# Patient Record
Sex: FEMALE | Race: WHITE | NOT HISPANIC OR LATINO | Employment: OTHER | URBAN - METROPOLITAN AREA
[De-identification: names, ages, dates, MRNs, and addresses within clinical notes are randomized per-mention and may not be internally consistent; named-entity substitution may affect disease eponyms.]

---

## 2017-01-19 ENCOUNTER — ANTICOAGULATION THERAPY VISIT (OUTPATIENT)
Dept: ANTICOAGULATION | Facility: CLINIC | Age: 82
End: 2017-01-19
Payer: COMMERCIAL

## 2017-01-19 DIAGNOSIS — Z79.01 LONG-TERM (CURRENT) USE OF ANTICOAGULANTS: Primary | ICD-10-CM

## 2017-01-19 LAB — INR POINT OF CARE: 2.4 (ref 0.86–1.14)

## 2017-01-19 PROCEDURE — 36416 COLLJ CAPILLARY BLOOD SPEC: CPT

## 2017-01-19 PROCEDURE — 85610 PROTHROMBIN TIME: CPT | Mod: QW

## 2017-01-19 PROCEDURE — 99207 ZZC NO CHARGE NURSE ONLY: CPT

## 2017-01-19 NOTE — PROGRESS NOTES
ANTICOAGULATION FOLLOW-UP CLINIC VISIT    Patient Name:  Madie Silva  Date:  1/19/2017  Contact Type:  Face to Face    SUBJECTIVE:     Patient Findings     Positives No Problem Findings           OBJECTIVE    INR PROTIME   Date Value Ref Range Status   01/19/2017 2.4* 0.86 - 1.14 Final       ASSESSMENT / PLAN  INR assessment THER    Recheck INR In: 6 WEEKS    INR Location Clinic      Anticoagulation Summary as of 1/19/2017     INR goal 2.0-3.0   Selected INR 2.4 (1/19/2017)   Maintenance plan 3 mg (3 mg x 1) on Mon, Wed, Fri; 4.5 mg (3 mg x 1.5) all other days   Full instructions 3 mg on Mon, Wed, Fri; 4.5 mg all other days   Weekly total 27 mg   Plan last modified Connie Hickman RN (5/19/2016)   Next INR check    Target end date     Indications   Long-term (current) use of anticoagulants [Z79.01] [Z79.01]  Phlebitis and thrombophlebitis of deep veins of lower extremities (H) [I80.209]  Acute myocardial infarction  initial episode of care (H) (Resolved) [I21.3]         Anticoagulation Episode Summary     INR check location     Preferred lab     Send INR reminders to [facility]    Comments             See the Encounter Report to view Anticoagulation Flowsheet and Dosing Calendar (Go to Encounters tab in chart review, and find the Anticoagulation Therapy Visit)    Dosage adjustment made based on physician directed care plan.    Connie Hickman RN

## 2017-01-19 NOTE — MR AVS SNAPSHOT
Madie Silva   1/19/2017 11:00 AM   Anticoagulation Therapy Visit    Description:  87 year old female   Provider:   ANTICOAGULATION CLINIC   Department:   Anti Coagulation           INR as of 1/19/2017     Selected INR 2.4 (1/19/2017)      Anticoagulation Summary as of 1/19/2017     INR goal 2.0-3.0   Selected INR 2.4 (1/19/2017)   Full instructions 3 mg on Mon, Wed, Fri; 4.5 mg all other days   Next INR check 3/2/2017    Indications   Long-term (current) use of anticoagulants [Z79.01] [Z79.01]  Phlebitis and thrombophlebitis of deep veins of lower extremities (H) [I80.209]  Acute myocardial infarction  initial episode of care (H) (Resolved) [I21.3]         Your next Anticoagulation Clinic appointment(s)     Jan 19, 2017 11:00 AM   Anticoagulation Visit with  ANTICOAGULATION CLINIC   [facility]    [address] [id number] [phone]            Mar 02, 2017 10:45 AM   Anticoagulation Visit with  ANTICOAGULATION CLINIC   [facility] ([facility])    [address] [address] [phone]              Contact Numbers     [facility]  Please call  [phone] to cancel and/or reschedule your appointment   Please call  [phone] with any problems or questions regarding your therapy.        January 2017 Details    Sun Mon Tue Wed Thu Fri Sat     1               2               3               4               5               6               7                 8               9               10               11               12               13               14                 15               16               17               18               19      4.5 mg   See details      20      3 mg         21      4.5 mg           22      4.5 mg         23      3 mg         24      4.5 mg         25      3 mg         26      4.5 mg         27      3 mg          28      4.5 mg           29      4.5 mg         30      3 mg         31      4.5 mg              Date Details   01/19 This INR check               How to take your warfarin dose     To take:  3 mg Take 1 of the 3 mg tablets.    To take:  4.5 mg Take 1.5 of the 3 mg tablets.           February 2017 Details    Sun Mon Tue Wed Thu Fri Sat        1      3 mg         2      4.5 mg         3      3 mg         4      4.5 mg           5      4.5 mg         6      3 mg         7      4.5 mg         8      3 mg         9      4.5 mg         10      3 mg         11      4.5 mg           12      4.5 mg         13      3 mg         14      4.5 mg         15      3 mg         16      4.5 mg         17      3 mg         18      4.5 mg           19      4.5 mg         20      3 mg         21      4.5 mg         22      3 mg         23      4.5 mg         24      3 mg         25      4.5 mg           26      4.5 mg         27      3 mg         28      4.5 mg              Date Details   No additional details            How to take your warfarin dose     To take:  3 mg Take 1 of the 3 mg tablets.    To take:  4.5 mg Take 1.5 of the 3 mg tablets.           March 2017 Details    Sun Mon Tue Wed Thu Fri Sat        1      3 mg         2            3               4                 5               6               7               8               9               10               11                 12               13               14               15               16               17               18                 19               20               21               22               23               24               25                 26               27               28               29               30               31                 Date Details   No additional details    Date of next INR:  3/2/2017         How to take your warfarin dose     To take:  3 mg Take 1 of the 3 mg tablets.    To take:  4.5 mg Take 1.5 of the 3 mg tablets.

## 2017-01-30 ENCOUNTER — ALLIED HEALTH/NURSE VISIT (OUTPATIENT)
Dept: CARDIOLOGY | Facility: CLINIC | Age: 82
End: 2017-01-30
Payer: COMMERCIAL

## 2017-01-30 DIAGNOSIS — Z95.0 CARDIAC PACEMAKER IN SITU: Primary | ICD-10-CM

## 2017-01-30 PROCEDURE — 93294 REM INTERROG EVL PM/LDLS PM: CPT | Performed by: INTERNAL MEDICINE

## 2017-01-30 PROCEDURE — 93296 REM INTERROG EVL PM/IDS: CPT | Performed by: INTERNAL MEDICINE

## 2017-01-30 NOTE — PROGRESS NOTES
[employer] Sensia (D) Remote PPM Device Check  AP: 90.6% : 3 %  Mode: DDDR       Presenting Rhythm: AP/VS  Heart Rate: Adequate rates per histogram  Sensing: Stable    Pacing Threshold: Stable    Impedance: Stable  Battery Status: 6.5-9.5 years  Atrial Arrhythmia: 12 brief mode switch episodes, no EGMs.   Ventricular Arrhythmia: 2 ventricular high rates. Marker only EGMs show As=Vs for PAT lasting 11-14 beats, rates 185-200bpm. Reviewed findings with REJI Lujan.      Care Plan: F/u PPM [facility] q 3 months. Gave patient results over the phone. Eden,CVT

## 2017-02-24 ENCOUNTER — TRANSFERRED RECORDS (OUTPATIENT)
Dept: HEALTH INFORMATION MANAGEMENT | Facility: CLINIC | Age: 82
End: 2017-02-24

## 2017-03-02 ENCOUNTER — ANTICOAGULATION THERAPY VISIT (OUTPATIENT)
Dept: ANTICOAGULATION | Facility: CLINIC | Age: 82
End: 2017-03-02
Payer: COMMERCIAL

## 2017-03-02 DIAGNOSIS — Z79.01 LONG-TERM (CURRENT) USE OF ANTICOAGULANTS: ICD-10-CM

## 2017-03-02 LAB — INR POINT OF CARE: 2.2 (ref 0.86–1.14)

## 2017-03-02 PROCEDURE — 99207 ZZC NO CHARGE NURSE ONLY: CPT

## 2017-03-02 PROCEDURE — 36416 COLLJ CAPILLARY BLOOD SPEC: CPT

## 2017-03-02 PROCEDURE — 85610 PROTHROMBIN TIME: CPT | Mod: QW

## 2017-03-02 NOTE — PROGRESS NOTES
ANTICOAGULATION FOLLOW-UP CLINIC VISIT    Patient Name:  Madie Silva  Date:  3/2/2017  Contact Type:  Face to Face    SUBJECTIVE:     Patient Findings     Positives No Problem Findings           OBJECTIVE    INR Protime   Date Value Ref Range Status   03/02/2017 2.2 (A) 0.86 - 1.14 Final       ASSESSMENT / PLAN  INR assessment THER    Recheck INR In: 6 WEEKS    INR Location Clinic      Anticoagulation Summary as of 3/2/2017     INR goal 2.0-3.0   Today's INR 2.2   Maintenance plan 3 mg (3 mg x 1) on Mon, Wed, Fri; 4.5 mg (3 mg x 1.5) all other days   Full instructions 3 mg on Mon, Wed, Fri; 4.5 mg all other days   Weekly total 27 mg   No change documented Connie Hickman RN   Plan last modified Connie Hickman RN (5/19/2016)   Next INR check 4/13/2017   Target end date     Indications   Long-term (current) use of anticoagulants [Z79.01] [Z79.01]  Phlebitis and thrombophlebitis of deep veins of lower extremities (H) [I80.209]  Acute myocardial infarction  initial episode of care (H) (Resolved) [I21.3]         Anticoagulation Episode Summary     INR check location     Preferred lab     Send INR reminders to  [facility]    Comments             See the Encounter Report to view Anticoagulation Flowsheet and Dosing Calendar (Go to Encounters tab in chart review, and find the Anticoagulation Therapy Visit)    Dosage adjustment made based on physician directed care plan.    Connie Hickman RN

## 2017-03-02 NOTE — MR AVS SNAPSHOT
Madie Silva   3/2/2017 10:45 AM   Anticoagulation Therapy Visit    Description:  87 year old female   Provider:   ANTICOAGULATION CLINIC   Department:   Anti Coagulation           INR as of 3/2/2017     Today's INR 2.2      Anticoagulation Summary as of 3/2/2017     INR goal 2.0-3.0   Today's INR 2.2   Full instructions 3 mg on Mon, Wed, Fri; 4.5 mg all other days   Next INR check 4/13/2017    Indications   Long-term (current) use of anticoagulants [Z79.01] [Z79.01]  Phlebitis and thrombophlebitis of deep veins of lower extremities (H) [I80.209]  Acute myocardial infarction  initial episode of care (H) (Resolved) [I21.3]         Your next Anticoagulation Clinic appointment(s)     Apr 13, 2017 10:45 AM CDT   Anticoagulation Visit with  ANTICOAGULATION CLINIC   [facility] ([facility]    [address] [place] [id number] [phone]              Contact Numbers     [facility]  Please call  [phone] to cancel and/or reschedule your appointment   Please call  [phone] with any problems or questions regarding your therapy.        March 2017 Details    Sun Mon Tue Wed Thu Fri Sat        1               2      4.5 mg   See details      3      3 mg         4      4.5 mg           5      4.5 mg         6      3 mg         7      4.5 mg         8      3 mg         9      4.5 mg         10      3 mg         11      4.5 mg           12      4.5 mg         13      3 mg         14      4.5 mg         15      3 mg         16      4.5 mg         17      3 mg         18      4.5 mg           19      4.5 mg         20      3 mg         21      4.5 mg         22      3 mg         23      4.5 mg         24      3 mg         25      4.5 mg           26      4.5 mg         27      3 mg         28      4.5 mg         29      3 mg         30      4.5 mg         31      3 mg           Date Details   03/02 This INR check               How to take your  warfarin dose     To take:  3 mg Take 1 of the 3 mg tablets.    To take:  4.5 mg Take 1.5 of the 3 mg tablets.           April 2017 Details    Sun Mon Tue Wed Thu Fri Sat           1      4.5 mg           2      4.5 mg         3      3 mg         4      4.5 mg         5      3 mg         6      4.5 mg         7      3 mg         8      4.5 mg           9      4.5 mg         10      3 mg         11      4.5 mg         12      3 mg         13            14               15                 16               17               18               19               20               21               22                 23               24               25               26               27               28               29                 30                      Date Details   No additional details    Date of next INR:  4/13/2017         How to take your warfarin dose     To take:  3 mg Take 1 of the 3 mg tablets.    To take:  4.5 mg Take 1.5 of the 3 mg tablets.

## 2017-04-13 ENCOUNTER — ANTICOAGULATION THERAPY VISIT (OUTPATIENT)
Dept: ANTICOAGULATION | Facility: CLINIC | Age: 82
End: 2017-04-13
Payer: COMMERCIAL

## 2017-04-13 DIAGNOSIS — Z79.01 LONG-TERM (CURRENT) USE OF ANTICOAGULANTS: ICD-10-CM

## 2017-04-13 LAB — INR POINT OF CARE: 3.2 (ref 0.86–1.14)

## 2017-04-13 PROCEDURE — 85610 PROTHROMBIN TIME: CPT | Mod: QW

## 2017-04-13 PROCEDURE — 99207 ZZC NO CHARGE NURSE ONLY: CPT

## 2017-04-13 PROCEDURE — 36416 COLLJ CAPILLARY BLOOD SPEC: CPT

## 2017-04-13 NOTE — PROGRESS NOTES
ANTICOAGULATION FOLLOW-UP CLINIC VISIT    Patient Name:  Madie Silva  Date:  4/13/2017  Contact Type:  Face to Face    SUBJECTIVE:     Patient Findings     Positives Change in diet/appetite (less greens than usual )           OBJECTIVE    INR Protime   Date Value Ref Range Status   04/13/2017 3.2 (A) 0.86 - 1.14 Final       ASSESSMENT / PLAN  INR assessment SUPRA    Recheck INR In: 6 WEEKS    INR Location Clinic      Anticoagulation Summary as of 4/13/2017     INR goal 2.0-3.0   Today's INR 3.2!   Maintenance plan 3 mg (3 mg x 1) on Mon, Wed, Fri; 4.5 mg (3 mg x 1.5) all other days   Full instructions 4/14: 4.5 mg; Otherwise 3 mg on Mon, Wed, Fri; 4.5 mg all other days   Weekly total 27 mg   Plan last modified Connei Hickman RN (5/19/2016)   Next INR check 5/25/2017   Target end date     Indications   Long-term (current) use of anticoagulants [Z79.01] [Z79.01]  Phlebitis and thrombophlebitis of deep veins of lower extremities (H) [I80.209]  Acute myocardial infarction  initial episode of care (H) (Resolved) [I21.3]         Anticoagulation Episode Summary     INR check location     Preferred lab     Send INR reminders to  [facility]    Comments             See the Encounter Report to view Anticoagulation Flowsheet and Dosing Calendar (Go to Encounters tab in chart review, and find the Anticoagulation Therapy Visit)    Dosage adjustment made based on physician directed care plan.    Connie Hickman RN

## 2017-04-13 NOTE — MR AVS SNAPSHOT
Madie Silva   4/13/2017 10:45 AM   Anticoagulation Therapy Visit    Description:  87 year old female   Provider:   ANTICOAGULATION CLINIC   Department:   Anti Coagulation           INR as of 4/13/2017     Today's INR 3.2!      Anticoagulation Summary as of 4/13/2017     INR goal 2.0-3.0   Today's INR 3.2!   Full instructions 4/13: 3 mg; Otherwise 3 mg on Mon, Wed, Fri; 4.5 mg all other days   Next INR check 5/25/2017    Indications   Long-term (current) use of anticoagulants [Z79.01] [Z79.01]  Phlebitis and thrombophlebitis of deep veins of lower extremities (H) [I80.209]  Acute myocardial infarction  initial episode of care (H) (Resolved) [I21.3]         Your next Anticoagulation Clinic appointment(s)     May 25, 2017 10:45 AM CDT   Anticoagulation Visit with  ANTICOAGULATION CLINIC   [facility] ([facility]    [address] [place] [id number] [phone]              Contact Numbers     [facility]  Please call  [phone] to cancel and/or reschedule your appointment   Please call  [phone] with any problems or questions regarding your therapy.        April 2017 Details    Sun Mon Tue Wed Thu Fri Sat           1                 2               3               4               5               6               7               8                 9               10               11               12               13      3 mg   See details      14      3 mg         15      4.5 mg           16      4.5 mg         17      3 mg         18      4.5 mg         19      3 mg         20      4.5 mg         21      3 mg         22      4.5 mg           23      4.5 mg         24      3 mg         25      4.5 mg         26      3 mg         27      4.5 mg         28      3 mg         29      4.5 mg           30      4.5 mg                Date Details   04/13 This INR check               How to take your warfarin dose     To take:  3 mg Take 1 of  the 3 mg tablets.    To take:  4.5 mg Take 1.5 of the 3 mg tablets.           May 2017 Details    Sun Mon Tue Wed Thu Fri Sat      1      3 mg         2      4.5 mg         3      3 mg         4      4.5 mg         5      3 mg         6      4.5 mg           7      4.5 mg         8      3 mg         9      4.5 mg         10      3 mg         11      4.5 mg         12      3 mg         13      4.5 mg           14      4.5 mg         15      3 mg         16      4.5 mg         17      3 mg         18      4.5 mg         19      3 mg         20      4.5 mg           21      4.5 mg         22      3 mg         23      4.5 mg         24      3 mg         25            26               27                 28               29               30               31                   Date Details   No additional details    Date of next INR:  5/25/2017         How to take your warfarin dose     To take:  3 mg Take 1 of the 3 mg tablets.    To take:  4.5 mg Take 1.5 of the 3 mg tablets.

## 2017-04-24 ENCOUNTER — TRANSFERRED RECORDS (OUTPATIENT)
Dept: HEALTH INFORMATION MANAGEMENT | Facility: CLINIC | Age: 82
End: 2017-04-24

## 2017-05-08 ENCOUNTER — ALLIED HEALTH/NURSE VISIT (OUTPATIENT)
Dept: CARDIOLOGY | Facility: CLINIC | Age: 82
End: 2017-05-08
Payer: COMMERCIAL

## 2017-05-08 DIAGNOSIS — Z95.0 CARDIAC PACEMAKER IN SITU: Primary | ICD-10-CM

## 2017-05-08 PROCEDURE — 93294 REM INTERROG EVL PM/LDLS PM: CPT | Performed by: INTERNAL MEDICINE

## 2017-05-08 PROCEDURE — 93296 REM INTERROG EVL PM/IDS: CPT | Performed by: INTERNAL MEDICINE

## 2017-05-08 NOTE — PROGRESS NOTES
[employer] Sensia (D) Remote PPM Device Check  AP: 89 % : 2 %  Mode: DDDR        Presenting Rhythm: AP/VS  Heart Rate: Adequate rates per histogram  Sensing: Stable    Pacing Threshold: Stable    Impedance: Stable  Battery Status: 6-9 years  Atrial Arrhythmia: 24 mode switch episodes comprising <1% of the time. No EGMs for review.  Ventricular Arrhythmia: 1 ventricular high rate. Marker only EGM shows regular VS events lasting 6 beats, rates 190-220bpm. EF 55-60% (2012). No associated symptoms. Reviewed with REJI Lujan.      Care Plan: F/u annual threshold in 3 months. Gave patient results over the phone. [place],CVT

## 2017-05-12 DIAGNOSIS — E11.21 TYPE 2 DIABETES MELLITUS WITH DIABETIC NEPHROPATHY, WITHOUT LONG-TERM CURRENT USE OF INSULIN (H): ICD-10-CM

## 2017-05-12 DIAGNOSIS — E78.5 HYPERLIPIDEMIA LDL GOAL <100: ICD-10-CM

## 2017-05-12 DIAGNOSIS — E03.9 ACQUIRED HYPOTHYROIDISM: ICD-10-CM

## 2017-05-12 DIAGNOSIS — N18.30 CKD (CHRONIC KIDNEY DISEASE) STAGE 3, GFR 30-59 ML/MIN (H): ICD-10-CM

## 2017-05-12 DIAGNOSIS — E55.9 VITAMIN D DEFICIENCY: ICD-10-CM

## 2017-05-12 DIAGNOSIS — I10 ESSENTIAL HYPERTENSION WITH GOAL BLOOD PRESSURE LESS THAN 140/90: ICD-10-CM

## 2017-05-12 LAB
ALT SERPL W P-5'-P-CCNC: 27 U/L (ref 0–50)
ANION GAP SERPL CALCULATED.3IONS-SCNC: 10 MMOL/L (ref 3–14)
BUN SERPL-MCNC: 18 MG/DL (ref 7–30)
CALCIUM SERPL-MCNC: 9.1 MG/DL (ref 8.5–10.1)
CHLORIDE SERPL-SCNC: 106 MMOL/L (ref 94–109)
CHOLEST SERPL-MCNC: 193 MG/DL
CO2 SERPL-SCNC: 23 MMOL/L (ref 20–32)
CREAT SERPL-MCNC: 0.91 MG/DL (ref 0.52–1.04)
GFR SERPL CREATININE-BSD FRML MDRD: 58 ML/MIN/1.7M2
GLUCOSE SERPL-MCNC: 146 MG/DL (ref 70–99)
HBA1C MFR BLD: 7.3 % (ref 4.3–6)
HDLC SERPL-MCNC: 59 MG/DL
HGB BLD-MCNC: 12.8 G/DL (ref 11.7–15.7)
LDLC SERPL CALC-MCNC: 86 MG/DL
NONHDLC SERPL-MCNC: 134 MG/DL
POTASSIUM SERPL-SCNC: 4.5 MMOL/L (ref 3.4–5.3)
SODIUM SERPL-SCNC: 139 MMOL/L (ref 133–144)
TRIGL SERPL-MCNC: 240 MG/DL
TSH SERPL DL<=0.005 MIU/L-ACNC: 1.13 MU/L (ref 0.4–4)

## 2017-05-12 PROCEDURE — 36415 COLL VENOUS BLD VENIPUNCTURE: CPT | Performed by: INTERNAL MEDICINE

## 2017-05-12 PROCEDURE — 80061 LIPID PANEL: CPT | Performed by: INTERNAL MEDICINE

## 2017-05-12 PROCEDURE — 82306 VITAMIN D 25 HYDROXY: CPT | Performed by: INTERNAL MEDICINE

## 2017-05-12 PROCEDURE — 85018 HEMOGLOBIN: CPT | Performed by: INTERNAL MEDICINE

## 2017-05-12 PROCEDURE — 83036 HEMOGLOBIN GLYCOSYLATED A1C: CPT | Performed by: INTERNAL MEDICINE

## 2017-05-12 PROCEDURE — 84460 ALANINE AMINO (ALT) (SGPT): CPT | Performed by: INTERNAL MEDICINE

## 2017-05-12 PROCEDURE — 80048 BASIC METABOLIC PNL TOTAL CA: CPT | Performed by: INTERNAL MEDICINE

## 2017-05-12 PROCEDURE — 84443 ASSAY THYROID STIM HORMONE: CPT | Performed by: INTERNAL MEDICINE

## 2017-05-15 LAB — DEPRECATED CALCIDIOL+CALCIFEROL SERPL-MC: 72 UG/L (ref 20–75)

## 2017-05-19 ENCOUNTER — OFFICE VISIT (OUTPATIENT)
Dept: INTERNAL MEDICINE | Facility: CLINIC | Age: 82
End: 2017-05-19
Payer: COMMERCIAL

## 2017-05-19 VITALS
TEMPERATURE: 97.5 F | SYSTOLIC BLOOD PRESSURE: 120 MMHG | HEIGHT: 65 IN | DIASTOLIC BLOOD PRESSURE: 56 MMHG | HEART RATE: 61 BPM | WEIGHT: 163.8 LBS | BODY MASS INDEX: 27.29 KG/M2 | OXYGEN SATURATION: 97 %

## 2017-05-19 DIAGNOSIS — I48.0 PAROXYSMAL ATRIAL FIBRILLATION (H): ICD-10-CM

## 2017-05-19 DIAGNOSIS — E11.21 TYPE 2 DIABETES MELLITUS WITH DIABETIC NEPHROPATHY, WITHOUT LONG-TERM CURRENT USE OF INSULIN (H): Primary | ICD-10-CM

## 2017-05-19 DIAGNOSIS — E03.9 ACQUIRED HYPOTHYROIDISM: ICD-10-CM

## 2017-05-19 DIAGNOSIS — E78.5 HYPERLIPIDEMIA LDL GOAL <100: ICD-10-CM

## 2017-05-19 DIAGNOSIS — I10 ESSENTIAL HYPERTENSION: ICD-10-CM

## 2017-05-19 DIAGNOSIS — M25.562 LEFT KNEE PAIN, UNSPECIFIED CHRONICITY: ICD-10-CM

## 2017-05-19 DIAGNOSIS — Z13.89 SCREENING FOR DIABETIC PERIPHERAL NEUROPATHY: ICD-10-CM

## 2017-05-19 DIAGNOSIS — B02.29 POST HERPETIC NEURALGIA: ICD-10-CM

## 2017-05-19 DIAGNOSIS — E55.9 VITAMIN D DEFICIENCY: ICD-10-CM

## 2017-05-19 DIAGNOSIS — G25.81 RESTLESS LEG SYNDROME: ICD-10-CM

## 2017-05-19 PROCEDURE — 99207 C FOOT EXAM  NO CHARGE: CPT | Performed by: INTERNAL MEDICINE

## 2017-05-19 PROCEDURE — 99214 OFFICE O/P EST MOD 30 MIN: CPT | Performed by: INTERNAL MEDICINE

## 2017-05-19 RX ORDER — GABAPENTIN 100 MG/1
CAPSULE ORAL
Qty: 450 CAPSULE | Refills: 1 | Status: SHIPPED | OUTPATIENT
Start: 2017-05-19 | End: 2018-01-16

## 2017-05-19 RX ORDER — ATORVASTATIN CALCIUM 80 MG/1
80 TABLET, FILM COATED ORAL DAILY
Qty: 90 TABLET | Status: SHIPPED | OUTPATIENT
Start: 2017-05-19 | End: 2018-06-26

## 2017-05-19 RX ORDER — HYDROCODONE BITARTRATE AND ACETAMINOPHEN 5; 325 MG/1; MG/1
.5-1 TABLET ORAL EVERY 6 HOURS PRN
Qty: 20 TABLET | Refills: 0 | Status: SHIPPED | OUTPATIENT
Start: 2017-05-19 | End: 2018-08-02

## 2017-05-19 NOTE — MR AVS SNAPSHOT
After Visit Summary   5/19/2017    Madie Silva    MRN: [id number]           Patient Information     Date Of Birth          7/21/1929        Visit Information        Provider Department      5/19/2017 9:00 AM De Obregon MD [facility]        Today's Diagnoses     Screening for diabetic peripheral neuropathy    -  1    Type 2 diabetes mellitus with diabetic nephropathy, without long-term current use of insulin (H)        Atrial fibrillation, Paroxysmal (HCC)        Hyperlipidemia LDL goal <100        Left knee pain, unspecified chronicity        Post herpetic neuralgia        Essential hypertension        Hypothyroidism        Vitamin D deficiency        Restless leg syndrome          Care Instructions    PLAN:                                                      1.  MEDICATIONS:        - Increase gabapentin to 100 mg in am, 100 mg midday, and 300 mg at bedtime        - Continue other medications without change  2.  Follow-up in 6 weeks  3.  Orthopedic consultation regarding knee problems  4.  Check fasting labs in 6 months, then make an office appointment with MD to review the results.         Follow-ups after your visit        Additional Services     ORTHO  REFERRAL       [facility] is referring you to the Orthopedic  Services at [facility].       The  Representative will assist you in the coordination of your Orthopedic and Musculoskeletal Care as prescribed by your physician.    The  Representative will call you within 1 business day to help schedule your appointment, or you may contact the  Representative at:    All areas ~ [phone]     Type of Referral : Surgical / Specialist  -- Dr. Dunaway or [place]       Timeframe requested: 3 - 5 days    Coverage of these services is subject to the terms and limitations of your health insurance plan.  Please call member services at your  health plan with any benefit or coverage questions.      If X-rays, CT or MRI's have been performed, please contact the facility where they were done to arrange for , prior to your scheduled appointment.  Please bring this referral request to your appointment and present it to your specialist.                  Your next 10 appointments already scheduled     May 25, 2017 10:45 AM CDT   Anticoagulation Visit with  ANTICOAGULATION CLINIC   [facility]    [address] [id number] [phone]            Aug 11, 2017 10:30 AM CDT   Pacemaker Check with RUBIN MANCUSO   [facility] [place] ([facility])    [address] [place] [address] [address]  [facility] [id number] [phone]            Aug 11, 2017 11:15 AM CDT   Return Visit with Andrew Red MD   [facility] [place] ([facility])    [address] [place] [address] [address]  [facility] [id number] [phone]              Who to contact     If you have questions or need follow up information about today's clinic visit or your schedule please contact [facility] directly at [phone].  Normal or non-critical lab and imaging results will be communicated to you by [employer], letter or phone within 4 business days after the clinic has received the results. If you do not hear from us within 7 days, please contact the clinic through [employer] or phone. If you have a critical or abnormal lab result, we will notify you by phone as soon as possible.  Submit refill requests through [employer] or call your pharmacy and they will forward the refill request to us. Please allow 3 business days for your refill to be completed.          Additional Information About Your Visit        [employer] Information     [employer] lets you send messages to your doctor, view your test results, renew your prescriptions,  "schedule appointments and more. To sign up, go to [email or web address] . Click on \"Log in\" on the left side of the screen, which will take you to the Welcome page. Then click on \"Sign up Now\" on the right side of the page.     You will be asked to enter the access code listed below, as well as some personal information. Please follow the directions to create your username and password.     Your access code is: [email or web address]  Expires: 2017  2:07 PM     Your access code will  in 90 days. If you need help or a new code, please call your [facility] clinic or [phone].        Care EveryWhere ID     This is your Care EveryWhere ID. This could be used by other organizations to access your [facility] medical records  [id number]        Your Vitals Were     Pulse Temperature Height Pulse Oximetry BMI (Body Mass Index)       61 97.5  F (36.4  C) (Oral) 5' 5\" (1.651 m) 97% 27.26 kg/m2        Blood Pressure from Last 3 Encounters:   17 120/56   16 120/58   16 138/60    Weight from Last 3 Encounters:   17 163 lb 12.8 oz (74.3 kg)   16 160 lb 12.8 oz (72.9 kg)   16 158 lb (71.7 kg)              We Performed the Following     FOOT EXAM  NO CHARGE [42037.114]     ORTHO  REFERRAL          Today's Medication Changes          These changes are accurate as of: 17 10:14 AM.  If you have any questions, ask your nurse or doctor.               These medicines have changed or have updated prescriptions.        Dose/Directions    gabapentin 100 MG capsule   Commonly known as:  NEURONTIN   This may have changed:    - how much to take  - how to take this  - when to take this  - additional instructions   Used for:  Post herpetic neuralgia, Restless leg syndrome   Changed by:  De Obregon MD        Take one in am, one midday, and three at bedtime   Quantity:  450 capsule   Refills:  1            Where to get your medicines      These medications were sent to [facility] " [place], [place] - [address]  [address] [facility] [address]     Phone:  [phone]     atorvastatin 80 MG tablet    gabapentin 100 MG capsule    metFORMIN 500 MG tablet         Some of these will need a paper prescription and others can be bought over the counter.  Ask your nurse if you have questions.     Bring a paper prescription for each of these medications     HYDROcodone-acetaminophen 5-325 MG per tablet                Primary Care Provider Office Phone # Fax #    De Obregon [phone]       [facility] ([facility]3] [address]  [facility] [address]        Thank you!     Thank you for choosing [facility]  for your care. Our goal is always to provide you with excellent care. Hearing back from our patients is one way we can continue to improve our services. Please take a few minutes to complete the written survey that you may receive in the mail after your visit with us. Thank you!             Your Updated Medication List - Protect others around you: Learn how to safely use, store and throw away your medicines at www.disposemymeds.org.          This list is accurate as of: 5/19/17 10:14 AM.  Always use your most recent med list.                   Brand Name Dispense Instructions for use    acetaminophen 650 MG CR tablet    TYLENOL ARTHRITIS PAIN     Take 2 tablets (1,300 mg) by mouth 2 times daily       aspirin 81 MG EC tablet      Take 1 tablet by mouth daily.       atorvastatin 80 MG tablet    LIPITOR    90 tablet    Take 1 tablet (80 mg) by mouth daily       blood glucose monitoring test strip    ONE TOUCH ULTRA    3 Month    by In Vitro route.       calcium 600 MG tablet      Take 1 tablet by mouth 2 times daily.       * PRESERVISION AREDS 2 PO          * CENTRUM SILVER per tablet      Take 1 tablet by mouth daily.       co-enzyme Q-10 100 MG Caps capsule      Take 1 capsule by mouth daily.       denosumab 60 MG/ML Soln  injection    PROLIA    1 mL    Inject 1 mL (60 mg) Subcutaneous every 6 months       gabapentin 100 MG capsule    NEURONTIN    450 capsule    Take one in am, one midday, and three at bedtime       HYDROcodone-acetaminophen 5-325 MG per tablet    NORCO    20 tablet    Take 0.5-1 tablets by mouth every 6 hours as needed for moderate to severe pain       levothyroxine 50 MCG tablet    SYNTHROID/LEVOTHROID    90 tablet    Take 1 tablet (50 mcg) by mouth daily       metFORMIN 500 MG tablet    GLUCOPHAGE    90 tablet    Take 1 tablet (500 mg) by mouth daily (with breakfast)       methocarbamol 500 MG tablet    ROBAXIN    50 tablet    Take 1-2 tablets (500-1,000 mg) by mouth 4 times daily as needed for muscle spasms       nitroglycerin 0.4 MG sublingual tablet    NITROSTAT     Place 0.4 mg under the tongue every 5 minutes as needed. Up to 3 doses per episode       omeprazole 20 MG CR capsule    priLOSEC    90 capsule    Take 1 capsule (20 mg) by mouth every morning (before breakfast)       polyethylene glycol powder    MIRALAX     Take 17 g by mouth daily       warfarin 3 MG tablet    COUMADIN    108 tablet    3 mg on Mon, Wed, Fri; 4.5 mg all other days,  as directed by [facility]       * Notice:  This list has 2 medication(s) that are the same as other medications prescribed for you. Read the directions carefully, and ask your doctor or other care provider to review them with you.

## 2017-05-19 NOTE — PROGRESS NOTES
"  SUBJECTIVE:                                                    Madie Silva is a 87 year old female who presents to clinic today for the following health issues:      Diabetes Follow-up      Patient is checking blood sugars: not at all    Diabetic concerns: None     Symptoms of hypoglycemia (low blood sugar): none     Paresthesias (numbness or burning in feet) or sores: No     Date of last diabetic eye exam: doesn't recall     Hyperlipidemia Follow-Up      Rate your low fat/cholesterol diet?: fair    Taking statin?  Yes, no muscle aches from statin    Other lipid medications/supplements?:  none       Amount of exercise or physical activity: None    Problems taking medications regularly: No    Medication side effects: none    Diet: regular (no restrictions)        Reviewed and updated as needed this visit by clinical staff:  Medications  Allergies  Soc Hx   Additional history: as documented    Additional issues to address:  1.  Patient continues to have pain from under R scapula down to waist - same location as shingles.   No rash.  - tries tylenol to help sleep, no help.  - when this doesn't work, takes a half of a hydrocodone, which helps her sleep.   Will have an anxious feeling, like she has to move.   Getting up at night to do an activity helps.  2.  Left leg pain is worse, behind the knee.  Cannot cross her legs without significant pain.   L leg has to be out straight to sleep.   3.  Grandson has moved in with her.  He has depression, under treatment.   He is not working.  This is an imposition for her.      ROS:    Constitutional, HEENT, cardiovascular, pulmonary, gi and gu systems are negative, except as otherwise noted.    OBJECTIVE:                                                      /56  Pulse 61  Temp 97.5  F (36.4  C) (Oral)  Ht 5' 5\" (1.651 m)  Wt 163 lb 12.8 oz (74.3 kg)  SpO2 97%  BMI 27.26 kg/m2  Body mass index is 27.26 kg/(m^2).   No apparent distress  Neck, thyroid normal  Reg " heart tones without ectopy  No edema    Full L knee exam not done     ASSESSMENT:                                                      DIABETES TYPE 2, non-insulin dependent, slightly worsened. Associated with the following: Renal Complications: Diabetic Nephropathy  HYPERLIPIDEMIA, controlled  HYPERTENSION, controlled. Associated with the following complications: Heart Disease and Diabetes  HYPOTHYROIDISM, controlled/euthyroid                                 ASCVD, stable  PAROXYSMAL ATRIAL FIB, no problems/symptoms   POST HERPETIC NEURALGIA.   Ongoing symptoms.  Discussed trial of nerve block, increased dose of medication, etc.  L KNEE PAIN.  Rule out Woo's cyst, other  Probable RLS    PLAN:                                                      1.  MEDICATIONS:        - Increase gabapentin to 100 mg in am, 100 mg midday, and 300 mg at bedtime        - Continue other medications without change  2.  Follow-up in 6 weeks  3.  Orthopedic consultation regarding knee problems  4.  Check fasting labs in 6 months, then make an office appointment with MD to review the results.     De Obregon MD  [facility]

## 2017-05-19 NOTE — NURSING NOTE
"Chief Complaint   Patient presents with     Diabetes     Lipids       Initial /56  Pulse 61  Temp 97.5  F (36.4  C) (Oral)  Ht 5' 5\" (1.651 m)  Wt 163 lb 12.8 oz (74.3 kg)  SpO2 97%  BMI 27.26 kg/m2 Estimated body mass index is 27.26 kg/(m^2) as calculated from the following:    Height as of this encounter: 5' 5\" (1.651 m).    Weight as of this encounter: 163 lb 12.8 oz (74.3 kg).  Medication Reconciliation: complete   Skylar Fernandez MA   "

## 2017-05-25 ENCOUNTER — ANTICOAGULATION THERAPY VISIT (OUTPATIENT)
Dept: ANTICOAGULATION | Facility: CLINIC | Age: 82
End: 2017-05-25
Payer: COMMERCIAL

## 2017-05-25 DIAGNOSIS — Z79.01 LONG-TERM (CURRENT) USE OF ANTICOAGULANTS: ICD-10-CM

## 2017-05-25 DIAGNOSIS — I80.209 PHLEBITIS AND THROMBOPHLEBITIS OF DEEP VEINS OF LOWER EXTREMITIES (H): ICD-10-CM

## 2017-05-25 LAB — INR POINT OF CARE: 3.1 (ref 0.86–1.14)

## 2017-05-25 PROCEDURE — 85610 PROTHROMBIN TIME: CPT | Mod: QW

## 2017-05-25 PROCEDURE — 36416 COLLJ CAPILLARY BLOOD SPEC: CPT

## 2017-05-25 PROCEDURE — 99207 ZZC NO CHARGE NURSE ONLY: CPT

## 2017-05-25 NOTE — PROGRESS NOTES
ANTICOAGULATION FOLLOW-UP CLINIC VISIT    Patient Name:  Madie Silva  Date:  5/25/2017  Contact Type:  Face to Face    SUBJECTIVE:     Patient Findings     Positives No Problem Findings           OBJECTIVE    INR Protime   Date Value Ref Range Status   05/25/2017 3.1 (A) 0.86 - 1.14 Final       ASSESSMENT / PLAN  INR assessment THER    Recheck INR In: 6 WEEKS    INR Location Clinic      Anticoagulation Summary as of 5/25/2017     INR goal 2.0-3.0   Today's INR 3.1!   Maintenance plan 4.5 mg (3 mg x 1.5) on Sun, Tue, Sat; 3 mg (3 mg x 1) all other days   Full instructions 5/25: 3 mg; Otherwise 4.5 mg on Sun, Tue, Sat; 3 mg all other days   Weekly total 25.5 mg   Plan last modified Stella Cha, RN (5/25/2017)   Next INR check 7/6/2017   Target end date     Indications   Long-term (current) use of anticoagulants [Z79.01] [Z79.01]  Phlebitis and thrombophlebitis of deep veins of lower extremities (H) [I80.209]  Acute myocardial infarction  initial episode of care (H) (Resolved) [I21.3]         Anticoagulation Episode Summary     INR check location     Preferred lab     Send INR reminders to  [facility]    Comments             See the Encounter Report to view Anticoagulation Flowsheet and Dosing Calendar (Go to Encounters tab in chart review, and find the Anticoagulation Therapy Visit)        Stella Cha RN

## 2017-05-25 NOTE — MR AVS SNAPSHOT
Madie Silva   5/25/2017 10:45 AM   Anticoagulation Therapy Visit    Description:  87 year old female   Provider:   ANTICOAGULATION CLINIC   Department:   Anti Coagulation           INR as of 5/25/2017     Today's INR 3.1!      Anticoagulation Summary as of 5/25/2017     INR goal 2.0-3.0   Today's INR 3.1!   Full instructions 5/25: 3 mg; Otherwise 4.5 mg on Sun, Tue, Sat; 3 mg all other days   Next INR check 7/6/2017    Indications   Long-term (current) use of anticoagulants [Z79.01] [Z79.01]  Phlebitis and thrombophlebitis of deep veins of lower extremities (H) [I80.209]  Acute myocardial infarction  initial episode of care (H) (Resolved) [I21.3]         Your next Anticoagulation Clinic appointment(s)     Jul 06, 2017 10:45 AM CDT   Anticoagulation Visit with  ANTICOAGULATION CLINIC   [facility]    [address] [place] [id number] [phone]              Contact Numbers     [facility]  Please call  [phone] to cancel and/or reschedule your appointment   Please call  [phone] with any problems or questions regarding your therapy.        May 2017 Details    Sun Mon Tue Wed Thu Fri Sat      1               2               3               4               5               6                 7               8               9               10               11               12               13                 14               15               16               17               18               19               20                 21               22               23               24               25      3 mg   See details      26      3 mg         27      4.5 mg           28      4.5 mg         29      3 mg         30      4.5 mg         31      3 mg             Date Details   05/25 This INR check               How to take your warfarin dose     To take:  3 mg Take 1 of the 3 mg tablets.    To take:  4.5 mg Take 1.5 of the  3 mg tablets.           June 2017 Details    Sun Mon Tue Wed Thu Fri Sat         1      3 mg         2      3 mg         3      4.5 mg           4      4.5 mg         5      3 mg         6      4.5 mg         7      3 mg         8      3 mg         9      3 mg         10      4.5 mg           11      4.5 mg         12      3 mg         13      4.5 mg         14      3 mg         15      3 mg         16      3 mg         17      4.5 mg           18      4.5 mg         19      3 mg         20      4.5 mg         21      3 mg         22      3 mg         23      3 mg         24      4.5 mg           25      4.5 mg         26      3 mg         27      4.5 mg         28      3 mg         29      3 mg         30      3 mg           Date Details   No additional details            How to take your warfarin dose     To take:  3 mg Take 1 of the 3 mg tablets.    To take:  4.5 mg Take 1.5 of the 3 mg tablets.           July 2017 Details    Sun Mon Tue Wed Thu Fri Sat           1      4.5 mg           2      4.5 mg         3      3 mg         4      4.5 mg         5      3 mg         6            7               8                 9               10               11               12               13               14               15                 16               17               18               19               20               21               22                 23               24               25               26               27               28               29                 30               31                     Date Details   No additional details    Date of next INR:  7/6/2017         How to take your warfarin dose     To take:  3 mg Take 1 of the 3 mg tablets.    To take:  4.5 mg Take 1.5 of the 3 mg tablets.

## 2017-06-01 ENCOUNTER — RADIANT APPOINTMENT (OUTPATIENT)
Dept: GENERAL RADIOLOGY | Facility: CLINIC | Age: 82
End: 2017-06-01
Attending: ORTHOPAEDIC SURGERY
Payer: COMMERCIAL

## 2017-06-01 DIAGNOSIS — R52 PAIN: ICD-10-CM

## 2017-06-01 PROCEDURE — 73564 X-RAY EXAM KNEE 4 OR MORE: CPT | Mod: TC

## 2017-06-27 ENCOUNTER — OFFICE VISIT (OUTPATIENT)
Dept: INTERNAL MEDICINE | Facility: CLINIC | Age: 82
End: 2017-06-27
Payer: COMMERCIAL

## 2017-06-27 VITALS
HEIGHT: 65 IN | SYSTOLIC BLOOD PRESSURE: 122 MMHG | OXYGEN SATURATION: 97 % | HEART RATE: 66 BPM | TEMPERATURE: 98.2 F | WEIGHT: 161.2 LBS | DIASTOLIC BLOOD PRESSURE: 60 MMHG | BODY MASS INDEX: 26.86 KG/M2

## 2017-06-27 DIAGNOSIS — B02.29 POST HERPETIC NEURALGIA: Primary | ICD-10-CM

## 2017-06-27 DIAGNOSIS — E03.9 ACQUIRED HYPOTHYROIDISM: ICD-10-CM

## 2017-06-27 DIAGNOSIS — G25.81 RESTLESS LEG SYNDROME: ICD-10-CM

## 2017-06-27 PROCEDURE — 99213 OFFICE O/P EST LOW 20 MIN: CPT | Performed by: INTERNAL MEDICINE

## 2017-06-27 RX ORDER — LEVOTHYROXINE SODIUM 50 UG/1
50 TABLET ORAL DAILY
Qty: 90 TABLET | Refills: 3 | Status: SHIPPED | OUTPATIENT
Start: 2017-06-27 | End: 2018-09-11

## 2017-06-27 NOTE — MR AVS SNAPSHOT
After Visit Summary   6/27/2017    Madie Silva    MRN: [id number]           Patient Information     Date Of Birth          7/21/1929        Visit Information        Provider Department      6/27/2017 10:00 AM De Obregon MD [facility]        Care Instructions    PLAN:                                                      1.  MEDICATIONS:  Continue current medications without change  2.  Follow-up with Dr. El for knee  3.  Recheck labs and follow-up in November          Follow-ups after your visit        Your next 10 appointments already scheduled     Jul 06, 2017 10:45 AM CDT   Anticoagulation Visit with [facility]   [facility] ([facility])    [address] [id number] [phone]            Aug 11, 2017 10:30 AM CDT   Pacemaker Check with RUBIN MANCUSO   [facility] [place] ([facility])    [address] [place] [address] [address]  [facility] [address] [phone]            Aug 11, 2017 11:15 AM CDT   Return Visit with Andrew Red MD   [facility] [place] ([facility])    [address] [place] [id number] [phone]              Who to contact     If you have questions or need follow up information about today's clinic visit or your schedule please contact [facility] directly at [phone].  Normal or non-critical lab and imaging results will be communicated to you by MyChart, letter or phone within 4 business days after the clinic has received the results. If you do not hear from us within 7 days, please contact the clinic through MyChart or phone. If you have a critical or abnormal lab result, we will notify you by phone as soon as possible.  Submit refill requests through [employer] or call your pharmacy and they will forward the refill request to us.  "Please allow 3 business days for your refill to be completed.          Additional Information About Your Visit        MyChart Information     [employer] System[employer] lets you send messages to your doctor, view your test results, renew your prescriptions, schedule appointments and more. To sign up, go to [email or web address] . Click on \"Log in\" on the left side of the screen, which will take you to the Welcome page. Then click on \"Sign up Now\" on the right side of the page.     You will be asked to enter the access code listed below, as well as some personal information. Please follow the directions to create your username and password.     Your access code is: [email or web address]  Expires: 2017  2:07 PM     Your access code will  in 90 days. If you need help or a new code, please call your [facility] clinic or [phone].        Care EveryWhere ID     This is your [facility] EveryWhere ID. This could be used by other organizations to access your [facility] medical records  [id number]        Your Vitals Were     Pulse Temperature Height Pulse Oximetry BMI (Body Mass Index)       66 98.2  F (36.8  C) (Oral) 5' 5\" (1.651 m) 97% 26.83 kg/m2        Blood Pressure from Last 3 Encounters:   17 122/60   17 120/56   16 120/58    Weight from Last 3 Encounters:   17 161 lb 3.2 oz (73.1 kg)   17 163 lb 12.8 oz (74.3 kg)   16 160 lb 12.8 oz (72.9 kg)              Today, you had the following     No orders found for display         Today's Medication Changes          These changes are accurate as of: 17 11:03 AM.  If you have any questions, ask your nurse or doctor.               Stop taking these medicines if you haven't already. Please contact your care team if you have questions.     methocarbamol 500 MG tablet   Commonly known as:  ROBAXIN   Stopped by:  De Obregon MD                    Primary Care Provider Office Phone # Fax #    De Obregon [phone]       [facility] [address] [facility] [address]        Equal Access to Services     DEBISHERITA SHERIDAN : Hadii og em hadpablo Somarcelaali, waaxda luqadaha, qaybta kaalmada mahadsuze, waxkyra carroll karliegiles tobinalisson ling tobias burdick. So [facility] [phone].    ATENCIÓN: Si habla español, tiene a rodas disposición servicios gratuitos de asistencia lingüística. Andresame al [phone].    We comply with applicable federal civil rights laws and [place] laws. We do not discriminate on the basis of race, color, national origin, age, disability sex, sexual orientation or gender identity.            Thank you!     Thank you for choosing [facility]  for your care. Our goal is always to provide you with excellent care. Hearing back from our patients is one way we can continue to improve our services. Please take a few minutes to complete the written survey that you may receive in the mail after your visit with us. Thank you!             Your Updated Medication List - Protect others around you: Learn how to safely use, store and throw away your medicines at www.disposemymeds.org.          This list is accurate as of: 6/27/17 11:03 AM.  Always use your most recent med list.                   Brand Name Dispense Instructions for use Diagnosis    acetaminophen 650 MG CR tablet    TYLENOL ARTHRITIS PAIN     Take 2 tablets (1,300 mg) by mouth 2 times daily    Primary osteoarthritis involving multiple joints       aspirin 81 MG EC tablet      Take 1 tablet by mouth daily.    S/P coronary artery bypass graft x 3       atorvastatin 80 MG tablet    LIPITOR    90 tablet    Take 1 tablet (80 mg) by mouth daily    Hyperlipidemia LDL goal <100       blood glucose monitoring test strip    ONE TOUCH ULTRA    3 Month    by In Vitro route.    Impaired fasting glucose       calcium 600 MG tablet      Take 1 tablet by mouth 2 times daily.        * PRESERVISION AREDS 2 PO           * CENTRUM SILVER per tablet      Take 1 tablet by  mouth daily.        co-enzyme Q-10 100 MG Caps capsule      Take 1 capsule by mouth daily.        denosumab 60 MG/ML Soln injection    PROLIA    1 mL    Inject 1 mL (60 mg) Subcutaneous every 6 months    Osteoporosis       gabapentin 100 MG capsule    NEURONTIN    450 capsule    Take one in am, one midday, and three at bedtime    Post herpetic neuralgia, Restless leg syndrome       HYDROcodone-acetaminophen 5-325 MG per tablet    NORCO    20 tablet    Take 0.5-1 tablets by mouth every 6 hours as needed for moderate to severe pain    Left knee pain, unspecified chronicity       levothyroxine 50 MCG tablet    SYNTHROID/LEVOTHROID    90 tablet    Take 1 tablet (50 mcg) by mouth daily    Hypothyroidism       metFORMIN 500 MG tablet    GLUCOPHAGE    90 tablet    Take 1 tablet (500 mg) by mouth daily (with breakfast)    Type 2 diabetes mellitus with diabetic nephropathy, without long-term current use of insulin (H)       nitroglycerin 0.4 MG sublingual tablet    NITROSTAT     Place 0.4 mg under the tongue every 5 minutes as needed. Up to 3 doses per episode        omeprazole 20 MG CR capsule    priLOSEC    90 capsule    Take 1 capsule (20 mg) by mouth every morning (before breakfast)    Hiatal hernia, Walters's esophagus with dysplasia       polyethylene glycol powder    MIRALAX     Take 17 g by mouth daily    Constipation       warfarin 3 MG tablet    COUMADIN    108 tablet    3 mg on Mon, Wed, Fri; 4.5 mg all other days,  as directed by [facility]    Paroxysmal atrial fibrillation (H)       * Notice:  This list has 2 medication(s) that are the same as other medications prescribed for you. Read the directions carefully, and ask your doctor or other care provider to review them with you.

## 2017-06-27 NOTE — PATIENT INSTRUCTIONS
PLAN:                                                      1.  MEDICATIONS:  Continue current medications without change  2.  Follow-up with Dr. El for knee  3.  Recheck labs and follow-up in November

## 2017-06-27 NOTE — Clinical Note
Please abstract the following data from this visit with this patient into the appropriate field in Epic:  Eye exam with ophthalmology on this date: 01/2016

## 2017-07-10 ENCOUNTER — ANTICOAGULATION THERAPY VISIT (OUTPATIENT)
Dept: ANTICOAGULATION | Facility: CLINIC | Age: 82
End: 2017-07-10
Payer: COMMERCIAL

## 2017-07-10 DIAGNOSIS — I80.209 PHLEBITIS AND THROMBOPHLEBITIS OF DEEP VEINS OF LOWER EXTREMITIES (H): ICD-10-CM

## 2017-07-10 DIAGNOSIS — Z79.01 LONG-TERM (CURRENT) USE OF ANTICOAGULANTS: ICD-10-CM

## 2017-07-10 LAB — INR POINT OF CARE: 2.1 (ref 0.86–1.14)

## 2017-07-10 PROCEDURE — 85610 PROTHROMBIN TIME: CPT | Mod: QW

## 2017-07-10 PROCEDURE — 99207 ZZC NO CHARGE NURSE ONLY: CPT

## 2017-07-10 PROCEDURE — 36416 COLLJ CAPILLARY BLOOD SPEC: CPT

## 2017-07-10 NOTE — PROGRESS NOTES
ANTICOAGULATION FOLLOW-UP CLINIC VISIT    Patient Name:  Madie Silva  Date:  7/10/2017  Contact Type:  Face to Face    SUBJECTIVE:     Patient Findings     Positives No Problem Findings           OBJECTIVE    INR Protime   Date Value Ref Range Status   07/10/2017 2.1 (A) 0.86 - 1.14 Final       ASSESSMENT / PLAN  INR assessment THER    Recheck INR In: 6 WEEKS    INR Location Clinic      Anticoagulation Summary as of 7/10/2017     INR goal 2.0-3.0   Today's INR 2.1   Maintenance plan 4.5 mg (3 mg x 1.5) on Sun, Tue, Sat; 3 mg (3 mg x 1) all other days   Full instructions 4.5 mg on Sun, Tue, Sat; 3 mg all other days   Weekly total 25.5 mg   No change documented Connie Hickman RN   Plan last modified Stella Cha RN (5/25/2017)   Next INR check 8/24/2017   Target end date     Indications   Long-term (current) use of anticoagulants [Z79.01] [Z79.01]  Phlebitis and thrombophlebitis of deep veins of lower extremities (H) [I80.209]  Acute myocardial infarction  initial episode of care (H) (Resolved) [I21.3]         Anticoagulation Episode Summary     INR check location     Preferred lab     Send INR reminders to  [facility]    Comments             See the Encounter Report to view Anticoagulation Flowsheet and Dosing Calendar (Go to Encounters tab in chart review, and find the Anticoagulation Therapy Visit)    Dosage adjustment made based on physician directed care plan.    Connie Hickman RN

## 2017-07-10 NOTE — MR AVS SNAPSHOT
Madie Silva   7/10/2017 11:15 AM   Anticoagulation Therapy Visit    Description:  87 year old female   Provider:   ANTICOAGULATION CLINIC   Department:   Anti Coagulation           INR as of 7/10/2017     Today's INR 2.1      Anticoagulation Summary as of 7/10/2017     INR goal 2.0-3.0   Today's INR 2.1   Full instructions 4.5 mg on Tue, Thu, Sat; 3 mg all other days   Next INR check 8/24/2017    Indications   Long-term (current) use of anticoagulants [Z79.01] [Z79.01]  Phlebitis and thrombophlebitis of deep veins of lower extremities (H) [I80.209]  Acute myocardial infarction  initial episode of care (H) (Resolved) [I21.3]         Your next Anticoagulation Clinic appointment(s)     Aug 24, 2017 10:45 AM CDT   Anticoagulation Visit with  ANTICOAGULATION CLINIC   [facility] ([facility])    [address] [address] [phone]              Contact Numbers     [facility]  Please call  [phone] to cancel and/or reschedule your appointment   Please call  [phone] with any problems or questions regarding your therapy.        July 2017 Details    Sun Mon Tue Wed Thu Fri Sat           1                 2               3               4               5               6               7               8                 9               10      3 mg   See details      11      4.5 mg         12      3 mg         13      4.5 mg         14      3 mg         15      4.5 mg           16      3 mg         17      3 mg         18      4.5 mg         19      3 mg         20      4.5 mg         21      3 mg         22      4.5 mg           23      3 mg         24      3 mg         25      4.5 mg         26      3 mg         27      4.5 mg         28      3 mg         29      4.5 mg           30      3 mg         31      3 mg               Date Details   07/10 This INR check               How to take your warfarin dose     To take:  3 mg Take  1 of the 3 mg tablets.    To take:  4.5 mg Take 1.5 of the 3 mg tablets.           August 2017 Details    Sun Mon Tue Wed Thu Fri Sat       1      4.5 mg         2      3 mg         3      4.5 mg         4      3 mg         5      4.5 mg           6      3 mg         7      3 mg         8      4.5 mg         9      3 mg         10      4.5 mg         11      3 mg         12      4.5 mg           13      3 mg         14      3 mg         15      4.5 mg         16      3 mg         17      4.5 mg         18      3 mg         19      4.5 mg           20      3 mg         21      3 mg         22      4.5 mg         23      3 mg         24            25               26                 27               28               29               30               31                  Date Details   No additional details    Date of next INR:  8/24/2017         How to take your warfarin dose     To take:  3 mg Take 1 of the 3 mg tablets.    To take:  4.5 mg Take 1.5 of the 3 mg tablets.

## 2017-08-11 ENCOUNTER — OFFICE VISIT (OUTPATIENT)
Dept: CARDIOLOGY | Facility: CLINIC | Age: 82
End: 2017-08-11
Payer: COMMERCIAL

## 2017-08-11 ENCOUNTER — ALLIED HEALTH/NURSE VISIT (OUTPATIENT)
Dept: CARDIOLOGY | Facility: CLINIC | Age: 82
End: 2017-08-11
Payer: COMMERCIAL

## 2017-08-11 VITALS
HEART RATE: 74 BPM | DIASTOLIC BLOOD PRESSURE: 60 MMHG | WEIGHT: 161.3 LBS | BODY MASS INDEX: 26.87 KG/M2 | SYSTOLIC BLOOD PRESSURE: 136 MMHG | HEIGHT: 65 IN

## 2017-08-11 DIAGNOSIS — I49.5 SSS (SICK SINUS SYNDROME) (H): ICD-10-CM

## 2017-08-11 DIAGNOSIS — I48.0 PAROXYSMAL ATRIAL FIBRILLATION (H): ICD-10-CM

## 2017-08-11 DIAGNOSIS — Z95.0 CARDIAC PACEMAKER IN SITU: ICD-10-CM

## 2017-08-11 DIAGNOSIS — Z95.0 CARDIAC PACEMAKER IN SITU: Primary | ICD-10-CM

## 2017-08-11 DIAGNOSIS — I25.810 CORONARY ARTERY DISEASE INVOLVING CORONARY BYPASS GRAFT OF NATIVE HEART WITHOUT ANGINA PECTORIS: ICD-10-CM

## 2017-08-11 PROCEDURE — 99214 OFFICE O/P EST MOD 30 MIN: CPT | Performed by: INTERNAL MEDICINE

## 2017-08-11 PROCEDURE — 93280 PM DEVICE PROGR EVAL DUAL: CPT | Performed by: INTERNAL MEDICINE

## 2017-08-11 RX ORDER — WARFARIN SODIUM 3 MG/1
3 TABLET ORAL DAILY
COMMUNITY
End: 2018-02-13

## 2017-08-11 NOTE — PROGRESS NOTES
I saw Madie Silva today, who is 86 years of age.  She has a history of previous 4-vessel bypass for multiple vessel coronary disease.    Her bypass operation involved a bypass to the LAD, ramus intermediate branch, obtuse marginal and a distal RCA/PDA.    In general, she has had well preserved LV function with no significant valvular chamber abnormalities noted, although mild aortic stenosis has been noted by echo with a 12 mm gradient in the past.       She has had AV conduction system dysfunction and has a permanent dual-chamber pacemaker in place.  Her underlying rhythm is sinus rhythm, although monitoring through her pacemaker has revealed atrial fibrillation with a controlled ventricular response.  She is of course on warfarin for stroke prophylaxis.       She has hypertension and hyperlipidemia, both of which are being treated.     Pacemaker check - 8-:      Medtronic Sensia (D) Pacemaker Device Check  AP: 88            %                    : 2 %  Mode: DDDR         Underlying Rhythm: SR, HR 67 bpm  Heart Rate: good variability  Sensing: low but stable in A, WNL in V   Pacing Threshold: WNL   Impedance: WNL  Battery Status: 7 yrs estimated longevity  Atrial Arrhythmia: 1 episode saved for AHR, EGM shows A=V, average  bpm, for sinus/atrial tachycardia. Episode lasted 5 minutes. Pt was active, sensor rate was 116 bpm. Pt did not have symptoms.   Ventricular Arrhythmia: none  Setting Change: changed A lead Amplitude Safety Margin from 2X to 1.5X per protocol.     HPI and Plan:   See dictation  I recommend continuing current treatment plans in the chart.    No orders of the defined types were placed in this encounter.    Orders Placed This Encounter   Medications     Cholecalciferol (VITAMIN D-400 PO)     Sig: Take by mouth daily     FIBER PO     Sig: Take by mouth daily     warfarin (JANTOVEN) 3 MG tablet     Sig: Take 3 mg by mouth daily And an additional half pill Tues. Thurs and Sat.      Medications Discontinued During This Encounter   Medication Reason     denosumab (PROLIA) 60 MG/ML SOLN Therapy completed     glucose blood VI test strips (ONE TOUCH ULTRA TEST) strip Stopped by Patient         Encounter Diagnoses   Name Primary?     Cardiac pacemaker in situ      Paroxysmal atrial fibrillation (H)      Coronary artery disease involving coronary bypass graft of native heart without angina pectoris        CURRENT MEDICATIONS:  Current Outpatient Prescriptions   Medication Sig Dispense Refill     Cholecalciferol (VITAMIN D-400 PO) Take by mouth daily       FIBER PO Take by mouth daily       warfarin (JANTOVEN) 3 MG tablet Take 3 mg by mouth daily And an additional half pill Tues. Thurs and Sat.       levothyroxine (SYNTHROID/LEVOTHROID) 50 MCG tablet Take 1 tablet (50 mcg) by mouth daily 90 tablet 3     HYDROcodone-acetaminophen (NORCO) 5-325 MG per tablet Take 0.5-1 tablets by mouth every 6 hours as needed for moderate to severe pain 20 tablet 0     metFORMIN (GLUCOPHAGE) 500 MG tablet Take 1 tablet (500 mg) by mouth daily (with breakfast) 90 tablet prn     atorvastatin (LIPITOR) 80 MG tablet Take 1 tablet (80 mg) by mouth daily 90 tablet prn     gabapentin (NEURONTIN) 100 MG capsule Take one in am, one midday, and three at bedtime 450 capsule 1     warfarin (COUMADIN) 3 MG tablet 3 mg on Mon, Wed, Fri; 4.5 mg all other days,  as directed by  tablet prn     omeprazole (PRILOSEC) 20 MG capsule Take 1 capsule (20 mg) by mouth every morning (before breakfast) 90 capsule prn     Multiple Vitamins-Minerals (PRESERVISION AREDS 2 PO)        polyethylene glycol (MIRALAX) powder Take 17 g by mouth daily       co-enzyme Q-10 (COENZYME Q-10) 100 MG CAPS Take 1 capsule by mouth daily.       calcium 600 MG tablet Take 1 tablet by mouth 2 times daily.       Multiple Vitamins-Minerals (CENTRUM SILVER) per tablet Take 1 tablet by mouth daily.       aspirin EC 81 MG EC tablet Take 1 tablet by mouth daily.        nitroGLYCERIN (NITROSTAT) 0.4 MG SL tablet Place 0.4 mg under the tongue every 5 minutes as needed. Up to 3 doses per episode        acetaminophen (TYLENOL ARTHRITIS PAIN) 650 MG CR tablet Take 2 tablets (1,300 mg) by mouth 2 times daily         ALLERGIES     Allergies   Allergen Reactions     Amoxicillin      hives     Bisphosphonates      Swallowing disorder     Boniva [Ibandronate Sodium] Nausea and Vomiting     Diarrhea, N/V with oral.  IV caused arm to swell      Fosamax [Alendronic Acid]      Severe gastrointestinal reaction     Lisinopril      cough     Niacin      rash     Simvastatin      myalgias       PAST MEDICAL HISTORY:  Past Medical History:   Diagnosis Date     Walters's esophagus 7/09     CHRONIC VERTIGO 9/99     Colonic Adenoma 3/90, 11/98     Costochondritis      Depression      DIABETES MELLITUS TYPE II 10/07     DJD      DVT of Leg, postop 11/09    6 months of warfarin     Gastritis 10/89     GERD      HIATAL HERNIA      HYPERLIPIDEMIA      HYPOTHYROIDISM (aka HASHIMOTO)      Impaired fasting glucose      L Ankle Fracture 10/09     Obesity, unspecified      Osteoporosis, unspecified      PERIPHERAL NEUROPATHY      Polymyalgia rheumatica (H)      Post Herpetic Neuralgia 4/03    R lumbar distribution     Restless leg syndrome      Small vessel cerebrovascular changes 9/99     Stricture and stenosis of esophagus 10/89    Dilated     Syncope     1/06, 8/08, 2/10     VITAMIN D DEFICIENCY 12/07    per Dr. Petty       PAST SURGICAL HISTORY:  Past Surgical History:   Procedure Laterality Date     BYPASS GRAFT ARTERY CORONARY  11/2/2011    CABG x 4  Dr. PINEDA     C NONSPECIFIC PROCEDURE  age 14    appendectomy     C NONSPECIFIC PROCEDURE  as a child    T&A     EMBOLECTOMY LOWER EXTREMITY  11/9/2011    EMBOLECTOMY LOWER EXTREMITY; left leg femoral embolectomy.; Surgeon:JOLEEN BOONE; Location:[facility] OR     HYSTERECTOMY, CERVIX STATUS UNKNOWN  1978    partial hysterectomy     SURGICAL  "HISTORY OF -   10/09    L ankle fracture repair    Dr. Jose Roberto Trevino       FAMILY HISTORY:  Family History   Problem Relation Age of Onset     Alzheimer Disease Sister      HEART DISEASE Mother      HEART DISEASE Father      HEART DISEASE Son      HEART DISEASE Brother        SOCIAL HISTORY:  Social History     Social History     Marital status:      Spouse name: N/A     Number of children: N/A     Years of education: N/A     Social History Main Topics     Smoking status: Never Smoker     Smokeless tobacco: Never Used     Alcohol use No     Drug use: No     Sexual activity: No     Other Topics Concern     Caffeine Concern No     Special Diet No     Exercise No     Parent/Sibling W/ Cabg, Mi Or Angioplasty Before 65f 55m? No     Social History Narrative         Review of Systems:  Skin:  Negative       Eyes:  Positive for glasses reading  ENT:  Negative      Respiratory:  Positive for dyspnea on exertion     Cardiovascular:  Negative      Gastroenterology: Negative      Genitourinary:  not assessed      Musculoskeletal:  Positive for arthritis    Neurologic:  Negative      Psychiatric:  Negative      Heme/Lymph/Imm:  Negative      Endocrine:  Positive for diabetes      Physical Exam:  Vitals: /60  Pulse 74  Ht 1.651 m (5' 5\")  Wt 73.2 kg (161 lb 4.8 oz)  BMI 26.84 kg/m2    Constitutional:  cooperative, alert and oriented, well developed, well nourished, in no acute distress overweight mildly    Skin:  warm and dry to the touch        Head:  normocephalic        Eyes:           ENT:  no pallor or cyanosis        Neck:  carotid pulses are full and equal bilaterally, JVP normal, no carotid bruit, no thyromegaly        Chest:  clear to auscultation     PPM in the L subclavian space    Cardiac: regular rhythm;normal S1 and S2;no S3 or S4       systolic ejection murmur;grade 2;RUSB;LUSB          Abdomen:  abdomen soft   mildly obese    Vascular:                                          Extremities and Back:  " no edema;no deformities, clubbing, cyanosis, erythema observed              Neurological:  affect appropriate, oriented to time, person and place;no gross motor deficits          Recent Lab Results:  LIPID RESULTS:  Lab Results   Component Value Date    CHOL 193 05/12/2017    HDL 59 05/12/2017    LDL 86 05/12/2017    TRIG 240 (H) 05/12/2017    CHOLHDLRATIO 2.8 04/20/2015       LIVER ENZYME RESULTS:  Lab Results   Component Value Date    AST 32 05/20/2014    ALT 27 05/12/2017       CBC RESULTS:  Lab Results   Component Value Date    WBC 6.9 04/20/2015    RBC 4.76 04/20/2015    HGB 12.8 05/12/2017    HCT 37.1 04/20/2015    MCV 78 04/20/2015    MCH 25.0 (L) 04/20/2015    MCHC 32.1 04/20/2015    RDW 16.6 (H) 04/20/2015     04/20/2015       BMP RESULTS:  Lab Results   Component Value Date     05/12/2017    POTASSIUM 4.5 05/12/2017    CHLORIDE 106 05/12/2017    CO2 23 05/12/2017    ANIONGAP 10 05/12/2017     (H) 05/12/2017    BUN 18 05/12/2017    CR 0.91 05/12/2017    GFRESTIMATED 58 (L) 05/12/2017    GFRESTBLACK 71 05/12/2017    SHAWNEE 9.1 05/12/2017        A1C RESULTS:  Lab Results   Component Value Date    A1C 7.3 (H) 05/12/2017       INR RESULTS:  Lab Results   Component Value Date    INR 2.1 (A) 07/10/2017    INR 3.1 (A) 05/25/2017    INR 2.19 (H) 01/03/2013    INR 3.68 (H) 06/28/2012           CC  De Obregon MD  [address] [place] [address]

## 2017-08-11 NOTE — MR AVS SNAPSHOT
After Visit Summary   8/11/2017    Madie Silva    MRN: [id number]           Patient Information     Date Of Birth          7/21/1929        Visit Information        Provider Department      8/11/2017 10:30 AM CONKLIN DCRN [facility]        Today's Diagnoses     Cardiac pacemaker in situ    -  1    SSS (sick sinus syndrome) (H)           Follow-ups after your visit        Your next 10 appointments already scheduled     Aug 11, 2017 11:15 AM CDT   Return Visit with Andrew Red MD   [facility] ([facility])    [address] [place] [address] [address]  [facility] [address]   [phone]            Aug 24, 2017 10:45 AM CDT   Anticoagulation Visit with [facility] ANT[facility]AGULATION CLINIC   [facility] ([facility]    [address] [place] [id number] [phone]            Nov 13, 2017 11:30 AM CST   Remote PPM Check with CONKLIN TECH1   [facility] ([facility])    [address] [place], [place] [id number]  [facility] [address]   [phone]           This appointment is for a remote check of your pacemaker.  This is not an appointment at the office.              Who to contact     If you have questions or need follow up information about today's clinic visit or your schedule please contact [facility] directly at [phone].  Normal or non-critical lab and imaging results will be communicated to you by MyChart, letter or phone within 4 business days after the clinic has received the results. If you do not hear from us within 7 days, please contact the clinic through MyChart or phone. If you have a critical or abnormal lab result, we will notify you by phone as soon as possible.  Submit refill requests through [employer] or call your pharmacy and they will forward the refill request to us. Please  "allow 3 business days for your refill to be completed.          Additional Information About Your Visit        MyChart Information     [employer] lets you send messages to your doctor, view your test results, renew your prescriptions, schedule appointments and more. To sign up, go to [email or web address] . Click on \"Log in\" on the left side of the screen, which will take you to the Welcome page. Then click on \"Sign up Now\" on the right side of the page.     You will be asked to enter the access code listed below, as well as some personal information. Please follow the directions to create your username and password.     Your access code is: [email or web address]  Expires: 2017 11:07 AM     Your access code will  in 90 days. If you need help or a new code, please call your [place] clinic or [phone].        Care EveryWhere ID     This is your Care EveryWhere ID. This could be used by other organizations to access your [place] medical records  [id number]         Blood Pressure from Last 3 Encounters:   17 122/60   17 120/56   16 120/58    Weight from Last 3 Encounters:   17 73.1 kg (161 lb 3.2 oz)   17 74.3 kg (163 lb 12.8 oz)   16 72.9 kg (160 lb 12.8 oz)              We Performed the Following     PM DEVICE PROGRAMMING EVAL, DUAL LEAD PACER ([id number])        Primary Care Provider Office Phone # Fax #    De Blaine Obregon [phone]       [address] [place] [address]        Equal Access to Services     [facility] : Hadii og Buck, wamarcelda ralph, qaybta javier rush . So [facility] [phone].    ATENCIÓN: Si habla español, tiene a rodas disposición servicios gratuitos de asistencia lingüística. Llame al [phone].    We comply with applicable federal civil rights laws and [place] laws. We do not discriminate on the basis of race, color, national origin, age, disability sex, sexual " orientation or gender identity.            Thank you!     Thank you for choosing [facility] [place]  for your care. Our goal is always to provide you with excellent care. Hearing back from our patients is one way we can continue to improve our services. Please take a few minutes to complete the written survey that you may receive in the mail after your visit with us. Thank you!             Your Updated Medication List - Protect others around you: Learn how to safely use, store and throw away your medicines at www.disposemymeds.org.          This list is accurate as of: 8/11/17 11:07 AM.  Always use your most recent med list.                   Brand Name Dispense Instructions for use Diagnosis    acetaminophen 650 MG CR tablet    TYLENOL ARTHRITIS PAIN     Take 2 tablets (1,300 mg) by mouth 2 times daily    Primary osteoarthritis involving multiple joints       aspirin 81 MG EC tablet      Take 1 tablet by mouth daily.    S/P coronary artery bypass graft x 3       atorvastatin 80 MG tablet    LIPITOR    90 tablet    Take 1 tablet (80 mg) by mouth daily    Hyperlipidemia LDL goal <100       blood glucose monitoring test strip    ONE TOUCH ULTRA    3 Month    by In Vitro route.    Impaired fasting glucose       calcium 600 MG tablet      Take 1 tablet by mouth 2 times daily.        * PRESERVISION AREDS 2 PO           * CENTRUM SILVER per tablet      Take 1 tablet by mouth daily.        co-enzyme Q-10 100 MG Caps capsule      Take 1 capsule by mouth daily.        denosumab 60 MG/ML Soln injection    PROLIA    1 mL    Inject 1 mL (60 mg) Subcutaneous every 6 months    Osteoporosis       gabapentin 100 MG capsule    NEURONTIN    450 capsule    Take one in am, one midday, and three at bedtime    Post herpetic neuralgia, Restless leg syndrome       HYDROcodone-acetaminophen 5-325 MG per tablet    NORCO    20 tablet    Take 0.5-1 tablets by mouth every 6 hours as needed for moderate to  severe pain    Left knee pain, unspecified chronicity       levothyroxine 50 MCG tablet    SYNTHROID/LEVOTHROID    90 tablet    Take 1 tablet (50 mcg) by mouth daily    Acquired hypothyroidism       metFORMIN 500 MG tablet    GLUCOPHAGE    90 tablet    Take 1 tablet (500 mg) by mouth daily (with breakfast)    Type 2 diabetes mellitus with diabetic nephropathy, without long-term current use of insulin (H)       nitroGLYcerin 0.4 MG sublingual tablet    NITROSTAT     Place 0.4 mg under the tongue every 5 minutes as needed. Up to 3 doses per episode        omeprazole 20 MG CR capsule    priLOSEC    90 capsule    Take 1 capsule (20 mg) by mouth every morning (before breakfast)    Hiatal hernia, Walters's esophagus with dysplasia       polyethylene glycol powder    MIRALAX     Take 17 g by mouth daily    Constipation       warfarin 3 MG tablet    COUMADIN    108 tablet    3 mg on Mon, Wed, Fri; 4.5 mg all other days,  as directed by [facility]    Paroxysmal atrial fibrillation (H)       * Notice:  This list has 2 medication(s) that are the same as other medications prescribed for you. Read the directions carefully, and ask your doctor or other care provider to review them with you.

## 2017-08-11 NOTE — MR AVS SNAPSHOT
After Visit Summary   8/11/2017    Madie Silva    MRN: [id number]           Patient Information     Date Of Birth          7/21/1929        Visit Information        Provider Department      8/11/2017 11:15 AM Andrew Red MD [facility] [place]        Today's Diagnoses     Cardiac pacemaker in situ        Paroxysmal atrial fibrillation (H)        Coronary artery disease involving coronary bypass graft of native heart without angina pectoris           Follow-ups after your visit        Additional Services     Follow-Up with Cardiologist                 Your next 10 appointments already scheduled     Aug 24, 2017 10:45 AM CDT   Anticoagulation Visit with OX ANTICOAGULATION CLINIC   [facility][1] ([facility][1])    [address] [id number] [phone]            Nov 13, 2017 11:30 AM CST   Remote PPM Check with CONKLIN TECH1   [facility] [place] ([facility])    [address] [place], [place] [id number]  [facility] [address] [phone]           This appointment is for a remote check of your pacemaker.  This is not an appointment at the office.              Future tests that were ordered for you today     Open Future Orders        Priority Expected Expires Ordered    Follow-Up with Cardiologist Routine 8/11/2019 8/31/2019 8/11/2017            Who to contact     If you have questions or need follow up information about today's clinic visit or your schedule please contact [facility] [place] directly at [phone].  Normal or non-critical lab and imaging results will be communicated to you by MyChart, letter or phone within 4 business days after the clinic has received the results. If you do not hear from us within 7 days, please contact the clinic through MyChart or phone. If you have a critical or abnormal lab result, we will notify  "you by phone as soon as possible.  Submit refill requests through [email or web address] or call your pharmacy and they will forward the refill request to us. Please allow 3 business days for your refill to be completed.          Additional Information About Your Visit        [employer] Information     [email or web address] lets you send messages to your doctor, view your test results, renew your prescriptions, schedule appointments and more. To sign up, go to [email or web address] . Click on \"Log in\" on the left side of the screen, which will take you to the Welcome page. Then click on \"Sign up Now\" on the right side of the page.     You will be asked to enter the access code listed below, as well as some personal information. Please follow the directions to create your username and password.     Your access code is: [email or web address]  Expires: 2017 11:07 AM     Your access code will  in 90 days. If you need help or a new code, please call your [place] clinic or [phone].        Care EveryWhere ID     This is your Care EveryWhere ID. This could be used by other organizations to access your [place] medical records  [id number]        Your Vitals Were     Pulse Height BMI (Body Mass Index)             74 1.651 m (5' 5\") 26.84 kg/m2          Blood Pressure from Last 3 Encounters:   17 136/60   17 122/60   17 120/56    Weight from Last 3 Encounters:   17 73.2 kg (161 lb 4.8 oz)   17 73.1 kg (161 lb 3.2 oz)   17 74.3 kg (163 lb 12.8 oz)              We Performed the Following     Follow-Up with Cardiologist        Primary Care Provider Office Phone # Fax #    De Obregon [phone] [address] [place] [phone]        Equal Access to Services     JAGDISH SWARTZ : Hafsa Buck, waviet luqcharlee, qaybblossom anderson, javier collado . So [facility] [phone].    ATENCIÓN: Si habla español, tiene a rodas disposición servicios " yamile de asistencia lingüística. Ervin leslie [phone].    We comply with applicable federal civil rights laws and [place] laws. We do not discriminate on the basis of race, color, national origin, age, disability sex, sexual orientation or gender identity.            Thank you!     Thank you for choosing [facility] [place]  for your care. Our goal is always to provide you with excellent care. Hearing back from our patients is one way we can continue to improve our services. Please take a few minutes to complete the written survey that you may receive in the mail after your visit with us. Thank you!             Your Updated Medication List - Protect others around you: Learn how to safely use, store and throw away your medicines at www.disposemymeds.org.          This list is accurate as of: 8/11/17 11:53 AM.  Always use your most recent med list.                   Brand Name Dispense Instructions for use Diagnosis    acetaminophen 650 MG CR tablet    TYLENOL ARTHRITIS PAIN     Take 2 tablets (1,300 mg) by mouth 2 times daily    Primary osteoarthritis involving multiple joints       aspirin 81 MG EC tablet      Take 1 tablet by mouth daily.    S/P coronary artery bypass graft x 3       atorvastatin 80 MG tablet    LIPITOR    90 tablet    Take 1 tablet (80 mg) by mouth daily    Hyperlipidemia LDL goal <100       calcium 600 MG tablet      Take 1 tablet by mouth 2 times daily.        * PRESERVISION AREDS 2 PO           * CENTRUM SILVER per tablet      Take 1 tablet by mouth daily.        co-enzyme Q-10 100 MG Caps capsule      Take 1 capsule by mouth daily.        FIBER PO      Take by mouth daily        gabapentin 100 MG capsule    NEURONTIN    450 capsule    Take one in am, one midday, and three at bedtime    Post herpetic neuralgia, Restless leg syndrome       HYDROcodone-acetaminophen 5-325 MG per tablet    NORCO    20 tablet    Take 0.5-1 tablets by mouth every 6 hours as  needed for moderate to severe pain    Left knee pain, unspecified chronicity       levothyroxine 50 MCG tablet    SYNTHROID/LEVOTHROID    90 tablet    Take 1 tablet (50 mcg) by mouth daily    Acquired hypothyroidism       metFORMIN 500 MG tablet    GLUCOPHAGE    90 tablet    Take 1 tablet (500 mg) by mouth daily (with breakfast)    Type 2 diabetes mellitus with diabetic nephropathy, without long-term current use of insulin (H)       nitroGLYcerin 0.4 MG sublingual tablet    NITROSTAT     Place 0.4 mg under the tongue every 5 minutes as needed. Up to 3 doses per episode        omeprazole 20 MG CR capsule    priLOSEC    90 capsule    Take 1 capsule (20 mg) by mouth every morning (before breakfast)    Hiatal hernia, Walters's esophagus with dysplasia       polyethylene glycol powder    MIRALAX     Take 17 g by mouth daily    Constipation       VITAMIN D-400 PO      Take by mouth daily        * JANTOVEN 3 MG tablet   Generic drug:  warfarin      Take 3 mg by mouth daily And an additional half pill Tues. Thurs and Sat.        * warfarin 3 MG tablet    COUMADIN    108 tablet    3 mg on Mon, Wed, Fri; 4.5 mg all other days,  as directed by [facility]    Paroxysmal atrial fibrillation (H)       * Notice:  This list has 4 medication(s) that are the same as other medications prescribed for you. Read the directions carefully, and ask your doctor or other care provider to review them with you.

## 2017-08-11 NOTE — PROGRESS NOTES
Medtronic Sensia (D) Pacemaker Device Check  AP: 88 % : 2 %  Mode: DDDR         Underlying Rhythm: SR, HR 67 bpm  Heart Rate: good variability  Sensing: low but stable in A, WNL in V   Pacing Threshold: WNL   Impedance: WNL  Battery Status: 7 yrs estimated longevity  Atrial Arrhythmia: 1 episode saved for AHR, EGM shows A=V, average  bpm, for sinus/atrial tachycardia. Episode lasted 5 minutes. Pt was active, sensor rate was 116 bpm. Pt did not have symptoms.   Ventricular Arrhythmia: none  Setting Change: changed A lead Amplitude Safety Margin from 2X to 1.5X per protocol.     Care Plan: remote in 3 months, scheduled. OV with Dr. Red today.    LETICIA MENDOZA

## 2017-08-11 NOTE — PROGRESS NOTES
HISTORY OF PRESENT ILLNESS:  A quick note with regards to Madie Silva and permanent pacemaker.  She has a history of known coronary disease also, having had previous bypass surgery to the LAD, ramus intermediate, obtuse marginal and distal RCA.  She has had documented paroxysmal atrial fibrillation and has been on warfarin with no complications with that intervention.      She has not had obvious signs of angina or heart failure in the past year.  She denies chest pain, palpitations, dizziness or syncope.      A pacemaker assessment shows that she has a Medtronic dual-chamber device.  She paces in the atrium 90% of the time and in the ventricle, 2% of the time.  She has 7-year battery longevity predicted.      Atrial telemetry shows intermittent atrial fibrillation.  She is primarily in sinus rhythm.      PAST MEDICAL HISTORY:  Includes:    1.  Chronically overweight.   2.  Paroxysmal atrial fibrillation.   3.  Coronary artery disease post 4-vessel bypass.    4.  Treated hyperlipidemia, on atorvastatin 80 mg and an LDL of 86, triglycerides do run high in the 240 range.      Other medications include:   1.  Neurontin 100 mg morning and afternoon and 300 mg at bedtime.    2.  Warfarin to an INR of 2.0 to 3.0.   3.  Prilosec 20 mg a day.   4.  Aspirin 81 mg a day.     5.  She has several PRNs that I will not list.        SOCIAL HISTORY:  She does not drink.  She does not smoke.  She is quietly active.  She enjoys life.  She likes to laugh and giggle and she looks and acts younger than her 88 years.      PHYSICAL EXAMINATION:   VITAL SIGNS:  Revealed a blood pressure of 136/60, heart rate 70 beats per minute and regular.     NECK:  She had no neck vein distention or bruit at 90 degrees.   HEART:  Regular without gallop or murmur.   LUNGS:  Clear.     CARDIAC:  Pacemaker is in the left subclavian space.   ABDOMEN:  Soft, overweight but heave pain or mass.   EXTREMITIES:  Free of edema.  Pedal pulses +1.      Madie Silva is  88.  She could pass for 10-15 years younger.  She takes good care of herself.  She is mentally bright and active.      Her pacemaker is working properly in a dual-chamber mode.  She has paroxysmal atrial fibrillation and she is on warfarin.        She is normotensive.  Her lipid profiles show an LDL of 86.  She looks well and feels well and I was pleased with her stability.  She really appreciates her care and the fact that you have known each other for 30-35 years.  She hopes you do not ever retire.      IMPRESSION:     1.  Coronary artery disease without signs of angina or heart failure.     2.  Proper pacemaker performance.   3.  Paroxysmal atrial fibrillation.   4.  Diabetes mellitus.   5.  Warfarin without obvious bleeding complications.      I reviewed old records, imaging, chest x-ray and laboratory work.      She is slowed down a bit by achy knees but otherwise she is doing well.  If she has problems, let me know.      Over 35 minutes were used doing her consult, greater than 60% of the time was face-to-face, doing counseling and education.      cc:   De Obregon MD    [facility]    [address] [place] [id number]         PIPE KILGORE MD[facility]             D: 2017 11:53   T: 2017 12:40   MT: LUDWIN      Name:     CORDELL AMEZQUITA   MRN:      [id number]        Account:      [id number]   :      1929           Service Date: 2017      Document: [id number]

## 2017-08-11 NOTE — LETTER
8/11/2017    De Obregon MD  [address] [place] [address]    RE: Madie Silva       Dear Colleague,    I had the pleasure of seeing Madie Silva in the [facility].    A quick note with regards to Madie Silva and permanent pacemaker.  She has a history of known coronary disease also, having had previous bypass surgery to the LAD, ramus intermediate, obtuse marginal and distal RCA.  She has had documented paroxysmal atrial fibrillation and has been on warfarin with no complications with that intervention.      She has not had obvious signs of angina or heart failure in the past year.  She denies chest pain, palpitations, dizziness or syncope.      A pacemaker assessment shows that she has a Medtronic dual-chamber device.  She paces in the atrium 90% of the time and in the ventricle, 2% of the time.  She has 7-year battery longevity predicted.      Atrial telemetry shows intermittent atrial fibrillation.  She is primarily in sinus rhythm.      PAST MEDICAL HISTORY:  Includes:    1.  Chronically overweight.   2.  Paroxysmal atrial fibrillation.   3.  Coronary artery disease post 4-vessel bypass.    4.  Treated hyperlipidemia, on atorvastatin 80 mg and an LDL of 86, triglycerides do run high in the 240 range.      Other medications include:   1.  Neurontin 100 mg morning and afternoon and 300 mg at bedtime.    2.  Warfarin to an INR of 2.0 to 3.0.   3.  Prilosec 20 mg a day.   4.  Aspirin 81 mg a day.     5.  She has several PRNs that I will not list.        SOCIAL HISTORY:  She does not drink.  She does not smoke.  She is quietly active.  She enjoys life.  She likes to laugh and giggle and she looks and acts younger than her 88 years.      PHYSICAL EXAMINATION:   VITAL SIGNS:  Revealed a blood pressure of 136/60, heart rate 70 beats per minute and regular.     NECK:  She had no neck vein distention or bruit at 90 degrees.   HEART:  Regular without gallop or murmur.    LUNGS:  Clear.     CARDIAC:  Pacemaker is in the left subclavian space.   ABDOMEN:  Soft, overweight but heave pain or mass.   EXTREMITIES:  Free of edema.  Pedal pulses +1.      Madie Silva is 88.  She could pass for 10-15 years younger.  She takes good care of herself.  She is mentally bright and active.      Her pacemaker is working properly in a dual-chamber mode.  She has paroxysmal atrial fibrillation and she is on warfarin.        She is normotensive.  Her lipid profiles show an LDL of 86.  She looks well and feels well and I was pleased with her stability.  She really appreciates her care and the fact that you have known each other for 30-35 years.  She hopes you do not ever retire.     Outpatient Encounter Prescriptions as of 8/11/2017   Medication Sig Dispense Refill     Cholecalciferol (VITAMIN D-400 PO) Take by mouth daily       FIBER PO Take by mouth daily       warfarin (JANTOVEN) 3 MG tablet Take 3 mg by mouth daily And an additional half pill Tues. Thurs and Sat.       levothyroxine (SYNTHROID/LEVOTHROID) 50 MCG tablet Take 1 tablet (50 mcg) by mouth daily 90 tablet 3     HYDROcodone-acetaminophen (NORCO) 5-325 MG per tablet Take 0.5-1 tablets by mouth every 6 hours as needed for moderate to severe pain 20 tablet 0     metFORMIN (GLUCOPHAGE) 500 MG tablet Take 1 tablet (500 mg) by mouth daily (with breakfast) 90 tablet prn     atorvastatin (LIPITOR) 80 MG tablet Take 1 tablet (80 mg) by mouth daily 90 tablet prn     gabapentin (NEURONTIN) 100 MG capsule Take one in am, one midday, and three at bedtime 450 capsule 1     warfarin (COUMADIN) 3 MG tablet 3 mg on Mon, Wed, Fri; 4.5 mg all other days,  as directed by  tablet prn     omeprazole (PRILOSEC) 20 MG capsule Take 1 capsule (20 mg) by mouth every morning (before breakfast) 90 capsule prn     Multiple Vitamins-Minerals (PRESERVISION AREDS 2 PO)        polyethylene glycol (MIRALAX) powder Take 17 g by mouth daily       co-enzyme Q-10  (COENZYME Q-10) 100 MG CAPS Take 1 capsule by mouth daily.       calcium 600 MG tablet Take 1 tablet by mouth 2 times daily.       Multiple Vitamins-Minerals (CENTRUM SILVER) per tablet Take 1 tablet by mouth daily.       aspirin EC 81 MG EC tablet Take 1 tablet by mouth daily.       nitroGLYCERIN (NITROSTAT) 0.4 MG SL tablet Place 0.4 mg under the tongue every 5 minutes as needed. Up to 3 doses per episode        acetaminophen (TYLENOL ARTHRITIS PAIN) 650 MG CR tablet Take 2 tablets (1,300 mg) by mouth 2 times daily       [DISCONTINUED] denosumab (PROLIA) 60 MG/ML SOLN Inject 1 mL (60 mg) Subcutaneous every 6 months (Patient not taking: Reported on 6/27/2017) 1 mL 1     [DISCONTINUED] glucose blood VI test strips (ONE TOUCH ULTRA TEST) strip by In Vitro route. 3 Month 3     No facility-administered encounter medications on file as of 8/11/2017.       IMPRESSION:     1.  Coronary artery disease without signs of angina or heart failure.     2.  Proper pacemaker performance.   3.  Paroxysmal atrial fibrillation.   4.  Diabetes mellitus.   5.  Warfarin without obvious bleeding complications.      I reviewed old records, imaging, chest x-ray and laboratory work.      She is slowed down a bit by achy knees but otherwise she is doing well.  If she has problems, let me know.      Over 35 minutes were used doing her consult, greater than 60% of the time was face-to-face, doing counseling and education.     Again, thank you for allowing me to participate in the care of your patient.      Sincerely,    PIPE KILGORE MD     [facility]

## 2017-08-24 ENCOUNTER — ANTICOAGULATION THERAPY VISIT (OUTPATIENT)
Dept: ANTICOAGULATION | Facility: CLINIC | Age: 82
End: 2017-08-24
Payer: COMMERCIAL

## 2017-08-24 DIAGNOSIS — Z79.01 LONG-TERM (CURRENT) USE OF ANTICOAGULANTS: ICD-10-CM

## 2017-08-24 LAB — INR POINT OF CARE: 2.4 (ref 0.86–1.14)

## 2017-08-24 PROCEDURE — 85610 PROTHROMBIN TIME: CPT | Mod: QW

## 2017-08-24 PROCEDURE — 36416 COLLJ CAPILLARY BLOOD SPEC: CPT

## 2017-08-24 PROCEDURE — 99207 ZZC NO CHARGE NURSE ONLY: CPT

## 2017-08-24 NOTE — PROGRESS NOTES
ANTICOAGULATION FOLLOW-UP CLINIC VISIT    Patient Name:  Madie Silva  Date:  8/24/2017  Contact Type:  Face to Face    SUBJECTIVE:     Patient Findings     Positives No Problem Findings           OBJECTIVE    INR Protime   Date Value Ref Range Status   08/24/2017 2.4 (A) 0.86 - 1.14 Final       ASSESSMENT / PLAN  INR assessment THER    Recheck INR In: 6 WEEKS    INR Location Clinic      Anticoagulation Summary as of 8/24/2017     INR goal 2.0-3.0   Today's INR 2.4   Maintenance plan 4.5 mg (3 mg x 1.5) on Tue, Thu, Sat; 3 mg (3 mg x 1) all other days   Full instructions 4.5 mg on Tue, Thu, Sat; 3 mg all other days   Weekly total 25.5 mg   No change documented Connie Hickman RN   Plan last modified Connie Hickman RN (7/10/2017)   Next INR check 10/5/2017   Target end date     Indications   Long-term (current) use of anticoagulants [Z79.01] [Z79.01]  Phlebitis and thrombophlebitis of deep veins of lower extremities (H) [I80.209]  Acute myocardial infarction  initial episode of care (H) (Resolved) [I21.3]         Anticoagulation Episode Summary     INR check location     Preferred lab     Send INR reminders to [facility]    Comments             See the Encounter Report to view Anticoagulation Flowsheet and Dosing Calendar (Go to Encounters tab in chart review, and find the Anticoagulation Therapy Visit)    Dosage adjustment made based on physician directed care plan.    Connie Hickman RN

## 2017-08-24 NOTE — MR AVS SNAPSHOT
Madie Silva   8/24/2017 10:45 AM   Anticoagulation Therapy Visit    Description:  88 year old female   Provider:   ANTICOAGULATION CLINIC   Department:   Anti Coagulation           INR as of 8/24/2017     Today's INR 2.4      Anticoagulation Summary as of 8/24/2017     INR goal 2.0-3.0   Today's INR 2.4   Full instructions 4.5 mg on Tue, Thu, Sat; 3 mg all other days   Next INR check 10/5/2017    Indications   Long-term (current) use of anticoagulants [Z79.01] [Z79.01]  Phlebitis and thrombophlebitis of deep veins of lower extremities (H) [I80.209]  Acute myocardial infarction  initial episode of care (H) (Resolved) [I21.3]         Your next Anticoagulation Clinic appointment(s)     Oct 05, 2017 10:45 AM CDT   Anticoagulation Visit with  ANTICOAGULATION CLINIC   [facility]    [address] [place] [id number] [phone]              Contact Numbers     [facility]  Please call  [phone] to cancel and/or reschedule your appointment   Please call  [phone] with any problems or questions regarding your therapy.        August 2017 Details    Sun Mon Tue Wed Thu Fri Sat       1               2               3               4               5                 6               7               8               9               10               11               12                 13               14               15               16               17               18               19                 20               21               22               23               24      4.5 mg   See details      25      3 mg         26      4.5 mg           27      3 mg         28      3 mg         29      4.5 mg         30      3 mg         31      4.5 mg            Date Details   08/24 This INR check               How to take your warfarin dose     To take:  3 mg Take 1 of the 3 mg tablets.    To take:  4.5 mg Take 1.5 of the 3 mg tablets.            September 2017 Details    Sun Mon Tue Wed Thu Fri Sat          1      3 mg         2      4.5 mg           3      3 mg         4      3 mg         5      4.5 mg         6      3 mg         7      4.5 mg         8      3 mg         9      4.5 mg           10      3 mg         11      3 mg         12      4.5 mg         13      3 mg         14      4.5 mg         15      3 mg         16      4.5 mg           17      3 mg         18      3 mg         19      4.5 mg         20      3 mg         21      4.5 mg         22      3 mg         23      4.5 mg           24      3 mg         25      3 mg         26      4.5 mg         27      3 mg         28      4.5 mg         29      3 mg         30      4.5 mg          Date Details   No additional details            How to take your warfarin dose     To take:  3 mg Take 1 of the 3 mg tablets.    To take:  4.5 mg Take 1.5 of the 3 mg tablets.           October 2017 Details    Sun Mon Tue Wed Thu Fri Sat     1      3 mg         2      3 mg         3      4.5 mg         4      3 mg         5            6               7                 8               9               10               11               12               13               14                 15               16               17               18               19               20               21                 22               23               24               25               26               27               28                 29               30               31                    Date Details   No additional details    Date of next INR:  10/5/2017         How to take your warfarin dose     To take:  3 mg Take 1 of the 3 mg tablets.    To take:  4.5 mg Take 1.5 of the 3 mg tablets.

## 2017-10-05 ENCOUNTER — ANTICOAGULATION THERAPY VISIT (OUTPATIENT)
Dept: ANTICOAGULATION | Facility: CLINIC | Age: 82
End: 2017-10-05
Payer: COMMERCIAL

## 2017-10-05 DIAGNOSIS — I80.209 PHLEBITIS AND THROMBOPHLEBITIS OF DEEP VEINS OF LOWER EXTREMITIES (H): ICD-10-CM

## 2017-10-05 DIAGNOSIS — Z79.01 LONG-TERM (CURRENT) USE OF ANTICOAGULANTS: ICD-10-CM

## 2017-10-05 LAB — INR POINT OF CARE: 2.5 (ref 0.86–1.14)

## 2017-10-05 PROCEDURE — 85610 PROTHROMBIN TIME: CPT | Mod: QW

## 2017-10-05 PROCEDURE — 99207 ZZC NO CHARGE NURSE ONLY: CPT

## 2017-10-05 PROCEDURE — 36416 COLLJ CAPILLARY BLOOD SPEC: CPT

## 2017-10-05 NOTE — MR AVS SNAPSHOT
Madie Silva   10/5/2017 10:45 AM   Anticoagulation Therapy Visit    Description:  88 year old female   Provider:   ANTICOAGULATION CLINIC   Department:   Anti Coagulation           INR as of 10/5/2017     Today's INR 2.5      Anticoagulation Summary as of 10/5/2017     INR goal 2.0-3.0   Today's INR 2.5   Full instructions 4.5 mg on Tue, Thu, Sat; 3 mg all other days   Next INR check 11/16/2017    Indications   Long-term (current) use of anticoagulants [Z79.01] [Z79.01]  Phlebitis and thrombophlebitis of deep veins of lower extremities (H) [I80.209]  Acute myocardial infarction  initial episode of care (Resolved) [I21.9]         Your next Anticoagulation Clinic appointment(s)     Nov 16, 2017 10:45 AM CST   Anticoagulation Visit with  ANTICOAGULATION CLINIC   [facility]    [address] [place] [address] [phone]              Contact Numbers     [facility]  Please call  [phone] to cancel and/or reschedule your appointment   Please call  [phone] with any problems or questions regarding your therapy.        October 2017 Details    Sun Mon Tue Wed Thu Fri Sat     1               2               3               4               5      4.5 mg   See details      6      3 mg         7      4.5 mg           8      3 mg         9      3 mg         10      4.5 mg         11      3 mg         12      4.5 mg         13      3 mg         14      4.5 mg           15      3 mg         16      3 mg         17      4.5 mg         18      3 mg         19      4.5 mg         20      3 mg         21      4.5 mg           22      3 mg         23      3 mg         24      4.5 mg         25      3 mg         26      4.5 mg         27      3 mg         28      4.5 mg           29      3 mg         30      3 mg         31      4.5 mg              Date Details   10/05 This INR check               How to take your warfarin dose     To  take:  3 mg Take 1 of the 3 mg tablets.    To take:  4.5 mg Take 1.5 of the 3 mg tablets.           November 2017 Details    Sun Mon Tue Wed Thu Fri Sat        1      3 mg         2      4.5 mg         3      3 mg         4      4.5 mg           5      3 mg         6      3 mg         7      4.5 mg         8      3 mg         9      4.5 mg         10      3 mg         11      4.5 mg           12      3 mg         13      3 mg         14      4.5 mg         15      3 mg         16            17               18                 19               20               21               22               23               24               25                 26               27               28               29               30                  Date Details   No additional details    Date of next INR:  11/16/2017         How to take your warfarin dose     To take:  3 mg Take 1 of the 3 mg tablets.    To take:  4.5 mg Take 1.5 of the 3 mg tablets.

## 2017-10-05 NOTE — PROGRESS NOTES
ANTICOAGULATION FOLLOW-UP CLINIC VISIT    Patient Name:  Madie Silva  Date:  10/5/2017  Contact Type:  Face to Face    SUBJECTIVE:     Patient Findings     Positives No Problem Findings           OBJECTIVE    INR Protime   Date Value Ref Range Status   10/05/2017 2.5 (A) 0.86 - 1.14 Final       ASSESSMENT / PLAN  INR assessment THER    Recheck INR In: 6 WEEKS    INR Location Clinic      Anticoagulation Summary as of 10/5/2017     INR goal 2.0-3.0   Today's INR 2.5   Maintenance plan 4.5 mg (3 mg x 1.5) on Tue, Thu, Sat; 3 mg (3 mg x 1) all other days   Full instructions 4.5 mg on Tue, Thu, Sat; 3 mg all other days   Weekly total 25.5 mg   No change documented Stella Cha, RN   Plan last modified Connie Hickman RN (7/10/2017)   Next INR check 11/16/2017   Target end date     Indications   Long-term (current) use of anticoagulants [Z79.01] [Z79.01]  Phlebitis and thrombophlebitis of deep veins of lower extremities (H) [I80.209]  Acute myocardial infarction  initial episode of care (Resolved) [I21.9]         Anticoagulation Episode Summary     INR check location     Preferred lab     Send INR reminders to [facility]    Comments             See the Encounter Report to view Anticoagulation Flowsheet and Dosing Calendar (Go to Encounters tab in chart review, and find the Anticoagulation Therapy Visit)        Stella Cha RN

## 2017-11-06 ENCOUNTER — DOCUMENTATION ONLY (OUTPATIENT)
Dept: INTERNAL MEDICINE | Facility: CLINIC | Age: 82
End: 2017-11-06

## 2017-11-13 ENCOUNTER — ALLIED HEALTH/NURSE VISIT (OUTPATIENT)
Dept: CARDIOLOGY | Facility: CLINIC | Age: 82
End: 2017-11-13
Payer: COMMERCIAL

## 2017-11-13 DIAGNOSIS — Z95.0 CARDIAC PACEMAKER IN SITU: Primary | ICD-10-CM

## 2017-11-13 PROCEDURE — 93294 REM INTERROG EVL PM/LDLS PM: CPT | Performed by: INTERNAL MEDICINE

## 2017-11-13 PROCEDURE — 93296 REM INTERROG EVL PM/IDS: CPT | Performed by: INTERNAL MEDICINE

## 2017-11-13 NOTE — PROGRESS NOTES
[employer]tronic Sensia [id number] (D) Remote PPM Device Check  AP: 84% : 1%  Mode: DDDR        Presenting Rhythm: AP/VS  Heart Rate: adequate heart rates per histogram  Sensing: RA: not performed RV: stable    Pacing Threshold: stable    Impedance: stable  Battery Status: 5 - 8 years remaining  Atrial Arrhythmia: 21 brief mode switch episodes comprising <0.1% of the time. No EGMs available  Ventricular Arrhythmia: none     Care Plan: F/U [facility] q 3 months. Gave results and next transmission date over the phone to saul VALLE

## 2017-11-13 NOTE — MR AVS SNAPSHOT
After Visit Summary   11/13/2017    Madie Silva    MRN: [id number]           Patient Information     Date Of Birth          7/21/1929        Visit Information        Provider Department      11/13/2017 11:30 AM RUBIN HIGGINS [facility]        Today's Diagnoses     Cardiac pacemaker in situ    -  1       Follow-ups after your visit        Your next 10 appointments already scheduled     Nov 16, 2017 10:45 AM CST   Anticoagulation Visit with [facility]   [facility] ([facility]    [address] [place] [id number] [phone]            Nov 20, 2017  9:00 AM CST   LAB with [facility]   [facility] ([facility])    [address] [place] [id number] [phone]           Please do not eat 10-12 hours before your appointment if you are coming in fasting for labs on lipids, cholesterol, or glucose (sugar). This does not apply to pregnant women. Water, hot tea and black coffee (with nothing added) are okay. Do not drink other fluids, diet soda or chew gum.            Nov 27, 2017  3:00 PM CST   Office Visit with De Obregon MD   [facility] ([facility])    [address] [place] [id number] [phone]           Bring a current list of meds and any records pertaining to this visit. For Physicals, please bring immunization records and any forms needing to be filled out. Please arrive 10 minutes early to complete paperwork.            Feb 19, 2018  3:45 PM CST   Remote PPM Check with RUBIN HIGGINS   [facility]   [place] ([facility])    [address] [place] [address] [address]  [facility] [address]   [phone]           This appointment is for a remote check of your pacemaker.  This is not an appointment at the office.              Who to  "contact     If you have questions or need follow up information about today's clinic visit or your schedule please contact [facility] directly at [phone].  Normal or non-critical lab and imaging results will be communicated to you by MyChart, letter or phone within 4 business days after the clinic has received the results. If you do not hear from us within 7 days, please contact the clinic through MyChart or phone. If you have a critical or abnormal lab result, we will notify you by phone as soon as possible.  Submit refill requests through [employer] or call your pharmacy and they will forward the refill request to us. Please allow 3 business days for your refill to be completed.          Additional Information About Your Visit        [employer] Information     [employer] lets you send messages to your doctor, view your test results, renew your prescriptions, schedule appointments and more. To sign up, go to [email or web address] . Click on \"Log in\" on the left side of the screen, which will take you to the Welcome page. Then click on \"Sign up Now\" on the right side of the page.     You will be asked to enter the access code listed below, as well as some personal information. Please follow the directions to create your username and password.     Your access code is: [email or web address]  Expires: 2018  1:33 PM     Your access code will  in 90 days. If you need help or a new code, please call your [place] clinic or [phone].        Care EveryWhere ID     This is your Care EveryWhere ID. This could be used by other organizations to access your [place] medical records  [id number]         Blood Pressure from Last 3 Encounters:   17 136/60   17 122/60   17 120/56    Weight from Last 3 Encounters:   17 73.2 kg (161 lb 4.8 oz)   17 73.1 kg (161 lb 3.2 oz)   17 74.3 kg (163 lb 12.8 oz)              We Performed the Following     " INTERROGATION DEVICE EVAL REMOTE, PACER/ICD (48152)     PM DEVICE INTERROGATE REMOTE (55988)        Primary Care Provider Office Phone # Fax #    De Obregon [phone] [address] [place] [phone]        Equal Access to Services     DEBISHERITA SHERIDAN : Hadii aad ku hadasho Soomaali, waaxda luqadaha, qaybta kaalmada adeegyada, waxay idiin hayaan adeeg kharash la'aan . So [facility] [place] [phone].    ATENCIÓN: Si habla español, tiene a rodas disposición servicios gratuitos de asistencia lingüística. Andresame al [phone].    We comply with applicable federal civil rights laws and [place] laws. We do not discriminate on the basis of race, color, national origin, age, disability, sex, sexual orientation, or gender identity.            Thank you!     Thank you for choosing [facility]  for your care. Our goal is always to provide you with excellent care. Hearing back from our patients is one way we can continue to improve our services. Please take a few minutes to complete the written survey that you may receive in the mail after your visit with us. Thank you!             Your Updated Medication List - Protect others around you: Learn how to safely use, store and throw away your medicines at www.disposemymeds.org.          This list is accurate as of: 11/13/17  1:33 PM.  Always use your most recent med list.                   Brand Name Dispense Instructions for use Diagnosis    acetaminophen 650 MG CR tablet    TYLENOL ARTHRITIS PAIN     Take 2 tablets (1,300 mg) by mouth 2 times daily    Primary osteoarthritis involving multiple joints       aspirin 81 MG EC tablet      Take 1 tablet by mouth daily.    S/P coronary artery bypass graft x 3       atorvastatin 80 MG tablet    LIPITOR    90 tablet    Take 1 tablet (80 mg) by mouth daily    Hyperlipidemia LDL goal <100       calcium 600 MG tablet      Take 1 tablet by mouth 2 times daily.        *  PRESERVISION AREDS 2 PO           * CENTRUM SILVER per tablet      Take 1 tablet by mouth daily.        co-enzyme Q-10 100 MG Caps capsule      Take 1 capsule by mouth daily.        FIBER PO      Take by mouth daily        gabapentin 100 MG capsule    NEURONTIN    450 capsule    Take one in am, one midday, and three at bedtime    Post herpetic neuralgia, Restless leg syndrome       HYDROcodone-acetaminophen 5-325 MG per tablet    NORCO    20 tablet    Take 0.5-1 tablets by mouth every 6 hours as needed for moderate to severe pain    Left knee pain, unspecified chronicity       levothyroxine 50 MCG tablet    SYNTHROID/LEVOTHROID    90 tablet    Take 1 tablet (50 mcg) by mouth daily    Acquired hypothyroidism       metFORMIN 500 MG tablet    GLUCOPHAGE    90 tablet    Take 1 tablet (500 mg) by mouth daily (with breakfast)    Type 2 diabetes mellitus with diabetic nephropathy, without long-term current use of insulin (H)       nitroGLYcerin 0.4 MG sublingual tablet    NITROSTAT     Place 0.4 mg under the tongue every 5 minutes as needed. Up to 3 doses per episode        omeprazole 20 MG CR capsule    priLOSEC    90 capsule    Take 1 capsule (20 mg) by mouth every morning (before breakfast)    Hiatal hernia, Walters's esophagus with dysplasia       polyethylene glycol powder    MIRALAX     Take 17 g by mouth daily    Constipation       VITAMIN D-400 PO      Take by mouth daily        * JANTOVEN 3 MG tablet   Generic drug:  warfarin      Take 3 mg by mouth daily And an additional half pill Tues. Thurs and Sat.        * warfarin 3 MG tablet    COUMADIN    108 tablet    3 mg on Mon, Wed, Fri; 4.5 mg all other days,  as directed by [facility]    Paroxysmal atrial fibrillation (H)       * Notice:  This list has 4 medication(s) that are the same as other medications prescribed for you. Read the directions carefully, and ask your doctor or other care provider to review them with you.

## 2017-11-16 ENCOUNTER — ANTICOAGULATION THERAPY VISIT (OUTPATIENT)
Dept: ANTICOAGULATION | Facility: CLINIC | Age: 82
End: 2017-11-16
Payer: COMMERCIAL

## 2017-11-16 DIAGNOSIS — I80.209 PHLEBITIS AND THROMBOPHLEBITIS OF DEEP VEINS OF LOWER EXTREMITIES (H): ICD-10-CM

## 2017-11-16 DIAGNOSIS — Z79.01 LONG-TERM (CURRENT) USE OF ANTICOAGULANTS: ICD-10-CM

## 2017-11-16 LAB — INR POINT OF CARE: 2.7 (ref 0.86–1.14)

## 2017-11-16 PROCEDURE — 85610 PROTHROMBIN TIME: CPT | Mod: QW

## 2017-11-16 PROCEDURE — 36416 COLLJ CAPILLARY BLOOD SPEC: CPT

## 2017-11-16 PROCEDURE — 99207 ZZC NO CHARGE NURSE ONLY: CPT

## 2017-11-16 NOTE — MR AVS SNAPSHOT
Madie Silva   11/16/2017 10:45 AM   Anticoagulation Therapy Visit    Description:  88 year old female   Provider:   ANTICOAGULATION CLINIC   Department:   Anti Coagulation           INR as of 11/16/2017     Today's INR 2.7      Anticoagulation Summary as of 11/16/2017     INR goal 2.0-3.0   Today's INR 2.7   Full instructions 4.5 mg on Tue, Thu, Sat; 3 mg all other days   Next INR check 12/28/2017    Indications   Long-term (current) use of anticoagulants [Z79.01] [Z79.01]  Phlebitis and thrombophlebitis of deep veins of lower extremities (H) [I80.209]  Acute myocardial infarction  initial episode of care (Resolved) [I21.9]         Your next Anticoagulation Clinic appointment(s)     Dec 28, 2017 11:00 AM CST   Anticoagulation Visit with  ANTICOAGULATION CLINIC   [facility] ([facility])    [address] [address] [phone]              Contact Numbers     [facility]  Please call  [phone] to cancel and/or reschedule your appointment   Please call  [phone] with any problems or questions regarding your therapy.        November 2017 Details    Sun Mon Tue Wed Thu Fri Sat        1               2               3               4                 5               6               7               8               9               10               11                 12               13               14               15               16      4.5 mg   See details      17      3 mg         18      4.5 mg           19      3 mg         20      3 mg         21      4.5 mg         22      3 mg         23      4.5 mg         24      3 mg         25      4.5 mg           26      3 mg         27      3 mg         28      4.5 mg         29      3 mg         30      4.5 mg            Date Details   11/16 This INR check               How to take your warfarin dose     To take:  3 mg Take 1 of the 3 mg tablets.    To take:  4.5 mg Take 1.5  of the 3 mg tablets.           December 2017 Details    Sun Mon Tue Wed Thu Fri Sat          1      3 mg         2      4.5 mg           3      3 mg         4      3 mg         5      4.5 mg         6      3 mg         7      4.5 mg         8      3 mg         9      4.5 mg           10      3 mg         11      3 mg         12      4.5 mg         13      3 mg         14      4.5 mg         15      3 mg         16      4.5 mg           17      3 mg         18      3 mg         19      4.5 mg         20      3 mg         21      4.5 mg         22      3 mg         23      4.5 mg           24      3 mg         25      3 mg         26      4.5 mg         27      3 mg         28            29               30                 31                      Date Details   No additional details    Date of next INR:  12/28/2017         How to take your warfarin dose     To take:  3 mg Take 1 of the 3 mg tablets.    To take:  4.5 mg Take 1.5 of the 3 mg tablets.

## 2017-11-20 DIAGNOSIS — E11.21 TYPE 2 DIABETES MELLITUS WITH DIABETIC NEPHROPATHY, WITHOUT LONG-TERM CURRENT USE OF INSULIN (H): ICD-10-CM

## 2017-11-20 LAB
GLUCOSE SERPL-MCNC: 151 MG/DL (ref 70–99)
HBA1C MFR BLD: 6.7 % (ref 4.3–6)

## 2017-11-20 PROCEDURE — 36415 COLL VENOUS BLD VENIPUNCTURE: CPT | Performed by: INTERNAL MEDICINE

## 2017-11-20 PROCEDURE — 83036 HEMOGLOBIN GLYCOSYLATED A1C: CPT | Performed by: INTERNAL MEDICINE

## 2017-11-20 PROCEDURE — 82947 ASSAY GLUCOSE BLOOD QUANT: CPT | Performed by: INTERNAL MEDICINE

## 2017-11-22 DIAGNOSIS — E11.21 TYPE 2 DIABETES MELLITUS WITH DIABETIC NEPHROPATHY, WITHOUT LONG-TERM CURRENT USE OF INSULIN (H): ICD-10-CM

## 2017-11-22 PROCEDURE — 82043 UR ALBUMIN QUANTITATIVE: CPT | Performed by: INTERNAL MEDICINE

## 2017-11-23 LAB
CREAT UR-MCNC: 118 MG/DL
MICROALBUMIN UR-MCNC: 10 MG/L
MICROALBUMIN/CREAT UR: 8.24 MG/G CR (ref 0–25)

## 2017-11-27 ENCOUNTER — OFFICE VISIT (OUTPATIENT)
Dept: INTERNAL MEDICINE | Facility: CLINIC | Age: 82
End: 2017-11-27
Payer: COMMERCIAL

## 2017-11-27 VITALS
SYSTOLIC BLOOD PRESSURE: 136 MMHG | HEART RATE: 81 BPM | BODY MASS INDEX: 27.66 KG/M2 | TEMPERATURE: 97.1 F | OXYGEN SATURATION: 97 % | WEIGHT: 166.2 LBS | DIASTOLIC BLOOD PRESSURE: 72 MMHG

## 2017-11-27 DIAGNOSIS — E11.21 TYPE 2 DIABETES MELLITUS WITH DIABETIC NEPHROPATHY, WITHOUT LONG-TERM CURRENT USE OF INSULIN (H): ICD-10-CM

## 2017-11-27 DIAGNOSIS — Z23 NEED FOR PROPHYLACTIC VACCINATION AND INOCULATION AGAINST INFLUENZA: Primary | ICD-10-CM

## 2017-11-27 DIAGNOSIS — E78.5 HYPERLIPIDEMIA LDL GOAL <100: ICD-10-CM

## 2017-11-27 DIAGNOSIS — E03.9 ACQUIRED HYPOTHYROIDISM: ICD-10-CM

## 2017-11-27 DIAGNOSIS — I10 ESSENTIAL HYPERTENSION: ICD-10-CM

## 2017-11-27 DIAGNOSIS — R10.9 RIGHT FLANK PAIN: ICD-10-CM

## 2017-11-27 PROCEDURE — 99214 OFFICE O/P EST MOD 30 MIN: CPT | Mod: 25 | Performed by: INTERNAL MEDICINE

## 2017-11-27 PROCEDURE — 90662 IIV NO PRSV INCREASED AG IM: CPT | Performed by: INTERNAL MEDICINE

## 2017-11-27 PROCEDURE — G0008 ADMIN INFLUENZA VIRUS VAC: HCPCS | Performed by: INTERNAL MEDICINE

## 2017-11-27 NOTE — NURSING NOTE
"Chief Complaint   Patient presents with     Results     discuss lab results        Initial /72  Pulse 81  Temp 97.1  F (36.2  C) (Oral)  Wt 166 lb 3.2 oz (75.4 kg)  SpO2 97%  BMI 27.66 kg/m2 Estimated body mass index is 27.66 kg/(m^2) as calculated from the following:    Height as of 8/11/17: 5' 5\" (1.651 m).    Weight as of this encounter: 166 lb 3.2 oz (75.4 kg).  Medication Reconciliation: complete    "

## 2017-11-27 NOTE — PATIENT INSTRUCTIONS
PLAN:                                                      1.  MEDICATIONS:  Continue current medications without change  2.  Continue to push fluids  3.  For next episode of R flank pain, need ASAP office visit appointment with urine check, etc.     4.  Follow-up labs and then office visit in 6 months

## 2017-11-27 NOTE — PROGRESS NOTES
"  SUBJECTIVE:   Madie Silva is a 88 year old female who presents to clinic today for the following health issues:      Diabetes Follow-up      Patient is checking blood sugars: not at all    Diabetic concerns: None     Symptoms of hypoglycemia (low blood sugar): none     Paresthesias (numbness or burning in feet) or sores: No     Date of last diabetic eye exam: 01/16      Hypertension Follow-up      Outpatient blood pressures are not being checked.    Low Salt Diet: not monitoring salt        Amount of exercise or physical activity: None    Problems taking medications regularly: No    Medication side effects: none    Diet: regular (no restrictions)      Reviewed and updated as needed this visit by clinical staff:  Medications  Allergies  Soc Hx   Additional history: as documented    Additional issues to address:  1.  Last month had another \"attack\" of pain below R ribs, followed by spasms of pain in the back.   Muscles feel very tight.    Pain rates pain as 8-9/10.   Took old muscle relaxant, which may have helped.   Used hydrocodone, with some benefit.   Pain still mild.    - gallbladder ultrasound 2013 noncontributory   - CT 2014 negative except kidney stones     ROS:    Constitutional, HEENT, cardiovascular, pulmonary, gi and gu systems are negative, except as otherwise noted.      OBJECTIVE:                                                    /72  Pulse 81  Temp 97.1  F (36.2  C) (Oral)  Wt 166 lb 3.2 oz (75.4 kg)  SpO2 97%  BMI 27.66 kg/m2  Body mass index is 27.66 kg/(m^2).   No apparent distress  Regular pulse  No CVA tenderness     Patient unable to provider urine specimen, will observe for blood in urine       ASSESSMENT:                                                      DIABETES TYPE 2, non-insulin dependent, controlled. Associated with the following: Renal Complications: Diabetic Nephropathy  HYPERTENSION, controlled. Associated with the following complications: CKD and Diabetes  R FLANK " PAIN, largely resolved.   Rule out muscular, though I suspect pain from kidney stone more likely.        PLAN:                                                      1.  MEDICATIONS:  Continue current medications without change  2.  Continue to push fluids  3.  For next episode of R flank pain, need ASAP office visit appointment with urine check, etc.     4.  Follow-up labs and then office visit in 6 months    De Obregon MD  [facility]       Injectable Influenza Immunization Documentation    1.  Is the person to be vaccinated sick today?   No    2. Does the person to be vaccinated have an allergy to a component   of the vaccine?   No  Egg Allergy Algorithm Link    3. Has the person to be vaccinated ever had a serious reaction   to influenza vaccine in the past?   No    4. Has the person to be vaccinated ever had Guillain-Barré syndrome?   No    Form completed by patient

## 2017-11-27 NOTE — MR AVS SNAPSHOT
After Visit Summary   11/27/2017    Madie Silva    MRN: [id number]           Patient Information     Date Of Birth          7/21/1929        Visit Information        Provider Department      11/27/2017 3:00 PM De Obregon MD [facility]        Today's Diagnoses     Need for prophylactic vaccination and inoculation against influenza    -  1    Type 2 diabetes mellitus with diabetic nephropathy, without long-term current use of insulin (H)        Essential hypertension        Hyperlipidemia LDL goal <100        Hypothyroidism        Right flank pain          Care Instructions    PLAN:                                                      1.  MEDICATIONS:  Continue current medications without change  2.  Continue to push fluids  3.  For next episode of R flank pain, need ASAP office visit appointment with urine check, etc.     4.  Follow-up labs and then office visit in 6 months          Follow-ups after your visit        Your next 10 appointments already scheduled     Dec 28, 2017 11:00 AM CST   Anticoagulation Visit with [facility]IC   [facility] ([facility]    [address] [id number] [phone]            Feb 19, 2018  3:45 PM CST   Remote PPM Check with CONKLIN TECH1   [facility] ([facility])    [address] [place] [address] [address]  [facility] [id number] [phone]           This appointment is for a remote check of your pacemaker.  This is not an appointment at the office.              Future tests that were ordered for you today     Open Future Orders        Priority Expected Expires Ordered    UA with Microscopic Routine 11/27/2017 11/27/2018 11/27/2017    **A1C FUTURE 6mo Routine 5/26/2018 6/25/2018 11/27/2017    **Basic metabolic panel FUTURE 6mo Routine 5/26/2018 6/25/2018 11/27/2017    **ALT FUTURE 6mo Routine 5/26/2018 6/25/2018  "2017    **TSH with free T4 reflex FUTURE 6mo Routine 2017    Lipid panel reflex to direct LDL Fasting Routine 2017            Who to contact     If you have questions or need follow up information about today's clinic visit or your schedule please contact [facility] directly at [phone].  Normal or non-critical lab and imaging results will be communicated to you by BlueSprighart, letter or phone within 4 business days after the clinic has received the results. If you do not hear from us within 7 days, please contact the clinic through Alait or phone. If you have a critical or abnormal lab result, we will notify you by phone as soon as possible.  Submit refill requests through ZoweeTV or call your pharmacy and they will forward the refill request to us. Please allow 3 business days for your refill to be completed.          Additional Information About Your Visit        ZoweeTV Information     ZoweeTV lets you send messages to your doctor, view your test results, renew your prescriptions, schedule appointments and more. To sign up, go to [email or web address] . Click on \"Log in\" on the left side of the screen, which will take you to the Welcome page. Then click on \"Sign up Now\" on the right side of the page.     You will be asked to enter the access code listed below, as well as some personal information. Please follow the directions to create your username and password.     Your access code is: [email or web address]  Expires: 2018  1:33 PM     Your access code will  in 90 days. If you need help or a new code, please call your [place] clinic or [phone].        Care EveryWhere ID     This is your Care EveryWhere ID. This could be used by other organizations to access your [place] medical records  [id number]        Your Vitals Were     Pulse Temperature Pulse Oximetry BMI (Body Mass Index)          81 97.1  F (36.2  C) (Oral) " 97% 27.66 kg/m2         Blood Pressure from Last 3 Encounters:   11/27/17 136/72   08/11/17 136/60   06/27/17 122/60    Weight from Last 3 Encounters:   11/27/17 166 lb 3.2 oz (75.4 kg)   08/11/17 161 lb 4.8 oz (73.2 kg)   06/27/17 161 lb 3.2 oz (73.1 kg)              We Performed the Following     ADMIN INFLUENZA (For MEDICARE Patients ONLY) []     FLU VACCINE, INCREASED ANTIGEN, PRESV FREE, AGE 65+ [69126]          Today's Medication Changes          These changes are accurate as of: 11/27/17  4:35 PM.  If you have any questions, ask your nurse or doctor.               These medicines have changed or have updated prescriptions.        Dose/Directions    JANTOVEN 3 MG tablet   This may have changed:  Another medication with the same name was removed. Continue taking this medication, and follow the directions you see here.   Generic drug:  warfarin   Changed by:  Andrew Red MD        Dose:  3 mg   Take 3 mg by mouth daily And an additional half pill Tues. Thurs and Sat.   Refills:  0                Primary Care Provider Office Phone # Fax #    De Blaine Obregon [phone]       [address] [place] [id number]        Equal Access to Services     JAGDISH SWARTZ AH: Hadii og ku hadasho Soomaali, waaxda luqadaha, qaybta kaalmada adeegyada, waxkyra hodges hayjillian burdick. So [facility] [phone].    ATENCIÓN: Si habla español, tiene a rodas disposición servicios gratuitos de asistencia lingüística. Llame al [phone].    We comply with applicable federal civil rights laws and [place] laws. We do not discriminate on the basis of race, color, national origin, age, disability, sex, sexual orientation, or gender identity.            Thank you!     Thank you for choosing [facility]  for your care. Our goal is always to provide you with excellent care. Hearing back from our patients is one way we can continue to improve our services. Please take a few  minutes to complete the written survey that you may receive in the mail after your visit with us. Thank you!             Your Updated Medication List - Protect others around you: Learn how to safely use, store and throw away your medicines at www.disposemymeds.org.          This list is accurate as of: 11/27/17  4:35 PM.  Always use your most recent med list.                   Brand Name Dispense Instructions for use Diagnosis    acetaminophen 650 MG CR tablet    TYLENOL ARTHRITIS PAIN     Take 2 tablets (1,300 mg) by mouth 2 times daily    Primary osteoarthritis involving multiple joints       aspirin 81 MG EC tablet      Take 1 tablet by mouth daily.    S/P coronary artery bypass graft x 3       atorvastatin 80 MG tablet    LIPITOR    90 tablet    Take 1 tablet (80 mg) by mouth daily    Hyperlipidemia LDL goal <100       calcium 600 MG tablet      Take 1 tablet by mouth 2 times daily.        * PRESERVISION AREDS 2 PO           * CENTRUM SILVER per tablet      Take 1 tablet by mouth daily.        co-enzyme Q-10 100 MG Caps capsule      Take 1 capsule by mouth daily.        FIBER PO      Take by mouth daily        gabapentin 100 MG capsule    NEURONTIN    450 capsule    Take one in am, one midday, and three at bedtime    Post herpetic neuralgia, Restless leg syndrome       HYDROcodone-acetaminophen 5-325 MG per tablet    NORCO    20 tablet    Take 0.5-1 tablets by mouth every 6 hours as needed for moderate to severe pain    Left knee pain, unspecified chronicity       JANTOVEN 3 MG tablet   Generic drug:  warfarin      Take 3 mg by mouth daily And an additional half pill Tues. Thurs and Sat.        levothyroxine 50 MCG tablet    SYNTHROID/LEVOTHROID    90 tablet    Take 1 tablet (50 mcg) by mouth daily    Acquired hypothyroidism       metFORMIN 500 MG tablet    GLUCOPHAGE    90 tablet    Take 1 tablet (500 mg) by mouth daily (with breakfast)    Type 2 diabetes mellitus with diabetic nephropathy, without long-term  current use of insulin (H)       nitroGLYcerin 0.4 MG sublingual tablet    NITROSTAT     Place 0.4 mg under the tongue every 5 minutes as needed. Up to 3 doses per episode        omeprazole 20 MG CR capsule    priLOSEC    90 capsule    Take 1 capsule (20 mg) by mouth every morning (before breakfast)    Hiatal hernia, Walters's esophagus with dysplasia       polyethylene glycol powder    MIRALAX     Take 17 g by mouth daily    Constipation       VITAMIN D-400 PO      Take by mouth daily        * Notice:  This list has 2 medication(s) that are the same as other medications prescribed for you. Read the directions carefully, and ask your doctor or other care provider to review them with you.

## 2017-12-28 ENCOUNTER — ANTICOAGULATION THERAPY VISIT (OUTPATIENT)
Dept: ANTICOAGULATION | Facility: CLINIC | Age: 82
End: 2017-12-28
Payer: COMMERCIAL

## 2017-12-28 DIAGNOSIS — I80.209 PHLEBITIS AND THROMBOPHLEBITIS OF DEEP VEINS OF LOWER EXTREMITIES (H): ICD-10-CM

## 2017-12-28 DIAGNOSIS — Z79.01 LONG-TERM (CURRENT) USE OF ANTICOAGULANTS: ICD-10-CM

## 2017-12-28 LAB — INR POINT OF CARE: 3.1 (ref 0.86–1.14)

## 2017-12-28 PROCEDURE — 36416 COLLJ CAPILLARY BLOOD SPEC: CPT

## 2017-12-28 PROCEDURE — 99207 ZZC NO CHARGE NURSE ONLY: CPT

## 2017-12-28 PROCEDURE — 85610 PROTHROMBIN TIME: CPT | Mod: QW

## 2017-12-28 NOTE — PROGRESS NOTES
ANTICOAGULATION FOLLOW-UP CLINIC VISIT    Patient Name:  Madie Silva  Date:  12/28/2017  Contact Type:  Face to Face    SUBJECTIVE:     Patient Findings     Positives No Problem Findings           OBJECTIVE    INR Protime   Date Value Ref Range Status   12/28/2017 3.1 (A) 0.86 - 1.14 Final       ASSESSMENT / PLAN  No question data found.  Anticoagulation Summary as of 12/28/2017     INR goal 2.0-3.0   Today's INR 3.1!   Maintenance plan 4.5 mg (3 mg x 1.5) on Tue, Thu, Sat; 3 mg (3 mg x 1) all other days   Full instructions 4.5 mg on Tue, Thu, Sat; 3 mg all other days   Weekly total 25.5 mg   Plan last modified Connie Hickman RN (7/10/2017)   Next INR check 2/8/2018   Target end date     Indications   Long-term (current) use of anticoagulants [Z79.01] [Z79.01]  Phlebitis and thrombophlebitis of deep veins of lower extremities (H) [I80.209]  Acute myocardial infarction  initial episode of care (Resolved) [I21.9]         Anticoagulation Episode Summary     INR check location     Preferred lab     Send INR reminders to [facility]    Comments             See the Encounter Report to view Anticoagulation Flowsheet and Dosing Calendar (Go to Encounters tab in chart review, and find the Anticoagulation Therapy Visit)        Connie Hickman RN

## 2017-12-28 NOTE — MR AVS SNAPSHOT
Madie Silva   12/28/2017 11:00 AM   Anticoagulation Therapy Visit    Description:  88 year old female   Provider:   ANTICOAGULATION CLINIC   Department:   Anti Coagulation           INR as of 12/28/2017     Today's INR 3.1!      Anticoagulation Summary as of 12/28/2017     INR goal 2.0-3.0   Today's INR 3.1!   Full instructions 4.5 mg on Tue, Thu, Sat; 3 mg all other days   Next INR check 2/8/2018    Indications   Long-term (current) use of anticoagulants [Z79.01] [Z79.01]  Phlebitis and thrombophlebitis of deep veins of lower extremities (H) [I80.209]  Acute myocardial infarction  initial episode of care (Resolved) [I21.9]         Your next Anticoagulation Clinic appointment(s)     Feb 08, 2018 11:00 AM CST   Anticoagulation Visit with  ANTICOAGULATION CLINIC   [facility] ([facility]    [address] [address] [phone]              Contact Numbers     [facility]  Please call  [phone] to cancel and/or reschedule your appointment   Please call  [phone] with any problems or questions regarding your therapy.        December 2017 Details    Sun Mon Tue Wed Thu Fri Sat          1               2                 3               4               5               6               7               8               9                 10               11               12               13               14               15               16                 17               18               19               20               21               22               23                 24               25               26               27               28      4.5 mg   See details      29      3 mg         30      4.5 mg           31      3 mg                Date Details   12/28 This INR check               How to take your warfarin dose     To take:  3 mg Take 1 of the 3 mg tablets.    To take:  4.5 mg Take 1.5 of the 3 mg tablets.            January 2018 Details    Sun Mon Tue Wed Thu Fri Sat      1      3 mg         2      4.5 mg         3      3 mg         4      4.5 mg         5      3 mg         6      4.5 mg           7      3 mg         8      3 mg         9      4.5 mg         10      3 mg         11      4.5 mg         12      3 mg         13      4.5 mg           14      3 mg         15      3 mg         16      4.5 mg         17      3 mg         18      4.5 mg         19      3 mg         20      4.5 mg           21      3 mg         22      3 mg         23      4.5 mg         24      3 mg         25      4.5 mg         26      3 mg         27      4.5 mg           28      3 mg         29      3 mg         30      4.5 mg         31      3 mg             Date Details   No additional details            How to take your warfarin dose     To take:  3 mg Take 1 of the 3 mg tablets.    To take:  4.5 mg Take 1.5 of the 3 mg tablets.           February 2018 Details    Sun Mon Tue Wed Thu Fri Sat         1      4.5 mg         2      3 mg         3      4.5 mg           4      3 mg         5      3 mg         6      4.5 mg         7      3 mg         8            9               10                 11               12               13               14               15               16               17                 18               19               20               21               22               23               24                 25               26               27               28                   Date Details   No additional details    Date of next INR:  2/8/2018         How to take your warfarin dose     To take:  3 mg Take 1 of the 3 mg tablets.    To take:  4.5 mg Take 1.5 of the 3 mg tablets.

## 2018-01-01 ENCOUNTER — TRANSFERRED RECORDS (OUTPATIENT)
Dept: HEALTH INFORMATION MANAGEMENT | Facility: CLINIC | Age: 83
End: 2018-01-01

## 2018-01-09 DIAGNOSIS — K44.9 HIATAL HERNIA: ICD-10-CM

## 2018-01-09 DIAGNOSIS — K22.719 BARRETT'S ESOPHAGUS WITH DYSPLASIA: ICD-10-CM

## 2018-01-16 DIAGNOSIS — B02.29 POST HERPETIC NEURALGIA: ICD-10-CM

## 2018-01-16 DIAGNOSIS — G25.81 RESTLESS LEG SYNDROME: ICD-10-CM

## 2018-01-16 RX ORDER — GABAPENTIN 100 MG/1
CAPSULE ORAL
Qty: 450 CAPSULE | Refills: 1 | Status: SHIPPED | OUTPATIENT
Start: 2018-01-16 | End: 2018-09-12

## 2018-01-16 NOTE — TELEPHONE ENCOUNTER
gabapentin      Last Written Prescription Date:  5/19/17  Last Fill Quantity: 450,   # refills: 1  Last Office Visit: 11/27/17  /Future Office visit:       Routing refill request to provider for review/approval because:  Drug not on the G, P or [facility] refill protocol or controlled substance

## 2018-01-17 ENCOUNTER — TRANSFERRED RECORDS (OUTPATIENT)
Dept: HEALTH INFORMATION MANAGEMENT | Facility: CLINIC | Age: 83
End: 2018-01-17

## 2018-02-08 ENCOUNTER — ANTICOAGULATION THERAPY VISIT (OUTPATIENT)
Dept: ANTICOAGULATION | Facility: CLINIC | Age: 83
End: 2018-02-08
Payer: COMMERCIAL

## 2018-02-08 DIAGNOSIS — Z79.01 LONG-TERM (CURRENT) USE OF ANTICOAGULANTS: ICD-10-CM

## 2018-02-08 DIAGNOSIS — I80.209 PHLEBITIS AND THROMBOPHLEBITIS OF DEEP VEINS OF LOWER EXTREMITIES (H): ICD-10-CM

## 2018-02-08 LAB — INR POINT OF CARE: 2.2 (ref 0.86–1.14)

## 2018-02-08 PROCEDURE — 85610 PROTHROMBIN TIME: CPT | Mod: QW

## 2018-02-08 PROCEDURE — 99207 ZZC NO CHARGE NURSE ONLY: CPT

## 2018-02-08 PROCEDURE — 36416 COLLJ CAPILLARY BLOOD SPEC: CPT

## 2018-02-08 NOTE — PROGRESS NOTES
ANTICOAGULATION FOLLOW-UP CLINIC VISIT    Patient Name:  Madie Silva  Date:  2/8/2018  Contact Type:  Face to Face    SUBJECTIVE:     Patient Findings     Positives No Problem Findings           OBJECTIVE    INR Protime   Date Value Ref Range Status   02/08/2018 2.2 (A) 0.86 - 1.14 Final       ASSESSMENT / PLAN  No question data found.  Anticoagulation Summary as of 2/8/2018     INR goal 2.0-3.0   Today's INR 2.2   Maintenance plan 4.5 mg (3 mg x 1.5) on Tue, Thu, Sat; 3 mg (3 mg x 1) all other days   Full instructions 4.5 mg on Tue, Thu, Sat; 3 mg all other days   Weekly total 25.5 mg   No change documented Connie Hickman RN   Plan last modified Connie Hickman RN (7/10/2017)   Next INR check 3/22/2018   Target end date     Indications   Long-term (current) use of anticoagulants [Z79.01] [Z79.01]  Phlebitis and thrombophlebitis of deep veins of lower extremities (H) [I80.209]  Acute myocardial infarction  initial episode of care (Resolved) [I21.9]         Anticoagulation Episode Summary     INR check location     Preferred lab     Send INR reminders to [facility]    Comments       Anticoagulation Care Providers     Provider Role Specialty Phone number    De Obregon MD [facility] [phone]            See the Encounter Report to view Anticoagulation Flowsheet and Dosing Calendar (Go to Encounters tab in chart review, and find the Anticoagulation Therapy Visit)        Connie Hickman RN

## 2018-02-08 NOTE — MR AVS SNAPSHOT
Madie Silva   2/8/2018 11:00 AM   Anticoagulation Therapy Visit    Description:  88 year old female   Provider:   ANTICOAGULATION CLINIC   Department:   Anti Coagulation           INR as of 2/8/2018     Today's INR 2.2      Anticoagulation Summary as of 2/8/2018     INR goal 2.0-3.0   Today's INR 2.2   Full instructions 4.5 mg on Tue, Thu, Sat; 3 mg all other days   Next INR check 3/22/2018    Indications   Long-term (current) use of anticoagulants [Z79.01] [Z79.01]  Phlebitis and thrombophlebitis of deep veins of lower extremities (H) [I80.209]  Acute myocardial infarction  initial episode of care (Resolved) [I21.9]         Your next Anticoagulation Clinic appointment(s)     Mar 22, 2018 11:00 AM CDT   Anticoagulation Visit with  ANTICOAGULATION CLINIC   [facility] ([facility])    [address] [address] [phone]              Contact Numbers     [facility]  Please call  [phone] to cancel and/or reschedule your appointment   Please call  [phone] with any problems or questions regarding your therapy.        February 2018 Details    Sun Mon Tue Wed Thu Fri Sat         1               2               3                 4               5               6               7               8      4.5 mg   See details      9      3 mg         10      4.5 mg           11      3 mg         12      3 mg         13      4.5 mg         14      3 mg         15      4.5 mg         16      3 mg         17      4.5 mg           18      3 mg         19      3 mg         20      4.5 mg         21      3 mg         22      4.5 mg         23      3 mg         24      4.5 mg           25      3 mg         26      3 mg         27      4.5 mg         28      3 mg             Date Details   02/08 This INR check               How to take your warfarin dose     To take:  3 mg Take 1 of the 3 mg tablets.    To take:  4.5 mg Take 1.5 of the 3 mg  tablets.           March 2018 Details    Sun Mon Tue Wed Thu Fri Sat         1      4.5 mg         2      3 mg         3      4.5 mg           4      3 mg         5      3 mg         6      4.5 mg         7      3 mg         8      4.5 mg         9      3 mg         10      4.5 mg           11      3 mg         12      3 mg         13      4.5 mg         14      3 mg         15      4.5 mg         16      3 mg         17      4.5 mg           18      3 mg         19      3 mg         20      4.5 mg         21      3 mg         22            23               24                 25               26               27               28               29               30               31                Date Details   No additional details    Date of next INR:  3/22/2018         How to take your warfarin dose     To take:  3 mg Take 1 of the 3 mg tablets.    To take:  4.5 mg Take 1.5 of the 3 mg tablets.

## 2018-02-12 ENCOUNTER — OFFICE VISIT (OUTPATIENT)
Dept: INTERNAL MEDICINE | Facility: CLINIC | Age: 83
End: 2018-02-12
Payer: COMMERCIAL

## 2018-02-12 ENCOUNTER — RADIANT APPOINTMENT (OUTPATIENT)
Dept: GENERAL RADIOLOGY | Facility: CLINIC | Age: 83
End: 2018-02-12
Attending: INTERNAL MEDICINE
Payer: COMMERCIAL

## 2018-02-12 ENCOUNTER — TELEPHONE (OUTPATIENT)
Dept: INTERNAL MEDICINE | Facility: CLINIC | Age: 83
End: 2018-02-12

## 2018-02-12 VITALS
BODY MASS INDEX: 27.71 KG/M2 | OXYGEN SATURATION: 98 % | HEIGHT: 65 IN | DIASTOLIC BLOOD PRESSURE: 60 MMHG | WEIGHT: 166.3 LBS | TEMPERATURE: 97.3 F | HEART RATE: 89 BPM | SYSTOLIC BLOOD PRESSURE: 146 MMHG

## 2018-02-12 DIAGNOSIS — I10 BENIGN ESSENTIAL HYPERTENSION: Primary | ICD-10-CM

## 2018-02-12 DIAGNOSIS — R06.09 DYSPNEA ON EXERTION: ICD-10-CM

## 2018-02-12 LAB
ALBUMIN SERPL-MCNC: 3.7 G/DL (ref 3.4–5)
ALP SERPL-CCNC: 132 U/L (ref 40–150)
ALT SERPL W P-5'-P-CCNC: 27 U/L (ref 0–50)
ANION GAP SERPL CALCULATED.3IONS-SCNC: 7 MMOL/L (ref 3–14)
AST SERPL W P-5'-P-CCNC: 22 U/L (ref 0–45)
BILIRUB SERPL-MCNC: 0.3 MG/DL (ref 0.2–1.3)
BUN SERPL-MCNC: 19 MG/DL (ref 7–30)
CALCIUM SERPL-MCNC: 9.1 MG/DL (ref 8.5–10.1)
CHLORIDE SERPL-SCNC: 105 MMOL/L (ref 94–109)
CO2 SERPL-SCNC: 26 MMOL/L (ref 20–32)
CREAT SERPL-MCNC: 0.96 MG/DL (ref 0.52–1.04)
ERYTHROCYTE [DISTWIDTH] IN BLOOD BY AUTOMATED COUNT: 17.1 % (ref 10–15)
GFR SERPL CREATININE-BSD FRML MDRD: 55 ML/MIN/1.7M2
GLUCOSE SERPL-MCNC: 175 MG/DL (ref 70–99)
HCT VFR BLD AUTO: 41.5 % (ref 35–47)
HGB BLD-MCNC: 13.2 G/DL (ref 11.7–15.7)
MCH RBC QN AUTO: 25 PG (ref 26.5–33)
MCHC RBC AUTO-ENTMCNC: 31.8 G/DL (ref 31.5–36.5)
MCV RBC AUTO: 79 FL (ref 78–100)
NT-PROBNP SERPL-MCNC: 230 PG/ML (ref 0–450)
PLATELET # BLD AUTO: 269 10E9/L (ref 150–450)
POTASSIUM SERPL-SCNC: 4.4 MMOL/L (ref 3.4–5.3)
PROT SERPL-MCNC: 7.3 G/DL (ref 6.8–8.8)
RBC # BLD AUTO: 5.27 10E12/L (ref 3.8–5.2)
SODIUM SERPL-SCNC: 138 MMOL/L (ref 133–144)
WBC # BLD AUTO: 8 10E9/L (ref 4–11)

## 2018-02-12 PROCEDURE — 71046 X-RAY EXAM CHEST 2 VIEWS: CPT | Mod: FY

## 2018-02-12 PROCEDURE — 80053 COMPREHEN METABOLIC PANEL: CPT | Performed by: INTERNAL MEDICINE

## 2018-02-12 PROCEDURE — 85027 COMPLETE CBC AUTOMATED: CPT | Performed by: INTERNAL MEDICINE

## 2018-02-12 PROCEDURE — 83880 ASSAY OF NATRIURETIC PEPTIDE: CPT | Performed by: INTERNAL MEDICINE

## 2018-02-12 PROCEDURE — 99214 OFFICE O/P EST MOD 30 MIN: CPT | Performed by: INTERNAL MEDICINE

## 2018-02-12 PROCEDURE — 36415 COLL VENOUS BLD VENIPUNCTURE: CPT | Performed by: INTERNAL MEDICINE

## 2018-02-12 PROCEDURE — 93000 ELECTROCARDIOGRAM COMPLETE: CPT | Performed by: INTERNAL MEDICINE

## 2018-02-12 RX ORDER — LOSARTAN POTASSIUM 50 MG/1
50 TABLET ORAL DAILY
Qty: 30 TABLET | Refills: 1 | Status: SHIPPED | OUTPATIENT
Start: 2018-02-12 | End: 2018-03-15

## 2018-02-12 NOTE — TELEPHONE ENCOUNTER
Madie Silva is a 88 year old female who calls with BP concerns.    NURSING ASSESSMENT:  Description:  /72 at last appointment in Nov. [facility] insurance nurse saw patient in January, /77. Yesterday patient saw a nurse from [employer] and . Unsure on DBP. Does not check her own BP.  Precip. factors:  Not walking much/exercising d/t it being winter. Denies any other recent changes.  Associated symptoms:  Denies any symptoms.  Last exam/Treatment:  11/27/17  Allergies:   Allergies   Allergen Reactions     Amoxicillin      hives     Bisphosphonates      Swallowing disorder     Boniva [Ibandronate Sodium] Nausea and Vomiting     Diarrhea, N/V with oral.  IV caused arm to swell      Fosamax [Alendronic Acid]      Severe gastrointestinal reaction     Lisinopril      cough     Niacin      rash     Simvastatin      myalgias       MEDICATIONS:   Patient does not think she is on any BP medications. Also denies any new OTC meds.      RECOMMENDED DISPOSITION:  See in 24 hours - appt made  Will comply with recommendation: Yes  If further questions/concerns or if symptoms do not improve, worsen or new symptoms develop, call your PCP or [facility] as soon as possible.      Guideline used:  Telephone Triage Protocols for Nurses, Fifth Edition, Marybeth Da Silva RN

## 2018-02-12 NOTE — PATIENT INSTRUCTIONS
EKG today.    ---    Please schedule echo on the way out today.    ---    Labs and chest xray downstairs today.    ---    START Losartan 50mg daily.    ---    Follow-up with me or Dr. Obregon in 2 weeks for a blood pressure check.

## 2018-02-12 NOTE — MR AVS SNAPSHOT
After Visit Summary   2/12/2018    Madie Silva    MRN: [id number]           Patient Information     Date Of Birth          7/21/1929        Visit Information        Provider Department      2/12/2018 2:45 PM Bee Orr MD [facility]        Today's Diagnoses     Benign essential hypertension    -  1    Dyspnea on exertion        Hypothyroidism, unspecified type          Care Instructions    EKG today.    ---    Please schedule echo on the way out today.    ---    Labs and chest xray downstairs today.    ---    START Losartan 50mg daily.    ---    Follow-up with me or Dr. Obregon in 2 weeks for a blood pressure check.           Follow-ups after your visit        Your next 10 appointments already scheduled     Feb 19, 2018  3:45 PM CST   Remote PPM Check with CONKLIN TECH1   [facility] ([facility])    [address] [place] [id number] [phone]           This appointment is for a remote check of your pacemaker.  This is not an appointment at the office.            Mar 22, 2018 11:00 AM CDT   Anticoagulation Visit with [facility]ULATION CLINIC   [facility] ([facility])    [address] [phone] [phone]            May 29, 2018 10:00 AM CDT   LAB with OXBORO LAB   [facility] ([facility])    [address] [id number] [phone]           Please do not eat 10-12 hours before your appointment if you are coming in fasting for labs on lipids, cholesterol, or glucose (sugar). This does not apply to pregnant women. Water, hot tea and black coffee (with nothing added) are okay. Do not drink other fluids, diet soda or chew gum.            Jun 07, 2018 10:30 AM CDT   Office Visit with De Obregon MD   [facility] ([facility]  "[facility]    [address] [id number] [phone]           Bring a current list of meds and any records pertaining to this visit. For Physicals, please bring immunization records and any forms needing to be filled out. Please arrive 10 minutes early to complete paperwork.              Future tests that were ordered for you today     Open Future Orders        Priority Expected Expires Ordered    XR Chest 2 Views Routine 2018    Echocardiogram Complete Routine  2019            Who to contact     If you have questions or need follow up information about today's clinic visit or your schedule please contact [facility] directly at [phone].  Normal or non-critical lab and imaging results will be communicated to you by LawbitDocshart, letter or phone within 4 business days after the clinic has received the results. If you do not hear from us within 7 days, please contact the clinic through LawbitDocshart or phone. If you have a critical or abnormal lab result, we will notify you by phone as soon as possible.  Submit refill requests through [employer]ign[employer]ly[employer] or call your pharmacy and they will forward the refill request to us. Please allow 3 business days for your refill to be completed.          Additional Information About Your Visit        [facility] Information     AlignAlytics lets you send messages to your doctor, view your test results, renew your prescriptions, schedule appointments and more. To sign up, go to [email or web address] . Click on \"Log in\" on the left side of the screen, which will take you to the Welcome page. Then click on \"Sign up Now\" on the right side of the page.     You will be asked to enter the access code listed below, as well as some personal information. Please follow the directions to create your username and password.     Your access code is: [email or web address]  Expires: 2018  3:19 PM     Your access code will  " "in 90 days. If you need help or a new code, please call your [facility] clinic or [phone].        Care EveryWhere ID     This is your Care EveryWhere ID. This could be used by other organizations to access your [facility] medical records  [id number]        Your Vitals Were     Pulse Temperature Height Pulse Oximetry BMI (Body Mass Index)       89 97.3  F (36.3  C) (Oral) 5' 5\" (1.651 m) 98% 27.67 kg/m2        Blood Pressure from Last 3 Encounters:   02/12/18 146/60   11/27/17 136/72   08/11/17 136/60    Weight from Last 3 Encounters:   02/12/18 166 lb 4.8 oz (75.4 kg)   11/27/17 166 lb 3.2 oz (75.4 kg)   08/11/17 161 lb 4.8 oz (73.2 kg)              We Performed the Following     BNP-N terminal pro     CBC with platelets     Comprehensive metabolic panel     EKG 12-lead complete w/read - Clinics     T4 free     TSH          Today's Medication Changes          These changes are accurate as of 2/12/18  3:19 PM.  If you have any questions, ask your nurse or doctor.               Start taking these medicines.        Dose/Directions    losartan 50 MG tablet   Commonly known as:  COZAAR   Used for:  Benign essential hypertension   Started by:  Bee Orr MD        Dose:  50 mg   Take 1 tablet (50 mg) by mouth daily   Quantity:  30 tablet   Refills:  1            Where to get your medicines      These medications were sent to [facility] [address] [address]     Phone:  [phone]     losartan 50 MG tablet                Primary Care Provider Office Phone # Fax #    De Obregon [phone]       [address] [place] [phone]        Equal Access to Services     JAGDISH SWARTZ : Hafsa Buck, wamarcelda luqadaha, qaybta kaalmada monica, javier burdick. So [facility] [phone].    ATENCIÓN: Si habla español, tiene a rodas disposición servicios gratuitos de asistencia " lingüísticaKaiser Mueller al [phone].    We comply with applicable federal civil rights laws and [place] laws. We do not discriminate on the basis of race, color, national origin, age, disability, sex, sexual orientation, or gender identity.            Thank you!     Thank you for choosing [facility]  for your care. Our goal is always to provide you with excellent care. Hearing back from our patients is one way we can continue to improve our services. Please take a few minutes to complete the written survey that you may receive in the mail after your visit with us. Thank you!             Your Updated Medication List - Protect others around you: Learn how to safely use, store and throw away your medicines at www.disposemymeds.org.          This list is accurate as of 2/12/18  3:19 PM.  Always use your most recent med list.                   Brand Name Dispense Instructions for use Diagnosis    acetaminophen 650 MG CR tablet    TYLENOL ARTHRITIS PAIN     Take 2 tablets (1,300 mg) by mouth 2 times daily    Primary osteoarthritis involving multiple joints       aspirin 81 MG EC tablet      Take 1 tablet by mouth daily.    S/P coronary artery bypass graft x 3       atorvastatin 80 MG tablet    LIPITOR    90 tablet    Take 1 tablet (80 mg) by mouth daily    Hyperlipidemia LDL goal <100       calcium 600 MG tablet      Take 1 tablet by mouth 2 times daily.        * PRESERVISION AREDS 2 PO           * CENTRUM SILVER per tablet      Take 1 tablet by mouth daily.        co-enzyme Q-10 100 MG Caps capsule      Take 1 capsule by mouth daily.        FIBER PO      Take by mouth daily        gabapentin 100 MG capsule    NEURONTIN    450 capsule    TAKE 1 CAPSULE BY MOUTH EVERY MORNING, ONE MIDDAY, AND THREE AT BEDTIME    Post herpetic neuralgia, Restless leg syndrome       HYDROcodone-acetaminophen 5-325 MG per tablet    NORCO    20 tablet    Take 0.5-1 tablets by mouth every 6 hours as needed for moderate  to severe pain    Left knee pain, unspecified chronicity       JANTOVEN 3 MG tablet   Generic drug:  warfarin      Take 3 mg by mouth daily And an additional half pill Tues. Thurs and Sat.        levothyroxine 50 MCG tablet    SYNTHROID/LEVOTHROID    90 tablet    Take 1 tablet (50 mcg) by mouth daily    Acquired hypothyroidism       losartan 50 MG tablet    COZAAR    30 tablet    Take 1 tablet (50 mg) by mouth daily    Benign essential hypertension       metFORMIN 500 MG tablet    GLUCOPHAGE    90 tablet    Take 1 tablet (500 mg) by mouth daily (with breakfast)    Type 2 diabetes mellitus with diabetic nephropathy, without long-term current use of insulin (H)       nitroGLYcerin 0.4 MG sublingual tablet    NITROSTAT     Place 0.4 mg under the tongue every 5 minutes as needed. Up to 3 doses per episode        omeprazole 20 MG CR capsule    priLOSEC    90 capsule    TAKE ONE CAPSULE (20 MG) BY MOUTH EVERY MORNING (BEFORE BREAKFAST)    Hiatal hernia, Walters's esophagus with dysplasia       polyethylene glycol powder    MIRALAX     Take 17 g by mouth daily    Constipation       VITAMIN D-400 PO      Take by mouth daily        * Notice:  This list has 2 medication(s) that are the same as other medications prescribed for you. Read the directions carefully, and ask your doctor or other care provider to review them with you.

## 2018-02-12 NOTE — TELEPHONE ENCOUNTER
Reason for call:  Other   Patient called regarding (reason for call): patient called about her blood pressure running to high she said it is 160  Additional comments: Please call patient asap to discuss this with her    Phone number to reach patient:  Home number on file [phone] (home)    Best Time:  anytime    Can we leave a detailed message on this number?  YES

## 2018-02-12 NOTE — PROGRESS NOTES
"  SUBJECTIVE:                                                      HPI: Madie Silva is a pleasant 88 year old female who presents high blood pressure readings and shortness of breath:    Re: high blood pressure readings:   - SBPs had been in [id number] until last year (not on Rx for HTN)  - SBPs have been increasing to the 130s and then the 140s in the last year  - recent SBP in 160s    No recent change in medication, diet (\"always terrible\"), or activity level (\"I never do very much\").    Re: shortness of breath:  - not at rest  - occurs with minimal exertion    - i.e. washing dishes, transferring positions, several steps, <1/4 block on flat ground  - patient feels ZHAO has been progressive over time    - no cough or wheezing  - no chest pain or palpitations  - no presyncope or syncope  - no lower extremity swelling  - no orthopnea    PMH significant for paroxysmal atrial fibrillation on Coumadin, CAD s/p CABG x4, and has a pacemaker in place.    The medication, allergy, and problem lists have been reviewed and updated as appropriate.       OBJECTIVE:                                                      /60 (BP Location: Left arm, Patient Position: Chair, Cuff Size: Adult Regular)  Pulse 89  Temp 97.3  F (36.3  C) (Oral)  Ht 5' 5\" (1.651 m)  Wt 166 lb 4.8 oz (75.4 kg)  SpO2 98%  BMI 27.67 kg/m2  Constitutional: well-appearing, but winded with transfers  Respiratory: normal respiratory effort; clear to auscultation bilaterally - no crackles, rhonchi, wheezes, or diminished breath sounds  Cardiovascular: regular rate and rhythm; REYNOLD heard best at LUSB and RUSB; no edema      ASSESSMENT/PLAN:                                                      (I10) Benign essential hypertension  (primary encounter diagnosis)  (R06.09) Dyspnea on exertion  Comment: etiology unclear, but significant cardiac history is concerning.   Plan:    - EKG and CXR today.   - TTE ordered - patient to schedule.   - CBC and CMP " ordered.   - START losartan 50mg daily.    - follow-up in 2 weeks for blood pressure check and BMP.    The instructions on the AVS were discussed and explained to the patient. Patient expressed understanding of instructions.    (Chart documentation was completed, in part, with GreenBytes voice-recognition software. Even though reviewed, some grammatical, spelling, and word errors may remain.)    Bee Orr MD   [address] [address]  T: [phone], F: [phone]

## 2018-02-13 DIAGNOSIS — I48.0 PAROXYSMAL A-FIB (H): Primary | ICD-10-CM

## 2018-02-14 RX ORDER — WARFARIN SODIUM 3 MG/1
3 TABLET ORAL DAILY
Qty: 100 TABLET | Refills: 0 | Status: SHIPPED | OUTPATIENT
Start: 2018-02-14 | End: 2018-05-08

## 2018-02-14 NOTE — TELEPHONE ENCOUNTER
"Requested Prescriptions   Pending Prescriptions Disp Refills     warfarin (JANTOVEN) 3 MG tablet 30 tablet      Sig: Take 1 tablet (3 mg) by mouth daily And an additional half pill Tues. Thurs and Sat.    Vitamin K Antagonists Failed    2/13/2018  9:28 PM       Failed - INR is within goal in the past 6 weeks    Confirm INR is within goal in the past 6 weeks.     Recent Labs   Lab Test 02/08/18   INR  2.2*                      Passed - Recent or future visit with authorizing provider's specialty    Patient had office visit in the last year or has a visit in the next 30 days with authorizing provider.  See \"Patient Info\" tab in inbasket, or \"Choose Columns\" in Meds & Orders section of the refill encounter.            Passed - Patient is 18 years of age or older       Passed - Patient is not pregnant       Passed - No positive pregnancy on file in past 12 months        Last Written Prescription Date:  na  Last Fill Quantity: na,  # refills: na   Last office visit: 2/12/2018 with prescribing provider:    Future Office Visit:   Next 5 appointments (look out 90 days)     Mar 15, 2018  2:30 PM CDT   Office Visit with De Obregon MD   [facility] ([facility])    [address] [address] [phone]                   "

## 2018-02-17 ENCOUNTER — ALLIED HEALTH/NURSE VISIT (OUTPATIENT)
Dept: INTERNAL MEDICINE | Facility: CLINIC | Age: 83
End: 2018-02-17
Payer: COMMERCIAL

## 2018-02-17 VITALS — SYSTOLIC BLOOD PRESSURE: 128 MMHG | DIASTOLIC BLOOD PRESSURE: 65 MMHG

## 2018-02-17 DIAGNOSIS — Z01.30 BP CHECK: Primary | ICD-10-CM

## 2018-02-17 PROCEDURE — 99207 ZZC NO CHARGE NURSE ONLY: CPT | Performed by: INTERNAL MEDICINE

## 2018-02-17 NOTE — MR AVS SNAPSHOT
After Visit Summary   2/17/2018    Madie Silva    MRN: [id number]           Patient Information     Date Of Birth          7/21/1929        Visit Information        Provider Department      2/17/2018 10:50 AM De Obregon MD [facility]        Today's Diagnoses     BP check    -  1       Follow-ups after your visit        Your next 10 appointments already scheduled     Feb 19, 2018  3:45 PM CST   Remote PPM Check with CONKLIN TECH1   [facility] ([facility])    [facility] [facility] [address]  [facility] [address] [phone]           This appointment is for a remote check of your pacemaker.  This is not an appointment at the office.            Mar 14, 2018 10:00 AM CDT   Ech Complete with OXECHR1   [facility] ([facility])    [address] [phone]           1. Please bring or wear a comfortable two-piece outfit. 2. You may eat, drink and take your normal medicines. 3. For any questions that cannot be answered, please contact the ordering physician            Mar 15, 2018  2:30 PM CDT   Office Visit with De Obregon MD   [facility] ([facility])    [address] [phone]           Bring a current list of meds and any records pertaining to this visit. For Physicals, please bring immunization records and any forms needing to be filled out. Please arrive 10 minutes early to complete paperwork.            Mar 22, 2018 11:00 AM CDT   Anticoagulation Visit with OX ANTICOAGULATION CLINIC   [facility] ([facility])    [address] [id number] [phone]            May 29, 2018 10:00 AM CDT   LAB with [facility]XBORO LAB   [facility] ([facility]  "[facility])    [address] [place] [address]   [phone]           Please do not eat 10-12 hours before your appointment if you are coming in fasting for labs on lipids, cholesterol, or glucose (sugar). This does not apply to pregnant women. Water, hot tea and black coffee (with nothing added) are okay. Do not drink other fluids, diet soda or chew gum.            Jun 07, 2018 10:30 AM CDT   Office Visit with De Obregon MD   [facility] ([facility])    [address] [place] [address]   [phone]           Bring a current list of meds and any records pertaining to this visit. For Physicals, please bring immunization records and any forms needing to be filled out. Please arrive 10 minutes early to complete paperwork.              Who to contact     If you have questions or need follow up information about today's clinic visit or your schedule please contact [facility] directly at [phone].  Normal or non-critical lab and imaging results will be communicated to you by [email or web address], letter or phone within 4 business days after the clinic has received the results. If you do not hear from us within 7 days, please contact the clinic through [employer] - [employer]. Like a Man.t or phone. If you have a critical or abnormal lab result, we will notify you by phone as soon as possible.  Submit refill requests through [employer] or call your pharmacy and they will forward the refill request to us. Please allow 3 business days for your refill to be completed.          Additional Information About Your Visit        [employer] Information     [employer] lets you send messages to your doctor, view your test results, renew your prescriptions, schedule appointments and more. To sign up, go to [email or web address] . Click on \"Log in\" on the left side of the screen, which will take you to the Welcome page. Then click on \"Sign up Now\" on the right side of " the page.     You will be asked to enter the access code listed below, as well as some personal information. Please follow the directions to create your username and password.     Your access code is: [email or web address]  Expires: 2018  3:19 PM     Your access code will  in 90 days. If you need help or a new code, please call your [place] clinic or [phone].        Care EveryWhere ID     This is your Care EveryWhere ID. This could be used by other organizations to access your [place] medical records  [id number]         Blood Pressure from Last 3 Encounters:   18 128/65   18 146/60   17 136/72    Weight from Last 3 Encounters:   18 166 lb 4.8 oz (75.4 kg)   17 166 lb 3.2 oz (75.4 kg)   17 161 lb 4.8 oz (73.2 kg)              Today, you had the following     No orders found for display       Primary Care Provider Office Phone # Fax #    De Blaine Obregon [phone]       [address] [facility] [address]        Equal Access to Services     SHERITA SWARTZ : Hadii og diaso Somarcelaali, waaxda luqadaha, qaybta kaalmada adeegyada, javier burdick. So [facility] [phone].    ATENCIÓN: Si habla español, tiene a rodas disposición servicios gratuitos de asistencia lingüística. Llame al [phone].    We comply with applicable federal civil rights laws and [place] laws. We do not discriminate on the basis of race, color, national origin, age, disability, sex, sexual orientation, or gender identity.            Thank you!     Thank you for choosing [facility]  for your care. Our goal is always to provide you with excellent care. Hearing back from our patients is one way we can continue to improve our services. Please take a few minutes to complete the written survey that you may receive in the mail after your visit with us. Thank you!             Your Updated Medication List - Protect others around you:  Learn how to safely use, store and throw away your medicines at www.disposemymeds.org.          This list is accurate as of 2/17/18 10:53 AM.  Always use your most recent med list.                   Brand Name Dispense Instructions for use Diagnosis    acetaminophen 650 MG CR tablet    TYLENOL ARTHRITIS PAIN     Take 2 tablets (1,300 mg) by mouth 2 times daily    Primary osteoarthritis involving multiple joints       aspirin 81 MG EC tablet      Take 1 tablet by mouth daily.    S/P coronary artery bypass graft x 3       atorvastatin 80 MG tablet    LIPITOR    90 tablet    Take 1 tablet (80 mg) by mouth daily    Hyperlipidemia LDL goal <100       calcium 600 MG tablet      Take 1 tablet by mouth 2 times daily.        * PRESERVISION AREDS 2 PO           * CENTRUM SILVER per tablet      Take 1 tablet by mouth daily.        co-enzyme Q-10 100 MG Caps capsule      Take 1 capsule by mouth daily.        FIBER PO      Take by mouth daily        gabapentin 100 MG capsule    NEURONTIN    450 capsule    TAKE 1 CAPSULE BY MOUTH EVERY MORNING, ONE MIDDAY, AND THREE AT BEDTIME    Post herpetic neuralgia, Restless leg syndrome       HYDROcodone-acetaminophen 5-325 MG per tablet    NORCO    20 tablet    Take 0.5-1 tablets by mouth every 6 hours as needed for moderate to severe pain    Left knee pain, unspecified chronicity       levothyroxine 50 MCG tablet    SYNTHROID/LEVOTHROID    90 tablet    Take 1 tablet (50 mcg) by mouth daily    Acquired hypothyroidism       losartan 50 MG tablet    COZAAR    30 tablet    Take 1 tablet (50 mg) by mouth daily    Benign essential hypertension       metFORMIN 500 MG tablet    GLUCOPHAGE    90 tablet    Take 1 tablet (500 mg) by mouth daily (with breakfast)    Type 2 diabetes mellitus with diabetic nephropathy, without long-term current use of insulin (H)       nitroGLYcerin 0.4 MG sublingual tablet    NITROSTAT     Place 0.4 mg under the tongue every 5 minutes as needed. Up to 3 doses per  episode        omeprazole 20 MG CR capsule    priLOSEC    90 capsule    TAKE ONE CAPSULE (20 MG) BY MOUTH EVERY MORNING (BEFORE BREAKFAST)    Hiatal hernia, Walters's esophagus with dysplasia       polyethylene glycol powder    MIRALAX     Take 17 g by mouth daily    Constipation       VITAMIN D-400 PO      Take by mouth daily        warfarin 3 MG tablet    JANTOVEN    100 tablet    Take 1 tablet (3 mg) by mouth daily And an additional half pill Tues. Thurs and Sat.    Paroxysmal a-fib (H)       * Notice:  This list has 2 medication(s) that are the same as other medications prescribed for you. Read the directions carefully, and ask your doctor or other care provider to review them with you.

## 2018-02-17 NOTE — NURSING NOTE
Madie Silva is enrolled/participating in the retail pharmacy Blood Pressure Goals Achievement Program (BPGAP).  Madie Silva was evaluated at [facility] on February 17, 2018 at which time her blood pressure was:    BP Readings from Last 3 Encounters:   02/17/18 128/65   02/12/18 146/60   11/27/17 136/72     Reviewed lifestyle modifications for blood pressure control and reduction: including making healthy food choices, managing weight, getting regular exercise, smoking cessation, reducing alcohol consumption, monitoring blood pressure regularly.     Madie Silva is not experiencing symptoms.    Follow-Up: BP is at goal of < 150/90 mmHg (patient 60+ years of age without diabetes).  Recommended follow-up at pharmacy in 6 months. No specific goal stated in EPIC    Recommendation to Provider: none    Madie Silva was evaluated for enrollment into the PGEN study today.    Patient eligible for enrollment:  No  Patient interested in enrollment:  No    Completed by: Chayo Gonzalez, Pharm. D.  [facility]  [phone]

## 2018-02-19 ENCOUNTER — ALLIED HEALTH/NURSE VISIT (OUTPATIENT)
Dept: CARDIOLOGY | Facility: CLINIC | Age: 83
End: 2018-02-19
Payer: COMMERCIAL

## 2018-02-19 DIAGNOSIS — Z95.0 CARDIAC PACEMAKER IN SITU: Primary | ICD-10-CM

## 2018-02-19 PROCEDURE — 93296 REM INTERROG EVL PM/IDS: CPT | Performed by: INTERNAL MEDICINE

## 2018-02-19 PROCEDURE — 93294 REM INTERROG EVL PM/LDLS PM: CPT | Performed by: INTERNAL MEDICINE

## 2018-02-19 NOTE — PROGRESS NOTES
Medtronic Sensia [id number] (D)  Remote PPM Device Check  AP: 89% : 1%  Mode: DDDR        Presenting Rhythm: AP/VS  Heart Rate: adequate heart rates per histogram  Sensing: RA: not performed RV; stable    Pacing Threshold: stable    Impedance: stable  Battery Status: 4.5 - 7.5 years remaining  Atrial Arrhythmia: 24 brief mode switch episodes comprising <0.1% of the time. No EGMs available  Ventricular Arrhythmia: 1 vent high rate. Marker only EGM shows 9 beats of VS events, rate 190bpm. EF 55 - 60% (2012). Reviewed finding with Qamar MENDOZA.     Care Plan: F/U [facility] q 3 months.  Order in for annual follow up with Dr. Red to be scheduled in August. Gave results and next transmission date over the phone to saul VALLE

## 2018-02-19 NOTE — MR AVS SNAPSHOT
After Visit Summary   2/19/2018    Madie Silva    MRN: [id number]           Patient Information     Date Of Birth          7/21/1929        Visit Information        Provider Department      2/19/2018 3:45 PM CONKLIN TECH1 [facility]        Today's Diagnoses     Cardiac pacemaker in situ    -  1       Follow-ups after your visit        Your next 10 appointments already scheduled     Feb 19, 2018  3:45 PM CST   Remote PPM Check with CONKLIN TECH1   [facility] ([facility])    [address] [place] [address] [address]  [place] [phone]           This appointment is for a remote check of your pacemaker.  This is not an appointment at the office.            Mar 14, 2018 10:00 AM CDT   Ech Complete with [facility] ([facility])    [address] [place] [phone]           1. Please bring or wear a comfortable two-piece outfit. 2. You may eat, drink and take your normal medicines. 3. For any questions that cannot be answered, please contact the ordering physician            Mar 15, 2018  2:30 PM CDT   Office Visit with De Obregon MD   [facility]    [address] [place] [phone]           Bring a current list of meds and any records pertaining to this visit. For Physicals, please bring immunization records and any forms needing to be filled out. Please arrive 10 minutes early to complete paperwork.            Mar 22, 2018 11:00 AM CDT   Anticoagulation Visit with [facility]   [facility] ([facility])    [address] [place] [phone]            May 22, 2018 11:45 AM CDT   Remote PPM Check with CONKLIN TECH1   [facility]  Care   [place] ([facility])    [address]  [place] [address]   [phone]           This appointment is for a remote check of your pacemaker.  This is not an appointment at the office.            May 29, 2018 10:00 AM CDT   LAB with [facility]   [facility] ([facility])    [address] [phone]           Please do not eat 10-12 hours before your appointment if you are coming in fasting for labs on lipids, cholesterol, or glucose (sugar). This does not apply to pregnant women. Water, hot tea and black coffee (with nothing added) are okay. Do not drink other fluids, diet soda or chew gum.            Jun 07, 2018 10:30 AM CDT   Office Visit with De Obregon MD   [facility] ([facility])    [address] [place] [phone]           Bring a current list of meds and any records pertaining to this visit. For Physicals, please bring immunization records and any forms needing to be filled out. Please arrive 10 minutes early to complete paperwork.              Who to contact     If you have questions or need follow up information about today's clinic visit or your schedule please contact [facility]   [place] directly at [phone].  Normal or non-critical lab and imaging results will be communicated to you by MyChart, letter or phone within 4 business days after the clinic has received the results. If you do not hear from us within 7 days, please contact the clinic through MyChart or phone. If you have a critical or abnormal lab result, we will notify you by phone as soon as possible.  Submit refill requests through [employer] or call your pharmacy and they will forward the refill request to us. Please allow 3 business days for your refill to be completed.          Additional Information About  "Your Visit        [employer] Information     [employer] lets you send messages to your doctor, view your test results, renew your prescriptions, schedule appointments and more. To sign up, go to [email or web address] . Click on \"Log in\" on the left side of the screen, which will take you to the Welcome page. Then click on \"Sign up Now\" on the right side of the page.     You will be asked to enter the access code listed below, as well as some personal information. Please follow the directions to create your username and password.     Your access code is: [email or web address]  Expires: 2018  3:19 PM     Your access code will  in 90 days. If you need help or a new code, please call your [place] clinic or [phone].        Care EveryWhere ID     This is your Care EveryWhere ID. This could be used by other organizations to access your [place] medical records  [id number]         Blood Pressure from Last 3 Encounters:   18 128/65   18 146/60   17 136/72    Weight from Last 3 Encounters:   18 75.4 kg (166 lb 4.8 oz)   17 75.4 kg (166 lb 3.2 oz)   17 73.2 kg (161 lb 4.8 oz)              We Performed the Following     INTERROGATION DEVICE EVAL REMOTE, PACER/ICD (50809)     PM DEVICE INTERROGATE REMOTE (07283)        Primary Care Provider Office Phone # Fax #    De Blaine Obregon [phone]       [address] [place] [phone]        Equal Access to Services     SHERITA SWARTZ : Hadii og Buck, waaxda luqcharlee, qaybta deloresaljavier stern. So [facility] [phone].    ATENCIÓN: Si habla español, tiene a rodas disposición servicios gratuitos de asistencia lingüística. Ervin al [phone].    We comply with applicable federal civil rights laws and [place] laws. We do not discriminate on the basis of race, color, national origin, age, disability, sex, sexual orientation, or gender identity.            Thank " you!     Thank you for choosing [facility]  for your care. Our goal is always to provide you with excellent care. Hearing back from our patients is one way we can continue to improve our services. Please take a few minutes to complete the written survey that you may receive in the mail after your visit with us. Thank you!             Your Updated Medication List - Protect others around you: Learn how to safely use, store and throw away your medicines at www.disposemymeds.org.          This list is accurate as of 2/19/18  1:35 PM.  Always use your most recent med list.                   Brand Name Dispense Instructions for use Diagnosis    acetaminophen 650 MG CR tablet    TYLENOL ARTHRITIS PAIN     Take 2 tablets (1,300 mg) by mouth 2 times daily    Primary osteoarthritis involving multiple joints       aspirin 81 MG EC tablet      Take 1 tablet by mouth daily.    S/P coronary artery bypass graft x 3       atorvastatin 80 MG tablet    LIPITOR    90 tablet    Take 1 tablet (80 mg) by mouth daily    Hyperlipidemia LDL goal <100       calcium 600 MG tablet      Take 1 tablet by mouth 2 times daily.        * PRESERVISION AREDS 2 PO           * CENTRUM SILVER per tablet      Take 1 tablet by mouth daily.        co-enzyme Q-10 100 MG Caps capsule      Take 1 capsule by mouth daily.        FIBER PO      Take by mouth daily        gabapentin 100 MG capsule    NEURONTIN    450 capsule    TAKE 1 CAPSULE BY MOUTH EVERY MORNING, ONE MIDDAY, AND THREE AT BEDTIME    Post herpetic neuralgia, Restless leg syndrome       HYDROcodone-acetaminophen 5-325 MG per tablet    NORCO    20 tablet    Take 0.5-1 tablets by mouth every 6 hours as needed for moderate to severe pain    Left knee pain, unspecified chronicity       levothyroxine 50 MCG tablet    SYNTHROID/LEVOTHROID    90 tablet    Take 1 tablet (50 mcg) by mouth daily    Acquired hypothyroidism       losartan 50 MG tablet    COZAAR    30  tablet    Take 1 tablet (50 mg) by mouth daily    Benign essential hypertension       metFORMIN 500 MG tablet    GLUCOPHAGE    90 tablet    Take 1 tablet (500 mg) by mouth daily (with breakfast)    Type 2 diabetes mellitus with diabetic nephropathy, without long-term current use of insulin (H)       nitroGLYcerin 0.4 MG sublingual tablet    NITROSTAT     Place 0.4 mg under the tongue every 5 minutes as needed. Up to 3 doses per episode        omeprazole 20 MG CR capsule    priLOSEC    90 capsule    TAKE ONE CAPSULE (20 MG) BY MOUTH EVERY MORNING (BEFORE BREAKFAST)    Hiatal hernia, Walters's esophagus with dysplasia       polyethylene glycol powder    MIRALAX     Take 17 g by mouth daily    Constipation       VITAMIN D-400 PO      Take by mouth daily        warfarin 3 MG tablet    JANTOVEN    100 tablet    Take 1 tablet (3 mg) by mouth daily And an additional half pill Tues. Thurs and Sat.    Paroxysmal a-fib (H)       * Notice:  This list has 2 medication(s) that are the same as other medications prescribed for you. Read the directions carefully, and ask your doctor or other care provider to review them with you.

## 2018-03-14 ENCOUNTER — RADIANT APPOINTMENT (OUTPATIENT)
Dept: CARDIOLOGY | Facility: CLINIC | Age: 83
End: 2018-03-14
Attending: INTERNAL MEDICINE
Payer: COMMERCIAL

## 2018-03-14 DIAGNOSIS — I10 BENIGN ESSENTIAL HYPERTENSION: ICD-10-CM

## 2018-03-14 PROCEDURE — 93306 TTE W/DOPPLER COMPLETE: CPT | Mod: TC

## 2018-03-14 PROCEDURE — 93306 TTE W/DOPPLER COMPLETE: CPT | Mod: 26 | Performed by: INTERNAL MEDICINE

## 2018-03-14 RX ADMIN — Medication 5 ML: at 11:07

## 2018-03-15 ENCOUNTER — OFFICE VISIT (OUTPATIENT)
Dept: INTERNAL MEDICINE | Facility: CLINIC | Age: 83
End: 2018-03-15
Payer: COMMERCIAL

## 2018-03-15 VITALS
OXYGEN SATURATION: 98 % | HEIGHT: 65 IN | DIASTOLIC BLOOD PRESSURE: 62 MMHG | RESPIRATION RATE: 20 BRPM | HEART RATE: 89 BPM | TEMPERATURE: 97.4 F | SYSTOLIC BLOOD PRESSURE: 122 MMHG | WEIGHT: 167.7 LBS | BODY MASS INDEX: 27.94 KG/M2

## 2018-03-15 DIAGNOSIS — G25.81 RESTLESS LEG SYNDROME: ICD-10-CM

## 2018-03-15 DIAGNOSIS — E11.21 TYPE 2 DIABETES MELLITUS WITH DIABETIC NEPHROPATHY, WITHOUT LONG-TERM CURRENT USE OF INSULIN (H): ICD-10-CM

## 2018-03-15 DIAGNOSIS — M35.3 POLYMYALGIA RHEUMATICA (H): ICD-10-CM

## 2018-03-15 DIAGNOSIS — I48.0 PAROXYSMAL ATRIAL FIBRILLATION (H): ICD-10-CM

## 2018-03-15 DIAGNOSIS — I35.0 NONRHEUMATIC AORTIC VALVE STENOSIS: Primary | ICD-10-CM

## 2018-03-15 DIAGNOSIS — I10 BENIGN ESSENTIAL HYPERTENSION: ICD-10-CM

## 2018-03-15 DIAGNOSIS — K22.719 BARRETT'S ESOPHAGUS WITH DYSPLASIA: ICD-10-CM

## 2018-03-15 DIAGNOSIS — B02.29 POST HERPETIC NEURALGIA: ICD-10-CM

## 2018-03-15 DIAGNOSIS — K44.9 HIATAL HERNIA: ICD-10-CM

## 2018-03-15 PROCEDURE — 99214 OFFICE O/P EST MOD 30 MIN: CPT | Performed by: INTERNAL MEDICINE

## 2018-03-15 RX ORDER — GABAPENTIN 100 MG/1
CAPSULE ORAL
Qty: 450 CAPSULE | Refills: 1 | Status: CANCELLED | OUTPATIENT
Start: 2018-03-15

## 2018-03-15 RX ORDER — LOSARTAN POTASSIUM 50 MG/1
50 TABLET ORAL DAILY
Qty: 90 TABLET | Refills: 3 | Status: SHIPPED | OUTPATIENT
Start: 2018-03-15 | End: 2018-04-19

## 2018-03-15 NOTE — PROGRESS NOTES
"  SUBJECTIVE:   Madie Silva is a 88 year old female who presents to clinic today for the following health issues:      Hypertension Follow-up      Outpatient blood pressures are not being checked.    Now on losartan    Low Salt Diet: no added salt      Amount of exercise or physical activity: None    Problems taking medications regularly: No    Medication side effects: none    Diet: regular (no restrictions)        Diabetes Follow-up      Patient is checking blood sugars: not at all    Diabetic concerns: None     Symptoms of hypoglycemia (low blood sugar): none     Paresthesias (numbness or burning in feet) or sores: No     Date of last diabetic eye exam: 01/2018     BP Readings from Last 2 Encounters:   02/17/18 128/65   02/12/18 146/60     Hemoglobin A1C (%)   Date Value   11/20/2017 6.7 (H)   05/12/2017 7.3 (H)     LDL Cholesterol Calculated (mg/dL)   Date Value   05/12/2017 86   04/13/2016 94       Reviewed and updated as needed this visit by clinical staff:  Medications  Allergies  Soc Hx   Additional history: as documented    Additional issues to address:  1.  Follow-up dyspnea on exertion, present for years.   Some worse, can only walk a flight of stairs, or walk a few hundred feet.   Now using cane, for steadying.    Dr. Orr did cxr and this showed larger hiatal hernia.  Echo yesterday, wants to review.   2.  Follow-up PMN, No active symptoms    ROS:    Constitutional, HEENT, cardiovascular, pulmonary, gi and gu systems are negative, except as otherwise noted.    OBJECTIVE:                                                      /58  Pulse 89  Temp 97.4  F (36.3  C) (Oral)  Resp 20  Ht 5' 5\" (1.651 m)  Wt 167 lb 11.2 oz (76.1 kg)  SpO2 98%  BMI 27.91 kg/m2  Body mass index is 27.91 kg/(m^2).  No apparent distress  Recheck blood pressure 122/62  Irregular pulse, normal heart tones  No edema      ASSESSMENT:                                                      HTN, controlled   Aortic stenosis, " progressive.  DM, stable - follow-up in May planned  Atrial fib, controlled.  Maintained on anticoagulation.  Polymyalgia rheumatica, doing well off steroids    PLAN:                                                      1.  MEDICATIONS:  Continue current medications without change  2.  See Dr. Red regarding possible intervention for aortic valve narrowing  3.  Check fasting labs in 2 months, then make an office appointment with MD to review the results.     De Obregon MD  [facility]

## 2018-03-15 NOTE — MR AVS SNAPSHOT
After Visit Summary   3/15/2018    Madie Silva    MRN: [id number]           Patient Information     Date Of Birth          7/21/1929        Visit Information        Provider Department      3/15/2018 2:30 PM De Obregon MD [facility]        Today's Diagnoses     Aortic valve stenosis, severe    -  1    Benign essential hypertension        Type 2 diabetes mellitus with diabetic nephropathy, without long-term current use of insulin (H)        Atrial fibrillation, Paroxysmal (H)        Post herpetic neuralgia        Restless leg syndrome        Hiatal hernia        Walters's esophagus with dysplasia          Care Instructions    PLAN:                                                      1.  MEDICATIONS:  Continue current medications without change  2.  See Dr. Red regarding possible intervention for aortic valve narrowing  3.  Check fasting labs in 2 months, then make an office appointment with MD to review the results.           Follow-ups after your visit        Your next 10 appointments already scheduled     Mar 22, 2018 11:00 AM CDT   Anticoagulation Visit with [facility]ATION [facility]   [facility]    [address] [phone]            May 22, 2018 11:45 AM CDT   Remote PPM Check with CONKLIN TECH1   [facility] ([facility])    [address] [place] [phone]           This appointment is for a remote check of your pacemaker.  This is not an appointment at the office.            May 29, 2018 10:00 AM CDT   LAB with [facility]   [facility]    [address] [phone]           Please do not eat 10-12 hours before your appointment if you are coming in fasting for labs on lipids, cholesterol,  "or glucose (sugar). This does not apply to pregnant women. Water, hot tea and black coffee (with nothing added) are okay. Do not drink other fluids, diet soda or chew gum.            Jun 07, 2018 10:30 AM CDT   Office Visit with De Obregon MD   [facility] ([facility])    [address] [phone]           Bring a current list of meds and any records pertaining to this visit. For Physicals, please bring immunization records and any forms needing to be filled out. Please arrive 10 minutes early to complete paperwork.              Who to contact     If you have questions or need follow up information about today's clinic visit or your schedule please contact [facility] directly at [phone].  Normal or non-critical lab and imaging results will be communicated to you by MyChart, letter or phone within 4 business days after the clinic has received the results. If you do not hear from us within 7 days, please contact the clinic through MyChart or phone. If you have a critical or abnormal lab result, we will notify you by phone as soon as possible.  Submit refill requests through [employer] or call your pharmacy and they will forward the refill request to us. Please allow 3 business days for your refill to be completed.          Additional Information About Your Visit        MyChart Information     [employer] lets you send messages to your doctor, view your test results, renew your prescriptions, schedule appointments and more. To sign up, go to [email or web address] . Click on \"Log in\" on the left side of the screen, which will take you to the Welcome page. Then click on \"Sign up Now\" on the right side of the page.     You will be asked to enter the access code listed below, as well as some personal information. Please follow the directions to create your username and password.     Your access code is: " "[email or web address]  Expires: 2018  4:19 PM     Your access code will  in 90 days. If you need help or a new code, please call your [place] clinic or [phone].        Care EveryWhere ID     This is your Care EveryWhere ID. This could be used by other organizations to access your [place] medical records  [id number]        Your Vitals Were     Pulse Temperature Respirations Height Pulse Oximetry BMI (Body Mass Index)    89 97.4  F (36.3  C) (Oral) 20 5' 5\" (1.651 m) 98% 27.91 kg/m2       Blood Pressure from Last 3 Encounters:   03/15/18 122/62   18 128/65   18 146/60    Weight from Last 3 Encounters:   03/15/18 167 lb 11.2 oz (76.1 kg)   18 166 lb 4.8 oz (75.4 kg)   17 166 lb 3.2 oz (75.4 kg)              Today, you had the following     No orders found for display         Where to get your medicines      These medications were sent to [facility] [address] [address]     Phone:  [phone]     losartan 50 MG tablet          Primary Care Provider Office Phone # Fax #    Deeddie Obregon [phone]       [address] [address]        Equal Access to Services     JAGDISH SWARTZ AH: Hadii og ku hadasho Soomaali, waaxda luqadaha, qaybta kaalmada adeegyada, javier escobar adealisson burdick. So [facility] [phone].    ATENCIÓN: Si habla español, tiene a rodas disposición servicios gratuitos de asistencia lingüística. Llame al [phone].    We comply with applicable federal civil rights laws and [place] laws. We do not discriminate on the basis of race, color, national origin, age, disability, sex, sexual orientation, or gender identity.            Thank you!     Thank you for choosing [facility]  for your care. Our goal is always to provide you with excellent care. Hearing back from our patients is one way we can continue to improve our services. " Please take a few minutes to complete the written survey that you may receive in the mail after your visit with us. Thank you!             Your Updated Medication List - Protect others around you: Learn how to safely use, store and throw away your medicines at www.disposemymeds.org.          This list is accurate as of 3/15/18  3:39 PM.  Always use your most recent med list.                   Brand Name Dispense Instructions for use Diagnosis    acetaminophen 650 MG CR tablet    TYLENOL ARTHRITIS PAIN     Take 2 tablets (1,300 mg) by mouth 2 times daily    Primary osteoarthritis involving multiple joints       aspirin 81 MG EC tablet      Take 1 tablet by mouth daily.    S/P coronary artery bypass graft x 3       atorvastatin 80 MG tablet    LIPITOR    90 tablet    Take 1 tablet (80 mg) by mouth daily    Hyperlipidemia LDL goal <100       calcium 600 MG tablet      Take 1 tablet by mouth 2 times daily.        * PRESERVISION AREDS 2 PO           * CENTRUM SILVER per tablet      Take 1 tablet by mouth daily.        co-enzyme Q-10 100 MG Caps capsule      Take 1 capsule by mouth daily.        FIBER PO      Take by mouth daily        gabapentin 100 MG capsule    NEURONTIN    450 capsule    TAKE 1 CAPSULE BY MOUTH EVERY MORNING, ONE MIDDAY, AND THREE AT BEDTIME    Post herpetic neuralgia, Restless leg syndrome       HYDROcodone-acetaminophen 5-325 MG per tablet    NORCO    20 tablet    Take 0.5-1 tablets by mouth every 6 hours as needed for moderate to severe pain    Left knee pain, unspecified chronicity       levothyroxine 50 MCG tablet    SYNTHROID/LEVOTHROID    90 tablet    Take 1 tablet (50 mcg) by mouth daily    Acquired hypothyroidism       losartan 50 MG tablet    COZAAR    90 tablet    Take 1 tablet (50 mg) by mouth daily    Benign essential hypertension       metFORMIN 500 MG tablet    GLUCOPHAGE    90 tablet    Take 1 tablet (500 mg) by mouth daily (with breakfast)    Type 2 diabetes mellitus with diabetic  nephropathy, without long-term current use of insulin (H)       nitroGLYcerin 0.4 MG sublingual tablet    NITROSTAT     Place 0.4 mg under the tongue every 5 minutes as needed. Up to 3 doses per episode        omeprazole 20 MG CR capsule    priLOSEC    90 capsule    TAKE ONE CAPSULE (20 MG) BY MOUTH EVERY MORNING (BEFORE BREAKFAST)    Hiatal hernia, Walters's esophagus with dysplasia       polyethylene glycol powder    MIRALAX     Take 17 g by mouth daily    Constipation       VITAMIN D-400 PO      Take by mouth daily        warfarin 3 MG tablet    JANTOVEN    100 tablet    Take 1 tablet (3 mg) by mouth daily And an additional half pill Tues. Thurs and Sat.    Paroxysmal a-fib (H)       * Notice:  This list has 2 medication(s) that are the same as other medications prescribed for you. Read the directions carefully, and ask your doctor or other care provider to review them with you.

## 2018-03-15 NOTE — PATIENT INSTRUCTIONS
PLAN:                                                      1.  MEDICATIONS:  Continue current medications without change  2.  See Dr. Red regarding possible intervention for aortic valve narrowing  3.  Check fasting labs in 2 months, then make an office appointment with MD to review the results.

## 2018-03-15 NOTE — Clinical Note
Please abstract the following data from this visit with this patient into the appropriate field in Epic:  Eye exam with ophthalmology on this date: 01/2018

## 2018-03-15 NOTE — PROGRESS NOTES
Your echocardiogram shows some increase in your aortic valve stenosis (narrowing).  We need to watch this closely, and have you see your cardiologist again this year.

## 2018-03-20 ENCOUNTER — OFFICE VISIT (OUTPATIENT)
Dept: CARDIOLOGY | Facility: CLINIC | Age: 83
End: 2018-03-20
Payer: COMMERCIAL

## 2018-03-20 ENCOUNTER — TELEPHONE (OUTPATIENT)
Dept: CARDIOLOGY | Facility: CLINIC | Age: 83
End: 2018-03-20

## 2018-03-20 ENCOUNTER — TRANSFERRED RECORDS (OUTPATIENT)
Dept: HEALTH INFORMATION MANAGEMENT | Facility: CLINIC | Age: 83
End: 2018-03-20

## 2018-03-20 VITALS
HEIGHT: 65 IN | HEART RATE: 76 BPM | SYSTOLIC BLOOD PRESSURE: 144 MMHG | DIASTOLIC BLOOD PRESSURE: 66 MMHG | BODY MASS INDEX: 27.47 KG/M2 | WEIGHT: 164.9 LBS

## 2018-03-20 DIAGNOSIS — E78.5 HYPERLIPIDEMIA LDL GOAL <100: ICD-10-CM

## 2018-03-20 DIAGNOSIS — I10 ESSENTIAL HYPERTENSION: ICD-10-CM

## 2018-03-20 DIAGNOSIS — I25.10 ASCVD (ARTERIOSCLEROTIC CARDIOVASCULAR DISEASE): ICD-10-CM

## 2018-03-20 DIAGNOSIS — I35.0 NONRHEUMATIC AORTIC VALVE STENOSIS: Primary | ICD-10-CM

## 2018-03-20 DIAGNOSIS — R06.09 DYSPNEA ON EXERTION: Primary | ICD-10-CM

## 2018-03-20 DIAGNOSIS — R06.09 DYSPNEA ON EXERTION: ICD-10-CM

## 2018-03-20 PROCEDURE — 99215 OFFICE O/P EST HI 40 MIN: CPT | Performed by: INTERNAL MEDICINE

## 2018-03-20 RX ORDER — FUROSEMIDE 20 MG
20 TABLET ORAL DAILY
Qty: 30 TABLET | Refills: 0 | Status: SHIPPED | OUTPATIENT
Start: 2018-03-20 | End: 2018-04-19

## 2018-03-20 NOTE — LETTER
3/20/2018      De Obregon MD  [address] [facility] [address]      RE: Madie Silva       Dear Colleague,    I had the pleasure of seeing Madie Silva in the [facility].    Service Date: 03/20/2018      HISTORY OF PRESENT ILLNESS:  I saw Madie today, age 88.  She comes with a history of 2 years of progressive dyspnea.  I was impressed when she walked into the room that she was dyspneic.  In getting up on the exam table, she got dyspneic.  You had suggested to her that this might be due to her hiatal hernia which has been there at least as far back as 2012 and has been documented by CT of the chest, abdomen and chest x-ray.  It is certainly a moderate-sized hiatal hernia but not gigantic.  I spoke to Dr. Coughlin, Pulmonary physician with [facility].  He reviewed the images also.  Agreed that hiatal hernia was moderate but he had not seen dyspnea of this dramatic degree associated simply with a hiatal hernia.  That has also been my experience.      For all of that, it is understandable to look in that direction but she also had an echocardiogram that showed progression of mild aortic stenosis to at least moderate with a mean gradient going from 12 to 22 mmHg and a valve area of 1.0 cm2.  Also, this generally would not explain the rather violent a sense of shortness of breath she had.  I am quite confused by the exact reason for her dyspnea.  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  Still, I agree she is quite short of breath and I do not feel I have a fine handle on what is going on here.       Documenting her aortic stenosis would require right and left heart catheterization which may well be required to confirm the severity of the AS but I reviewed her echo myself.  The valve does not look critically or severely stenotic, although it is certainly moderate.      She has a  history of coronary disease.  I ordered a Lexiscan thallium study to see if there might be a component of inducible ischemia also promoting shortness of breath without chest pain.      PHYSICAL EXAMINATION:   VITAL SIGNS:  Blood pressure was 140/60, heart rate 70 beats a minute and regular.     NECK:  She had no neck vein distention or bruit.   HEART:  Regular with a 2/6 systolic ejection murmur that is mid peaking.  No S3 heard.   LUNGS:  Clear, no rales heard.   ABDOMEN:  Soft, obese, nontender.   EXTREMITIES:  Free of edema.      Accordingly, I am a bit at a loss for why she is so symptomatic.  Certainly, one would have to analyze her extensively to come to  with any suggestion that the hiatal hernia be surgical repaired and similarly, the aortic stenosis would have to be clearly symptomatic and stenotic to go down that path, even including a TAVR.      I gave her Lasix 20 mg daily for 2 days.  We will see if a little diuresis helps her breathing at all.  Once again, she had no rales, I did not hear an S3 and she does not show signs of fluid retention otherwise.      Any help we can give each other and this analysis is appreciated.      IMPRESSION:   1.  Multifactorial dyspnea.   2.  Moderate aortic stenosis, not severe, as defined by the  of the echo.   3.  History of hypertension but currently not excessively high.    4.  Coronary artery disease post previous coronary artery bypass x4 vessel.   5.  Chronic pacemaker with a DDDR device pacing in the atrium most of the time and rarely in the ventricle.      PLAN:  Further assessment may well be required.  We will see if a touch of diuresis helps.  If we cannot sort out exactly what the issues are, we may need to resort cardiac catheterization but this would be a lady I would dearly not like to have put through any major surgical interventions unless we were absolutely sure that there was a benefit.      She has not had a history of lung disease  and her x-rays, other than the hiatal hernia, have always been clear.  No signs of pulmonary fibrosis, etc.      I will try to get in touch with you by phone.  Warm regards.  Over 40 minutes was spent doing her consultation with the majority of the time face-to-face but I also spoke to a Pulmonary physician, reviewed multiple records, images, echo, chest x-ray, etc.      cc:   De Obregon MD    [facility]    [address] [place] [address]         PIPE KILGORE MD, [facility]             D: 2018   T: 2018   MT: LUDWIN      Name:     CORDELL AMEZQUITA   MRN:      [id number]        Account:      [id number]   :      1929           Service Date: 2018      Document: [id number]         Outpatient Encounter Prescriptions as of 3/20/2018   Medication Sig Dispense Refill     furosemide (LASIX) 20 MG tablet Take 1 tablet (20 mg) by mouth daily 30 tablet 0     losartan (COZAAR) 50 MG tablet Take 1 tablet (50 mg) by mouth daily 90 tablet 3     warfarin (JANTOVEN) 3 MG tablet Take 1 tablet (3 mg) by mouth daily And an additional half pill Tues. Thurs and Sat. 100 tablet 0     gabapentin (NEURONTIN) 100 MG capsule TAKE 1 CAPSULE BY MOUTH EVERY MORNING, ONE MIDDAY, AND THREE AT BEDTIME 450 capsule 1     omeprazole (PRILOSEC) 20 MG CR capsule TAKE ONE CAPSULE (20 MG) BY MOUTH EVERY MORNING (BEFORE BREAKFAST) 90 capsule 2     Cholecalciferol (VITAMIN D-400 PO) Take by mouth daily       FIBER PO Take by mouth daily       levothyroxine (SYNTHROID/LEVOTHROID) 50 MCG tablet Take 1 tablet (50 mcg) by mouth daily 90 tablet 3     HYDROcodone-acetaminophen (NORCO) 5-325 MG per tablet Take 0.5-1 tablets by mouth every 6 hours as needed for moderate to severe pain 20 tablet 0     metFORMIN (GLUCOPHAGE) 500 MG tablet Take 1 tablet (500 mg) by mouth daily (with breakfast) 90 tablet prn     atorvastatin (LIPITOR) 80 MG tablet Take 1 tablet (80 mg) by mouth daily 90 tablet prn     acetaminophen (TYLENOL  ARTHRITIS PAIN) 650 MG CR tablet Take 2 tablets (1,300 mg) by mouth 2 times daily       Multiple Vitamins-Minerals (PRESERVISION AREDS 2 PO)        polyethylene glycol (MIRALAX) powder Take 17 g by mouth daily       co-enzyme Q-10 (COENZYME Q-10) 100 MG CAPS Take 1 capsule by mouth daily.       calcium 600 MG tablet Take 1 tablet by mouth 2 times daily.       Multiple Vitamins-Minerals (CENTRUM SILVER) per tablet Take 1 tablet by mouth daily.       aspirin EC 81 MG EC tablet Take 1 tablet by mouth daily.       nitroGLYCERIN (NITROSTAT) 0.4 MG SL tablet Place 0.4 mg under the tongue every 5 minutes as needed. Up to 3 doses per episode        No facility-administered encounter medications on file as of 3/20/2018.        Again, thank you for allowing me to participate in the care of your patient.      Sincerely,    PIPE KILGORE MD     [facility]

## 2018-03-20 NOTE — LETTER
3/20/2018    De Obregon MD  [address] [facility] [address]    RE: Madie Silva       Dear Colleague,    I had the pleasure of seeing Madie Silva in the [facility].    HPI and Plan:   See dictation    No orders of the defined types were placed in this encounter.      Orders Placed This Encounter   Medications     furosemide (LASIX) 20 MG tablet     Sig: Take 1 tablet (20 mg) by mouth daily     Dispense:  30 tablet     Refill:  0       There are no discontinued medications.      Encounter Diagnoses   Name Primary?     Aortic valve stenosis, severe Yes     ASCVD (arteriosclerotic cardiovascular disease)      Hyperlipidemia LDL goal <100      Essential hypertension      Dyspnea on exertion        CURRENT MEDICATIONS:  Current Outpatient Prescriptions   Medication Sig Dispense Refill     furosemide (LASIX) 20 MG tablet Take 1 tablet (20 mg) by mouth daily 30 tablet 0     losartan (COZAAR) 50 MG tablet Take 1 tablet (50 mg) by mouth daily 90 tablet 3     warfarin (JANTOVEN) 3 MG tablet Take 1 tablet (3 mg) by mouth daily And an additional half pill Tues. Thurs and Sat. 100 tablet 0     gabapentin (NEURONTIN) 100 MG capsule TAKE 1 CAPSULE BY MOUTH EVERY MORNING, ONE MIDDAY, AND THREE AT BEDTIME 450 capsule 1     omeprazole (PRILOSEC) 20 MG CR capsule TAKE ONE CAPSULE (20 MG) BY MOUTH EVERY MORNING (BEFORE BREAKFAST) 90 capsule 2     Cholecalciferol (VITAMIN D-400 PO) Take by mouth daily       FIBER PO Take by mouth daily       levothyroxine (SYNTHROID/LEVOTHROID) 50 MCG tablet Take 1 tablet (50 mcg) by mouth daily 90 tablet 3     HYDROcodone-acetaminophen (NORCO) 5-325 MG per tablet Take 0.5-1 tablets by mouth every 6 hours as needed for moderate to severe pain 20 tablet 0     metFORMIN (GLUCOPHAGE) 500 MG tablet Take 1 tablet (500 mg) by mouth daily (with breakfast) 90 tablet prn     atorvastatin (LIPITOR) 80 MG tablet Take 1 tablet (80 mg) by mouth daily 90 tablet prn      acetaminophen (TYLENOL ARTHRITIS PAIN) 650 MG CR tablet Take 2 tablets (1,300 mg) by mouth 2 times daily       Multiple Vitamins-Minerals (PRESERVISION AREDS 2 PO)        polyethylene glycol (MIRALAX) powder Take 17 g by mouth daily       co-enzyme Q-10 (COENZYME Q-10) 100 MG CAPS Take 1 capsule by mouth daily.       calcium 600 MG tablet Take 1 tablet by mouth 2 times daily.       Multiple Vitamins-Minerals (CENTRUM SILVER) per tablet Take 1 tablet by mouth daily.       aspirin EC 81 MG EC tablet Take 1 tablet by mouth daily.       nitroGLYCERIN (NITROSTAT) 0.4 MG SL tablet Place 0.4 mg under the tongue every 5 minutes as needed. Up to 3 doses per episode          ALLERGIES     Allergies   Allergen Reactions     Amoxicillin      hives     Bisphosphonates      Swallowing disorder     Boniva [Ibandronate Sodium] Nausea and Vomiting     Diarrhea, N/V with oral.  IV caused arm to swell      Fosamax [Alendronic Acid]      Severe gastrointestinal reaction     Lisinopril      cough     Niacin      rash     Simvastatin      myalgias       PAST MEDICAL HISTORY:  Past Medical History:   Diagnosis Date     Walters's esophagus 7/09     CHRONIC VERTIGO 9/99     Colonic Adenoma 3/90, 11/98     Costochondritis      Depression      DIABETES MELLITUS TYPE II 10/07     DJD      DVT of Leg, postop 11/09    6 months of warfarin     Gastritis 10/89     GERD      HIATAL HERNIA      HYPERLIPIDEMIA      HYPOTHYROIDISM (aka HASHIMOTO)      Impaired fasting glucose      L Ankle Fracture 10/09     Obesity, unspecified      Osteoporosis, unspecified      PERIPHERAL NEUROPATHY      Polymyalgia rheumatica (H)      Post Herpetic Neuralgia 4/03    R lumbar distribution     Restless leg syndrome      Small vessel cerebrovascular changes 9/99     Stricture and stenosis of esophagus 10/89    Dilated     Syncope     1/06, 8/08, 2/10     VITAMIN D DEFICIENCY 12/07    per Dr. Petty       PAST SURGICAL HISTORY:  Past Surgical History:   Procedure  "Laterality Date     BYPASS GRAFT ARTERY CORONARY  11/2/2011    CABG x 4  Dr. EDWIN ACOSTA NONSPECIFIC PROCEDURE  age 14    appendectomy     C NONSPECIFIC PROCEDURE  as a child    T&A     EMBOLECTOMY LOWER EXTREMITY  11/9/2011    EMBOLECTOMY LOWER EXTREMITY; left leg femoral embolectomy.; Surgeon:JOLEEN BOONE; Location:[facility] OR     HYSTERECTOMY, CERVIX STATUS UNKNOWN  1978    partial hysterectomy     SURGICAL HISTORY OF -   10/09    L ankle fracture repair    Dr. Jose Roberto Trevino       FAMILY HISTORY:  Family History   Problem Relation Age of Onset     Alzheimer Disease Sister      HEART DISEASE Mother      HEART DISEASE Father      HEART DISEASE Son      HEART DISEASE Brother        SOCIAL HISTORY:  Social History     Social History     Marital status:      Spouse name: N/A     Number of children: N/A     Years of education: N/A     Social History Main Topics     Smoking status: Never Smoker     Smokeless tobacco: Never Used     Alcohol use No     Drug use: No     Sexual activity: No     Other Topics Concern     Caffeine Concern No     Special Diet No     Exercise No     Parent/Sibling W/ Cabg, Mi Or Angioplasty Before 65f 55m? No     Social History Narrative       Review of Systems:  Skin:  Negative       Eyes:  Positive for glasses cheaters  ENT:  Negative      Respiratory:  Positive for dyspnea on exertion;cough worsening   Cardiovascular:    Positive for;fatigue;lightheadedness;dizziness chest discomfort  Gastroenterology: Negative      Genitourinary:  Positive for incontinence    Musculoskeletal:  Positive for arthritis    Neurologic:  Positive for numbness or tingling of hands    Psychiatric:  Negative      Heme/Lymph/Imm:  Negative      Endocrine:  Positive for thyroid disorder;diabetes      Physical Exam:  Vitals: /66  Pulse 76  Ht 1.651 m (5' 5\")  Wt 74.8 kg (164 lb 14.4 oz)  BMI 27.44 kg/m2    Constitutional:           Skin:             Head:           Eyes:           Lymph:  "     ENT:           Neck:           Respiratory:            Cardiac:                                                           GI:           Extremities and Muscular Skeletal:                 Neurological:           Psych:           CC  No referring provider defined for this encounter.                Thank you for allowing me to participate in the care of your patient.      Sincerely,     PIPE KILGORE MD     [facility]    cc:   No referring provider defined for this encounter.

## 2018-03-20 NOTE — PROGRESS NOTES
Service Date: 03/20/2018      HISTORY OF PRESENT ILLNESS:  I saw Madie today, age 88.  She comes with a history of 2 years of progressive dyspnea.  I was impressed when she walked into the room that she was dyspneic.  In getting up on the exam table, she got dyspneic.  You had suggested to her that this might be due to her hiatal hernia which has been there at least as far back as 2012 and has been documented by CT of the chest, abdomen and chest x-ray.  It is certainly a moderate-sized hiatal hernia but not gigantic.  I spoke to Dr. Coughlin, Pulmonary physician with [facility].  He reviewed the images also.  Agreed that hiatal hernia was moderate but he had not seen dyspnea of this dramatic degree associated simply with a hiatal hernia.  That has also been my experience.      For all of that, it is understandable to look in that direction but she also had an echocardiogram that showed progression of mild aortic stenosis to at least moderate with a mean gradient going from 12 to 22 mmHg and a valve area of 1.0 cm2.  Also, this generally would not explain the rather violent a sense of shortness of breath she had.  I am quite confused by the exact reason for her dyspnea.  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  Still, I agree she is quite short of breath and I do not feel I have a fine handle on what is going on here.       Documenting her aortic stenosis would require right and left heart catheterization which may well be required to confirm the severity of the AS but I reviewed her echo myself.  The valve does not look critically or severely stenotic, although it is certainly moderate.      She has a history of coronary disease.  I ordered a Lexiscan thallium study to see if there might be a component of inducible ischemia also promoting shortness of breath without chest pain.      PHYSICAL EXAMINATION:   VITAL  SIGNS:  Blood pressure was 140/60, heart rate 70 beats a minute and regular.     NECK:  She had no neck vein distention or bruit.   HEART:  Regular with a 2/6 systolic ejection murmur that is mid peaking.  No S3 heard.   LUNGS:  Clear, no rales heard.   ABDOMEN:  Soft, obese, nontender.   EXTREMITIES:  Free of edema.      Accordingly, I am a bit at a loss for why she is so symptomatic.  Certainly, one would have to analyze her extensively to come to  with any suggestion that the hiatal hernia be surgical repaired and similarly, the aortic stenosis would have to be clearly symptomatic and stenotic to go down that path, even including a TAVR.      I gave her Lasix 20 mg daily for 2 days.  We will see if a little diuresis helps her breathing at all.  Once again, she had no rales, I did not hear an S3 and she does not show signs of fluid retention otherwise.      Any help we can give each other and this analysis is appreciated.      IMPRESSION:   1.  Multifactorial dyspnea.   2.  Moderate aortic stenosis, not severe, as defined by the  of the echo.   3.  History of hypertension but currently not excessively high.    4.  Coronary artery disease post previous coronary artery bypass x4 vessel.   5.  Chronic pacemaker with a DDDR device pacing in the atrium most of the time and rarely in the ventricle.      PLAN:  Further assessment may well be required.  We will see if a touch of diuresis helps.  If we cannot sort out exactly what the issues are, we may need to resort cardiac catheterization but this would be a lady I would dearly not like to have put through any major surgical interventions unless we were absolutely sure that there was a benefit.      She has not had a history of lung disease and her x-rays, other than the hiatal hernia, have always been clear.  No signs of pulmonary fibrosis, etc.      I will try to get in touch with you by phone.  Warm regards.  Over 40 minutes was spent doing her  consultation with the majority of the time face-to-face but I also spoke to a Pulmonary physician, reviewed multiple records, images, echo, chest x-ray, etc.      cc:   De Obregon MD    [place] [address] [place] [id number]         PIPE KILGORE MD, [facility]             D: 2018   T: 2018   MT: LUDWIN      Name:     CORDELL AMEZQUITA   MRN:      [id number]        Account:      [id number]   :      1929           Service Date: 2018      Document: [id number]

## 2018-03-20 NOTE — PROGRESS NOTES
HPI and Plan:   See dictation    No orders of the defined types were placed in this encounter.      Orders Placed This Encounter   Medications     furosemide (LASIX) 20 MG tablet     Sig: Take 1 tablet (20 mg) by mouth daily     Dispense:  30 tablet     Refill:  0       There are no discontinued medications.      Encounter Diagnoses   Name Primary?     Aortic valve stenosis, severe Yes     ASCVD (arteriosclerotic cardiovascular disease)      Hyperlipidemia LDL goal <100      Essential hypertension      Dyspnea on exertion        CURRENT MEDICATIONS:  Current Outpatient Prescriptions   Medication Sig Dispense Refill     furosemide (LASIX) 20 MG tablet Take 1 tablet (20 mg) by mouth daily 30 tablet 0     losartan (COZAAR) 50 MG tablet Take 1 tablet (50 mg) by mouth daily 90 tablet 3     warfarin (JANTOVEN) 3 MG tablet Take 1 tablet (3 mg) by mouth daily And an additional half pill Tues. Thurs and Sat. 100 tablet 0     gabapentin (NEURONTIN) 100 MG capsule TAKE 1 CAPSULE BY MOUTH EVERY MORNING, ONE MIDDAY, AND THREE AT BEDTIME 450 capsule 1     omeprazole (PRILOSEC) 20 MG CR capsule TAKE ONE CAPSULE (20 MG) BY MOUTH EVERY MORNING (BEFORE BREAKFAST) 90 capsule 2     Cholecalciferol (VITAMIN D-400 PO) Take by mouth daily       FIBER PO Take by mouth daily       levothyroxine (SYNTHROID/LEVOTHROID) 50 MCG tablet Take 1 tablet (50 mcg) by mouth daily 90 tablet 3     HYDROcodone-acetaminophen (NORCO) 5-325 MG per tablet Take 0.5-1 tablets by mouth every 6 hours as needed for moderate to severe pain 20 tablet 0     metFORMIN (GLUCOPHAGE) 500 MG tablet Take 1 tablet (500 mg) by mouth daily (with breakfast) 90 tablet prn     atorvastatin (LIPITOR) 80 MG tablet Take 1 tablet (80 mg) by mouth daily 90 tablet prn     acetaminophen (TYLENOL ARTHRITIS PAIN) 650 MG CR tablet Take 2 tablets (1,300 mg) by mouth 2 times daily       Multiple Vitamins-Minerals (PRESERVISION AREDS 2 PO)        polyethylene glycol (MIRALAX) powder Take 17  g by mouth daily       co-enzyme Q-10 (COENZYME Q-10) 100 MG CAPS Take 1 capsule by mouth daily.       calcium 600 MG tablet Take 1 tablet by mouth 2 times daily.       Multiple Vitamins-Minerals (CENTRUM SILVER) per tablet Take 1 tablet by mouth daily.       aspirin EC 81 MG EC tablet Take 1 tablet by mouth daily.       nitroGLYCERIN (NITROSTAT) 0.4 MG SL tablet Place 0.4 mg under the tongue every 5 minutes as needed. Up to 3 doses per episode          ALLERGIES     Allergies   Allergen Reactions     Amoxicillin      hives     Bisphosphonates      Swallowing disorder     Boniva [Ibandronate Sodium] Nausea and Vomiting     Diarrhea, N/V with oral.  IV caused arm to swell      Fosamax [Alendronic Acid]      Severe gastrointestinal reaction     Lisinopril      cough     Niacin      rash     Simvastatin      myalgias       PAST MEDICAL HISTORY:  Past Medical History:   Diagnosis Date     Walters's esophagus 7/09     CHRONIC VERTIGO 9/99     Colonic Adenoma 3/90, 11/98     Costochondritis      Depression      DIABETES MELLITUS TYPE II 10/07     DJD      DVT of Leg, postop 11/09    6 months of warfarin     Gastritis 10/89     GERD      HIATAL HERNIA      HYPERLIPIDEMIA      HYPOTHYROIDISM (aka HASHIMOTO)      Impaired fasting glucose      L Ankle Fracture 10/09     Obesity, unspecified      Osteoporosis, unspecified      PERIPHERAL NEUROPATHY      Polymyalgia rheumatica (H)      Post Herpetic Neuralgia 4/03    R lumbar distribution     Restless leg syndrome      Small vessel cerebrovascular changes 9/99     Stricture and stenosis of esophagus 10/89    Dilated     Syncope     1/06, 8/08, 2/10     VITAMIN D DEFICIENCY 12/07    per Dr. Petty       PAST SURGICAL HISTORY:  Past Surgical History:   Procedure Laterality Date     BYPASS GRAFT ARTERY CORONARY  11/2/2011    CABG x 4  Dr. EDWIN ACOSTA NONSPECIFIC PROCEDURE  age 14    appendectomy     C NONSPECIFIC PROCEDURE  as a child    T&A     EMBOLECTOMY LOWER EXTREMITY   "11/9/2011    EMBOLECTOMY LOWER EXTREMITY; left leg femoral embolectomy.; Surgeon:JOLEEN BOONE; Location:[facility]     HYSTERECTOMY, CERVIX STATUS UNKNOWN  1978    partial hysterectomy     SURGICAL HISTORY OF -   10/09    L ankle fracture repair    Dr. Jose Roberto Trevino       FAMILY HISTORY:  Family History   Problem Relation Age of Onset     Alzheimer Disease Sister      HEART DISEASE Mother      HEART DISEASE Father      HEART DISEASE Son      HEART DISEASE Brother        SOCIAL HISTORY:  Social History     Social History     Marital status:      Spouse name: N/A     Number of children: N/A     Years of education: N/A     Social History Main Topics     Smoking status: Never Smoker     Smokeless tobacco: Never Used     Alcohol use No     Drug use: No     Sexual activity: No     Other Topics Concern     Caffeine Concern No     Special Diet No     Exercise No     Parent/Sibling W/ Cabg, Mi Or Angioplasty Before 65f 55m? No     Social History Narrative       Review of Systems:  Skin:  Negative       Eyes:  Positive for glasses cheaters  ENT:  Negative      Respiratory:  Positive for dyspnea on exertion;cough worsening   Cardiovascular:    Positive for;fatigue;lightheadedness;dizziness chest discomfort  Gastroenterology: Negative      Genitourinary:  Positive for incontinence    Musculoskeletal:  Positive for arthritis    Neurologic:  Positive for numbness or tingling of hands    Psychiatric:  Negative      Heme/Lymph/Imm:  Negative      Endocrine:  Positive for thyroid disorder;diabetes      Physical Exam:  Vitals: /66  Pulse 76  Ht 1.651 m (5' 5\")  Wt 74.8 kg (164 lb 14.4 oz)  BMI 27.44 kg/m2    Constitutional:           Skin:             Head:           Eyes:           Lymph:      ENT:           Neck:           Respiratory:            Cardiac:                                                           GI:           Extremities and Muscular Skeletal:                 Neurological:           Psych:    "        CC  No referring provider defined for this encounter.

## 2018-03-20 NOTE — MR AVS SNAPSHOT
After Visit Summary   3/20/2018    Madie Silva    MRN: [id number]           Patient Information     Date Of Birth          7/21/1929        Visit Information        Provider Department      3/20/2018 12:45 PM Andrew Red MD [facility]        Today's Diagnoses     Aortic valve stenosis, severe    -  1    ASCVD (arteriosclerotic cardiovascular disease)        Hyperlipidemia LDL goal <100        Essential hypertension        Dyspnea on exertion           Follow-ups after your visit        Additional Services     Follow-Up with Cardiac Advanced Practice Provider       ZHAO                  Your next 10 appointments already scheduled     Mar 22, 2018 11:00 AM CDT   [facility]    [address] [place] [id number] [phone]            Apr 17, 2018  9:00 AM CDT   LAB with [facility]   [facility]    [address] [place] [id number] [phone]           Please do not eat 10-12 hours before your appointment if you are coming in fasting for labs on lipids, cholesterol, or glucose (sugar). This does not apply to pregnant women. Water, hot tea and black coffee (with nothing added) are okay. Do not drink other fluids, diet soda or chew gum.            Apr 19, 2018  9:00 AM CDT   Office Visit with De Obregon MD   [facility]    [address] [id number] [phone]           Bring a current list of meds and any records pertaining to this visit. For Physicals, please bring immunization records and any forms needing to be filled out. Please arrive 10 minutes early to complete paperwork.            May 22, 2018 11:45 AM CDT   Remote PPM Check with [address]  "[facility] ([facility])    [address]  [place] [id number] [phone]           This appointment is for a remote check of your pacemaker.  This is not an appointment at the office.              Future tests that were ordered for you today     Open Future Orders        Priority Expected Expires Ordered    Follow-Up with Cardiac Advanced Practice Provider Routine 3/27/2018 3/20/2019 3/20/2018            Who to contact     If you have questions or need follow up information about today's clinic visit or your schedule please contact [facility] directly at [phone].  Normal or non-critical lab and imaging results will be communicated to you by EnzySurgehart, letter or phone within 4 business days after the clinic has received the results. If you do not hear from us within 7 days, please contact the clinic through EnzySurgehart or phone. If you have a critical or abnormal lab result, we will notify you by phone as soon as possible.  Submit refill requests through sevenload or call your pharmacy and they will forward the refill request to us. Please allow 3 business days for your refill to be completed.          Additional Information About Your Visit        sevenload Information     sevenload lets you send messages to your doctor, view your test results, renew your prescriptions, schedule appointments and more. To sign up, go to [email or web address] . Click on \"Log in\" on the left side of the screen, which will take you to the Welcome page. Then click on \"Sign up Now\" on the right side of the page.     You will be asked to enter the access code listed below, as well as some personal information. Please follow the directions to create your username and password.     Your access code is: [email or web address]  Expires: 2018  4:19 PM     Your access code will  in 90 days. If you need help or a new code, please call your [facility] " "clinic or [phone].        Care EveryWhere ID     This is your Care EveryWhere ID. This could be used by other organizations to access your [facility] medical records  [id number]        Your Vitals Were     Pulse Height BMI (Body Mass Index)             76 1.651 m (5' 5\") 27.44 kg/m2          Blood Pressure from Last 3 Encounters:   03/20/18 144/66   03/15/18 122/62   02/17/18 128/65    Weight from Last 3 Encounters:   03/20/18 74.8 kg (164 lb 14.4 oz)   03/15/18 76.1 kg (167 lb 11.2 oz)   02/12/18 75.4 kg (166 lb 4.8 oz)                 Today's Medication Changes          These changes are accurate as of 3/20/18  1:43 PM.  If you have any questions, ask your nurse or doctor.               Start taking these medicines.        Dose/Directions    furosemide 20 MG tablet   Commonly known as:  LASIX   Used for:  Dyspnea on exertion, Nonrheumatic aortic valve stenosis   Started by:  Andrew Red MD        Dose:  20 mg   Take 1 tablet (20 mg) by mouth daily   Quantity:  30 tablet   Refills:  0            Where to get your medicines      These medications were sent to [facility] [address] [address]     Phone:  [phone]     furosemide 20 MG tablet                Primary Care Provider Office Phone # Fax #    De Blaine Obregon [phone]       [address] [place] [phone]        Equal Access to Services     SHERITA SWARTZ AH: Hadii og diaso Sojuan, waaxda luqadaha, qaybta kaalmajavier rapp. So [facility] [phone].    ATENCIÓN: Si habla español, tiene a rodas disposición servicios gratuitos de asistencia lingüística. Llame al [phone].    We comply with applicable federal civil rights laws and [place] laws. We do not discriminate on the basis of race, color, national origin, age, disability, sex, sexual orientation, or gender identity.            Thank you!     " Thank you for choosing [facility]  for your care. Our goal is always to provide you with excellent care. Hearing back from our patients is one way we can continue to improve our services. Please take a few minutes to complete the written survey that you may receive in the mail after your visit with us. Thank you!             Your Updated Medication List - Protect others around you: Learn how to safely use, store and throw away your medicines at www.disposemymeds.org.          This list is accurate as of 3/20/18  1:43 PM.  Always use your most recent med list.                   Brand Name Dispense Instructions for use Diagnosis    acetaminophen 650 MG CR tablet    TYLENOL ARTHRITIS PAIN     Take 2 tablets (1,300 mg) by mouth 2 times daily    Primary osteoarthritis involving multiple joints       aspirin 81 MG EC tablet      Take 1 tablet by mouth daily.    S/P coronary artery bypass graft x 3       atorvastatin 80 MG tablet    LIPITOR    90 tablet    Take 1 tablet (80 mg) by mouth daily    Hyperlipidemia LDL goal <100       calcium 600 MG tablet      Take 1 tablet by mouth 2 times daily.        * PRESERVISION AREDS 2 PO           * CENTRUM SILVER per tablet      Take 1 tablet by mouth daily.        co-enzyme Q-10 100 MG Caps capsule      Take 1 capsule by mouth daily.        FIBER PO      Take by mouth daily        furosemide 20 MG tablet    LASIX    30 tablet    Take 1 tablet (20 mg) by mouth daily    Dyspnea on exertion, Nonrheumatic aortic valve stenosis       gabapentin 100 MG capsule    NEURONTIN    450 capsule    TAKE 1 CAPSULE BY MOUTH EVERY MORNING, ONE MIDDAY, AND THREE AT BEDTIME    Post herpetic neuralgia, Restless leg syndrome       HYDROcodone-acetaminophen 5-325 MG per tablet    NORCO    20 tablet    Take 0.5-1 tablets by mouth every 6 hours as needed for moderate to severe pain    Left knee pain, unspecified chronicity       levothyroxine 50 MCG tablet     SYNTHROID/LEVOTHROID    90 tablet    Take 1 tablet (50 mcg) by mouth daily    Acquired hypothyroidism       losartan 50 MG tablet    COZAAR    90 tablet    Take 1 tablet (50 mg) by mouth daily    Benign essential hypertension       metFORMIN 500 MG tablet    GLUCOPHAGE    90 tablet    Take 1 tablet (500 mg) by mouth daily (with breakfast)    Type 2 diabetes mellitus with diabetic nephropathy, without long-term current use of insulin (H)       nitroGLYcerin 0.4 MG sublingual tablet    NITROSTAT     Place 0.4 mg under the tongue every 5 minutes as needed. Up to 3 doses per episode        omeprazole 20 MG CR capsule    priLOSEC    90 capsule    TAKE ONE CAPSULE (20 MG) BY MOUTH EVERY MORNING (BEFORE BREAKFAST)    Hiatal hernia, Walters's esophagus with dysplasia       polyethylene glycol powder    MIRALAX     Take 17 g by mouth daily    Constipation       VITAMIN D-400 PO      Take by mouth daily        warfarin 3 MG tablet    JANTOVEN    100 tablet    Take 1 tablet (3 mg) by mouth daily And an additional half pill Tues. Thurs and Sat.    Paroxysmal a-fib (H)       * Notice:  This list has 2 medication(s) that are the same as other medications prescribed for you. Read the directions carefully, and ask your doctor or other care provider to review them with you.

## 2018-03-21 NOTE — TELEPHONE ENCOUNTER
Contacted patient with Dr. Red's recommendation for Lexiscan this week for ZHAO.  Has F/U OV with Shreya on 3-.  Patient states she needs to arrange transportation with her son.

## 2018-03-22 ENCOUNTER — HOSPITAL ENCOUNTER (OUTPATIENT)
Dept: CARDIOLOGY | Facility: CLINIC | Age: 83
Discharge: HOME | End: 2018-03-22
Attending: INTERNAL MEDICINE | Admitting: INTERNAL MEDICINE
Payer: COMMERCIAL

## 2018-03-22 DIAGNOSIS — R06.09 DYSPNEA ON EXERTION: ICD-10-CM

## 2018-03-22 PROCEDURE — 34300033 ZZH RX 343: Performed by: INTERNAL MEDICINE

## 2018-03-22 PROCEDURE — 78452 HT MUSCLE IMAGE SPECT MULT: CPT

## 2018-03-22 PROCEDURE — 93016 CV STRESS TEST SUPVJ ONLY: CPT | Performed by: INTERNAL MEDICINE

## 2018-03-22 PROCEDURE — 93018 CV STRESS TEST I&R ONLY: CPT | Performed by: INTERNAL MEDICINE

## 2018-03-22 PROCEDURE — A9502 TC99M TETROFOSMIN: HCPCS | Performed by: INTERNAL MEDICINE

## 2018-03-22 PROCEDURE — 78452 HT MUSCLE IMAGE SPECT MULT: CPT | Mod: 26 | Performed by: INTERNAL MEDICINE

## 2018-03-22 PROCEDURE — 25000128 H RX IP 250 OP 636: Performed by: INTERNAL MEDICINE

## 2018-03-22 RX ORDER — ALBUTEROL SULFATE 90 UG/1
2 AEROSOL, METERED RESPIRATORY (INHALATION) EVERY 5 MIN PRN
Status: DISCONTINUED | OUTPATIENT
Start: 2018-03-22 | End: 2018-03-23 | Stop reason: HOSPADM

## 2018-03-22 RX ORDER — ACYCLOVIR 200 MG/1
0-1 CAPSULE ORAL
Status: DISCONTINUED | OUTPATIENT
Start: 2018-03-22 | End: 2018-03-23 | Stop reason: HOSPADM

## 2018-03-22 RX ORDER — REGADENOSON 0.08 MG/ML
0.4 INJECTION, SOLUTION INTRAVENOUS ONCE
Status: COMPLETED | OUTPATIENT
Start: 2018-03-22 | End: 2018-03-22

## 2018-03-22 RX ORDER — AMINOPHYLLINE 25 MG/ML
50-100 INJECTION, SOLUTION INTRAVENOUS
Status: DISCONTINUED | OUTPATIENT
Start: 2018-03-22 | End: 2018-03-23 | Stop reason: HOSPADM

## 2018-03-22 RX ADMIN — REGADENOSON 0.4 MG: 0.08 INJECTION, SOLUTION INTRAVENOUS at 14:34

## 2018-03-22 RX ADMIN — TETROFOSMIN 9.37 MCI.: 1.38 INJECTION, POWDER, LYOPHILIZED, FOR SOLUTION INTRAVENOUS at 14:37

## 2018-03-22 RX ADMIN — TETROFOSMIN 3.23 MCI.: 1.38 INJECTION, POWDER, LYOPHILIZED, FOR SOLUTION INTRAVENOUS at 13:21

## 2018-03-23 ENCOUNTER — TELEPHONE (OUTPATIENT)
Dept: CARDIOLOGY | Facility: CLINIC | Age: 83
End: 2018-03-23

## 2018-03-23 NOTE — TELEPHONE ENCOUNTER
Reporting weights as directed after clinic visit with Dr. Red on 3/20/18 after giving her Lasix 20 mg and to take it for 2 days.  3/05=040.5  3/93=876.5  3/50=278.0    Did not notice any difference in her urination nor in her dyspnea.   Has f/u OV with Cinthya Titus on Monday 3/26/18.     Will update Dr. Red for any further recommendations.   Patient will continue to take daily weights until she sees Cinthya on Monday, 3/26/18

## 2018-03-26 ENCOUNTER — OFFICE VISIT (OUTPATIENT)
Dept: CARDIOLOGY | Facility: CLINIC | Age: 83
End: 2018-03-26
Attending: INTERNAL MEDICINE
Payer: COMMERCIAL

## 2018-03-26 VITALS
WEIGHT: 165 LBS | DIASTOLIC BLOOD PRESSURE: 72 MMHG | HEIGHT: 65 IN | BODY MASS INDEX: 27.49 KG/M2 | SYSTOLIC BLOOD PRESSURE: 152 MMHG | HEART RATE: 88 BPM

## 2018-03-26 DIAGNOSIS — I25.10 ASCVD (ARTERIOSCLEROTIC CARDIOVASCULAR DISEASE): ICD-10-CM

## 2018-03-26 DIAGNOSIS — I10 ESSENTIAL HYPERTENSION: ICD-10-CM

## 2018-03-26 DIAGNOSIS — E78.5 HYPERLIPIDEMIA LDL GOAL <100: ICD-10-CM

## 2018-03-26 DIAGNOSIS — I35.0 NONRHEUMATIC AORTIC VALVE STENOSIS: ICD-10-CM

## 2018-03-26 DIAGNOSIS — R06.09 DYSPNEA ON EXERTION: ICD-10-CM

## 2018-03-26 PROCEDURE — 99214 OFFICE O/P EST MOD 30 MIN: CPT | Performed by: PHYSICIAN ASSISTANT

## 2018-03-26 NOTE — PATIENT INSTRUCTIONS
Thank you for your [facility] visit today. Your provider has recommended the following:  Medication Changes:  No medication changes today  Recommendations:  Please let us know if your shortness of breath worsens. I'll touch base with Dr. Red and let you know if he has any other recommendations.  Follow-up:  See Dr. Red for cardiology follow up as recommended.  Reminder:  Please bring in all current medications, over the counter supplements and vitamin bottles to your next appointment.            [facility]

## 2018-03-26 NOTE — TELEPHONE ENCOUNTER
Response received from Dr. Red:     Andrew Red MD Herdan, Judy, REJI                   She needs to be seen.   No new Rx unless seen.    JE       Being seen today with GREG Mckinnon.

## 2018-03-26 NOTE — PROGRESS NOTES
Cardiology Progress Note    Date of Service: 03/26/2018  Patient seen today in follow up of: SOB, stress test results  Primary cardiologist: Dr. Red    HPI:  Madie Silva is a very pleasant 88-year-old female who is followed with Dr. Red over the years for history of coronary artery disease status post four-vessel bypass  to the LAD, ramus intermediate, obtuse marginal, and distal RCA as well as paroxysmal atrial fibrillation with bradycardia and pacemaker placement, hyperlipidemia,  and obesity.    She has done fairly well over the years but has noted some progressive dyspnea over the last 2 years.  She did see Dr. Red on 3/20/18.  Apparently, she was dyspneic just getting up onto the exam table.  Prior to this visit, she was seen by her primary care physician.  An EKG, chest x-ray, echocardiogram, and lab work were ordered.  Her laboratory workup was overall unremarkable.  Her renal function was normal.  Hemoglobin was normal.  Her NT proBNP was 230.  Chest x-ray showed a large hiatal hernia but was otherwise unremarkable.  Her TTE did show a preserved LVEF of 65-70%.  There was mild LVH.  Moderately severe to severe aortic stenosis was noted with a mean pressure gradient of 22 mmHg.  The valve area was 1.0 cm .    Dr. Red reviewed her chest x-ray with a pulmonologist who agreed that her hiatal hernia was not likely the cause of her severe dyspnea.  Dr. Red also did not feel her aortic valve was likely to completely explain her dyspnea although mentioned possibly proceeding with a right and left heart cath to confirm the severity.  He did order a Lexiscan nuclear stress test to evaluate for possible ischemia given her coronary artery disease.  He also gave her 2 doses of 20 mg of Lasix although she did not appear fluid overloaded on exam.    The Lexiscan nuclear stress test was done on 3/22/18.  This showed probably normal perfusion.  There was some soft tissue attenuation but no significant areas  of ischemia or infarction were identified.    She returns today for follow-up.  Her shortness of breath is overall stable.  She is here with her son who notes her breathing's today seems to be better than it was yesterday.  It seems to vary from day-to-day.  She continues to deny any chest discomfort.  She denies orthopnea, PND, or peripheral edema.  She did take Lasix 20 mg for 2 days and did not have a significant change in her weight or her symptoms.    She wonders if perhaps this is related to her being more inactive over the winter. As the weather gets nicer she plans to increase her activity.    ASSESSMENT/PLAN:  1.  Exertional shortness of breath.    2.  CAD status post four-vessel CABG.  3.  Aortic stenosis.  Dr. Red reviewed the echocardiogram and did not feel this was severe.  4.  Hypertension.  5.  Paroxysmal atrial fibrillation.  6.  Permanent pacemaker in situ.  Last device check in February showed adequate heart rate variation.    Madie returns for discussion and evaluation of her progressive exertional dyspnea over the past few years.  Her nuclear stress test as above did not identify any significant areas of ischemia or infarction.  She had no improvement in her symptoms with a few days of Lasix.  Of note, her NT proBNP was normal in February and she does not have obvious signs of fluid overload on exam today.  She does not have any clear evidence of underlying pulmonary disease.  Her chest x-ray has been clear.  I do also see she had spirometry done several years ago which was normal.  She does have at least a moderate sized hiatal hernia but this has been discussed and not felt to be the etiology for her significant shortness of breath.  In regards to her aortic stenosis, Dr. Red reviewed her echocardiogram and did not think this was severe.  He did mention possibly pursuing a left and right heart cath for further assessment if a specific etiology could not be found for her dyspnea.. She tells  me she is not thrilled about pursuing this at this point and would like to see how things go over the next few months as she increases her activity level at home.  I asked her to stay in touch and let us know if her shortness of breath were to worsen at all.  I will also touch base with Dr. Red to discuss any further recommendations at this point.    Orders this Visit:  No orders of the defined types were placed in this encounter.    No orders of the defined types were placed in this encounter.    There are no discontinued medications.    CURRENT MEDICATIONS:  Current Outpatient Prescriptions   Medication Sig Dispense Refill     losartan (COZAAR) 50 MG tablet Take 1 tablet (50 mg) by mouth daily 90 tablet 3     warfarin (JANTOVEN) 3 MG tablet Take 1 tablet (3 mg) by mouth daily And an additional half pill Tues. Thurs and Sat. 100 tablet 0     gabapentin (NEURONTIN) 100 MG capsule TAKE 1 CAPSULE BY MOUTH EVERY MORNING, ONE MIDDAY, AND THREE AT BEDTIME 450 capsule 1     omeprazole (PRILOSEC) 20 MG CR capsule TAKE ONE CAPSULE (20 MG) BY MOUTH EVERY MORNING (BEFORE BREAKFAST) 90 capsule 2     Cholecalciferol (VITAMIN D-400 PO) Take by mouth daily       FIBER PO Take by mouth daily       levothyroxine (SYNTHROID/LEVOTHROID) 50 MCG tablet Take 1 tablet (50 mcg) by mouth daily 90 tablet 3     HYDROcodone-acetaminophen (NORCO) 5-325 MG per tablet Take 0.5-1 tablets by mouth every 6 hours as needed for moderate to severe pain 20 tablet 0     metFORMIN (GLUCOPHAGE) 500 MG tablet Take 1 tablet (500 mg) by mouth daily (with breakfast) 90 tablet prn     atorvastatin (LIPITOR) 80 MG tablet Take 1 tablet (80 mg) by mouth daily 90 tablet prn     acetaminophen (TYLENOL ARTHRITIS PAIN) 650 MG CR tablet Take 2 tablets (1,300 mg) by mouth 2 times daily       Multiple Vitamins-Minerals (PRESERVISION AREDS 2 PO)        polyethylene glycol (MIRALAX) powder Take 17 g by mouth daily       co-enzyme Q-10 (COENZYME Q-10) 100 MG CAPS Take 1  capsule by mouth daily.       calcium 600 MG tablet Take 1 tablet by mouth 2 times daily.       Multiple Vitamins-Minerals (CENTRUM SILVER) per tablet Take 1 tablet by mouth daily.       aspirin EC 81 MG EC tablet Take 1 tablet by mouth daily.       nitroGLYCERIN (NITROSTAT) 0.4 MG SL tablet Place 0.4 mg under the tongue every 5 minutes as needed. Up to 3 doses per episode        furosemide (LASIX) 20 MG tablet Take 1 tablet (20 mg) by mouth daily (Patient not taking: Reported on 3/26/2018) 30 tablet 0     ALLERGIES  Allergies   Allergen Reactions     Amoxicillin      hives     Bisphosphonates      Swallowing disorder     Boniva [Ibandronate Sodium] Nausea and Vomiting     Diarrhea, N/V with oral.  IV caused arm to swell      Fosamax [Alendronic Acid]      Severe gastrointestinal reaction     Lisinopril      cough     Niacin      rash     Simvastatin      myalgias     PAST MEDICAL HISTORY:  Past Medical History:   Diagnosis Date     Walters's esophagus 7/09     CHRONIC VERTIGO 9/99     Colonic Adenoma 3/90, 11/98     Costochondritis      Depression      DIABETES MELLITUS TYPE II 10/07     DJD      DVT of Leg, postop 11/09    6 months of warfarin     Gastritis 10/89     GERD      HIATAL HERNIA      HYPERLIPIDEMIA      HYPOTHYROIDISM (aka HASHIMOTO)      Impaired fasting glucose      L Ankle Fracture 10/09     Obesity, unspecified      Osteoporosis, unspecified      PERIPHERAL NEUROPATHY      Polymyalgia rheumatica (H)      Post Herpetic Neuralgia 4/03    R lumbar distribution     Restless leg syndrome      Small vessel cerebrovascular changes 9/99     Stricture and stenosis of esophagus 10/89    Dilated     Syncope     1/06, 8/08, 2/10     VITAMIN D DEFICIENCY 12/07    per Dr. Petty     PAST SURGICAL HISTORY:  Past Surgical History:   Procedure Laterality Date     BYPASS GRAFT ARTERY CORONARY  11/2/2011    CABG x 4  Dr. EDWIN ACOSTA NONSPECIFIC PROCEDURE  age 14    appendectomy     C NONSPECIFIC PROCEDURE  as a  "child    T&A     EMBOLECTOMY LOWER EXTREMITY  11/9/2011    EMBOLECTOMY LOWER EXTREMITY; left leg femoral embolectomy.; Surgeon:JOLEEN BOONE; Location:[facility] OR     HYSTERECTOMY, CERVIX STATUS UNKNOWN  1978    partial hysterectomy     SURGICAL HISTORY OF -   10/09    L ankle fracture repair    Dr. Jose Roberto Trevino     FAMILY HISTORY:  Family History   Problem Relation Age of Onset     Alzheimer Disease Sister      HEART DISEASE Mother      HEART DISEASE Father      HEART DISEASE Son      HEART DISEASE Brother      SOCIAL HISTORY:  Social History     Social History     Marital status:      Spouse name: N/A     Number of children: N/A     Years of education: N/A     Social History Main Topics     Smoking status: Never Smoker     Smokeless tobacco: Never Used     Alcohol use No     Drug use: No     Sexual activity: No     Other Topics Concern     Caffeine Concern No     Special Diet No     Exercise No     Parent/Sibling W/ Cabg, Mi Or Angioplasty Before 65f 55m? No     Social History Narrative     Review of Systems:  Skin:  Negative     Eyes:  Positive for glasses  ENT:  Negative    Respiratory:  Positive for dyspnea on exertion  Cardiovascular:    Positive for;lightheadedness;dizziness  Gastroenterology: Negative    Genitourinary:  not assessed    Musculoskeletal:  Positive for arthritis  Neurologic:  Positive for numbness or tingling of hands  Psychiatric:  Negative    Heme/Lymph/Imm:  Negative    Endocrine:  Positive for thyroid disorder;diabetes     Physical Exam:  Vitals: /72  Pulse 88  Ht 1.651 m (5' 5\")  Wt 74.8 kg (165 lb)  BMI 27.46 kg/m2   Wt Readings from Last 4 Encounters:   03/26/18 74.8 kg (165 lb)   03/20/18 74.8 kg (164 lb 14.4 oz)   03/15/18 76.1 kg (167 lb 11.2 oz)   02/12/18 75.4 kg (166 lb 4.8 oz)     GEN: well nourished, in no acute distress.  HEENT:  Pupils equal, round. Sclerae nonicteric.   NECK: Supple, no masses appreciated. No significant JVD at 30 degrees.  C/V:  Regular " rate and rhythm, no murmur, rub or gallop.  + REYNOLD at RUSB.  RESP: Respirations are unlabored. Clear to auscultation bilaterally without wheezing, rales, or rhonchi.  GI: Abdomen soft, nontender.  EXTREM: No LE edema.  NEURO: Alert and oriented, cooperative.  SKIN: Warm and dry.     Recent Lab Results:  LIPID RESULTS:  Lab Results   Component Value Date    CHOL 193 05/12/2017    HDL 59 05/12/2017    LDL 86 05/12/2017    TRIG 240 (H) 05/12/2017    CHOLHDLRATIO 2.8 04/20/2015     LIVER ENZYME RESULTS:  Lab Results   Component Value Date    AST 22 02/12/2018    ALT 27 02/12/2018     CBC RESULTS:  Lab Results   Component Value Date    WBC 8.0 02/12/2018    RBC 5.27 (H) 02/12/2018    HGB 13.2 02/12/2018    HCT 41.5 02/12/2018    MCV 79 02/12/2018    MCH 25.0 (L) 02/12/2018    MCHC 31.8 02/12/2018    RDW 17.1 (H) 02/12/2018     02/12/2018     BMP RESULTS:  Lab Results   Component Value Date     02/12/2018    POTASSIUM 4.4 02/12/2018    CHLORIDE 105 02/12/2018    CO2 26 02/12/2018    ANIONGAP 7 02/12/2018     (H) 02/12/2018    BUN 19 02/12/2018    CR 0.96 02/12/2018    GFRESTIMATED 55 (L) 02/12/2018    GFRESTBLACK 67 02/12/2018    SHAWNEE 9.1 02/12/2018      A1C RESULTS:  Lab Results   Component Value Date    A1C 6.7 (H) 11/20/2017     INR RESULTS:  Lab Results   Component Value Date    INR 2.2 (A) 02/08/2018    INR 3.1 (A) 12/28/2017    INR 2.19 (H) 01/03/2013    INR 3.68 (H) 06/28/2012       New/Pertinent imaging results since last visit:  NM Lexiscan stress test 3/22/18:  Impression  1.  Myocardial perfusion imaging using single isotope technique  demonstrated probably normal perfusion. There are no significant areas  of ischemia or infarction identified on this study.   2. Gated images demonstrated normal wall motion and normal left  ventricular chamber size.  The left ventricular systolic function is  normal, with an ejection fraction measured at 78%.  3. Compared to the prior study from 6/7/2010, I no  longer see a small  area of distal apical lateral ischemia .     Tatiana Titus PA-C  [facility]

## 2018-03-26 NOTE — MR AVS SNAPSHOT
After Visit Summary   3/26/2018    Madie Silva    MRN: [id number]           Patient Information     Date Of Birth          7/21/1929        Visit Information        Provider Department      3/26/2018 8:10 AM Tatiana Titus PA-C [facility]   [place]        Today's Diagnoses     Aortic valve stenosis, severe        ASCVD (arteriosclerotic cardiovascular disease)        Hyperlipidemia LDL goal <100        Essential hypertension        Dyspnea on exertion          Care Instructions    Thank you for your [facility] visit today. Your provider has recommended the following:  Medication Changes:  No medication changes today  Recommendations:  Please let us know if your shortness of breath worsens. I'll touch base with Dr. Red and let you know if he has any other recommendations.  Follow-up:  See Dr. Red for cardiology follow up as recommended.  Reminder:  Please bring in all current medications, over the counter supplements and vitamin bottles to your next appointment.            [facility]            Follow-ups after your visit        Your next 10 appointments already scheduled     Apr 17, 2018  9:00 AM CDT   LAB with [facility]   [facility] ([facility]    [address] [place] [id number] [phone]           Please do not eat 10-12 hours before your appointment if you are coming in fasting for labs on lipids, cholesterol, or glucose (sugar). This does not apply to pregnant women. Water, hot tea and black coffee (with nothing added) are okay. Do not drink other fluids, diet soda or chew gum.            Apr 19, 2018  9:00 AM CDT   Office Visit with De Obregon MD   [facility] ([facility])    [address] [place] [id number] [phone]           Bring a current list of meds and any records  "pertaining to this visit. For Physicals, please bring immunization records and any forms needing to be filled out. Please arrive 10 minutes early to complete paperwork.            May 22, 2018 11:45 AM CDT   Remote PPM Check with CONKLIN TECH1   [facility] ([facility])    [address] [place] [address] [address]  [place] [id number] [phone] OPT 2           This appointment is for a remote check of your pacemaker.  This is not an appointment at the office.              Who to contact     If you have questions or need follow up information about today's clinic visit or your schedule please contact [facility] directly at [phone].  Normal or non-critical lab and imaging results will be communicated to you by [employer], letter or phone within 4 business days after the clinic has received the results. If you do not hear from us within 7 days, please contact the clinic through [employer] or phone. If you have a critical or abnormal lab result, we will notify you by phone as soon as possible.  Submit refill requests through [employer] or call your pharmacy and they will forward the refill request to us. Please allow 3 business days for your refill to be completed.          Additional Information About Your Visit        [employer] Information     [employer] lets you send messages to your doctor, view your test results, renew your prescriptions, schedule appointments and more. To sign up, go to [email or web address] . Click on \"Log in\" on the left side of the screen, which will take you to the Welcome page. Then click on \"Sign up Now\" on the right side of the page.     You will be asked to enter the access code listed below, as well as some personal information. Please follow the directions to create your username and password.     Your access code is: [email or web address]  Expires: 2018  4:19 PM     Your access code will  in 90 days. If you need help " "or a new code, please call your [facility] or [phone].        Care EveryWhere ID     This is your Care EveryWhere ID. This could be used by other organizations to access your [facility] medical records  [id number]        Your Vitals Were     Pulse Height BMI (Body Mass Index)             88 1.651 m (5' 5\") 27.46 kg/m2          Blood Pressure from Last 3 Encounters:   03/26/18 152/72   03/20/18 144/66   03/15/18 122/62    Weight from Last 3 Encounters:   03/26/18 74.8 kg (165 lb)   03/20/18 74.8 kg (164 lb 14.4 oz)   03/15/18 76.1 kg (167 lb 11.2 oz)              We Performed the Following     Follow-Up with Cardiac Advanced Practice Provider        Primary Care Provider Office Phone # Fax #    De Obregon [phone]       [address] [place] [phone]        Equal Access to Services     SHERITA SWARTZ : Hadii aad ku hadasho Soomaali, waaxda luqadaha, qaybta kaalmada adeegyada, waxay idiin hayjillian collado . So [facility] [phone].    ATENCIÓN: Si habla español, tiene a rodas disposición servicios gratuitos de asistencia lingüística. [facility] [phone].    We comply with applicable federal civil rights laws and [place] laws. We do not discriminate on the basis of race, color, national origin, age, disability, sex, sexual orientation, or gender identity.            Thank you!     Thank you for choosing [facility]  for your care. Our goal is always to provide you with excellent care. Hearing back from our patients is one way we can continue to improve our services. Please take a few minutes to complete the written survey that you may receive in the mail after your visit with us. Thank you!             Your Updated Medication List - Protect others around you: Learn how to safely use, store and throw away your medicines at www.disposemymeds.org.          This list is accurate as of 3/26/18  8:36 AM.  Always use your most recent " med list.                   Brand Name Dispense Instructions for use Diagnosis    acetaminophen 650 MG CR tablet    TYLENOL ARTHRITIS PAIN     Take 2 tablets (1,300 mg) by mouth 2 times daily    Primary osteoarthritis involving multiple joints       aspirin 81 MG EC tablet      Take 1 tablet by mouth daily.    S/P coronary artery bypass graft x 3       atorvastatin 80 MG tablet    LIPITOR    90 tablet    Take 1 tablet (80 mg) by mouth daily    Hyperlipidemia LDL goal <100       calcium 600 MG tablet      Take 1 tablet by mouth 2 times daily.        * PRESERVISION AREDS 2 PO           * CENTRUM SILVER per tablet      Take 1 tablet by mouth daily.        co-enzyme Q-10 100 MG Caps capsule      Take 1 capsule by mouth daily.        FIBER PO      Take by mouth daily        furosemide 20 MG tablet    LASIX    30 tablet    Take 1 tablet (20 mg) by mouth daily    Dyspnea on exertion, Nonrheumatic aortic valve stenosis       gabapentin 100 MG capsule    NEURONTIN    450 capsule    TAKE 1 CAPSULE BY MOUTH EVERY MORNING, ONE MIDDAY, AND THREE AT BEDTIME    Post herpetic neuralgia, Restless leg syndrome       HYDROcodone-acetaminophen 5-325 MG per tablet    NORCO    20 tablet    Take 0.5-1 tablets by mouth every 6 hours as needed for moderate to severe pain    Left knee pain, unspecified chronicity       levothyroxine 50 MCG tablet    SYNTHROID/LEVOTHROID    90 tablet    Take 1 tablet (50 mcg) by mouth daily    Acquired hypothyroidism       losartan 50 MG tablet    COZAAR    90 tablet    Take 1 tablet (50 mg) by mouth daily    Benign essential hypertension       metFORMIN 500 MG tablet    GLUCOPHAGE    90 tablet    Take 1 tablet (500 mg) by mouth daily (with breakfast)    Type 2 diabetes mellitus with diabetic nephropathy, without long-term current use of insulin (H)       nitroGLYcerin 0.4 MG sublingual tablet    NITROSTAT     Place 0.4 mg under the tongue every 5 minutes as needed. Up to 3 doses per episode        omeprazole  20 MG CR capsule    priLOSEC    90 capsule    TAKE ONE CAPSULE (20 MG) BY MOUTH EVERY MORNING (BEFORE BREAKFAST)    Hiatal hernia, Walters's esophagus with dysplasia       polyethylene glycol powder    MIRALAX     Take 17 g by mouth daily    Constipation       VITAMIN D-400 PO      Take by mouth daily        warfarin 3 MG tablet    JANTOVEN    100 tablet    Take 1 tablet (3 mg) by mouth daily And an additional half pill Tues. Thurs and Sat.    Paroxysmal a-fib (H)       * Notice:  This list has 2 medication(s) that are the same as other medications prescribed for you. Read the directions carefully, and ask your doctor or other care provider to review them with you.

## 2018-03-26 NOTE — LETTER
3/26/2018    De Obregon MD  [address] [facility] [id number]    RE: Madie [place]       Dear Colleague,    I had the pleasure of seeing Madie Silva in the [facility].      Cardiology Progress Note    Date of Service: 03/26/2018  Patient seen today in follow up of: SOB, stress test results  Primary cardiologist: Dr. Red    HPI:  Madie Silva is a very pleasant 88-year-old female who is followed with Dr. Red over the years for history of coronary artery disease status post four-vessel bypass  to the LAD, ramus intermediate, obtuse marginal, and distal RCA as well as paroxysmal atrial fibrillation with bradycardia and pacemaker placement, hyperlipidemia,  and obesity.    She has done fairly well over the years but has noted some progressive dyspnea over the last 2 years.  She did see Dr. Red on 3/20/18.  Apparently, she was dyspneic just getting up onto the exam table.  Prior to this visit, she was seen by her primary care physician.  An EKG, chest x-ray, echocardiogram, and lab work were ordered.  Her laboratory workup was overall unremarkable.  Her renal function was normal.  Hemoglobin was normal.  Her NT proBNP was 230.  Chest x-ray showed a large hiatal hernia but was otherwise unremarkable.  Her TTE did show a preserved LVEF of 65-70%.  There was mild LVH.  Moderately severe to severe aortic stenosis was noted with a mean pressure gradient of 22 mmHg.  The valve area was 1.0 cm .    Dr. Red reviewed her chest x-ray with a pulmonologist who agreed that her hiatal hernia was not likely the cause of her severe dyspnea.  Dr. Red also did not feel her aortic valve was likely to completely explain her dyspnea although mentioned possibly proceeding with a right and left heart cath to confirm the severity.  He did order a Lexiscan nuclear stress test to evaluate for possible ischemia given her coronary artery disease.  He also gave her 2 doses of 20 mg of Lasix  although she did not appear fluid overloaded on exam.    The Lexiscan nuclear stress test was done on 3/22/18.  This showed probably normal perfusion.  There was some soft tissue attenuation but no significant areas of ischemia or infarction were identified.    She returns today for follow-up.  Her shortness of breath is overall stable.  She is here with her son who notes her breathing's today seems to be better than it was yesterday.  It seems to vary from day-to-day.  She continues to deny any chest discomfort.  She denies orthopnea, PND, or peripheral edema.  She did take Lasix 20 mg for 2 days and did not have a significant change in her weight or her symptoms.    She wonders if perhaps this is related to her being more inactive over the winter. As the weather gets nicer she plans to increase her activity.    ASSESSMENT/PLAN:  1.  Exertional shortness of breath.    2.  CAD status post four-vessel CABG.  3.  Aortic stenosis.  Dr. Red reviewed the echocardiogram and did not feel this was severe.  4.  Hypertension.  5.  Paroxysmal atrial fibrillation.  6.  Permanent pacemaker in situ.  Last device check in February showed adequate heart rate variation.    Madie returns for discussion and evaluation of her progressive exertional dyspnea over the past few years.  Her nuclear stress test as above did not identify any significant areas of ischemia or infarction.  She improvement in her symptoms with a few days of Lasix.  Of note, her NT proBNP was normal in February and she does not have obvious signs of fluid overload on exam today.  She does not have any clear evidence of underlying pulmonary disease.  Her chest x-ray has been clear.  I do also see she had spirometry done several years ago which was normal.  She does have at least a moderate sized hiatal hernia but this has been discussed and not felt to be the etiology for her significant shortness of breath.  In regards to her aortic stenosis, Dr. Red reviewed  her echocardiogram and did not think this was severe.  Find an etiology for her dyspnea.  I did discuss this briefly with her today. She tells me she is not thrilled about pursuing this at this point and would like to see how things go over the next few months as she increases her activity level at home.  I asked her to stay in touch and let us know if her shortness of breath were to worsen at all.  I will also touch base with Dr. Red to discuss any further recommendations at this point.    Orders this Visit:  No orders of the defined types were placed in this encounter.    No orders of the defined types were placed in this encounter.    There are no discontinued medications.    CURRENT MEDICATIONS:  Current Outpatient Prescriptions   Medication Sig Dispense Refill     losartan (COZAAR) 50 MG tablet Take 1 tablet (50 mg) by mouth daily 90 tablet 3     warfarin (JANTOVEN) 3 MG tablet Take 1 tablet (3 mg) by mouth daily And an additional half pill Tues. Thurs and Sat. 100 tablet 0     gabapentin (NEURONTIN) 100 MG capsule TAKE 1 CAPSULE BY MOUTH EVERY MORNING, ONE MIDDAY, AND THREE AT BEDTIME 450 capsule 1     omeprazole (PRILOSEC) 20 MG CR capsule TAKE ONE CAPSULE (20 MG) BY MOUTH EVERY MORNING (BEFORE BREAKFAST) 90 capsule 2     Cholecalciferol (VITAMIN D-400 PO) Take by mouth daily       FIBER PO Take by mouth daily       levothyroxine (SYNTHROID/LEVOTHROID) 50 MCG tablet Take 1 tablet (50 mcg) by mouth daily 90 tablet 3     HYDROcodone-acetaminophen (NORCO) 5-325 MG per tablet Take 0.5-1 tablets by mouth every 6 hours as needed for moderate to severe pain 20 tablet 0     metFORMIN (GLUCOPHAGE) 500 MG tablet Take 1 tablet (500 mg) by mouth daily (with breakfast) 90 tablet prn     atorvastatin (LIPITOR) 80 MG tablet Take 1 tablet (80 mg) by mouth daily 90 tablet prn     acetaminophen (TYLENOL ARTHRITIS PAIN) 650 MG CR tablet Take 2 tablets (1,300 mg) by mouth 2 times daily       Multiple Vitamins-Minerals  (PRESERVISION AREDS 2 PO)        polyethylene glycol (MIRALAX) powder Take 17 g by mouth daily       co-enzyme Q-10 (COENZYME Q-10) 100 MG CAPS Take 1 capsule by mouth daily.       calcium 600 MG tablet Take 1 tablet by mouth 2 times daily.       Multiple Vitamins-Minerals (CENTRUM SILVER) per tablet Take 1 tablet by mouth daily.       aspirin EC 81 MG EC tablet Take 1 tablet by mouth daily.       nitroGLYCERIN (NITROSTAT) 0.4 MG SL tablet Place 0.4 mg under the tongue every 5 minutes as needed. Up to 3 doses per episode        furosemide (LASIX) 20 MG tablet Take 1 tablet (20 mg) by mouth daily (Patient not taking: Reported on 3/26/2018) 30 tablet 0     ALLERGIES  Allergies   Allergen Reactions     Amoxicillin      hives     Bisphosphonates      Swallowing disorder     Boniva [Ibandronate Sodium] Nausea and Vomiting     Diarrhea, N/V with oral.  IV caused arm to swell      Fosamax [Alendronic Acid]      Severe gastrointestinal reaction     Lisinopril      cough     Niacin      rash     Simvastatin      myalgias     PAST MEDICAL HISTORY:  Past Medical History:   Diagnosis Date     Walters's esophagus 7/09     CHRONIC VERTIGO 9/99     Colonic Adenoma 3/90, 11/98     Costochondritis      Depression      DIABETES MELLITUS TYPE II 10/07     DJD      DVT of Leg, postop 11/09    6 months of warfarin     Gastritis 10/89     GERD      HIATAL HERNIA      HYPERLIPIDEMIA      HYPOTHYROIDISM (aka HASHIMOTO)      Impaired fasting glucose      L Ankle Fracture 10/09     Obesity, unspecified      Osteoporosis, unspecified      PERIPHERAL NEUROPATHY      Polymyalgia rheumatica (H)      Post Herpetic Neuralgia 4/03    R lumbar distribution     Restless leg syndrome      Small vessel cerebrovascular changes 9/99     Stricture and stenosis of esophagus 10/89    Dilated     Syncope     1/06, 8/08, 2/10     VITAMIN D DEFICIENCY 12/07    per Dr. Petty     PAST SURGICAL HISTORY:  Past Surgical History:   Procedure Laterality Date      "BYPASS GRAFT ARTERY CORONARY  11/2/2011    CABG x 4  Dr. EDWIN ACOSTA NONSPECIFIC PROCEDURE  age 14    appendectomy     C NONSPECIFIC PROCEDURE  as a child    T&A     EMBOLECTOMY LOWER EXTREMITY  11/9/2011    EMBOLECTOMY LOWER EXTREMITY; left leg femoral embolectomy.; Surgeon:JOLEEN BOONE; Location:[facility] OR     HYSTERECTOMY, CERVIX STATUS UNKNOWN  1978    partial hysterectomy     SURGICAL HISTORY OF -   10/09    L ankle fracture repair    Dr. Jose Roberto Trevino     FAMILY HISTORY:  Family History   Problem Relation Age of Onset     Alzheimer Disease Sister      HEART DISEASE Mother      HEART DISEASE Father      HEART DISEASE Son      HEART DISEASE Brother      SOCIAL HISTORY:  Social History     Social History     Marital status:      Spouse name: N/A     Number of children: N/A     Years of education: N/A     Social History Main Topics     Smoking status: Never Smoker     Smokeless tobacco: Never Used     Alcohol use No     Drug use: No     Sexual activity: No     Other Topics Concern     Caffeine Concern No     Special Diet No     Exercise No     Parent/Sibling W/ Cabg, Mi Or Angioplasty Before 65f 55m? No     Social History Narrative     Review of Systems:  Skin:  Negative     Eyes:  Positive for glasses  ENT:  Negative    Respiratory:  Positive for dyspnea on exertion  Cardiovascular:    Positive for;lightheadedness;dizziness  Gastroenterology: Negative    Genitourinary:  not assessed    Musculoskeletal:  Positive for arthritis  Neurologic:  Positive for numbness or tingling of hands  Psychiatric:  Negative    Heme/Lymph/Imm:  Negative    Endocrine:  Positive for thyroid disorder;diabetes     Physical Exam:  Vitals: /72  Pulse 88  Ht 1.651 m (5' 5\")  Wt 74.8 kg (165 lb)  BMI 27.46 kg/m2   Wt Readings from Last 4 Encounters:   03/26/18 74.8 kg (165 lb)   03/20/18 74.8 kg (164 lb 14.4 oz)   03/15/18 76.1 kg (167 lb 11.2 oz)   02/12/18 75.4 kg (166 lb 4.8 oz)     GEN: well nourished, in no " acute distress.  HEENT:  Pupils equal, round. Sclerae nonicteric.   NECK: Supple, no masses appreciated. No significant JVD at 30 degrees.  C/V:  Regular rate and rhythm, no murmur, rub or gallop.  + REYNOLD at RUSB.  RESP: Respirations are unlabored. Clear to auscultation bilaterally without wheezing, rales, or rhonchi.  GI: Abdomen soft, nontender.  EXTREM: No LE edema.  NEURO: Alert and oriented, cooperative.  SKIN: Warm and dry.     Recent Lab Results:  LIPID RESULTS:  Lab Results   Component Value Date    CHOL 193 05/12/2017    HDL 59 05/12/2017    LDL 86 05/12/2017    TRIG 240 (H) 05/12/2017    CHOLHDLRATIO 2.8 04/20/2015     LIVER ENZYME RESULTS:  Lab Results   Component Value Date    AST 22 02/12/2018    ALT 27 02/12/2018     CBC RESULTS:  Lab Results   Component Value Date    WBC 8.0 02/12/2018    RBC 5.27 (H) 02/12/2018    HGB 13.2 02/12/2018    HCT 41.5 02/12/2018    MCV 79 02/12/2018    MCH 25.0 (L) 02/12/2018    MCHC 31.8 02/12/2018    RDW 17.1 (H) 02/12/2018     02/12/2018     BMP RESULTS:  Lab Results   Component Value Date     02/12/2018    POTASSIUM 4.4 02/12/2018    CHLORIDE 105 02/12/2018    CO2 26 02/12/2018    ANIONGAP 7 02/12/2018     (H) 02/12/2018    BUN 19 02/12/2018    CR 0.96 02/12/2018    GFRESTIMATED 55 (L) 02/12/2018    GFRESTBLACK 67 02/12/2018    SHAWNEE 9.1 02/12/2018      A1C RESULTS:  Lab Results   Component Value Date    A1C 6.7 (H) 11/20/2017     INR RESULTS:  Lab Results   Component Value Date    INR 2.2 (A) 02/08/2018    INR 3.1 (A) 12/28/2017    INR 2.19 (H) 01/03/2013    INR 3.68 (H) 06/28/2012       New/Pertinent imaging results since last visit:  NM Lexiscan stress test 3/22/18:  Impression  1.  Myocardial perfusion imaging using single isotope technique  demonstrated probably normal perfusion. There are no significant areas  of ischemia or infarction identified on this study.   2. Gated images demonstrated normal wall motion and normal left  ventricular chamber  size.  The left ventricular systolic function is  normal, with an ejection fraction measured at 78%.  3. Compared to the prior study from 6/7/2010, I no longer see a small  area of distal apical lateral ischemia .     Tatinaa Titus PA-C  [facility]      Thank you for allowing me to participate in the care of your patient.      Sincerely,     Tatiana Titus PA-C     [facility]    cc:   Andrew Red MD  [address]  [place] [address]

## 2018-03-28 ENCOUNTER — ANTICOAGULATION THERAPY VISIT (OUTPATIENT)
Dept: ANTICOAGULATION | Facility: CLINIC | Age: 83
End: 2018-03-28
Payer: COMMERCIAL

## 2018-03-28 DIAGNOSIS — Z79.01 LONG-TERM (CURRENT) USE OF ANTICOAGULANTS: ICD-10-CM

## 2018-03-28 DIAGNOSIS — I80.209 PHLEBITIS AND THROMBOPHLEBITIS OF DEEP VEINS OF LOWER EXTREMITIES (H): ICD-10-CM

## 2018-03-28 LAB — INR POINT OF CARE: 2.4 (ref 0.86–1.14)

## 2018-03-28 PROCEDURE — 99207 ZZC NO CHARGE NURSE ONLY: CPT

## 2018-03-28 PROCEDURE — 85610 PROTHROMBIN TIME: CPT | Mod: QW

## 2018-03-28 PROCEDURE — 36416 COLLJ CAPILLARY BLOOD SPEC: CPT

## 2018-03-28 NOTE — MR AVS SNAPSHOT
Madie Silva   3/28/2018 11:00 AM   Anticoagulation Therapy Visit    Description:  88 year old female   Provider:   ANTICOAGULATION CLINIC   Department:   Anti Coagulation           INR as of 3/28/2018     Today's INR 2.4      Anticoagulation Summary as of 3/28/2018     INR goal 2.0-3.0   Today's INR 2.4   Full instructions 4.5 mg on Tue, Thu, Sat; 3 mg all other days   Next INR check 5/10/2018    Indications   Long-term (current) use of anticoagulants [Z79.01] [Z79.01]  Phlebitis and thrombophlebitis of deep veins of lower extremities (H) [I80.209]  Acute myocardial infarction  initial episode of care (Resolved) [I21.9]         Your next Anticoagulation Clinic appointment(s)     May 10, 2018 11:00 AM CDT   Anticoagulation Visit with  ANTICOAGULATION CLINIC   [facility] ([facility]    [address] [place] [address] [phone]              Contact Numbers     [facility]  Please call  [phone] to cancel and/or reschedule your appointment   Please call  [phone] with any problems or questions regarding your therapy.        March 2018 Details    Sun Mon Tue Wed Thu Fri Sat         1               2               3                 4               5               6               7               8               9               10                 11               12               13               14               15               16               17                 18               19               20               21               22               23               24                 25               26               27               28      3 mg   See details      29      4.5 mg         30      3 mg         31      4.5 mg          Date Details   03/28 This INR check               How to take your warfarin dose     To take:  3 mg Take 1 of the 3 mg tablets.    To take:  4.5 mg Take 1.5 of the 3 mg tablets.           April 2018  Details    Sun Mon Tue Wed Thu Fri Sat     1      3 mg         2      3 mg         3      4.5 mg         4      3 mg         5      4.5 mg         6      3 mg         7      4.5 mg           8      3 mg         9      3 mg         10      4.5 mg         11      3 mg         12      4.5 mg         13      3 mg         14      4.5 mg           15      3 mg         16      3 mg         17      4.5 mg         18      3 mg         19      4.5 mg         20      3 mg         21      4.5 mg           22      3 mg         23      3 mg         24      4.5 mg         25      3 mg         26      4.5 mg         27      3 mg         28      4.5 mg           29      3 mg         30      3 mg               Date Details   No additional details            How to take your warfarin dose     To take:  3 mg Take 1 of the 3 mg tablets.    To take:  4.5 mg Take 1.5 of the 3 mg tablets.           May 2018 Details    Sun Mon Tue Wed Thu Fri Sat       1      4.5 mg         2      3 mg         3      4.5 mg         4      3 mg         5      4.5 mg           6      3 mg         7      3 mg         8      4.5 mg         9      3 mg         10            11               12                 13               14               15               16               17               18               19                 20               21               22               23               24               25               26                 27               28               29               30               31                  Date Details   No additional details    Date of next INR:  5/10/2018         How to take your warfarin dose     To take:  3 mg Take 1 of the 3 mg tablets.    To take:  4.5 mg Take 1.5 of the 3 mg tablets.

## 2018-04-18 DIAGNOSIS — E11.21 TYPE 2 DIABETES MELLITUS WITH DIABETIC NEPHROPATHY, WITHOUT LONG-TERM CURRENT USE OF INSULIN (H): ICD-10-CM

## 2018-04-18 DIAGNOSIS — E78.5 HYPERLIPIDEMIA LDL GOAL <100: ICD-10-CM

## 2018-04-18 DIAGNOSIS — E03.9 ACQUIRED HYPOTHYROIDISM: ICD-10-CM

## 2018-04-18 LAB — HBA1C MFR BLD: 6.9 % (ref 0–5.6)

## 2018-04-18 PROCEDURE — 83036 HEMOGLOBIN GLYCOSYLATED A1C: CPT | Performed by: INTERNAL MEDICINE

## 2018-04-18 PROCEDURE — 36415 COLL VENOUS BLD VENIPUNCTURE: CPT | Performed by: INTERNAL MEDICINE

## 2018-04-18 PROCEDURE — 84460 ALANINE AMINO (ALT) (SGPT): CPT | Performed by: INTERNAL MEDICINE

## 2018-04-18 PROCEDURE — 80048 BASIC METABOLIC PNL TOTAL CA: CPT | Performed by: INTERNAL MEDICINE

## 2018-04-18 PROCEDURE — 84443 ASSAY THYROID STIM HORMONE: CPT | Performed by: INTERNAL MEDICINE

## 2018-04-18 PROCEDURE — 80061 LIPID PANEL: CPT | Performed by: INTERNAL MEDICINE

## 2018-04-19 ENCOUNTER — OFFICE VISIT (OUTPATIENT)
Dept: INTERNAL MEDICINE | Facility: CLINIC | Age: 83
End: 2018-04-19
Payer: COMMERCIAL

## 2018-04-19 VITALS
OXYGEN SATURATION: 98 % | TEMPERATURE: 97.4 F | DIASTOLIC BLOOD PRESSURE: 60 MMHG | HEART RATE: 92 BPM | HEIGHT: 65 IN | WEIGHT: 166.5 LBS | BODY MASS INDEX: 27.74 KG/M2 | RESPIRATION RATE: 18 BRPM | SYSTOLIC BLOOD PRESSURE: 112 MMHG

## 2018-04-19 DIAGNOSIS — E03.9 ACQUIRED HYPOTHYROIDISM: ICD-10-CM

## 2018-04-19 DIAGNOSIS — E11.21 TYPE 2 DIABETES MELLITUS WITH DIABETIC NEPHROPATHY, WITHOUT LONG-TERM CURRENT USE OF INSULIN (H): ICD-10-CM

## 2018-04-19 DIAGNOSIS — I10 BENIGN ESSENTIAL HYPERTENSION: ICD-10-CM

## 2018-04-19 DIAGNOSIS — R06.09 DOE (DYSPNEA ON EXERTION): Primary | ICD-10-CM

## 2018-04-19 DIAGNOSIS — E78.5 HYPERLIPIDEMIA LDL GOAL <100: ICD-10-CM

## 2018-04-19 DIAGNOSIS — I10 ESSENTIAL HYPERTENSION: ICD-10-CM

## 2018-04-19 LAB
ALT SERPL W P-5'-P-CCNC: 27 U/L (ref 0–50)
ANION GAP SERPL CALCULATED.3IONS-SCNC: 6 MMOL/L (ref 3–14)
BUN SERPL-MCNC: 21 MG/DL (ref 7–30)
CALCIUM SERPL-MCNC: 9.3 MG/DL (ref 8.5–10.1)
CHLORIDE SERPL-SCNC: 107 MMOL/L (ref 94–109)
CHOLEST SERPL-MCNC: 221 MG/DL
CO2 SERPL-SCNC: 27 MMOL/L (ref 20–32)
CREAT SERPL-MCNC: 0.84 MG/DL (ref 0.52–1.04)
FEF 25/75: NORMAL
FEV-1: NORMAL
FEV1/FVC: NORMAL
FVC: NORMAL
GFR SERPL CREATININE-BSD FRML MDRD: 64 ML/MIN/1.7M2
GLUCOSE SERPL-MCNC: 149 MG/DL (ref 70–99)
HDLC SERPL-MCNC: 54 MG/DL
LDLC SERPL CALC-MCNC: 105 MG/DL
NONHDLC SERPL-MCNC: 167 MG/DL
POTASSIUM SERPL-SCNC: 4.9 MMOL/L (ref 3.4–5.3)
SODIUM SERPL-SCNC: 140 MMOL/L (ref 133–144)
TRIGL SERPL-MCNC: 309 MG/DL
TSH SERPL DL<=0.005 MIU/L-ACNC: 1.88 MU/L (ref 0.4–4)

## 2018-04-19 PROCEDURE — 94010 BREATHING CAPACITY TEST: CPT | Performed by: INTERNAL MEDICINE

## 2018-04-19 PROCEDURE — 99214 OFFICE O/P EST MOD 30 MIN: CPT | Mod: 25 | Performed by: INTERNAL MEDICINE

## 2018-04-19 RX ORDER — LOSARTAN POTASSIUM 50 MG/1
25 TABLET ORAL DAILY
Start: 2018-04-19 | End: 2018-12-13

## 2018-04-19 NOTE — PROGRESS NOTES
SUBJECTIVE:   Madie Silva is a 88 year old female who presents to clinic today for the following health issues:      Diabetes Follow-up      Patient is checking blood sugars: not at all    Diabetic concerns: None     Symptoms of hypoglycemia (low blood sugar): none     Paresthesias (numbness or burning in feet) or sores: No     Date of last diabetic eye exam:     Hyperlipidemia Follow-Up      Rate your low fat/cholesterol diet?: good    Taking statin?  Yes, no muscle aches from statin    Other lipid medications/supplements?:  none    Hypertension Follow-up      Outpatient blood pressures are not being checked.    Low Salt Diet: no added salt    BP Readings from Last 2 Encounters:   03/26/18 152/72   03/20/18 144/66     Hemoglobin A1C (%)   Date Value   04/18/2018 6.9 (H)   11/20/2017 6.7 (H)     LDL Cholesterol Calculated (mg/dL)   Date Value   05/12/2017 86   04/13/2016 94     Hypothyroidism Follow-up      Since last visit, patient describes the following symptoms: Weight stable, no hair loss, no skin changes, no constipation, no loose stools      Amount of exercise or physical activity: None    Problems taking medications regularly: No    Medication side effects: none    Diet: regular (no restrictions)        Reviewed and updated as needed this visit by clinical staff:  Medications  Allergies  Soc Hx   Additional history: as documented    Additional issues to address:  1.  Follow-up shortness of breath, ongoing symptoms which varies somewhat.   Patient saw Dr. Red last month.   He tried a couple of days of lasix, which didn't do anything according to Madie.   Had a significant episode of shortness of breath in [employer] a couple weeks ago.   Lasted perhaps 5 minutes.  - cold weather may worsen symptoms, but no severely  - past week symptoms minimal     ROS:    Constitutional, HEENT, cardiovascular, pulmonary, gi and gu systems are negative, except as otherwise noted.    OBJECTIVE:                                "                       /60  Pulse 92  Temp 97.4  F (36.3  C) (Oral)  Resp 18  Ht 5' 5\" (1.651 m)  Wt 166 lb 8 oz (75.5 kg)  SpO2 98%  BMI 27.71 kg/m2  Body mass index is 27.71 kg/(m^2).  No apparent distress  Regular pulse     PFT:  normal     ASSESSMENT:                                                      DM, with nephropathy.  Stable.  Hyperlipidemia  HTN, blood pressure lower  Hypothyroidism, await follow-up labs --> normal      PLAN:                                                      1.  MEDICATIONS:        - Decrease losartan to 25 mg daily (1/2 tablet)       - Continue other medications without change  2.  If shortness of breath worsens, we may consider moving toward bilateral heart catheterization  3.  Check blood pressure at pharmacy a few times  4.  Follow-up in 3 months.      De Obregon MD  [facility]     "

## 2018-04-19 NOTE — PATIENT INSTRUCTIONS
PLAN:                                                      1.  MEDICATIONS:        - Decrease losartan to 25 mg daily (1/2 tablet)       - Continue other medications without change  2.  If shortness of breath worsens, we may consider moving toward bilateral heart catheterization  3.  Check blood pressure at pharmacy a few times  4.  Follow-up in 3 months.  5.  Await final lab results -->  We will call if abnormal

## 2018-04-19 NOTE — MR AVS SNAPSHOT
After Visit Summary   4/19/2018    Madie Silva    MRN: [id number]           Patient Information     Date Of Birth          7/21/1929        Visit Information        Provider Department      4/19/2018 2:00 PM De Obregon MD [facility]        Today's Diagnoses     ZHAO (dyspnea on exertion)    -  1      Care Instructions    PLAN:                                                      1.  MEDICATIONS:        - Decrease losartan to 25 mg daily (1/2 tablet)       - Continue other medications without change  2.  If shortness of breath worsens, we may consider moving toward bilateral heart catheterization  3.  Check blood pressure at pharmacy a few times  4.  Follow-up in 3 months.  5.  Await final lab results -->  We will call if abnormal          Follow-ups after your visit        Your next 10 appointments already scheduled     May 10, 2018 11:00 AM CDT   Anticoagulation [facility] with  [facility]ATION CLINIC   [facility]    [address] [id number] [phone]            May 22, 2018 11:45 AM CDT   Remote PPM Check with [facility] [employer] [place] [address] ([facility])    [address] [place], [place] [id number]  [facility] [id number] [phone] OPT 2           This appointment is for a remote check of your pacemaker.  This is not an appointment at the office.              Who to contact     If you have questions or need follow up information about today's clinic visit or your schedule please contact [facility] directly at [phone].  Normal or non-critical lab and imaging results will be communicated to you by MyChart, letter or phone within 4 business days after the clinic has received the results. If you do not hear from us within 7 days, please contact the clinic through MyChart or phone. If you have a critical or abnormal lab result,  "we will notify you by phone as soon as possible.  Submit refill requests through [employer] or call your pharmacy and they will forward the refill request to us. Please allow 3 business days for your refill to be completed.          Additional Information About Your Visit        [employer] Information     [employer] lets you send messages to your doctor, view your test results, renew your prescriptions, schedule appointments and more. To sign up, go to [email or web address] . Click on \"Log in\" on the left side of the screen, which will take you to the Welcome page. Then click on \"Sign up Now\" on the right side of the page.     You will be asked to enter the access code listed below, as well as some personal information. Please follow the directions to create your username and password.     Your access code is: [email or web address]  Expires: 2018  4:19 PM     Your access code will  in 90 days. If you need help or a new code, please call your [place] clinic or [phone].        Care EveryWhere ID     This is your Care EveryWhere ID. This could be used by other organizations to access your [facility] medical records  [id number]        Your Vitals Were     Pulse Temperature Respirations Height Pulse Oximetry BMI (Body Mass Index)    92 97.4  F (36.3  C) (Oral) 18 5' 5\" (1.651 m) 98% 27.71 kg/m2       Blood Pressure from Last 3 Encounters:   18 112/60   18 152/72   18 144/66    Weight from Last 3 Encounters:   18 166 lb 8 oz (75.5 kg)   18 165 lb (74.8 kg)   18 164 lb 14.4 oz (74.8 kg)              We Performed the Following     Spirometry, Breathing Capacity        Primary Care Provider Office Phone # Fax #    De Obregon [phone]       [address] [place] [id number]        Equal Access to Services     JAGDISH SWARTZ : Hafsa Buck, casey rosas, qaybta kaalmada adeegjavier abarca. [facility] " [phone].    ATENCIÓN: Si suhas lin, tiene a rodas disposición servicios gratuitos de asistencia lingüística. Ervin leslie [phone].    We comply with applicable federal civil rights laws and [place] laws. We do not discriminate on the basis of race, color, national origin, age, disability, sex, sexual orientation, or gender identity.            Thank you!     Thank you for choosing [facility]  for your care. Our goal is always to provide you with excellent care. Hearing back from our patients is one way we can continue to improve our services. Please take a few minutes to complete the written survey that you may receive in the mail after your visit with us. Thank you!             Your Updated Medication List - Protect others around you: Learn how to safely use, store and throw away your medicines at www.disposemymeds.org.          This list is accurate as of 4/19/18  3:39 PM.  Always use your most recent med list.                   Brand Name Dispense Instructions for use Diagnosis    acetaminophen 650 MG CR tablet    TYLENOL ARTHRITIS PAIN     Take 2 tablets (1,300 mg) by mouth 2 times daily    Primary osteoarthritis involving multiple joints       aspirin 81 MG EC tablet      Take 1 tablet by mouth daily.    S/P coronary artery bypass graft x 3       atorvastatin 80 MG tablet    LIPITOR    90 tablet    Take 1 tablet (80 mg) by mouth daily    Hyperlipidemia LDL goal <100       calcium 600 MG tablet      Take 1 tablet by mouth 2 times daily.        * PRESERVISION AREDS 2 PO           * CENTRUM SILVER per tablet      Take 1 tablet by mouth daily.        co-enzyme Q-10 100 MG Caps capsule      Take 1 capsule by mouth daily.        FIBER PO      Take by mouth daily        gabapentin 100 MG capsule    NEURONTIN    450 capsule    TAKE 1 CAPSULE BY MOUTH EVERY MORNING, ONE MIDDAY, AND THREE AT BEDTIME    Post herpetic neuralgia, Restless leg syndrome       HYDROcodone-acetaminophen 5-325  MG per tablet    NORCO    20 tablet    Take 0.5-1 tablets by mouth every 6 hours as needed for moderate to severe pain    Left knee pain, unspecified chronicity       levothyroxine 50 MCG tablet    SYNTHROID/LEVOTHROID    90 tablet    Take 1 tablet (50 mcg) by mouth daily    Acquired hypothyroidism       losartan 50 MG tablet    COZAAR    90 tablet    Take 1 tablet (50 mg) by mouth daily    Benign essential hypertension       metFORMIN 500 MG tablet    GLUCOPHAGE    90 tablet    Take 1 tablet (500 mg) by mouth daily (with breakfast)    Type 2 diabetes mellitus with diabetic nephropathy, without long-term current use of insulin (H)       nitroGLYcerin 0.4 MG sublingual tablet    NITROSTAT     Place 0.4 mg under the tongue every 5 minutes as needed. Up to 3 doses per episode        omeprazole 20 MG CR capsule    priLOSEC    90 capsule    TAKE ONE CAPSULE (20 MG) BY MOUTH EVERY MORNING (BEFORE BREAKFAST)    Hiatal hernia, Walters's esophagus with dysplasia       polyethylene glycol powder    MIRALAX     Take 17 g by mouth daily    Constipation       VITAMIN D-400 PO      Take by mouth daily        warfarin 3 MG tablet    JANTOVEN    100 tablet    Take 1 tablet (3 mg) by mouth daily And an additional half pill Tues. Thurs and Sat.    Paroxysmal a-fib (H)       * Notice:  This list has 2 medication(s) that are the same as other medications prescribed for you. Read the directions carefully, and ask your doctor or other care provider to review them with you.

## 2018-05-08 DIAGNOSIS — I48.0 PAROXYSMAL A-FIB (H): ICD-10-CM

## 2018-05-09 RX ORDER — WARFARIN SODIUM 3 MG/1
TABLET ORAL
Qty: 100 TABLET | Refills: 3 | Status: SHIPPED | OUTPATIENT
Start: 2018-05-09 | End: 2019-04-09

## 2018-05-09 NOTE — TELEPHONE ENCOUNTER
"Requested Prescriptions   Pending Prescriptions Disp Refills     JANTOVEN 3 MG tablet [Pharmacy Med Name: JANTOVEN 3MG TABS] 100 tablet 0     Sig: TAKE ONE TABLET BY MOUTH ONCE DAILY AND TAKE AN ADDITIONAL HALF TABLET TUESDAY, THURSAY, AND SATURDAY    Vitamin K Antagonists Failed    5/8/2018  5:45 PM       Failed - INR is within goal in the past 6 weeks    Confirm INR is within goal in the past 6 weeks.     Recent Labs   Lab Test 03/28/18   INR  2.4*                      Passed - Recent (12 mo) or future (30 days) visit within the authorizing provider's specialty    Patient had office visit in the last 12 months or has a visit in the next 30 days with authorizing provider or within the authorizing provider's specialty.  See \"Patient Info\" tab in inbasket, or \"Choose Columns\" in Meds & Orders section of the refill encounter.           Passed - Patient is 18 years of age or older       Passed - Patient is not pregnant       Passed - No positive pregnancy on file in past 12 months          "

## 2018-05-10 ENCOUNTER — ANTICOAGULATION THERAPY VISIT (OUTPATIENT)
Dept: ANTICOAGULATION | Facility: CLINIC | Age: 83
End: 2018-05-10
Payer: COMMERCIAL

## 2018-05-10 VITALS — DIASTOLIC BLOOD PRESSURE: 68 MMHG | SYSTOLIC BLOOD PRESSURE: 130 MMHG

## 2018-05-10 DIAGNOSIS — I80.209 PHLEBITIS AND THROMBOPHLEBITIS OF DEEP VEINS OF LOWER EXTREMITIES (H): ICD-10-CM

## 2018-05-10 DIAGNOSIS — Z79.01 LONG-TERM (CURRENT) USE OF ANTICOAGULANTS: ICD-10-CM

## 2018-05-10 LAB — INR POINT OF CARE: 2.3 (ref 0.86–1.14)

## 2018-05-10 PROCEDURE — 99207 ZZC NO CHARGE NURSE ONLY: CPT

## 2018-05-10 PROCEDURE — 85610 PROTHROMBIN TIME: CPT | Mod: QW

## 2018-05-10 PROCEDURE — 36416 COLLJ CAPILLARY BLOOD SPEC: CPT

## 2018-05-10 NOTE — MR AVS SNAPSHOT
Madie Silva   5/10/2018 11:00 AM   Anticoagulation Therapy Visit    Description:  88 year old female   Provider:   ANTICOAGULATION CLINIC   Department:   Anti Coagulation           INR as of 5/10/2018     Today's INR 2.3      Anticoagulation Summary as of 5/10/2018     INR goal 2.0-3.0   Today's INR 2.3   Full instructions 4.5 mg on Tue, Thu, Sat; 3 mg all other days   Next INR check 6/21/2018    Indications   Long-term (current) use of anticoagulants [Z79.01] [Z79.01]  Phlebitis and thrombophlebitis of deep veins of lower extremities (H) [I80.209]  Acute myocardial infarction  initial episode of care (Resolved) [I21.9]         Your next Anticoagulation Clinic appointment(s)     Jun 21, 2018 11:00 AM CDT   Anticoagulation Visit with  ANTICOAGULATION CLINIC   [facility]    [address] [address] [phone]              Contact Numbers     [facility]  Please call  [phone] to cancel and/or reschedule your appointment   Please call  [phone] with any problems or questions regarding your therapy.        May 2018 Details    Sun Mon Tue Wed Thu Fri Sat       1               2               3               4               5                 6               7               8               9               10      4.5 mg   See details      11      3 mg         12      4.5 mg           13      3 mg         14      3 mg         15      4.5 mg         16      3 mg         17      4.5 mg         18      3 mg         19      4.5 mg           20      3 mg         21      3 mg         22      4.5 mg         23      3 mg         24      4.5 mg         25      3 mg         26      4.5 mg           27      3 mg         28      3 mg         29      4.5 mg         30      3 mg         31      4.5 mg            Date Details   05/10 This INR check               How to take your warfarin dose     To take:  3 mg Take 1 of the 3  mg tablets.    To take:  4.5 mg Take 1.5 of the 3 mg tablets.           June 2018 Details    Sun Mon Tue Wed Thu Fri Sat          1      3 mg         2      4.5 mg           3      3 mg         4      3 mg         5      4.5 mg         6      3 mg         7      4.5 mg         8      3 mg         9      4.5 mg           10      3 mg         11      3 mg         12      4.5 mg         13      3 mg         14      4.5 mg         15      3 mg         16      4.5 mg           17      3 mg         18      3 mg         19      4.5 mg         20      3 mg         21            22               23                 24               25               26               27               28               29               30                Date Details   No additional details    Date of next INR:  6/21/2018         How to take your warfarin dose     To take:  3 mg Take 1 of the 3 mg tablets.    To take:  4.5 mg Take 1.5 of the 3 mg tablets.

## 2018-05-10 NOTE — PROGRESS NOTES
ANTICOAGULATION FOLLOW-UP CLINIC VISIT    Patient Name:  Madie Silva  Date:  5/10/2018  Contact Type:  Face to Face    SUBJECTIVE:     Patient Findings     Positives No Problem Findings           OBJECTIVE    INR Protime   Date Value Ref Range Status   05/10/2018 2.3 (A) 0.86 - 1.14 Final       ASSESSMENT / PLAN  No question data found.  Anticoagulation Summary as of 5/10/2018     INR goal 2.0-3.0   Today's INR 2.3   Maintenance plan 4.5 mg (3 mg x 1.5) on Tue, Thu, Sat; 3 mg (3 mg x 1) all other days   Full instructions 4.5 mg on Tue, Thu, Sat; 3 mg all other days   Weekly total 25.5 mg   No change documented Connie Hickman RN   Plan last modified Connie Hickman RN (7/10/2017)   Next INR check 6/21/2018   Target end date     Indications   Long-term (current) use of anticoagulants [Z79.01] [Z79.01]  Phlebitis and thrombophlebitis of deep veins of lower extremities (H) [I80.209]  Acute myocardial infarction  initial episode of care (Resolved) [I21.9]         Anticoagulation Episode Summary     INR check location     Preferred lab     Send IN[facility] reminders to [facility]    Comments       Anticoagulation Care Providers     Provider Role Specialty Phone number    De Obregon MD [facility] Internal Medicine [phone]            See the Encounter Report to view Anticoagulation Flowsheet and Dosing Calendar (Go to Encounters tab in chart review, and find the Anticoagulation Therapy Visit)        Connie Hickman RN

## 2018-05-22 ENCOUNTER — ALLIED HEALTH/NURSE VISIT (OUTPATIENT)
Dept: CARDIOLOGY | Facility: CLINIC | Age: 83
End: 2018-05-22
Payer: COMMERCIAL

## 2018-05-22 DIAGNOSIS — Z95.0 CARDIAC PACEMAKER IN SITU: Primary | ICD-10-CM

## 2018-05-22 PROCEDURE — 93296 REM INTERROG EVL PM/IDS: CPT | Performed by: INTERNAL MEDICINE

## 2018-05-22 PROCEDURE — 93294 REM INTERROG EVL PM/LDLS PM: CPT | Performed by: INTERNAL MEDICINE

## 2018-05-22 NOTE — PROGRESS NOTES
[employer] Sensia (D) Remote PPM Device Check  AP: 90 % : 1 %  Mode: DDDR        Presenting Rhythm: AP/VS  Heart Rate: Adequate rates per histogram  Sensing: Stable    Pacing Threshold: Stable    Impedance: Stable  Battery Status: 3.5-7 years  Atrial Arrhythmia: 22 mode switch episodes comprising <1% of the time. No EGMs.   Ventricular Arrhythmia: 1 ventricular high rate. Marker only EGM shows As=Vs for PAT lasting 8 beats, rates 140-190bpm.       Care Plan: F/u annual threshold in 3 months. Gave patient results over the phone. [place],CVT

## 2018-05-22 NOTE — MR AVS SNAPSHOT
After Visit Summary   5/22/2018    Madie Silva    MRN: [id number]           Patient Information     Date Of Birth          7/21/1929        Visit Information        Provider Department      5/22/2018 11:45 AM CONKLIN TECH1 [facility]        Today's Diagnoses     Cardiac pacemaker in situ    -  1       Follow-ups after your visit        Your next 10 appointments already scheduled     May 22, 2018 11:45 AM CDT   Remote PPM Check with CONKLIN TECH1   [facility] ([facility])    [address] [id number]  [facility] [address] [phone] OPT 2           This appointment is for a remote check of your pacemaker.  This is not an appointment at the office.            Jun 21, 2018 11:00 AM CDT   Anticoagulation Visit with OX ANTICOAGULATION CLINIC   [facility] ([facility])    [address] [id number] [phone]            Jul 26, 2018  9:30 AM CDT   Office Visit with De Obregon MD   [facility] ([facility])    [address] [id number] [phone]           Bring a current list of meds and any records pertaining to this visit. For Physicals, please bring immunization records and any forms needing to be filled out. Please arrive 10 minutes early to complete paperwork.            Aug 23, 2018  1:45 PM CDT   Pacemaker Check with RUBIN BLACKN   [facility] ([facility])    [address] [place], [place] [address]  [facility] [id number] [phone] OPT 2              Who to contact     If you have questions or need follow up information about today's clinic visit or your schedule please contact [facility] directly at [phone].  Normal or non-critical lab and imaging results will be communicated to you  "by MM Local Foodshart, letter or phone within 4 business days after the clinic has received the results. If you do not hear from us within 7 days, please contact the clinic through Sybarit or phone. If you have a critical or abnormal lab result, we will notify you by phone as soon as possible.  Submit refill requests through SendGrid or call your pharmacy and they will forward the refill request to us. Please allow 3 business days for your refill to be completed.          Additional Information About Your Visit        MM Local FoodsharBATS Information     SendGrid lets you send messages to your doctor, view your test results, renew your prescriptions, schedule appointments and more. To sign up, go to [email or web address] . Click on \"Log in\" on the left side of the screen, which will take you to the Welcome page. Then click on \"Sign up Now\" on the right side of the page.     You will be asked to enter the access code listed below, as well as some personal information. Please follow the directions to create your username and password.     Your access code is: [email or web address]  Expires: 2018  8:28 AM     Your access code will  in 90 days. If you need help or a new code, please call your [facility] clinic or [phone].        Care EveryWhere ID     This is your Care EveryWhere ID. This could be used by other organizations to access your [facility] medical records  [id number]         Blood Pressure from Last 3 Encounters:   05/10/18 130/68   18 112/60   18 152/72    Weight from Last 3 Encounters:   18 75.5 kg (166 lb 8 oz)   18 74.8 kg (165 lb)   18 74.8 kg (164 lb 14.4 oz)              We Performed the Following     INTERROGATION DEVICE EVAL REMOTE, PACER/ICD (22103)     PM DEVICE INTERROGATE REMOTE (46681)        Primary Care Provider Office Phone # Fax #    De Obregon [phone]       [address] [place] [address]        Equal Access to Services     JAGDISH SWARTZ AH: " Hadii aad manav roadawno Somarcelaali, waaxda luqadaha, qaybta kaalmada monica, javier vancein hayaan mahadalisson ling lalulúgiles gennaro. So [facility] [phone].    ATENCIÓN: Si suhas lin, tiene a rodas disposición servicios gratuitos de asistencia lingüística. Llame al [phone].    We comply with applicable federal civil rights laws and [place] laws. We do not discriminate on the basis of race, color, national origin, age, disability, sex, sexual orientation, or gender identity.            Thank you!     Thank you for choosing [facility]  for your care. Our goal is always to provide you with excellent care. Hearing back from our patients is one way we can continue to improve our services. Please take a few minutes to complete the written survey that you may receive in the mail after your visit with us. Thank you!             Your Updated Medication List - Protect others around you: Learn how to safely use, store and throw away your medicines at www.disposemymeds.org.          This list is accurate as of 5/22/18  8:28 AM.  Always use your most recent med list.                   Brand Name Dispense Instructions for use Diagnosis    acetaminophen 650 MG CR tablet    TYLENOL ARTHRITIS PAIN     Take 2 tablets (1,300 mg) by mouth 2 times daily    Primary osteoarthritis involving multiple joints       aspirin 81 MG EC tablet      Take 1 tablet by mouth daily.    S/P coronary artery bypass graft x 3       atorvastatin 80 MG tablet    LIPITOR    90 tablet    Take 1 tablet (80 mg) by mouth daily    Hyperlipidemia LDL goal <100       calcium 600 MG tablet      Take 1 tablet by mouth 2 times daily.        * PRESERVISION AREDS 2 PO           * CENTRUM SILVER per tablet      Take 1 tablet by mouth daily.        co-enzyme Q-10 100 MG Caps capsule      Take 1 capsule by mouth daily.        FIBER PO      Take by mouth daily        gabapentin 100 MG capsule    NEURONTIN    450 capsule    TAKE 1 CAPSULE BY  MOUTH EVERY MORNING, ONE MIDDAY, AND THREE AT BEDTIME    Post herpetic neuralgia, Restless leg syndrome       HYDROcodone-acetaminophen 5-325 MG per tablet    NORCO    20 tablet    Take 0.5-1 tablets by mouth every 6 hours as needed for moderate to severe pain    Left knee pain, unspecified chronicity       JANTOVEN 3 MG tablet   Generic drug:  warfarin     100 tablet    TAKE ONE TABLET BY MOUTH ONCE DAILY AND TAKE AN ADDITIONAL HALF TABLET TUESDAY, THURSAY, AND SATURDAY    Paroxysmal a-fib (H)       levothyroxine 50 MCG tablet    SYNTHROID/LEVOTHROID    90 tablet    Take 1 tablet (50 mcg) by mouth daily    Acquired hypothyroidism       losartan 50 MG tablet    COZAAR     Take 0.5 tablets (25 mg) by mouth daily    Benign essential hypertension       metFORMIN 500 MG tablet    GLUCOPHAGE    90 tablet    Take 1 tablet (500 mg) by mouth daily (with breakfast)    Type 2 diabetes mellitus with diabetic nephropathy, without long-term current use of insulin (H)       nitroGLYcerin 0.4 MG sublingual tablet    NITROSTAT     Place 0.4 mg under the tongue every 5 minutes as needed. Up to 3 doses per episode        omeprazole 20 MG CR capsule    priLOSEC    90 capsule    TAKE ONE CAPSULE (20 MG) BY MOUTH EVERY MORNING (BEFORE BREAKFAST)    Hiatal hernia, Walters's esophagus with dysplasia       polyethylene glycol powder    MIRALAX     Take 17 g by mouth daily    Constipation       VITAMIN D-400 PO      Take by mouth daily        * Notice:  This list has 2 medication(s) that are the same as other medications prescribed for you. Read the directions carefully, and ask your doctor or other care provider to review them with you.

## 2018-06-14 ENCOUNTER — ALLIED HEALTH/NURSE VISIT (OUTPATIENT)
Dept: INTERNAL MEDICINE | Facility: CLINIC | Age: 83
End: 2018-06-14
Payer: COMMERCIAL

## 2018-06-14 VITALS — SYSTOLIC BLOOD PRESSURE: 138 MMHG | DIASTOLIC BLOOD PRESSURE: 58 MMHG

## 2018-06-14 DIAGNOSIS — I10 ESSENTIAL HYPERTENSION: Primary | ICD-10-CM

## 2018-06-14 PROCEDURE — 99207 ZZC NO CHARGE NURSE ONLY: CPT | Performed by: INTERNAL MEDICINE

## 2018-06-14 NOTE — MR AVS SNAPSHOT
After Visit Summary   6/14/2018    Madie Silva    MRN: [id number]           Patient Information     Date Of Birth          7/21/1929        Visit Information        Provider Department      6/14/2018 10:59 AM De Obregon MD [facility]        Today's Diagnoses     Essential hypertension    -  1       Follow-ups after your visit        Your next 10 appointments already scheduled     Jun 21, 2018 11:00 AM CDT   Anticoagulation Visit with OX ANTICOAGULATION CLINIC   [facility] ([facility])    [address] [phone]            Jul 26, 2018  9:30 AM CDT   Office Visit with De Obregon MD   [facility] ([facility])    [address] [place] [phone]           Bring a current list of meds and any records pertaining to this visit. For Physicals, please bring immunization records and any forms needing to be filled out. Please arrive 10 minutes early to complete paperwork.            Aug 23, 2018  1:45 PM CDT   Pacemaker Check with RUBIN MANCUSO   [facility] ([facility])    [address] [place] [id number] [phone] OPT 2              Who to contact     If you have questions or need follow up information about today's clinic visit or your schedule please contact [facility] directly at [phone].  Normal or non-critical lab and imaging results will be communicated to you by MyChart, letter or phone within 4 business days after the clinic has received the results. If you do not hear from us within 7 days, please contact the clinic through MyChart or phone. If you have a critical or abnormal lab result, we will notify you by phone as soon as possible.  Submit refill requests through Mobile Service Pros or call your pharmacy and  "they will forward the refill request to us. Please allow 3 business days for your refill to be completed.          Additional Information About Your Visit        MyChart Information     Everypoint lets you send messages to your doctor, view your test results, renew your prescriptions, schedule appointments and more. To sign up, go to [email or web address] . Click on \"Log in\" on the left side of the screen, which will take you to the Welcome page. Then click on \"Sign up Now\" on the right side of the page.     You will be asked to enter the access code listed below, as well as some personal information. Please follow the directions to create your username and password.     Your access code is: [email or web address]  Expires: 2018  8:28 AM     Your access code will  in 90 days. If you need help or a new code, please call your [facility] clinic or [phone].        Care EveryWhere ID     This is your Care EveryWhere ID. This could be used by other organizations to access your [facility] medical records  [id number]         Blood Pressure from Last 3 Encounters:   18 138/58   05/10/18 130/68   18 112/60    Weight from Last 3 Encounters:   18 166 lb 8 oz (75.5 kg)   18 165 lb (74.8 kg)   18 164 lb 14.4 oz (74.8 kg)              Today, you had the following     No orders found for display       Primary Care Provider Office Phone # Fax #    De Blaine Obregon [phone]       [address] [place] [address]        Equal Access to Services     JAGDISH SWARTZ : Hadru [facility], casey rosas, qajavier olmedo. So [facility] [phone].    ATENCIÓN: Si habla español, tiene a rodas disposición servicios gratuitos de asistencia lingüística. Ervin al [phone].    We comply with applicable federal civil rights laws and [place] laws. We do not discriminate on the basis of race, color, national origin, age, " disability, sex, sexual orientation, or gender identity.            Thank you!     Thank you for choosing [facility]  for your care. Our goal is always to provide you with excellent care. Hearing back from our patients is one way we can continue to improve our services. Please take a few minutes to complete the written survey that you may receive in the mail after your visit with us. Thank you!             Your Updated Medication List - Protect others around you: Learn how to safely use, store and throw away your medicines at www.disposemymeds.org.          This list is accurate as of 6/14/18  3:05 PM.  Always use your most recent med list.                   Brand Name Dispense Instructions for use Diagnosis    acetaminophen 650 MG CR tablet    TYLENOL ARTHRITIS PAIN     Take 2 tablets (1,300 mg) by mouth 2 times daily    Primary osteoarthritis involving multiple joints       aspirin 81 MG EC tablet      Take 1 tablet by mouth daily.    S/P coronary artery bypass graft x 3       atorvastatin 80 MG tablet    LIPITOR    90 tablet    Take 1 tablet (80 mg) by mouth daily    Hyperlipidemia LDL goal <100       calcium 600 MG tablet      Take 1 tablet by mouth 2 times daily.        * PRESERVISION AREDS 2 PO           * CENTRUM SILVER per tablet      Take 1 tablet by mouth daily.        co-enzyme Q-10 100 MG Caps capsule      Take 1 capsule by mouth daily.        FIBER PO      Take by mouth daily        gabapentin 100 MG capsule    NEURONTIN    450 capsule    TAKE 1 CAPSULE BY MOUTH EVERY MORNING, ONE MIDDAY, AND THREE AT BEDTIME    Post herpetic neuralgia, Restless leg syndrome       HYDROcodone-acetaminophen 5-325 MG per tablet    NORCO    20 tablet    Take 0.5-1 tablets by mouth every 6 hours as needed for moderate to severe pain    Left knee pain, unspecified chronicity       JANTOVEN 3 MG tablet   Generic drug:  warfarin     100 tablet    TAKE ONE TABLET BY MOUTH ONCE DAILY AND TAKE AN  ADDITIONAL HALF TABLET TUESDAY, THURSAY, AND SATURDAY    Paroxysmal a-fib (H)       levothyroxine 50 MCG tablet    SYNTHROID/LEVOTHROID    90 tablet    Take 1 tablet (50 mcg) by mouth daily    Acquired hypothyroidism       losartan 50 MG tablet    COZAAR     Take 0.5 tablets (25 mg) by mouth daily    Benign essential hypertension       metFORMIN 500 MG tablet    GLUCOPHAGE    90 tablet    Take 1 tablet (500 mg) by mouth daily (with breakfast)    Type 2 diabetes mellitus with diabetic nephropathy, without long-term current use of insulin (H)       nitroGLYcerin 0.4 MG sublingual tablet    NITROSTAT     Place 0.4 mg under the tongue every 5 minutes as needed. Up to 3 doses per episode        omeprazole 20 MG CR capsule    priLOSEC    90 capsule    TAKE ONE CAPSULE (20 MG) BY MOUTH EVERY MORNING (BEFORE BREAKFAST)    Hiatal hernia, Walters's esophagus with dysplasia       polyethylene glycol powder    MIRALAX     Take 17 g by mouth daily    Constipation       VITAMIN D-400 PO      Take by mouth daily        * Notice:  This list has 2 medication(s) that are the same as other medications prescribed for you. Read the directions carefully, and ask your doctor or other care provider to review them with you.

## 2018-06-14 NOTE — PROGRESS NOTES
This note has been reviewed/approved.    Pravin Winston, PharmD  [facility]  [phone]

## 2018-06-14 NOTE — PROGRESS NOTES
Madie Silva is enrolled/participating in the retail pharmacy Blood Pressure Goals Achievement Program (BPGAP).  Madie Silva was evaluated at [facility] on June 14, 2018 at which time her blood pressure was:    BP Readings from Last 3 Encounters:   06/14/18 138/58   05/10/18 130/68   04/19/18 112/60     Reviewed lifestyle modifications for blood pressure control and reduction: including making healthy food choices, managing weight, getting regular exercise, smoking cessation, reducing alcohol consumption, monitoring blood pressure regularly.     Madie Silva is not experiencing symptoms.    Follow-Up: BP is at goal of < 140/90mmHg (patient 18+ years of age with or without diabetes).  Recommended follow-up at pharmacy in 6 months.     Recommendation to Provider: No recommendation at this time. Please continue current therapy.    Madie Silva was evaluated for enrollment into the PGEN study today.    Patient eligible for enrollment:  No  Patient interested in enrollment:  No    Completed by:Susanna Morales, 3rd Year Student Pharmacist

## 2018-06-21 ENCOUNTER — ANTICOAGULATION THERAPY VISIT (OUTPATIENT)
Dept: ANTICOAGULATION | Facility: CLINIC | Age: 83
End: 2018-06-21
Payer: COMMERCIAL

## 2018-06-21 DIAGNOSIS — Z79.01 LONG-TERM (CURRENT) USE OF ANTICOAGULANTS: ICD-10-CM

## 2018-06-21 DIAGNOSIS — I80.209 PHLEBITIS AND THROMBOPHLEBITIS OF DEEP VEINS OF LOWER EXTREMITIES (H): ICD-10-CM

## 2018-06-21 LAB — INR POINT OF CARE: 2.7 (ref 0.86–1.14)

## 2018-06-21 PROCEDURE — 99207 ZZC NO CHARGE NURSE ONLY: CPT

## 2018-06-21 PROCEDURE — 85610 PROTHROMBIN TIME: CPT | Mod: QW

## 2018-06-21 PROCEDURE — 36416 COLLJ CAPILLARY BLOOD SPEC: CPT

## 2018-06-21 NOTE — MR AVS SNAPSHOT
Madie Silva   6/21/2018 11:00 AM   Anticoagulation Therapy Visit    Description:  88 year old female   Provider:   ANTICOAGULATION CLINIC   Department:   Anti Coagulation           INR as of 6/21/2018     Today's INR 2.7      Anticoagulation Summary as of 6/21/2018     INR goal 2.0-3.0   Today's INR 2.7   Full warfarin instructions 4.5 mg on Tue, Thu, Sat; 3 mg all other days   Next INR check 8/2/2018    Indications   Long-term (current) use of anticoagulants [Z79.01] [Z79.01]  Phlebitis and thrombophlebitis of deep veins of lower extremities (H) [I80.209]  Acute myocardial infarction  initial episode of care (Resolved) [I21.9]         Your next Anticoagulation Clinic appointment(s)     Aug 02, 2018 11:00 AM CDT   Anticoagulation Visit with  ANTICOAGULATION CLINIC   [facility] ([facility]    [address] [place] [address] [phone]              Contact Numbers     [facility]  Please call  [phone] to cancel and/or reschedule your appointment   Please call  [phone] with any problems or questions regarding your therapy.        June 2018 Details    Sun Mon Tue Wed Thu Fri Sat          1               2                 3               4               5               6               7               8               9                 10               11               12               13               14               15               16                 17               18               19               20               21      4.5 mg   See details      22      3 mg         23      4.5 mg           24      3 mg         25      3 mg         26      4.5 mg         27      3 mg         28      4.5 mg         29      3 mg         30      4.5 mg          Date Details   06/21 This INR check               How to take your warfarin dose     To take:  3 mg Take 1 of the 3 mg tablets.    To take:  4.5 mg Take 1.5 of the 3 mg tablets.            July 2018 Details    Sun Mon Tue Wed Thu Fri Sat     1      3 mg         2      3 mg         3      4.5 mg         4      3 mg         5      4.5 mg         6      3 mg         7      4.5 mg           8      3 mg         9      3 mg         10      4.5 mg         11      3 mg         12      4.5 mg         13      3 mg         14      4.5 mg           15      3 mg         16      3 mg         17      4.5 mg         18      3 mg         19      4.5 mg         20      3 mg         21      4.5 mg           22      3 mg         23      3 mg         24      4.5 mg         25      3 mg         26      4.5 mg         27      3 mg         28      4.5 mg           29      3 mg         30      3 mg         31      4.5 mg              Date Details   No additional details            How to take your warfarin dose     To take:  3 mg Take 1 of the 3 mg tablets.    To take:  4.5 mg Take 1.5 of the 3 mg tablets.           August 2018 Details    Sun Mon Tue Wed Thu Fri Sat        1      3 mg         2            3               4                 5               6               7               8               9               10               11                 12               13               14               15               16               17               18                 19               20               21               22               23               24               25                 26               27               28               29               30               31                 Date Details   No additional details    Date of next INR:  8/2/2018         How to take your warfarin dose     To take:  3 mg Take 1 of the 3 mg tablets.    To take:  4.5 mg Take 1.5 of the 3 mg tablets.

## 2018-06-21 NOTE — PROGRESS NOTES
ANTICOAGULATION FOLLOW-UP CLINIC VISIT    Patient Name:  Madie Silva  Date:  6/21/2018  Contact Type:  Face to Face    SUBJECTIVE:     Patient Findings     Positives No Problem Findings           OBJECTIVE    INR Protime   Date Value Ref Range Status   06/21/2018 2.7 (A) 0.86 - 1.14 Final       ASSESSMENT / PLAN  No question data found.  Anticoagulation Summary as of 6/21/2018     INR goal 2.0-3.0   Today's INR 2.7   Warfarin maintenance plan 4.5 mg (3 mg x 1.5) on Tue, Thu, Sat; 3 mg (3 mg x 1) all other days   Full warfarin instructions 4.5 mg on Tue, Thu, Sat; 3 mg all other days   Weekly warfarin total 25.5 mg   No change documented Connie Hickman RN   Plan last modified Connie Hickman RN (7/10/2017)   Next INR check 8/2/2018   Target end date     Indications   Long-term (current) use of anticoagulants [Z79.01] [Z79.01]  Phlebitis and thrombophlebitis of deep veins of lower extremities (H) [I80.209]  Acute myocardial infarction  initial episode of care (Resolved) [I21.9]         Anticoagulation Episode Summary     INR check location     Preferred lab     Send INR reminders to [facility]    Comments       Anticoagulation Care Providers     Provider Role Specialty Phone number    De Obregon MD [facility] Internal Medicine [phone]            See the Encounter Report to view Anticoagulation Flowsheet and Dosing Calendar (Go to Encounters tab in chart review, and find the Anticoagulation Therapy Visit)        Connie Hickman RN

## 2018-06-26 DIAGNOSIS — E78.5 HYPERLIPIDEMIA LDL GOAL <100: ICD-10-CM

## 2018-06-27 RX ORDER — ATORVASTATIN CALCIUM 80 MG/1
TABLET, FILM COATED ORAL
Qty: 90 TABLET | Refills: 2 | Status: SHIPPED | OUTPATIENT
Start: 2018-06-27 | End: 2018-12-13

## 2018-06-27 NOTE — TELEPHONE ENCOUNTER
"Requested Prescriptions   Pending Prescriptions Disp Refills     atorvastatin (LIPITOR) 80 MG tablet [Pharmacy Med Name: ATORVASTATIN CALCIUM 80MG TABS] 90 tablet      Sig: TAKE ONE TABLET BY MOUTH EVERY DAY    Statins Protocol Passed    6/26/2018  5:45 PM       Passed - LDL on file in past 12 months    Recent Labs   Lab Test  04/18/18   0853   LDL  105*            Passed - No abnormal creatine kinase in past 12 months    No lab results found.            Passed - Recent (12 mo) or future (30 days) visit within the authorizing provider's specialty    Patient had office visit in the last 12 months or has a visit in the next 30 days with authorizing provider or within the authorizing provider's specialty.  See \"Patient Info\" tab in inbasket, or \"Choose Columns\" in Meds & Orders section of the refill encounter.           Passed - Patient is age 18 or older       Passed - No active pregnancy on record       Passed - No positive pregnancy test in past 12 months        Last Written Prescription Date:  5/19/17  Last Fill Quantity: 90,  # refills: prn   Last office visit: 4/19/2018 with prescribing provider:  4/19/18   Future Office Visit:   Next 5 appointments (look out 90 days)     Jul 26, 2018  9:30 AM CDT   Office Visit with De Obregon MD   [facility] ([facility])    [address] [address] [phone]                   "

## 2018-07-31 DIAGNOSIS — E11.21 TYPE 2 DIABETES MELLITUS WITH DIABETIC NEPHROPATHY, WITHOUT LONG-TERM CURRENT USE OF INSULIN (H): ICD-10-CM

## 2018-07-31 NOTE — TELEPHONE ENCOUNTER
"Requested Prescriptions   Pending Prescriptions Disp Refills     metFORMIN (GLUCOPHAGE) 500 MG tablet [Pharmacy Med Name: METFORMIN HCL 500MG TABS] 90 tablet      Sig: TAKE ONE TABLET BY MOUTH DAILY WITH BREAKFAST    Biguanide Agents Passed    7/31/2018  3:49 PM       Passed - Blood pressure less than 140/90 in past 6 months    BP Readings from Last 3 Encounters:   06/14/18 138/58   05/10/18 130/68   04/19/18 112/60                Passed - Patient has documented LDL within the past 12 mos.    Recent Labs   Lab Test  04/18/18   0853   LDL  105*            Passed - Patient has had a Microalbumin in the past 12 mos.    Recent Labs   Lab Test  11/22/17   1257   MICROL  10   UMALCR  8.24            Passed - Patient is age 10 or older       Passed - Patient has documented A1c within the specified period of time.    If HgbA1C is 8 or greater, it needs to be on file within the past 3 months.  If less than 8, must be on file within the past 6 months.     Recent Labs   Lab Test  04/18/18   0853   A1C  6.9*            Passed - Patient's CR is NOT>1.4 OR Patient's EGFR is NOT<45 within past 12 mos.    Recent Labs   Lab Test  04/18/18   0853   GFRESTIMATED  64   GFRESTBLACK  78       Recent Labs   Lab Test  04/18/18   0853   CR  0.84            Passed - Patient does NOT have a diagnosis of CHF.       Passed - Patient is not pregnant       Passed - Patient has not had a positive pregnancy test within the past 12 mos.        Passed - Recent (6 mo) or future (30 days) visit within the authorizing provider's specialty    Patient had office visit in the last 6 months or has a visit in the next 30 days with authorizing provider or within the authorizing provider's specialty.  See \"Patient Info\" tab in inbasket, or \"Choose Columns\" in Meds & Orders section of the refill encounter.            Last Written Prescription Date:  5/19/17  Last Fill Quantity: 90,  # refills: prm   Last office visit: 4/19/2018 with prescribing provider:  " 4/19/18   Future Office Visit:   Next 5 appointments (look out 90 days)     Aug 02, 2018 11:00 AM CDT   [facility]    [address] [place] [id number] [phone]

## 2018-08-02 ENCOUNTER — ANTICOAGULATION THERAPY VISIT (OUTPATIENT)
Dept: ANTICOAGULATION | Facility: CLINIC | Age: 83
End: 2018-08-02
Payer: COMMERCIAL

## 2018-08-02 ENCOUNTER — OFFICE VISIT (OUTPATIENT)
Dept: INTERNAL MEDICINE | Facility: CLINIC | Age: 83
End: 2018-08-02
Payer: COMMERCIAL

## 2018-08-02 VITALS
SYSTOLIC BLOOD PRESSURE: 130 MMHG | WEIGHT: 161.6 LBS | BODY MASS INDEX: 26.89 KG/M2 | DIASTOLIC BLOOD PRESSURE: 66 MMHG | OXYGEN SATURATION: 98 % | TEMPERATURE: 98.6 F | HEART RATE: 110 BPM

## 2018-08-02 DIAGNOSIS — R32 URINARY INCONTINENCE, UNSPECIFIED TYPE: ICD-10-CM

## 2018-08-02 DIAGNOSIS — I35.0 NONRHEUMATIC AORTIC VALVE STENOSIS: ICD-10-CM

## 2018-08-02 DIAGNOSIS — Z79.01 LONG-TERM (CURRENT) USE OF ANTICOAGULANTS: ICD-10-CM

## 2018-08-02 DIAGNOSIS — I80.209 PHLEBITIS AND THROMBOPHLEBITIS OF DEEP VEINS OF LOWER EXTREMITIES (H): ICD-10-CM

## 2018-08-02 DIAGNOSIS — I10 ESSENTIAL HYPERTENSION: Primary | ICD-10-CM

## 2018-08-02 DIAGNOSIS — M25.50 MULTIPLE JOINT PAIN: ICD-10-CM

## 2018-08-02 DIAGNOSIS — R06.09 DOE (DYSPNEA ON EXERTION): ICD-10-CM

## 2018-08-02 LAB
ALBUMIN UR-MCNC: NEGATIVE MG/DL
APPEARANCE UR: CLEAR
BACTERIA #/AREA URNS HPF: ABNORMAL /HPF
BILIRUB UR QL STRIP: NEGATIVE
CAOX CRY #/AREA URNS HPF: ABNORMAL /HPF
COLOR UR AUTO: YELLOW
GLUCOSE UR STRIP-MCNC: NEGATIVE MG/DL
HGB BLD-MCNC: 13.1 G/DL (ref 11.7–15.7)
HGB UR QL STRIP: NEGATIVE
HYALINE CASTS #/AREA URNS LPF: ABNORMAL /LPF
INR POINT OF CARE: 2.5 (ref 0.86–1.14)
KETONES UR STRIP-MCNC: NEGATIVE MG/DL
LEUKOCYTE ESTERASE UR QL STRIP: ABNORMAL
MUCOUS THREADS #/AREA URNS LPF: PRESENT /LPF
NITRATE UR QL: NEGATIVE
NON-SQ EPI CELLS #/AREA URNS LPF: ABNORMAL /LPF
PH UR STRIP: 5 PH (ref 5–7)
RBC #/AREA URNS AUTO: ABNORMAL /HPF
SOURCE: ABNORMAL
SP GR UR STRIP: >1.03 (ref 1–1.03)
UROBILINOGEN UR STRIP-ACNC: 0.2 EU/DL (ref 0.2–1)
WBC #/AREA URNS AUTO: ABNORMAL /HPF

## 2018-08-02 PROCEDURE — 81001 URINALYSIS AUTO W/SCOPE: CPT | Performed by: INTERNAL MEDICINE

## 2018-08-02 PROCEDURE — 99214 OFFICE O/P EST MOD 30 MIN: CPT | Performed by: INTERNAL MEDICINE

## 2018-08-02 PROCEDURE — 99207 ZZC NO CHARGE NURSE ONLY: CPT

## 2018-08-02 PROCEDURE — 85610 PROTHROMBIN TIME: CPT | Mod: QW

## 2018-08-02 PROCEDURE — 87086 URINE CULTURE/COLONY COUNT: CPT | Performed by: INTERNAL MEDICINE

## 2018-08-02 PROCEDURE — 85018 HEMOGLOBIN: CPT | Performed by: INTERNAL MEDICINE

## 2018-08-02 PROCEDURE — 36416 COLLJ CAPILLARY BLOOD SPEC: CPT

## 2018-08-02 RX ORDER — HYDROCODONE BITARTRATE AND ACETAMINOPHEN 5; 325 MG/1; MG/1
.5-1 TABLET ORAL EVERY 6 HOURS PRN
Qty: 20 TABLET | Refills: 0 | Status: SHIPPED | OUTPATIENT
Start: 2018-08-02 | End: 2019-12-20

## 2018-08-02 NOTE — MR AVS SNAPSHOT
Madie Silva   8/2/2018 12:00 PM   Anticoagulation Therapy Visit    Description:  89 year old female   Provider:   ANTICOAGULATION CLINIC   Department:   Anti Coagulation           INR as of 8/2/2018     Today's INR 2.5      Anticoagulation Summary as of 8/2/2018     INR goal 2.0-3.0   Today's INR 2.5   Full warfarin instructions 4.5 mg on Tue, Thu, Sat; 3 mg all other days   Next INR check 9/13/2018    Indications   Long-term (current) use of anticoagulants [Z79.01] [Z79.01]  Phlebitis and thrombophlebitis of deep veins of lower extremities (H) [I80.209]  Acute myocardial infarction  initial episode of care (Resolved) [I21.9]         Your next Anticoagulation Clinic appointment(s)     Sep 13, 2018 11:00 AM CDT   Anticoagulation Visit with  ANTICOAGULATION CLINIC   [facility]    [address] [place] [id number] [phone]              Contact Numbers     [facility]  Please call  [phone] to cancel and/or reschedule your appointment   Please call  [phone] with any problems or questions regarding your therapy.        August 2018 Details    Sun Mon Tue Wed Thu Fri Sat        1               2      4.5 mg   See details      3      3 mg         4      4.5 mg           5      3 mg         6      3 mg         7      4.5 mg         8      3 mg         9      4.5 mg         10      3 mg         11      4.5 mg           12      3 mg         13      3 mg         14      4.5 mg         15      3 mg         16      4.5 mg         17      3 mg         18      4.5 mg           19      3 mg         20      3 mg         21      4.5 mg         22      3 mg         23      4.5 mg         24      3 mg         25      4.5 mg           26      3 mg         27      3 mg         28      4.5 mg         29      3 mg         30      4.5 mg         31      3 mg           Date Details   08/02 This INR check               How to take your  warfarin dose     To take:  3 mg Take 1 of the 3 mg tablets.    To take:  4.5 mg Take 1.5 of the 3 mg tablets.           September 2018 Details    Sun Mon Tue Wed Thu Fri Sat           1      4.5 mg           2      3 mg         3      3 mg         4      4.5 mg         5      3 mg         6      4.5 mg         7      3 mg         8      4.5 mg           9      3 mg         10      3 mg         11      4.5 mg         12      3 mg         13            14               15                 16               17               18               19               20               21               22                 23               24               25               26               27               28               29                 30                      Date Details   No additional details    Date of next INR:  9/13/2018         How to take your warfarin dose     To take:  3 mg Take 1 of the 3 mg tablets.    To take:  4.5 mg Take 1.5 of the 3 mg tablets.

## 2018-08-02 NOTE — PROGRESS NOTES
ANTICOAGULATION FOLLOW-UP CLINIC VISIT    Patient Name:  Madie Silva  Date:  8/2/2018  Contact Type:  Face to Face    SUBJECTIVE:     Patient Findings     Positives No Problem Findings           OBJECTIVE    INR Protime   Date Value Ref Range Status   08/02/2018 2.5 (A) 0.86 - 1.14 Final       ASSESSMENT / PLAN  No question data found.  Anticoagulation Summary as of 8/2/2018     INR goal 2.0-3.0   Today's INR 2.5   Warfarin maintenance plan 4.5 mg (3 mg x 1.5) on Tue, Thu, Sat; 3 mg (3 mg x 1) all other days   Full warfarin instructions 4.5 mg on Tue, Thu, Sat; 3 mg all other days   Weekly warfarin total 25.5 mg   No change documented Connie Hickman RN   Plan last modified Connie Hickman RN (7/10/2017)   Next INR check 9/13/2018   Target end date     Indications   Long-term (current) use of anticoagulants [Z79.01] [Z79.01]  Phlebitis and thrombophlebitis of deep veins of lower extremities (H) [I80.209]  Acute myocardial infarction  initial episode of care (Resolved) [I21.9]         Anticoagulation Episode Summary     INR check location     Preferred lab     Send INR reminders to [facility]    Comments       Anticoagulation Care Providers     Provider Role Specialty Phone number    De Obregon MD [facility] Internal Medicine [phone]            See the Encounter Report to view Anticoagulation Flowsheet and Dosing Calendar (Go to Encounters tab in chart review, and find the Anticoagulation Therapy Visit)        Connie Hickman RN

## 2018-08-02 NOTE — PATIENT INSTRUCTIONS
PLAN:                                                      1.  MEDICATIONS:        - Trial of switching from psyllium to Citrucel (generic OK)       - Try cutting down on the miralax to 1/2 to 2/3 capful daily       - Temporarily, stop the gabapentin -->  See if balance issues resolve/improve       - Continue other medications without change  2.  If gabapentin seems to be the problem, we will try different medication.    3.  Check urine to rule out infection.  If normal, consider trial of oxybutynin.   Would watch for dry mouth issues.    4.  Echocardiogram, then see cardiologist again.
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
## 2018-08-02 NOTE — MR AVS SNAPSHOT
After Visit Summary   8/2/2018    Madie Silva    MRN: [id number]           Patient Information     Date Of Birth          7/21/1929        Visit Information        Provider Department      8/2/2018 11:00 AM De Obregon MD [facility]        Today's Diagnoses     Multiple joint pain    -  1    Essential hypertension        Urinary incontinence, unspecified type        Aortic valve stenosis, severe        ZHAO (dyspnea on exertion)          Care Instructions    PLAN:                                                      1.  MEDICATIONS:        - Trial of switching from psyllium to Citrucel (generic OK)       - Try cutting down on the miralax to 1/2 to 2/3 capful daily       - Temporarily, stop the gabapentin -->  See if balance issues resolve/improve       - Continue other medications without change  2.  If gabapentin seems to be the problem, we will try different medication.    3.  Check urine to rule out infection.  If normal, consider trial of oxybutynin.   Would watch for dry mouth issues.    4.  Echocardiogram, then see cardiologist again.           Follow-ups after your visit        Your next 10 appointments already scheduled     Aug 02, 2018 12:00 PM CDT   Anticoagulation Visit with  ANTICOAGULATION CLINIC   [facility]    [address] [phone]            Aug 23, 2018  1:45 PM CDT   Pacemaker Check with RUBIN MANCUSO   [facility])    [address] [place] [id number] [phone] OPT 2              Future tests that were ordered for you today     Open Future Orders        Priority Expected Expires Ordered    Echocardiogram Routine  8/2/2019 8/2/2018    UA with Microscopic Routine 8/2/2018 10/2/2018 8/2/2018    Urine Culture Aerobic Bacterial Routine 8/2/2018 10/2/2018 8/2/2018            Who  to contact     If you have questions or need follow up information about today's clinic visit or your schedule please contact [facility] directly at [phone].  Normal or non-critical lab and imaging results will be communicated to you by MyChart, letter or phone within 4 business days after the clinic has received the results. If you do not hear from us within 7 days, please contact the clinic through MyChart or phone. If you have a critical or abnormal lab result, we will notify you by phone as soon as possible.  Submit refill requests through [employer] or call your pharmacy and they will forward the refill request to us. Please allow 3 business days for your refill to be completed.          Additional Information About Your Visit        Care EveryWhere ID     This is your Care EveryWhere ID. This could be used by other organizations to access your [place] medical records  [id number]        Your Vitals Were     Pulse Temperature Pulse Oximetry BMI (Body Mass Index)          110 98.6  F (37  C) (Oral) 98% 26.89 kg/m2         Blood Pressure from Last 3 Encounters:   08/02/18 130/66   06/14/18 138/58   05/10/18 130/68    Weight from Last 3 Encounters:   08/02/18 161 lb 9.6 oz (73.3 kg)   04/19/18 166 lb 8 oz (75.5 kg)   03/26/18 165 lb (74.8 kg)              We Performed the Following     Hemoglobin     PAF COMPLETED          Where to get your medicines      Some of these will need a paper prescription and others can be bought over the counter.  Ask your nurse if you have questions.     Bring a paper prescription for each of these medications     HYDROcodone-acetaminophen 5-325 MG per tablet         Information about OPIOIDS     PRESCRIPTION OPIOIDS: WHAT YOU NEED TO KNOW   We gave you an opioid (narcotic) pain medicine. It is important to manage your pain, but opioids are not always the best choice. You should first try all the other options your care team gave you. Take this medicine  for as short a time (and as few doses) as possible.     These medicines have risks:    DO NOT drive when on new or higher doses of pain medicine. These medicines can affect your alertness and reaction times, and you could be arrested for driving under the influence (DUI). If you need to use opioids long-term, talk to your care team about driving.    DO NOT operate heave machinery    DO NOT do any other dangerous activities while taking these medicines.     DO NOT drink any alcohol while taking these medicines.      If the opioid prescribed includes acetaminophen, DO NOT take with any other medicines that contain acetaminophen. Read all labels carefully. Look for the word  acetaminophen  or  Tylenol.  Ask your pharmacist if you have questions or are unsure.    You can get addicted to pain medicines, especially if you have a history of addiction (chemical, alcohol or substance dependence). Talk to your care team about ways to reduce this risk.    Store your pills in a secure place, locked if possible. We will not replace any lost or stolen medicine. If you don t finish your medicine, please throw away (dispose) as directed by your pharmacist. The [place] Pollution Control Agency has more information about safe disposal: https://www.pca.[facility].mn.us/living-green/managing-unwanted-medications.     All opioids tend to cause constipation. Drink plenty of water and eat foods that have a lot of fiber, such as fruits, vegetables, prune juice, apple juice and high-fiber cereal. Take a laxative (Miralax, milk of magnesia, Colace, Senna) if you don t move your bowels at least every other day.          Primary Care Provider Office Phone # Fax #    De Obregon [phone]       [address] [place] [phone]        Equal Access to Services     JAGDISH SWARTZ : casey Lopez, javier mcfarlane. So [facility]  [phone].    ATENCIÓN: Si suhas lin, tiene a rodas disposición servicios gratuitos de asistencia lingüística. Ervin leslie [phone].    We comply with applicable federal civil rights laws and [place] laws. We do not discriminate on the basis of race, color, national origin, age, disability, sex, sexual orientation, or gender identity.            Thank you!     Thank you for choosing [facility]  for your care. Our goal is always to provide you with excellent care. Hearing back from our patients is one way we can continue to improve our services. Please take a few minutes to complete the written survey that you may receive in the mail after your visit with us. Thank you!             Your Updated Medication List - Protect others around you: Learn how to safely use, store and throw away your medicines at www.disposemymeds.org.          This list is accurate as of 8/2/18 11:57 AM.  Always use your most recent med list.                   Brand Name Dispense Instructions for use Diagnosis    acetaminophen 650 MG CR tablet    TYLENOL ARTHRITIS PAIN     Take 2 tablets (1,300 mg) by mouth 2 times daily    Primary osteoarthritis involving multiple joints       aspirin 81 MG EC tablet      Take 1 tablet by mouth daily.    S/P coronary artery bypass graft x 3       atorvastatin 80 MG tablet    LIPITOR    90 tablet    TAKE ONE TABLET BY MOUTH EVERY DAY    Hyperlipidemia LDL goal <100       calcium 600 MG tablet      Take 1 tablet by mouth 2 times daily.        * PRESERVISION AREDS 2 PO           * CENTRUM SILVER per tablet      Take 1 tablet by mouth daily.        co-enzyme Q-10 100 MG Caps capsule      Take 1 capsule by mouth daily.        FIBER PO      Take by mouth daily        gabapentin 100 MG capsule    NEURONTIN    450 capsule    TAKE 1 CAPSULE BY MOUTH EVERY MORNING, ONE MIDDAY, AND THREE AT BEDTIME    Post herpetic neuralgia, Restless leg syndrome       HYDROcodone-acetaminophen 5-325 MG  per tablet    NORCO    20 tablet    Take 0.5-1 tablets by mouth every 6 hours as needed for moderate to severe pain    Multiple joint pain       JANTOVEN 3 MG tablet   Generic drug:  warfarin     100 tablet    TAKE ONE TABLET BY MOUTH ONCE DAILY AND TAKE AN ADDITIONAL HALF TABLET TUESDAY, THURSAY, AND SATURDAY    Paroxysmal a-fib (H)       levothyroxine 50 MCG tablet    SYNTHROID/LEVOTHROID    90 tablet    Take 1 tablet (50 mcg) by mouth daily    Acquired hypothyroidism       losartan 50 MG tablet    COZAAR     Take 0.5 tablets (25 mg) by mouth daily    Benign essential hypertension       metFORMIN 500 MG tablet    GLUCOPHAGE    90 tablet    TAKE ONE TABLET BY MOUTH DAILY WITH BREAKFAST    Type 2 diabetes mellitus with diabetic nephropathy, without long-term current use of insulin (H)       nitroGLYcerin 0.4 MG sublingual tablet    NITROSTAT     Place 0.4 mg under the tongue every 5 minutes as needed. Up to 3 doses per episode        omeprazole 20 MG CR capsule    priLOSEC    90 capsule    TAKE ONE CAPSULE (20 MG) BY MOUTH EVERY MORNING (BEFORE BREAKFAST)    Hiatal hernia, Walters's esophagus with dysplasia       polyethylene glycol powder    MIRALAX     Take 17 g by mouth daily    Constipation       VITAMIN D-400 PO      Take by mouth daily        * Notice:  This list has 2 medication(s) that are the same as other medications prescribed for you. Read the directions carefully, and ask your doctor or other care provider to review them with you.

## 2018-08-03 LAB
BACTERIA SPEC CULT: NORMAL
SPECIMEN SOURCE: NORMAL

## 2018-08-08 ENCOUNTER — TELEPHONE (OUTPATIENT)
Dept: INTERNAL MEDICINE | Facility: CLINIC | Age: 83
End: 2018-08-08

## 2018-08-08 DIAGNOSIS — R32 URINARY INCONTINENCE, UNSPECIFIED TYPE: Primary | ICD-10-CM

## 2018-08-09 ENCOUNTER — HOSPITAL ENCOUNTER (OUTPATIENT)
Dept: CARDIOLOGY | Facility: CLINIC | Age: 83
Discharge: HOME | End: 2018-08-09
Attending: INTERNAL MEDICINE | Admitting: INTERNAL MEDICINE
Payer: COMMERCIAL

## 2018-08-09 DIAGNOSIS — I35.0 NONRHEUMATIC AORTIC VALVE STENOSIS: ICD-10-CM

## 2018-08-09 PROCEDURE — 93306 TTE W/DOPPLER COMPLETE: CPT | Mod: 26 | Performed by: INTERNAL MEDICINE

## 2018-08-09 PROCEDURE — 93306 TTE W/DOPPLER COMPLETE: CPT

## 2018-08-09 PROCEDURE — 25500064 ZZH RX 255 OP 636: Performed by: INTERNAL MEDICINE

## 2018-08-09 RX ORDER — OXYBUTYNIN CHLORIDE 5 MG/1
5 TABLET, EXTENDED RELEASE ORAL DAILY
Qty: 30 TABLET | Refills: 5 | Status: SHIPPED | OUTPATIENT
Start: 2018-08-09 | End: 2019-06-13

## 2018-08-09 RX ADMIN — HUMAN ALBUMIN MICROSPHERES AND PERFLUTREN 3 ML: 10; .22 INJECTION, SOLUTION INTRAVENOUS at 15:27

## 2018-08-09 NOTE — TELEPHONE ENCOUNTER
Called and informed patient of message below. Patient would like to start the oxybutynin. Patient also informed me that she was unable to get in to see Dr. Red until October so she is seeing a nurse practitioner in the meantime.   Etelvina Smith CMA on 8/9/2018 at 8:14 AM

## 2018-08-09 NOTE — TELEPHONE ENCOUNTER
Please contact patient and advise urine culture was negative.  See if she wants to try some oxybutynin starting at 5 mg once daily.  Patient should recall that this can make her mouth drier than usual.  If the 5 mg dose does not work, and her side effects are not significant, we would increase to 10 mg daily.

## 2018-08-13 NOTE — PROGRESS NOTES
Please contact patient and advise patient that her aortic stenosis has worsened some.   Please keep appointment with cardiologist next week.

## 2018-08-22 ENCOUNTER — OFFICE VISIT (OUTPATIENT)
Dept: CARDIOLOGY | Facility: CLINIC | Age: 83
End: 2018-08-22
Attending: INTERNAL MEDICINE
Payer: COMMERCIAL

## 2018-08-22 VITALS
WEIGHT: 160.1 LBS | SYSTOLIC BLOOD PRESSURE: 140 MMHG | DIASTOLIC BLOOD PRESSURE: 62 MMHG | HEART RATE: 68 BPM | BODY MASS INDEX: 26.67 KG/M2 | HEIGHT: 65 IN

## 2018-08-22 DIAGNOSIS — Z95.0 CARDIAC PACEMAKER IN SITU: ICD-10-CM

## 2018-08-22 DIAGNOSIS — I48.0 PAROXYSMAL ATRIAL FIBRILLATION (H): ICD-10-CM

## 2018-08-22 DIAGNOSIS — I25.810 CORONARY ARTERY DISEASE INVOLVING CORONARY BYPASS GRAFT OF NATIVE HEART WITHOUT ANGINA PECTORIS: ICD-10-CM

## 2018-08-22 PROCEDURE — 99214 OFFICE O/P EST MOD 30 MIN: CPT | Performed by: PHYSICIAN ASSISTANT

## 2018-08-22 NOTE — PROGRESS NOTES
Cardiology Progress Note    Date of Service: 08/22/2018  Patient seen today in follow up of: aortic stenosis  Primary cardiologist: Dr. Red    HPI:  Madie Silva is a very pleasant 88-year-old female who has followed with Dr. Red over the years for her history of coronary artery disease status post four-vessel bypass to the LAD, ramus intermediate, obtuse marginal, and distal RCA in 2011 as well as paroxysmal atrial fibrillation with bradycardia and pacemaker placement, hyperlipidemia,  and obesity.     She has done fairly well over the years but over the last two years she has noted progressive dyspnea on exertion. She saw Dr. Red on 3/20/18.  She had an initial work up by her PCP including labs, an EKG, CXR, and echocardiogram. Her laboratory work up was unremarkable including her renal function and hemoglobin. NTproBNP was low. CXR showed a large hiatal hernia but was otherwise unremarkable. TTE showed preserved LVEF with mild LVH and aortic stenosis was noted with a mean pressure gradient of 22 mmHg.  The valve area was 1.0 cm . Her pulmonologist did not feel her hiatal hernia is the cause of her significant dyspnea. Dr. Red ordered a Lexiscan nuclear stress test to evaluate for possible ischemia given her CAD. This showed probably normal perfusion. Her lasix dose was also increased without any improvement in her symptoms.    She recently saw her PCP who ordered a follow up echocardiogram as her dyspnea on exertion has progressed and is here today with her son to follow up on the results of this. She is short of breath with minimal exertion including her daily activities. She can do all of these things, it just takes her longer than prior. She also feels winded with talking. She feels her symptoms have progressed since this spring. She is short of breath with less exertion and takes longer to recover. She has no associated chest discomfort, palpitations, peripheral edema, orthopnea, PND, or any other  new symptoms. No syncope or presyncope. Her weight is stable. Other than her profound dyspnea, she reports she feels quite well.     ASSESSMENT/PLAN:  1.  Progressive exertional dyspnea.  2.  Aortic stenosis, at least moderate.   3.  Paroxysmal atrial fibrillation with bradycardia. S/p PPM. She has a deviced check tomorrow. She is anticoagulated with warfarin.   4.  Coronary artery disease. S/p 4 vessel bypass in 2011. She is on aspirin and statin.     Madie is here today to follow up on her ongoing progressive exertional dyspnea for the last two years. Since she was last seen this spring, it has progressed. She has no other new or associated symptoms. Her work up including laboratory work up, CXR, PFTs, and a nuclear stress test have been unrevealing. She has known aortic stenosis which has been monitored with serial echocardiograms. Recent echocardiogram showed and LVEF of 65 - 70% with mild LVH. The LV is normal in size. No WMA were noted. The aortic stenosis was quantified as moderate with a mean gradient of 29 mmHg and an estimated aortic valve area of 1.0. The IVC was normal. Although her aortic stenosis is moderate, the mean gradient has increased by 10 mmHg since her last evaluation. She does not appear to be in acute heart failure at this point. Previously, sending her for a right and left heart catheterization for further evaluation was suggested if we could not sort out her dyspnea. I discussed her case with Dr. Red. At this point, it is unclear whether her aortic valve may be contributing to her underlying symptoms. Her measurements at this point do not suggest severe aortic stenosis. As a next step, a right and left heart cath would be reasonable. This would allow us to evaluate her coronary arteries, aortic valve, and hemodynamics. Depending on that, we could consider going down the route of valve intervention if indicated. I called and discussed with Madie. We reviewed the angiogram and what it  entails. We also reviewed reviewed valve replacement/TAVR work up. She understands. At this point, even with her ongoing symptoms she feels her quality of life is quite good. She prefers to hold off on invasive evaluation for now. If her symptoms continue to progress or she feels like they are affecting her quality of life, then she would consider it. I recommended she come back for a visit in three months for re-evaluation. If she changes her mind or has worsening symptoms, she will contact us sooner.    This note was completed in part using Dragon voice recognition software. Although reviewed after completion, some word and grammatical errors may occur.    Orders this Visit:  No orders of the defined types were placed in this encounter.    No orders of the defined types were placed in this encounter.    There are no discontinued medications.    CURRENT MEDICATIONS:  Current Outpatient Prescriptions   Medication Sig Dispense Refill     acetaminophen (TYLENOL ARTHRITIS PAIN) 650 MG CR tablet Take 2 tablets (1,300 mg) by mouth 2 times daily       aspirin EC 81 MG EC tablet Take 1 tablet by mouth daily.       atorvastatin (LIPITOR) 80 MG tablet TAKE ONE TABLET BY MOUTH EVERY DAY 90 tablet 2     calcium 600 MG tablet Take 1 tablet by mouth 2 times daily.       Cholecalciferol (VITAMIN D-400 PO) Take by mouth daily       co-enzyme Q-10 (COENZYME Q-10) 100 MG CAPS Take 1 capsule by mouth daily.       FIBER PO Take by mouth daily       HYDROcodone-acetaminophen (NORCO) 5-325 MG per tablet Take 0.5-1 tablets by mouth every 6 hours as needed for moderate to severe pain 20 tablet 0     JANTOVEN 3 MG tablet TAKE ONE TABLET BY MOUTH ONCE DAILY AND TAKE AN ADDITIONAL HALF TABLET TUESDAY, THURSAY, AND SATURDAY 100 tablet 3     levothyroxine (SYNTHROID/LEVOTHROID) 50 MCG tablet Take 1 tablet (50 mcg) by mouth daily 90 tablet 3     losartan (COZAAR) 50 MG tablet Take 0.5 tablets (25 mg) by mouth daily       metFORMIN (GLUCOPHAGE)  500 MG tablet TAKE ONE TABLET BY MOUTH DAILY WITH BREAKFAST 90 tablet 0     Multiple Vitamins-Minerals (CENTRUM SILVER) per tablet Take 1 tablet by mouth daily.       Multiple Vitamins-Minerals (PRESERVISION AREDS 2 PO)        nitroGLYCERIN (NITROSTAT) 0.4 MG SL tablet Place 0.4 mg under the tongue every 5 minutes as needed. Up to 3 doses per episode        omeprazole (PRILOSEC) 20 MG CR capsule TAKE ONE CAPSULE (20 MG) BY MOUTH EVERY MORNING (BEFORE BREAKFAST) 90 capsule 2     polyethylene glycol (MIRALAX) powder Take 17 g by mouth daily       gabapentin (NEURONTIN) 100 MG capsule TAKE 1 CAPSULE BY MOUTH EVERY MORNING, ONE MIDDAY, AND THREE AT BEDTIME (Patient not taking: Reported on 8/22/2018) 450 capsule 1     oxybutynin (DITROPAN-XL) 5 MG 24 hr tablet Take 1 tablet (5 mg) by mouth daily (Patient not taking: Reported on 8/22/2018) 30 tablet 5     ALLERGIES  Allergies   Allergen Reactions     Amoxicillin      hives     Bisphosphonates      Swallowing disorder     Boniva [Ibandronate Sodium] Nausea and Vomiting     Diarrhea, N/V with oral.  IV caused arm to swell      Fosamax [Alendronic Acid]      Severe gastrointestinal reaction     Lisinopril      cough     Niacin      rash     Simvastatin      myalgias     PAST MEDICAL HISTORY:  Past Medical History:   Diagnosis Date     Walters's esophagus 7/09     CHRONIC VERTIGO 9/99     Colonic Adenoma 3/90, 11/98     Costochondritis      Depression      DIABETES MELLITUS TYPE II 10/07     DJD      DVT of Leg, postop 11/09    6 months of warfarin     Gastritis 10/89     GERD      HIATAL HERNIA      HYPERLIPIDEMIA      HYPOTHYROIDISM (aka HASHIMOTO)      Impaired fasting glucose      L Ankle Fracture 10/09     Obesity, unspecified      Osteoporosis, unspecified      PERIPHERAL NEUROPATHY      Polymyalgia rheumatica (H)      Post Herpetic Neuralgia 4/03    R lumbar distribution     Restless leg syndrome      Small vessel cerebrovascular changes 9/99     Stricture and stenosis  "of esophagus 10/89    Dilated     Syncope     1/06, 8/08, 2/10     VITAMIN D DEFICIENCY 12/07    per Dr. Petty     PAST SURGICAL HISTORY:  Past Surgical History:   Procedure Laterality Date     BYPASS GRAFT ARTERY CORONARY  11/2/2011    CABG x 4  Dr. EDWIN ACOSTA NONSPECIFIC PROCEDURE  age 14    appendectomy     C NONSPECIFIC PROCEDURE  as a child    T&A     EMBOLECTOMY LOWER EXTREMITY  11/9/2011    EMBOLECTOMY LOWER EXTREMITY; left leg femoral embolectomy.; Surgeon:JOLEEN BOONE; Location:[facility] OR     HYSTERECTOMY, CERVIX STATUS UNKNOWN  1978    partial hysterectomy     SURGICAL HISTORY OF -   10/09    L ankle fracture repair    Dr. Jose Roberto Trevino     FAMILY HISTORY:  Family History   Problem Relation Age of Onset     Alzheimer Disease Sister      HEART DISEASE Mother      HEART DISEASE Father      HEART DISEASE Son      HEART DISEASE Brother      SOCIAL HISTORY:  Social History     Social History     Marital status:      Spouse name: N/A     Number of children: N/A     Years of education: N/A     Social History Main Topics     Smoking status: Never Smoker     Smokeless tobacco: Never Used     Alcohol use No     Drug use: No     Sexual activity: No     Other Topics Concern     Caffeine Concern No     Special Diet No     Exercise No     Parent/Sibling W/ Cabg, Mi Or Angioplasty Before 65f 55m? No     Social History Narrative     Review of Systems:  Skin:  Negative     Eyes:  Positive for glasses  ENT:  Negative    Respiratory:  Positive for dyspnea on exertion;dyspnea at rest  Cardiovascular:  Negative    Gastroenterology: Positive for heartburn  Genitourinary:  Positive for urinary frequency;nocturia  Musculoskeletal:  Positive for arthritis;joint pain  Neurologic:  Positive for numbness or tingling of hands  Psychiatric:  Negative    Heme/Lymph/Imm:  Negative    Endocrine:  Positive for thyroid disorder;diabetes     Physical Exam:  Vitals: /62  Pulse 68  Ht 1.651 m (5' 5\")  Wt 72.6 kg (160 " lb 1.6 oz)  BMI 26.64 kg/m2   Wt Readings from Last 4 Encounters:   08/22/18 72.6 kg (160 lb 1.6 oz)   08/02/18 73.3 kg (161 lb 9.6 oz)   04/19/18 75.5 kg (166 lb 8 oz)   03/26/18 74.8 kg (165 lb)     GEN: well nourished, in no acute distress.  HEENT:  Pupils equal, round. Sclerae nonicteric.   NECK: Supple, no masses appreciated. No JVD at 90 degrees.  C/V:  Regular rate and rhythm, III/VI systolic murmur heard throughout the precordium, best at the RUSB which radiates to the carotids.   RESP: Respirations are unlabored. Clear to auscultation bilaterally without wheezing, rales, or rhonchi.  GI: Abdomen soft, nontender.  EXTREM: No LE edema.  NEURO: Alert and oriented, cooperative.  SKIN: Warm and dry.     Recent Lab Results:  LIPID RESULTS:  Lab Results   Component Value Date    CHOL 221 (H) 04/18/2018    HDL 54 04/18/2018     (H) 04/18/2018    TRIG 309 (H) 04/18/2018    CHOLHDLRATIO 2.8 04/20/2015     LIVER ENZYME RESULTS:  Lab Results   Component Value Date    AST 22 02/12/2018    ALT 27 04/18/2018     CBC RESULTS:  Lab Results   Component Value Date    WBC 8.0 02/12/2018    RBC 5.27 (H) 02/12/2018    HGB 13.1 08/02/2018    HCT 41.5 02/12/2018    MCV 79 02/12/2018    MCH 25.0 (L) 02/12/2018    MCHC 31.8 02/12/2018    RDW 17.1 (H) 02/12/2018     02/12/2018     BMP RESULTS:  Lab Results   Component Value Date     04/18/2018    POTASSIUM 4.9 04/18/2018    CHLORIDE 107 04/18/2018    CO2 27 04/18/2018    ANIONGAP 6 04/18/2018     (H) 04/18/2018    BUN 21 04/18/2018    CR 0.84 04/18/2018    GFRESTIMATED 64 04/18/2018    GFRESTBLACK 78 04/18/2018    SHAWNEE 9.3 04/18/2018      A1C RESULTS:  Lab Results   Component Value Date    A1C 6.9 (H) 04/18/2018     INR RESULTS:  Lab Results   Component Value Date    INR 2.5 (A) 08/02/2018    INR 2.7 (A) 06/21/2018    INR 2.19 (H) 01/03/2013    INR 3.68 (H) 06/28/2012       New/Pertinent imaging results since last visit:  Echocardiogram  8/9/18:  Interpretation Summary  The left ventricle is normal in size.  The left ventricular ejection fraction is normal.  Grade II or moderate diastolic dysfunction.  There is mild concentric left ventricular hypertrophy.  The left atrium is moderately dilated.  Moderate valvular aortic stenosis. Mean gradient 29 mmHg, estimated aortic  valve area 1.0 cmÂ , dimensionless index 0.29 Compared to the last echocardiogram dated March 14, 2018, the mean gradient across the aortic valve has increased by 10 mmHg. Calculated aortic valve area is about the same.    Tatiana Titus PA-C  [facility]

## 2018-08-22 NOTE — LETTER
8/22/2018    De Obregon MD  [address] [facility] [address]    RE: Madie R Ricardo       Dear Colleague,    I had the pleasure of seeing Madie Silva in the [facility].    Cardiology Progress Note    Date of Service: 08/22/2018  Patient seen today in follow up of: aortic stenosis  Primary cardiologist: Dr. Red    HPI:  Madie Silva is a very pleasant 88-year-old female who has followed with Dr. Red over the years for her history of coronary artery disease status post four-vessel bypass to the LAD, ramus intermediate, obtuse marginal, and distal RCA in 2011 as well as paroxysmal atrial fibrillation with bradycardia and pacemaker placement, hyperlipidemia,  and obesity.     She has done fairly well over the years but over the last two years she has noted progressive dyspnea on exertion. She saw Dr. Red on 3/20/18.  She had an initial work up by her PCP including labs, an EKG, CXR, and echocardiogram. Her laboratory work up was unremarkable including her renal function and hemoglobin. NTproBNP was low. CXR showed a large hiatal hernia but was otherwise unremarkable. TTE showed preserved LVEF with mild LVH and aortic stenosis was noted with a mean pressure gradient of 22 mmHg.  The valve area was 1.0 cm .  Her pulmonologist did not feel her hiatal hernia is the cause of her significant dyspnea. Dr. Red ordered a Lexiscan nuclear stress test to evaluate for possible ischemia given her CAD. This showed probably normal perfusion. Her lasix dose was also increased without any improvement in her symptoms.    She recently saw her PCP who ordered a follow up echocardiogram as her dyspnea on exertion has progressed and is here today with her son to follow up on the results of this. She is short of breath with minimal exertion including her daily activities. She can do all of these things, it just takes her longer than prior. She also feels winded with talking. She feels her  symptoms have progressed since this spring. She is short of breath with less exertion and takes longer to recover. She has no associated chest discomfort, palpitations, peripheral edema, orthopnea, PND, or any other new symptoms. No syncope or presyncope. Her weight is stable. Other than her profound dyspnea, she reports she feels quite well.     ASSESSMENT/PLAN:  1.  Progressive exertional dyspnea.  2.  Aortic stenosis, at least moderate.   3.  Paroxysmal atrial fibrillation with bradycardia. S/p PPM. She has a deviced check tomorrow. She is anticoagulated with warfarin.   4.  Coronary artery disease. S/p 4 vessel bypass in 2011. She is on aspirin and statin.     Madie is here today to follow up on her ongoing progressive exertional dyspnea for the last two years. Since she was last seen this spring, it has progressed. She has no other new or associated symptoms. Her work up including laboratory work up, CXR, PFTs, and a nuclear stress test have been unrevealing. She has known aortic stenosis which has been monitored with serial echocardiograms. Recent echocardiogram showed and LVEF of 65 - 70% with mild LVH. The LV is normal in size. No WMA were noted. The aortic stenosis was quantified as moderate with a mean gradient of 29 mmHg and an estimated aortic valve area of 1.0. The IVC was normal. Although her aortic stenosis is moderate, the mean gradient has increased by 10 mmHg since her last evaluation. She does not appear to be in acute heart failure at this point. Previously, sending her for a right and left heart catheterization for further evaluation was suggested if we could not sort out her dyspnea. I discussed her case with Dr. Red. At this point, it is unclear whether her aortic valve may be contributing to her underlying symptoms. Her measurements at this point do not suggest severe aortic stenosis. As a next step, a right and left heart cath would be reasonable. This would allow us to evaluate her  coronary arteries, aortic valve, and hemodynamics. Depending on that, we could consider going down the route of valve intervention if indicated. I called and discussed with Madie. We reviewed the angiogram and what it entails. We also reviewed reviewed valve replacement/TAVR work up. She understands. At this point, even with her ongoing symptoms she feels her quality of life is quite good. She prefers to hold off on invasive evaluation for now. If her symptoms continue to progress or she feels like they are affecting her quality of life, then she would consider it. I recommended she come back for a visit in three months for re-evaluation. If she changes her mind or has worsening symptoms, she will contact us sooner.    This note was completed in part using Dragon voice recognition software. Although reviewed after completion, some word and grammatical errors may occur.    Orders this Visit:  No orders of the defined types were placed in this encounter.    No orders of the defined types were placed in this encounter.    There are no discontinued medications.    CURRENT MEDICATIONS:  Current Outpatient Prescriptions   Medication Sig Dispense Refill     acetaminophen (TYLENOL ARTHRITIS PAIN) 650 MG CR tablet Take 2 tablets (1,300 mg) by mouth 2 times daily       aspirin EC 81 MG EC tablet Take 1 tablet by mouth daily.       atorvastatin (LIPITOR) 80 MG tablet TAKE ONE TABLET BY MOUTH EVERY DAY 90 tablet 2     calcium 600 MG tablet Take 1 tablet by mouth 2 times daily.       Cholecalciferol (VITAMIN D-400 PO) Take by mouth daily       co-enzyme Q-10 (COENZYME Q-10) 100 MG CAPS Take 1 capsule by mouth daily.       FIBER PO Take by mouth daily       HYDROcodone-acetaminophen (NORCO) 5-325 MG per tablet Take 0.5-1 tablets by mouth every 6 hours as needed for moderate to severe pain 20 tablet 0     JANTOVEN 3 MG tablet TAKE ONE TABLET BY MOUTH ONCE DAILY AND TAKE AN ADDITIONAL HALF TABLET TUESDAY, THURSAY, AND SATURDAY 100  tablet 3     levothyroxine (SYNTHROID/LEVOTHROID) 50 MCG tablet Take 1 tablet (50 mcg) by mouth daily 90 tablet 3     losartan (COZAAR) 50 MG tablet Take 0.5 tablets (25 mg) by mouth daily       metFORMIN (GLUCOPHAGE) 500 MG tablet TAKE ONE TABLET BY MOUTH DAILY WITH BREAKFAST 90 tablet 0     Multiple Vitamins-Minerals (CENTRUM SILVER) per tablet Take 1 tablet by mouth daily.       Multiple Vitamins-Minerals (PRESERVISION AREDS 2 PO)        nitroGLYCERIN (NITROSTAT) 0.4 MG SL tablet Place 0.4 mg under the tongue every 5 minutes as needed. Up to 3 doses per episode        omeprazole (PRILOSEC) 20 MG CR capsule TAKE ONE CAPSULE (20 MG) BY MOUTH EVERY MORNING (BEFORE BREAKFAST) 90 capsule 2     polyethylene glycol (MIRALAX) powder Take 17 g by mouth daily       gabapentin (NEURONTIN) 100 MG capsule TAKE 1 CAPSULE BY MOUTH EVERY MORNING, ONE MIDDAY, AND THREE AT BEDTIME (Patient not taking: Reported on 8/22/2018) 450 capsule 1     oxybutynin (DITROPAN-XL) 5 MG 24 hr tablet Take 1 tablet (5 mg) by mouth daily (Patient not taking: Reported on 8/22/2018) 30 tablet 5     ALLERGIES  Allergies   Allergen Reactions     Amoxicillin      hives     Bisphosphonates      Swallowing disorder     Boniva [Ibandronate Sodium] Nausea and Vomiting     Diarrhea, N/V with oral.  IV caused arm to swell      Fosamax [Alendronic Acid]      Severe gastrointestinal reaction     Lisinopril      cough     Niacin      rash     Simvastatin      myalgias     PAST MEDICAL HISTORY:  Past Medical History:   Diagnosis Date     Walters's esophagus 7/09     CHRONIC VERTIGO 9/99     Colonic Adenoma 3/90, 11/98     Costochondritis      Depression      DIABETES MELLITUS TYPE II 10/07     DJD      DVT of Leg, postop 11/09    6 months of warfarin     Gastritis 10/89     GERD      HIATAL HERNIA      HYPERLIPIDEMIA      HYPOTHYROIDISM (aka HASHIMOTO)      Impaired fasting glucose      L Ankle Fracture 10/09     Obesity, unspecified      Osteoporosis, unspecified       PERIPHERAL NEUROPATHY      Polymyalgia rheumatica (H)      Post Herpetic Neuralgia 4/03    R lumbar distribution     Restless leg syndrome      Small vessel cerebrovascular changes 9/99     Stricture and stenosis of esophagus 10/89    Dilated     Syncope     1/06, 8/08, 2/10     VITAMIN D DEFICIENCY 12/07    per Dr. Petty     PAST SURGICAL HISTORY:  Past Surgical History:   Procedure Laterality Date     BYPASS GRAFT ARTERY CORONARY  11/2/2011    CABG x 4  Dr. EDWIN ACOSTA NONSPECIFIC PROCEDURE  age 14    appendectomy     C NONSPECIFIC PROCEDURE  as a child    T&A     EMBOLECTOMY LOWER EXTREMITY  11/9/2011    EMBOLECTOMY LOWER EXTREMITY; left leg femoral embolectomy.; Surgeon:JOLEEN BOONE; Location:[facility] OR     HYSTERECTOMY, CERVIX STATUS UNKNOWN  1978    partial hysterectomy     SURGICAL HISTORY OF -   10/09    L ankle fracture repair    Dr. Jose Roberto Trevino     FAMILY HISTORY:  Family History   Problem Relation Age of Onset     Alzheimer Disease Sister      HEART DISEASE Mother      HEART DISEASE Father      HEART DISEASE Son      HEART DISEASE Brother      SOCIAL HISTORY:  Social History     Social History     Marital status:      Spouse name: N/A     Number of children: N/A     Years of education: N/A     Social History Main Topics     Smoking status: Never Smoker     Smokeless tobacco: Never Used     Alcohol use No     Drug use: No     Sexual activity: No     Other Topics Concern     Caffeine Concern No     Special Diet No     Exercise No     Parent/Sibling W/ Cabg, Mi Or Angioplasty Before 65f 55m? No     Social History Narrative     Review of Systems:  Skin:  Negative     Eyes:  Positive for glasses  ENT:  Negative    Respiratory:  Positive for dyspnea on exertion;dyspnea at rest  Cardiovascular:  Negative    Gastroenterology: Positive for heartburn  Genitourinary:  Positive for urinary frequency;nocturia  Musculoskeletal:  Positive for arthritis;joint pain  Neurologic:  Positive for  "numbness or tingling of hands  Psychiatric:  Negative    Heme/Lymph/Imm:  Negative    Endocrine:  Positive for thyroid disorder;diabetes     Physical Exam:  Vitals: /62  Pulse 68  Ht 1.651 m (5' 5\")  Wt 72.6 kg (160 lb 1.6 oz)  BMI 26.64 kg/m2   Wt Readings from Last 4 Encounters:   08/22/18 72.6 kg (160 lb 1.6 oz)   08/02/18 73.3 kg (161 lb 9.6 oz)   04/19/18 75.5 kg (166 lb 8 oz)   03/26/18 74.8 kg (165 lb)     GEN: well nourished, in no acute distress.  HEENT:  Pupils equal, round. Sclerae nonicteric.   NECK: Supple, no masses appreciated. No JVD at 90 degrees.  C/V:  Regular rate and rhythm, III/VI systolic murmur heard throughout the precordium, best at the RUSB which radiates to the carotids.   RESP: Respirations are unlabored. Clear to auscultation bilaterally without wheezing, rales, or rhonchi.  GI: Abdomen soft, nontender.  EXTREM: No LE edema.  NEURO: Alert and oriented, cooperative.  SKIN: Warm and dry.     Recent Lab Results:  LIPID RESULTS:  Lab Results   Component Value Date    CHOL 221 (H) 04/18/2018    HDL 54 04/18/2018     (H) 04/18/2018    TRIG 309 (H) 04/18/2018    CHOLHDLRATIO 2.8 04/20/2015     LIVER ENZYME RESULTS:  Lab Results   Component Value Date    AST 22 02/12/2018    ALT 27 04/18/2018     CBC RESULTS:  Lab Results   Component Value Date    WBC 8.0 02/12/2018    RBC 5.27 (H) 02/12/2018    HGB 13.1 08/02/2018    HCT 41.5 02/12/2018    MCV 79 02/12/2018    MCH 25.0 (L) 02/12/2018    MCHC 31.8 02/12/2018    RDW 17.1 (H) 02/12/2018     02/12/2018     BMP RESULTS:  Lab Results   Component Value Date     04/18/2018    POTASSIUM 4.9 04/18/2018    CHLORIDE 107 04/18/2018    CO2 27 04/18/2018    ANIONGAP 6 04/18/2018     (H) 04/18/2018    BUN 21 04/18/2018    CR 0.84 04/18/2018    GFRESTIMATED 64 04/18/2018    GFRESTBLACK 78 04/18/2018    SHAWNEE 9.3 04/18/2018      A1C RESULTS:  Lab Results   Component Value Date    A1C 6.9 (H) 04/18/2018     INR RESULTS:  Lab " Results   Component Value Date    INR 2.5 (A) 08/02/2018    INR 2.7 (A) 06/21/2018    INR 2.19 (H) 01/03/2013    INR 3.68 (H) 06/28/2012       New/Pertinent imaging results since last visit:  Echocardiogram 8/9/18:  Interpretation Summary  The left ventricle is normal in size.  The left ventricular ejection fraction is normal.  Grade II or moderate diastolic dysfunction.  There is mild concentric left ventricular hypertrophy.  The left atrium is moderately dilated.  Moderate valvular aortic stenosis. Mean gradient 29 mmHg, estimated aortic  valve area 1.0 cmÂ , dimensionless index 0.29 Compared to the last echocardiogram dated March 14, 2018, the mean gradient across the aortic valve has increased by 10 mmHg. Calculated aortic valve area is about the same.    Tatiana Titus PA-C  [facility]        Thank you for allowing me to participate in the care of your patient.    Sincerely,     Tatiana Titus PA-C     [facility]

## 2018-08-22 NOTE — PATIENT INSTRUCTIONS
Thank you for your [facility] visit today. Your provider has recommended the following:  Medication Changes:  No medication changes today.  Reminder:  Please bring in all current medications, over the counter supplements and vitamin bottles to your next appointment.            [facility]

## 2018-08-22 NOTE — MR AVS SNAPSHOT
After Visit Summary   8/22/2018    Madie Silva    MRN: [id number]           Patient Information     Date Of Birth          7/21/1929        Visit Information        Provider Department      8/22/2018 2:30 PM Tatiana Titus PA-C [facility]        Today's Diagnoses     Cardiac pacemaker in situ        Paroxysmal atrial fibrillation (H)        Coronary artery disease involving coronary bypass graft of native heart without angina pectoris          Care Instructions    Thank you for your [facility] visit today. Your provider has recommended the following:  Medication Changes:  No medication changes today.  Reminder:  Please bring in all current medications, over the counter supplements and vitamin bottles to your next appointment.            [facility]            Follow-ups after your visit        Your next 10 appointments already scheduled     Aug 23, 2018  1:45 PM CDT   Pacemaker Check with RUBIN MANCUSO   [facility] ([facility])    [address] [place] [address]   [phone] OPT 2            Sep 13, 2018 11:00 AM CDT   Anticoagulation Visit with  ANTICOAGULATION CLINIC   [facility]    [address] [address] [phone]              Who to contact     If you have questions or need follow up information about today's clinic visit or your schedule please contact [facility] directly at [phone].  Normal or non-critical lab and imaging results will be communicated to you by MyChart, letter or phone within 4 business days after the clinic has received the results. If you do not hear from us within 7 days, please contact the clinic through MyChart or phone. If you have a critical or abnormal lab result, we will notify you by phone  "as soon as possible.  Submit refill requests through [employer] or call your pharmacy and they will forward the refill request to us. Please allow 3 business days for your refill to be completed.          Additional Information About Your Visit        Care EveryWhere ID     This is your Care EveryWhere ID. This could be used by other organizations to access your [facility] medical records  [id number]        Your Vitals Were     Pulse Height BMI (Body Mass Index)             68 1.651 m (5' 5\") 26.64 kg/m2          Blood Pressure from Last 3 Encounters:   08/22/18 140/62   08/02/18 130/66   06/14/18 138/58    Weight from Last 3 Encounters:   08/22/18 72.6 kg (160 lb 1.6 oz)   08/02/18 73.3 kg (161 lb 9.6 oz)   04/19/18 75.5 kg (166 lb 8 oz)              We Performed the Following     Follow-Up with Cardiologist        Primary Care Provider Office Phone # Fax #    De Obregon [phone]       [address] [place] [id number]        Equal Access to Services     [facility] : Hadii aad ku hadasho Sojuan, waaxda luqadaha, qaybta kaalmada monica, javier collado . So [facility] [phone].    ATENCIÓN: Si habla español, tiene a rodas disposición servicios gratuitos de asistencia lingüística. [facility] [phone].    We comply with applicable federal civil rights laws and [place] laws. We do not discriminate on the basis of race, color, national origin, age, disability, sex, sexual orientation, or gender identity.            Thank you!     Thank you for choosing [facility]ART [facility]  for your care. Our goal is always to provide you with excellent care. Hearing back from our patients is one way we can continue to improve our services. Please take a few minutes to complete the written survey that you may receive in the mail after your visit with us. Thank you!             Your Updated Medication List - Protect others around you: Learn how to " safely use, store and throw away your medicines at www.disposemymeds.org.          This list is accurate as of 8/22/18  3:01 PM.  Always use your most recent med list.                   Brand Name Dispense Instructions for use Diagnosis    acetaminophen 650 MG CR tablet    TYLENOL ARTHRITIS PAIN     Take 2 tablets (1,300 mg) by mouth 2 times daily    Primary osteoarthritis involving multiple joints       aspirin 81 MG EC tablet      Take 1 tablet by mouth daily.    S/P coronary artery bypass graft x 3       atorvastatin 80 MG tablet    LIPITOR    90 tablet    TAKE ONE TABLET BY MOUTH EVERY DAY    Hyperlipidemia LDL goal <100       calcium 600 MG tablet      Take 1 tablet by mouth 2 times daily.        * PRESERVISION AREDS 2 PO           * CENTRUM SILVER per tablet      Take 1 tablet by mouth daily.        co-enzyme Q-10 100 MG Caps capsule      Take 1 capsule by mouth daily.        FIBER PO      Take by mouth daily        gabapentin 100 MG capsule    NEURONTIN    450 capsule    TAKE 1 CAPSULE BY MOUTH EVERY MORNING, ONE MIDDAY, AND THREE AT BEDTIME    Post herpetic neuralgia, Restless leg syndrome       HYDROcodone-acetaminophen 5-325 MG per tablet    NORCO    20 tablet    Take 0.5-1 tablets by mouth every 6 hours as needed for moderate to severe pain    Multiple joint pain       JANTOVEN 3 MG tablet   Generic drug:  warfarin     100 tablet    TAKE ONE TABLET BY MOUTH ONCE DAILY AND TAKE AN ADDITIONAL HALF TABLET TUESDAY, THURSAY, AND SATURDAY    Paroxysmal a-fib (H)       levothyroxine 50 MCG tablet    SYNTHROID/LEVOTHROID    90 tablet    Take 1 tablet (50 mcg) by mouth daily    Acquired hypothyroidism       losartan 50 MG tablet    COZAAR     Take 0.5 tablets (25 mg) by mouth daily    Benign essential hypertension       metFORMIN 500 MG tablet    GLUCOPHAGE    90 tablet    TAKE ONE TABLET BY MOUTH DAILY WITH BREAKFAST    Type 2 diabetes mellitus with diabetic nephropathy, without long-term current use of insulin  (H)       nitroGLYcerin 0.4 MG sublingual tablet    NITROSTAT     Place 0.4 mg under the tongue every 5 minutes as needed. Up to 3 doses per episode        omeprazole 20 MG CR capsule    priLOSEC    90 capsule    TAKE ONE CAPSULE (20 MG) BY MOUTH EVERY MORNING (BEFORE BREAKFAST)    Hiatal hernia, Walters's esophagus with dysplasia       oxybutynin 5 MG 24 hr tablet    DITROPAN-XL    30 tablet    Take 1 tablet (5 mg) by mouth daily    Urinary incontinence, unspecified type       polyethylene glycol powder    MIRALAX     Take 17 g by mouth daily    Constipation       VITAMIN D-400 PO      Take by mouth daily        * Notice:  This list has 2 medication(s) that are the same as other medications prescribed for you. Read the directions carefully, and ask your doctor or other care provider to review them with you.

## 2018-08-23 ENCOUNTER — ALLIED HEALTH/NURSE VISIT (OUTPATIENT)
Dept: CARDIOLOGY | Facility: CLINIC | Age: 83
End: 2018-08-23
Payer: COMMERCIAL

## 2018-08-23 DIAGNOSIS — Z95.0 CARDIAC PACEMAKER IN SITU: Primary | ICD-10-CM

## 2018-08-23 PROCEDURE — 93280 PM DEVICE PROGR EVAL DUAL: CPT | Performed by: INTERNAL MEDICINE

## 2018-08-23 NOTE — PROGRESS NOTES
"Medtronic Sensia DR Pacemaker Device Check  AP: 90 % : 1 %  Mode: DDDR        Underlying Rhythm: SB, 50's  Heart Rate: Histogram is stable and adequate; pt has \"breathing issues\" and is not active  Sensing: Stable    Pacing Threshold: stable   Impedance: WNL  Battery Status: 3.5-6.5 years estimated longevity  Device Site: No issues  Atrial Arrhythmia: NONE  Ventricular Arrhythmia: 2 VHR episodes. Tracings suggest NSVT with PVC onset. Rates 180-200 bpm lasting 3 and 4 seconds  Setting Change: NONE    Care Plan: Remote in 3 months sml               "

## 2018-08-23 NOTE — MR AVS SNAPSHOT
After Visit Summary   8/23/2018    Madie Silva    MRN: [id number]           Patient Information     Date Of Birth          7/21/1929        Visit Information        Provider Department      8/23/2018 1:45 PM CONKLIN DCRN [facility]        Today's Diagnoses     Cardiac pacemaker in situ    -  1       Follow-ups after your visit        Your next 10 appointments already scheduled     Sep 13, 2018 11:00 AM CDT   Anticoagulation Visit with  ANTICOAGULATION CLINIC   [facility] ([facility])    [address] [address] [phone]            Dec 03, 2018 10:45 AM CST   Remote PPM Check with CONKLIN TECH1   [facility] ([facility])    [address]  [facility] [address] [phone] OPT 2           This appointment is for a remote check of your pacemaker.  This is not an appointment at the office.              Who to contact     If you have questions or need follow up information about today's clinic visit or your schedule please contact [facility] directly at [phone].  Normal or non-critical lab and imaging results will be communicated to you by MyChart, letter or phone within 4 business days after the clinic has received the results. If you do not hear from us within 7 days, please contact the clinic through MyChart or phone. If you have a critical or abnormal lab result, we will notify you by phone as soon as possible.  Submit refill requests through [employer] or call your pharmacy and they will forward the refill request to us. Please allow 3 business days for your refill to be completed.          Additional Information About Your Visit        Care EveryWhere ID     This is your Care EveryWhere ID. This could be used by other organizations to access your [facility] medical records  [id number]          Blood Pressure from Last 3 Encounters:   08/22/18 140/62   08/02/18 130/66   06/14/18 138/58    Weight from Last 3 Encounters:   08/22/18 72.6 kg (160 lb 1.6 oz)   08/02/18 73.3 kg (161 lb 9.6 oz)   04/19/18 75.5 kg (166 lb 8 oz)              We Performed the Following     PM DEVICE PROGRAMMING EVAL, DUAL LEAD PACER ([id number])        Primary Care Provider Office Phone # Fax #    De Obregon [phone]       [address] [facility][1] [address]        Equal Access to Services     JAGDISH SWARTZ : Hadii og Buck, waaxda ralph, qaybta kaalmada monica, javier burdick. So [facility] [phone].    ATENCIÓN: Si habla español, tiene a rodas disposición servicios gratuitos de asistencia lingüística. Llame al [phone].    We comply with applicable federal civil rights laws and [place] laws. We do not discriminate on the basis of race, color, national origin, age, disability, sex, sexual orientation, or gender identity.            Thank you!     Thank you for choosing [facility]  for your care. Our goal is always to provide you with excellent care. Hearing back from our patients is one way we can continue to improve our services. Please take a few minutes to complete the written survey that you may receive in the mail after your visit with us. Thank you!             Your Updated Medication List - Protect others around you: Learn how to safely use, store and throw away your medicines at www.disposemymeds.org.          This list is accurate as of 8/23/18  2:09 PM.  Always use your most recent med list.                   Brand Name Dispense Instructions for use Diagnosis    acetaminophen 650 MG CR tablet    TYLENOL ARTHRITIS PAIN     Take 2 tablets (1,300 mg) by mouth 2 times daily    Primary osteoarthritis involving multiple joints       aspirin 81 MG EC tablet      Take 1 tablet by mouth daily.    S/P coronary  artery bypass graft x 3       atorvastatin 80 MG tablet    LIPITOR    90 tablet    TAKE ONE TABLET BY MOUTH EVERY DAY    Hyperlipidemia LDL goal <100       calcium 600 MG tablet      Take 1 tablet by mouth 2 times daily.        * PRESERVISION AREDS 2 PO           * CENTRUM SILVER per tablet      Take 1 tablet by mouth daily.        co-enzyme Q-10 100 MG Caps capsule      Take 1 capsule by mouth daily.        FIBER PO      Take by mouth daily        gabapentin 100 MG capsule    NEURONTIN    450 capsule    TAKE 1 CAPSULE BY MOUTH EVERY MORNING, ONE MIDDAY, AND THREE AT BEDTIME    Post herpetic neuralgia, Restless leg syndrome       HYDROcodone-acetaminophen 5-325 MG per tablet    NORCO    20 tablet    Take 0.5-1 tablets by mouth every 6 hours as needed for moderate to severe pain    Multiple joint pain       JANTOVEN 3 MG tablet   Generic drug:  warfarin     100 tablet    TAKE ONE TABLET BY MOUTH ONCE DAILY AND TAKE AN ADDITIONAL HALF TABLET TUESDAY, THURSAY, AND SATURDAY    Paroxysmal a-fib (H)       levothyroxine 50 MCG tablet    SYNTHROID/LEVOTHROID    90 tablet    Take 1 tablet (50 mcg) by mouth daily    Acquired hypothyroidism       losartan 50 MG tablet    COZAAR     Take 0.5 tablets (25 mg) by mouth daily    Benign essential hypertension       metFORMIN 500 MG tablet    GLUCOPHAGE    90 tablet    TAKE ONE TABLET BY MOUTH DAILY WITH BREAKFAST    Type 2 diabetes mellitus with diabetic nephropathy, without long-term current use of insulin (H)       nitroGLYcerin 0.4 MG sublingual tablet    NITROSTAT     Place 0.4 mg under the tongue every 5 minutes as needed. Up to 3 doses per episode        omeprazole 20 MG CR capsule    priLOSEC    90 capsule    TAKE ONE CAPSULE (20 MG) BY MOUTH EVERY MORNING (BEFORE BREAKFAST)    Hiatal hernia, Walters's esophagus with dysplasia       oxybutynin 5 MG 24 hr tablet    DITROPAN-XL    30 tablet    Take 1 tablet (5 mg) by mouth daily    Urinary incontinence, unspecified type        polyethylene glycol powder    MIRALAX     Take 17 g by mouth daily    Constipation       VITAMIN D-400 PO      Take by mouth daily        * Notice:  This list has 2 medication(s) that are the same as other medications prescribed for you. Read the directions carefully, and ask your doctor or other care provider to review them with you.

## 2018-08-24 ENCOUNTER — TELEPHONE (OUTPATIENT)
Dept: INTERNAL MEDICINE | Facility: CLINIC | Age: 83
End: 2018-08-24

## 2018-08-24 NOTE — TELEPHONE ENCOUNTER
Reason for Call:  Form, our goal is to have forms completed with 72 hours, however, some forms may require a visit or additional information.    Type of letter, form or note:  disability    Who is the form from?: application for disability parking certificate    Where did the form come from: neither call from pt    What clinic location was the form placed at?: [facility]    Where the form was placed: 's Box    What number is listed as a contact on the form?: call pt when ready for pt to  and sign pts' tele # [phone]       Additional comments:     Call taken on 8/24/2018 at 9:43 AM by Gilma Quintana

## 2018-09-11 DIAGNOSIS — E03.9 ACQUIRED HYPOTHYROIDISM: ICD-10-CM

## 2018-09-11 RX ORDER — LEVOTHYROXINE SODIUM 50 UG/1
TABLET ORAL
Qty: 90 TABLET | Refills: 1 | Status: SHIPPED | OUTPATIENT
Start: 2018-09-11 | End: 2018-12-13

## 2018-09-11 NOTE — TELEPHONE ENCOUNTER
"Requested Prescriptions   Pending Prescriptions Disp Refills     levothyroxine (SYNTHROID/LEVOTHROID) 50 MCG tablet [Pharmacy Med Name: LEVOTHYROXINE SODIUM 50MCG TABS] 90 tablet 3    Last Written Prescription Date:  6/27/17  Last Fill Quantity: 90,  # refills: 3   Last office visit: 8/2/2018 with prescribing provider:  8/2/18   Future Office Visit:     Sig: TAKE ONE TABLET BY MOUTH EVERY DAY    Thyroid Protocol Passed    9/11/2018 10:16 AM       Passed - Patient is 12 years or older       Passed - Recent (12 mo) or future (30 days) visit within the authorizing provider's specialty    Patient had office visit in the last 12 months or has a visit in the next 30 days with authorizing provider or within the authorizing provider's specialty.  See \"Patient Info\" tab in inbasket, or \"Choose Columns\" in Meds & Orders section of the refill encounter.           Passed - Normal TSH on file in past 12 months    Recent Labs   Lab Test  04/18/18   0853   TSH  1.88             Passed - No active pregnancy on record    If patient is pregnant or has had a positive pregnancy test, please check TSH.         Passed - No positive pregnancy test in past 12 months    If patient is pregnant or has had a positive pregnancy test, please check TSH.        \    "

## 2018-09-12 ENCOUNTER — OFFICE VISIT (OUTPATIENT)
Dept: INTERNAL MEDICINE | Facility: CLINIC | Age: 83
End: 2018-09-12
Payer: COMMERCIAL

## 2018-09-12 VITALS
BODY MASS INDEX: 26.36 KG/M2 | RESPIRATION RATE: 14 BRPM | TEMPERATURE: 97.9 F | SYSTOLIC BLOOD PRESSURE: 126 MMHG | HEART RATE: 82 BPM | DIASTOLIC BLOOD PRESSURE: 54 MMHG | OXYGEN SATURATION: 96 % | WEIGHT: 158.4 LBS

## 2018-09-12 DIAGNOSIS — R30.0 DYSURIA: ICD-10-CM

## 2018-09-12 DIAGNOSIS — R10.9 ACUTE RIGHT FLANK PAIN: Primary | ICD-10-CM

## 2018-09-12 DIAGNOSIS — B02.29 POST HERPETIC NEURALGIA: ICD-10-CM

## 2018-09-12 DIAGNOSIS — N20.0 BILATERAL NEPHROLITHIASIS: ICD-10-CM

## 2018-09-12 DIAGNOSIS — G25.81 RESTLESS LEG SYNDROME: ICD-10-CM

## 2018-09-12 PROCEDURE — 99214 OFFICE O/P EST MOD 30 MIN: CPT | Performed by: INTERNAL MEDICINE

## 2018-09-12 RX ORDER — GABAPENTIN 100 MG/1
100 CAPSULE ORAL 3 TIMES DAILY
Qty: 90 CAPSULE | Refills: 0 | Status: SHIPPED | OUTPATIENT
Start: 2018-09-12 | End: 2018-11-27

## 2018-09-12 NOTE — PATIENT INSTRUCTIONS
*  I suspect that the pain in the right flank is probably due to a flare up of the post herpetic neuralgia nerve pain because of the type of pain and the superfical nature of the pain.  Identical to what happens in January 2015.    *  Gabapentin to help treat the nerve pain.  Take one 100 mg capsule three times per day.   In a few days, you can increase to one in the morning and one mid-day, 3 capsules at bedtime, .   Beware of drowsiness with this medication.     *  Lidocaine patches for pain relief (if covered by your insurance).  Theses patches are impregnated with lidocaine, a topical anesthetic.  Place 1-3 patches directly over the affected area.  Leave in place for 12 hours, then remove.  You should not wear these patches for more than 12 hours.  Choose whatever 12 hour time period works best for you, usually the 12 hours during the daytime or the nighttime.              *  If no improvement, or if the blood cells seen in the urine, then it could be a kidney stone.      *  Contact Dr. Obregon with an update in 2-4 days.      *  If you are not much better, then you may need to consdier a CT scan.    *  Hydrocodone (narcotic pain medication) 1/2 or 1 tablet three times per day as needed for VERY SEVERE pain only.   Do NOT take unless ABSOLUTELY NECESSARY.   Beware of drowsiness, nausea, vomiting, when taking this medication.  Do not drive, or operate dangerous equipment after taking this.   Once the pain is under control for the other measures above, then do not use the hydrocodone.

## 2018-09-12 NOTE — MR AVS SNAPSHOT
After Visit Summary   9/12/2018    Madie Silva    MRN: [id number]           Patient Information     Date Of Birth          7/21/1929        Visit Information        Provider Department      9/12/2018 1:00 PM Osmel Moreno MD [facility]        Today's Diagnoses     Acute right flank pain    -  1    Dysuria        Bilateral nephrolithiasis        Post herpetic neuralgia        Restless leg syndrome          Care Instructions    *  I suspect that the pain in the right flank is probably due to a flare up of the post herpetic neuralgia nerve pain because of the type of pain and the superfical nature of the pain.  Identical to what happens in January 2015.    *  Gabapentin to help treat the nerve pain.  Take one 100 mg capsule three times per day.   In a few days, you can increase to one in the morning and one mid-day, 3 capsules at bedtime, .   Beware of drowsiness with this medication.     *  Lidocaine patches for pain relief (if covered by your insurance).  Theses patches are impregnated with lidocaine, a topical anesthetic.  Place 1-3 patches directly over the affected area.  Leave in place for 12 hours, then remove.  You should not wear these patches for more than 12 hours.  Choose whatever 12 hour time period works best for you, usually the 12 hours during the daytime or the nighttime.              *  If no improvement, or if the blood cells seen in the urine, then it could be a kidney stone.      *  Contact Dr. Obregon with an update in 2-4 days.      *  If you are not much better, then you may need to consdier a CT scan.    *  Hydrocodone (narcotic pain medication) 1/2 or 1 tablet three times per day as needed for VERY SEVERE pain only.   Do NOT take unless ABSOLUTELY NECESSARY.   Beware of drowsiness, nausea, vomiting, when taking this medication.  Do not drive, or operate dangerous equipment after taking this.   Once the pain is under control for the other  measures above, then do not use the hydrocodone.              Follow-ups after your visit        Your next 10 appointments already scheduled     Sep 13, 2018 11:00 AM CDT   Anticoagulation Visit with [facility]X ANTICOAGULATION CLINIC   [facility] ([facility]    [address] [id number] [phone]            Dec 03, 2018 10:45 AM CST   Remote PPM Check with CONKLIN TECH1   [facility] ([facility])    [address]  [facility] [id number] [phone] OPT 2           This appointment is for a remote check of your pacemaker.  This is not an appointment at the office.              Who to contact     If you have questions or need follow up information about today's clinic visit or your schedule please contact [facility] directly at [phone].  Normal or non-critical lab and imaging results will be communicated to you by MyChart, letter or phone within 4 business days after the clinic has received the results. If you do not hear from us within 7 days, please contact the clinic through MyChart or phone. If you have a critical or abnormal lab result, we will notify you by phone as soon as possible.  Submit refill requests through [employer] or call your pharmacy and they will forward the refill request to us. Please allow 3 business days for your refill to be completed.          Additional Information About Your Visit        Care EveryWhere ID     This is your Care EveryWhere ID. This could be used by other organizations to access your [facility] [facility] medical records  [id number]        Your Vitals Were     Pulse Temperature Respirations Pulse Oximetry BMI (Body Mass Index)       82 97.9  F (36.6  C) (Oral) 14 96% 26.36 kg/m2        Blood Pressure from Last 3 Encounters:   09/12/18 126/54   08/22/18 140/62   08/02/18 130/66    Weight from Last 3 Encounters:   09/12/18 158 lb 6.4  oz (71.8 kg)   08/22/18 160 lb 1.6 oz (72.6 kg)   08/02/18 161 lb 9.6 oz (73.3 kg)              We Performed the Following     UA with Microscopic reflex to Culture          Today's Medication Changes          These changes are accurate as of 9/12/18  2:22 PM.  If you have any questions, ask your nurse or doctor.               These medicines have changed or have updated prescriptions.        Dose/Directions    gabapentin 100 MG capsule   Commonly known as:  NEURONTIN   This may have changed:  See the new instructions.   Used for:  Post herpetic neuralgia, Restless leg syndrome   Changed by:  Osmel Moreno MD        Dose:  100 mg   Take 1 capsule (100 mg) by mouth 3 times daily   Quantity:  90 capsule   Refills:  0            Where to get your medicines      These medications were sent to [facility] [address] [address]     Phone:  [phone]     gabapentin 100 MG capsule                Primary Care Provider Office Phone # Fax #    [place] Obregon [phone]       [address] [place] [address]        Equal Access to Services     [facility]: Hadii aad ku hadasho Soomaali, waaxda luqadaha, qaybta kaalmada adeegyada, waxay idiin hayaan cezar collado . So [facility] [phone].    ATENCIÓN: Si habla español, tiene a rodas disposición servicios gratuitos de asistencia lingüística. Llame al [phone].    We comply with applicable federal civil rights laws and [place] laws. We do not discriminate on the basis of race, color, national origin, age, disability, sex, sexual orientation, or gender identity.            Thank you!     Thank you for choosing [facility]  for your care. Our goal is always to provide you with excellent care. Hearing back from our patients is one way we can continue to improve our services. Please take a few minutes to complete the written survey that  you may receive in the mail after your visit with us. Thank you!             Your Updated Medication List - Protect others around you: Learn how to safely use, store and throw away your medicines at www.disposemymeds.org.          This list is accurate as of 9/12/18  2:22 PM.  Always use your most recent med list.                   Brand Name Dispense Instructions for use Diagnosis    acetaminophen 650 MG CR tablet    TYLENOL ARTHRITIS PAIN     Take 2 tablets (1,300 mg) by mouth 2 times daily    Primary osteoarthritis involving multiple joints       aspirin 81 MG EC tablet      Take 1 tablet by mouth daily.    S/P coronary artery bypass graft x 3       atorvastatin 80 MG tablet    LIPITOR    90 tablet    TAKE ONE TABLET BY MOUTH EVERY DAY    Hyperlipidemia LDL goal <100       calcium 600 MG tablet      Take 1 tablet by mouth 2 times daily.        * PRESERVISION AREDS 2 PO           * CENTRUM SILVER per tablet      Take 1 tablet by mouth daily.        co-enzyme Q-10 100 MG Caps capsule      Take 1 capsule by mouth daily.        FIBER PO      Take by mouth daily        gabapentin 100 MG capsule    NEURONTIN    90 capsule    Take 1 capsule (100 mg) by mouth 3 times daily    Post herpetic neuralgia, Restless leg syndrome       HYDROcodone-acetaminophen 5-325 MG per tablet    NORCO    20 tablet    Take 0.5-1 tablets by mouth every 6 hours as needed for moderate to severe pain    Multiple joint pain       JANTOVEN 3 MG tablet   Generic drug:  warfarin     100 tablet    TAKE ONE TABLET BY MOUTH ONCE DAILY AND TAKE AN ADDITIONAL HALF TABLET TUESDAY, THURSAY, AND SATURDAY    Paroxysmal a-fib (H)       levothyroxine 50 MCG tablet    SYNTHROID/LEVOTHROID    90 tablet    TAKE ONE TABLET BY MOUTH EVERY DAY    Acquired hypothyroidism       losartan 50 MG tablet    COZAAR     Take 0.5 tablets (25 mg) by mouth daily    Benign essential hypertension       metFORMIN 500 MG tablet    GLUCOPHAGE    90 tablet    TAKE ONE TABLET BY MOUTH  DAILY WITH BREAKFAST    Type 2 diabetes mellitus with diabetic nephropathy, without long-term current use of insulin (H)       nitroGLYcerin 0.4 MG sublingual tablet    NITROSTAT     Place 0.4 mg under the tongue every 5 minutes as needed. Up to 3 doses per episode        omeprazole 20 MG CR capsule    priLOSEC    90 capsule    TAKE ONE CAPSULE (20 MG) BY MOUTH EVERY MORNING (BEFORE BREAKFAST)    Hiatal hernia, Walters's esophagus with dysplasia       oxybutynin 5 MG 24 hr tablet    DITROPAN-XL    30 tablet    Take 1 tablet (5 mg) by mouth daily    Urinary incontinence, unspecified type       polyethylene glycol powder    MIRALAX     Take 17 g by mouth daily    Constipation       VITAMIN D-400 PO      Take by mouth daily        * Notice:  This list has 2 medication(s) that are the same as other medications prescribed for you. Read the directions carefully, and ask your doctor or other care provider to review them with you.

## 2018-09-12 NOTE — PROGRESS NOTES
SUBJECTIVE:   Madie Silva is a 89 year old female who presents to clinic today for the following health issues:    Pt is not taking oxybutynin and gabapentin    FLANK PAIN     Onset: 5 days    Description:   Character: Sharp intermittent pain but always achey  Location: right flank  Radiation: None    Intensity: severe    Progression of Symptoms:  intermittent    Accompanying Signs & Symptoms:  Fever/Chills?: no   Gas/Bloating: YES- gas  Nausea: no   Vomitting: no   Diarrhea?: no   Constipation: hx of this  Dysuria or Hematuria: no    History:   Trauma: no   Previous similar pain: YES   Previous tests done: CT    Precipitating factors:   Does the pain change with:     Food: no      BM: no     Urination: no     Alleviating factors:  nothing    Therapies Tried and outcome: Norco and tylenol    LMP:  not applicable    Pain is in the right upper flank area around the area of the right bra strap.  The pain is sharp lancinating makes her wince.  Does not radiate into the groin.  No effect on eating.  Movement and touch in the area does seem to affect the pain.  No fevers no chills.  No hematuria.           Problem list and histories reviewed & adjusted, as indicated.  Additional history: as documented        Reviewed and updated as needed this visit by clinical staff  Tobacco  Allergies  Meds       Reviewed and updated as needed this visit by Provider           Past Medical History:  ---------------------------  Past Medical History:   Diagnosis Date     Walters's esophagus 7/09     CHRONIC VERTIGO 9/99     Colonic Adenoma 3/90, 11/98     Costochondritis      Depression      DIABETES MELLITUS TYPE II 10/07     DJD      DVT of Leg, postop 11/09    6 months of warfarin     Gastritis 10/89     GERD      HIATAL HERNIA      HYPERLIPIDEMIA      HYPOTHYROIDISM (aka HASHIMOTO)      Impaired fasting glucose      L Ankle Fracture 10/09     Obesity, unspecified      Osteoporosis, unspecified      PERIPHERAL NEUROPATHY       Polymyalgia rheumatica (H)      Post Herpetic Neuralgia 4/03    R lumbar distribution     Restless leg syndrome      Small vessel cerebrovascular changes 9/99     Stricture and stenosis of esophagus 10/89    Dilated     Syncope     1/06, 8/08, 2/10     VITAMIN D DEFICIENCY 12/07    per Dr. Petty       Past Surgical History:  ---------------------------  Past Surgical History:   Procedure Laterality Date     BYPASS GRAFT ARTERY CORONARY  11/2/2011    CABG x 4  Dr. PINEDA     C NONSPECIFIC PROCEDURE  age 14    appendectomy     C NONSPECIFIC PROCEDURE  as a child    T&A     EMBOLECTOMY LOWER EXTREMITY  11/9/2011    EMBOLECTOMY LOWER EXTREMITY; left leg femoral embolectomy.; Surgeon:JOLEEN BOONE; Location:[facility] OR     HYSTERECTOMY, CERVIX STATUS UNKNOWN  1978    partial hysterectomy     SURGICAL HISTORY OF -   10/09    L ankle fracture repair    Dr. Jose Roberto Trevino       Current Medications:  ---------------------------  Current Outpatient Prescriptions   Medication Sig Dispense Refill     acetaminophen (TYLENOL ARTHRITIS PAIN) 650 MG CR tablet Take 2 tablets (1,300 mg) by mouth 2 times daily       aspirin EC 81 MG EC tablet Take 1 tablet by mouth daily.       atorvastatin (LIPITOR) 80 MG tablet TAKE ONE TABLET BY MOUTH EVERY DAY 90 tablet 2     calcium 600 MG tablet Take 1 tablet by mouth 2 times daily.       Cholecalciferol (VITAMIN D-400 PO) Take by mouth daily       co-enzyme Q-10 (COENZYME Q-10) 100 MG CAPS Take 1 capsule by mouth daily.       FIBER PO Take by mouth daily       HYDROcodone-acetaminophen (NORCO) 5-325 MG per tablet Take 0.5-1 tablets by mouth every 6 hours as needed for moderate to severe pain 20 tablet 0     JANTOVEN 3 MG tablet TAKE ONE TABLET BY MOUTH ONCE DAILY AND TAKE AN ADDITIONAL HALF TABLET TUESDAY, THURSAY, AND SATURDAY 100 tablet 3     levothyroxine (SYNTHROID/LEVOTHROID) 50 MCG tablet TAKE ONE TABLET BY MOUTH EVERY DAY 90 tablet 1     losartan (COZAAR) 50 MG tablet Take 0.5  tablets (25 mg) by mouth daily       metFORMIN (GLUCOPHAGE) 500 MG tablet TAKE ONE TABLET BY MOUTH DAILY WITH BREAKFAST 90 tablet 0     Multiple Vitamins-Minerals (CENTRUM SILVER) per tablet Take 1 tablet by mouth daily.       Multiple Vitamins-Minerals (PRESERVISION AREDS 2 PO)        nitroGLYCERIN (NITROSTAT) 0.4 MG SL tablet Place 0.4 mg under the tongue every 5 minutes as needed. Up to 3 doses per episode        omeprazole (PRILOSEC) 20 MG CR capsule TAKE ONE CAPSULE (20 MG) BY MOUTH EVERY MORNING (BEFORE BREAKFAST) 90 capsule 2     polyethylene glycol (MIRALAX) powder Take 17 g by mouth daily       gabapentin (NEURONTIN) 100 MG capsule TAKE 1 CAPSULE BY MOUTH EVERY MORNING, ONE MIDDAY, AND THREE AT BEDTIME (Patient not taking: Reported on 8/22/2018) 450 capsule 1     oxybutynin (DITROPAN-XL) 5 MG 24 hr tablet Take 1 tablet (5 mg) by mouth daily (Patient not taking: Reported on 8/22/2018) 30 tablet 5       Allergies:  -------------  Allergies   Allergen Reactions     Amoxicillin      hives     Bisphosphonates      Swallowing disorder     Boniva [Ibandronate Sodium] Nausea and Vomiting     Diarrhea, N/V with oral.  IV caused arm to swell      Fosamax [Alendronic Acid]      Severe gastrointestinal reaction     Lisinopril      cough     Niacin      rash     Simvastatin      myalgias       Social History:  -------------------  Social History     Social History     Marital status:      Spouse name: N/A     Number of children: N/A     Years of education: N/A     Occupational History     Not on file.     Social History Main Topics     Smoking status: Never Smoker     Smokeless tobacco: Never Used     Alcohol use No     Drug use: No     Sexual activity: No     Other Topics Concern     Caffeine Concern No     Special Diet No     Exercise No     Parent/Sibling W/ Cabg, Mi Or Angioplasty Before 65f 55m? No     Social History Narrative       Family Medical History:  ------------------------------  Family History  "  Problem Relation Age of Onset     Alzheimer Disease Sister      HEART DISEASE Mother      HEART DISEASE Father      HEART DISEASE Son      HEART DISEASE Brother          ROS:  REVIEW OF SYSTEMS:    RESP: negative for cough, dyspnea, wheezing, hemoptysis  CV: negative for chest pain, palpitations, PND, ZHAO, orthopnea; reports no changes in their ability to perform physical activity (from cardiovascular standpoint)  GI: negative for dysphagia, N/V, pain, melena, diarrhea and constipation  NEURO: negative for numbness/tingling, paralysis, incoordination, or focal weakness     OBJECTIVE:                                                    /54  Pulse 82  Temp 97.9  F (36.6  C) (Oral)  Resp 14  Wt 158 lb 6.4 oz (71.8 kg)  SpO2 96%  BMI 26.36 kg/m2     GENERAL alert and no distress  EYES:  Normal sclera,conjunctiva, EOMI  HENT: oral and posterior pharynx without lesions or erythema, facies symmetric  NECK: Neck supple. No LAD, without thyroidmegaly or JVD., Carotids without bruits.  RESP: Clear to ausculation bilaterally without wheezes or crackles. Normal BS in all fields.  CV: RRR normal S1S2 without murmurs, rubs or gallops. PMI normal  LYMPH: no cervical lymph adenopathy appreciated  MS: extremities- no gross deformities of the visible extremities noted, no edema  PSYCH: Alert and oriented times 3; speech- coherent  SKIN:  No obvious significant skin lesions on visible portions of face   BACK: Pain with light touch over the area of the right upper flank area around the bra strap area.  No visible rash, pustules, zoster rash.  Muscles are tender in this spot.  Palpation of the subcutaneous tissues exactly reproduces her pain.  There is no radiation into the groin or pelvis.  The patient does not describe the pain as \"internal\".  No pain with percussion.    Patient is unable to leave a urine sample at this time.       ASSESSMENT/PLAN:                                                      (R10.9) Acute right " flank pain  (primary encounter diagnosis)  Comment: Strongly suspect this is probably an exacerbation of postherpetic neuralgia pain.  She had had an identical presentation in January 2015 shortly after stopping gabapentin at that time.  She was reinitiated on gabapentin and given a muscle relaxant with resolution of her symptoms.  She just stopped gabapentin proximal month ago after deciding she did not need it anymore.  There is possibility this could represent a kidney stone given the history of nonobstructing bilateral nephrolithiasis according to prior CT scan years ago.  If the urine test is obvious for hematuria then we may need to consider CT scan to rule out renal stone.  We will get the patient back on gabapentin 100 mg 2-3 times a day possibly increasing to 5 tablets per day at her former dose in a week or so.  We will give her lidocaine patches to place directly over the area.  Patient has been taking hydrocodone that she has had from previous 6 prescriptions  For this pain.  I asked her to limit the use this medicine as much as possible out of concern for side effects in a 89-year-old woman.  Her grandson agreed  We will have her contact us in 2-4 days with an update.  If she does not have any significant improvement with these measures will need to consider CT scan.    Plan:     (R30.0) Dysuria  Comment:   Plan: UA with Microscopic reflex to Culture,         CANCELED: UA with Microscopic reflex to Culture            (N20.0) Bilateral nephrolithiasis  Comment: history of nonobsutrcutin nephrlithiasis.    Plan:     (B02.29) Post herpetic neuralgia  Comment:   Plan: gabapentin (NEURONTIN) 100 MG capsule            (G25.81) Restless leg syndrome  Comment:   Plan: gabapentin (NEURONTIN) 100 MG capsule             See Patient Instructions    BEAU ANDINO M.D., MD  [facility]     Spent greater than 25 minutes with pt and grandson, greater than 50% of time was educational and counseling.

## 2018-09-13 ENCOUNTER — ANTICOAGULATION THERAPY VISIT (OUTPATIENT)
Dept: ANTICOAGULATION | Facility: CLINIC | Age: 83
End: 2018-09-13
Payer: COMMERCIAL

## 2018-09-13 DIAGNOSIS — R82.90 NONSPECIFIC FINDING ON EXAMINATION OF URINE: Primary | ICD-10-CM

## 2018-09-13 DIAGNOSIS — I80.209 PHLEBITIS AND THROMBOPHLEBITIS OF DEEP VEINS OF LOWER EXTREMITIES (H): ICD-10-CM

## 2018-09-13 DIAGNOSIS — R30.0 DYSURIA: ICD-10-CM

## 2018-09-13 DIAGNOSIS — Z79.01 LONG-TERM (CURRENT) USE OF ANTICOAGULANTS: ICD-10-CM

## 2018-09-13 LAB
ALBUMIN UR-MCNC: NEGATIVE MG/DL
APPEARANCE UR: CLEAR
BACTERIA #/AREA URNS HPF: ABNORMAL /HPF
BILIRUB UR QL STRIP: NEGATIVE
COLOR UR AUTO: YELLOW
GLUCOSE UR STRIP-MCNC: NEGATIVE MG/DL
HGB UR QL STRIP: NEGATIVE
INR POINT OF CARE: 2.9 (ref 0.86–1.14)
KETONES UR STRIP-MCNC: NEGATIVE MG/DL
LEUKOCYTE ESTERASE UR QL STRIP: ABNORMAL
NITRATE UR QL: NEGATIVE
NON-SQ EPI CELLS #/AREA URNS LPF: ABNORMAL /LPF
PH UR STRIP: 5 PH (ref 5–7)
RBC #/AREA URNS AUTO: ABNORMAL /HPF
SOURCE: ABNORMAL
SP GR UR STRIP: 1.02 (ref 1–1.03)
UROBILINOGEN UR STRIP-ACNC: 0.2 EU/DL (ref 0.2–1)
WBC #/AREA URNS AUTO: ABNORMAL /HPF

## 2018-09-13 PROCEDURE — 99207 ZZC NO CHARGE NURSE ONLY: CPT

## 2018-09-13 PROCEDURE — 81001 URINALYSIS AUTO W/SCOPE: CPT | Performed by: INTERNAL MEDICINE

## 2018-09-13 PROCEDURE — 85610 PROTHROMBIN TIME: CPT | Mod: QW

## 2018-09-13 PROCEDURE — 36416 COLLJ CAPILLARY BLOOD SPEC: CPT

## 2018-09-13 PROCEDURE — 87086 URINE CULTURE/COLONY COUNT: CPT | Performed by: INTERNAL MEDICINE

## 2018-09-13 NOTE — PROGRESS NOTES
ANTICOAGULATION FOLLOW-UP CLINIC VISIT    Patient Name:  Madie Silva  Date:  9/13/2018  Contact Type:  Face to Face    SUBJECTIVE:        OBJECTIVE    INR Protime   Date Value Ref Range Status   09/13/2018 2.9 (A) 0.86 - 1.14 Final       ASSESSMENT / PLAN  No question data found.  Anticoagulation Summary as of 9/13/2018     INR goal 2.0-3.0   Today's INR 2.9   Warfarin maintenance plan 4.5 mg (3 mg x 1.5) on Tue, Thu, Sat; 3 mg (3 mg x 1) all other days   Full warfarin instructions 4.5 mg on Tue, Thu, Sat; 3 mg all other days   Weekly warfarin total 25.5 mg   No change documented Connie Hickman RN   Plan last modified Connie Hickman RN (7/10/2017)   Next INR check 10/25/2018   Target end date     Indications   Long-term (current) use of anticoagulants [Z79.01] [Z79.01]  Phlebitis and thrombophlebitis of deep veins of lower extremities (H) [I80.209]  Acute myocardial infarction  initial episode of care (Resolved) [I21.9]         Anticoagulation Episode Summary     INR check location     Preferred lab     Send INR reminders to [facility]    Comments       Anticoagulation Care Providers     Provider Role Specialty Phone number    De Obregon MD [facility] Internal Medicine [phone]            See the Encounter Report to view Anticoagulation Flowsheet and Dosing Calendar (Go to Encounters tab in chart review, and find the Anticoagulation Therapy Visit)        Connie Hickman RN

## 2018-09-13 NOTE — MR AVS SNAPSHOT
Madie Silva   9/13/2018 11:00 AM   Anticoagulation Therapy Visit    Description:  89 year old female   Provider:   ANTICOAGULATION CLINIC   Department:   Anti Coagulation           INR as of 9/13/2018     Today's INR 2.9      Anticoagulation Summary as of 9/13/2018     INR goal 2.0-3.0   Today's INR 2.9   Full warfarin instructions 4.5 mg on Tue, Thu, Sat; 3 mg all other days   Next INR check 10/25/2018    Indications   Long-term (current) use of anticoagulants [Z79.01] [Z79.01]  Phlebitis and thrombophlebitis of deep veins of lower extremities (H) [I80.209]  Acute myocardial infarction  initial episode of care (Resolved) [I21.9]         Your next Anticoagulation Clinic appointment(s)     Oct 25, 2018 11:00 AM CDT   Anticoagulation Visit with  ANTICOAGULATION CLINIC   [facility] ([facility]    [address] [address] [phone]              Contact Numbers     [facility]  Please call  [phone] to cancel and/or reschedule your appointment   Please call  [phone] with any problems or questions regarding your therapy.        September 2018 Details    Sun Mon Tue Wed Thu Fri Sat           1                 2               3               4               5               6               7               8                 9               10               11               12               13      4.5 mg   See details      14      3 mg         15      4.5 mg           16      3 mg         17      3 mg         18      4.5 mg         19      3 mg         20      4.5 mg         21      3 mg         22      4.5 mg           23      3 mg         24      3 mg         25      4.5 mg         26      3 mg         27      4.5 mg         28      3 mg         29      4.5 mg           30      3 mg                Date Details   09/13 This INR check               How to take your warfarin dose     To take:  3 mg Take 1 of the 3 mg tablets.     To take:  4.5 mg Take 1.5 of the 3 mg tablets.           October 2018 Details    Sun Mon Tue Wed Thu Fri Sat      1      3 mg         2      4.5 mg         3      3 mg         4      4.5 mg         5      3 mg         6      4.5 mg           7      3 mg         8      3 mg         9      4.5 mg         10      3 mg         11      4.5 mg         12      3 mg         13      4.5 mg           14      3 mg         15      3 mg         16      4.5 mg         17      3 mg         18      4.5 mg         19      3 mg         20      4.5 mg           21      3 mg         22      3 mg         23      4.5 mg         24      3 mg         25            26               27                 28               29               30               31                   Date Details   No additional details    Date of next INR:  10/25/2018         How to take your warfarin dose     To take:  3 mg Take 1 of the 3 mg tablets.    To take:  4.5 mg Take 1.5 of the 3 mg tablets.

## 2018-09-14 ENCOUNTER — TELEPHONE (OUTPATIENT)
Dept: NURSING | Facility: CLINIC | Age: 83
End: 2018-09-14

## 2018-09-14 LAB
BACTERIA SPEC CULT: NORMAL
SPECIMEN SOURCE: NORMAL

## 2018-09-14 NOTE — TELEPHONE ENCOUNTER
Pt calling to let you know her flank pain has improved significantly.  She thanks you for seeing her in Dr. Obregon's absence.

## 2018-09-18 NOTE — TELEPHONE ENCOUNTER
Patient informed. Says breathing is better today but it just depends on the weather. No issues if weather is good. Says these issues have been going on for so long. Breathing is better in air conditioning, says Dr. Obregon took her on long walk in August at [facility] but it did not feel like this was a true test for her d/t it being air conditioned in clinic. Breathing issues worse with movement/activity. Patient declines appt at this time. Advised to call if symptoms worsen/change in any way. She still plans on seeing Dr. Obregon in December.     Regarding amoxicillin, pharmacist alerted her of allergy. Patient does not remember having hives from it. She told pharmacist she wanted to try it. Thinks on day 5 out of 10 day course. No issues so far. She is worried about blood getting thin, thinks this has happened before, has inr appt tomorrow. Denies symptoms/bleeding.

## 2018-09-18 NOTE — TELEPHONE ENCOUNTER
Based on her symptoms, if shortness of breath is not improving rapidly, would suggest an office visit with partner.   I could also add her on Thursday am at 7:30, if she thinks that would be necessary/helpful.

## 2018-09-18 NOTE — TELEPHONE ENCOUNTER
"Patient informed. Says she is \"getting along\" on 3 gabapentin a day. No pain since put the lidocaine patch on. Also notes she is on amoxicillin for tooth infection. Says she already talked to INR nurse and has appt tomorrow. Is not taking her miralax or fiber as she is concerned she will get diarrhea from her amoxicillin. No diarrhea yet and she will let us know if develops it. Amoxicillin is on her allergy list (hives listed as reaction). Is she ok to be taking?    Also said she had terrible trouble breathing again yesterday. Has had these issues for a couple years but getting worse. Episode she had yesterday was not worse than episodes in August when she saw Dr. Obregon. Says PCP has not put her on oxygen because she has had 97-98% O2 levels in past. Says at last appt with Dr. Moreno when the nurse first took it, O2 levels were 90%. Thinks it was lower than 90% before that because she was feeling better by the time it was checked. Is wondering how low is too low for her? Says she has heart problems, Stenosis of aorta. Thinks breathing issues d.t air, could not go outside yesterday. No chest pain. Says heart clinic also aware of breathing issues in the past. Issues were \"bad\" when she saw Dr. Moreno too but did not mention it. Not on any inhalers. Please advise if she should be seen again, patient's plan was to see PCP in December. Should she see partner earlier?  "

## 2018-09-19 ENCOUNTER — ANTICOAGULATION THERAPY VISIT (OUTPATIENT)
Dept: ANTICOAGULATION | Facility: CLINIC | Age: 83
End: 2018-09-19
Payer: COMMERCIAL

## 2018-09-19 DIAGNOSIS — I80.209 PHLEBITIS AND THROMBOPHLEBITIS OF DEEP VEINS OF LOWER EXTREMITIES (H): ICD-10-CM

## 2018-09-19 DIAGNOSIS — Z79.01 LONG-TERM (CURRENT) USE OF ANTICOAGULANTS: ICD-10-CM

## 2018-09-19 DIAGNOSIS — Z23 NEED FOR PROPHYLACTIC VACCINATION AND INOCULATION AGAINST INFLUENZA: Primary | ICD-10-CM

## 2018-09-19 LAB — INR POINT OF CARE: 3.1 (ref 0.86–1.14)

## 2018-09-19 PROCEDURE — 85610 PROTHROMBIN TIME: CPT | Mod: QW

## 2018-09-19 PROCEDURE — G0008 ADMIN INFLUENZA VIRUS VAC: HCPCS

## 2018-09-19 PROCEDURE — 36416 COLLJ CAPILLARY BLOOD SPEC: CPT

## 2018-09-19 PROCEDURE — 90662 IIV NO PRSV INCREASED AG IM: CPT

## 2018-09-19 PROCEDURE — 99207 ZZC NO CHARGE NURSE ONLY: CPT

## 2018-09-19 NOTE — PROGRESS NOTES

## 2018-09-19 NOTE — PROGRESS NOTES
ANTICOAGULATION FOLLOW-UP CLINIC VISIT    Patient Name:  Madie Silva  Date:  9/19/2018  Contact Type:  Face to Face    SUBJECTIVE:     Patient Findings     Comments Was on antibiotic for 5 days stopped yesterday due to diarrhea approved per dentist           OBJECTIVE    INR Protime   Date Value Ref Range Status   09/19/2018 3.1 (A) 0.86 - 1.14 Final       ASSESSMENT / PLAN  INR assessment SUPRA    Recheck INR In: 5 WEEKS    INR Location Clinic      Anticoagulation Summary as of 9/19/2018     INR goal 2.0-3.0   Today's INR 3.1!   Warfarin maintenance plan 4.5 mg (3 mg x 1.5) on Tue, Thu, Sat; 3 mg (3 mg x 1) all other days   Full warfarin instructions 9/19: 1.5 mg; Otherwise 4.5 mg on Tue, Thu, Sat; 3 mg all other days   Weekly warfarin total 25.5 mg   Plan last modified Connie Hickman RN (7/10/2017)   Next INR check 10/25/2018   Target end date     Indications   Long-term (current) use of anticoagulants [Z79.01] [Z79.01]  Phlebitis and thrombophlebitis of deep veins of lower extremities (H) [I80.209]  Acute myocardial infarction  initial episode of care (Resolved) [I21.9]         Anticoagulation Episode Summary     INR check location     Preferred lab     Send INR reminders to [facility]    Comments       Anticoagulation Care Providers     Provider Role Specialty Phone number    De Obregon MD [facility] Internal Medicine [phone]            See the Encounter Report to view Anticoagulation Flowsheet and Dosing Calendar (Go to Encounters tab in chart review, and find the Anticoagulation Therapy Visit)        Stella Cha RN             Injectable Influenza Immunization Documentation    1.  Has the patient received the information for the injectable influenza vaccine? YES     2. Is the patient 6 months of age or older? yes   3. Does the patient have any of the following contraindications?         Severe allergy to eggs? No     Severe allergic reaction to previous influenza vaccines?  "No   Severe allergy to latex? No       History of Guillain-Rudyard syndrome? No     Currently have a temperature greater than 100.4F? No        4.  Severely egg allergic patients should have flu vaccine eligibility assessed by an MD, RN, or pharmacist, and those who received flu vaccine should be observed for 15 min by an MD, RN, Pharmacist, Medical Technician, or member of clinic staff.\": YES    5. Latex-allergic patients should be given latex-free influenza vaccine No. Please reference the Vaccine latex table to determine if your clinic s product is latex-containing.       Vaccination given by .Tash Cha RN        "

## 2018-09-19 NOTE — MR AVS SNAPSHOT
Madie Silva   9/19/2018 10:45 AM   Anticoagulation Therapy Visit    Description:  89 year old female   Provider:   ANTICOAGULATION CLINIC   Department:   Anti Coagulation           INR as of 9/19/2018     Today's INR 3.1!      Anticoagulation Summary as of 9/19/2018     INR goal 2.0-3.0   Today's INR 3.1!   Full warfarin instructions 9/19: 1.5 mg; Otherwise 4.5 mg on Tue, Thu, Sat; 3 mg all other days   Next INR check 10/25/2018    Indications   Long-term (current) use of anticoagulants [Z79.01] [Z79.01]  Phlebitis and thrombophlebitis of deep veins of lower extremities (H) [I80.209]  Acute myocardial infarction  initial episode of care (Resolved) [I21.9]         Your next Anticoagulation Clinic appointment(s)     Oct 25, 2018 11:00 AM CDT   Anticoagulation Visit with  ANTICOAGULATION CLINIC   [facility]    [address] [place] [address] [phone]              Contact Numbers     [facility]  Please call  [phone] to cancel and/or reschedule your appointment   Please call  [phone] with any problems or questions regarding your therapy.        September 2018 Details    Sun Mon Tue Wed Thu Fri Sat           1                 2               3               4               5               6               7               8                 9               10               11               12               13               14               15                 16               17               18               19      1.5 mg   See details      20      4.5 mg         21      3 mg         22      4.5 mg           23      3 mg         24      3 mg         25      4.5 mg         26      3 mg         27      4.5 mg         28      3 mg         29      4.5 mg           30      3 mg                Date Details   09/19 This INR check               How to take your warfarin dose     To take:  1.5 mg Take 0.5 of a 3 mg tablet.     To take:  3 mg Take 1 of the 3 mg tablets.    To take:  4.5 mg Take 1.5 of the 3 mg tablets.           October 2018 Details    Sun Mon Tue Wed Thu Fri Sat      1      3 mg         2      4.5 mg         3      3 mg         4      4.5 mg         5      3 mg         6      4.5 mg           7      3 mg         8      3 mg         9      4.5 mg         10      3 mg         11      4.5 mg         12      3 mg         13      4.5 mg           14      3 mg         15      3 mg         16      4.5 mg         17      3 mg         18      4.5 mg         19      3 mg         20      4.5 mg           21      3 mg         22      3 mg         23      4.5 mg         24      3 mg         25            26               27                 28               29               30               31                   Date Details   No additional details    Date of next INR:  10/25/2018         How to take your warfarin dose     To take:  3 mg Take 1 of the 3 mg tablets.    To take:  4.5 mg Take 1.5 of the 3 mg tablets.

## 2018-10-13 DIAGNOSIS — K22.719 BARRETT'S ESOPHAGUS WITH DYSPLASIA: ICD-10-CM

## 2018-10-13 DIAGNOSIS — K44.9 HIATAL HERNIA: ICD-10-CM

## 2018-10-13 NOTE — TELEPHONE ENCOUNTER
"Requested Prescriptions   Pending Prescriptions Disp Refills     omeprazole (PRILOSEC) 20 MG CR capsule [Pharmacy Med Name: OMEPRAZOLE 20MG CPDR] 90 capsule 2    Last Written Prescription Date:  1/9/2018  Last Fill Quantity: 90,  # refills: 2   Last office visit: 9/12/2018 with prescribing provider:  9/12/2018   Future Office Visit:   Next 5 appointments (look out 90 days)     Dec 13, 2018  2:30 PM CST   Office Visit with De Obregon MD   [facility]    [address] [address] [phone]                  Sig: TAKE ONE CAPSULE (20 MG) BY MOUTH EVERY MORNING (BEFORE BREAKFAST)    PPI Protocol Failed    10/13/2018  9:12 AM       Failed - No diagnosis of osteoporosis on record       Passed - Not on Clopidogrel (unless Pantoprazole ordered)       Passed - Recent (12 mo) or future (30 days) visit within the authorizing provider's specialty    Patient had office visit in the last 12 months or has a visit in the next 30 days with authorizing provider or within the authorizing provider's specialty.  See \"Patient Info\" tab in inbasket, or \"Choose Columns\" in Meds & Orders section of the refill encounter.           Passed - Patient is age 18 or older       Passed - No active pregnacy on record       Passed - No positive pregnancy test in past 12 months          "

## 2018-10-23 DIAGNOSIS — E11.21 TYPE 2 DIABETES MELLITUS WITH DIABETIC NEPHROPATHY, WITHOUT LONG-TERM CURRENT USE OF INSULIN (H): ICD-10-CM

## 2018-10-23 NOTE — LETTER
[facility]  [address] [id number] [phone]            Madie Silva  [address] [place] [address]        October 24, 2018    Dear Madie,    While refilling your prescription today, we noticed that you are due to have labs drawn.  We will refill your prescription for 30 days, but a follow-up appointment must be made before any additional refills can be approved.     Taking care of your health is important to us and we look forward to seeing you in the near future.  Please call us at [phone] or [phone] (or use [employer]) to schedule an appointment.     Please disregard this notice if you have already made an appointment.    Sincerely,        [facility]

## 2018-10-24 NOTE — TELEPHONE ENCOUNTER
"Requested Prescriptions   Pending Prescriptions Disp Refills     metFORMIN (GLUCOPHAGE) 500 MG tablet [Pharmacy Med Name: METFORMIN HCL 500MG TABS] 90 tablet 0     Sig: TAKE ONE TABLET BY MOUTH DAILY WITH BREAKFAST    Biguanide Agents Failed    10/23/2018  6:30 PM       Failed - Patient has documented A1c within the specified period of time.    If HgbA1C is 8 or greater, it needs to be on file within the past 3 months.  If less than 8, must be on file within the past 6 months.     Recent Labs   Lab Test  04/18/18   0853   A1C  6.9*            Passed - Blood pressure less than 140/90 in past 6 months    BP Readings from Last 3 Encounters:   09/12/18 126/54   08/22/18 140/62   08/02/18 130/66                Passed - Patient has documented LDL within the past 12 mos.    Recent Labs   Lab Test  04/18/18   0853   LDL  105*            Passed - Patient has had a Microalbumin in the past 15 mos.    Recent Labs   Lab Test  11/22/17   1257   MICROL  10   UMALCR  8.24            Passed - Patient is age 10 or older       Passed - Patient's CR is NOT>1.4 OR Patient's EGFR is NOT<45 within past 12 mos.    Recent Labs   Lab Test  04/18/18   0853   GFRESTIMATED  64   GFRESTBLACK  78       Recent Labs   Lab Test  04/18/18   0853   CR  0.84            Passed - Patient does NOT have a diagnosis of CHF.       Passed - Patient is not pregnant       Passed - Patient has not had a positive pregnancy test within the past 12 mos.        Passed - Recent (6 mo) or future (30 days) visit within the authorizing provider's specialty    Patient had office visit in the last 6 months or has a visit in the next 30 days with authorizing provider or within the authorizing provider's specialty.  See \"Patient Info\" tab in inbasket, or \"Choose Columns\" in Meds & Orders section of the refill encounter.            Last Written Prescription Date:  8/1/18  Last Fill Quantity: 90,  # refills: 0   Last office visit: 9/12/2018 with prescribing provider:  " 9/12/18   Future Office Visit:   Next 5 appointments (look out 90 days)     Dec 13, 2018  2:30 PM CST   Office Visit with De Obregon MD   [facility]    [address] [place] [address] [phone]

## 2018-10-25 ENCOUNTER — ANTICOAGULATION THERAPY VISIT (OUTPATIENT)
Dept: ANTICOAGULATION | Facility: CLINIC | Age: 83
End: 2018-10-25
Payer: COMMERCIAL

## 2018-10-25 DIAGNOSIS — I80.209 PHLEBITIS AND THROMBOPHLEBITIS OF DEEP VEINS OF LOWER EXTREMITIES (H): ICD-10-CM

## 2018-10-25 LAB — INR POINT OF CARE: 3.5 (ref 0.86–1.14)

## 2018-10-25 PROCEDURE — 36416 COLLJ CAPILLARY BLOOD SPEC: CPT

## 2018-10-25 PROCEDURE — 99207 ZZC NO CHARGE NURSE ONLY: CPT

## 2018-10-25 PROCEDURE — 85610 PROTHROMBIN TIME: CPT | Mod: QW

## 2018-10-25 NOTE — MR AVS SNAPSHOT
Madie Silva   10/25/2018 11:00 AM   Anticoagulation Therapy Visit    Description:  89 year old female   Provider:   ANTICOAGULATION CLINIC   Department:   Anti Coagulation           INR as of 10/25/2018     Today's INR 3.5!      Anticoagulation Summary as of 10/25/2018     INR goal 2.0-3.0   Today's INR 3.5!   Full warfarin instructions 10/25: 3 mg; Otherwise 4.5 mg on Tue, Thu, Sat; 3 mg all other days   Next INR check 11/15/2018    Indications   Long-term (current) use of anticoagulants [Z79.01] [Z79.01]  Phlebitis and thrombophlebitis of deep veins of lower extremities (H) [I80.209]  Acute myocardial infarction  initial episode of care (H) (Resolved) [I21.9]         Description     Pt requesting just to decrease to day then recheck in 3 weeks rather than decrease Thursday dose every week      Your next Anticoagulation Clinic appointment(s)     Nov 15, 2018  2:30 PM CST   Anticoagulation Visit with  ANTICOAGULATION CLINIC   [facility] ([facility]    [address] [address] [phone]              Contact Numbers     [facility]  Please call  [phone] to cancel and/or reschedule your appointment   Please call  [phone] with any problems or questions regarding your therapy.        October 2018 Details    Sun Mon Tue Wed Thu Fri Sat      1               2               3               4               5               6                 7               8               9               10               11               12               13                 14               15               16               17               18               19               20                 21               22               23               24               25      3 mg   See details      26      3 mg         27      4.5 mg           28      3 mg         29      3 mg         30      4.5 mg         31      3 mg             Date Details    10/25 This INR check               How to take your warfarin dose     To take:  3 mg Take 1 of the 3 mg tablets.    To take:  4.5 mg Take 1.5 of the 3 mg tablets.           November 2018 Details    Sun Mon Tue Wed Thu Fri Sat         1      4.5 mg         2      3 mg         3      4.5 mg           4      3 mg         5      3 mg         6      4.5 mg         7      3 mg         8      4.5 mg         9      3 mg         10      4.5 mg           11      3 mg         12      3 mg         13      4.5 mg         14      3 mg         15            16               17                 18               19               20               21               22               23               24                 25               26               27               28               29               30                 Date Details   No additional details    Date of next INR:  11/15/2018         How to take your warfarin dose     To take:  3 mg Take 1 of the 3 mg tablets.    To take:  4.5 mg Take 1.5 of the 3 mg tablets.

## 2018-10-25 NOTE — PROGRESS NOTES
ANTICOAGULATION FOLLOW-UP CLINIC VISIT    Patient Name:  Madie Silva  Date:  10/25/2018  Contact Type:  Face to Face    SUBJECTIVE:     Patient Findings     Positives Unexplained INR or factor level change           OBJECTIVE    INR Protime   Date Value Ref Range Status   10/25/2018 3.5 (A) 0.86 - 1.14 Final       ASSESSMENT / PLAN  INR assessment SUPRA    Recheck INR In: 3 WEEKS per pt    INR Location Clinic      Anticoagulation Summary as of 10/25/2018     INR goal 2.0-3.0   Today's INR 3.5!   Warfarin maintenance plan 4.5 mg (3 mg x 1.5) on Tue, Thu, Sat; 3 mg (3 mg x 1) all other days   Full warfarin instructions 10/25: 3 mg; Otherwise 4.5 mg on Tue, Thu, Sat; 3 mg all other days   Weekly warfarin total 25.5 mg   Plan last modified Stella Cha RN (10/25/2018)   Next INR check 11/15/2018   Target end date     Indications   Long-term (current) use of anticoagulants [Z79.01] [Z79.01]  Phlebitis and thrombophlebitis of deep veins of lower extremities (H) [I80.209]  Acute myocardial infarction  initial episode of care (H) (Resolved) [I21.9]         Anticoagulation Episode Summary     INR check location     Preferred lab     Send INR reminders to [facility]    Comments       Anticoagulation Care Providers     Provider Role Specialty Phone number    De Obregon MD [facility] Internal Medicine [phone]            See the Encounter Report to view Anticoagulation Flowsheet and Dosing Calendar (Go to Encounters tab in chart review, and find the Anticoagulation Therapy Visit)        Stella Cha RN

## 2018-10-26 DIAGNOSIS — E11.21 TYPE 2 DIABETES MELLITUS WITH DIABETIC NEPHROPATHY, WITHOUT LONG-TERM CURRENT USE OF INSULIN (H): ICD-10-CM

## 2018-10-26 NOTE — TELEPHONE ENCOUNTER
Reason for Call:  Medication or medication refill:    Do you use a [facility]?   YES  Name of the pharmacy and phone number for the current request:  [facility] [address] [place] [address] - [phone]    Name of the medication requested:   metFORMIN (GLUCOPHAGE) 500 MG tablet     Other request: she will run out of this medication prior to her scheduled doctor appt in December 2018 with Dr Obregon.  She will be out for 2 wks prior to the doctor appt.  She would like to have a refill and then see him on her scheduled appt day for medication renewal.    Can we leave a detailed message on this number? YES    Phone number patient can be reached at: Home number on file [phone] (home)    Best Time: anytime    Call taken on 10/26/2018 at 1:48 PM by Kasia Campbell

## 2018-11-15 ENCOUNTER — ANTICOAGULATION THERAPY VISIT (OUTPATIENT)
Dept: ANTICOAGULATION | Facility: CLINIC | Age: 83
End: 2018-11-15
Payer: COMMERCIAL

## 2018-11-15 VITALS — DIASTOLIC BLOOD PRESSURE: 58 MMHG | SYSTOLIC BLOOD PRESSURE: 124 MMHG

## 2018-11-15 DIAGNOSIS — I80.209 PHLEBITIS AND THROMBOPHLEBITIS OF DEEP VEINS OF LOWER EXTREMITIES (H): ICD-10-CM

## 2018-11-15 LAB — INR POINT OF CARE: 2.6 (ref 0.86–1.14)

## 2018-11-15 PROCEDURE — 36416 COLLJ CAPILLARY BLOOD SPEC: CPT

## 2018-11-15 PROCEDURE — 99207 ZZC NO CHARGE NURSE ONLY: CPT

## 2018-11-15 PROCEDURE — 85610 PROTHROMBIN TIME: CPT | Mod: QW

## 2018-11-15 NOTE — MR AVS SNAPSHOT
Madie Silva   11/15/2018 2:30 PM   Anticoagulation Therapy Visit    Description:  89 year old female   Provider:   ANTICOAGULATION CLINIC   Department:   Anti Coagulation           INR as of 11/15/2018     Today's INR 2.6      Anticoagulation Summary as of 11/15/2018     INR goal 2.0-3.0   Today's INR 2.6   Full warfarin instructions 4.5 mg on Tue, Thu, Sat; 3 mg all other days   Next INR check 12/27/2018    Indications   Long-term (current) use of anticoagulants [Z79.01] [Z79.01]  Phlebitis and thrombophlebitis of deep veins of lower extremities (H) [I80.209]  Acute myocardial infarction  initial episode of care (H) (Resolved) [I21.9]         Your next Anticoagulation Clinic appointment(s)     Dec 27, 2018 11:00 AM CST   Anticoagulation Visit with  ANTICOAGULATION CLINIC   [facility] ([facility])    [address] [address] [phone]              Contact Numbers     [facility]  Please call  [phone] to cancel and/or reschedule your appointment   Please call  [phone] with any problems or questions regarding your therapy.        November 2018 Details    Sun Mon Tue Wed Thu Fri Sat         1               2               3                 4               5               6               7               8               9               10                 11               12               13               14               15      4.5 mg   See details      16      3 mg         17      4.5 mg           18      3 mg         19      3 mg         20      4.5 mg         21      3 mg         22      4.5 mg         23      3 mg         24      4.5 mg           25      3 mg         26      3 mg         27      4.5 mg         28      3 mg         29      4.5 mg         30      3 mg           Date Details   11/15 This INR check               How to take your warfarin dose     To take:  3 mg Take 1 of the 3 mg tablets.    To take:   4.5 mg Take 1.5 of the 3 mg tablets.           December 2018 Details    Sun Mon Tue Wed Thu Fri Sat           1      4.5 mg           2      3 mg         3      3 mg         4      4.5 mg         5      3 mg         6      4.5 mg         7      3 mg         8      4.5 mg           9      3 mg         10      3 mg         11      4.5 mg         12      3 mg         13      4.5 mg         14      3 mg         15      4.5 mg           16      3 mg         17      3 mg         18      4.5 mg         19      3 mg         20      4.5 mg         21      3 mg         22      4.5 mg           23      3 mg         24      3 mg         25      4.5 mg         26      3 mg         27            28               29                 30               31                     Date Details   No additional details    Date of next INR:  12/27/2018         How to take your warfarin dose     To take:  3 mg Take 1 of the 3 mg tablets.    To take:  4.5 mg Take 1.5 of the 3 mg tablets.

## 2018-11-15 NOTE — PROGRESS NOTES
ANTICOAGULATION FOLLOW-UP CLINIC VISIT    Patient Name:  Madie Silva  Date:  11/15/2018  Contact Type:  Face to Face    SUBJECTIVE:     Patient Findings     Positives No Problem Findings           OBJECTIVE    INR Protime   Date Value Ref Range Status   11/15/2018 2.6 (A) 0.86 - 1.14 Final       ASSESSMENT / PLAN  No question data found.  Anticoagulation Summary as of 11/15/2018     INR goal 2.0-3.0   Today's INR 2.6   Warfarin maintenance plan 4.5 mg (3 mg x 1.5) on Tue, Thu, Sat; 3 mg (3 mg x 1) all other days   Full warfarin instructions 4.5 mg on Tue, Thu, Sat; 3 mg all other days   Weekly warfarin total 25.5 mg   Plan last modified Stella Cha RN (10/25/2018)   Next INR check 12/27/2018   Target end date     Indications   Long-term (current) use of anticoagulants [Z79.01] [Z79.01]  Phlebitis and thrombophlebitis of deep veins of lower extremities (H) [I80.209]  Acute myocardial infarction  initial episode of care (H) (Resolved) [I21.9]         Anticoagulation Episode Summary     INR check location     Preferred lab     Send INR reminders to [facility]    Comments       Anticoagulation Care Providers     Provider Role Specialty Phone number    De Obregon MD [facility] Internal Medicine [phone]            See the Encounter Report to view Anticoagulation Flowsheet and Dosing Calendar (Go to Encounters tab in chart review, and find the Anticoagulation Therapy Visit)        Connie Hickman RN

## 2018-11-27 DIAGNOSIS — G25.81 RESTLESS LEG SYNDROME: ICD-10-CM

## 2018-11-27 DIAGNOSIS — B02.29 POST HERPETIC NEURALGIA: ICD-10-CM

## 2018-11-27 RX ORDER — GABAPENTIN 100 MG/1
CAPSULE ORAL
Qty: 90 CAPSULE | Refills: 3 | Status: SHIPPED | OUTPATIENT
Start: 2018-11-27 | End: 2018-12-13

## 2018-11-27 NOTE — TELEPHONE ENCOUNTER
Requested Prescriptions   Pending Prescriptions Disp Refills     gabapentin (NEURONTIN) 100 MG capsule [Pharmacy Med Name: GABAPENTIN 100MG CAPS]  Last Written Prescription Date:  09/12/2018  Last Fill Quantity: 90,  # refills: 0   Last Office Visit: 9/12/2018   Future Office Visit:    Next 5 appointments (look out 90 days)     Dec 13, 2018  2:30 PM CST   Office Visit with De Obregon MD   [facility]    [address] [address] [phone]                  90 capsule 0     Sig: TAKE ONE CAPSULE BY MOUTH THREE TIMES A DAY    There is no refill protocol information for this order

## 2018-12-03 ENCOUNTER — ALLIED HEALTH/NURSE VISIT (OUTPATIENT)
Dept: CARDIOLOGY | Facility: CLINIC | Age: 83
End: 2018-12-03
Payer: COMMERCIAL

## 2018-12-03 DIAGNOSIS — Z95.0 CARDIAC PACEMAKER IN SITU: Primary | ICD-10-CM

## 2018-12-03 PROCEDURE — 93294 REM INTERROG EVL PM/LDLS PM: CPT | Performed by: INTERNAL MEDICINE

## 2018-12-03 PROCEDURE — 93296 REM INTERROG EVL PM/IDS: CPT | Performed by: INTERNAL MEDICINE

## 2018-12-03 NOTE — MR AVS SNAPSHOT
After Visit Summary   12/3/2018    Madie Silva    MRN: [id number]           Patient Information     Date Of Birth          7/21/1929        Visit Information        Provider Department      12/3/2018 10:45 AM RUBIN HIGGINS [facility]        Today's Diagnoses     Cardiac pacemaker in situ    -  1       Follow-ups after your visit        Your next 10 appointments already scheduled     Dec 06, 2018 10:30 AM CST   [facility]   [facility] ([facility])    [address] [place] [address]   [phone]           Please do not eat 10-12 hours before your appointment if you are coming in fasting for labs on lipids, cholesterol, or glucose (sugar). This does not apply to pregnant women. Water, hot tea and black coffee (with nothing added) are okay. Do not drink other fluids, diet soda or chew gum.            Dec 13, 2018  2:30 PM CST   Office Visit with De Obregon MD   [facility] ([facility])    [address] [place] [address]   [phone]           Bring a current list of meds and any records pertaining to this visit. For Physicals, please bring immunization records and any forms needing to be filled out. Please arrive 10 minutes early to complete paperwork.            Dec 27, 2018 11:00 AM CST   Anticoagulation Visit with [facility]   [facility] ([facility])    [address] [place] [address]   [phone]            Mar 25, 2019 12:00 AM CDT   CARDIAC DEVICE CHECK - REMOTE with RUBIN HIGGINS   [facility] ([facility])    [address] [place] [address] [address]  [facility] [address] [phone]              Future tests that were ordered for you today     Open Future Orders        Priority Expected Expires Ordered     Cardiac Device Check - Remote Routine 2/2/2019 12/3/2020 12/3/2018            Who to contact     If you have questions or need follow up information about today's clinic visit or your schedule please contact [facility] directly at [phone].  Normal or non-critical lab and imaging results will be communicated to you by MyChart, letter or phone within 4 business days after the clinic has received the results. If you do not hear from us within 7 days, please contact the clinic through MyChart or phone. If you have a critical or abnormal lab result, we will notify you by phone as soon as possible.  Submit refill requests through [employer] or call your pharmacy and they will forward the refill request to us. Please allow 3 business days for your refill to be completed.          Additional Information About Your Visit        Care EveryWhere ID     This is your Care EveryWhere ID. This could be used by other organizations to access your [place] medical records  [id number]         Blood Pressure from Last 3 Encounters:   11/15/18 124/58   09/12/18 126/54   08/22/18 140/62    Weight from Last 3 Encounters:   09/12/18 71.8 kg (158 lb 6.4 oz)   08/22/18 72.6 kg (160 lb 1.6 oz)   08/02/18 73.3 kg (161 lb 9.6 oz)              We Performed the Following     INTERROGATION DEVICE EVAL REMOTE, PACER/ICD (04203)     PM DEVICE INTERROGATE REMOTE (87518)        Primary Care Provider Office Phone # Fax #    De Blaine Obregon [phone] [address] [place] [address]        Equal Access to Services     JAGDISH SWARTZ : Hadii og Buck, waaxda luqadaha, qaybta kaalmajavier rapp. So [facility] [phone].    ATENCIÓN: Si habla español, tiene a rodas disposición servicios gratuitos de asistencia lingüística. Llame al [phone].    We comply with applicable federal civil rights laws and [place] laws. We do  not discriminate on the basis of race, color, national origin, age, disability, sex, sexual orientation, or gender identity.            Thank you!     Thank you for choosing [facility]  for your care. Our goal is always to provide you with excellent care. Hearing back from our patients is one way we can continue to improve our services. Please take a few minutes to complete the written survey that you may receive in the mail after your visit with us. Thank you!             Your Updated Medication List - Protect others around you: Learn how to safely use, store and throw away your medicines at www.disposemymeds.org.          This list is accurate as of 12/3/18 11:22 AM.  Always use your most recent med list.                   Brand Name Dispense Instructions for use Diagnosis    acetaminophen 650 MG CR tablet    TYLENOL ARTHRITIS PAIN     Take 2 tablets (1,300 mg) by mouth 2 times daily    Primary osteoarthritis involving multiple joints       aspirin 81 MG EC tablet    ASA     Take 1 tablet by mouth daily.    S/P coronary artery bypass graft x 3       atorvastatin 80 MG tablet    LIPITOR    90 tablet    TAKE ONE TABLET BY MOUTH EVERY DAY    Hyperlipidemia LDL goal <100       calcium 600 MG tablet      Take 1 tablet by mouth 2 times daily.        co-enzyme Q-10 100 MG Caps capsule      Take 1 capsule by mouth daily.        FIBER PO      Take by mouth daily        gabapentin 100 MG capsule    NEURONTIN    90 capsule    TAKE ONE CAPSULE BY MOUTH THREE TIMES A DAY    Post herpetic neuralgia, Restless leg syndrome       HYDROcodone-acetaminophen 5-325 MG tablet    NORCO    20 tablet    Take 0.5-1 tablets by mouth every 6 hours as needed for moderate to severe pain    Multiple joint pain       JANTOVEN 3 MG tablet   Generic drug:  warfarin     100 tablet    TAKE ONE TABLET BY MOUTH ONCE DAILY AND TAKE AN ADDITIONAL HALF TABLET TUESDAY, THURSAY, AND SATURDAY    Paroxysmal A-fib (H)        levothyroxine 50 MCG tablet    SYNTHROID/LEVOTHROID    90 tablet    TAKE ONE TABLET BY MOUTH EVERY DAY    Acquired hypothyroidism       losartan 50 MG tablet    COZAAR     Take 0.5 tablets (25 mg) by mouth daily    Benign essential hypertension       metFORMIN 500 MG tablet    GLUCOPHAGE    90 tablet    TAKE ONE TABLET BY MOUTH DAILY WITH BREAKFAST    Type 2 diabetes mellitus with diabetic nephropathy, without long-term current use of insulin (H)       * PRESERVISION AREDS 2 PO           * multivitamin tablet      Take 1 tablet by mouth daily.        nitroGLYcerin 0.4 MG sublingual tablet    NITROSTAT     Place 0.4 mg under the tongue every 5 minutes as needed. Up to 3 doses per episode        omeprazole 20 MG DR capsule    priLOSEC    90 capsule    TAKE ONE CAPSULE (20 MG) BY MOUTH EVERY MORNING (BEFORE BREAKFAST)    Hiatal hernia, Walters's esophagus with dysplasia       oxybutynin ER 5 MG 24 hr tablet    DITROPAN-XL    30 tablet    Take 1 tablet (5 mg) by mouth daily    Urinary incontinence, unspecified type       polyethylene glycol powder    MIRALAX     Take 17 g by mouth daily    Constipation       VITAMIN D-400 PO      Take by mouth daily        * Notice:  This list has 2 medication(s) that are the same as other medications prescribed for you. Read the directions carefully, and ask your doctor or other care provider to review them with you.

## 2018-12-03 NOTE — PROGRESS NOTES
[employer] Sensia (D) Remote PPM Device Check  AP: 88 % : 1 %  Mode: DDDR        Presenting Rhythm: AP/VS  Heart Rate: Adequate rates per histogram  Sensing: Stable    Pacing Threshold: Stable    Impedance: Stable  Battery Status: 3-6 years  Atrial Arrhythmia: 36 mode switch episodes comprising <1% of the time. 1 EGM shows As=Vs lasting 43 seconds, ventricular rates peaked at 157bpm   Ventricular Arrhythmia: 3 ventricular high rates. EGMs show As=Vs for PAT lasting 5-13 beats, rates 150-210bpm.     Care Plan: F/u PPPM [facility] q 3 months. Gave patient results over the phone. [place],CVT

## 2018-12-06 DIAGNOSIS — E11.21 TYPE 2 DIABETES MELLITUS WITH DIABETIC NEPHROPATHY, WITHOUT LONG-TERM CURRENT USE OF INSULIN (H): ICD-10-CM

## 2018-12-06 LAB
ANION GAP SERPL CALCULATED.3IONS-SCNC: 8 MMOL/L (ref 3–14)
BUN SERPL-MCNC: 16 MG/DL (ref 7–30)
CALCIUM SERPL-MCNC: 9.2 MG/DL (ref 8.5–10.1)
CHLORIDE SERPL-SCNC: 105 MMOL/L (ref 94–109)
CO2 SERPL-SCNC: 25 MMOL/L (ref 20–32)
CREAT SERPL-MCNC: 0.9 MG/DL (ref 0.52–1.04)
GFR SERPL CREATININE-BSD FRML MDRD: 59 ML/MIN/1.7M2
GLUCOSE SERPL-MCNC: 133 MG/DL (ref 70–99)
HBA1C MFR BLD: 6.6 % (ref 0–5.6)
POTASSIUM SERPL-SCNC: 4.7 MMOL/L (ref 3.4–5.3)
SODIUM SERPL-SCNC: 138 MMOL/L (ref 133–144)

## 2018-12-06 PROCEDURE — 36415 COLL VENOUS BLD VENIPUNCTURE: CPT | Performed by: INTERNAL MEDICINE

## 2018-12-06 PROCEDURE — 80048 BASIC METABOLIC PNL TOTAL CA: CPT | Performed by: INTERNAL MEDICINE

## 2018-12-06 PROCEDURE — 83036 HEMOGLOBIN GLYCOSYLATED A1C: CPT | Performed by: INTERNAL MEDICINE

## 2018-12-13 ENCOUNTER — OFFICE VISIT (OUTPATIENT)
Dept: INTERNAL MEDICINE | Facility: CLINIC | Age: 83
End: 2018-12-13
Payer: COMMERCIAL

## 2018-12-13 VITALS
WEIGHT: 161 LBS | DIASTOLIC BLOOD PRESSURE: 58 MMHG | BODY MASS INDEX: 26.79 KG/M2 | OXYGEN SATURATION: 97 % | HEART RATE: 88 BPM | RESPIRATION RATE: 18 BRPM | SYSTOLIC BLOOD PRESSURE: 124 MMHG | TEMPERATURE: 97.7 F

## 2018-12-13 DIAGNOSIS — I10 BENIGN ESSENTIAL HYPERTENSION: ICD-10-CM

## 2018-12-13 DIAGNOSIS — M81.0 OSTEOPOROSIS, UNSPECIFIED OSTEOPOROSIS TYPE, UNSPECIFIED PATHOLOGICAL FRACTURE PRESENCE: ICD-10-CM

## 2018-12-13 DIAGNOSIS — G25.81 RESTLESS LEG SYNDROME: ICD-10-CM

## 2018-12-13 DIAGNOSIS — Z78.0 ASYMPTOMATIC POSTMENOPAUSAL STATUS: ICD-10-CM

## 2018-12-13 DIAGNOSIS — K44.9 HIATAL HERNIA: ICD-10-CM

## 2018-12-13 DIAGNOSIS — E78.5 HYPERLIPIDEMIA LDL GOAL <100: ICD-10-CM

## 2018-12-13 DIAGNOSIS — B02.29 POST HERPETIC NEURALGIA: ICD-10-CM

## 2018-12-13 DIAGNOSIS — Z13.89 SCREENING FOR DIABETIC PERIPHERAL NEUROPATHY: ICD-10-CM

## 2018-12-13 DIAGNOSIS — E03.9 ACQUIRED HYPOTHYROIDISM: ICD-10-CM

## 2018-12-13 DIAGNOSIS — K22.719 BARRETT'S ESOPHAGUS WITH DYSPLASIA: ICD-10-CM

## 2018-12-13 DIAGNOSIS — E11.21 TYPE 2 DIABETES MELLITUS WITH DIABETIC NEPHROPATHY, WITHOUT LONG-TERM CURRENT USE OF INSULIN (H): ICD-10-CM

## 2018-12-13 PROCEDURE — 99207 C FOOT EXAM  NO CHARGE: CPT | Mod: 25 | Performed by: INTERNAL MEDICINE

## 2018-12-13 PROCEDURE — 99214 OFFICE O/P EST MOD 30 MIN: CPT | Performed by: INTERNAL MEDICINE

## 2018-12-13 RX ORDER — LEVOTHYROXINE SODIUM 50 UG/1
50 TABLET ORAL DAILY
Qty: 90 TABLET | Refills: 1 | Status: SHIPPED | OUTPATIENT
Start: 2018-12-13 | End: 2019-06-13

## 2018-12-13 RX ORDER — LOSARTAN POTASSIUM 50 MG/1
TABLET ORAL
Qty: 45 TABLET | Refills: 3 | Status: SHIPPED | OUTPATIENT
Start: 2018-12-13 | End: 2019-12-20

## 2018-12-13 RX ORDER — GABAPENTIN 100 MG/1
100 CAPSULE ORAL 3 TIMES DAILY
Qty: 270 CAPSULE | Refills: 3 | Status: SHIPPED | OUTPATIENT
Start: 2018-12-13 | End: 2019-12-20

## 2018-12-13 RX ORDER — ATORVASTATIN CALCIUM 80 MG/1
80 TABLET, FILM COATED ORAL DAILY
Qty: 90 TABLET | Refills: 3 | Status: SHIPPED | OUTPATIENT
Start: 2018-12-13 | End: 2019-12-20

## 2018-12-13 NOTE — PATIENT INSTRUCTIONS
PLAN:                                                      1.  MEDICATIONS:  Continue current medications without change  2.  Check fasting labs in 6 months, then make an office appointment with MD to review the results.   3.  Notify clinic for change in symptoms

## 2018-12-13 NOTE — PROGRESS NOTES
SUBJECTIVE:   Madie Silva is a 89 year old female who presents to clinic today for the following health issues:    Diabetes Follow-up      Patient is checking blood sugars: not at all    Diabetic concerns: None     Symptoms of hypoglycemia (low blood sugar): none     Paresthesias (numbness or burning in feet) or sores: No     Date of last diabetic eye exam: patient unsure      Hypertension Follow-up      Outpatient blood pressures are not being checked.    Low Salt Diet: not monitoring salt    BP Readings from Last 2 Encounters:   11/15/18 124/58   09/12/18 126/54     Hemoglobin A1C (%)   Date Value   12/06/2018 6.6 (H)   04/18/2018 6.9 (H)     LDL Cholesterol Calculated (mg/dL)   Date Value   04/18/2018 105 (H)   05/12/2017 86       Hypothyroidism Follow-up      Since last visit, patient describes the following symptoms: Weight stable, no hair loss, no skin changes, no constipation, no loose stools    Reviewed and updated as needed this visit by clinical staff:  Medications  Allergies  Soc Hx   Additional history: as documented    Additional issues to address:  1.  Still some dyspnea on exertion.   Able to go out and shovel, can walk < block.   Has to rest for a few minutes.  Denies edema.  Saw cards, 8/22 note fully reviewed.   Refer for details.    2.  Follow-up osteoporosis, intol to all bisphosphonates.   Declines prolia attempt at this time.     ROS:    Constitutional, HEENT, cardiovascular, pulmonary, gi and gu systems are negative, except as otherwise noted.    OBJECTIVE:                                                      /58   Pulse 88   Temp 97.7  F (36.5  C) (Oral)   Resp 18   Wt 73 kg (161 lb)   SpO2 97%   Breastfeeding? No   BMI 26.79 kg/m    Body mass index is 26.79 kg/m .  No apparent distress  CV: regular rates and rhythm and grade 1/6 blowing hemisystolic murmur heard best over the left sternal border      DIABETIC FOOT EXAM: normal DP and/or PT pulses, no skin breakdown or  trophic changes, normal monofilament exam bilaterally       ASSESSMENT:                                                      DIABETES TYPE 2, non-insulin dependent, controlled. Associated with the following: Renal Complications: Diabetic Nephropathy  HYPERTENSION, controlled. Associated with the following complications: Diabetes  Progressive aortic stenosis, moderate at this time.   Decision is to hold off further testing (heart cath) unless symptoms worsen.   She has decided at this time to defer follow-up with cardiology.  OSTEOPOROSIS, patient declines attempt to PA prolia    PLAN:                                                      1.  MEDICATIONS:  Continue current medications without change  2.  Check fasting labs in 6 months, then make an office appointment with MD to review the results.   3.  Notify clinic for change in symptoms   Code status verified, advance directive done    De Obregon MD  [facility]

## 2018-12-27 ENCOUNTER — ANTICOAGULATION THERAPY VISIT (OUTPATIENT)
Dept: ANTICOAGULATION | Facility: CLINIC | Age: 83
End: 2018-12-27
Payer: COMMERCIAL

## 2018-12-27 DIAGNOSIS — I80.209 PHLEBITIS AND THROMBOPHLEBITIS OF DEEP VEINS OF LOWER EXTREMITIES (H): ICD-10-CM

## 2018-12-27 LAB — INR POINT OF CARE: 2.2 (ref 0.86–1.14)

## 2018-12-27 PROCEDURE — 99207 ZZC NO CHARGE NURSE ONLY: CPT

## 2018-12-27 PROCEDURE — 36416 COLLJ CAPILLARY BLOOD SPEC: CPT

## 2018-12-27 PROCEDURE — 85610 PROTHROMBIN TIME: CPT | Mod: QW

## 2018-12-27 NOTE — PROGRESS NOTES
ANTICOAGULATION FOLLOW-UP CLINIC VISIT    Patient Name:  Madie Silva  Date:  2018  Contact Type:  Face to Face    SUBJECTIVE:     Patient Findings     Positives:   No Problem Findings           OBJECTIVE    INR Protime   Date Value Ref Range Status   2018 2.2 (A) 0.86 - 1.14 Final       ASSESSMENT / PLAN  INR assessment THER    Recheck INR In: 6 WEEKS    INR Location Clinic      Anticoagulation Summary  As of 2018    INR goal:   2.0-3.0   TTR:   81.3 % (3 y)   INR used for dosin.2 (2018)   Warfarin maintenance plan:   4.5 mg (3 mg x 1.5) every Tue, Thu, Sat; 3 mg (3 mg x 1) all other days   Full warfarin instructions:   4.5 mg every Tue, Thu, Sat; 3 mg all other days   Weekly warfarin total:   25.5 mg   No change documented:   Stella Cha RN   Plan last modified:   Stella Cha RN (10/25/2018)   Next INR check:   2019   Target end date:       Indications    Long-term (current) use of anticoagulants [Z79.01] [Z79.01]  Phlebitis and thrombophlebitis of deep veins of lower extremities (H) [I80.209]  Acute myocardial infarction  initial episode of care (H) (Resolved) [I21.9]             Anticoagulation Episode Summary     INR check location:       Preferred lab:       Send INR reminders to:   [facility]    Comments:         Anticoagulation Care Providers     Provider Role Specialty Phone number    De Obregon MD [facility] Internal Medicine [phone]            See the Encounter Report to view Anticoagulation Flowsheet and Dosing Calendar (Go to Encounters tab in chart review, and find the Anticoagulation Therapy Visit)        Stella Cha RN

## 2019-02-14 ENCOUNTER — ANTICOAGULATION THERAPY VISIT (OUTPATIENT)
Dept: ANTICOAGULATION | Facility: CLINIC | Age: 84
End: 2019-02-14
Payer: COMMERCIAL

## 2019-02-14 DIAGNOSIS — I80.209 PHLEBITIS AND THROMBOPHLEBITIS OF DEEP VEINS OF LOWER EXTREMITIES (H): ICD-10-CM

## 2019-02-14 LAB — INR POINT OF CARE: 2.7 (ref 0.86–1.14)

## 2019-02-14 PROCEDURE — 99207 ZZC NO CHARGE NURSE ONLY: CPT

## 2019-02-14 PROCEDURE — 36416 COLLJ CAPILLARY BLOOD SPEC: CPT

## 2019-02-14 PROCEDURE — 85610 PROTHROMBIN TIME: CPT | Mod: QW

## 2019-02-14 NOTE — PROGRESS NOTES
ANTICOAGULATION FOLLOW-UP CLINIC VISIT    Patient Name:  Madie Silva  Date:  2019  Contact Type:  Face to Face    SUBJECTIVE:     Patient Findings     Positives:   No Problem Findings           OBJECTIVE    INR Protime   Date Value Ref Range Status   2019 2.7 (A) 0.86 - 1.14 Final       ASSESSMENT / PLAN  INR assessment THER    Recheck INR In: 6 WEEKS    INR Location Clinic      Anticoagulation Summary  As of 2019    INR goal:   2.0-3.0   TTR:   82.2 % (3.1 y)   INR used for dosin.7 (2019)   Warfarin maintenance plan:   4.5 mg (3 mg x 1.5) every Tue, Thu, Sat; 3 mg (3 mg x 1) all other days   Full warfarin instructions:   4.5 mg every Tue, Thu, Sat; 3 mg all other days   Weekly warfarin total:   25.5 mg   No change documented:   Stella Cha RN   Plan last modified:   Stella Cha RN (10/25/2018)   Next INR check:   3/28/2019   Target end date:       Indications    Long-term (current) use of anticoagulants [Z79.01] [Z79.01]  Phlebitis and thrombophlebitis of deep veins of lower extremities (H) [I80.209]  Acute myocardial infarction  initial episode of care (H) (Resolved) [I21.9]             Anticoagulation Episode Summary     INR check location:       Preferred lab:       Send INR reminders to:   [facility]    Comments:         Anticoagulation Care Providers     Provider Role Specialty Phone number    De Obregon MD [facility] Internal Medicine [phone]            See the Encounter Report to view Anticoagulation Flowsheet and Dosing Calendar (Go to Encounters tab in chart review, and find the Anticoagulation Therapy Visit)        Stella Cha RN

## 2019-03-25 ENCOUNTER — ANCILLARY PROCEDURE (OUTPATIENT)
Dept: CARDIOLOGY | Facility: CLINIC | Age: 84
End: 2019-03-25
Attending: INTERNAL MEDICINE
Payer: COMMERCIAL

## 2019-03-25 DIAGNOSIS — I48.91 ATRIAL FIBRILLATION (H): ICD-10-CM

## 2019-03-25 PROCEDURE — 93296 REM INTERROG EVL PM/IDS: CPT | Performed by: INTERNAL MEDICINE

## 2019-03-25 PROCEDURE — 93294 REM INTERROG EVL PM/LDLS PM: CPT | Performed by: INTERNAL MEDICINE

## 2019-03-28 ENCOUNTER — ANTICOAGULATION THERAPY VISIT (OUTPATIENT)
Dept: ANTICOAGULATION | Facility: CLINIC | Age: 84
End: 2019-03-28
Payer: COMMERCIAL

## 2019-03-28 VITALS — DIASTOLIC BLOOD PRESSURE: 58 MMHG | SYSTOLIC BLOOD PRESSURE: 122 MMHG

## 2019-03-28 DIAGNOSIS — I80.209 PHLEBITIS AND THROMBOPHLEBITIS OF DEEP VEINS OF LOWER EXTREMITIES (H): ICD-10-CM

## 2019-03-28 LAB — INR POINT OF CARE: 2.5 (ref 0.86–1.14)

## 2019-03-28 PROCEDURE — 36416 COLLJ CAPILLARY BLOOD SPEC: CPT

## 2019-03-28 PROCEDURE — 99207 ZZC NO CHARGE NURSE ONLY: CPT

## 2019-03-28 PROCEDURE — 85610 PROTHROMBIN TIME: CPT | Mod: QW

## 2019-03-28 NOTE — PROGRESS NOTES
ANTICOAGULATION FOLLOW-UP CLINIC VISIT    Patient Name:  Madie Silva  Date:  3/28/2019  Contact Type:  Face to Face    SUBJECTIVE:     Patient Findings     Comments:   The patient was assessed for diet, medication, and activity level changes, missed or extra doses, bruising or bleeding, with no problem findings.             OBJECTIVE    INR Protime   Date Value Ref Range Status   2019 2.5 (A) 0.86 - 1.14 Final       ASSESSMENT / PLAN  No question data found.  Anticoagulation Summary  As of 3/28/2019    INR goal:   2.0-3.0   TTR:   82.8 % (3.2 y)   INR used for dosin.5 (3/28/2019)   Warfarin maintenance plan:   4.5 mg (3 mg x 1.5) every Tue, Thu, Sat; 3 mg (3 mg x 1) all other days   Full warfarin instructions:   4.5 mg every Tue, Thu, Sat; 3 mg all other days   Weekly warfarin total:   25.5 mg   No change documented:   Connie Hickman RN   Plan last modified:   Stella Cha RN (10/25/2018)   Next INR check:   2019   Target end date:       Indications    Long-term (current) use of anticoagulants [Z79.01] [Z79.01]  Phlebitis and thrombophlebitis of deep veins of lower extremities (H) [I80.209]  Acute myocardial infarction  initial episode of care (H) (Resolved) [I21.9]             Anticoagulation Episode Summary     INR check location:       Preferred lab:       Send INR reminders to:   [facility]    Comments:         Anticoagulation Care Providers     Provider Role Specialty Phone number    De Obregon MD [facility] Internal Medicine [phone]            See the Encounter Report to view Anticoagulation Flowsheet and Dosing Calendar (Go to Encounters tab in chart review, and find the Anticoagulation Therapy Visit)        Connie Hickman, REJI

## 2019-04-05 LAB
MDC_IDC_LEAD_IMPLANT_DT: NORMAL
MDC_IDC_LEAD_IMPLANT_DT: NORMAL
MDC_IDC_LEAD_LOCATION: NORMAL
MDC_IDC_LEAD_LOCATION: NORMAL
MDC_IDC_LEAD_MFG: NORMAL
MDC_IDC_LEAD_MFG: NORMAL
MDC_IDC_LEAD_MODEL: NORMAL
MDC_IDC_LEAD_MODEL: NORMAL
MDC_IDC_LEAD_POLARITY_TYPE: NORMAL
MDC_IDC_LEAD_POLARITY_TYPE: NORMAL
MDC_IDC_LEAD_SERIAL: NORMAL
MDC_IDC_LEAD_SERIAL: NORMAL
MDC_IDC_MSMT_BATTERY_DTM: NORMAL
MDC_IDC_MSMT_BATTERY_IMPEDANCE: 1275 OHM
MDC_IDC_MSMT_BATTERY_REMAINING_LONGEVITY: 51 MO
MDC_IDC_MSMT_BATTERY_STATUS: NORMAL
MDC_IDC_MSMT_BATTERY_VOLTAGE: 2.77 V
MDC_IDC_MSMT_LEADCHNL_RA_IMPEDANCE_VALUE: 459 OHM
MDC_IDC_MSMT_LEADCHNL_RA_PACING_THRESHOLD_AMPLITUDE: 0.25 V
MDC_IDC_MSMT_LEADCHNL_RA_PACING_THRESHOLD_PULSEWIDTH: 0.4 MS
MDC_IDC_MSMT_LEADCHNL_RV_IMPEDANCE_VALUE: 606 OHM
MDC_IDC_MSMT_LEADCHNL_RV_PACING_THRESHOLD_AMPLITUDE: 0.5 V
MDC_IDC_MSMT_LEADCHNL_RV_PACING_THRESHOLD_PULSEWIDTH: 0.4 MS
MDC_IDC_MSMT_LEADCHNL_RV_SENSING_INTR_AMPL: 16 MV
MDC_IDC_PG_IMPLANT_DTM: NORMAL
MDC_IDC_PG_MFG: NORMAL
MDC_IDC_PG_MODEL: NORMAL
MDC_IDC_PG_SERIAL: NORMAL
MDC_IDC_PG_TYPE: NORMAL
MDC_IDC_SESS_CLINIC_NAME: NORMAL
MDC_IDC_SESS_DTM: NORMAL
MDC_IDC_SESS_TYPE: NORMAL
MDC_IDC_SET_BRADY_AT_MODE_SWITCH_MODE: NORMAL
MDC_IDC_SET_BRADY_AT_MODE_SWITCH_RATE: 175 {BEATS}/MIN
MDC_IDC_SET_BRADY_LOWRATE: 70 {BEATS}/MIN
MDC_IDC_SET_BRADY_MAX_SENSOR_RATE: 130 {BEATS}/MIN
MDC_IDC_SET_BRADY_MAX_TRACKING_RATE: 130 {BEATS}/MIN
MDC_IDC_SET_BRADY_MODE: NORMAL
MDC_IDC_SET_BRADY_PAV_DELAY_LOW: 150 MS
MDC_IDC_SET_BRADY_SAV_DELAY_LOW: 120 MS
MDC_IDC_SET_LEADCHNL_RA_PACING_AMPLITUDE: 1.5 V
MDC_IDC_SET_LEADCHNL_RA_PACING_ANODE_ELECTRODE_1: NORMAL
MDC_IDC_SET_LEADCHNL_RA_PACING_ANODE_LOCATION_1: NORMAL
MDC_IDC_SET_LEADCHNL_RA_PACING_CAPTURE_MODE: NORMAL
MDC_IDC_SET_LEADCHNL_RA_PACING_CATHODE_ELECTRODE_1: NORMAL
MDC_IDC_SET_LEADCHNL_RA_PACING_CATHODE_LOCATION_1: NORMAL
MDC_IDC_SET_LEADCHNL_RA_PACING_POLARITY: NORMAL
MDC_IDC_SET_LEADCHNL_RA_PACING_PULSEWIDTH: 0.4 MS
MDC_IDC_SET_LEADCHNL_RA_SENSING_ANODE_ELECTRODE_1: NORMAL
MDC_IDC_SET_LEADCHNL_RA_SENSING_ANODE_LOCATION_1: NORMAL
MDC_IDC_SET_LEADCHNL_RA_SENSING_CATHODE_ELECTRODE_1: NORMAL
MDC_IDC_SET_LEADCHNL_RA_SENSING_CATHODE_LOCATION_1: NORMAL
MDC_IDC_SET_LEADCHNL_RA_SENSING_POLARITY: NORMAL
MDC_IDC_SET_LEADCHNL_RA_SENSING_SENSITIVITY: 0.18 MV
MDC_IDC_SET_LEADCHNL_RV_PACING_AMPLITUDE: 2 V
MDC_IDC_SET_LEADCHNL_RV_PACING_ANODE_ELECTRODE_1: NORMAL
MDC_IDC_SET_LEADCHNL_RV_PACING_ANODE_LOCATION_1: NORMAL
MDC_IDC_SET_LEADCHNL_RV_PACING_CAPTURE_MODE: NORMAL
MDC_IDC_SET_LEADCHNL_RV_PACING_CATHODE_ELECTRODE_1: NORMAL
MDC_IDC_SET_LEADCHNL_RV_PACING_CATHODE_LOCATION_1: NORMAL
MDC_IDC_SET_LEADCHNL_RV_PACING_POLARITY: NORMAL
MDC_IDC_SET_LEADCHNL_RV_PACING_PULSEWIDTH: 0.4 MS
MDC_IDC_SET_LEADCHNL_RV_SENSING_ANODE_ELECTRODE_1: NORMAL
MDC_IDC_SET_LEADCHNL_RV_SENSING_ANODE_LOCATION_1: NORMAL
MDC_IDC_SET_LEADCHNL_RV_SENSING_CATHODE_ELECTRODE_1: NORMAL
MDC_IDC_SET_LEADCHNL_RV_SENSING_CATHODE_LOCATION_1: NORMAL
MDC_IDC_SET_LEADCHNL_RV_SENSING_POLARITY: NORMAL
MDC_IDC_SET_LEADCHNL_RV_SENSING_SENSITIVITY: 4 MV
MDC_IDC_SET_ZONE_DETECTION_INTERVAL: 333.33 MS
MDC_IDC_SET_ZONE_DETECTION_INTERVAL: 342.86 MS
MDC_IDC_SET_ZONE_TYPE: NORMAL
MDC_IDC_SET_ZONE_TYPE: NORMAL
MDC_IDC_STAT_AT_BURDEN_PERCENT: 0 %
MDC_IDC_STAT_AT_DTM_END: NORMAL
MDC_IDC_STAT_AT_DTM_START: NORMAL
MDC_IDC_STAT_AT_MODE_SW_COUNT: 58
MDC_IDC_STAT_BRADY_AP_VP_PERCENT: 3 %
MDC_IDC_STAT_BRADY_AP_VS_PERCENT: 88 %
MDC_IDC_STAT_BRADY_AS_VP_PERCENT: 1 %
MDC_IDC_STAT_BRADY_AS_VS_PERCENT: 8 %
MDC_IDC_STAT_BRADY_DTM_END: NORMAL
MDC_IDC_STAT_BRADY_DTM_START: NORMAL
MDC_IDC_STAT_BRADY_RA_PERCENT_PACED: 91 %
MDC_IDC_STAT_BRADY_RV_PERCENT_PACED: 4 %
MDC_IDC_STAT_EPISODE_RECENT_COUNT: 1
MDC_IDC_STAT_EPISODE_RECENT_COUNT: 8
MDC_IDC_STAT_EPISODE_RECENT_COUNT_DTM_END: NORMAL
MDC_IDC_STAT_EPISODE_RECENT_COUNT_DTM_END: NORMAL
MDC_IDC_STAT_EPISODE_RECENT_COUNT_DTM_START: NORMAL
MDC_IDC_STAT_EPISODE_RECENT_COUNT_DTM_START: NORMAL
MDC_IDC_STAT_EPISODE_TYPE: NORMAL
MDC_IDC_STAT_EPISODE_TYPE: NORMAL

## 2019-04-09 DIAGNOSIS — I48.0 PAROXYSMAL A-FIB (H): ICD-10-CM

## 2019-04-09 RX ORDER — WARFARIN SODIUM 3 MG/1
TABLET ORAL
Qty: 100 TABLET | Refills: 3 | Status: SHIPPED | OUTPATIENT
Start: 2019-04-09 | End: 2020-03-03

## 2019-05-09 ENCOUNTER — ANTICOAGULATION THERAPY VISIT (OUTPATIENT)
Dept: ANTICOAGULATION | Facility: CLINIC | Age: 84
End: 2019-05-09
Payer: COMMERCIAL

## 2019-05-09 DIAGNOSIS — I80.209 PHLEBITIS AND THROMBOPHLEBITIS OF DEEP VEINS OF LOWER EXTREMITIES (H): ICD-10-CM

## 2019-05-09 LAB — INR POINT OF CARE: 2.4 (ref 0.86–1.14)

## 2019-05-09 PROCEDURE — 85610 PROTHROMBIN TIME: CPT | Mod: QW

## 2019-05-09 PROCEDURE — 99207 ZZC NO CHARGE NURSE ONLY: CPT

## 2019-05-09 PROCEDURE — 36416 COLLJ CAPILLARY BLOOD SPEC: CPT

## 2019-05-09 NOTE — PROGRESS NOTES
ANTICOAGULATION FOLLOW-UP CLINIC VISIT    Patient Name:  Madie Silva  Date:  2019  Contact Type:  Face to Face    SUBJECTIVE:  Patient Findings     Comments:   The patient was assessed for diet, medication, and activity level changes, missed or extra doses, bruising or bleeding, with no problem findings.          Clinical Outcomes     Negatives:   Major bleeding event, Thromboembolic event, Anticoagulation-related hospital admission, Anticoagulation-related ED visit, Anticoagulation-related fatality    Comments:   The patient was assessed for diet, medication, and activity level changes, missed or extra doses, bruising or bleeding, with no problem findings.             OBJECTIVE    INR Protime   Date Value Ref Range Status   2019 2.4 (A) 0.86 - 1.14 Final       ASSESSMENT / PLAN  No question data found.  Anticoagulation Summary  As of 2019    INR goal:   2.0-3.0   TTR:   83.4 % (3.4 y)   INR used for dosin.4 (2019)   Warfarin maintenance plan:   4.5 mg (3 mg x 1.5) every Tue, Thu, Sat; 3 mg (3 mg x 1) all other days   Full warfarin instructions:   4.5 mg every Tue, Thu, Sat; 3 mg all other days   Weekly warfarin total:   25.5 mg   No change documented:   Connie Hickman RN   Plan last modified:   Stella Cha RN (10/25/2018)   Next INR check:   2019   Target end date:       Indications    Long-term (current) use of anticoagulants [Z79.01] [Z79.01]  Phlebitis and thrombophlebitis of deep veins of lower extremities (H) [I80.209]  Acute myocardial infarction  initial episode of care (H) (Resolved) [I21.9]             Anticoagulation Episode Summary     INR check location:       Preferred lab:       Send INR reminders to:   [facility]    Comments:         Anticoagulation Care Providers     Provider Role Specialty Phone number    De Obregon MD [facility] Internal Medicine [phone]            See the Encounter Report to view Anticoagulation Flowsheet and Dosing  Calendar (Go to Encounters tab in chart review, and find the Anticoagulation Therapy Visit)        Connie Hickman RN

## 2019-05-15 ENCOUNTER — DOCUMENTATION ONLY (OUTPATIENT)
Dept: OTHER | Facility: CLINIC | Age: 84
End: 2019-05-15

## 2019-06-06 DIAGNOSIS — E03.9 ACQUIRED HYPOTHYROIDISM: ICD-10-CM

## 2019-06-06 DIAGNOSIS — E11.21 TYPE 2 DIABETES MELLITUS WITH DIABETIC NEPHROPATHY, WITHOUT LONG-TERM CURRENT USE OF INSULIN (H): ICD-10-CM

## 2019-06-06 DIAGNOSIS — E78.5 HYPERLIPIDEMIA LDL GOAL <100: ICD-10-CM

## 2019-06-06 DIAGNOSIS — I10 BENIGN ESSENTIAL HYPERTENSION: ICD-10-CM

## 2019-06-06 LAB
ALT SERPL W P-5'-P-CCNC: 25 U/L (ref 0–50)
ANION GAP SERPL CALCULATED.3IONS-SCNC: 9 MMOL/L (ref 3–14)
BUN SERPL-MCNC: 20 MG/DL (ref 7–30)
CALCIUM SERPL-MCNC: 9 MG/DL (ref 8.5–10.1)
CHLORIDE SERPL-SCNC: 107 MMOL/L (ref 94–109)
CHOLEST SERPL-MCNC: 214 MG/DL
CO2 SERPL-SCNC: 23 MMOL/L (ref 20–32)
CREAT SERPL-MCNC: 0.9 MG/DL (ref 0.52–1.04)
CREAT UR-MCNC: 60 MG/DL
GFR SERPL CREATININE-BSD FRML MDRD: 56 ML/MIN/{1.73_M2}
GLUCOSE SERPL-MCNC: 131 MG/DL (ref 70–99)
HBA1C MFR BLD: 6.7 % (ref 0–5.6)
HDLC SERPL-MCNC: 61 MG/DL
LDLC SERPL CALC-MCNC: 99 MG/DL
MICROALBUMIN UR-MCNC: 19 MG/L
MICROALBUMIN/CREAT UR: 31.27 MG/G CR (ref 0–25)
NONHDLC SERPL-MCNC: 153 MG/DL
POTASSIUM SERPL-SCNC: 4.3 MMOL/L (ref 3.4–5.3)
SODIUM SERPL-SCNC: 139 MMOL/L (ref 133–144)
TRIGL SERPL-MCNC: 270 MG/DL
TSH SERPL DL<=0.005 MIU/L-ACNC: 1.26 MU/L (ref 0.4–4)

## 2019-06-06 PROCEDURE — 84460 ALANINE AMINO (ALT) (SGPT): CPT | Performed by: INTERNAL MEDICINE

## 2019-06-06 PROCEDURE — 36415 COLL VENOUS BLD VENIPUNCTURE: CPT | Performed by: INTERNAL MEDICINE

## 2019-06-06 PROCEDURE — 82043 UR ALBUMIN QUANTITATIVE: CPT | Performed by: INTERNAL MEDICINE

## 2019-06-06 PROCEDURE — 84443 ASSAY THYROID STIM HORMONE: CPT | Performed by: INTERNAL MEDICINE

## 2019-06-06 PROCEDURE — 83036 HEMOGLOBIN GLYCOSYLATED A1C: CPT | Performed by: INTERNAL MEDICINE

## 2019-06-06 PROCEDURE — 80048 BASIC METABOLIC PNL TOTAL CA: CPT | Performed by: INTERNAL MEDICINE

## 2019-06-06 PROCEDURE — 80061 LIPID PANEL: CPT | Performed by: INTERNAL MEDICINE

## 2019-06-13 ENCOUNTER — OFFICE VISIT (OUTPATIENT)
Dept: INTERNAL MEDICINE | Facility: CLINIC | Age: 84
End: 2019-06-13
Payer: COMMERCIAL

## 2019-06-13 VITALS
OXYGEN SATURATION: 97 % | HEIGHT: 65 IN | TEMPERATURE: 97.3 F | DIASTOLIC BLOOD PRESSURE: 58 MMHG | RESPIRATION RATE: 15 BRPM | BODY MASS INDEX: 26.79 KG/M2 | SYSTOLIC BLOOD PRESSURE: 120 MMHG | HEART RATE: 73 BPM | WEIGHT: 160.8 LBS

## 2019-06-13 DIAGNOSIS — E03.9 ACQUIRED HYPOTHYROIDISM: ICD-10-CM

## 2019-06-13 DIAGNOSIS — N18.30 CKD (CHRONIC KIDNEY DISEASE) STAGE 3, GFR 30-59 ML/MIN (H): ICD-10-CM

## 2019-06-13 DIAGNOSIS — I35.0 NONRHEUMATIC AORTIC VALVE STENOSIS: ICD-10-CM

## 2019-06-13 DIAGNOSIS — M35.3 POLYMYALGIA RHEUMATICA (H): ICD-10-CM

## 2019-06-13 DIAGNOSIS — K44.9 HIATAL HERNIA: ICD-10-CM

## 2019-06-13 DIAGNOSIS — E11.21 TYPE 2 DIABETES MELLITUS WITH DIABETIC NEPHROPATHY, WITHOUT LONG-TERM CURRENT USE OF INSULIN (H): Primary | ICD-10-CM

## 2019-06-13 DIAGNOSIS — I48.0 PAROXYSMAL ATRIAL FIBRILLATION (H): ICD-10-CM

## 2019-06-13 DIAGNOSIS — K22.719 BARRETT'S ESOPHAGUS WITH DYSPLASIA: ICD-10-CM

## 2019-06-13 PROCEDURE — 99214 OFFICE O/P EST MOD 30 MIN: CPT | Performed by: INTERNAL MEDICINE

## 2019-06-13 RX ORDER — LEVOTHYROXINE SODIUM 50 UG/1
50 TABLET ORAL DAILY
Qty: 90 TABLET | Refills: 3 | Status: SHIPPED | OUTPATIENT
Start: 2019-06-13 | End: 2020-01-01

## 2019-06-13 ASSESSMENT — MIFFLIN-ST. JEOR: SCORE: 1155.26

## 2019-06-13 NOTE — PROGRESS NOTES
Subjective     Madie Silva is a 89 year old female who presents to clinic today for the following health issues:    HPI   Diabetes Follow-up      How often are you checking your blood sugar? Not at all    What time of day are you checking your blood sugars (select all that apply)?  None    Have you had any blood sugars above 200?  No    Have you had any blood sugars below 70?  No    What symptoms do you notice when your blood sugar is low?  None    What concerns do you have today about your diabetes? None     Do you have any of these symptoms? (Select all that apply)  No numbness or tingling in feet.  No redness, sores or blisters on feet.  No complaints of excessive thirst.  No reports of blurry vision.  No significant changes to weight.     Have you had a diabetic eye exam in the last 12 months? No DUE     BP Readings from Last 2 Encounters:   03/28/19 122/58   12/13/18 124/58     Hemoglobin A1C (%)   Date Value   06/06/2019 6.7 (H)   12/06/2018 6.6 (H)     LDL Cholesterol Calculated (mg/dL)   Date Value   06/06/2019 99   04/18/2018 105 (H)       Diabetes Management Resources  Hypertension Follow-up      Do you check your blood pressure regularly outside of the clinic? Yes     Are you following a low salt diet? No    Are your blood pressures ever more than 140 on the top number (systolic) OR more   than 90 on the bottom number (diastolic), for example 140/90? No   Napping more, wonders if from lower blood pressure.   Gets BP checked with INR checks.    Hypothyroidism Follow-up      Since last visit, patient describes the following symptoms: Weight stable, no hair loss, no skin changes, no constipation, no loose stools      Amount of exercise or physical activity: None- does yard work     Problems taking medications regularly: No    Medication side effects: none    Diet: regular (no restrictions)      Problem list and histories reviewed & adjusted, as indicated.  Additional history: as documented    Additional  "issues to address:  1.  Wakes with severe pain in L middle finger, feels it's arthritis.   Wakes her up, variable, worse in cold without color change.   Cannot say that it's just one joint in the finger.   Has tried tylenol at bedtime without much change.  aspercream tried.     2.  Bug bites on neck and ankles from bad gnats     ROS:  Overall, feels much better than last year  Fatigue, but stamina may be a bit better  Has to take a lot of breaks.   Try to stack some wood, but couldn't do it.  Mild dyspnea on exertion, probably some better.  Not coughing.    Constitutional, HEENT, cardiovascular, pulmonary, gi and gu systems are negative, except as otherwise noted.    OBJECTIVE:                                                    /58   Pulse 73   Temp 97.3  F (36.3  C) (Oral)   Resp 15   Ht 1.651 m (5' 5\")   Wt 72.9 kg (160 lb 12.8 oz)   SpO2 97%   BMI 26.76 kg/m    Body mass index is 26.76 kg/m .   No apparent distress  Neck and thyroid normal   1-2/6 systolic ejection type murmur heard best at the base   No edema  Marked DJD PIPs and DIPs, no focal tenderness over L middle finger  Normal pulses  Mentation excellent       ASSESSMENT:                                                    DIABETES TYPE 2, non-insulin dependent, controlled. Associated with the following: Renal Complications: Diabetic Nephropathy  HYPERTENSION, controlled. Associated with the following complications: CKD and Diabetes  HYPOTHYROIDISM, controlled/euthyroid     HYPERLIPIDEMIA,    Stable on lipitor                           CHRONIC KIDNEY DISEASE Stage  3 ; stable. 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    Aortic stenosis.   Last echo 8/2018 was worsened.  Saw cardiology 8/2018 and inteventions deferred.  Suspect her dyspnea on exertion is mostly related to this problem.  Osteoarthritis, with more significant pain L middle finger  CAD, no active symptoms or problems  Neuralgia, " stable.   Rule out some sedation from gabapentin.    PLAN:                                                      1.  MEDICATIONS:        - Trial of capsaicin 2-3 times daily over L middle finger.  Use a tiny dab.         - change gabapentin to take all 3 pills at bedtime, and don't take during the day       - Continue other medications without change  2.  Call with an update in 2-4 weeks:   - has stopping the morning gabapentin helped fatigue?   - has the capsaicin helped?  3.  Schedule follow-up echocardiogram in august, then see cardiology.  -->   Message sent to cardiology   4.  Check fasting labs in 6 months, then make an office appointment with MD to review the results.     De Obregon MD  [facility]

## 2019-06-13 NOTE — PATIENT INSTRUCTIONS
PLAN:                                                      1.  MEDICATIONS:        - Trial of capsaicin 2-3 times daily over L middle finger.  Use a tiny dab.         - change gabapentin to take all 3 pills at bedtime, and don't take during the day       - Continue other medications without change  2.  Call with an update in 2-4 weeks:   - has stopping the morning gabapentin helped fatigue?   - has the capsaicin helped?  3.  Schedule follow-up echocardiogram in august, then see cardiology  4.  Check fasting labs in 6 months, then make an office appointment with MD to review the results.

## 2019-06-14 DIAGNOSIS — I35.0 NONRHEUMATIC AORTIC VALVE STENOSIS: Primary | ICD-10-CM

## 2019-06-20 ENCOUNTER — ANTICOAGULATION THERAPY VISIT (OUTPATIENT)
Dept: ANTICOAGULATION | Facility: CLINIC | Age: 84
End: 2019-06-20
Payer: COMMERCIAL

## 2019-06-20 DIAGNOSIS — I80.209 PHLEBITIS AND THROMBOPHLEBITIS OF DEEP VEINS OF LOWER EXTREMITIES (H): ICD-10-CM

## 2019-06-20 DIAGNOSIS — Z79.01 LONG TERM CURRENT USE OF ANTICOAGULANT THERAPY: ICD-10-CM

## 2019-06-20 LAB — INR POINT OF CARE: 3.1 (ref 0.86–1.14)

## 2019-06-20 PROCEDURE — 99207 ZZC NO CHARGE NURSE ONLY: CPT

## 2019-06-20 PROCEDURE — 36416 COLLJ CAPILLARY BLOOD SPEC: CPT

## 2019-06-20 PROCEDURE — 85610 PROTHROMBIN TIME: CPT | Mod: QW

## 2019-06-20 NOTE — PROGRESS NOTES
ANTICOAGULATION FOLLOW-UP CLINIC VISIT    Patient Name:  Madie Silva  Date:  6/20/2019  Contact Type:  Face to Face    SUBJECTIVE:  Patient Findings     Comments:   The patient was assessed for diet, medication, and activity level changes, missed or extra doses, bruising or bleeding, with no problem findings.          Clinical Outcomes     Negatives:   Major bleeding event, Thromboembolic event, Anticoagulation-related hospital admission, Anticoagulation-related ED visit, Anticoagulation-related fatality    Comments:   The patient was assessed for diet, medication, and activity level changes, missed or extra doses, bruising or bleeding, with no problem findings.             OBJECTIVE    INR Protime   Date Value Ref Range Status   06/20/2019 3.1 (A) 0.86 - 1.14 Final       ASSESSMENT / PLAN  No question data found.  Anticoagulation Summary  As of 6/20/2019    INR goal:   2.0-3.0   TTR:   83.5 % (3.5 y)   INR used for dosing:   3.1! (6/20/2019)   Warfarin maintenance plan:   4.5 mg (3 mg x 1.5) every Tue, Thu, Sat; 3 mg (3 mg x 1) all other days   Full warfarin instructions:   6/20: 3 mg; Otherwise 4.5 mg every Tue, Thu, Sat; 3 mg all other days   Weekly warfarin total:   25.5 mg   Plan last modified:   Stella Cha RN (10/25/2018)   Next INR check:   8/1/2019   Target end date:       Indications    Long-term (current) use of anticoagulants [Z79.01] [Z79.01]  Phlebitis and thrombophlebitis of deep veins of lower extremities (H) [I80.209]  Acute myocardial infarction  initial episode of care (H) (Resolved) [I21.9]             Anticoagulation Episode Summary     INR check location:       Preferred lab:       Send INR reminders to:   [facility]    Comments:         Anticoagulation Care Providers     Provider Role Specialty Phone number    De Obregon MD [facility] Internal Medicine [phone]            See the Encounter Report to view Anticoagulation Flowsheet and Dosing Calendar  (Go to Encounters tab in chart review, and find the Anticoagulation Therapy Visit)        Connie Hickman RN

## 2019-07-01 ENCOUNTER — ANCILLARY PROCEDURE (OUTPATIENT)
Dept: CARDIOLOGY | Facility: CLINIC | Age: 84
End: 2019-07-01
Attending: INTERNAL MEDICINE
Payer: COMMERCIAL

## 2019-07-01 DIAGNOSIS — I48.91 ATRIAL FIBRILLATION (H): ICD-10-CM

## 2019-07-01 PROCEDURE — 93296 REM INTERROG EVL PM/IDS: CPT | Performed by: INTERNAL MEDICINE

## 2019-07-01 PROCEDURE — 93294 REM INTERROG EVL PM/LDLS PM: CPT | Performed by: INTERNAL MEDICINE

## 2019-07-02 LAB
MDC_IDC_LEAD_IMPLANT_DT: NORMAL
MDC_IDC_LEAD_IMPLANT_DT: NORMAL
MDC_IDC_LEAD_LOCATION: NORMAL
MDC_IDC_LEAD_LOCATION: NORMAL
MDC_IDC_LEAD_MFG: NORMAL
MDC_IDC_LEAD_MFG: NORMAL
MDC_IDC_LEAD_MODEL: NORMAL
MDC_IDC_LEAD_MODEL: NORMAL
MDC_IDC_LEAD_POLARITY_TYPE: NORMAL
MDC_IDC_LEAD_POLARITY_TYPE: NORMAL
MDC_IDC_LEAD_SERIAL: NORMAL
MDC_IDC_LEAD_SERIAL: NORMAL
MDC_IDC_MSMT_BATTERY_DTM: NORMAL
MDC_IDC_MSMT_BATTERY_IMPEDANCE: 1471 OHM
MDC_IDC_MSMT_BATTERY_REMAINING_LONGEVITY: 46 MO
MDC_IDC_MSMT_BATTERY_STATUS: NORMAL
MDC_IDC_MSMT_BATTERY_VOLTAGE: 2.76 V
MDC_IDC_MSMT_LEADCHNL_RA_IMPEDANCE_VALUE: 459 OHM
MDC_IDC_MSMT_LEADCHNL_RA_PACING_THRESHOLD_AMPLITUDE: 0.25 V
MDC_IDC_MSMT_LEADCHNL_RA_PACING_THRESHOLD_PULSEWIDTH: 0.4 MS
MDC_IDC_MSMT_LEADCHNL_RV_IMPEDANCE_VALUE: 616 OHM
MDC_IDC_MSMT_LEADCHNL_RV_PACING_THRESHOLD_AMPLITUDE: 0.62 V
MDC_IDC_MSMT_LEADCHNL_RV_PACING_THRESHOLD_PULSEWIDTH: 0.4 MS
MDC_IDC_PG_IMPLANT_DTM: NORMAL
MDC_IDC_PG_MFG: NORMAL
MDC_IDC_PG_MODEL: NORMAL
MDC_IDC_PG_SERIAL: NORMAL
MDC_IDC_PG_TYPE: NORMAL
MDC_IDC_SESS_CLINIC_NAME: NORMAL
MDC_IDC_SESS_DTM: NORMAL
MDC_IDC_SESS_TYPE: NORMAL
MDC_IDC_SET_BRADY_AT_MODE_SWITCH_MODE: NORMAL
MDC_IDC_SET_BRADY_AT_MODE_SWITCH_RATE: 175 {BEATS}/MIN
MDC_IDC_SET_BRADY_LOWRATE: 70 {BEATS}/MIN
MDC_IDC_SET_BRADY_MAX_SENSOR_RATE: 130 {BEATS}/MIN
MDC_IDC_SET_BRADY_MAX_TRACKING_RATE: 130 {BEATS}/MIN
MDC_IDC_SET_BRADY_MODE: NORMAL
MDC_IDC_SET_BRADY_PAV_DELAY_LOW: 150 MS
MDC_IDC_SET_BRADY_SAV_DELAY_LOW: 120 MS
MDC_IDC_SET_LEADCHNL_RA_PACING_AMPLITUDE: 1.5 V
MDC_IDC_SET_LEADCHNL_RA_PACING_ANODE_ELECTRODE_1: NORMAL
MDC_IDC_SET_LEADCHNL_RA_PACING_ANODE_LOCATION_1: NORMAL
MDC_IDC_SET_LEADCHNL_RA_PACING_CAPTURE_MODE: NORMAL
MDC_IDC_SET_LEADCHNL_RA_PACING_CATHODE_ELECTRODE_1: NORMAL
MDC_IDC_SET_LEADCHNL_RA_PACING_CATHODE_LOCATION_1: NORMAL
MDC_IDC_SET_LEADCHNL_RA_PACING_POLARITY: NORMAL
MDC_IDC_SET_LEADCHNL_RA_PACING_PULSEWIDTH: 0.4 MS
MDC_IDC_SET_LEADCHNL_RA_SENSING_ANODE_ELECTRODE_1: NORMAL
MDC_IDC_SET_LEADCHNL_RA_SENSING_ANODE_LOCATION_1: NORMAL
MDC_IDC_SET_LEADCHNL_RA_SENSING_CATHODE_ELECTRODE_1: NORMAL
MDC_IDC_SET_LEADCHNL_RA_SENSING_CATHODE_LOCATION_1: NORMAL
MDC_IDC_SET_LEADCHNL_RA_SENSING_POLARITY: NORMAL
MDC_IDC_SET_LEADCHNL_RA_SENSING_SENSITIVITY: 0.35 MV
MDC_IDC_SET_LEADCHNL_RV_PACING_AMPLITUDE: 2 V
MDC_IDC_SET_LEADCHNL_RV_PACING_ANODE_ELECTRODE_1: NORMAL
MDC_IDC_SET_LEADCHNL_RV_PACING_ANODE_LOCATION_1: NORMAL
MDC_IDC_SET_LEADCHNL_RV_PACING_CAPTURE_MODE: NORMAL
MDC_IDC_SET_LEADCHNL_RV_PACING_CATHODE_ELECTRODE_1: NORMAL
MDC_IDC_SET_LEADCHNL_RV_PACING_CATHODE_LOCATION_1: NORMAL
MDC_IDC_SET_LEADCHNL_RV_PACING_POLARITY: NORMAL
MDC_IDC_SET_LEADCHNL_RV_PACING_PULSEWIDTH: 0.4 MS
MDC_IDC_SET_LEADCHNL_RV_SENSING_ANODE_ELECTRODE_1: NORMAL
MDC_IDC_SET_LEADCHNL_RV_SENSING_ANODE_LOCATION_1: NORMAL
MDC_IDC_SET_LEADCHNL_RV_SENSING_CATHODE_ELECTRODE_1: NORMAL
MDC_IDC_SET_LEADCHNL_RV_SENSING_CATHODE_LOCATION_1: NORMAL
MDC_IDC_SET_LEADCHNL_RV_SENSING_POLARITY: NORMAL
MDC_IDC_SET_LEADCHNL_RV_SENSING_SENSITIVITY: 4 MV
MDC_IDC_SET_ZONE_DETECTION_INTERVAL: 333.33 MS
MDC_IDC_SET_ZONE_DETECTION_INTERVAL: 342.86 MS
MDC_IDC_SET_ZONE_TYPE: NORMAL
MDC_IDC_SET_ZONE_TYPE: NORMAL
MDC_IDC_STAT_AT_BURDEN_PERCENT: 0 %
MDC_IDC_STAT_AT_DTM_END: NORMAL
MDC_IDC_STAT_AT_DTM_START: NORMAL
MDC_IDC_STAT_AT_MODE_SW_COUNT: 77
MDC_IDC_STAT_BRADY_AP_VP_PERCENT: 3 %
MDC_IDC_STAT_BRADY_AP_VS_PERCENT: 89 %
MDC_IDC_STAT_BRADY_AS_VP_PERCENT: 1 %
MDC_IDC_STAT_BRADY_AS_VS_PERCENT: 8 %
MDC_IDC_STAT_BRADY_DTM_END: NORMAL
MDC_IDC_STAT_BRADY_DTM_START: NORMAL
MDC_IDC_STAT_EPISODE_RECENT_COUNT: 10
MDC_IDC_STAT_EPISODE_RECENT_COUNT: 4
MDC_IDC_STAT_EPISODE_RECENT_COUNT_DTM_END: NORMAL
MDC_IDC_STAT_EPISODE_RECENT_COUNT_DTM_END: NORMAL
MDC_IDC_STAT_EPISODE_RECENT_COUNT_DTM_START: NORMAL
MDC_IDC_STAT_EPISODE_RECENT_COUNT_DTM_START: NORMAL
MDC_IDC_STAT_EPISODE_TYPE: NORMAL
MDC_IDC_STAT_EPISODE_TYPE: NORMAL

## 2019-07-10 ENCOUNTER — HOSPITAL ENCOUNTER (OUTPATIENT)
Dept: CARDIOLOGY | Facility: CLINIC | Age: 84
Discharge: HOME | End: 2019-07-10
Attending: PHYSICIAN ASSISTANT | Admitting: PHYSICIAN ASSISTANT
Payer: COMMERCIAL

## 2019-07-10 DIAGNOSIS — I35.0 NONRHEUMATIC AORTIC VALVE STENOSIS: ICD-10-CM

## 2019-07-10 PROCEDURE — 93306 TTE W/DOPPLER COMPLETE: CPT | Mod: 26 | Performed by: INTERNAL MEDICINE

## 2019-07-10 PROCEDURE — 93306 TTE W/DOPPLER COMPLETE: CPT

## 2019-07-11 ENCOUNTER — TELEPHONE (OUTPATIENT)
Dept: CARDIOLOGY | Facility: CLINIC | Age: 84
End: 2019-07-11

## 2019-07-11 NOTE — TELEPHONE ENCOUNTER
Tatiana Titus PA-C [facility] Heart Team 3             Please let her know her echo shows likely moderate to severe aortic stenosis. She is schedule to see Dr. Cardenas next month (former [facility] patient). If she's having worsening trouble breathing she may want to come in sooner for an appointment to discuss. She could see any new cardiologist. Thanks. Raj      Called patient to discuss. Unable to reach. Left  requesting callback.   Darcy Burton, RN Care Coordinator    Addendum: Able to reach patient on second attempt. Endorses worsening SOB outside which she attributes to humidity. States she always feels better when she's indoors. Agrees to call if sx worsen prior to OV with Dr. Cardenas.

## 2019-08-01 ENCOUNTER — ANTICOAGULATION THERAPY VISIT (OUTPATIENT)
Dept: ANTICOAGULATION | Facility: CLINIC | Age: 84
End: 2019-08-01
Payer: COMMERCIAL

## 2019-08-01 LAB — INR POINT OF CARE: 2.5 (ref 0.86–1.14)

## 2019-08-01 PROCEDURE — 85610 PROTHROMBIN TIME: CPT | Mod: QW

## 2019-08-01 PROCEDURE — 36416 COLLJ CAPILLARY BLOOD SPEC: CPT

## 2019-08-01 NOTE — PROGRESS NOTES
ANTICOAGULATION FOLLOW-UP CLINIC VISIT    Patient Name:  Madie Silva  Date:  2019  Contact Type:  Face to Face    SUBJECTIVE:  Patient Findings     Comments:   The patient was assessed for diet, medication, and activity level changes, missed or extra doses, bruising or bleeding, with no problem findings.          Clinical Outcomes     Comments:   The patient was assessed for diet, medication, and activity level changes, missed or extra doses, bruising or bleeding, with no problem findings.             OBJECTIVE    INR Protime   Date Value Ref Range Status   2019 2.5 (A) 0.86 - 1.14 Final       ASSESSMENT / PLAN  INR assessment THER    Recheck INR In: 4 WEEKS    INR Location Clinic      Anticoagulation Summary  As of 2019    INR goal:   2.0-3.0   TTR:   83.5 % (3.6 y)   INR used for dosin.5 (2019)   Warfarin maintenance plan:   4.5 mg (3 mg x 1.5) every Tue, Thu, Sat; 3 mg (3 mg x 1) all other days   Full warfarin instructions:   4.5 mg every Tue, Thu, Sat; 3 mg all other days   Weekly warfarin total:   25.5 mg   Plan last modified:   Stella Cha, RN (10/25/2018)   Next INR check:   2019   Target end date:       Indications    Long-term (current) use of anticoagulants [Z79.01] [Z79.01]  Phlebitis and thrombophlebitis of deep veins of lower extremities (H) [I80.209]  Acute myocardial infarction  initial episode of care (H) (Resolved) [I21.9]             Anticoagulation Episode Summary     INR check location:       Preferred lab:       Send INR reminders to:   [facility]    Comments:         Anticoagulation Care Providers     Provider Role Specialty Phone number    De Obregon MD [facility] Internal Medicine [phone]            See the Encounter Report to view Anticoagulation Flowsheet and Dosing Calendar (Go to Encounters tab in chart review, and find the Anticoagulation Therapy Visit)    Dosage adjustment made based on physician directed care  plan.    Kathleen Bolaños RN

## 2019-08-27 NOTE — PROGRESS NOTES
HPI and Plan:     I had the pleasure seeing Ms. Silva in consultation at the [facility] physicians heart today.  She is a very pleasant 89-year-old female who was previously followed by Dr. Red who has now retired. She was last seen by Tatiana Titus PA-C, in 08/2018.    Ms. Silva has a history of coronary artery disease and is status post coronary bypass grafting x4 in November 2011 with a LIMA to the LAD, and saphenous venous grafting to the ramus intermedius, first obtuse marginal and PDA.  She is also status post permanent pacemaker implantation due to postoperative bradycardia.  Her most recent interrogation indicated that she is predominantly atrial paced.  He does carry a diagnosis of post-op atrial fibrillation and is chronic warfarin therapy.      She last underwent an ischemic evaluation in March 2018, at which point a nuclear stress test was negative for ischemia.  At that point he was complaining of exertional dyspnea which appears to have persisted over the past year or so.  Her echocardiogram at the same time demonstrated evidence of moderate aortic stenosis, and it was thought that this may be contributing to her symptoms.      Further evaluation with right and left heart catheterization and potentially TAVR referral was discussed, but the patient at that time elected to defer  the work-up given her excellent quality of life.    Today she presents feeling well overall from a cardiovascular standpoint.  She does, however, experience dyspnea upon walking less than a block.  This appears to be stable from the time when she was seen last year in follow-up.  It does, however, limit her activities significantly.  She denies any syncope or presyncope.  She denies any chest discomfort, PND or orthopnea.      Her physical exam is dictated below.    Her most recent echocardiogram on 7/10/2019 demonstrated normal left ventricular systolic function with moderate to severe aortic stenosis.  The  mean gradient was 25 mmHg with a peak velocity of 3.2 m/s and a calculated valve area of 0.8 cm .  Stroke-volume index was within normal limits at 39.3 mL/m .    IMPRESSION:  1) Severe aortic stenosis, symptomatic  2) CAD s/p CABG x 4 2011  3) AF s/p PPM implantation  4) History of post-op DVT    PLAN  -The patient appears to have severe symptomatic aortic stenosis.  I had a detailed discussion with her regarding the indications and treatment options for aortic valve replacement.  In her case TAVR would be the preferred option.  I discussed the process with her in detail and recommended consultation with our structural heart team.    - She was initially quite reluctant to consider any invasive procedures to address her aortic stenosis but after our discussion was agreeable to at least scheduling an appointment with the structural heart team to discuss her options.  -In the interim she will continue her current medications as prescribed.  -It was a pleasure seeing her in follow-up today.      CURRENT MEDICATIONS:  Current Outpatient Medications   Medication Sig Dispense Refill     acetaminophen (TYLENOL ARTHRITIS PAIN) 650 MG CR tablet Take 2 tablets (1,300 mg) by mouth 2 times daily       aspirin EC 81 MG EC tablet Take 1 tablet by mouth daily.       atorvastatin (LIPITOR) 80 MG tablet Take 1 tablet (80 mg) by mouth daily 90 tablet 3     calcium 600 MG tablet Take 1 tablet by mouth 2 times daily.       Cholecalciferol (VITAMIN D-400 PO) Take by mouth daily       co-enzyme Q-10 (COENZYME Q-10) 100 MG CAPS Take 1 capsule by mouth daily.       FIBER PO Take by mouth daily       gabapentin (NEURONTIN) 100 MG capsule Take 1 capsule (100 mg) by mouth 3 times daily 270 capsule 3     HYDROcodone-acetaminophen (NORCO) 5-325 MG per tablet Take 0.5-1 tablets by mouth every 6 hours as needed for moderate to severe pain 20 tablet 0     levothyroxine (SYNTHROID/LEVOTHROID) 50 MCG tablet Take 1 tablet (50 mcg) by mouth daily 90  tablet 3     losartan (COZAAR) 50 MG tablet Take 1/2 tablet daily 45 tablet 3     metFORMIN (GLUCOPHAGE) 500 MG tablet TAKE ONE TABLET BY MOUTH DAILY WITH BREAKFAST 180 tablet 3     Multiple Vitamins-Minerals (CENTRUM SILVER) per tablet Take 1 tablet by mouth daily.       Multiple Vitamins-Minerals (PRESERVISION AREDS 2 PO)        nitroGLYCERIN (NITROSTAT) 0.4 MG SL tablet Place 0.4 mg under the tongue every 5 minutes as needed. Up to 3 doses per episode        omeprazole (PRILOSEC) 20 MG DR capsule Take 1 capsule (20 mg) by mouth daily 90 capsule 3     polyethylene glycol (MIRALAX) powder Take 17 g by mouth daily       warfarin (JANTOVEN) 3 MG tablet Take one tablet daily except 1 1/2 tabs on Tues/Thurs/Sat as directed by the anticoagulation clinic 100 tablet 3       ALLERGIES     Allergies   Allergen Reactions     Amoxicillin      hives     Bisphosphonates      Swallowing disorder     Boniva [Ibandronate Sodium] Nausea and Vomiting     Diarrhea, N/V with oral.  IV caused arm to swell      Fosamax [Alendronic Acid]      Severe gastrointestinal reaction     Lisinopril      cough     Niacin      rash     Simvastatin      myalgias       PAST MEDICAL HISTORY:  Past Medical History:   Diagnosis Date     Walters's esophagus 7/09     CHRONIC VERTIGO 9/99     Colonic Adenoma 3/90, 11/98     Costochondritis      Depression      DIABETES MELLITUS TYPE II 10/07     DJD      DVT of Leg, postop 11/09    6 months of warfarin     Gastritis 10/89     GERD      HIATAL HERNIA      HYPERLIPIDEMIA      HYPOTHYROIDISM (aka HASHIMOTO)      Impaired fasting glucose      L Ankle Fracture 10/09     Obesity, unspecified      Osteoporosis, unspecified      PERIPHERAL NEUROPATHY      Polymyalgia rheumatica (H)      Post Herpetic Neuralgia 4/03    R lumbar distribution     Restless leg syndrome      Small vessel cerebrovascular changes 9/99     Stricture and stenosis of esophagus 10/89    Dilated     Syncope     1/06, 8/08, 2/10     VITAMIN D  DEFICIENCY 12/07    per Dr. Petty       PAST SURGICAL HISTORY:  Past Surgical History:   Procedure Laterality Date     BYPASS GRAFT ARTERY CORONARY  11/2/2011    CABG x 4  Dr. EDWIN ACOSTA NONSPECIFIC PROCEDURE  age 14    appendectomy     C NONSPECIFIC PROCEDURE  as a child    T&A     EMBOLECTOMY LOWER EXTREMITY  11/9/2011    EMBOLECTOMY LOWER EXTREMITY; left leg femoral embolectomy.; Surgeon:JOLEEN BOONE; Location:[facility] OR     HYSTERECTOMY, CERVIX STATUS UNKNOWN  1978    partial hysterectomy     SURGICAL HISTORY OF -   10/09    L ankle fracture repair    Dr. Jose Roberto Trevino       FAMILY HISTORY:  Family History   Problem Relation Age of Onset     Alzheimer Disease Sister      Heart Disease Mother      Heart Disease Father      Heart Disease Son      Heart Disease Brother        SOCIAL HISTORY:  Social History     Socioeconomic History     Marital status:      Spouse name: Not on file     Number of children: Not on file     Years of education: Not on file     Highest education level: Not on file   Occupational History     Not on file   Social Needs     Financial resource strain: Not on file     Food insecurity:     Worry: Not on file     Inability: Not on file     Transportation needs:     Medical: Not on file     Non-medical: Not on file   Tobacco Use     Smoking status: Never Smoker     Smokeless tobacco: Never Used   Substance and Sexual Activity     Alcohol use: No     Drug use: No     Sexual activity: Never   Lifestyle     Physical activity:     Days per week: Not on file     Minutes per session: Not on file     Stress: Not on file   Relationships     Social connections:     Talks on phone: Not on file     Gets together: Not on file     Attends Yazdanism service: Not on file     Active member of club or organization: Not on file     Attends meetings of clubs or organizations: Not on file     Relationship status: Not on file     Intimate partner violence:     Fear of current or ex partner: Not on  file     Emotionally abused: Not on file     Physically abused: Not on file     Forced sexual activity: Not on file   Other Topics Concern      Service Not Asked     Blood Transfusions Not Asked     Caffeine Concern No     Occupational Exposure Not Asked     Hobby Hazards Not Asked     Sleep Concern Not Asked     Stress Concern Not Asked     Weight Concern Not Asked     Special Diet No     Back Care Not Asked     Exercise No     Bike Helmet Not Asked     Seat Belt Not Asked     Self-Exams Not Asked     Parent/sibling w/ CABG, MI or angioplasty before 65F 55M? No   Social History Narrative     Not on file       Review of Systems:  Skin:          Eyes:         ENT:         Respiratory:          Cardiovascular:         Gastroenterology:        Genitourinary:         Musculoskeletal:         Neurologic:         Psychiatric:         Heme/Lymph/Imm:         Endocrine:           Physical Exam:  Vitals: There were no vitals taken for this visit.    Constitutional:  cooperative, alert and oriented, well developed, well nourished, in no acute distress overweight mildly    Skin:  warm and dry to the touch          Head:  normocephalic        Eyes:           Lymph:      ENT:  no pallor or cyanosis        Neck:           Respiratory:  clear to auscultation    PPM in the L subclavian space    Cardiac: regular rhythm;normal S1 and S2;no S3 or S4       systolic ejection murmur;grade 2;RUSB;LUSB              2+       1+     0       1+            GI:  abdomen soft   mildly obese    Extremities and Muscular Skeletal:  no edema;no deformities, clubbing, cyanosis, erythema observed              Neurological:           Psych:           CC  Tatiana Titus PA-C  [address]  [place] [address]

## 2019-08-29 ENCOUNTER — OFFICE VISIT (OUTPATIENT)
Dept: CARDIOLOGY | Facility: CLINIC | Age: 84
End: 2019-08-29
Attending: PHYSICIAN ASSISTANT
Payer: COMMERCIAL

## 2019-08-29 VITALS
SYSTOLIC BLOOD PRESSURE: 139 MMHG | BODY MASS INDEX: 26.46 KG/M2 | DIASTOLIC BLOOD PRESSURE: 62 MMHG | HEART RATE: 90 BPM | WEIGHT: 159 LBS

## 2019-08-29 DIAGNOSIS — I35.0 NONRHEUMATIC AORTIC VALVE STENOSIS: ICD-10-CM

## 2019-08-29 PROCEDURE — 99204 OFFICE O/P NEW MOD 45 MIN: CPT | Performed by: INTERNAL MEDICINE

## 2019-08-29 NOTE — LETTER
8/29/2019    De Obregon MD  [address] [facility] [id number]    RE: Madie Moralesman       Dear Colleague,    I had the pleasure of seeing Madie Silva in the [facility].    HPI and Plan:     I had the pleasure seeing Ms. Silva in consultation at the [facility] physicians heart today.  She is a very pleasant 89-year-old female who was previously followed by Dr. Red who has now retired. She was last seen by Tatiana Titus PA-C, in 08/2018.    Ms. Silva has a history of coronary artery disease and is status post coronary bypass grafting x4 in November 2011 with a LIMA to the LAD, and saphenous venous grafting to the ramus intermedius, first obtuse marginal and PDA.  She is also status post permanent pacemaker implantation due to postoperative bradycardia.  Her most recent interrogation indicated that she is predominantly atrial paced.  He does carry a diagnosis of post-op atrial fibrillation and is chronic warfarin therapy.      She last underwent an ischemic evaluation in March 2018, at which point a nuclear stress test was negative for ischemia.  At that point he was complaining of exertional dyspnea which appears to have persisted over the past year or so.  Her echocardiogram at the same time demonstrated evidence of moderate aortic stenosis, and it was thought that this may be contributing to her symptoms.      Further evaluation with right and left heart catheterization and potentially TAVR referral was discussed, but the patient at that time elected to defer  the work-up given her excellent quality of life.    Today she presents feeling well overall from a cardiovascular standpoint.  She does, however, experience dyspnea upon walking less than a block.  This appears to be stable from the time when she was seen last year in follow-up.  It does, however, limit her activities significantly.  She denies any syncope or presyncope.  She denies any chest discomfort,  PND or orthopnea.      Her physical exam is dictated below.    Her most recent echocardiogram on 7/10/2019 demonstrated normal left ventricular systolic function with moderate to severe aortic stenosis.  The mean gradient was 25 mmHg with a peak velocity of 3.2 m/s and a calculated valve area of 0.8 cm .  Stroke-volume index was within normal limits at 39.3 mL/m .    IMPRESSION:  1) Severe aortic stenosis, symptomatic  2) CAD s/p CABG x 4 2011  3) AF s/p PPM implantation  4) History of post-op DVT    PLAN  -The patient appears to have severe symptomatic aortic stenosis.  I had a detailed discussion with her regarding the indications and treatment options for aortic valve replacement.  In her case TAVR would be the preferred option.  I discussed the process with her in detail and recommended consultation with our structural heart team.    - She was initially quite reluctant to consider any invasive procedures to address her aortic stenosis but after our discussion was agreeable to at least scheduling an appointment with the structural heart team to discuss her options.  -In the interim she will continue her current medications as prescribed.  -It was a pleasure seeing her in follow-up today.      CURRENT MEDICATIONS:  Current Outpatient Medications   Medication Sig Dispense Refill     acetaminophen (TYLENOL ARTHRITIS PAIN) 650 MG CR tablet Take 2 tablets (1,300 mg) by mouth 2 times daily       aspirin EC 81 MG EC tablet Take 1 tablet by mouth daily.       atorvastatin (LIPITOR) 80 MG tablet Take 1 tablet (80 mg) by mouth daily 90 tablet 3     calcium 600 MG tablet Take 1 tablet by mouth 2 times daily.       Cholecalciferol (VITAMIN D-400 PO) Take by mouth daily       co-enzyme Q-10 (COENZYME Q-10) 100 MG CAPS Take 1 capsule by mouth daily.       FIBER PO Take by mouth daily       gabapentin (NEURONTIN) 100 MG capsule Take 1 capsule (100 mg) by mouth 3 times daily 270 capsule 3     HYDROcodone-acetaminophen (NORCO)  5-325 MG per tablet Take 0.5-1 tablets by mouth every 6 hours as needed for moderate to severe pain 20 tablet 0     levothyroxine (SYNTHROID/LEVOTHROID) 50 MCG tablet Take 1 tablet (50 mcg) by mouth daily 90 tablet 3     losartan (COZAAR) 50 MG tablet Take 1/2 tablet daily 45 tablet 3     metFORMIN (GLUCOPHAGE) 500 MG tablet TAKE ONE TABLET BY MOUTH DAILY WITH BREAKFAST 180 tablet 3     Multiple Vitamins-Minerals (CENTRUM SILVER) per tablet Take 1 tablet by mouth daily.       Multiple Vitamins-Minerals (PRESERVISION AREDS 2 PO)        nitroGLYCERIN (NITROSTAT) 0.4 MG SL tablet Place 0.4 mg under the tongue every 5 minutes as needed. Up to 3 doses per episode        omeprazole (PRILOSEC) 20 MG DR capsule Take 1 capsule (20 mg) by mouth daily 90 capsule 3     polyethylene glycol (MIRALAX) powder Take 17 g by mouth daily       warfarin (JANTOVEN) 3 MG tablet Take one tablet daily except 1 1/2 tabs on Tues/Thurs/Sat as directed by the anticoagulation clinic 100 tablet 3       ALLERGIES     Allergies   Allergen Reactions     Amoxicillin      hives     Bisphosphonates      Swallowing disorder     Boniva [Ibandronate Sodium] Nausea and Vomiting     Diarrhea, N/V with oral.  IV caused arm to swell      Fosamax [Alendronic Acid]      Severe gastrointestinal reaction     Lisinopril      cough     Niacin      rash     Simvastatin      myalgias       PAST MEDICAL HISTORY:  Past Medical History:   Diagnosis Date     Walters's esophagus 7/09     CHRONIC VERTIGO 9/99     Colonic Adenoma 3/90, 11/98     Costochondritis      Depression      DIABETES MELLITUS TYPE II 10/07     DJD      DVT of Leg, postop 11/09    6 months of warfarin     Gastritis 10/89     GERD      HIATAL HERNIA      HYPERLIPIDEMIA      HYPOTHYROIDISM (aka HASHIMOTO)      Impaired fasting glucose      L Ankle Fracture 10/09     Obesity, unspecified      Osteoporosis, unspecified      PERIPHERAL NEUROPATHY      Polymyalgia rheumatica (H)      Post Herpetic Neuralgia  4/03    R lumbar distribution     Restless leg syndrome      Small vessel cerebrovascular changes 9/99     Stricture and stenosis of esophagus 10/89    Dilated     Syncope     1/06, 8/08, 2/10     VITAMIN D DEFICIENCY 12/07    per Dr. Petty       PAST SURGICAL HISTORY:  Past Surgical History:   Procedure Laterality Date     BYPASS GRAFT ARTERY CORONARY  11/2/2011    CABG x 4  Dr. EDWIN ACOSTA NONSPECIFIC PROCEDURE  age 14    appendectomy     C NONSPECIFIC PROCEDURE  as a child    T&A     EMBOLECTOMY LOWER EXTREMITY  11/9/2011    EMBOLECTOMY LOWER EXTREMITY; left leg femoral embolectomy.; Surgeon:JOLEEN BOONE; Location:[facility] OR     HYSTERECTOMY, CERVIX STATUS UNKNOWN  1978    partial hysterectomy     SURGICAL HISTORY OF -   10/09    L ankle fracture repair    Dr. Jose Roberto Trevino       FAMILY HISTORY:  Family History   Problem Relation Age of Onset     Alzheimer Disease Sister      Heart Disease Mother      Heart Disease Father      Heart Disease Son      Heart Disease Brother        SOCIAL HISTORY:  Social History     Socioeconomic History     Marital status:      Spouse name: Not on file     Number of children: Not on file     Years of education: Not on file     Highest education level: Not on file   Occupational History     Not on file   Social Needs     Financial resource strain: Not on file     Food insecurity:     Worry: Not on file     Inability: Not on file     Transportation needs:     Medical: Not on file     Non-medical: Not on file   Tobacco Use     Smoking status: Never Smoker     Smokeless tobacco: Never Used   Substance and Sexual Activity     Alcohol use: No     Drug use: No     Sexual activity: Never   Lifestyle     Physical activity:     Days per week: Not on file     Minutes per session: Not on file     Stress: Not on file   Relationships     Social connections:     Talks on phone: Not on file     Gets together: Not on file     Attends Zoroastrianism service: Not on file     Active member  of club or organization: Not on file     Attends meetings of clubs or organizations: Not on file     Relationship status: Not on file     Intimate partner violence:     Fear of current or ex partner: Not on file     Emotionally abused: Not on file     Physically abused: Not on file     Forced sexual activity: Not on file   Other Topics Concern      Service Not Asked     Blood Transfusions Not Asked     Caffeine Concern No     Occupational Exposure Not Asked     Hobby Hazards Not Asked     Sleep Concern Not Asked     Stress Concern Not Asked     Weight Concern Not Asked     Special Diet No     Back Care Not Asked     Exercise No     Bike Helmet Not Asked     Seat Belt Not Asked     Self-Exams Not Asked     Parent/sibling w/ CABG, MI or angioplasty before 65F 55M? No   Social History Narrative     Not on file       Review of Systems:  Skin:          Eyes:         ENT:         Respiratory:          Cardiovascular:         Gastroenterology:        Genitourinary:         Musculoskeletal:         Neurologic:         Psychiatric:         Heme/Lymph/Imm:         Endocrine:           Physical Exam:  Vitals: There were no vitals taken for this visit.    Constitutional:  cooperative, alert and oriented, well developed, well nourished, in no acute distress overweight mildly    Skin:  warm and dry to the touch          Head:  normocephalic        Eyes:           Lymph:      ENT:  no pallor or cyanosis        Neck:           Respiratory:  clear to auscultation    PPM in the L subclavian space    Cardiac: regular rhythm;normal S1 and S2;no S3 or S4       systolic ejection murmur;grade 2;RUSB;LUSB              2+       1+     0       1+            GI:  abdomen soft   mildly obese    Extremities and Muscular Skeletal:  no edema;no deformities, clubbing, cyanosis, erythema observed              Neurological:           Psych:           CC  Tatiana Titus PA-C  [address]  [place]  [id number]            Thank you for allowing me to participate in the care of your patient.    Sincerely,     Noel Cardenas MD     [facility]

## 2019-09-09 NOTE — PROGRESS NOTES
ANTICOAGULATION FOLLOW-UP CLINIC VISIT    Patient Name:  Madie Silva  Date:  11/16/2017  Contact Type:  Face to Face    SUBJECTIVE:     Patient Findings     Positives No Problem Findings           OBJECTIVE    INR Protime   Date Value Ref Range Status   11/16/2017 2.7 (A) 0.86 - 1.14 Final       ASSESSMENT / PLAN  No question data found.  Anticoagulation Summary as of 11/16/2017     INR goal 2.0-3.0   Today's INR 2.7   Maintenance plan 4.5 mg (3 mg x 1.5) on Tue, Thu, Sat; 3 mg (3 mg x 1) all other days   Full instructions 4.5 mg on Tue, Thu, Sat; 3 mg all other days   Weekly total 25.5 mg   Plan last modified Connie Hickman RN (7/10/2017)   Next INR check 12/28/2017   Target end date     Indications   Long-term (current) use of anticoagulants [Z79.01] [Z79.01]  Phlebitis and thrombophlebitis of deep veins of lower extremities (H) [I80.209]  Acute myocardial infarction  initial episode of care (Resolved) [I21.9]         Anticoagulation Episode Summary     INR check location     Preferred lab     Send INR reminders to [facility]    Comments             See the Encounter Report to view Anticoagulation Flowsheet and Dosing Calendar (Go to Encounters tab in chart review, and find the Anticoagulation Therapy Visit)        Connie Hickman RN                Subjective   Cindy Hawk is a 42 y.o. female.     History of Present Illness    Since the last visit, she has overall felt depressed and anxious.  She has Hyperlipidemia and working on this with diet and exercise, Hypothyroidism well controlled on current medication and will continue Rx and Vitamin D deficiency and will update labs for continued management.  she has been compliant with current medications have reviewed them.  The patient denies medication side effects.  Will refill medications.  I do have her on B12 and will get IM today  Stop smoking  Needs pap  Results for orders placed or performed in visit on 06/04/19   T3, Free   Result Value Ref Range    T3, Free 2.44 2.00 - 4.40 pg/mL   T4, Free   Result Value Ref Range    Free T4 1.45 0.93 - 1.70 ng/dL   TSH   Result Value Ref Range    TSH 1.740 0.270 - 4.200 uIU/mL     She is still having headaches and want her to see neurologist  Still has N/T hands, feet, arms, legs    Refill Ultram for the neck pain and is also r/t Chiari    Cindy Hawk female 42 y.o. who presents today for follow up of Anxiety.  She reports overall having break through anxiety and need to go up on dose.. Onset of symptoms was approximately several years ago.  She denies current suicidal and homicidal ideation. Risk factors are family history of anxiety and or depression, lifestyle of multiple roles and worry of Aissie and has chronic pain.  Previous treatment includes current Rx.  She complains of the following medication side effects: none.  The patient declines to go to counseling..  Ready to go up on dose  The following portions of the patient's history were reviewed and updated as appropriate: allergies, current medications, past family history, past medical history, past social history, past surgical history and problem list.    Review of Systems   Constitutional: Positive for fatigue. Negative for activity change, appetite change and unexpected weight change.    HENT: Negative for nosebleeds and trouble swallowing.    Eyes: Positive for visual disturbance. Negative for pain.   Respiratory: Negative for chest tightness, shortness of breath and wheezing.    Cardiovascular: Negative for chest pain and palpitations.   Gastrointestinal: Negative for abdominal pain and blood in stool.   Endocrine: Negative.    Genitourinary: Negative for difficulty urinating and hematuria.   Musculoskeletal: Positive for back pain, neck pain and neck stiffness. Negative for joint swelling.   Skin: Negative for color change and rash.   Allergic/Immunologic: Positive for environmental allergies.   Neurological: Positive for dizziness, weakness, light-headedness, numbness and headaches. Negative for syncope and speech difficulty.   Hematological: Negative for adenopathy.   Psychiatric/Behavioral: Negative for agitation and confusion.   All other systems reviewed and are negative.      Objective   Physical Exam   Constitutional: She is oriented to person, place, and time. She appears well-developed and well-nourished. No distress.   HENT:   Head: Normocephalic and atraumatic.   Eyes: Conjunctivae and EOM are normal. Pupils are equal, round, and reactive to light. Right eye exhibits no discharge. Left eye exhibits no discharge. No scleral icterus.   Neck: Normal range of motion. Neck supple. No tracheal deviation present. No thyromegaly present.   Cardiovascular: Normal rate, regular rhythm, normal heart sounds, intact distal pulses and normal pulses. Exam reveals no gallop.   No murmur heard.  Pulmonary/Chest: Effort normal and breath sounds normal. No respiratory distress. She has no wheezes. She has no rales.   Musculoskeletal: Normal range of motion.   Neurological: She is alert and oriented to person, place, and time. She exhibits normal muscle tone. Coordination normal.   Skin: Skin is warm. No rash noted. No erythema. No pallor.   Psychiatric: She has a normal mood and affect. Her behavior is  normal. Judgment and thought content normal.   Nursing note and vitals reviewed.      Assessment/Plan   Problems Addressed this Visit        Cardiovascular and Mediastinum    Hyperlipidemia       Digestive    Vitamin B12 deficiency    Vitamin D deficiency disease       Endocrine    Acquired hypothyroidism - Primary       Nervous and Auditory    Neck pain    Peripheral neuropathy    Other headache syndrome       Other    Anxiety    History of Chiari malformation        Plan, Cindy Hawk, was seen today.  she was seen for Hypothyroidism well controlled, continue medication and Vitamin D deficiency and will update labs .  Go up on Zoloft to 200mg.    Refill Ultram  Still need to see ortho  Cont B12

## 2019-09-12 ENCOUNTER — ANTICOAGULATION THERAPY VISIT (OUTPATIENT)
Dept: ANTICOAGULATION | Facility: CLINIC | Age: 84
End: 2019-09-12
Payer: COMMERCIAL

## 2019-09-12 DIAGNOSIS — I80.209 PHLEBITIS AND THROMBOPHLEBITIS OF DEEP VEINS OF LOWER EXTREMITIES (H): ICD-10-CM

## 2019-09-12 DIAGNOSIS — Z79.01 LONG TERM CURRENT USE OF ANTICOAGULANT THERAPY: ICD-10-CM

## 2019-09-12 LAB — INR POINT OF CARE: 2.5 (ref 0.86–1.14)

## 2019-09-12 PROCEDURE — 36416 COLLJ CAPILLARY BLOOD SPEC: CPT

## 2019-09-12 PROCEDURE — 85610 PROTHROMBIN TIME: CPT | Mod: QW

## 2019-09-12 PROCEDURE — 99207 ZZC NO CHARGE NURSE ONLY: CPT

## 2019-09-12 NOTE — PROGRESS NOTES
ANTICOAGULATION FOLLOW-UP CLINIC VISIT    Patient Name:  Madie Silva  Date:  2019  Contact Type:  Face to Face    SUBJECTIVE:  Patient Findings     Positives:   Change in health (eating dried peas), Upcoming invasive procedure (discussin TAVR with  10/15/2019)    Comments:   Assessed for S/S bleeding, clotting, medication, diet, health, activity and alcohol changes.          Clinical Outcomes     Negatives:   Major bleeding event, Thromboembolic event, Anticoagulation-related hospital admission, Anticoagulation-related ED visit, Anticoagulation-related fatality    Comments:   Assessed for S/S bleeding, clotting, medication, diet, health, activity and alcohol changes.             OBJECTIVE    INR Protime   Date Value Ref Range Status   2019 2.5 (A) 0.86 - 1.14 Final       ASSESSMENT / PLAN  INR assessment THER    Recheck INR In: 6 WEEKS    INR Location Clinic      Anticoagulation Summary  As of 2019    INR goal:   2.0-3.0   TTR:   84.0 % (3.7 y)   INR used for dosin.5 (2019)   Warfarin maintenance plan:   4.5 mg (3 mg x 1.5) every Tue, Thu, Sat; 3 mg (3 mg x 1) all other days   Full warfarin instructions:   4.5 mg every Tue, Thu, Sat; 3 mg all other days   Weekly warfarin total:   25.5 mg   No change documented:   Anna Trevino [facility]   Plan last modified:   Stella Cha RN (10/25/2018)   Next INR check:   10/24/2019   Target end date:       Indications    Long-term (current) use of anticoagulants [Z79.01] [Z79.01]  Phlebitis and thrombophlebitis of deep veins of lower extremities (H) [I80.209]  Acute myocardial infarction  initial episode of care (H) (Resolved) [I21.9]             Anticoagulation Episode Summary     INR check location:       Preferred lab:       Send INR reminders to:   [facility]    Comments:         Anticoagulation Care Providers     Provider Role Specialty Phone number    De Obregon MD Responsible Internal Medicine  [phone]            See the Encounter Report to view Anticoagulation Flowsheet and Dosing Calendar (Go to Encounters tab in chart review, and find the Anticoagulation Therapy Visit)    Will discuss TAVR with Dr. Arcos 10/15, not sure interested in proceeding.    Anna Trevino [facility]

## 2019-10-09 ENCOUNTER — HOSPITAL ENCOUNTER (EMERGENCY)
Facility: CLINIC | Age: 84
Discharge: HOME | End: 2019-10-09
Admitting: EMERGENCY MEDICINE
Payer: COMMERCIAL

## 2019-10-09 ENCOUNTER — DOCUMENTATION ONLY (OUTPATIENT)
Dept: CARDIOLOGY | Facility: CLINIC | Age: 84
End: 2019-10-09

## 2019-10-09 ENCOUNTER — ANCILLARY PROCEDURE (OUTPATIENT)
Dept: CARDIOLOGY | Facility: CLINIC | Age: 84
End: 2019-10-09
Attending: INTERNAL MEDICINE
Payer: COMMERCIAL

## 2019-10-09 ENCOUNTER — APPOINTMENT (OUTPATIENT)
Dept: CT IMAGING | Facility: CLINIC | Age: 84
End: 2019-10-09
Attending: PHYSICIAN ASSISTANT
Payer: COMMERCIAL

## 2019-10-09 VITALS
RESPIRATION RATE: 22 BRPM | WEIGHT: 160 LBS | HEART RATE: 74 BPM | DIASTOLIC BLOOD PRESSURE: 53 MMHG | SYSTOLIC BLOOD PRESSURE: 163 MMHG | HEIGHT: 66 IN | BODY MASS INDEX: 25.71 KG/M2 | OXYGEN SATURATION: 99 % | TEMPERATURE: 97.7 F

## 2019-10-09 DIAGNOSIS — I48.91 ATRIAL FIBRILLATION (H): ICD-10-CM

## 2019-10-09 DIAGNOSIS — Z95.0 CARDIAC PACEMAKER IN SITU: Primary | ICD-10-CM

## 2019-10-09 DIAGNOSIS — M54.6 LEFT-SIDED THORACIC BACK PAIN: ICD-10-CM

## 2019-10-09 DIAGNOSIS — R06.02 SHORTNESS OF BREATH: ICD-10-CM

## 2019-10-09 LAB
ANION GAP SERPL CALCULATED.3IONS-SCNC: 5 MMOL/L (ref 3–14)
BASOPHILS # BLD AUTO: 0 10E9/L (ref 0–0.2)
BASOPHILS NFR BLD AUTO: 0.5 %
BUN SERPL-MCNC: 19 MG/DL (ref 7–30)
CALCIUM SERPL-MCNC: 9 MG/DL (ref 8.5–10.1)
CHLORIDE SERPL-SCNC: 107 MMOL/L (ref 94–109)
CO2 SERPL-SCNC: 26 MMOL/L (ref 20–32)
CREAT SERPL-MCNC: 0.85 MG/DL (ref 0.52–1.04)
DIFFERENTIAL METHOD BLD: ABNORMAL
EOSINOPHIL # BLD AUTO: 0.2 10E9/L (ref 0–0.7)
EOSINOPHIL NFR BLD AUTO: 1.9 %
ERYTHROCYTE [DISTWIDTH] IN BLOOD BY AUTOMATED COUNT: 17.9 % (ref 10–15)
GFR SERPL CREATININE-BSD FRML MDRD: 61 ML/MIN/{1.73_M2}
GLUCOSE SERPL-MCNC: 98 MG/DL (ref 70–99)
HCT VFR BLD AUTO: 36.3 % (ref 35–47)
HGB BLD-MCNC: 11.6 G/DL (ref 11.7–15.7)
IMM GRANULOCYTES # BLD: 0 10E9/L (ref 0–0.4)
IMM GRANULOCYTES NFR BLD: 0.2 %
INR PPP: 2.18 (ref 0.86–1.14)
INTERPRETATION ECG - MUSE: NORMAL
LYMPHOCYTES # BLD AUTO: 3 10E9/L (ref 0.8–5.3)
LYMPHOCYTES NFR BLD AUTO: 36.1 %
MCH RBC QN AUTO: 24.1 PG (ref 26.5–33)
MCHC RBC AUTO-ENTMCNC: 32 G/DL (ref 31.5–36.5)
MCV RBC AUTO: 75 FL (ref 78–100)
MONOCYTES # BLD AUTO: 1.1 10E9/L (ref 0–1.3)
MONOCYTES NFR BLD AUTO: 13.1 %
NEUTROPHILS # BLD AUTO: 4.1 10E9/L (ref 1.6–8.3)
NEUTROPHILS NFR BLD AUTO: 48.2 %
NRBC # BLD AUTO: 0 10*3/UL
NRBC BLD AUTO-RTO: 0 /100
NT-PROBNP SERPL-MCNC: 552 PG/ML (ref 0–1800)
PLATELET # BLD AUTO: 232 10E9/L (ref 150–450)
POTASSIUM SERPL-SCNC: 4.4 MMOL/L (ref 3.4–5.3)
RBC # BLD AUTO: 4.82 10E12/L (ref 3.8–5.2)
SODIUM SERPL-SCNC: 138 MMOL/L (ref 133–144)
TROPONIN I SERPL-MCNC: <0.015 UG/L (ref 0–0.04)
TROPONIN I SERPL-MCNC: <0.015 UG/L (ref 0–0.04)
WBC # BLD AUTO: 8.4 10E9/L (ref 4–11)

## 2019-10-09 PROCEDURE — 25000132 ZZH RX MED GY IP 250 OP 250 PS 637: Performed by: PHYSICIAN ASSISTANT

## 2019-10-09 PROCEDURE — 25000125 ZZHC RX 250: Performed by: PHYSICIAN ASSISTANT

## 2019-10-09 PROCEDURE — 84484 ASSAY OF TROPONIN QUANT: CPT | Performed by: PHYSICIAN ASSISTANT

## 2019-10-09 PROCEDURE — 85610 PROTHROMBIN TIME: CPT | Performed by: PHYSICIAN ASSISTANT

## 2019-10-09 PROCEDURE — 85025 COMPLETE CBC W/AUTO DIFF WBC: CPT | Performed by: PHYSICIAN ASSISTANT

## 2019-10-09 PROCEDURE — 80048 BASIC METABOLIC PNL TOTAL CA: CPT | Performed by: PHYSICIAN ASSISTANT

## 2019-10-09 PROCEDURE — 71260 CT THORAX DX C+: CPT

## 2019-10-09 PROCEDURE — 74177 CT ABD & PELVIS W/CONTRAST: CPT

## 2019-10-09 PROCEDURE — 99285 EMERGENCY DEPT VISIT HI MDM: CPT | Mod: 25

## 2019-10-09 PROCEDURE — 93280 PM DEVICE PROGR EVAL DUAL: CPT | Mod: 26 | Performed by: INTERNAL MEDICINE

## 2019-10-09 PROCEDURE — 83880 ASSAY OF NATRIURETIC PEPTIDE: CPT | Performed by: PHYSICIAN ASSISTANT

## 2019-10-09 PROCEDURE — 93005 ELECTROCARDIOGRAM TRACING: CPT

## 2019-10-09 PROCEDURE — 25000128 H RX IP 250 OP 636: Performed by: PHYSICIAN ASSISTANT

## 2019-10-09 RX ORDER — HYDROCODONE BITARTRATE AND ACETAMINOPHEN 5; 325 MG/1; MG/1
0.5 TABLET ORAL ONCE
Status: DISCONTINUED | OUTPATIENT
Start: 2019-10-09 | End: 2019-10-09

## 2019-10-09 RX ORDER — HYDROCODONE BITARTRATE AND ACETAMINOPHEN 5; 325 MG/1; MG/1
1 TABLET ORAL ONCE
Status: COMPLETED | OUTPATIENT
Start: 2019-10-09 | End: 2019-10-09

## 2019-10-09 RX ORDER — IOPAMIDOL 755 MG/ML
75 INJECTION, SOLUTION INTRAVASCULAR ONCE
Status: COMPLETED | OUTPATIENT
Start: 2019-10-09 | End: 2019-10-09

## 2019-10-09 RX ADMIN — SODIUM CHLORIDE 70 ML: 9 INJECTION, SOLUTION INTRAVENOUS at 13:26

## 2019-10-09 RX ADMIN — IOPAMIDOL 75 ML: 755 INJECTION, SOLUTION INTRAVENOUS at 13:25

## 2019-10-09 RX ADMIN — HYDROCODONE BITARTRATE AND ACETAMINOPHEN 1 HALF-TAB: 5; 325 TABLET ORAL at 12:42

## 2019-10-09 ASSESSMENT — ENCOUNTER SYMPTOMS
SHORTNESS OF BREATH: 1
MYALGIAS: 1
ABDOMINAL PAIN: 0
BACK PAIN: 1
NAUSEA: 0
VOMITING: 0
LIGHT-HEADEDNESS: 1
NUMBNESS: 0

## 2019-10-09 ASSESSMENT — MIFFLIN-ST. JEOR: SCORE: 1162.51

## 2019-10-09 NOTE — ED PROVIDER NOTES
"  History     Chief Complaint:  Shortness of Breath    HPI   Madie Silva is a 89 year old female, who is anticoagulated, with a history of chronic shortness of breath, DVT, hypertension, hyperlipidemia, among others who presents with son for evaluation of increased shortness of breath, associated with sharp back pain that is exacerbated with palpation, that gradually began late yesterday afternoon. Patient states yesterday afternoon she began experiencing sharp back pain. She has had this pain before, but it has not been associated with her shortness of breath. To treat her symptoms in the past, she took a 1/2 pill of hydrocodone and put pressure on her back. Today her breathing was worse and her son described her back as being \"in a knot\". A nurse prompted her to present due to her back pain being associated with shortness of breath.    Here, she endorses she is occasionally lightheaded. She denies any chest pain, abdominal pain, nausea, vomiting, recent falls, leg swelling, or any new numbness. Of note, she states her primary care provider believes a hernia is pressing into her lungs, which causes her chronic shortness of breath. She also has an aortic valve replacement meeting next week.    Allergies:  Amoxicillin  Bisphosphonates  Boniva  Fosamax  Lisinopril  Niacin  Simvastatin     Medications:    Aspirin 81 mg  Lipitor  Coenzyme Q-10  Neurontin  Norco  Synthroid  Cozaar  Glucophage  Nitrostat  Prilosec  Miralax  Hydrocodone  Warfarin    Past Medical History:    Walters's esophagus  Chronic vertigo  Colonic adenoma  Costochondritis  Depression  Diabetes mellitus type 2  DJD  DVT  Gastritis  GERD  Hiatal Hernia  Hyperlipidemia  Hypothyroidism  Impaired fasting glucose  Obesity  Osteoporosis  Peripheral neuropathy  Polymyalgia rheumatic  Post herpetic neuralgia  Restless leg syndrome  Stricture and stenosis of esophagus  CKD    Past Surgical History:    Bypass graft artery " "coronary  Appendectomy  Tonsillectomy  Adenoidectomy  Embolectomy lower extremity  Hysterectomy  Orthopedic surgery, left ankle fracture repair    Family History:    Mother - Heart disease  Father - Heart disease  Sister - Alzheimer disease  Brother - Heart disease  Son - Heart disease    Social History:  The patient was accompanied to the ED by son.  Smoking Status: Never  Smokeless Tobacco: Never  Alcohol Use: No  Drug Use: No   Marital Status:   [5]     Review of Systems   Respiratory: Positive for shortness of breath.    Cardiovascular: Negative for chest pain and leg swelling.   Gastrointestinal: Negative for abdominal pain, nausea and vomiting.   Musculoskeletal: Positive for back pain and myalgias.   Neurological: Positive for light-headedness. Negative for numbness.   All other systems reviewed and are negative.      Physical Exam     Patient Vitals for the past 24 hrs:   BP Temp Temp src Pulse Resp SpO2 Height Weight   10/09/19 1215 -- 97.7  F (36.5  C) Oral -- -- -- -- --   10/09/19 1125 (!) 153/62 -- Oral 78 24 100 % 1.676 m (5' 6\") 72.6 kg (160 lb)     Physical Exam  General: Alert and cooperative with exam. Resting comfortably on gurney. Elderly appearing female.  Head:  Scalp is NC/AT  Eyes:  No scleral icterus, PERRL  ENT:  The external nose and ears are normal.   Neck:  Normal range of motion without rigidity.  CV:  Regular rate and rhythm    No pathologic murmur, rubs, or gallops.  Resp:  Breath sounds are clear bilaterally.  No crackles, wheezes, rhonchi.    Non-labored, no retractions or accessory muscle use  MS:  No lower extremity edema or asymmetric calf swelling.    Questionable minimal tenderness over left thoracic spinal muscles.  Skin:  Warm and dry, No rash or lesions noted.  Psych:  Awake. Alert. Normal affect. Appropriate interactions.   Emergency Department Course     ECG:  ECG taken at 1226, ECG read at 1230 by Dr. Karlie Mead MD  Atrial-paced rhythm  Abnormal ECG  Rate 73 " bpm. AR interval 208. QRS duration 78. QT/QTc 394/434. P-R-T axes * 76 77.     Imaging:  Radiology findings were communicated with the patient who voiced understanding of the findings.    CT Aortic Survey w Contrast  IMPRESSION:  1. No evidence of acute aortic abnormality.  2. Large hiatal hernia with much of the stomach in the chest.  3. Severe coronary atherosclerosis.  Reading per radiology.     Laboratory:  Laboratory findings were communicated with the patient and family who voiced understanding of the findings.    CBC: HGB 11.6 (L) o/w WNL (WBC 8.4, )   BMP: AWNL (Creatinine 0.85)   Troponin (Collected 1215): <0.015   BNP: 552   INR: 2.18 (H)     Interventions:  1242 Norco 1 half-tab PO     Emergency Department Course:  Nursing notes and vitals reviewed.  EKG obtained in the ED, see results above.    The patient was sent for a CT Aortic Survey w Contrast while in the emergency department, results above.    IV was inserted and blood was drawn for laboratory testing, results above.     (1154)   I performed an exam of the patient as documented above. History obtained from patient.    (0470)   I updated patient with results and plan of care.  Findings and plan explained to the Patient. Patient discharged home with instructions regarding supportive care, medications, and reasons to return. The importance of close follow-up was reviewed. I personally reviewed the laboratory results with the Patient and answered all related questions prior to discharge.    Impression & Plan      Medical Decision Makin-year-old female with a history of coronary artery disease and aortic stenosis who presents with back pain and shortness of breath.  Patient history and records reviewed.  A broad differential was considered including: ACS, PE, aortic dissection, pneumothorax, pneumonia, fracture, musculoskeletal etiologies.  The work-up here in the emergency department has been reassuring.  EKG shows atrial paced rhythm  with no acute ischemic changes.  Initial and delta troponin are both undetectable and I feel ACS is very unlikely at this time.  Never the less we did discuss option to admit for serial troponins and further evaluation and the patient and her son prefer to forgo this which I think is reasonable given low suspicion, age, atypical symptoms.      Given the patient's history of aortic pathology and back pain associated with shortness of breath I did opt to obtain a CT aortic study which fortunately demonstrated no evidence of acute aneurysm or dissection.  It additionally showed no pneumonia or pneumo thorax.  It did show coronary artery disease which is not surprising given the patient's known history of this.  It also showed her known large hiatal hernia.  I did consider pulmonary embolism, however the patient is anticoagulated on Coumadin and is therapeutic.  Per review of her records she appears to be consistently therapeutic.  She is additionally not tachycardic or hypoxic and I think the likelihood of PE is very low.  There is no clinical evidence of other concerning etiology for back pain such as infectious process, cord compression, or fracture.    The patient feels improved following symptomatic treatment here in the ED and I feel she is safe for discharge home.  She will follow-up with her primary care provider in 2 to 3 days.  She will otherwise return to the ED for any new or worsening symptoms.    Diagnosis:    ICD-10-CM    1. Left-sided thoracic back pain M54.6    2. Shortness of breath R06.02       Disposition:   Discharge.    Scribe Disclosure:  JUNITO Shadnora Gomez, am serving as a scribe at 11:51 AM on 10/9/2019 to document services personally performed by Franklyn Grant PA-C based on my observations and the provider's statements to me.  10/9/2019    EMERGENCY DEPARTMENT       Franklyn Grant PA-C  10/09/19 [id number]

## 2019-10-09 NOTE — TELEPHONE ENCOUNTER
Can we follow up on this patient? We talked about her seeing the TAVR team but I don't see any documentation that this happened. Can we also arrange an GREG follow up after her ER visit? Thanks!

## 2019-10-09 NOTE — PROGRESS NOTES
"Noel Cardenas MD  You; Nila Mantilla, RN; Batsheva Penaloza, RN 12 minutes ago (11:43 AM)         Can we follow up on this patient? We talked about her seeing the TAVR team but I don't see any documentation that this happened. Can we also arrange an GREG follow up after her ER visit? Thanks!        Pt was recommended to go to ED today for shortness of breath and shoulder pain, is still in ED per [facility]. Team 2 will call patient to arrange GREG f/up pending ED progress.     Discussed need for TAVR consult per Dr Cardenas with Jackie Gonsalves in TAVR clinic, state will call patient to arrange necessary f/up.     Last OV 8/29/19 w/ Dr Cardenas: \"The patient appears to have severe symptomatic aortic stenosis.  I had a detailed discussion with her regarding the indications and treatment options for aortic valve replacement.  In her case TAVR would be the preferred option.  I discussed the process with her in detail and recommended consultation with our structural heart team.    - She was initially quite reluctant to consider any invasive procedures to address her aortic stenosis but after our discussion was agreeable to at least scheduling an appointment with the structural heart team to discuss her options.\"     1615: Pt discharged from ED, symptoms improved upon arrival and with belching/flatus. Pt is scheduled with Dr Arcos on 10/15/19 in structural heart clinic. Message to Dr Cardenas- still need GREG OV?     "

## 2019-10-09 NOTE — TELEPHONE ENCOUNTER
Pt came in for her annual PPM device check today.  On review of arrhythmias, pt has had 110 mode switches  and 16 Ventricular High Rates (VHR) logged since 8/23/18 with a <0.1% AT/AF burden.  All EGM's show 1:1 A:V conduction for PAT in the 180-190's with the longest lasting 5m 43 seconds.  Other episodes lasted from a couple of seconds to 4m 28s.  Pt cannot recall symptoms at time of event.  Pt is on Warfarin.      Previous episodes of PAT were recorded as lasting <1 minute.      Overall heart rates when not in these episodes are <100 approximately 95% of the time.      Will route to Dr. Cardenas for notification and review.     REJI Edwards

## 2019-10-09 NOTE — ED PROVIDER NOTES
"Emergency Department Attending Supervision Note  10/9/2019  3:25 PM    I evaluated this patient in conjunction with Franklyn Grant PA-C    Madie Silva is a 89 year old female with a history of hyperlipidemia, diabetes, deep vein thrombosis, chronic kidney disease, and gastroesophageal reflux disease status post pacemaker placement on warfarin who presented with multiple days of sharp, left sided back pain radiating to the right, exacerbated with palpation, partially alleviated with passing gas, and unchanged with movement, as well as increased shortness of breath. She endorses utilizing hydrocodone for symptom management. On my evaluation, the patient reported improvement of her symptoms after receiving norco in the ED. The patient denies chest pain or leg swelling.     Exam:   BP (!) 163/53   Pulse 74   Temp 97.7  F (36.5  C) (Oral)   Resp 22   Ht 1.676 m (5' 6\")   Wt 72.6 kg (160 lb)   SpO2 99%   BMI 25.82 kg/m      General: Alert and cooperative with exam. Patient in mild distress. Normal mentation.  Head:  Scalp is NC/AT  Eyes:  No scleral icterus, PERRL  ENT:  The external nose and ears are normal. The oropharynx is normal and without erythema; mucus membranes are moist.   Neck:  Normal range of motion without rigidity.  CV:  Regular rate and rhythm    Moderate systolic murmur. Pacemaker present.   Resp:  Breath sounds are clear bilaterally    Non-labored, no retractions or accessory muscle use  GI:  Abdomen is soft, no distension, no tenderness. No peritoneal signs  MS:  No lower extremity edema. Mild tenderness to palpation without overlying skin change to the left thoracic paraspinal muscles. No midline thoracic or lumbar tenderness.   Skin:  Warm and dry, No rash or lesions noted.  Neuro: Oriented x 3. No gross motor deficits.    Results:    ECG:  ECG taken at 1226, ECG read at 1230 by Dr. Karlie Mead MD  Atrial-paced rhythm  Abnormal ECG  Rate 73 bpm. VT interval 208. QRS duration 78. QT/QTc " Patient's wife called stating that the medications amLODIPine (NORVASC) 5 MG tablet and flecainide (TAMBOCOR) 100 MG tablet need to be verified with the pharmacy [facility] mail service because they will not continue to deliver them to him without verification from Dr. Gomes that they are medically necessary. As of right now, the amLODIPine (NORVASC) 5 MG tablet is waiting for them at [facility], and they will pick it up today, but they prefer that it be mailed to them instead.    Patients wife would like a call for updates    Thank you.    394/434. P-R-T axes * 76 77.     CT Aortic Survey w Contrast  1. No evidence of acute aortic abnormality.  2. Large hiatal hernia with much of the stomach in the chest.  3. Severe coronary atherosclerosis.  JERAMIE FENTON MD    Laboratory Studies  Troponin (Collected: 1424): <0.015   Troponin (Collected: 1215): <0.015  CBC: WBC 8.4, HGB 11.6 (L),    BMP: WNL (Creatinine 0.85)   BNP: 552  INR: 2.18 (H)    Patient is a 89-year-old female presents with several day history of back pain and mild shortness of breath.  Patient's medical history and records were reviewed.  Given patient's age and complex medical history, I was asked to evaluate the patient by Franklyn Grant PA-C, prior to discharge.  On my evaluation, patient notes significant improvement since ED presentation.  Back pain appears musculoskeletal; discussed supportive care.  Additionally she notes increased belching and passing gas which has improved her symptoms.  Imaging and labs are reassuring as above.  Patient's INR is therapeutic.  At this time no emergent cause for patient's symptoms could be determined.  She is scheduled for follow-up next week with her cardiologist. She does have hx of moderate to severe aortic stenosis though I doubt this is contributing to her presentation today.  Return precautions were discussed.  Patient discharged to home.    Diagnosis    ICD-10-CM    1. Left-sided thoracic back pain M54.6    2. Shortness of breath R06.02      Scribe Disclosure:  I, Jeny Saldaña, am serving as a scribe at 3:26 PM on 10/9/2019 to document services personally performed by Willian Bryant DO based on my observations and the provider's statements to me.         Willian Bryant DO  10/10/19 [id number]

## 2019-10-09 NOTE — ED AVS SNAPSHOT
Emergency Department  [address] [place] [address]  Phone:  [phone]  Fax:  [phone]                                    Madie Silva   MRN: [id number]    Department:   Emergency Department   Date of Visit:  10/9/2019           After Visit Summary Signature Page    I have received my discharge instructions, and my questions have been answered. I have discussed any challenges I see with this plan with the nurse or doctor.    ..........................................................................................................................................  Patient/Patient Representative Signature      ..........................................................................................................................................  Patient Representative Print Name and Relationship to Patient    ..................................................               ................................................  Date                                   Time    ..........................................................................................................................................  Reviewed by Signature/Title    ...................................................              ..............................................  Date                                               Time          22EPIC Rev 08/18

## 2019-10-10 LAB
MDC_IDC_LEAD_IMPLANT_DT: NORMAL
MDC_IDC_LEAD_IMPLANT_DT: NORMAL
MDC_IDC_LEAD_LOCATION: NORMAL
MDC_IDC_LEAD_LOCATION: NORMAL
MDC_IDC_LEAD_MFG: NORMAL
MDC_IDC_LEAD_MFG: NORMAL
MDC_IDC_LEAD_MODEL: NORMAL
MDC_IDC_LEAD_MODEL: NORMAL
MDC_IDC_LEAD_POLARITY_TYPE: NORMAL
MDC_IDC_LEAD_POLARITY_TYPE: NORMAL
MDC_IDC_LEAD_SERIAL: NORMAL
MDC_IDC_LEAD_SERIAL: NORMAL
MDC_IDC_MSMT_BATTERY_DTM: NORMAL
MDC_IDC_MSMT_BATTERY_IMPEDANCE: 1558 OHM
MDC_IDC_MSMT_BATTERY_REMAINING_LONGEVITY: 43 MO
MDC_IDC_MSMT_BATTERY_STATUS: NORMAL
MDC_IDC_MSMT_BATTERY_VOLTAGE: 2.76 V
MDC_IDC_MSMT_LEADCHNL_RA_IMPEDANCE_VALUE: 435 OHM
MDC_IDC_MSMT_LEADCHNL_RA_PACING_THRESHOLD_AMPLITUDE: 0.25 V
MDC_IDC_MSMT_LEADCHNL_RA_PACING_THRESHOLD_PULSEWIDTH: 0.4 MS
MDC_IDC_MSMT_LEADCHNL_RA_SENSING_INTR_AMPL: 1 MV
MDC_IDC_MSMT_LEADCHNL_RV_IMPEDANCE_VALUE: 542 OHM
MDC_IDC_MSMT_LEADCHNL_RV_PACING_THRESHOLD_AMPLITUDE: 0.62 V
MDC_IDC_MSMT_LEADCHNL_RV_PACING_THRESHOLD_PULSEWIDTH: 0.4 MS
MDC_IDC_MSMT_LEADCHNL_RV_SENSING_INTR_AMPL: 8 MV
MDC_IDC_PG_IMPLANT_DTM: NORMAL
MDC_IDC_PG_MFG: NORMAL
MDC_IDC_PG_MODEL: NORMAL
MDC_IDC_PG_SERIAL: NORMAL
MDC_IDC_PG_TYPE: NORMAL
MDC_IDC_SESS_CLINIC_NAME: NORMAL
MDC_IDC_SESS_DTM: NORMAL
MDC_IDC_SESS_TYPE: NORMAL
MDC_IDC_SET_BRADY_AT_MODE_SWITCH_MODE: NORMAL
MDC_IDC_SET_BRADY_AT_MODE_SWITCH_RATE: 175 {BEATS}/MIN
MDC_IDC_SET_BRADY_LOWRATE: 70 {BEATS}/MIN
MDC_IDC_SET_BRADY_MAX_SENSOR_RATE: 130 {BEATS}/MIN
MDC_IDC_SET_BRADY_MAX_TRACKING_RATE: 130 {BEATS}/MIN
MDC_IDC_SET_BRADY_MODE: NORMAL
MDC_IDC_SET_BRADY_PAV_DELAY_LOW: 150 MS
MDC_IDC_SET_BRADY_SAV_DELAY_LOW: 120 MS
MDC_IDC_SET_LEADCHNL_RA_PACING_AMPLITUDE: 1.5 V
MDC_IDC_SET_LEADCHNL_RA_PACING_ANODE_ELECTRODE_1: NORMAL
MDC_IDC_SET_LEADCHNL_RA_PACING_ANODE_LOCATION_1: NORMAL
MDC_IDC_SET_LEADCHNL_RA_PACING_CAPTURE_MODE: NORMAL
MDC_IDC_SET_LEADCHNL_RA_PACING_CATHODE_ELECTRODE_1: NORMAL
MDC_IDC_SET_LEADCHNL_RA_PACING_CATHODE_LOCATION_1: NORMAL
MDC_IDC_SET_LEADCHNL_RA_PACING_POLARITY: NORMAL
MDC_IDC_SET_LEADCHNL_RA_PACING_PULSEWIDTH: 0.4 MS
MDC_IDC_SET_LEADCHNL_RA_SENSING_ANODE_ELECTRODE_1: NORMAL
MDC_IDC_SET_LEADCHNL_RA_SENSING_ANODE_LOCATION_1: NORMAL
MDC_IDC_SET_LEADCHNL_RA_SENSING_CATHODE_ELECTRODE_1: NORMAL
MDC_IDC_SET_LEADCHNL_RA_SENSING_CATHODE_LOCATION_1: NORMAL
MDC_IDC_SET_LEADCHNL_RA_SENSING_POLARITY: NORMAL
MDC_IDC_SET_LEADCHNL_RA_SENSING_SENSITIVITY: 0.25 MV
MDC_IDC_SET_LEADCHNL_RV_PACING_AMPLITUDE: 2 V
MDC_IDC_SET_LEADCHNL_RV_PACING_ANODE_ELECTRODE_1: NORMAL
MDC_IDC_SET_LEADCHNL_RV_PACING_ANODE_LOCATION_1: NORMAL
MDC_IDC_SET_LEADCHNL_RV_PACING_CAPTURE_MODE: NORMAL
MDC_IDC_SET_LEADCHNL_RV_PACING_CATHODE_ELECTRODE_1: NORMAL
MDC_IDC_SET_LEADCHNL_RV_PACING_CATHODE_LOCATION_1: NORMAL
MDC_IDC_SET_LEADCHNL_RV_PACING_POLARITY: NORMAL
MDC_IDC_SET_LEADCHNL_RV_PACING_PULSEWIDTH: 0.4 MS
MDC_IDC_SET_LEADCHNL_RV_SENSING_ANODE_ELECTRODE_1: NORMAL
MDC_IDC_SET_LEADCHNL_RV_SENSING_ANODE_LOCATION_1: NORMAL
MDC_IDC_SET_LEADCHNL_RV_SENSING_CATHODE_ELECTRODE_1: NORMAL
MDC_IDC_SET_LEADCHNL_RV_SENSING_CATHODE_LOCATION_1: NORMAL
MDC_IDC_SET_LEADCHNL_RV_SENSING_POLARITY: NORMAL
MDC_IDC_SET_LEADCHNL_RV_SENSING_SENSITIVITY: 4 MV
MDC_IDC_SET_ZONE_DETECTION_INTERVAL: 333.33 MS
MDC_IDC_SET_ZONE_DETECTION_INTERVAL: 342.86 MS
MDC_IDC_SET_ZONE_TYPE: NORMAL
MDC_IDC_SET_ZONE_TYPE: NORMAL
MDC_IDC_STAT_AT_BURDEN_PERCENT: 0 %
MDC_IDC_STAT_AT_DTM_END: NORMAL
MDC_IDC_STAT_AT_DTM_START: NORMAL
MDC_IDC_STAT_AT_MODE_SW_COUNT: 110
MDC_IDC_STAT_BRADY_AP_VP_PERCENT: 3 %
MDC_IDC_STAT_BRADY_AP_VS_PERCENT: 88 %
MDC_IDC_STAT_BRADY_AS_VP_PERCENT: 1 %
MDC_IDC_STAT_BRADY_AS_VS_PERCENT: 8 %
MDC_IDC_STAT_BRADY_DTM_END: NORMAL
MDC_IDC_STAT_BRADY_DTM_START: NORMAL
MDC_IDC_STAT_EPISODE_RECENT_COUNT: 16
MDC_IDC_STAT_EPISODE_RECENT_COUNT: 8
MDC_IDC_STAT_EPISODE_RECENT_COUNT_DTM_END: NORMAL
MDC_IDC_STAT_EPISODE_RECENT_COUNT_DTM_END: NORMAL
MDC_IDC_STAT_EPISODE_RECENT_COUNT_DTM_START: NORMAL
MDC_IDC_STAT_EPISODE_RECENT_COUNT_DTM_START: NORMAL
MDC_IDC_STAT_EPISODE_TYPE: NORMAL
MDC_IDC_STAT_EPISODE_TYPE: NORMAL

## 2019-10-15 ENCOUNTER — OFFICE VISIT (OUTPATIENT)
Dept: CARDIOLOGY | Facility: CLINIC | Age: 84
End: 2019-10-15
Attending: INTERNAL MEDICINE
Payer: COMMERCIAL

## 2019-10-15 VITALS
BODY MASS INDEX: 26.33 KG/M2 | HEART RATE: 83 BPM | DIASTOLIC BLOOD PRESSURE: 60 MMHG | OXYGEN SATURATION: 98 % | HEIGHT: 65 IN | SYSTOLIC BLOOD PRESSURE: 130 MMHG | WEIGHT: 158 LBS

## 2019-10-15 DIAGNOSIS — I35.0 NONRHEUMATIC AORTIC VALVE STENOSIS: Primary | ICD-10-CM

## 2019-10-15 DIAGNOSIS — I25.810 CORONARY ARTERY DISEASE INVOLVING CORONARY BYPASS GRAFT OF NATIVE HEART WITHOUT ANGINA PECTORIS: ICD-10-CM

## 2019-10-15 DIAGNOSIS — R06.09 DYSPNEA ON EXERTION: ICD-10-CM

## 2019-10-15 DIAGNOSIS — I48.0 PAROXYSMAL ATRIAL FIBRILLATION (H): ICD-10-CM

## 2019-10-15 PROCEDURE — 99215 OFFICE O/P EST HI 40 MIN: CPT | Performed by: INTERNAL MEDICINE

## 2019-10-15 ASSESSMENT — MIFFLIN-ST. JEOR: SCORE: 1137.56

## 2019-10-15 NOTE — NURSING NOTE
"TAVR Coordinator visit:  Provided additional education regarding TAVR procedure, after being present for discussion with physician. Explained the work-up process and next steps for patient. Patient and son provided our direct contact number and instructed to call with any questions. Pt unsure that she would like to complete any procedure, stating \"I have the worst luck. I get all the complications. After my bypass, I was in the hospital for 6 weeks.\" Pt willing to complete TAVR CT as initial step. She states she will think about moving forward after that. Told patient I would call her with results after reviewed by Dr. Arcos, and then she and her son could discuss moving forward with TAVR. Kim Wong RN    "

## 2019-10-15 NOTE — LETTER
10/15/2019      De Obregon MD  [address] [facility] [address]      RE: Madie Silva       Dear Colleague,    I had the pleasure of seeing Madie Silva in the [facility].    Service Date: 10/15/2019      REASON FOR CONSULTATION:  Severe aortic valve stenosis.      REFERRING PHYSICIAN:  Dr. Cardenas.      HISTORY OF PRESENT ILLNESS:  I had the pleasure of seeing Madie Silva at the [facility] in [place] this morning.  She is a very pleasant 89-year-old female with known aortic valve stenosis, coronary artery disease status post CABG x4 in 11/2011 (LIMA to LAD, SVG to ramus, SVG to OM, and SVG to PDA), symptomatic bradycardia status post pacemaker implantation and paroxysmal atrial fibrillation who is here for further evaluation regarding symptomatic severe aortic valve stenosis.      The patient has been followed for aortic valve stenosis over the last several years, but the severity of AS has progressed over the last year.  Her most recent echocardiogram demonstrated moderate-severe aortic valve stenosis, although visually the valve appears to be severely stenotic.      With regards to symptoms, she is endorsing significant shortness of breath.  She gets short of breath with minimal activity, sometimes walking across the room.  She denies chest pain.  She denies palpitations, presyncope or syncope.  Part of her shortness of breath has been in the past attributed to a known large hiatal hernia.  However, her symptoms clearly have progressed over the past year.  She was recently in the Emergency Department with back pain and worsening shortness of breath.      IMPRESSION, REPORT, PLAN:   1.  Severe symptomatic aortic valve stenosis.   2.  Coronary artery disease status post CABG x4 in 2011.   3.  Paroxysmal atrial fibrillation.   4.  History of symptomatic bradycardia status post pacemaker implantation.   5.  History of postoperative DVT.      I reviewed  the patient's most recent transthoracic echocardiogram.  The echo was read as showing moderate to severe aortic valve stenosis with mean gradient of 25 mmHg, peak systolic velocity of 3.2 meters per second and a valve area of 0.8 cm squared.  Stroke volume index is 39 mL/m2 and her dimensionless index is 0.27.      Although there is a discrepancy between the numbers with regard to the severity of her aortic valve stenosis, visually the valve appears to be quite severely stenotic.  Her physical examination findings are also consistent with severe AS.        She is quite limited from her shortness of breath.  Although the hiatal hernia is contributing, I suspect the progression of her AS is most likely the cause for the worsening shortness of breath.  I do recommend aortic valve replacement.  Given her advanced age, transcatheter aortic valve replacement is preferred.  However, the patient is currently not interested in pursuing valve replacement.  She is quite concerned about potential complications that could occur during the procedure.  Although she is quite limited, she does not feel like her symptoms are interfering with her quality of life very much at this point.      We settled on obtaining a CT TAVR to see if she would even be a candidate for a transcatheter aortic valve replacement.  We will also obtain an echocardiogram in approximately 2 months to see if all of the measurements are consistent with severe AS.  We will arrange a followup after her transthoracic echocardiogram.      It was a pleasure seeing Ms. Amezquita in the clinic this morning.  I appreciate the opportunity to be part of her care.         CARL LITTLE MD             D: 10/15/2019   T: 10/15/2019   MT: BRIANNA      Name:     CORDELL AMEZQUITA   MRN:      [id number]        Account:      [id number]   :      1929           Service Date: 10/15/2019      Document: [id number]       Outpatient Encounter Medications as of 10/15/2019   Medication  Sig Dispense Refill     acetaminophen (TYLENOL ARTHRITIS PAIN) 650 MG CR tablet Take 2 tablets (1,300 mg) by mouth 2 times daily       aspirin EC 81 MG EC tablet Take 1 tablet by mouth daily.       atorvastatin (LIPITOR) 80 MG tablet Take 1 tablet (80 mg) by mouth daily 90 tablet 3     calcium 600 MG tablet Take 1 tablet by mouth 2 times daily.       Cholecalciferol (VITAMIN D-400 PO) Take by mouth daily       co-enzyme Q-10 (COENZYME Q-10) 100 MG CAPS Take 1 capsule by mouth daily.       FIBER PO Take by mouth daily       gabapentin (NEURONTIN) 100 MG capsule Take 1 capsule (100 mg) by mouth 3 times daily 270 capsule 3     HYDROcodone-acetaminophen (NORCO) 5-325 MG per tablet Take 0.5-1 tablets by mouth every 6 hours as needed for moderate to severe pain 20 tablet 0     losartan (COZAAR) 50 MG tablet Take 1/2 tablet daily 45 tablet 3     metFORMIN (GLUCOPHAGE) 500 MG tablet TAKE ONE TABLET BY MOUTH DAILY WITH BREAKFAST 180 tablet 3     Multiple Vitamins-Minerals (CENTRUM SILVER) per tablet Take 1 tablet by mouth daily.       Multiple Vitamins-Minerals (PRESERVISION AREDS 2 PO)        nitroGLYCERIN (NITROSTAT) 0.4 MG SL tablet Place 0.4 mg under the tongue every 5 minutes as needed. Up to 3 doses per episode        omeprazole (PRILOSEC) 20 MG DR capsule Take 1 capsule (20 mg) by mouth daily 90 capsule 3     polyethylene glycol (MIRALAX) powder Take 17 g by mouth daily       warfarin (JANTOVEN) 3 MG tablet Take one tablet daily except 1 1/2 tabs on Tues/Thurs/Sat as directed by the anticoagulation clinic 100 tablet 3     levothyroxine (SYNTHROID/LEVOTHROID) 50 MCG tablet Take 1 tablet (50 mcg) by mouth daily 90 tablet 3     No facility-administered encounter medications on file as of 10/15/2019.        Again, thank you for allowing me to participate in the care of your patient.      Sincerely,    Mian Arcos MD, MD     [facility]

## 2019-10-15 NOTE — LETTER
10/15/2019    De Obregon MD  [address] [facility] [address]    RE: Madie Silva       Dear Colleague,    I had the pleasure of seeing Madie Silva in the [facility].    Service Date: 10/15/2019      REASON FOR CONSULTATION:  Severe aortic valve stenosis.      REFERRING PHYSICIAN:  Dr. Cardenas.      HISTORY OF PRESENT ILLNESS:  I had the pleasure of seeing Madie Silva at the [facility] in [place] this morning.  She is a very pleasant 89-year-old female with known aortic valve stenosis, coronary artery disease status post CABG x4 in 11/2011 (LIMA to LAD, SVG to ramus, SVG to OM, and SVG to PDA), symptomatic bradycardia status post pacemaker implantation and paroxysmal atrial fibrillation who is here for further evaluation regarding symptomatic severe aortic valve stenosis.      The patient has been followed for aortic valve stenosis over the last several years, but the severity of AS has progressed over the last year.  Her most recent echocardiogram demonstrated moderate-severe aortic valve stenosis, although visually the valve appears to be severely stenotic.      With regards to symptoms, she is endorsing significant shortness of breath.  She gets short of breath with minimal activity, sometimes walking across the room.  She denies chest pain.  She denies palpitations, presyncope or syncope.  Part of her shortness of breath has been in the past attributed to a known large hiatal hernia.  However, her symptoms clearly have progressed over the past year.  She was recently in the Emergency Department with back pain and worsening shortness of breath.      IMPRESSION, REPORT, PLAN:   1.  Severe symptomatic aortic valve stenosis.   2.  Coronary artery disease status post CABG x4 in 2011.   3.  Paroxysmal atrial fibrillation.   4.  History of symptomatic bradycardia status post pacemaker implantation.   5.  History of postoperative DVT.      I reviewed the  patient's most recent transthoracic echocardiogram.  The echo was read as showing moderate to severe aortic valve stenosis with mean gradient of 25 mmHg, peak systolic velocity of 3.2 meters per second and a valve area of 0.8 cm squared.  Stroke volume index is 39 mL/m2 and her dimensionless index is 0.27.      Although there is a discrepancy between the numbers with regard to the severity of her aortic valve stenosis, visually the valve appears to be quite severely stenotic.  Her physical examination findings are also consistent with severe AS.        She is quite limited from her shortness of breath.  Although the hiatal hernia is contributing, I suspect the progression of her AS is most likely the cause for the worsening shortness of breath.  I do recommend aortic valve replacement.  Given her advanced age, transcatheter aortic valve replacement is preferred.  However, the patient is currently not interested in pursuing valve replacement.  She is quite concerned about potential complications that could occur during the procedure.  Although she is quite limited, she does not feel like her symptoms are interfering with her quality of life very much at this point.      We settled on obtaining a CT TAVR to see if she would even be a candidate for a transcatheter aortic valve replacement.  We will also obtain an echocardiogram in approximately 2 months to see if all of the measurements are consistent with severe AS.  We will arrange a followup after her transthoracic echocardiogram.      It was a pleasure seeing Ms. Amezquita in the clinic this morning.  I appreciate the opportunity to be part of her care.         CARL LITTLE MD             D: 10/15/2019   T: 10/15/2019   MT: BRIANNA      Name:     CORDELL AMEZQUITA   MRN:      [id number]        Account:      [id number]   :      1929           Service Date: 10/15/2019      Document: [id number]       Thank you for allowing me to participate in the care of your  patient.      Sincerely,     Mian Arcos MD, MD     [facility]    cc:   Noel Cardenas MD  [address]  [place] [id number]

## 2019-10-15 NOTE — PROGRESS NOTES
Service Date: 10/15/2019      REASON FOR CONSULTATION:  Severe aortic valve stenosis.      REFERRING PHYSICIAN:  Dr. Cardenas.      HISTORY OF PRESENT ILLNESS:  I had the pleasure of seeing Madie Silva at the [facility] in [place] this morning.  She is a very pleasant 89-year-old female with known aortic valve stenosis, coronary artery disease status post CABG x4 in 11/2011 (LIMA to LAD, SVG to ramus, SVG to OM, and SVG to PDA), symptomatic bradycardia status post pacemaker implantation and paroxysmal atrial fibrillation who is here for further evaluation regarding symptomatic severe aortic valve stenosis.      The patient has been followed for aortic valve stenosis over the last several years, but the severity of AS has progressed over the last year.  Her most recent echocardiogram demonstrated moderate-severe aortic valve stenosis, although visually the valve appears to be severely stenotic.      With regards to symptoms, she is endorsing significant shortness of breath.  She gets short of breath with minimal activity, sometimes walking across the room.  She denies chest pain.  She denies palpitations, presyncope or syncope.  Part of her shortness of breath has been in the past attributed to a known large hiatal hernia.  However, her symptoms clearly have progressed over the past year.  She was recently in the Emergency Department with back pain and worsening shortness of breath.      IMPRESSION, REPORT, PLAN:   1.  Severe symptomatic aortic valve stenosis.   2.  Coronary artery disease status post CABG x4 in 2011.   3.  Paroxysmal atrial fibrillation.   4.  History of symptomatic bradycardia status post pacemaker implantation.   5.  History of postoperative DVT.      I reviewed the patient's most recent transthoracic echocardiogram.  The echo was read as showing moderate to severe aortic valve stenosis with mean gradient of 25 mmHg, peak systolic velocity of 3.2 meters per second and a  valve area of 0.8 cm squared.  Stroke volume index is 39 mL/m2 and her dimensionless index is 0.27.      Although there is a discrepancy between the numbers with regard to the severity of her aortic valve stenosis, visually the valve appears to be severely stenotic.  Her physical examination findings are also consistent with severe AS.        She is quite limited from her shortness of breath and although the hiatal hernia is contributing, I suspect the progression of her AS is most likely the cause for the worsening shortness of breath.  I do recommend aortic valve replacement.  Given her advanced age, transcatheter aortic valve replacement is preferred.  However, the patient is currently not interested in pursuing valve replacement.  She is quite concerned about potential complications that could occur during the procedure.       We settled on obtaining a CT TAVR to see if she would even be a candidate for a transcatheter aortic valve replacement.  We will also obtain an echocardiogram in approximately 2 months to see if all of the measurements are consistent with severe AS.  We will arrange a followup after her transthoracic echocardiogram.      It was a pleasure seeing Ms. Amezquita in the clinic this morning.  I appreciate the opportunity to be part of her care.         CARL LITTLE MD             D: 10/15/2019   T: 10/15/2019   MT: BRIANNA      Name:     CORDELL AMEZQUITA   MRN:      [id number]        Account:      [id number]   :      1929           Service Date: 10/15/2019      Document: [id number]

## 2019-10-24 ENCOUNTER — ANTICOAGULATION THERAPY VISIT (OUTPATIENT)
Dept: ANTICOAGULATION | Facility: CLINIC | Age: 84
End: 2019-10-24
Payer: COMMERCIAL

## 2019-10-24 VITALS — DIASTOLIC BLOOD PRESSURE: 58 MMHG | SYSTOLIC BLOOD PRESSURE: 138 MMHG

## 2019-10-24 LAB — INR POINT OF CARE: 2.7 (ref 0.86–1.14)

## 2019-10-24 PROCEDURE — 85610 PROTHROMBIN TIME: CPT | Mod: QW

## 2019-10-24 PROCEDURE — 36416 COLLJ CAPILLARY BLOOD SPEC: CPT

## 2019-10-24 NOTE — PROGRESS NOTES
ANTICOAGULATION FOLLOW-UP CLINIC VISIT    Patient Name:  Madie Silva  Date:  10/24/2019  Contact Type:  Face to Face    SUBJECTIVE:  Patient Findings     Comments:   The patient was assessed for diet, medication, and activity level changes, missed or extra doses, bruising or bleeding, with no problem findings.          Clinical Outcomes     Comments:   The patient was assessed for diet, medication, and activity level changes, missed or extra doses, bruising or bleeding, with no problem findings.             OBJECTIVE    INR Protime   Date Value Ref Range Status   10/24/2019 2.7 (A) 0.86 - 1.14 Final       ASSESSMENT / PLAN  INR assessment THER    Recheck INR In: 6 WEEKS    INR Location Clinic      Anticoagulation Summary  As of 10/24/2019    INR goal:   2.0-3.0   TTR:   84.5 % (3.8 y)   INR used for dosin.7 (10/24/2019)   Warfarin maintenance plan:   4.5 mg (3 mg x 1.5) every Tue, Thu, Sat; 3 mg (3 mg x 1) all other days   Full warfarin instructions:   4.5 mg every Tue, Thu, Sat; 3 mg all other days   Weekly warfarin total:   25.5 mg   Plan last modified:   Stella Cha, RN (10/25/2018)   Next INR check:   2019   Target end date:       Indications    Long-term (current) use of anticoagulants [Z79.01] [Z79.01]  Phlebitis and thrombophlebitis of deep veins of lower extremities (H) [I80.209]  Acute myocardial infarction  initial episode of care (H) (Resolved) [I21.9]             Anticoagulation Episode Summary     INR check location:       Preferred lab:       Send INR reminders to:   [facility]    Comments:         Anticoagulation Care Providers     Provider Role Specialty Phone number    De Obregon MD [facility] Internal Medicine [phone]            See the Encounter Report to view Anticoagulation Flowsheet and Dosing Calendar (Go to Encounters tab in chart review, and find the Anticoagulation Therapy Visit)    Dosage adjustment made based on physician directed  care plan.    Kathleen Bolaños RN

## 2019-10-31 ENCOUNTER — HOSPITAL ENCOUNTER (OUTPATIENT)
Dept: CARDIOLOGY | Facility: CLINIC | Age: 84
Discharge: HOME | End: 2019-10-31
Attending: INTERNAL MEDICINE | Admitting: INTERNAL MEDICINE
Payer: COMMERCIAL

## 2019-10-31 VITALS — SYSTOLIC BLOOD PRESSURE: 177 MMHG | HEART RATE: 60 BPM | DIASTOLIC BLOOD PRESSURE: 68 MMHG

## 2019-10-31 DIAGNOSIS — I35.0 NONRHEUMATIC AORTIC VALVE STENOSIS: ICD-10-CM

## 2019-10-31 LAB
CREAT BLD-MCNC: 0.8 MG/DL (ref 0.52–1.04)
GFR SERPL CREATININE-BSD FRML MDRD: 67 ML/MIN/{1.73_M2}

## 2019-10-31 PROCEDURE — 71275 CT ANGIOGRAPHY CHEST: CPT | Mod: 26 | Performed by: INTERNAL MEDICINE

## 2019-10-31 PROCEDURE — 25000128 H RX IP 250 OP 636: Performed by: INTERNAL MEDICINE

## 2019-10-31 PROCEDURE — 74174 CTA ABD&PLVS W/CONTRAST: CPT

## 2019-10-31 PROCEDURE — 74174 CTA ABD&PLVS W/CONTRAST: CPT | Mod: 26 | Performed by: INTERNAL MEDICINE

## 2019-10-31 PROCEDURE — 82565 ASSAY OF CREATININE: CPT

## 2019-10-31 RX ORDER — IOPAMIDOL 755 MG/ML
200 INJECTION, SOLUTION INTRAVASCULAR ONCE
Status: COMPLETED | OUTPATIENT
Start: 2019-10-31 | End: 2019-10-31

## 2019-10-31 RX ADMIN — SODIUM CHLORIDE 100 ML: 9 INJECTION, SOLUTION INTRAVENOUS at 09:45

## 2019-10-31 RX ADMIN — IOPAMIDOL 115 ML: 755 INJECTION, SOLUTION INTRAVENOUS at 09:45

## 2019-11-04 ENCOUNTER — TELEPHONE (OUTPATIENT)
Dept: CARDIOLOGY | Facility: CLINIC | Age: 84
End: 2019-11-04

## 2019-11-04 NOTE — TELEPHONE ENCOUNTER
"Called patient with TAVR CT results. Per Dr. Arcos, we can recheck Echo and F/U in 3 months. Pt notified of this and is in agreement. She states she is unsure that she would want to go through TAVR procedure. She did tell me that she has to be cautious when she exerts herself and had a \"spell\" at [facility] yesterday where she felt lightheaded but resolved with rest. She states she is okay with it and isn't sure she like to move ahead with any surgery. Pt transferred to scheduling to set up recheck. Kim Wnog RN    "

## 2019-11-06 NOTE — NURSING NOTE
"Chief Complaint   Patient presents with     Medication Follow-up     Gabapentin        Initial /60  Pulse 66  Temp 98.2  F (36.8  C) (Oral)  Ht 5' 5\" (1.651 m)  Wt 161 lb 3.2 oz (73.1 kg)  SpO2 97%  BMI 26.83 kg/m2 Estimated body mass index is 26.83 kg/(m^2) as calculated from the following:    Height as of this encounter: 5' 5\" (1.651 m).    Weight as of this encounter: 161 lb 3.2 oz (73.1 kg).  Medication Reconciliation: complete   Skylar Fernandez MA   " Carac Counseling:  I discussed with the patient the risks of Carac including but not limited to erythema, scaling, itching, weeping, crusting, and pain.

## 2019-12-05 ENCOUNTER — ANTICOAGULATION THERAPY VISIT (OUTPATIENT)
Dept: ANTICOAGULATION | Facility: CLINIC | Age: 84
End: 2019-12-05
Payer: COMMERCIAL

## 2019-12-05 LAB — INR POINT OF CARE: 2.5 (ref 0.86–1.14)

## 2019-12-05 PROCEDURE — 36416 COLLJ CAPILLARY BLOOD SPEC: CPT

## 2019-12-05 PROCEDURE — 85610 PROTHROMBIN TIME: CPT | Mod: QW

## 2019-12-05 NOTE — PROGRESS NOTES
ANTICOAGULATION FOLLOW-UP CLINIC VISIT    Patient Name:  Madie Silva  Date:  2019  Contact Type:  Face to Face    SUBJECTIVE:  Patient Findings     Comments:   The patient was assessed for diet, medication, and activity level changes, missed or extra doses, bruising or bleeding, with no problem findings.          Clinical Outcomes     Comments:   The patient was assessed for diet, medication, and activity level changes, missed or extra doses, bruising or bleeding, with no problem findings.             OBJECTIVE    INR Protime   Date Value Ref Range Status   2019 2.5 (A) 0.86 - 1.14 Final       ASSESSMENT / PLAN  INR assessment THER    Recheck INR In: 6 WEEKS    INR Location Clinic      Anticoagulation Summary  As of 2019    INR goal:   2.0-3.0   TTR:   96.4 % (1 y)   INR used for dosin.5 (2019)   Warfarin maintenance plan:   4.5 mg (3 mg x 1.5) every Tue, Thu, Sat; 3 mg (3 mg x 1) all other days   Full warfarin instructions:   4.5 mg every Tue, Thu, Sat; 3 mg all other days   Weekly warfarin total:   25.5 mg   No change documented:   Kathleen Bolaños, RN   Plan last modified:   Stella Cha RN (10/25/2018)   Next INR check:   2020   Target end date:       Indications    Long-term (current) use of anticoagulants [Z79.01] [Z79.01]  Phlebitis and thrombophlebitis of deep veins of lower extremities (H) [I80.209]  Acute myocardial infarction  initial episode of care (H) (Resolved) [I21.9]             Anticoagulation Episode Summary     INR check location:       Preferred lab:       Send INR reminders to:   [facility]    Comments:         Anticoagulation Care Providers     Provider Role Specialty Phone number    De Obregon MD [facility] Internal Medicine [phone]            See the Encounter Report to view Anticoagulation Flowsheet and Dosing Calendar (Go to Encounters tab in chart review, and find the Anticoagulation Therapy Visit)    Dosage  adjustment made based on physician directed care plan.    Kathleen Bolaños RN

## 2019-12-16 DIAGNOSIS — E11.21 TYPE 2 DIABETES MELLITUS WITH DIABETIC NEPHROPATHY, WITHOUT LONG-TERM CURRENT USE OF INSULIN (H): ICD-10-CM

## 2019-12-16 LAB
GLUCOSE SERPL-MCNC: 134 MG/DL (ref 70–99)
HBA1C MFR BLD: 6.8 % (ref 0–5.6)

## 2019-12-16 PROCEDURE — 83036 HEMOGLOBIN GLYCOSYLATED A1C: CPT | Performed by: INTERNAL MEDICINE

## 2019-12-16 PROCEDURE — 82947 ASSAY GLUCOSE BLOOD QUANT: CPT | Performed by: INTERNAL MEDICINE

## 2019-12-16 PROCEDURE — 36415 COLL VENOUS BLD VENIPUNCTURE: CPT | Performed by: INTERNAL MEDICINE

## 2019-12-17 DIAGNOSIS — B02.29 POST HERPETIC NEURALGIA: ICD-10-CM

## 2019-12-17 DIAGNOSIS — G25.81 RESTLESS LEG SYNDROME: ICD-10-CM

## 2019-12-17 DIAGNOSIS — E78.5 HYPERLIPIDEMIA LDL GOAL <100: ICD-10-CM

## 2019-12-17 DIAGNOSIS — I10 BENIGN ESSENTIAL HYPERTENSION: ICD-10-CM

## 2019-12-17 NOTE — TELEPHONE ENCOUNTER
"Want to wait until appt on Friday to refill medications.  Routing refill request to provider for review/approval because:  Pt has appt with Dr. Obregon on Friday: 12/20/2019 9:45 AM.        Requested Prescriptions   Pending Prescriptions Disp Refills     losartan (COZAAR) 50 MG tablet [Pharmacy Med Name: LOSARTAN POTASSIUM 50MG TABS] 45 tablet 3     Sig: TAKE ONE HALF TABLET BY MOUTH DAILY       Angiotensin-II Receptors Failed - 12/17/2019  9:01 AM        Failed - Last blood pressure under 140/90 in past 12 months     BP Readings from Last 3 Encounters:   10/31/19 (!) 177/68   10/24/19 138/58   10/15/19 130/60                 Passed - Recent (12 mo) or future (30 days) visit within the authorizing provider's specialty     Patient has had an office visit with the authorizing provider or a provider within the authorizing providers department within the previous 12 mos or has a future within next 30 days. See \"Patient Info\" tab in inbasket, or \"Choose Columns\" in Meds & Orders section of the refill encounter.              Passed - Medication is active on med list        Passed - Patient is age 18 or older        Passed - No active pregnancy on record        Passed - Normal serum creatinine on file in past 12 months     Recent Labs   Lab Test 10/31/19  0909 10/09/19  1215   CR  --  0.85   CREAT 0.8  --              Passed - Normal serum potassium on file in past 12 months     Recent Labs   Lab Test 10/09/19  1215   POTASSIUM 4.4                    Passed - No positive pregnancy test in past 12 months        gabapentin (NEURONTIN) 100 MG capsule [Pharmacy Med Name: GABAPENTIN 100MG CAPS] 270 capsule 3     Sig: TAKE ONE CAPSULE BY MOUTH THREE TIMES A DAY       There is no refill protocol information for this order        atorvastatin (LIPITOR) 80 MG tablet [Pharmacy Med Name: ATORVASTATIN CALCIUM 80MG TABS] 90 tablet 3     Sig: TAKE ONE TABLET BY MOUTH ONCE DAILY       Statins Protocol Passed - 12/17/2019  9:01 AM        " "Passed - LDL on file in past 12 months     Recent Labs   Lab Test 06/06/19  0855   LDL 99             Passed - No abnormal creatine kinase in past 12 months     No lab results found.             Passed - Recent (12 mo) or future (30 days) visit within the authorizing provider's specialty     Patient has had an office visit with the authorizing provider or a provider within the authorizing providers department within the previous 12 mos or has a future within next 30 days. See \"Patient Info\" tab in inbasket, or \"Choose Columns\" in Meds & Orders section of the refill encounter.              Passed - Medication is active on med list        Passed - Patient is age 18 or older        Passed - No active pregnancy on record        Passed - No positive pregnancy test in past 12 months          "

## 2019-12-20 ENCOUNTER — OFFICE VISIT (OUTPATIENT)
Dept: INTERNAL MEDICINE | Facility: CLINIC | Age: 84
End: 2019-12-20
Payer: COMMERCIAL

## 2019-12-20 VITALS
DIASTOLIC BLOOD PRESSURE: 60 MMHG | BODY MASS INDEX: 27.06 KG/M2 | HEART RATE: 84 BPM | WEIGHT: 162.6 LBS | SYSTOLIC BLOOD PRESSURE: 136 MMHG | OXYGEN SATURATION: 98 % | RESPIRATION RATE: 20 BRPM

## 2019-12-20 DIAGNOSIS — E78.5 HYPERLIPIDEMIA LDL GOAL <100: ICD-10-CM

## 2019-12-20 DIAGNOSIS — M25.50 MULTIPLE JOINT PAIN: ICD-10-CM

## 2019-12-20 DIAGNOSIS — I10 ESSENTIAL HYPERTENSION: Primary | ICD-10-CM

## 2019-12-20 DIAGNOSIS — E11.21 TYPE 2 DIABETES MELLITUS WITH DIABETIC NEPHROPATHY, WITHOUT LONG-TERM CURRENT USE OF INSULIN (H): ICD-10-CM

## 2019-12-20 DIAGNOSIS — E03.9 ACQUIRED HYPOTHYROIDISM: ICD-10-CM

## 2019-12-20 LAB
CREAT UR-MCNC: 28 MG/DL
MICROALBUMIN UR-MCNC: 7 MG/L
MICROALBUMIN/CREAT UR: 24.35 MG/G CR (ref 0–25)

## 2019-12-20 PROCEDURE — 99214 OFFICE O/P EST MOD 30 MIN: CPT | Performed by: INTERNAL MEDICINE

## 2019-12-20 PROCEDURE — 82043 UR ALBUMIN QUANTITATIVE: CPT | Performed by: INTERNAL MEDICINE

## 2019-12-20 RX ORDER — LOSARTAN POTASSIUM 100 MG/1
100 TABLET ORAL DAILY
Qty: 90 TABLET | Refills: 3 | Status: ON HOLD | OUTPATIENT
Start: 2019-12-20 | End: 2020-05-06

## 2019-12-20 RX ORDER — ATORVASTATIN CALCIUM 80 MG/1
TABLET, FILM COATED ORAL
Qty: 90 TABLET | Refills: 3 | Status: SHIPPED | OUTPATIENT
Start: 2019-12-20 | End: 2021-01-01

## 2019-12-20 RX ORDER — HYDROCODONE BITARTRATE AND ACETAMINOPHEN 5; 325 MG/1; MG/1
.5-1 TABLET ORAL EVERY 6 HOURS PRN
Qty: 12 TABLET | Refills: 0 | Status: SHIPPED | OUTPATIENT
Start: 2019-12-20 | End: 2020-07-14

## 2019-12-20 RX ORDER — GABAPENTIN 100 MG/1
CAPSULE ORAL
Qty: 270 CAPSULE | Refills: 3 | Status: ON HOLD | OUTPATIENT
Start: 2019-12-20 | End: 2020-08-01

## 2019-12-20 NOTE — PROGRESS NOTES
Subjective     Madie Silva is a 89 year old female who presents to clinic today for the following health issues:      Diabetes Follow-up      How often are you checking your blood sugar? Not at all    What concerns do you have today about your diabetes? None     Do you have any of these symptoms? (Select all that apply)  No numbness or tingling in feet.  No redness, sores or blisters on feet.  No complaints of excessive thirst.  No reports of blurry vision.  No significant changes to weight.     Have you had a diabetic eye exam in the last 12 months? No    BP Readings from Last 2 Encounters:   10/31/19 (!) 177/68   10/24/19 138/58     Hemoglobin A1C (%)   Date Value   12/16/2019 6.8 (H)   06/06/2019 6.7 (H)     LDL Cholesterol Calculated (mg/dL)   Date Value   06/06/2019 99   04/18/2018 105 (H)       Diabetes Management Resources    Hypertension Follow-up      Do you check your blood pressure regularly outside of the clinic? No     Are you following a low salt diet? No    Are your blood pressures ever more than 140 on the top number (systolic) OR more   than 90 on the bottom number (diastolic), for example 140/90? No      How many servings of fruits and vegetables do you eat daily?  2-3    On average, how many sweetened beverages do you drink each day (Examples: soda, juice, sweet tea, etc.  Do NOT count diet or artificially sweetened beverages)?   1    How many days per week do you miss taking your medication? 0    Problem list and histories reviewed & adjusted, as indicated.  Additional history: as documented      ROS:    Constitutional, HEENT, cardiovascular, pulmonary, gi and gu systems are negative, except as otherwise noted.    OBJECTIVE:                                                    BP (!) 142/70   Pulse 84   Resp 20   Wt 73.8 kg (162 lb 9.6 oz)   SpO2 98%   BMI 27.06 kg/m    Body mass index is 27.06 kg/m .   No apparent distress  Progressive increase of systolic ejection type murmur heard best at  the base, now 3/6 at least  No edema        ASSESSMENT:                                                      1.  DM, controlled   2.  HTN, blood pressure higher, suspect related to valve  3.  Progressive aortic stenosis, symptomatic.   Patient considering     PLAN:                                                      1.  MEDICATIONS:        - Increase losartan to 100 mg daily       - Continue other medications without change  2.  Follow-up with cardiology in January, as planned.   I would be supportive of TAVR.  3.  Check fasting labs in 6 months, then make an office appointment with MD to review the results.      De Obregon MD  [facility]

## 2020-01-01 ENCOUNTER — TELEPHONE (OUTPATIENT)
Dept: CARDIOLOGY | Facility: CLINIC | Age: 85
End: 2020-01-01

## 2020-01-01 ENCOUNTER — NURSING HOME VISIT (OUTPATIENT)
Dept: GERIATRICS | Facility: CLINIC | Age: 85
End: 2020-01-01
Payer: COMMERCIAL

## 2020-01-01 ENCOUNTER — ANTICOAGULATION THERAPY VISIT (OUTPATIENT)
Dept: INTERNAL MEDICINE | Facility: CLINIC | Age: 85
End: 2020-01-01

## 2020-01-01 ENCOUNTER — TELEPHONE (OUTPATIENT)
Dept: INTERNAL MEDICINE | Facility: CLINIC | Age: 85
End: 2020-01-01

## 2020-01-01 ENCOUNTER — ANCILLARY PROCEDURE (OUTPATIENT)
Dept: CARDIOLOGY | Facility: CLINIC | Age: 85
End: 2020-01-01
Attending: INTERNAL MEDICINE
Payer: COMMERCIAL

## 2020-01-01 ENCOUNTER — ALLIED HEALTH/NURSE VISIT (OUTPATIENT)
Dept: DERMATOLOGY | Facility: CLINIC | Age: 85
End: 2020-01-01
Payer: COMMERCIAL

## 2020-01-01 ENCOUNTER — PATIENT OUTREACH (OUTPATIENT)
Dept: NURSING | Facility: CLINIC | Age: 85
End: 2020-01-01
Payer: COMMERCIAL

## 2020-01-01 ENCOUNTER — ANTICOAGULATION THERAPY VISIT (OUTPATIENT)
Dept: ANTICOAGULATION | Facility: CLINIC | Age: 85
End: 2020-01-01

## 2020-01-01 ENCOUNTER — TRANSFERRED RECORDS (OUTPATIENT)
Dept: HEALTH INFORMATION MANAGEMENT | Facility: CLINIC | Age: 85
End: 2020-01-01

## 2020-01-01 ENCOUNTER — OFFICE VISIT (OUTPATIENT)
Dept: DERMATOLOGY | Facility: CLINIC | Age: 85
End: 2020-01-01
Payer: COMMERCIAL

## 2020-01-01 ENCOUNTER — TELEPHONE (OUTPATIENT)
Dept: DERMATOLOGY | Facility: CLINIC | Age: 85
End: 2020-01-01

## 2020-01-01 ENCOUNTER — DISCHARGE SUMMARY NURSING HOME (OUTPATIENT)
Dept: GERIATRICS | Facility: CLINIC | Age: 85
End: 2020-01-01
Payer: COMMERCIAL

## 2020-01-01 ENCOUNTER — OFFICE VISIT (OUTPATIENT)
Dept: INTERNAL MEDICINE | Facility: CLINIC | Age: 85
End: 2020-01-01
Payer: COMMERCIAL

## 2020-01-01 ENCOUNTER — ANTICOAGULATION THERAPY VISIT (OUTPATIENT)
Dept: INTERNAL MEDICINE | Facility: CLINIC | Age: 85
End: 2020-01-01
Payer: COMMERCIAL

## 2020-01-01 ENCOUNTER — PATIENT OUTREACH (OUTPATIENT)
Dept: CARE COORDINATION | Facility: CLINIC | Age: 85
End: 2020-01-01

## 2020-01-01 VITALS
BODY MASS INDEX: 25.18 KG/M2 | SYSTOLIC BLOOD PRESSURE: 148 MMHG | WEIGHT: 156.7 LBS | HEART RATE: 72 BPM | TEMPERATURE: 98 F | RESPIRATION RATE: 16 BRPM | OXYGEN SATURATION: 98 % | DIASTOLIC BLOOD PRESSURE: 68 MMHG | HEIGHT: 66 IN

## 2020-01-01 VITALS — DIASTOLIC BLOOD PRESSURE: 57 MMHG | OXYGEN SATURATION: 100 % | SYSTOLIC BLOOD PRESSURE: 157 MMHG | HEART RATE: 80 BPM

## 2020-01-01 VITALS
BODY MASS INDEX: 24.89 KG/M2 | SYSTOLIC BLOOD PRESSURE: 173 MMHG | HEART RATE: 58 BPM | RESPIRATION RATE: 16 BRPM | WEIGHT: 154.9 LBS | DIASTOLIC BLOOD PRESSURE: 65 MMHG | TEMPERATURE: 96.6 F | OXYGEN SATURATION: 97 % | HEIGHT: 66 IN

## 2020-01-01 VITALS
RESPIRATION RATE: 22 BRPM | DIASTOLIC BLOOD PRESSURE: 52 MMHG | OXYGEN SATURATION: 99 % | BODY MASS INDEX: 25.02 KG/M2 | WEIGHT: 155 LBS | TEMPERATURE: 97 F | HEART RATE: 98 BPM | SYSTOLIC BLOOD PRESSURE: 130 MMHG

## 2020-01-01 VITALS
RESPIRATION RATE: 18 BRPM | TEMPERATURE: 97.5 F | HEIGHT: 66 IN | BODY MASS INDEX: 25.09 KG/M2 | OXYGEN SATURATION: 96 % | DIASTOLIC BLOOD PRESSURE: 56 MMHG | HEART RATE: 78 BPM | WEIGHT: 156.1 LBS | SYSTOLIC BLOOD PRESSURE: 122 MMHG

## 2020-01-01 VITALS
WEIGHT: 156.6 LBS | BODY MASS INDEX: 25.17 KG/M2 | HEIGHT: 66 IN | HEART RATE: 70 BPM | SYSTOLIC BLOOD PRESSURE: 138 MMHG | OXYGEN SATURATION: 98 % | DIASTOLIC BLOOD PRESSURE: 71 MMHG | TEMPERATURE: 98 F | RESPIRATION RATE: 16 BRPM

## 2020-01-01 VITALS
RESPIRATION RATE: 16 BRPM | BODY MASS INDEX: 24.89 KG/M2 | TEMPERATURE: 97.8 F | SYSTOLIC BLOOD PRESSURE: 157 MMHG | DIASTOLIC BLOOD PRESSURE: 59 MMHG | WEIGHT: 154.9 LBS | HEIGHT: 66 IN | HEART RATE: 73 BPM | OXYGEN SATURATION: 98 %

## 2020-01-01 VITALS
DIASTOLIC BLOOD PRESSURE: 61 MMHG | HEART RATE: 75 BPM | RESPIRATION RATE: 16 BRPM | BODY MASS INDEX: 25.1 KG/M2 | TEMPERATURE: 97.3 F | SYSTOLIC BLOOD PRESSURE: 116 MMHG | OXYGEN SATURATION: 93 % | HEIGHT: 66 IN | WEIGHT: 156.2 LBS

## 2020-01-01 DIAGNOSIS — R00.1 SYMPTOMATIC BRADYCARDIA: ICD-10-CM

## 2020-01-01 DIAGNOSIS — K59.03 DRUG-INDUCED CONSTIPATION: ICD-10-CM

## 2020-01-01 DIAGNOSIS — I25.10 CORONARY ARTERY DISEASE INVOLVING NATIVE CORONARY ARTERY OF NATIVE HEART WITHOUT ANGINA PECTORIS: ICD-10-CM

## 2020-01-01 DIAGNOSIS — I48.0 PAROXYSMAL ATRIAL FIBRILLATION (H): ICD-10-CM

## 2020-01-01 DIAGNOSIS — R53.81 PHYSICAL DECONDITIONING: ICD-10-CM

## 2020-01-01 DIAGNOSIS — I80.209 PHLEBITIS AND THROMBOPHLEBITIS OF DEEP VEINS OF LOWER EXTREMITIES (H): ICD-10-CM

## 2020-01-01 DIAGNOSIS — S42.202D CLOSED FRACTURE OF PROXIMAL END OF LEFT HUMERUS WITH ROUTINE HEALING, UNSPECIFIED FRACTURE MORPHOLOGY, SUBSEQUENT ENCOUNTER: Primary | ICD-10-CM

## 2020-01-01 DIAGNOSIS — I10 ESSENTIAL HYPERTENSION: ICD-10-CM

## 2020-01-01 DIAGNOSIS — I80.209 PHLEBITIS AND THROMBOPHLEBITIS OF DEEP VEINS OF LOWER EXTREMITIES (H): Primary | ICD-10-CM

## 2020-01-01 DIAGNOSIS — D62 ANEMIA DUE TO BLOOD LOSS, ACUTE: ICD-10-CM

## 2020-01-01 DIAGNOSIS — N18.30 CKD (CHRONIC KIDNEY DISEASE) STAGE 3, GFR 30-59 ML/MIN (H): ICD-10-CM

## 2020-01-01 DIAGNOSIS — Z53.9 DIAGNOSIS NOT YET DEFINED: Primary | ICD-10-CM

## 2020-01-01 DIAGNOSIS — Z95.0 CARDIAC PACEMAKER IN SITU: ICD-10-CM

## 2020-01-01 DIAGNOSIS — Z95.0 CARDIAC PACEMAKER IN SITU: Primary | ICD-10-CM

## 2020-01-01 DIAGNOSIS — I35.0 AORTIC STENOSIS, SEVERE: ICD-10-CM

## 2020-01-01 DIAGNOSIS — Z48.01 ENCOUNTER FOR CHANGE OR REMOVAL OF SURGICAL WOUND DRESSING: Primary | ICD-10-CM

## 2020-01-01 DIAGNOSIS — I80.201 DEEP VEIN PHLEBITIS AND THROMBOPHLEBITIS OF RIGHT LOWER EXTREMITY (H): ICD-10-CM

## 2020-01-01 DIAGNOSIS — C44.311 BASAL CELL CARCINOMA (BCC) OF DORSUM OF NOSE: Primary | ICD-10-CM

## 2020-01-01 DIAGNOSIS — Z23 NEED FOR PROPHYLACTIC VACCINATION AND INOCULATION AGAINST INFLUENZA: ICD-10-CM

## 2020-01-01 DIAGNOSIS — G47.00 INSOMNIA, UNSPECIFIED TYPE: ICD-10-CM

## 2020-01-01 DIAGNOSIS — S42.202D CLOSED FRACTURE OF PROXIMAL END OF LEFT HUMERUS WITH ROUTINE HEALING, UNSPECIFIED FRACTURE MORPHOLOGY, SUBSEQUENT ENCOUNTER: ICD-10-CM

## 2020-01-01 DIAGNOSIS — E03.9 ACQUIRED HYPOTHYROIDISM: ICD-10-CM

## 2020-01-01 DIAGNOSIS — F32.0 CURRENT MILD EPISODE OF MAJOR DEPRESSIVE DISORDER WITHOUT PRIOR EPISODE (H): ICD-10-CM

## 2020-01-01 DIAGNOSIS — I10 BENIGN ESSENTIAL HYPERTENSION: ICD-10-CM

## 2020-01-01 DIAGNOSIS — Z09 HOSPITAL DISCHARGE FOLLOW-UP: Primary | ICD-10-CM

## 2020-01-01 DIAGNOSIS — Z95.2 S/P TAVR (TRANSCATHETER AORTIC VALVE REPLACEMENT): Primary | ICD-10-CM

## 2020-01-01 LAB
CAPILLARY BLOOD COLLECTION: NORMAL
INR PPP: 1.7 (ref 0.9–1.1)
INR PPP: 1.8 (ref 0.86–1.14)
INR PPP: 2.2 (ref 0.9–1.1)
INR PPP: 2.3 (ref 0.9–1.1)
INR PPP: 2.5 (ref 0.9–1.1)
INR PPP: 2.6 (ref 0.9–1.1)
INR PPP: 2.7 (ref 0.9–1.1)
INR PPP: 2.8 (ref 0.86–1.14)
INR PPP: 2.8 (ref 0.9–1.1)
INR PPP: 2.9 (ref 0.9–1.1)
INR PPP: 2.9 (ref 0.9–1.1)
INR PPP: 3 (ref 0.9–1.1)
INR PPP: 3.1 (ref 0.9–1.1)
INR PPP: 3.3 (ref 0.86–1.14)
INR PPP: 3.3 (ref 0.9–1.1)
INR PPP: 3.5 (ref 0.9–1.1)
INR PPP: 3.6 (ref 0.9–1.1)
INR PPP: 3.9 (ref 0.86–1.14)
MDC_IDC_LEAD_IMPLANT_DT: NORMAL
MDC_IDC_LEAD_IMPLANT_DT: NORMAL
MDC_IDC_LEAD_LOCATION: NORMAL
MDC_IDC_LEAD_LOCATION: NORMAL
MDC_IDC_LEAD_MFG: NORMAL
MDC_IDC_LEAD_MFG: NORMAL
MDC_IDC_LEAD_MODEL: NORMAL
MDC_IDC_LEAD_MODEL: NORMAL
MDC_IDC_LEAD_POLARITY_TYPE: NORMAL
MDC_IDC_LEAD_POLARITY_TYPE: NORMAL
MDC_IDC_LEAD_SERIAL: NORMAL
MDC_IDC_LEAD_SERIAL: NORMAL
MDC_IDC_MSMT_BATTERY_DTM: NORMAL
MDC_IDC_MSMT_BATTERY_IMPEDANCE: 1950 OHM
MDC_IDC_MSMT_BATTERY_REMAINING_LONGEVITY: 34 MO
MDC_IDC_MSMT_BATTERY_STATUS: NORMAL
MDC_IDC_MSMT_BATTERY_VOLTAGE: 2.75 V
MDC_IDC_MSMT_LEADCHNL_RA_IMPEDANCE_VALUE: 446 OHM
MDC_IDC_MSMT_LEADCHNL_RA_PACING_THRESHOLD_AMPLITUDE: 0.38 V
MDC_IDC_MSMT_LEADCHNL_RA_PACING_THRESHOLD_PULSEWIDTH: 0.4 MS
MDC_IDC_MSMT_LEADCHNL_RA_SENSING_INTR_AMPL: 0.7 MV
MDC_IDC_MSMT_LEADCHNL_RV_IMPEDANCE_VALUE: 517 OHM
MDC_IDC_MSMT_LEADCHNL_RV_PACING_THRESHOLD_AMPLITUDE: 0.5 V
MDC_IDC_MSMT_LEADCHNL_RV_PACING_THRESHOLD_PULSEWIDTH: 0.4 MS
MDC_IDC_MSMT_LEADCHNL_RV_SENSING_INTR_AMPL: 8 MV
MDC_IDC_PG_IMPLANT_DTM: NORMAL
MDC_IDC_PG_MFG: NORMAL
MDC_IDC_PG_MODEL: NORMAL
MDC_IDC_PG_SERIAL: NORMAL
MDC_IDC_PG_TYPE: NORMAL
MDC_IDC_SESS_CLINIC_NAME: NORMAL
MDC_IDC_SESS_DTM: NORMAL
MDC_IDC_SESS_TYPE: NORMAL
MDC_IDC_SET_BRADY_AT_MODE_SWITCH_MODE: NORMAL
MDC_IDC_SET_BRADY_AT_MODE_SWITCH_RATE: 175 {BEATS}/MIN
MDC_IDC_SET_BRADY_LOWRATE: 70 {BEATS}/MIN
MDC_IDC_SET_BRADY_MAX_SENSOR_RATE: 130 {BEATS}/MIN
MDC_IDC_SET_BRADY_MAX_TRACKING_RATE: 130 {BEATS}/MIN
MDC_IDC_SET_BRADY_MODE: NORMAL
MDC_IDC_SET_BRADY_PAV_DELAY_LOW: 150 MS
MDC_IDC_SET_BRADY_SAV_DELAY_LOW: 120 MS
MDC_IDC_SET_LEADCHNL_RA_PACING_AMPLITUDE: 1.5 V
MDC_IDC_SET_LEADCHNL_RA_PACING_CAPTURE_MODE: NORMAL
MDC_IDC_SET_LEADCHNL_RA_PACING_POLARITY: NORMAL
MDC_IDC_SET_LEADCHNL_RA_PACING_PULSEWIDTH: 0.4 MS
MDC_IDC_SET_LEADCHNL_RA_SENSING_POLARITY: NORMAL
MDC_IDC_SET_LEADCHNL_RA_SENSING_SENSITIVITY: 0.18 MV
MDC_IDC_SET_LEADCHNL_RV_PACING_AMPLITUDE: 2 V
MDC_IDC_SET_LEADCHNL_RV_PACING_CAPTURE_MODE: NORMAL
MDC_IDC_SET_LEADCHNL_RV_PACING_POLARITY: NORMAL
MDC_IDC_SET_LEADCHNL_RV_PACING_PULSEWIDTH: 0.4 MS
MDC_IDC_SET_LEADCHNL_RV_SENSING_POLARITY: NORMAL
MDC_IDC_SET_LEADCHNL_RV_SENSING_SENSITIVITY: 4 MV
MDC_IDC_SET_ZONE_DETECTION_INTERVAL: 333.33 MS
MDC_IDC_SET_ZONE_DETECTION_INTERVAL: 342.86 MS
MDC_IDC_SET_ZONE_TYPE: NORMAL
MDC_IDC_SET_ZONE_TYPE: NORMAL
MDC_IDC_STAT_AT_BURDEN_PERCENT: 0 %
MDC_IDC_STAT_AT_DTM_END: NORMAL
MDC_IDC_STAT_AT_DTM_START: NORMAL
MDC_IDC_STAT_AT_MODE_SW_COUNT: 101
MDC_IDC_STAT_BRADY_AP_VP_PERCENT: 5 %
MDC_IDC_STAT_BRADY_AP_VS_PERCENT: 87 %
MDC_IDC_STAT_BRADY_AS_VP_PERCENT: 1 %
MDC_IDC_STAT_BRADY_AS_VS_PERCENT: 7 %
MDC_IDC_STAT_BRADY_DTM_END: NORMAL
MDC_IDC_STAT_BRADY_DTM_START: NORMAL
MDC_IDC_STAT_EPISODE_RECENT_COUNT: 16
MDC_IDC_STAT_EPISODE_RECENT_COUNT: 8
MDC_IDC_STAT_EPISODE_RECENT_COUNT_DTM_END: NORMAL
MDC_IDC_STAT_EPISODE_RECENT_COUNT_DTM_END: NORMAL
MDC_IDC_STAT_EPISODE_RECENT_COUNT_DTM_START: NORMAL
MDC_IDC_STAT_EPISODE_RECENT_COUNT_DTM_START: NORMAL
MDC_IDC_STAT_EPISODE_TYPE: NORMAL
MDC_IDC_STAT_EPISODE_TYPE: NORMAL

## 2020-01-01 PROCEDURE — 99213 OFFICE O/P EST LOW 20 MIN: CPT | Mod: 25 | Performed by: INTERNAL MEDICINE

## 2020-01-01 PROCEDURE — 85610 PROTHROMBIN TIME: CPT | Performed by: INTERNAL MEDICINE

## 2020-01-01 PROCEDURE — 99309 SBSQ NF CARE MODERATE MDM 30: CPT | Performed by: NURSE PRACTITIONER

## 2020-01-01 PROCEDURE — 93280 PM DEVICE PROGR EVAL DUAL: CPT | Performed by: INTERNAL MEDICINE

## 2020-01-01 PROCEDURE — 99207 PR NO CHARGE LOS: CPT | Performed by: DERMATOLOGY

## 2020-01-01 PROCEDURE — 36416 COLLJ CAPILLARY BLOOD SPEC: CPT | Performed by: INTERNAL MEDICINE

## 2020-01-01 PROCEDURE — 99207 PR NO CHARGE NURSE ONLY: CPT | Performed by: INTERNAL MEDICINE

## 2020-01-01 PROCEDURE — 99310 SBSQ NF CARE HIGH MDM 45: CPT | Performed by: NURSE PRACTITIONER

## 2020-01-01 PROCEDURE — G0180 MD CERTIFICATION HHA PATIENT: HCPCS | Performed by: INTERNAL MEDICINE

## 2020-01-01 PROCEDURE — 99207 PR NO CHARGE NURSE ONLY: CPT

## 2020-01-01 PROCEDURE — 14061 TIS TRNFR E/N/E/L10.1-30SQCM: CPT | Performed by: DERMATOLOGY

## 2020-01-01 PROCEDURE — 17311 MOHS 1 STAGE H/N/HF/G: CPT | Performed by: DERMATOLOGY

## 2020-01-01 PROCEDURE — 90662 IIV NO PRSV INCREASED AG IM: CPT | Performed by: INTERNAL MEDICINE

## 2020-01-01 PROCEDURE — 99316 NF DSCHRG MGMT 30 MIN+: CPT | Performed by: NURSE PRACTITIONER

## 2020-01-01 PROCEDURE — G0008 ADMIN INFLUENZA VIRUS VAC: HCPCS | Performed by: INTERNAL MEDICINE

## 2020-01-01 RX ORDER — LANOLIN ALCOHOL/MO/W.PET/CERES
3 CREAM (GRAM) TOPICAL AT BEDTIME
COMMUNITY
End: 2021-01-01

## 2020-01-01 RX ORDER — METOPROLOL SUCCINATE 25 MG/1
25 TABLET, EXTENDED RELEASE ORAL DAILY
Qty: 30 TABLET | Refills: 11
Start: 2020-01-01 | End: 2021-01-01

## 2020-01-01 RX ORDER — CLINDAMYCIN HCL 300 MG
600 CAPSULE ORAL ONCE
Qty: 2 CAPSULE | Refills: 0 | Status: SHIPPED | OUTPATIENT
Start: 2020-01-01 | End: 2020-01-01

## 2020-01-01 RX ORDER — LIDOCAINE 4 G/G
1 PATCH TOPICAL DAILY PRN
COMMUNITY
End: 2021-01-01

## 2020-01-01 RX ORDER — NYSTATIN 100000 [USP'U]/G
POWDER TOPICAL
Status: ON HOLD | COMMUNITY
End: 2021-01-01

## 2020-01-01 RX ORDER — AMOXICILLIN 250 MG
CAPSULE ORAL
COMMUNITY
End: 2021-01-01

## 2020-01-01 RX ORDER — ACETAMINOPHEN 500 MG
1000 TABLET ORAL 3 TIMES DAILY
Status: ON HOLD | COMMUNITY
End: 2021-01-01

## 2020-01-01 RX ORDER — LEVOTHYROXINE SODIUM 50 UG/1
TABLET ORAL
Qty: 90 TABLET | Refills: 3 | Status: SHIPPED | OUTPATIENT
Start: 2020-01-01 | End: 2021-01-01

## 2020-01-01 RX ORDER — MIRTAZAPINE 7.5 MG/1
7.5 TABLET, FILM COATED ORAL AT BEDTIME
COMMUNITY
End: 2021-01-01

## 2020-01-01 RX ORDER — LOSARTAN POTASSIUM 50 MG/1
50 TABLET ORAL DAILY
Qty: 90 TABLET | Refills: 3
Start: 2020-01-01 | End: 2021-01-01

## 2020-01-01 ASSESSMENT — MIFFLIN-ST. JEOR
SCORE: 1134.37
SCORE: 1142.08
SCORE: 1140.27
SCORE: 1142.54
SCORE: 1139.81
SCORE: 1134.37

## 2020-01-01 ASSESSMENT — ACTIVITIES OF DAILY LIVING (ADL)
DEPENDENT_IADLS:: INDEPENDENT

## 2020-01-09 ENCOUNTER — HOSPITAL ENCOUNTER (OUTPATIENT)
Dept: CARDIOLOGY | Facility: CLINIC | Age: 85
Discharge: HOME | End: 2020-01-09
Attending: INTERNAL MEDICINE | Admitting: INTERNAL MEDICINE
Payer: COMMERCIAL

## 2020-01-09 DIAGNOSIS — I35.0 NONRHEUMATIC AORTIC VALVE STENOSIS: ICD-10-CM

## 2020-01-09 PROCEDURE — 93306 TTE W/DOPPLER COMPLETE: CPT | Mod: 26 | Performed by: INTERNAL MEDICINE

## 2020-01-09 PROCEDURE — 93306 TTE W/DOPPLER COMPLETE: CPT

## 2020-01-14 ENCOUNTER — OFFICE VISIT (OUTPATIENT)
Dept: CARDIOLOGY | Facility: CLINIC | Age: 85
End: 2020-01-14
Attending: INTERNAL MEDICINE
Payer: COMMERCIAL

## 2020-01-14 VITALS
WEIGHT: 160 LBS | SYSTOLIC BLOOD PRESSURE: 139 MMHG | HEIGHT: 65 IN | HEART RATE: 86 BPM | BODY MASS INDEX: 26.66 KG/M2 | DIASTOLIC BLOOD PRESSURE: 81 MMHG

## 2020-01-14 DIAGNOSIS — I35.0 NONRHEUMATIC AORTIC VALVE STENOSIS: Primary | ICD-10-CM

## 2020-01-14 DIAGNOSIS — I48.0 PAROXYSMAL ATRIAL FIBRILLATION (H): ICD-10-CM

## 2020-01-14 DIAGNOSIS — E78.5 HYPERLIPIDEMIA LDL GOAL <100: ICD-10-CM

## 2020-01-14 DIAGNOSIS — R06.02 SHORTNESS OF BREATH: ICD-10-CM

## 2020-01-14 DIAGNOSIS — I10 ESSENTIAL HYPERTENSION: ICD-10-CM

## 2020-01-14 DIAGNOSIS — I25.10 CORONARY ARTERY DISEASE INVOLVING NATIVE CORONARY ARTERY OF NATIVE HEART WITHOUT ANGINA PECTORIS: ICD-10-CM

## 2020-01-14 PROCEDURE — 99214 OFFICE O/P EST MOD 30 MIN: CPT | Performed by: NURSE PRACTITIONER

## 2020-01-14 ASSESSMENT — MIFFLIN-ST. JEOR: SCORE: 1146.64

## 2020-01-14 NOTE — LETTER
1/14/2020    De Obregon MD  [address] [facility] [address]    RE: Madie Silva       Dear Colleague,    I had the pleasure of seeing Madie Silva in the [facility].    Cardiology Clinic Progress Note  Madie Silva MRN# [id number]   YOB: 1929 Age: 89 year old     Reason For Visit:  Review of echo results   Primary Cardiologist:   Dr. Cardenas          History of Presenting Illness:      Madie Silva is a pleasant 89 year old patient who carries a past medical history significant for aortic valve stenosis, coronary artery disease status post CABG x4 in 2011 with LIMA to the LAD, SVG to ramus, SVG to the OM and SVG to PDA, symptomatic bradycardia status post permanent pacemaker implant, DV, tension, hyperlipidemia and paroxysmal atrial fibrillation.      She was last seen on 10/15/2019, in our structural heart clinic for evaluation of aortic valve stenosis.  She reported significant exertional shortness of breath with minimal activity and recommended she undergo work-up for TAVR.  Echocardiogram demonstrated normal ejection fraction of 60 to 65%, no regional wall motion abnormalities, normal RV size and function, AoV mean gradient of 25 mmHg, calculated aortic valve area of 0.8 cm ,  and a dimensionless index of 0.27.  TAVR CT showed a aortic valve calcium score of 696.  CT also noted a large hiatal hernia with the majority of the stomach in the chest ( known).  At time of her last evaluation, she was not interested in pursuing any valve replacement and wished to reevaluate in 3 months with an echocardiogram.  She presents to the office today accompanied by her son.    She offers no cardiac complaint with the exception of exertional shortness of breath. She states she is unable to do minimal tasks such as vacuum a small room, shovel light snow,  or put groceries away without frequent rest periods. She denies chest pain, orthopnea, presyncope, syncope, edema, heart  racing, or palpitations.  Upon exam, rhythm and rate are regular, lungs clear bilaterally, audible systolic murmur, negative JVD, abdominal distention, or lower extremity edema.    Recent echocardiogram demonstrated a normal EF, grade 1 diastolic dysfunction, normal RV size and function, mild to moderate mitral annular calcification, trace mitral regurgitation, and severe valvular aortic stenosis with a mean gradient of 26 mmHg, valve area 0.8 cm , and dimensionless index of 0.24.  In comparison to her previous echocardiogram there is a slight progression in aortic stenosis.     Blood pressure is well controlled at 139/81, heart rate 86.   Last device interrogation demonstrated 91.1% atrial pacing 3.2% ventricular pacing, underlying rhythm is sinus bradycardia at 55.  She was noted to have 110 mode switches for PAT with rates 1 80-1 90 with the longest 5 minutes and 43 seconds.  She is managed on Jantoven with therapeutic INR at 2.5  Remains compliant with all medications.                   Assessment and Plan:       1.  Severe aortic stenosis -symptomatic. Recent echocardiogram demonstrated a normal EF, grade 1 diastolic dysfunction, normal RV size and function, mild to moderate mitral annular calcification, trace mitral regurgitation, and severe valvular aortic stenosis with a mean gradient of 26 mmHg, valve area 0.8 cm , and dimensionless index of 0.24.  In comparison to her previous echocardiogram there is a slight progression in aortic stenosis.  Due to her worsening symptoms, she is wishing to complete her TAVR work-up.  I have reviewed her request with our TAVR coordinators and we will schedule a right and left heart catheterization in addition to a carotid ultrasound. Risks and benefits of coronary angiogram discussed today including, bleeding, bruising, infection, allergic reaction, kidney damage (including need for dialysis), stroke, heart attack, vascular damage, emergency open heart surgery, up to and  including death.  Patient indicates understanding and is agreeable to proceed.  She will need to hold her Jantoven 3 days prior to angiogram.     2.  Coronary artery disease -remote CABG x4 in 2011 with LIMA to the LAD, SVG to ramus, SVG to the OM and SVG to PDA. Denies chest pain. Continue on current medical therapy. Proceed with angiogram for coronary evaluation.     3.  Symptomatic bradycardia -that is post permanent pacemaker implant. Last device interrogation demonstrated 91.1% atrial pacing 3.2% ventricular pacing, underlying rhythm is sinus bradycardia at 55.  She was noted to have 110 mode switches for PAT with rates 180-190 with the longest 5 minutes and 43 seconds.  She is managed on Jantoven with therapeutic INR at 2.5    4.  Paroxysmal atrial fibrillation/DVT -on Coumadin therapy.  Therapeutic INR at 2.5  5.  Hypertension -well controlled on losartan  6.  Hyperlipidemia. -On high-dose statin             Thank you for allowing me to participate in this delightful patient's care.        HAROLDO Hernandez CNP         Review of Systems:     Review of Systems:  Skin:  Negative     Eyes:  Positive for glasses  ENT:  Negative    Respiratory:  Positive for dyspnea on exertion;shortness of breath  Cardiovascular:  Negative Positive for;lightheadedness;dizziness  Gastroenterology: Negative heartburn  Genitourinary:  Negative urinary frequency;nocturia  Musculoskeletal:  Positive for arthritis;joint pain  Neurologic:  Positive for numbness or tingling of hands  Psychiatric:  Negative sleep disturbances  Heme/Lymph/Imm:  Negative allergies  Endocrine:  Positive for thyroid disorder;diabetes              Physical Exam:     GEN:  In general, this is a well nourished elderly female in no acute distress.  HEENT:  Pupils equal, round. Sclerae nonicteric. Clear oropharynx. Mucous membranes moist.  NECK: Supple, no masses appreciated. Trachea midline.  No JVD   C/V:  Regular rate and rhythm, systolic murmur, no rub or  gallop. No S3 or RV heave.   RESP: Respirations are unlabored. No use of accessory muscles. Clear to auscultation bilaterally without wheezing, rales, or rhonchi.  GI: Abdomen soft, nontender, nondistended. No HSM appreciated.   EXTREM: No LE edema. No cyanosis or clubbing.  NEURO: Alert and oriented, cooperative. Gait stable with cane. No obvious focal deficits.   PSYCH: Normal affect.  SKIN: Warm and dry. No rashes or petechiae appreciated.          Past Medical History:     Past Medical History:   Diagnosis Date     Walters's esophagus 7/09     Bradycardia     post-op CABG 2011 - s/p pacemaker implanted 2011     CHRONIC VERTIGO 9/99     Colonic Adenoma 3/90, 11/98     Coronary artery disease     CABG x4 2011: LIMA to her LAD, saphenous vein graft to her ramus, saphenous vein graft to her OM, saphenous vein graft to her PDA.     Costochondritis      Depression      DIABETES MELLITUS TYPE II 10/07     DJD      DVT of Leg, postop 11/09    6 months of warfarin     Gastritis 10/89     GERD      HIATAL HERNIA      HYPERLIPIDEMIA      HYPOTHYROIDISM (aka HASHIMOTO)      Impaired fasting glucose      L Ankle Fracture 10/09     Obesity, unspecified      Osteoporosis, unspecified      PERIPHERAL NEUROPATHY      Polymyalgia rheumatica (H)      Post Herpetic Neuralgia 4/03    R lumbar distribution     Restless leg syndrome      Small vessel cerebrovascular changes 9/99     Stricture and stenosis of esophagus 10/89    Dilated     Syncope     1/06, 8/08, 2/10     VITAMIN D DEFICIENCY 12/07    per Dr. Petty              Past Surgical History:     Past Surgical History:   Procedure Laterality Date     BYPASS GRAFT ARTERY CORONARY  11/2/2011    CABG x 4  Dr. PINEDA     C NONSPECIFIC PROCEDURE  age 14    appendectomy     C NONSPECIFIC PROCEDURE  as a child    T&A     EMBOLECTOMY LOWER EXTREMITY  11/9/2011    EMBOLECTOMY LOWER EXTREMITY; left leg femoral embolectomy.; Surgeon:JOLEEN BOONE; Location: OR      HYSTERECTOMY, CERVIX STATUS UNKNOWN  1978    partial hysterectomy     SURGICAL HISTORY OF -   10/09    L ankle fracture repair    Dr. Jose Roberto Trevino              Allergies:   Amoxicillin; Bisphosphonates; Boniva [ibandronate sodium]; Fosamax [alendronic acid]; Lisinopril; Niacin; and Simvastatin       Data:   All laboratory data reviewed:    LAST CHOLESTEROL:  Lab Results   Component Value Date    CHOL 214 06/06/2019     Lab Results   Component Value Date    HDL 61 06/06/2019     Lab Results   Component Value Date    LDL 99 06/06/2019     Lab Results   Component Value Date    TRIG 270 06/06/2019     Lab Results   Component Value Date    CHOLHDLRATIO 2.8 04/20/2015       LAST BMP:  Lab Results   Component Value Date     10/09/2019      Lab Results   Component Value Date    POTASSIUM 4.4 10/09/2019     Lab Results   Component Value Date    CHLORIDE 107 10/09/2019     Lab Results   Component Value Date    SHAWNEE 9.0 10/09/2019     Lab Results   Component Value Date    CO2 26 10/09/2019     Lab Results   Component Value Date    BUN 19 10/09/2019     Lab Results   Component Value Date    CR 0.85 10/09/2019     Lab Results   Component Value Date     12/16/2019       LAST CBC:  Lab Results   Component Value Date    WBC 8.4 10/09/2019     Lab Results   Component Value Date    RBC 4.82 10/09/2019     Lab Results   Component Value Date    HGB 11.6 10/09/2019     Lab Results   Component Value Date    HCT 36.3 10/09/2019     Lab Results   Component Value Date    MCV 75 10/09/2019     Lab Results   Component Value Date    MCH 24.1 10/09/2019     Lab Results   Component Value Date    MCHC 32.0 10/09/2019     Lab Results   Component Value Date    RDW 17.9 10/09/2019     Lab Results   Component Value Date     10/09/2019           Thank you for allowing me to participate in the care of your patient.    Sincerely,     HAROLDO Hernandez [facility]

## 2020-01-14 NOTE — PROGRESS NOTES
Cardiology Clinic Progress Note  Madie Silva MRN# [id number]   YOB: 1929 Age: 89 year old     Reason For Visit:  Review of echo results   Primary Cardiologist:   Dr. Cardenas          History of Presenting Illness:      Madie Silva is a pleasant 89 year old patient who carries a past medical history significant for aortic valve stenosis, coronary artery disease status post CABG x4 in 2011 with LIMA to the LAD, SVG to ramus, SVG to the OM and SVG to PDA, symptomatic bradycardia status post permanent pacemaker implant, DV, tension, hyperlipidemia and paroxysmal atrial fibrillation.      She was last seen on 10/15/2019, in our structural heart clinic for evaluation of aortic valve stenosis.  She reported significant exertional shortness of breath with minimal activity and recommended she undergo work-up for TAVR.  Echocardiogram demonstrated normal ejection fraction of 60 to 65%, no regional wall motion abnormalities, normal RV size and function, AoV mean gradient of 25 mmHg, calculated aortic valve area of 0.8 cm , and a dimensionless index of 0.27.  TAVR CT showed a aortic valve calcium score of 696.  CT also noted a large hiatal hernia with the majority of the stomach in the chest ( known).  At time of her last evaluation, she was not interested in pursuing any valve replacement and wished to reevaluate in 3 months with an echocardiogram.  She presents to the office today accompanied by her son.    She offers no cardiac complaint with the exception of exertional shortness of breath. She states she is unable to do minimal tasks such as vacuum a small room, shovel light snow,  or put groceries away without frequent rest periods. She denies chest pain, orthopnea, presyncope, syncope, edema, heart racing, or palpitations.  Upon exam, rhythm and rate are regular, lungs clear bilaterally, audible systolic murmur, negative JVD, abdominal distention, or lower extremity edema.    Recent echocardiogram  demonstrated a normal EF, grade 1 diastolic dysfunction, normal RV size and function, mild to moderate mitral annular calcification, trace mitral regurgitation, and severe valvular aortic stenosis with a mean gradient of 26 mmHg, valve area 0.8 cm , and dimensionless index of 0.24.  In comparison to her previous echocardiogram there is a slight progression in aortic stenosis.     Blood pressure is well controlled at 139/81, heart rate 86.   Last device interrogation demonstrated 91.1% atrial pacing 3.2% ventricular pacing, underlying rhythm is sinus bradycardia at 55.  She was noted to have 110 mode switches for PAT with rates 1 80-1 90 with the longest 5 minutes and 43 seconds.  She is managed on Jantoven with therapeutic INR at 2.5  Remains compliant with all medications.                   Assessment and Plan:       1.  Severe aortic stenosis -symptomatic. Recent echocardiogram demonstrated a normal EF, grade 1 diastolic dysfunction, normal RV size and function, mild to moderate mitral annular calcification, trace mitral regurgitation, and severe valvular aortic stenosis with a mean gradient of 26 mmHg, valve area 0.8 cm , and dimensionless index of 0.24.  In comparison to her previous echocardiogram there is a slight progression in aortic stenosis.  Due to her worsening symptoms, she is wishing to complete her TAVR work-up.  I have reviewed her request with our TAVR coordinators and we will schedule a right and left heart catheterization in addition to a carotid ultrasound. Risks and benefits of coronary angiogram discussed today including, bleeding, bruising, infection, allergic reaction, kidney damage (including need for dialysis), stroke, heart attack, vascular damage, emergency open heart surgery, up to and including death.  Patient indicates understanding and is agreeable to proceed.  She will need to hold her Jantoven 3 days prior to angiogram.     2.  Coronary artery disease -remote CABG x4 in 2011 with  LIMA to the LAD, SVG to ramus, SVG to the OM and SVG to PDA. Denies chest pain. Continue on current medical therapy. Proceed with angiogram for coronary evaluation.     3.  Symptomatic bradycardia -that is post permanent pacemaker implant. Last device interrogation demonstrated 91.1% atrial pacing 3.2% ventricular pacing, underlying rhythm is sinus bradycardia at 55.  She was noted to have 110 mode switches for PAT with rates 180-190 with the longest 5 minutes and 43 seconds.  She is managed on Jantoven with therapeutic INR at 2.5    4.  Paroxysmal atrial fibrillation/DVT -on Coumadin therapy.  Therapeutic INR at 2.5  5.  Hypertension -well controlled on losartan  6.  Hyperlipidemia. -On high-dose statin             Thank you for allowing me to participate in this delightful patient's care.        HAROLDO Hernandez CNP         Review of Systems:     Review of Systems:  Skin:  Negative     Eyes:  Positive for glasses  ENT:  Negative    Respiratory:  Positive for dyspnea on exertion;shortness of breath  Cardiovascular:  Negative Positive for;lightheadedness;dizziness  Gastroenterology: Negative heartburn  Genitourinary:  Negative urinary frequency;nocturia  Musculoskeletal:  Positive for arthritis;joint pain  Neurologic:  Positive for numbness or tingling of hands  Psychiatric:  Negative sleep disturbances  Heme/Lymph/Imm:  Negative allergies  Endocrine:  Positive for thyroid disorder;diabetes              Physical Exam:     GEN:  In general, this is a well nourished elderly female in no acute distress.  HEENT:  Pupils equal, round. Sclerae nonicteric. Clear oropharynx. Mucous membranes moist.  NECK: Supple, no masses appreciated. Trachea midline.  No JVD   C/V:  Regular rate and rhythm, systolic murmur, no rub or gallop. No S3 or RV heave.   RESP: Respirations are unlabored. No use of accessory muscles. Clear to auscultation bilaterally without wheezing, rales, or rhonchi.  GI: Abdomen soft, nontender,  nondistended. No HSM appreciated.   EXTREM: No LE edema. No cyanosis or clubbing.  NEURO: Alert and oriented, cooperative. Gait stable with cane. No obvious focal deficits.   PSYCH: Normal affect.  SKIN: Warm and dry. No rashes or petechiae appreciated.          Past Medical History:     Past Medical History:   Diagnosis Date     Walters's esophagus 7/09     Bradycardia     post-op CABG 2011 - s/p pacemaker implanted 2011     CHRONIC VERTIGO 9/99     Colonic Adenoma 3/90, 11/98     Coronary artery disease     CABG x4 2011: LIMA to her LAD, saphenous vein graft to her ramus, saphenous vein graft to her OM, saphenous vein graft to her PDA.     Costochondritis      Depression      DIABETES MELLITUS TYPE II 10/07     DJD      DVT of Leg, postop 11/09    6 months of warfarin     Gastritis 10/89     GERD      HIATAL HERNIA      HYPERLIPIDEMIA      HYPOTHYROIDISM (aka HASHIMOTO)      Impaired fasting glucose      L Ankle Fracture 10/09     Obesity, unspecified      Osteoporosis, unspecified      PERIPHERAL NEUROPATHY      Polymyalgia rheumatica (H)      Post Herpetic Neuralgia 4/03    R lumbar distribution     Restless leg syndrome      Small vessel cerebrovascular changes 9/99     Stricture and stenosis of esophagus 10/89    Dilated     Syncope     1/06, 8/08, 2/10     VITAMIN D DEFICIENCY 12/07    per Dr. Petty              Past Surgical History:     Past Surgical History:   Procedure Laterality Date     BYPASS GRAFT ARTERY CORONARY  11/2/2011    CABG x 4  Dr. EDWIN ACOSTA NONSPECIFIC PROCEDURE  age 14    appendectomy     C NONSPECIFIC PROCEDURE  as a child    T&A     EMBOLECTOMY LOWER EXTREMITY  11/9/2011    EMBOLECTOMY LOWER EXTREMITY; left leg femoral embolectomy.; Surgeon:JOLEEN BOONE; Location:[facility] OR     HYSTERECTOMY, CERVIX STATUS UNKNOWN  1978    partial hysterectomy     SURGICAL HISTORY OF -   10/09    L ankle fracture repair    Dr. Jose Roberto Trevino              Allergies:   Amoxicillin; Bisphosphonates;  Boniva [ibandronate sodium]; Fosamax [alendronic acid]; Lisinopril; Niacin; and Simvastatin       Data:   All laboratory data reviewed:    LAST CHOLESTEROL:  Lab Results   Component Value Date    CHOL 214 06/06/2019     Lab Results   Component Value Date    HDL 61 06/06/2019     Lab Results   Component Value Date    LDL 99 06/06/2019     Lab Results   Component Value Date    TRIG 270 06/06/2019     Lab Results   Component Value Date    CHOLHDLRATIO 2.8 04/20/2015       LAST BMP:  Lab Results   Component Value Date     10/09/2019      Lab Results   Component Value Date    POTASSIUM 4.4 10/09/2019     Lab Results   Component Value Date    CHLORIDE 107 10/09/2019     Lab Results   Component Value Date    SHAWNEE 9.0 10/09/2019     Lab Results   Component Value Date    CO2 26 10/09/2019     Lab Results   Component Value Date    BUN 19 10/09/2019     Lab Results   Component Value Date    CR 0.85 10/09/2019     Lab Results   Component Value Date     12/16/2019       LAST CBC:  Lab Results   Component Value Date    WBC 8.4 10/09/2019     Lab Results   Component Value Date    RBC 4.82 10/09/2019     Lab Results   Component Value Date    HGB 11.6 10/09/2019     Lab Results   Component Value Date    HCT 36.3 10/09/2019     Lab Results   Component Value Date    MCV 75 10/09/2019     Lab Results   Component Value Date    MCH 24.1 10/09/2019     Lab Results   Component Value Date    MCHC 32.0 10/09/2019     Lab Results   Component Value Date    RDW 17.9 10/09/2019     Lab Results   Component Value Date     10/09/2019

## 2020-01-14 NOTE — PATIENT INSTRUCTIONS
Thanks for coming into [facility] clinic today.    Worsening shortness of breath  Reviewed results of recent echocardiogram, severe aortic stenosis  Wishes to complete TAVR workup  Will review with TAVR coordinator with plans for right and left heart catheterization and carotid US.   Continue on all current medications  Further recommendations follow up results.     Please call my nurse at [phone] with any questions or concerns.    Scheduling phone number: [phone]  Reminder: Please bring in all current medications, over the counter supplements and vitamin bottles to your next appointment.

## 2020-01-16 ENCOUNTER — TELEPHONE (OUTPATIENT)
Dept: ANTICOAGULATION | Facility: CLINIC | Age: 85
End: 2020-01-16

## 2020-01-16 ENCOUNTER — ANTICOAGULATION THERAPY VISIT (OUTPATIENT)
Dept: ANTICOAGULATION | Facility: CLINIC | Age: 85
End: 2020-01-16
Payer: COMMERCIAL

## 2020-01-16 ENCOUNTER — ANCILLARY PROCEDURE (OUTPATIENT)
Dept: CARDIOLOGY | Facility: CLINIC | Age: 85
End: 2020-01-16
Attending: INTERNAL MEDICINE
Payer: COMMERCIAL

## 2020-01-16 ENCOUNTER — TELEPHONE (OUTPATIENT)
Dept: CARDIOLOGY | Facility: CLINIC | Age: 85
End: 2020-01-16

## 2020-01-16 DIAGNOSIS — R09.89 ABNORMAL CHEST SOUNDS: Primary | ICD-10-CM

## 2020-01-16 DIAGNOSIS — I48.0 PAROXYSMAL ATRIAL FIBRILLATION (H): Primary | ICD-10-CM

## 2020-01-16 DIAGNOSIS — Z95.0 CARDIAC PACEMAKER IN SITU: ICD-10-CM

## 2020-01-16 LAB — INR POINT OF CARE: 2.4 (ref 0.86–1.14)

## 2020-01-16 PROCEDURE — 36416 COLLJ CAPILLARY BLOOD SPEC: CPT

## 2020-01-16 PROCEDURE — 85610 PROTHROMBIN TIME: CPT | Mod: QW

## 2020-01-16 PROCEDURE — 93294 REM INTERROG EVL PM/LDLS PM: CPT | Performed by: INTERNAL MEDICINE

## 2020-01-16 PROCEDURE — 93296 REM INTERROG EVL PM/IDS: CPT | Performed by: INTERNAL MEDICINE

## 2020-01-16 NOTE — TELEPHONE ENCOUNTER
Spoke with patient, she would like to move forward with TAVR workup. Will schedule patient for angiogram. Kim Wong RN

## 2020-01-16 NOTE — TELEPHONE ENCOUNTER
Connected with patient regarding angiogram scheduling. Pt is scheduled for left heart cath 1/27/20 at 1100am, check in time at 0900am. Pt will hold her warfarin, with her last dose 1/23/19. She will hold her metformin the day of the procedure. Her son will be her  that day. Follow-up with Dr. Arcos scheduled as well as carotid US needed for TAVR workup. Kim Wong RN

## 2020-01-16 NOTE — PROGRESS NOTES
ANTICOAGULATION FOLLOW-UP CLINIC VISIT    Patient Name:  Madie Silva  Date:  2020  Contact Type:  Face to Face    SUBJECTIVE:  Patient Findings     Positives:   Upcoming invasive procedure (Pt is having work up for aortic valve replacement in the near future. She is possibly having heart cath on 20. pt needs to be off warfarin for 3 days prior per note.)             OBJECTIVE    INR Protime   Date Value Ref Range Status   2020 2.4 (A) 0.86 - 1.14 Final       ASSESSMENT / PLAN  INR assessment THER    Recheck INR In: 10 DAYS    INR Location Clinic      Anticoagulation Summary  As of 2020    INR goal:   2.0-3.0   TTR:   96.4 % (1 y)   INR used for dosin.4 (2020)   Warfarin maintenance plan:   4.5 mg (3 mg x 1.5) every Tue, Thu, Sat; 3 mg (3 mg x 1) all other days   Full warfarin instructions:   : Hold; : Hold; : Hold; : 4.5 mg; Otherwise 4.5 mg every Tue, Thu, Sat; 3 mg all other days   Weekly warfarin total:   25.5 mg   Plan last modified:   Stella Cha RN (10/25/2018)   Next INR check:   2020   Target end date:       Indications    Long-term (current) use of anticoagulants [Z79.01] [Z79.01]  Phlebitis and thrombophlebitis of deep veins of lower extremities (H) [I80.209]  Acute myocardial infarction  initial episode of care (H) (Resolved) [I21.9]             Anticoagulation Episode Summary     INR check location:       Preferred lab:       Send INR reminders to:   [facility][1]    Comments:         Anticoagulation Care Providers     Provider Role Specialty Phone number    De Obreogn MD [facility] Internal Medicine [phone]            See the Encounter Report to view Anticoagulation Flowsheet and Dosing Calendar (Go to Encounters tab in chart review, and find the Anticoagulation Therapy Visit)    Dosage adjustment made based on physician directed care plan.    Pt is process of work up for valve replacement, needs to have  angiogram next week, is not scheduled as of yet, pt will hold warfarin 3 days. Possibly having procedure 1/21, pt will take 4.5mg 1/21, 1/22, then resume maintenance dose 4.5mg TuThSat, 3 mg all other days. INR check 8-10 days after procedure.    Kathleen Bolaños RN

## 2020-01-22 ENCOUNTER — TELEPHONE (OUTPATIENT)
Dept: INTERNAL MEDICINE | Facility: CLINIC | Age: 85
End: 2020-01-22

## 2020-01-22 NOTE — TELEPHONE ENCOUNTER
Per cardiology note the procedure is scheduled for 1/27/20.  Patient is to hold her warfarin 3 days prior to procedure. Advised to take last dose of warfarin 1/23/2020.     Okay to hold warfarin x 3 days and is bridging with lovenox needed?    Patient has been advised on hold dates and restart dosing once okayed by cardiology on discharge.  Patient will recheck INR on 2/6/20.    No need to call patient back unless PCP advises differently.       Connie Hickman, RN  Anticoagulation nurse-[facility] [place]

## 2020-01-22 NOTE — TELEPHONE ENCOUNTER
Reason for Call:  Other call back    Detailed comments: Pt is having an angiogram 2/27/20.  She has questions about her warfarin.  Please call pt back.    Phone Number Patient can be reached at: Home number on file [phone] (home)    Best Time: anytime    Can we leave a detailed message on this number? YES    Call taken on 1/22/2020 at 9:28 AM by WING DIMAS

## 2020-01-23 LAB
MDC_IDC_LEAD_IMPLANT_DT: NORMAL
MDC_IDC_LEAD_IMPLANT_DT: NORMAL
MDC_IDC_LEAD_LOCATION: NORMAL
MDC_IDC_LEAD_LOCATION: NORMAL
MDC_IDC_LEAD_MFG: NORMAL
MDC_IDC_LEAD_MFG: NORMAL
MDC_IDC_LEAD_MODEL: NORMAL
MDC_IDC_LEAD_MODEL: NORMAL
MDC_IDC_LEAD_POLARITY_TYPE: NORMAL
MDC_IDC_LEAD_POLARITY_TYPE: NORMAL
MDC_IDC_LEAD_SERIAL: NORMAL
MDC_IDC_LEAD_SERIAL: NORMAL
MDC_IDC_MSMT_BATTERY_DTM: NORMAL
MDC_IDC_MSMT_BATTERY_IMPEDANCE: 1759 OHM
MDC_IDC_MSMT_BATTERY_REMAINING_LONGEVITY: 38 MO
MDC_IDC_MSMT_BATTERY_STATUS: NORMAL
MDC_IDC_MSMT_BATTERY_VOLTAGE: 2.76 V
MDC_IDC_MSMT_LEADCHNL_RA_IMPEDANCE_VALUE: 440 OHM
MDC_IDC_MSMT_LEADCHNL_RA_PACING_THRESHOLD_AMPLITUDE: 0.38 V
MDC_IDC_MSMT_LEADCHNL_RA_PACING_THRESHOLD_PULSEWIDTH: 0.4 MS
MDC_IDC_MSMT_LEADCHNL_RV_IMPEDANCE_VALUE: 538 OHM
MDC_IDC_MSMT_LEADCHNL_RV_PACING_THRESHOLD_AMPLITUDE: 0.5 V
MDC_IDC_MSMT_LEADCHNL_RV_PACING_THRESHOLD_PULSEWIDTH: 0.4 MS
MDC_IDC_PG_IMPLANT_DTM: NORMAL
MDC_IDC_PG_MFG: NORMAL
MDC_IDC_PG_MODEL: NORMAL
MDC_IDC_PG_SERIAL: NORMAL
MDC_IDC_PG_TYPE: NORMAL
MDC_IDC_SESS_CLINIC_NAME: NORMAL
MDC_IDC_SESS_DTM: NORMAL
MDC_IDC_SESS_TYPE: NORMAL
MDC_IDC_SET_BRADY_AT_MODE_SWITCH_MODE: NORMAL
MDC_IDC_SET_BRADY_AT_MODE_SWITCH_RATE: 175 {BEATS}/MIN
MDC_IDC_SET_BRADY_LOWRATE: 70 {BEATS}/MIN
MDC_IDC_SET_BRADY_MAX_SENSOR_RATE: 130 {BEATS}/MIN
MDC_IDC_SET_BRADY_MAX_TRACKING_RATE: 130 {BEATS}/MIN
MDC_IDC_SET_BRADY_MODE: NORMAL
MDC_IDC_SET_BRADY_PAV_DELAY_LOW: 150 MS
MDC_IDC_SET_BRADY_SAV_DELAY_LOW: 120 MS
MDC_IDC_SET_LEADCHNL_RA_PACING_AMPLITUDE: 1.5 V
MDC_IDC_SET_LEADCHNL_RA_PACING_ANODE_ELECTRODE_1: NORMAL
MDC_IDC_SET_LEADCHNL_RA_PACING_ANODE_LOCATION_1: NORMAL
MDC_IDC_SET_LEADCHNL_RA_PACING_CAPTURE_MODE: NORMAL
MDC_IDC_SET_LEADCHNL_RA_PACING_CATHODE_ELECTRODE_1: NORMAL
MDC_IDC_SET_LEADCHNL_RA_PACING_CATHODE_LOCATION_1: NORMAL
MDC_IDC_SET_LEADCHNL_RA_PACING_POLARITY: NORMAL
MDC_IDC_SET_LEADCHNL_RA_PACING_PULSEWIDTH: 0.4 MS
MDC_IDC_SET_LEADCHNL_RA_SENSING_ANODE_ELECTRODE_1: NORMAL
MDC_IDC_SET_LEADCHNL_RA_SENSING_ANODE_LOCATION_1: NORMAL
MDC_IDC_SET_LEADCHNL_RA_SENSING_CATHODE_ELECTRODE_1: NORMAL
MDC_IDC_SET_LEADCHNL_RA_SENSING_CATHODE_LOCATION_1: NORMAL
MDC_IDC_SET_LEADCHNL_RA_SENSING_POLARITY: NORMAL
MDC_IDC_SET_LEADCHNL_RA_SENSING_SENSITIVITY: 0.25 MV
MDC_IDC_SET_LEADCHNL_RV_PACING_AMPLITUDE: 2 V
MDC_IDC_SET_LEADCHNL_RV_PACING_ANODE_ELECTRODE_1: NORMAL
MDC_IDC_SET_LEADCHNL_RV_PACING_ANODE_LOCATION_1: NORMAL
MDC_IDC_SET_LEADCHNL_RV_PACING_CAPTURE_MODE: NORMAL
MDC_IDC_SET_LEADCHNL_RV_PACING_CATHODE_ELECTRODE_1: NORMAL
MDC_IDC_SET_LEADCHNL_RV_PACING_CATHODE_LOCATION_1: NORMAL
MDC_IDC_SET_LEADCHNL_RV_PACING_POLARITY: NORMAL
MDC_IDC_SET_LEADCHNL_RV_PACING_PULSEWIDTH: 0.4 MS
MDC_IDC_SET_LEADCHNL_RV_SENSING_ANODE_ELECTRODE_1: NORMAL
MDC_IDC_SET_LEADCHNL_RV_SENSING_ANODE_LOCATION_1: NORMAL
MDC_IDC_SET_LEADCHNL_RV_SENSING_CATHODE_ELECTRODE_1: NORMAL
MDC_IDC_SET_LEADCHNL_RV_SENSING_CATHODE_LOCATION_1: NORMAL
MDC_IDC_SET_LEADCHNL_RV_SENSING_POLARITY: NORMAL
MDC_IDC_SET_LEADCHNL_RV_SENSING_SENSITIVITY: 4 MV
MDC_IDC_SET_ZONE_DETECTION_INTERVAL: 333.33 MS
MDC_IDC_SET_ZONE_DETECTION_INTERVAL: 342.86 MS
MDC_IDC_SET_ZONE_TYPE: NORMAL
MDC_IDC_SET_ZONE_TYPE: NORMAL
MDC_IDC_STAT_AT_BURDEN_PERCENT: 0 %
MDC_IDC_STAT_AT_DTM_END: NORMAL
MDC_IDC_STAT_AT_DTM_START: NORMAL
MDC_IDC_STAT_AT_MODE_SW_COUNT: 20
MDC_IDC_STAT_BRADY_AP_VP_PERCENT: 3 %
MDC_IDC_STAT_BRADY_AP_VS_PERCENT: 89 %
MDC_IDC_STAT_BRADY_AS_VP_PERCENT: 1 %
MDC_IDC_STAT_BRADY_AS_VS_PERCENT: 7 %
MDC_IDC_STAT_BRADY_DTM_END: NORMAL
MDC_IDC_STAT_BRADY_DTM_START: NORMAL
MDC_IDC_STAT_EPISODE_RECENT_COUNT: 2
MDC_IDC_STAT_EPISODE_RECENT_COUNT: 4
MDC_IDC_STAT_EPISODE_RECENT_COUNT_DTM_END: NORMAL
MDC_IDC_STAT_EPISODE_RECENT_COUNT_DTM_END: NORMAL
MDC_IDC_STAT_EPISODE_RECENT_COUNT_DTM_START: NORMAL
MDC_IDC_STAT_EPISODE_RECENT_COUNT_DTM_START: NORMAL
MDC_IDC_STAT_EPISODE_TYPE: NORMAL
MDC_IDC_STAT_EPISODE_TYPE: NORMAL

## 2020-01-24 ENCOUNTER — TELEPHONE (OUTPATIENT)
Dept: CARDIOLOGY | Facility: CLINIC | Age: 85
End: 2020-01-24

## 2020-01-24 RX ORDER — LIDOCAINE 40 MG/G
CREAM TOPICAL
Status: CANCELLED | OUTPATIENT
Start: 2020-01-24

## 2020-01-24 RX ORDER — POTASSIUM CHLORIDE 1500 MG/1
20 TABLET, EXTENDED RELEASE ORAL
Status: CANCELLED | OUTPATIENT
Start: 2020-01-24

## 2020-01-24 RX ORDER — SODIUM CHLORIDE 9 MG/ML
INJECTION, SOLUTION INTRAVENOUS CONTINUOUS
Status: CANCELLED | OUTPATIENT
Start: 2020-01-24

## 2020-01-24 NOTE — TELEPHONE ENCOUNTER
LEFT and Right HEART CATH PREP INSTRUCTIONS  Patient is scheduled for a left heart cath at [facility], on 1/27/20. Check in time is at 0900am and procedure time is at 1100am.  Patient to be NPO after midnight on 1/27/20.   Patient will hold Metformin the morning of the procedure. Pt also understands she will hold it for 2 days after. Patient is on warfarin with her last dose 1/23/20. Pt takes 81mg of ASA and will continue this dose.  Patient can take her other meds prior to procedure with small sips of water.  Patient denies any contrast allergy.  Pt will have  to and from the hospital, as well as they will be available to the pt after the procedure.  Kim Wong RN

## 2020-01-27 ENCOUNTER — HOSPITAL ENCOUNTER (OUTPATIENT)
Facility: CLINIC | Age: 85
Discharge: HOME | End: 2020-01-27
Attending: INTERNAL MEDICINE | Admitting: INTERNAL MEDICINE
Payer: COMMERCIAL

## 2020-01-27 VITALS
SYSTOLIC BLOOD PRESSURE: 164 MMHG | HEIGHT: 65 IN | RESPIRATION RATE: 16 BRPM | HEART RATE: 70 BPM | TEMPERATURE: 97.5 F | WEIGHT: 159.9 LBS | OXYGEN SATURATION: 98 % | DIASTOLIC BLOOD PRESSURE: 66 MMHG | BODY MASS INDEX: 26.64 KG/M2

## 2020-01-27 DIAGNOSIS — I35.0 NONRHEUMATIC AORTIC VALVE STENOSIS: ICD-10-CM

## 2020-01-27 PROBLEM — Z98.890 STATUS POST CORONARY ANGIOGRAM: Status: ACTIVE | Noted: 2020-01-27

## 2020-01-27 LAB
ANION GAP SERPL CALCULATED.3IONS-SCNC: 4 MMOL/L (ref 3–14)
APTT PPP: 22 SEC (ref 22–37)
BUN SERPL-MCNC: 20 MG/DL (ref 7–30)
CALCIUM SERPL-MCNC: 9.2 MG/DL (ref 8.5–10.1)
CHLORIDE SERPL-SCNC: 107 MMOL/L (ref 94–109)
CO2 BLDCOV-SCNC: 23 MMOL/L (ref 21–28)
CO2 SERPL-SCNC: 27 MMOL/L (ref 20–32)
CREAT SERPL-MCNC: 0.92 MG/DL (ref 0.52–1.04)
ERYTHROCYTE [DISTWIDTH] IN BLOOD BY AUTOMATED COUNT: 17.5 % (ref 10–15)
GFR SERPL CREATININE-BSD FRML MDRD: 55 ML/MIN/{1.73_M2}
GLUCOSE SERPL-MCNC: 157 MG/DL (ref 70–99)
HCT VFR BLD AUTO: 37.5 % (ref 35–47)
HGB BLD-MCNC: 11.9 G/DL (ref 11.7–15.7)
INR PPP: 1.29 (ref 0.86–1.14)
MCH RBC QN AUTO: 23.8 PG (ref 26.5–33)
MCHC RBC AUTO-ENTMCNC: 31.7 G/DL (ref 31.5–36.5)
MCV RBC AUTO: 75 FL (ref 78–100)
PCO2 BLDV: 40 MM HG (ref 40–50)
PH BLDV: 7.37 PH (ref 7.32–7.43)
PLATELET # BLD AUTO: 224 10E9/L (ref 150–450)
PO2 BLDV: 36 MM HG (ref 25–47)
POTASSIUM SERPL-SCNC: 4.2 MMOL/L (ref 3.4–5.3)
RBC # BLD AUTO: 5.01 10E12/L (ref 3.8–5.2)
SAO2 % BLDV FROM PO2: 67 %
SODIUM SERPL-SCNC: 138 MMOL/L (ref 133–144)
WBC # BLD AUTO: 6.7 10E9/L (ref 4–11)

## 2020-01-27 PROCEDURE — 25000125 ZZHC RX 250: Performed by: INTERNAL MEDICINE

## 2020-01-27 PROCEDURE — 40000852 ZZH STATISTIC HEART CATH LAB OR EP LAB

## 2020-01-27 PROCEDURE — 27210794 ZZH OR GENERAL SUPPLY STERILE: Performed by: INTERNAL MEDICINE

## 2020-01-27 PROCEDURE — 93457 R HRT ART/GRFT ANGIO: CPT | Performed by: INTERNAL MEDICINE

## 2020-01-27 PROCEDURE — 82803 BLOOD GASES ANY COMBINATION: CPT

## 2020-01-27 PROCEDURE — 93456 R HRT CORONARY ARTERY ANGIO: CPT | Performed by: INTERNAL MEDICINE

## 2020-01-27 PROCEDURE — 93567 NJX CAR CTH SPRVLV AORTGRPHY: CPT

## 2020-01-27 PROCEDURE — G0278 ILIAC ART ANGIO,CARDIAC CATH: HCPCS

## 2020-01-27 PROCEDURE — 80048 BASIC METABOLIC PNL TOTAL CA: CPT | Performed by: INTERNAL MEDICINE

## 2020-01-27 PROCEDURE — 85730 THROMBOPLASTIN TIME PARTIAL: CPT | Performed by: INTERNAL MEDICINE

## 2020-01-27 PROCEDURE — 93005 ELECTROCARDIOGRAM TRACING: CPT

## 2020-01-27 PROCEDURE — C1894 INTRO/SHEATH, NON-LASER: HCPCS | Performed by: INTERNAL MEDICINE

## 2020-01-27 PROCEDURE — 85610 PROTHROMBIN TIME: CPT | Performed by: INTERNAL MEDICINE

## 2020-01-27 PROCEDURE — 40000235 ZZH STATISTIC TELEMETRY

## 2020-01-27 PROCEDURE — C1760 CLOSURE DEV, VASC: HCPCS | Performed by: INTERNAL MEDICINE

## 2020-01-27 PROCEDURE — 85027 COMPLETE CBC AUTOMATED: CPT | Performed by: INTERNAL MEDICINE

## 2020-01-27 PROCEDURE — 25000128 H RX IP 250 OP 636: Performed by: INTERNAL MEDICINE

## 2020-01-27 PROCEDURE — 25800030 ZZH RX IP 258 OP 636: Performed by: INTERNAL MEDICINE

## 2020-01-27 PROCEDURE — 99153 MOD SED SAME PHYS/QHP EA: CPT | Performed by: INTERNAL MEDICINE

## 2020-01-27 PROCEDURE — 36415 COLL VENOUS BLD VENIPUNCTURE: CPT

## 2020-01-27 PROCEDURE — 93010 ELECTROCARDIOGRAM REPORT: CPT | Performed by: INTERNAL MEDICINE

## 2020-01-27 PROCEDURE — 25000132 ZZH RX MED GY IP 250 OP 250 PS 637: Performed by: INTERNAL MEDICINE

## 2020-01-27 PROCEDURE — 99152 MOD SED SAME PHYS/QHP 5/>YRS: CPT | Performed by: INTERNAL MEDICINE

## 2020-01-27 PROCEDURE — C1769 GUIDE WIRE: HCPCS | Performed by: INTERNAL MEDICINE

## 2020-01-27 RX ORDER — EPTIFIBATIDE 2 MG/ML
180 INJECTION, SOLUTION INTRAVENOUS EVERY 10 MIN PRN
Status: DISCONTINUED | OUTPATIENT
Start: 2020-01-27 | End: 2020-01-27 | Stop reason: HOSPADM

## 2020-01-27 RX ORDER — FENTANYL CITRATE 50 UG/ML
25-50 INJECTION, SOLUTION INTRAMUSCULAR; INTRAVENOUS
Status: DISCONTINUED | OUTPATIENT
Start: 2020-01-27 | End: 2020-01-27 | Stop reason: HOSPADM

## 2020-01-27 RX ORDER — LIDOCAINE 40 MG/G
CREAM TOPICAL
Status: DISCONTINUED | OUTPATIENT
Start: 2020-01-27 | End: 2020-01-27 | Stop reason: HOSPADM

## 2020-01-27 RX ORDER — NALOXONE HYDROCHLORIDE 0.4 MG/ML
.2-.4 INJECTION, SOLUTION INTRAMUSCULAR; INTRAVENOUS; SUBCUTANEOUS
Status: DISCONTINUED | OUTPATIENT
Start: 2020-01-27 | End: 2020-01-27 | Stop reason: HOSPADM

## 2020-01-27 RX ORDER — ATROPINE SULFATE 0.1 MG/ML
0.5 INJECTION INTRAVENOUS EVERY 5 MIN PRN
Status: DISCONTINUED | OUTPATIENT
Start: 2020-01-27 | End: 2020-01-27 | Stop reason: HOSPADM

## 2020-01-27 RX ORDER — DOPAMINE HYDROCHLORIDE 160 MG/100ML
2-20 INJECTION, SOLUTION INTRAVENOUS CONTINUOUS PRN
Status: DISCONTINUED | OUTPATIENT
Start: 2020-01-27 | End: 2020-01-27 | Stop reason: HOSPADM

## 2020-01-27 RX ORDER — ARGATROBAN 1 MG/ML
150 INJECTION, SOLUTION INTRAVENOUS
Status: DISCONTINUED | OUTPATIENT
Start: 2020-01-27 | End: 2020-01-27 | Stop reason: HOSPADM

## 2020-01-27 RX ORDER — HEPARIN SODIUM 10000 [USP'U]/100ML
100-1000 INJECTION, SOLUTION INTRAVENOUS CONTINUOUS PRN
Status: DISCONTINUED | OUTPATIENT
Start: 2020-01-27 | End: 2020-01-27 | Stop reason: HOSPADM

## 2020-01-27 RX ORDER — SODIUM CHLORIDE 9 MG/ML
INJECTION, SOLUTION INTRAVENOUS CONTINUOUS
Status: DISCONTINUED | OUTPATIENT
Start: 2020-01-27 | End: 2020-01-27 | Stop reason: HOSPADM

## 2020-01-27 RX ORDER — ACETAMINOPHEN 325 MG/1
650 TABLET ORAL EVERY 4 HOURS PRN
Status: DISCONTINUED | OUTPATIENT
Start: 2020-01-27 | End: 2020-01-27 | Stop reason: HOSPADM

## 2020-01-27 RX ORDER — EPTIFIBATIDE 2 MG/ML
2 INJECTION, SOLUTION INTRAVENOUS CONTINUOUS PRN
Status: DISCONTINUED | OUTPATIENT
Start: 2020-01-27 | End: 2020-01-27 | Stop reason: HOSPADM

## 2020-01-27 RX ORDER — ARGATROBAN 1 MG/ML
350 INJECTION, SOLUTION INTRAVENOUS
Status: DISCONTINUED | OUTPATIENT
Start: 2020-01-27 | End: 2020-01-27 | Stop reason: HOSPADM

## 2020-01-27 RX ORDER — NALOXONE HYDROCHLORIDE 0.4 MG/ML
.1-.4 INJECTION, SOLUTION INTRAMUSCULAR; INTRAVENOUS; SUBCUTANEOUS
Status: DISCONTINUED | OUTPATIENT
Start: 2020-01-27 | End: 2020-01-27 | Stop reason: HOSPADM

## 2020-01-27 RX ORDER — DOBUTAMINE HYDROCHLORIDE 200 MG/100ML
2-20 INJECTION INTRAVENOUS CONTINUOUS PRN
Status: DISCONTINUED | OUTPATIENT
Start: 2020-01-27 | End: 2020-01-27 | Stop reason: HOSPADM

## 2020-01-27 RX ORDER — NOREPINEPHRINE BITARTRATE 0.06 MG/ML
.03-.4 INJECTION, SOLUTION INTRAVENOUS CONTINUOUS PRN
Status: DISCONTINUED | OUTPATIENT
Start: 2020-01-27 | End: 2020-01-27 | Stop reason: HOSPADM

## 2020-01-27 RX ORDER — FLUMAZENIL 0.1 MG/ML
0.2 INJECTION, SOLUTION INTRAVENOUS
Status: DISCONTINUED | OUTPATIENT
Start: 2020-01-27 | End: 2020-01-27 | Stop reason: HOSPADM

## 2020-01-27 RX ORDER — EPTIFIBATIDE 2 MG/ML
1 INJECTION, SOLUTION INTRAVENOUS CONTINUOUS PRN
Status: DISCONTINUED | OUTPATIENT
Start: 2020-01-27 | End: 2020-01-27 | Stop reason: HOSPADM

## 2020-01-27 RX ORDER — NITROGLYCERIN 20 MG/100ML
.07-2 INJECTION INTRAVENOUS CONTINUOUS PRN
Status: DISCONTINUED | OUTPATIENT
Start: 2020-01-27 | End: 2020-01-27 | Stop reason: HOSPADM

## 2020-01-27 RX ORDER — POTASSIUM CHLORIDE 1500 MG/1
20 TABLET, EXTENDED RELEASE ORAL
Status: DISCONTINUED | OUTPATIENT
Start: 2020-01-27 | End: 2020-01-27 | Stop reason: HOSPADM

## 2020-01-27 RX ORDER — FENTANYL CITRATE 50 UG/ML
INJECTION, SOLUTION INTRAMUSCULAR; INTRAVENOUS
Status: DISCONTINUED | OUTPATIENT
Start: 2020-01-27 | End: 2020-01-27 | Stop reason: HOSPADM

## 2020-01-27 RX ORDER — PHENYLEPHRINE HCL IN 0.9% NACL 50MG/250ML
.5-6 PLASTIC BAG, INJECTION (ML) INTRAVENOUS CONTINUOUS PRN
Status: DISCONTINUED | OUTPATIENT
Start: 2020-01-27 | End: 2020-01-27 | Stop reason: HOSPADM

## 2020-01-27 RX ADMIN — ACETAMINOPHEN 650 MG: 325 TABLET, FILM COATED ORAL at 12:50

## 2020-01-27 RX ADMIN — SODIUM CHLORIDE: 9 INJECTION, SOLUTION INTRAVENOUS at 09:43

## 2020-01-27 ASSESSMENT — MIFFLIN-ST. JEOR: SCORE: 1146.18

## 2020-01-27 NOTE — PROGRESS NOTES
Care Suites Post Procedure Note    Patient Information  Name: Madie Silva  Age: 89 year old    Post Procedure  Procedure: non intervention heart cath  Time patient returned to Care Suites: 1200  Concerns/abnormal assessment: none  If abnormal assessment, provider notified: No  Plan/Other: call light in reach, cont to monitor.    Etelvina Felipe RN

## 2020-01-27 NOTE — DISCHARGE INSTRUCTIONS

## 2020-01-27 NOTE — PROGRESS NOTES
Care Suites Admission Nursing Note    Patient Information  Name: Madie Silva  Age: 89 year old  Reason for admission: heart cath   Care Suites arrival time: 0855    Patient Admission/Assessment   Pre-procedure assessment complete: YES  If abnormal assessment/labs, provider notified: Pending now  NPO: YES  Medications held per instructions/orders: YES  Consent: deferred  Patient oriented to room: YES  Education/questions answered: YES  Plan/other: heart cath at 1100    Discharge Planning  Accompanied by: Theodore and dtr in [facility]  Discharge name/phone number: [phone]  Overnight post sedation caregiver: Son  Discharge location: home    Etelvina Felipe RN

## 2020-01-27 NOTE — PROGRESS NOTES
Care Suites Discharge Nursing Note    Patient Information  Name: Madie Silva  Age: 89 year old    Groin site CDI soft and flat after activity.  Reports no diff voiding.    Discharge Education:  Discharge instructions reviewed: YES  Additional education/resources provided: none    Patient/patient representative verbalizes understanding: YES  Patient discharging on new medications: YES and NO  Medication education completed: YES    Discharge Plans:   Discharge location: home  Discharge name/phone numb  Discharge ride contacted: YES  Approximate discharge time: 1440    Discharge Criteria:  Discharge criteria met and vital signs stable: YES    Patient Belongs:  Patient belongings returned to patient: YES    Etelvina Felipe RN

## 2020-01-27 NOTE — PRE-PROCEDURE
GENERAL PRE-PROCEDURE:   Procedure:  Coronary angiogram, rhc, grafts  Date/Time:  1/27/2020 11:06 AM    Verbal consent obtained?: Yes    Written consent obtained?: Yes    Risks and benefits: Risks, benefits and alternatives were discussed    Consent given by:  Patient  Patient states understanding of procedure being performed: Yes    Patient's understanding of procedure matches consent: Yes    Procedure consent matches procedure scheduled: Yes    Expected level of sedation:  Moderate  Appropriately NPO:  Yes  ASA Class:  Class 3- Severe systemic disease, definite functional limitations  Mallampati  :  Grade 3- soft palate visible, posterior pharyngeal wall not visible  Lungs:  Lungs clear with good breath sounds bilaterally  Heart:  Normal heart sounds and rate  History & Physical reviewed:  History and physical reviewed and no updates needed  Statement of review:  I have reviewed the lab findings, diagnostic data, medications, and the plan for sedation

## 2020-01-28 ENCOUNTER — CARE COORDINATION (OUTPATIENT)
Dept: CARDIOLOGY | Facility: CLINIC | Age: 85
End: 2020-01-28

## 2020-01-28 DIAGNOSIS — I35.0 NONRHEUMATIC AORTIC VALVE STENOSIS: Primary | ICD-10-CM

## 2020-01-28 NOTE — PROGRESS NOTES
Returned call from patient regarding clarification on discharge instructions. Pt to call to schedule BMP at her [facility]. Requested she get it drawn 1/29 or 1/30. She also had questions about her extra dose of warfarin that was dosed by her INR nurse, instructed to reach out to her INR nurse to clarify. Kim Wong RN

## 2020-01-30 DIAGNOSIS — I35.0 NONRHEUMATIC AORTIC VALVE STENOSIS: ICD-10-CM

## 2020-01-30 LAB
ANION GAP SERPL CALCULATED.3IONS-SCNC: 5 MMOL/L (ref 3–14)
BUN SERPL-MCNC: 14 MG/DL (ref 7–30)
CALCIUM SERPL-MCNC: 9.3 MG/DL (ref 8.5–10.1)
CHLORIDE SERPL-SCNC: 107 MMOL/L (ref 94–109)
CO2 SERPL-SCNC: 26 MMOL/L (ref 20–32)
CREAT SERPL-MCNC: 0.9 MG/DL (ref 0.52–1.04)
GFR SERPL CREATININE-BSD FRML MDRD: 56 ML/MIN/{1.73_M2}
GLUCOSE SERPL-MCNC: 160 MG/DL (ref 70–99)
POTASSIUM SERPL-SCNC: 4.4 MMOL/L (ref 3.4–5.3)
SODIUM SERPL-SCNC: 138 MMOL/L (ref 133–144)

## 2020-01-30 PROCEDURE — 36415 COLL VENOUS BLD VENIPUNCTURE: CPT | Performed by: INTERNAL MEDICINE

## 2020-01-30 PROCEDURE — 80048 BASIC METABOLIC PNL TOTAL CA: CPT | Performed by: INTERNAL MEDICINE

## 2020-02-02 LAB — INTERPRETATION ECG - MUSE: NORMAL

## 2020-02-06 ENCOUNTER — ANTICOAGULATION THERAPY VISIT (OUTPATIENT)
Dept: ANTICOAGULATION | Facility: CLINIC | Age: 85
End: 2020-02-06
Payer: COMMERCIAL

## 2020-02-06 LAB — INR POINT OF CARE: 2 (ref 0.86–1.14)

## 2020-02-06 PROCEDURE — 36416 COLLJ CAPILLARY BLOOD SPEC: CPT

## 2020-02-06 PROCEDURE — 85610 PROTHROMBIN TIME: CPT | Mod: QW

## 2020-02-06 NOTE — PROGRESS NOTES
ANTICOAGULATION FOLLOW-UP CLINIC VISIT    Patient Name:  Madie Silva  Date:  2020  Contact Type:  Face to Face    SUBJECTIVE:  Patient Findings     Positives:   Hospital admission (Pt had a heart cath done on 2020)             OBJECTIVE    INR Protime   Date Value Ref Range Status   2020 2.0 (A) 0.86 - 1.14 Final       ASSESSMENT / PLAN  INR assessment THER    Recheck INR In: 6 WEEKS    INR Location Clinic      Anticoagulation Summary  As of 2020    INR goal:   2.0-3.0   TTR:   91.8 % (1 y)   INR used for dosin.0 (2020)   Warfarin maintenance plan:   4.5 mg (3 mg x 1.5) every Tue, Thu, Sat; 3 mg (3 mg x 1) all other days   Full warfarin instructions:   : 4.5 mg; Otherwise 4.5 mg every Tue, Thu, Sat; 3 mg all other days   Weekly warfarin total:   25.5 mg   Plan last modified:   Stella Cha RN (10/25/2018)   Next INR check:   3/19/2020   Target end date:   Indefinite    Indications    Long-term (current) use of anticoagulants [Z79.01] [Z79.01]  Phlebitis and thrombophlebitis of deep veins of lower extremities (H) [I80.209]  Acute myocardial infarction  initial episode of care (H) (Resolved) [I21.9]  Atrial fibrillation  Paroxysmal (H) [I48.0]             Anticoagulation Episode Summary     INR check location:       Preferred lab:       Send INR reminders to:   [facility]    Comments:         Anticoagulation Care Providers     Provider Role Specialty Phone number    De Obregon MD Referring Internal Medicine [phone]            See the Encounter Report to view Anticoagulation Flowsheet and Dosing Calendar (Go to Encounters tab in chart review, and find the Anticoagulation Therapy Visit)    Dosage adjustment made based on physician directed care plan.    Kathleen Bolaños RN

## 2020-02-13 ENCOUNTER — HOSPITAL ENCOUNTER (OUTPATIENT)
Dept: ULTRASOUND IMAGING | Facility: CLINIC | Age: 85
Discharge: HOME | End: 2020-02-13
Attending: INTERNAL MEDICINE | Admitting: INTERNAL MEDICINE
Payer: COMMERCIAL

## 2020-02-13 ENCOUNTER — OFFICE VISIT (OUTPATIENT)
Dept: CARDIOLOGY | Facility: CLINIC | Age: 85
End: 2020-02-13
Payer: COMMERCIAL

## 2020-02-13 VITALS
BODY MASS INDEX: 26.89 KG/M2 | HEART RATE: 80 BPM | WEIGHT: 161.4 LBS | HEIGHT: 65 IN | SYSTOLIC BLOOD PRESSURE: 140 MMHG | DIASTOLIC BLOOD PRESSURE: 58 MMHG

## 2020-02-13 DIAGNOSIS — I35.0 NONRHEUMATIC AORTIC VALVE STENOSIS: Primary | ICD-10-CM

## 2020-02-13 DIAGNOSIS — I35.0 AORTIC VALVE STENOSIS, ETIOLOGY OF CARDIAC VALVE DISEASE UNSPECIFIED: Primary | ICD-10-CM

## 2020-02-13 DIAGNOSIS — R06.02 SHORTNESS OF BREATH: ICD-10-CM

## 2020-02-13 DIAGNOSIS — R09.89 ABNORMAL CHEST SOUNDS: ICD-10-CM

## 2020-02-13 PROCEDURE — 93880 EXTRACRANIAL BILAT STUDY: CPT

## 2020-02-13 PROCEDURE — 99215 OFFICE O/P EST HI 40 MIN: CPT | Mod: 25 | Performed by: INTERNAL MEDICINE

## 2020-02-13 PROCEDURE — 93880 EXTRACRANIAL BILAT STUDY: CPT | Mod: 26 | Performed by: INTERNAL MEDICINE

## 2020-02-13 ASSESSMENT — MIFFLIN-ST. JEOR: SCORE: 1152.99

## 2020-02-13 NOTE — PROGRESS NOTES
HPI and Plan:   See dictation    No orders of the defined types were placed in this encounter.      No orders of the defined types were placed in this encounter.      There are no discontinued medications.      Encounter Diagnoses   Name Primary?     Aortic valve stenosis, etiology of cardiac valve disease unspecified Yes     Shortness of breath        CURRENT MEDICATIONS:  Current Outpatient Medications   Medication Sig Dispense Refill     acetaminophen (TYLENOL ARTHRITIS PAIN) 650 MG CR tablet Take 2 tablets (1,300 mg) by mouth 2 times daily       aspirin EC 81 MG EC tablet Take 1 tablet by mouth daily.       atorvastatin (LIPITOR) 80 MG tablet TAKE ONE TABLET BY MOUTH ONCE DAILY 90 tablet 3     calcium 600 MG tablet Take 1 tablet by mouth 2 times daily.       Cholecalciferol (VITAMIN D-400 PO) Take by mouth daily       FIBER PO Take by mouth daily       gabapentin (NEURONTIN) 100 MG capsule TAKE ONE CAPSULE BY MOUTH THREE TIMES A DAY (Patient taking differently: 300 mg At Bedtime ) 270 capsule 3     HYDROcodone-acetaminophen (NORCO) 5-325 MG tablet Take 0.5-1 tablets by mouth every 6 hours as needed for moderate to severe pain 12 tablet 0     levothyroxine (SYNTHROID/LEVOTHROID) 50 MCG tablet Take 1 tablet (50 mcg) by mouth daily 90 tablet 3     losartan (COZAAR) 100 MG tablet Take 1 tablet (100 mg) by mouth daily 90 tablet 3     metFORMIN (GLUCOPHAGE) 500 MG tablet TAKE ONE TABLET BY MOUTH DAILY WITH BREAKFAST 90 tablet 3     Multiple Vitamins-Minerals (CENTRUM SILVER) per tablet Take 1 tablet by mouth daily.       Multiple Vitamins-Minerals (PRESERVISION AREDS 2 PO)        nitroGLYCERIN (NITROSTAT) 0.4 MG SL tablet Place 0.4 mg under the tongue every 5 minutes as needed. Up to 3 doses per episode        omeprazole (PRILOSEC) 20 MG DR capsule Take 1 capsule (20 mg) by mouth daily 90 capsule 3     polyethylene glycol (MIRALAX) powder Take 17 g by mouth daily       warfarin (JANTOVEN) 3 MG tablet Take one tablet  daily except 1 1/2 tabs on Tues/Thurs/Sat as directed by the anticoagulation clinic 100 tablet 3     co-enzyme Q-10 (COENZYME Q-10) 100 MG CAPS Take 1 capsule by mouth daily.         ALLERGIES     Allergies   Allergen Reactions     Amoxicillin      hives     Bisphosphonates      Swallowing disorder     Boniva [Ibandronate Sodium] Nausea and Vomiting     Diarrhea, N/V with oral.  IV caused arm to swell      Fosamax [Alendronic Acid]      Severe gastrointestinal reaction     Lisinopril      cough     Niacin      rash     Simvastatin      myalgias       PAST MEDICAL HISTORY:  Past Medical History:   Diagnosis Date     Walters's esophagus 7/09     Bradycardia     post-op CABG 2011 - s/p pacemaker implanted 2011     CHRONIC VERTIGO 9/99     Colonic Adenoma 3/90, 11/98     Coronary artery disease     CABG x4 2011: LIMA to her LAD, saphenous vein graft to her ramus, saphenous vein graft to her OM, saphenous vein graft to her PDA.     Costochondritis      Depression      DIABETES MELLITUS TYPE II 10/07     DJD      DVT of Leg, postop 11/09    6 months of warfarin     Gastritis 10/89     GERD      HIATAL HERNIA      HYPERLIPIDEMIA      HYPOTHYROIDISM (aka HASHIMOTO)      Impaired fasting glucose      L Ankle Fracture 10/09     Obesity, unspecified      Osteoporosis, unspecified      PERIPHERAL NEUROPATHY      Polymyalgia rheumatica (H)      Post Herpetic Neuralgia 4/03    R lumbar distribution     Restless leg syndrome      Small vessel cerebrovascular changes 9/99     Stricture and stenosis of esophagus 10/89    Dilated     Syncope     1/06, 8/08, 2/10     VITAMIN D DEFICIENCY 12/07    per Dr. Petty       PAST SURGICAL HISTORY:  Past Surgical History:   Procedure Laterality Date     BYPASS GRAFT ARTERY CORONARY  11/2/2011    CABG x 4  Dr. EDWIN ACOSTA NONSPECIFIC PROCEDURE  age 14    appendectomy     C NONSPECIFIC PROCEDURE  as a child    T&A     CV ANGIOGRAM CORONARY GRAFT N/A 1/27/2020    Procedure: Angiogram Coronary  Graft;  Surgeon: Jenny Luther MD;  Location:  HEART CARDIAC CATH LAB     CV AORTOGRAM ABDOMINAL N/A 1/27/2020    Procedure: Aortogram Abdominal;  Surgeon: Jenny Luther MD;  Location:  HEART CARDIAC CATH LAB     CV AORTOGRAM THORACIC N/A 1/27/2020    Procedure: Aortogram Thoracic;  Surgeon: Jenny Luther MD;  Location:  HEART CARDIAC CATH LAB     CV LEFT HEART CATH N/A 1/27/2020    Procedure: Right and Left heart catheterization for TAVR workup;  Surgeon: Jenny Luther MD;  Location:  HEART CARDIAC CATH LAB     EMBOLECTOMY LOWER EXTREMITY  11/9/2011    EMBOLECTOMY LOWER EXTREMITY; left leg femoral embolectomy.; Surgeon:JOLEEN BOONE; Location: OR     HYSTERECTOMY, CERVIX STATUS UNKNOWN  1978    partial hysterectomy     SURGICAL HISTORY OF -   10/09    L ankle fracture repair    Dr. Jose Roberto Trevino       FAMILY HISTORY:  Family History   Problem Relation Age of Onset     Alzheimer Disease Sister      Heart Disease Mother      Heart Disease Father      Heart Disease Son      Heart Disease Brother        SOCIAL HISTORY:  Social History     Socioeconomic History     Marital status:      Spouse name: None     Number of children: None     Years of education: None     Highest education level: None   Occupational History     None   Social Needs     Financial resource strain: None     Food insecurity:     Worry: None     Inability: None     Transportation needs:     Medical: None     Non-medical: None   Tobacco Use     Smoking status: Never Smoker     Smokeless tobacco: Never Used   Substance and Sexual Activity     Alcohol use: No     Drug use: No     Sexual activity: Never   Lifestyle     Physical activity:     Days per week: None     Minutes per session: None     Stress: None   Relationships     Social connections:     Talks on phone: None     Gets together: None     Attends Jainism service: None     Active member of club or organization: None     Attends  "meetings of clubs or organizations: None     Relationship status: None     Intimate partner violence:     Fear of current or ex partner: None     Emotionally abused: None     Physically abused: None     Forced sexual activity: None   Other Topics Concern      Service Not Asked     Blood Transfusions Not Asked     Caffeine Concern No     Occupational Exposure Not Asked     Hobby Hazards Not Asked     Sleep Concern Not Asked     Stress Concern Not Asked     Weight Concern Not Asked     Special Diet No     Back Care Not Asked     Exercise No     Bike Helmet Not Asked     Seat Belt Not Asked     Self-Exams Not Asked     Parent/sibling w/ CABG, MI or angioplasty before 65F 55M? No   Social History Narrative     None       Review of Systems:  Skin:  Negative       Eyes:  Positive for glasses readers  ENT:  Negative      Respiratory:  Positive for dyspnea on exertion;shortness of breath     Cardiovascular:    Positive for;lightheadedness;dizziness    Gastroenterology: Positive for heartburn;reflux treated  Genitourinary:  Negative      Musculoskeletal:  Positive for arthritis;joint pain    Neurologic:  Positive for numbness or tingling of hands nerves damaged during Stent procedure  Psychiatric:  Negative      Heme/Lymph/Imm:  Negative      Endocrine:  Positive for thyroid disorder;diabetes      Physical Exam:  Vitals: BP (!) 140/58   Pulse 80   Ht 1.651 m (5' 5\")   Wt 73.2 kg (161 lb 6.4 oz)   BMI 26.86 kg/m      Constitutional:  cooperative;in no acute distress        Skin:  warm and dry to the touch          Head:  normocephalic        Eyes:           Lymph:      ENT:  no pallor or cyanosis        Neck:  JVP normal        Respiratory:  clear to auscultation         Cardiac: regular rhythm       systolic ejection murmur;grade 2            2+ 1+           2+ 1+                    GI:  abdomen soft;non-tender        Extremities and Muscular Skeletal:  no edema              Neurological:  no gross motor deficits "        Psych:  Alert and Oriented x 3        CC  De Obregon MD  [address] [place] [id number]

## 2020-02-13 NOTE — PROGRESS NOTES
Service Date: 02/13/2020      REASON FOR CONSULTATION:  Severe symptomatic aortic valve stenosis.      HISTORY OF PRESENT ILLNESS:  I had the pleasure of seeing Madie Silva at the [facility] in [place] this afternoon.  She is a very pleasant 89-year-old female with known aortic valve stenosis, coronary artery disease status post prior CABG x4 (LIMA to LAD, SVG to ramus, SVG to OM and SVG to PDA) in 2011, symptomatic bradycardia status post pacemaker implantation and paroxysmal atrial fibrillation.  I saw her in October of last year for severe symptomatic aortic valve stenosis.  At that time she was endorsing significant shortness of breath.  We recommended aortic valve replacement at that time with a transcatheter approach, but she declined.      Since then, she continued to have progressive dyspnea on exertion.  She had a repeat echocardiogram last month which again showed severe aortic valve stenosis with a valve area of 0.8, dimensionless index of 0.24 and a mean gradient of 26 mmHg.  She is now interested in pursuing transcatheter aortic valve replacement.      IMPRESSION, REPORT, PLAN:   1.  Severe symptomatic aortic valve stenosis.   2.  Coronary artery disease status post previous CABG.   3.  Paroxysmal atrial fibrillation.   4.  History of symptomatic bradycardia status post pacemaker implantation.   5.  History of postoperative DVT.      Ms. Silva developed worsening symptoms since our last visit.  Repeat echocardiogram was again consistent with severe aortic valve stenosis.  Since our last visit, she also had right and left heart catheterization.  This demonstrated patent LIMA to the LAD and vein graft to the OM and right PDA.  The vein graft to the ramus is occluded.      She is now interested in pursuing a transcatheter aortic valve replacement.  Given her symptoms and advanced age, I believe a transcatheter aortic valve replacement is a reasonable treatment option for her.   On reviewing her CT scan, I do not see any obvious contraindications to proceeding, although her left main annulus distance is low at 9.2 mm.  However, she has a LIMA graft as well as a patent vein graft to the obtuse marginal branch.      We will present her at our next TAVR conference to further discuss her candidacy.      It was a pleasure seeing Ms. Amezquita in the clinic this afternoon.  I appreciate the opportunity to be part of her care.         CARL LITTLE MD             D: 2020   T: 2020   MT: BRIANNA      Name:     CORDELL AMEZQUITA   MRN:      [id number]        Account:      [id number]   :      1929           Service Date: 2020      Document: [id number]

## 2020-02-13 NOTE — LETTER
2/13/2020      RE: Madie Silva  [address] [place] [id number]       Dear Colleague,    Thank you for the opportunity to participate in the care of your patient, Madie Silva, at the [facility] CARDIOTHORACIC at [facility]. Please see a copy of my visit note below.    Service Date: 02/13/2020      REASON FOR VISIT:  I was requested to see Ms. Silva for evaluation of options for severe aortic stenosis.      HISTORY OF PRESENT ILLNESS:  The patient is a pleasant 89-year-old female with past medical history of coronary artery disease, status post coronary artery bypass grafting, hypertension, hyperlipidemia, diabetes who was diagnosed with severe aortic stenosis.  She has significant shortness breath, fatigue and tiredness.  Denies any fever, chills, syncope, palpitation, loss of consciousness.      PAST MEDICAL HISTORY:  Coronary artery disease, status post myocardial infarction, status post coronary artery bypass grafting, hypertension, hyperlipidemia, atrial fibrillation, DVT, diabetes, chronic renal insufficiency.      MEDICATIONS:  Coumadin, aspirin, Lipitor, metformin, Synthroid.      ALLERGIES:  Reviewed.      REVIEW OF SYSTEMS:  A 10-point review of systems was normal other than mentioned in history and physical.      FAMILY HISTORY:  Reviewed.      PHYSICAL EXAMINATION:   VITAL SIGNS:  She is afebrile, blood pressure 130/78, heart rate 66.   GENERAL:  She is awake, alert, oriented x3, appears comfortable at rest.   CARDIOVASCULAR SYSTEM:  S1, S2 normal, no S3, a 3/6 ejection systolic murmur left sternal border.   RESPIRATORY:  Bilateral breath sounds.  No rales, rhonchi or crepitations.   ABDOMEN:  Bowel sounds present, nondistended, nontender, no hepatosplenomegaly.   LOWER EXTREMITIES:  Warm and well perfused.  Mild pedal edema.   NEUROLOGICAL:  No focal deficits.   EYES:  Pupils equal, round and reactive to light.   DERMATOLOGICAL SYSTEM:  Within normal  limits.      LABORATORY DATA:  Electrolytes within normal limits, creatinine 0.8, platelets 232,000.     Echocardiogram:  Ejection fraction normal, aortic valve area 0.8, mean gradient 25, peak velocity 3.2.      ASSESSMENT AND PLAN:  A 89-year-old female with severe symptomatic aortic stenosis.  In view of her elderly age and redo status with a 30-day STS mortality of 5.2%, the patient is at intermediate to high risk for adverse outcomes after surgical aortic valve replacement and should be considered for TAVR.  She is currently completing her TAVR evaluation and is willing to proceed with this.  If there are any questions regarding her care, feel free to contact me.      cc:   Noel Cardenas MD    [facility] Heart at [address], [address]    [place]  [address]       De Obregon MD    [facility]    [address]  [phone]       [facility] Billing          LUCIO MARCUS MD             D: 2020   T: 2020   MT: BRIANNA      Name:     CORDELL AMEZQUITA   MRN:      [id number]        Account:      [id number]   :      1929           Service Date: 2020      Document: [id number]

## 2020-02-13 NOTE — PROGRESS NOTES
Service Date: 02/13/2020      REASON FOR VISIT:  I was requested to see Ms. Silva for evaluation of options for severe aortic stenosis.      HISTORY OF PRESENT ILLNESS:  The patient is a pleasant 89-year-old female with past medical history of coronary artery disease, status post coronary artery bypass grafting, hypertension, hyperlipidemia, diabetes who was diagnosed with severe aortic stenosis.  She has significant shortness breath, fatigue and tiredness.  Denies any fever, chills, syncope, palpitation, loss of consciousness.      PAST MEDICAL HISTORY:  Coronary artery disease, status post myocardial infarction, status post coronary artery bypass grafting, hypertension, hyperlipidemia, atrial fibrillation, DVT, diabetes, chronic renal insufficiency.      MEDICATIONS:  Coumadin, aspirin, Lipitor, metformin, Synthroid.      ALLERGIES:  Reviewed.      REVIEW OF SYSTEMS:  A 10-point review of systems was normal other than mentioned in history and physical.      FAMILY HISTORY:  Reviewed.      PHYSICAL EXAMINATION:   VITAL SIGNS:  She is afebrile, blood pressure 130/78, heart rate 66.   GENERAL:  She is awake, alert, oriented x3, appears comfortable at rest.   CARDIOVASCULAR SYSTEM:  S1, S2 normal, no S3, a 3/6 ejection systolic murmur left sternal border.   RESPIRATORY:  Bilateral breath sounds.  No rales, rhonchi or crepitations.   ABDOMEN:  Bowel sounds present, nondistended, nontender, no hepatosplenomegaly.   LOWER EXTREMITIES:  Warm and well perfused.  Mild pedal edema.   NEUROLOGICAL:  No focal deficits.   EYES:  Pupils equal, round and reactive to light.   DERMATOLOGICAL SYSTEM:  Within normal limits.      LABORATORY DATA:  Electrolytes within normal limits, creatinine 0.8, platelets 232,000.     Echocardiogram:  Ejection fraction normal, aortic valve area 0.8, mean gradient 25, peak velocity 3.2.      ASSESSMENT AND PLAN:  A 89-year-old female with severe symptomatic aortic stenosis.  In view of her elderly  age and redo status with a 30-day STS mortality of 5.2%, the patient is at intermediate to high risk for adverse outcomes after surgical aortic valve replacement and should be considered for TAVR.  She is currently completing her TAVR evaluation and is willing to proceed with this.  If there are any questions regarding her care, feel free to contact me.      cc:   Noel Cardenas MD    [facility] at [address], [address]    [place]  [address]       De Obregon MD    [facility]    [address]  [address]       [facility] Billing          LUCIO MARCUS MD             D: 2020   T: 2020   MT: BRIANNA      Name:     CORDELL AMEZQUITA   MRN:      [id number]        Account:      [id number]   :      1929           Service Date: 2020      Document: [id number]

## 2020-02-13 NOTE — LETTER
2/13/2020    De Obregon MD  [address] [facility] [address]    RE: Madie Silva       Dear Colleague,    I had the pleasure of seeing Madie Silva in the [facility].    HPI and Plan:   See dictation    No orders of the defined types were placed in this encounter.      No orders of the defined types were placed in this encounter.      There are no discontinued medications.      Encounter Diagnoses   Name Primary?     Aortic valve stenosis, etiology of cardiac valve disease unspecified Yes     Shortness of breath        CURRENT MEDICATIONS:  Current Outpatient Medications   Medication Sig Dispense Refill     acetaminophen (TYLENOL ARTHRITIS PAIN) 650 MG CR tablet Take 2 tablets (1,300 mg) by mouth 2 times daily       aspirin EC 81 MG EC tablet Take 1 tablet by mouth daily.       atorvastatin (LIPITOR) 80 MG tablet TAKE ONE TABLET BY MOUTH ONCE DAILY 90 tablet 3     calcium 600 MG tablet Take 1 tablet by mouth 2 times daily.       Cholecalciferol (VITAMIN D-400 PO) Take by mouth daily       FIBER PO Take by mouth daily       gabapentin (NEURONTIN) 100 MG capsule TAKE ONE CAPSULE BY MOUTH THREE TIMES A DAY (Patient taking differently: 300 mg At Bedtime ) 270 capsule 3     HYDROcodone-acetaminophen (NORCO) 5-325 MG tablet Take 0.5-1 tablets by mouth every 6 hours as needed for moderate to severe pain 12 tablet 0     levothyroxine (SYNTHROID/LEVOTHROID) 50 MCG tablet Take 1 tablet (50 mcg) by mouth daily 90 tablet 3     losartan (COZAAR) 100 MG tablet Take 1 tablet (100 mg) by mouth daily 90 tablet 3     metFORMIN (GLUCOPHAGE) 500 MG tablet TAKE ONE TABLET BY MOUTH DAILY WITH BREAKFAST 90 tablet 3     Multiple Vitamins-Minerals (CENTRUM SILVER) per tablet Take 1 tablet by mouth daily.       Multiple Vitamins-Minerals (PRESERVISION AREDS 2 PO)        nitroGLYCERIN (NITROSTAT) 0.4 MG SL tablet Place 0.4 mg under the tongue every 5 minutes as needed. Up to 3 doses per episode         omeprazole (PRILOSEC) 20 MG DR capsule Take 1 capsule (20 mg) by mouth daily 90 capsule 3     polyethylene glycol (MIRALAX) powder Take 17 g by mouth daily       warfarin (JANTOVEN) 3 MG tablet Take one tablet daily except 1 1/2 tabs on Tues/Thurs/Sat as directed by the anticoagulation clinic 100 tablet 3     co-enzyme Q-10 (COENZYME Q-10) 100 MG CAPS Take 1 capsule by mouth daily.         ALLERGIES     Allergies   Allergen Reactions     Amoxicillin      hives     Bisphosphonates      Swallowing disorder     Boniva [Ibandronate Sodium] Nausea and Vomiting     Diarrhea, N/V with oral.  IV caused arm to swell      Fosamax [Alendronic Acid]      Severe gastrointestinal reaction     Lisinopril      cough     Niacin      rash     Simvastatin      myalgias       PAST MEDICAL HISTORY:  Past Medical History:   Diagnosis Date     Walters's esophagus 7/09     Bradycardia     post-op CABG 2011 - s/p pacemaker implanted 2011     CHRONIC VERTIGO 9/99     Colonic Adenoma 3/90, 11/98     Coronary artery disease     CABG x4 2011: LIMA to her LAD, saphenous vein graft to her ramus, saphenous vein graft to her OM, saphenous vein graft to her PDA.     Costochondritis      Depression      DIABETES MELLITUS TYPE II 10/07     DJD      DVT of Leg, postop 11/09    6 months of warfarin     Gastritis 10/89     GERD      HIATAL HERNIA      HYPERLIPIDEMIA      HYPOTHYROIDISM (aka HASHIMOTO)      Impaired fasting glucose      L Ankle Fracture 10/09     Obesity, unspecified      Osteoporosis, unspecified      PERIPHERAL NEUROPATHY      Polymyalgia rheumatica (H)      Post Herpetic Neuralgia 4/03    R lumbar distribution     Restless leg syndrome      Small vessel cerebrovascular changes 9/99     Stricture and stenosis of esophagus 10/89    Dilated     Syncope     1/06, 8/08, 2/10     VITAMIN D DEFICIENCY 12/07    per Dr. Petty       PAST SURGICAL HISTORY:  Past Surgical History:   Procedure Laterality Date     BYPASS GRAFT ARTERY CORONARY   11/2/2011    CABG x 4  Dr. EDWIN ACOSTA NONSPECIFIC PROCEDURE  age 14    appendectomy     C NONSPECIFIC PROCEDURE  as a child    T&A     CV ANGIOGRAM CORONARY GRAFT N/A 1/27/2020    Procedure: Angiogram Coronary Graft;  Surgeon: Jenny Luther MD;  Location:  HEART CARDIAC CATH LAB     CV AORTOGRAM ABDOMINAL N/A 1/27/2020    Procedure: Aortogram Abdominal;  Surgeon: Jenny Luther MD;  Location:  HEART CARDIAC CATH LAB     CV AORTOGRAM THORACIC N/A 1/27/2020    Procedure: Aortogram Thoracic;  Surgeon: Jenny Luther MD;  Location:  HEART CARDIAC CATH LAB     CV LEFT HEART CATH N/A 1/27/2020    Procedure: Right and Left heart catheterization for TAVR workup;  Surgeon: Jenny Luther MD;  Location:  HEART CARDIAC CATH LAB     EMBOLECTOMY LOWER EXTREMITY  11/9/2011    EMBOLECTOMY LOWER EXTREMITY; left leg femoral embolectomy.; Surgeon:JOLEEN BOONE; Location: OR     HYSTERECTOMY, CERVIX STATUS UNKNOWN  1978    partial hysterectomy     SURGICAL HISTORY OF -   10/09    L ankle fracture repair    Dr. Jose Roberto Trevino       FAMILY HISTORY:  Family History   Problem Relation Age of Onset     Alzheimer Disease Sister      Heart Disease Mother      Heart Disease Father      Heart Disease Son      Heart Disease Brother        SOCIAL HISTORY:  Social History     Socioeconomic History     Marital status:      Spouse name: None     Number of children: None     Years of education: None     Highest education level: None   Occupational History     None   Social Needs     Financial resource strain: None     Food insecurity:     Worry: None     Inability: None     Transportation needs:     Medical: None     Non-medical: None   Tobacco Use     Smoking status: Never Smoker     Smokeless tobacco: Never Used   Substance and Sexual Activity     Alcohol use: No     Drug use: No     Sexual activity: Never   Lifestyle     Physical activity:     Days per week: None     Minutes per  "session: None     Stress: None   Relationships     Social connections:     Talks on phone: None     Gets together: None     Attends Muslim service: None     Active member of club or organization: None     Attends meetings of clubs or organizations: None     Relationship status: None     Intimate partner violence:     Fear of current or ex partner: None     Emotionally abused: None     Physically abused: None     Forced sexual activity: None   Other Topics Concern      Service Not Asked     Blood Transfusions Not Asked     Caffeine Concern No     Occupational Exposure Not Asked     Hobby Hazards Not Asked     Sleep Concern Not Asked     Stress Concern Not Asked     Weight Concern Not Asked     Special Diet No     Back Care Not Asked     Exercise No     Bike Helmet Not Asked     Seat Belt Not Asked     Self-Exams Not Asked     Parent/sibling w/ CABG, MI or angioplasty before 65F 55M? No   Social History Narrative     None       Review of Systems:  Skin:  Negative       Eyes:  Positive for glasses readers  ENT:  Negative      Respiratory:  Positive for dyspnea on exertion;shortness of breath     Cardiovascular:    Positive for;lightheadedness;dizziness    Gastroenterology: Positive for heartburn;reflux treated  Genitourinary:  Negative      Musculoskeletal:  Positive for arthritis;joint pain    Neurologic:  Positive for numbness or tingling of hands nerves damaged during Stent procedure  Psychiatric:  Negative      Heme/Lymph/Imm:  Negative      Endocrine:  Positive for thyroid disorder;diabetes      Physical Exam:  Vitals: BP (!) 140/58   Pulse 80   Ht 1.651 m (5' 5\")   Wt 73.2 kg (161 lb 6.4 oz)   BMI 26.86 kg/m       Constitutional:  cooperative;in no acute distress        Skin:  warm and dry to the touch          Head:  normocephalic        Eyes:           Lymph:      ENT:  no pallor or cyanosis        Neck:  JVP normal        Respiratory:  clear to auscultation         Cardiac: regular rhythm       " systolic ejection murmur;grade 2            2+ 1+           2+ 1+                    GI:  abdomen soft;non-tender        Extremities and Muscular Skeletal:  no edema              Neurological:  no gross motor deficits        Psych:  Alert and Oriented x 3        CC  De Obregon MD  [facility] [address] [place] [id number]                Thank you for allowing me to participate in the care of your patient.      Sincerely,     Mian Arcos MD, MD     [facility]    cc:   De Obregon MD  [address] [place] [phone]

## 2020-02-13 NOTE — LETTER
2/13/2020      De Obregon MD  [address] [facility] [address]      RE: Madie Silva       Dear Colleague,    I had the pleasure of seeing Madie Silva in the [facility].    Service Date: 02/13/2020      REASON FOR CONSULTATION:  Severe symptomatic aortic valve stenosis.      HISTORY OF PRESENT ILLNESS:  I had the pleasure of seeing Madie Silva at the [facility] in [place] this afternoon.  She is a very pleasant 89-year-old female with known aortic valve stenosis, coronary artery disease status post prior CABG x4 (LIMA to LAD, SVG to ramus, SVG to OM and SVG to PDA) in 2011, symptomatic bradycardia status post pacemaker implantation and paroxysmal atrial fibrillation.  I saw her in October of last year for severe symptomatic aortic valve stenosis.  At that time she was endorsing significant shortness of breath.  We recommended aortic valve replacement at that time with a transcatheter approach, but she declined.      Since then, she continued to have progressive dyspnea on exertion.  She had a repeat echocardiogram last month which again showed severe aortic valve stenosis with a valve area of 0.8, dimensionless index of 0.24 and a mean gradient of 26 mmHg.  She is now interested in pursuing transcatheter aortic valve replacement.      IMPRESSION, REPORT, PLAN:   1.  Severe symptomatic aortic valve stenosis.   2.  Coronary artery disease status post previous CABG.   3.  Paroxysmal atrial fibrillation.   4.  History of symptomatic bradycardia status post pacemaker implantation.   5.  History of postoperative DVT.        Ms. Silva developed worsening symptoms since our last visit.  Repeat echocardiogram was again consistent with severe aortic valve stenosis.  Since our last visit, she also had right and left heart catheterization.  This demonstrated patent LIMA to the LAD and vein graft to the OM and right PDA.  The vein graft to the ramus is  occluded.      She is now interested in pursuing a transcatheter aortic valve replacement.  Given her symptoms and advanced age, I believe a transcatheter aortic valve replacement is a reasonable treatment option for her.  On reviewing her CT scan, I do not see any obvious contraindications to proceeding, although her left main annulus distance is low at 9.2 mm.  However, she has a LIMA graft as well as a patent vein graft to the obtuse marginal branch.      We will present her at our next TAVR conference to further discuss her candidacy.      It was a pleasure seeing Ms. Amezquita in the clinic this afternoon.  I appreciate the opportunity to be part of her care.         CARL LITTLE MD             D: 2020   T: 2020   MT: BRIANNA      Name:     CORDELL AMEZQUITA   MRN:      [id number]        Account:      [id number]   :      1929           Service Date: 2020      Document: [id number]         Outpatient Encounter Medications as of 2020   Medication Sig Dispense Refill     acetaminophen (TYLENOL ARTHRITIS PAIN) 650 MG CR tablet Take 2 tablets (1,300 mg) by mouth 2 times daily       aspirin EC 81 MG EC tablet Take 1 tablet by mouth daily.       atorvastatin (LIPITOR) 80 MG tablet TAKE ONE TABLET BY MOUTH ONCE DAILY 90 tablet 3     calcium 600 MG tablet Take 1 tablet by mouth 2 times daily.       Cholecalciferol (VITAMIN D-400 PO) Take by mouth daily       FIBER PO Take by mouth daily       gabapentin (NEURONTIN) 100 MG capsule TAKE ONE CAPSULE BY MOUTH THREE TIMES A DAY (Patient taking differently: 300 mg At Bedtime ) 270 capsule 3     HYDROcodone-acetaminophen (NORCO) 5-325 MG tablet Take 0.5-1 tablets by mouth every 6 hours as needed for moderate to severe pain 12 tablet 0     levothyroxine (SYNTHROID/LEVOTHROID) 50 MCG tablet Take 1 tablet (50 mcg) by mouth daily 90 tablet 3     losartan (COZAAR) 100 MG tablet Take 1 tablet (100 mg) by mouth daily 90 tablet 3     metFORMIN (GLUCOPHAGE) 500 MG  tablet TAKE ONE TABLET BY MOUTH DAILY WITH BREAKFAST 90 tablet 3     Multiple Vitamins-Minerals (CENTRUM SILVER) per tablet Take 1 tablet by mouth daily.       Multiple Vitamins-Minerals (PRESERVISION AREDS 2 PO)        nitroGLYCERIN (NITROSTAT) 0.4 MG SL tablet Place 0.4 mg under the tongue every 5 minutes as needed. Up to 3 doses per episode        omeprazole (PRILOSEC) 20 MG DR capsule Take 1 capsule (20 mg) by mouth daily 90 capsule 3     polyethylene glycol (MIRALAX) powder Take 17 g by mouth daily       warfarin (JANTOVEN) 3 MG tablet Take one tablet daily except 1 1/2 tabs on Tues/Thurs/Sat as directed by the anticoagulation clinic 100 tablet 3     co-enzyme Q-10 (COENZYME Q-10) 100 MG CAPS Take 1 capsule by mouth daily.       No facility-administered encounter medications on file as of 2/13/2020.        Again, thank you for allowing me to participate in the care of your patient.      Sincerely,    Mian Arcos MD, MD     [facility]

## 2020-02-13 NOTE — NURSING NOTE
TAVR Coordinator visit:  Provided additional education regarding TAVR procedure, after being present for discussion with physician. Explained the work-up process and next steps for patient, her workup is complete. We will present her at conference next week and notify her next week. Patient provided our direct contact number and instructed to call with any questions.     Completed frailty testing and KCCQ.   5 meter walk: 6.5sec    KCCQ Results:   1a. 4  1b. 1  1c. 6  2. 5  3. 1  4. 1  5. 5  6. 1  7. 1  8a. 1  8b. 2  8c. 3  Kim Wong RN

## 2020-02-20 ENCOUNTER — TELEPHONE (OUTPATIENT)
Dept: CARDIOLOGY | Facility: CLINIC | Age: 85
End: 2020-02-20

## 2020-02-20 NOTE — TELEPHONE ENCOUNTER
"Patient was presented this morning at the multidisciplinary TAVR conference. Plan is to proceed with TAVR procedure.    Valve: Khan  Approach: TF  [place]: Yes  Sedation: MAC    Above information was called out to the patient. Pt states she has a dentist appt tomorrow for cleaning. Pt would like \"the surgery day as soon as possible, I just feel like I could go downhill fast.\" Will discuss with TAVR team and call pt back with procedure date. Kim Wong RN      "

## 2020-02-21 ENCOUNTER — TELEPHONE (OUTPATIENT)
Dept: CARDIOLOGY | Facility: CLINIC | Age: 85
End: 2020-02-21

## 2020-02-21 DIAGNOSIS — I48.0 PAROXYSMAL ATRIAL FIBRILLATION (H): ICD-10-CM

## 2020-02-21 DIAGNOSIS — Z79.01 LONG TERM CURRENT USE OF ANTICOAGULANT THERAPY: ICD-10-CM

## 2020-02-21 DIAGNOSIS — I35.0 NONRHEUMATIC AORTIC VALVE STENOSIS: Primary | ICD-10-CM

## 2020-02-21 NOTE — LETTER
March 2, 2020       TO: Madie Silva   [address] [place] [phone]       Dear Ms. Silva,    You are scheduled for the following appointments as part of your pre-operative assessment for TAVR. The lab work on 3/30/20 at 12:20pm will be completed at [facility] laboratory. Please check-in at the welcome desk in the [employer]. Your appointment at 1:10pm on 3/30/20 will be in the Heart Care clinic, please come up to the 2nd floor after your lab work. You will meet with the nurse practitioner and one of the TAVR coordinators. We will review specific pre-operative instructions with you at this appointment and schedule your post-operative follow-up appointments, 1 week after your TAVR and 1 month after your TAVR.     Future Appointments   Date Time Provider Dept Center   3/19/2020 11:00 AM OX ANTICO CLINIC [facility]   3/30/2020 12:20 PM  LAB ONLY [facility]   3/30/2020  1:10 PM Mehnaz Almazan APRN CNP [facility]      Your TAVR is scheduled for 3/31/20, 3rd case. Please arrive at [facility] at 11:00am. You will check in at the welcome desk in the [employer] that day as well. You will need to hold your warfarin prior to your TAVR. Your last dose will be: 3/25/20. Hold your dose on 3/26, 3/27, 3/28, 3/29, 3/30 and 3/31. We will resume your warfarin after your TAVR procedure. Please continue your 81mg of aspirin daily while you hold your warfarin.     Please contact me with any additional questions you may have.    Thank you,  Kim MENDOZA  Structural Heart Care Coordinator  [phone]

## 2020-02-21 NOTE — TELEPHONE ENCOUNTER
Patient was presented this morning at the multidisciplinary TAVR conference..   Plan is to proceed with TAVR procedure.    Valve: Khan 23 mm  Approach: TF  [place]: Yes  Sedation: MAC  Will have discussion with patient regarding emergency open heart in case of emergency.    Left msg with Oliver to have him call us back.    Above information was called out to the patient.

## 2020-03-02 NOTE — TELEPHONE ENCOUNTER
Spoke with patient, will plan on TAVR 3/31/20 with Dr. Arcos. Pre-op appt scheduled for 3/30/20, date, time and location reviewed with patient. Pt will hold warfarin for TAVR, taking last dose 3/25/20. This information will be provided to the patient in writing at her request, will also reach out to her INR nurse, Kathleen, at her request. Kim Wong RN

## 2020-03-03 ENCOUNTER — TELEPHONE (OUTPATIENT)
Dept: CARDIOLOGY | Facility: CLINIC | Age: 85
End: 2020-03-03

## 2020-03-03 DIAGNOSIS — I48.0 PAROXYSMAL A-FIB (H): ICD-10-CM

## 2020-03-03 RX ORDER — WARFARIN SODIUM 3 MG/1
TABLET ORAL
Qty: 100 TABLET | Refills: 1 | Status: SHIPPED | OUTPATIENT
Start: 2020-03-03 | End: 2020-05-04

## 2020-03-03 NOTE — TELEPHONE ENCOUNTER
"Requested Prescriptions   Pending Prescriptions Disp Refills     warfarin ANTICOAGULANT (JANTOVEN ANTICOAGULANT) 3 MG tablet 100 tablet 3     Sig: Take one tablet daily except 1 1/2 tabs on Tues/Thurs/Sat as directed by the anticoagulation clinic       Vitamin K Antagonists Failed - 3/3/2020  9:44 AM        Failed - INR is within goal in the past 6 weeks     Confirm INR is within goal in the past 6 weeks.     Recent Labs   Lab Test 02/06/20   INR 2.0*                       Failed - Medication is active on med list        Passed - Recent (12 mo) or future (30 days) visit within the authorizing provider's specialty     Patient has had an office visit with the authorizing provider or a provider within the authorizing providers department within the previous 12 mos or has a future within next 30 days. See \"Patient Info\" tab in inbasket, or \"Choose Columns\" in Meds & Orders section of the refill encounter.              Passed - Patient is 18 years of age or older        Passed - Patient is not pregnant        Passed - No positive pregnancy on file in past 12 months        Prescription approved per [facility] Refill Protocol.    "

## 2020-03-03 NOTE — TELEPHONE ENCOUNTER
Madie called stating she had received a call to register for the hospital but missed the phone number. TAVR is not yet on surgical schedule, pt does not have any other upcoming hospitalizations. Told patient I would check with [facility] regarding this call. Kim Wong RN

## 2020-03-13 ENCOUNTER — TELEPHONE (OUTPATIENT)
Dept: CARDIOLOGY | Facility: CLINIC | Age: 85
End: 2020-03-13

## 2020-03-19 ENCOUNTER — ANTICOAGULATION THERAPY VISIT (OUTPATIENT)
Dept: ANTICOAGULATION | Facility: CLINIC | Age: 85
End: 2020-03-19
Payer: COMMERCIAL

## 2020-03-19 DIAGNOSIS — I48.0 PAROXYSMAL ATRIAL FIBRILLATION (H): Primary | ICD-10-CM

## 2020-03-19 LAB — INR POINT OF CARE: 2.4 (ref 0.86–1.14)

## 2020-03-19 PROCEDURE — 85610 PROTHROMBIN TIME: CPT | Mod: QW

## 2020-03-19 PROCEDURE — 36416 COLLJ CAPILLARY BLOOD SPEC: CPT

## 2020-03-19 NOTE — PROGRESS NOTES
ANTICOAGULATION FOLLOW-UP CLINIC VISIT    Patient Name:  Madie Silva  Date:  3/19/2020  Contact Type:  Face to Face    SUBJECTIVE:  Patient Findings     Positives:   Upcoming invasive procedure (Pt is having valvular surgery scheduled 3/30/20 FSD, pt will hold warfarin 5 days, no bridging required.)    Comments:   The patient was assessed for diet, medication, and activity level changes, missed or extra doses, bruising or bleeding, with no problem findings.          Clinical Outcomes     Comments:   The patient was assessed for diet, medication, and activity level changes, missed or extra doses, bruising or bleeding, with no problem findings.             OBJECTIVE    INR Protime   Date Value Ref Range Status   2020 2.4 (A) 0.86 - 1.14 Final       ASSESSMENT / PLAN  INR assessment THER    Recheck INR In: 3 WEEKS    INR Location Clinic      Anticoagulation Summary  As of 3/19/2020    INR goal:   2.0-3.0   TTR:   91.8 % (1 y)   INR used for dosin.4 (3/19/2020)   Warfarin maintenance plan:   4.5 mg (3 mg x 1.5) every Tue, Thu, Sat; 3 mg (3 mg x 1) all other days   Full warfarin instructions:   3/26: Hold; 3/27: Hold; 3/28: Hold; 3/29: Hold; 3/30: Hold; Otherwise 4.5 mg every Tue, Thu, Sat; 3 mg all other days   Weekly warfarin total:   25.5 mg   Plan last modified:   Stella Cha RN (10/25/2018)   Next INR check:   2020   Target end date:   Indefinite    Indications    Long-term (current) use of anticoagulants [Z79.01] [Z79.01]  Phlebitis and thrombophlebitis of deep veins of lower extremities (H) [I80.209]  Acute myocardial infarction  initial episode of care (H) (Resolved) [I21.9]  Atrial fibrillation  Paroxysmal (H) [I48.0]             Anticoagulation Episode Summary     INR check location:       Preferred lab:       Send INR reminders to:   [facility]    Comments:         Anticoagulation Care Providers     Provider Role Specialty Phone number    De Obregon MD  Referring Internal Medicine [phone]            See the Encounter Report to view Anticoagulation Flowsheet and Dosing Calendar (Go to Encounters tab in chart review, and find the Anticoagulation Therapy Visit)    Dosage adjustment made based on physician directed care plan.    INR 2.4, Pt is having Aortic Valve Replacement done 3/31/20 with hold warfarin 5 days prior and no bridging required. Pt will be hospitalized for 2-3 days after surgery. Pt given holding schedule. Will stay on maintenance dose until then, and marylou have plan post surgery with dosing and when to have INR done.     Kathleen Bolaños RN

## 2020-03-24 ENCOUNTER — TELEPHONE (OUTPATIENT)
Dept: CARDIOLOGY | Facility: CLINIC | Age: 85
End: 2020-03-24

## 2020-03-24 NOTE — TELEPHONE ENCOUNTER
Received a call from Madie yesterday wondering if her TAVR procedure will go on as planned. I told her that at this time we are not proceeding with elective procedures and since she is considered stable with her aortic stenosis we don't want to bring her into the hospital if it is not emergent. She understood and I told her we will be in touch and let her know when we decide to start doing TAVR procedures.Stefan Milligan RN

## 2020-04-06 DIAGNOSIS — K59.00 CONSTIPATION: ICD-10-CM

## 2020-04-06 DIAGNOSIS — K59.00 CONSTIPATION, UNSPECIFIED CONSTIPATION TYPE: Primary | ICD-10-CM

## 2020-04-06 RX ORDER — POLYETHYLENE GLYCOL 3350 17 G/17G
1 POWDER, FOR SOLUTION ORAL DAILY
Qty: 1 BOTTLE | Refills: 3 | Status: SHIPPED | OUTPATIENT
Start: 2020-04-06

## 2020-04-06 NOTE — TELEPHONE ENCOUNTER
"    polyethylene glycol (MIRALAX) powder    Last Written Prescription Date:  05/04/2015  Last Fill Quantity: ,  # refills:    Last office visit: 12/20/2019 with prescribing provider:  12/20/2019   Future Office Visit:   Next 5 appointments (look out 90 days)    Jun 26, 2020  9:45 AM CDT  [facility] [address] [address] [phone]         Requested Prescriptions   Pending Prescriptions Disp Refills     Psyllium (FIBER) 0.52 g CAPS       Sig: Take by mouth daily       There is no refill protocol information for this order        polyethylene glycol (MIRALAX) powder       Sig: Take 17 g (1 capful) by mouth daily       Laxatives Protocol Passed - 4/6/2020  1:04 PM        Passed - Patient is age 6 or older        Passed - Recent (12 mo) or future (30 days) visit within the authorizing provider's specialty     Patient has had an office visit with the authorizing provider or a provider within the authorizing providers department within the previous 12 mos or has a future within next 30 days. See \"Patient Info\" tab in inbasket, or \"Choose Columns\" in Meds & Orders section of the refill encounter.              Passed - Medication is active on med list             "

## 2020-04-06 NOTE — TELEPHONE ENCOUNTER
Pt requesting Rx's for miralax and fiber caps, pt usually gets OTC but does not want to leave her home to shop    Thank you!  Marialuisa Naranjo Carly  [facility]

## 2020-04-08 ENCOUNTER — TELEPHONE (OUTPATIENT)
Dept: CARDIOLOGY | Facility: CLINIC | Age: 85
End: 2020-04-08

## 2020-04-08 NOTE — TELEPHONE ENCOUNTER
"Called and spoke to Madie. She states she is feeling okay, she has been working in her yard. She is \"trying not to overdo it.\" She is nervous to proceed with TAVR with COVID19 outbreak. Pt states she is okay to wait, as she \"doesn't want to be anywhere near a hospital, I would definitely get that coronavirus at my age.\" She is having groceries delivered and \"just staying at home.\" Pt has my direct contact information should she develop worsening shortness of breath, fatigue or chest pain. Kim Wong RN    "

## 2020-04-14 DIAGNOSIS — I48.0 PAROXYSMAL ATRIAL FIBRILLATION (H): Primary | ICD-10-CM

## 2020-04-22 ENCOUNTER — TELEPHONE (OUTPATIENT)
Dept: CARDIOLOGY | Facility: CLINIC | Age: 85
End: 2020-04-22

## 2020-04-22 NOTE — TELEPHONE ENCOUNTER
"Spoke to Madie regarding her symptoms. She was panting on the phone, stating \"well I just watered my flowers.\" She states she is short of breath with activities like laundry and empty the . Told patient I would like to move forward with TAVR in May as she is developing worsening shortness of breath, tentatively planned for 20. Pt expressed her concerns about coming to the hospital with COVID-19 outbreak. She states \"A friend of mine just  from that at [place]. His wife couldn't even see him.\" Patient and I will communicate regularly about her symptoms, and I expressed my concerns about her waiting too long for TAVR but certainly understood her concerns about COVID-19. Will call patient next week. Kim Wong RN    "

## 2020-04-22 NOTE — LETTER
April 28, 2020       TO: Madie Silva   [address] [place] [id number]       Dear Ms. Silva,    You are scheduled for the following appointments as part of your pre-operative assessment for TAVR. The lab work on Friday 5/1/20 at 12:00pm will be completed at [facility] laboratory. Please check-in at the welcome desk in the [facility]. Your appointment at 12:30pm on 5/1/20 will be in the [facility] clinic, please come up to the 2nd floor after your lab work. You will meet with the nurse practitioner and one of the TAVR coordinators. We will review specific pre-operative instructions with you at this appointment and schedule your post-operative follow-up appointments, 1 week after your TAVR and 1 month after your TAVR.     Future Appointments   Date Time Provider Dept Center   4/29/2020 12:00 AM [facility] TECH1 [facility] PSA CLIN   5/1/2020 12:00 PM  LAB ONLY [facility]   5/1/2020 12:30 PM Mehnaz Almazan, HAROLDO CNP [facility] HEART CLINIC - [address]       Your TAVR is scheduled for Tuesday 5/5/20, 1st case. Please arrive at [facility] at 05:30am. You will check in at the welcome desk in the [facility] that day as well. You will need to hold your Coumadin prior to your TAVR. Your last dose will be: 4/29/20.    Please contact me with any additional questions you may have.    Thank you,  Kim MENDOZA  Structural Heart Care Coordinator  [phone]

## 2020-04-27 NOTE — TELEPHONE ENCOUNTER
"Spoke with patient to check on symptoms and she expressed her concern about waiting for COVID19 outbreak to clear before getting TAVR. Pt was short of breath on the phone, I asked her to sit down and rest for us to speak, pt was panting into the phone-taking a breath every couple of words. She states she was \"very short of breath this weekend. I have a friend that's a nurse and she was very concerned about me.\" Pt would like to move forward with TAVR at this time. She states her weight is steady and denies swelling around her ankles. As we spoke her shortness of breath improved. I encouraged patient to take it easy this week - she is still trying to take walks, working in the yard, doing laundry.      TAVR will be scheduled 5/5/20, pre-op appt scheduled for 5/1/20. Pt to take her last dose of Coumadin 4/29/20. I encouraged patient to use wheelchair for the appt.  Kim Wong, RN    Wellness Screening Tool  Symptom Screening:  Do you have one of the following new symptoms:    Fever or reported chills?  No    A new cough (started within the past 14 days)? No    Shortness of breath (started within the past 14 days)? No    Nausea, vomiting or diarrhea? No  Within the past 3 weeks, have you been exposed to someone with a known positive illness below?    COVID - 19 (known or suspected) No    Chicken pox?  No    Measles? No    Pertussis? No  Patient notified of visitor restriction: Yes    Patient's appointment status: Patient will be seen in clinic as scheduled on 5/1/20.        "

## 2020-04-28 NOTE — TELEPHONE ENCOUNTER
Called patient to check on her shortness of breath, she states she is feeling better today, not doing much around her home. I told her to take it easy. Reminded her of her appt on Friday, she will now be 1st case on 5/5/20 with check in time of 0530. She will take her last dose of coumadin 4/29/20. Kim Wong RN

## 2020-04-29 ENCOUNTER — ANCILLARY PROCEDURE (OUTPATIENT)
Dept: CARDIOLOGY | Facility: CLINIC | Age: 85
End: 2020-04-29
Attending: INTERNAL MEDICINE
Payer: COMMERCIAL

## 2020-04-29 DIAGNOSIS — Z11.59 ENCOUNTER FOR SCREENING FOR OTHER VIRAL DISEASES: Primary | ICD-10-CM

## 2020-04-29 DIAGNOSIS — Z95.0 CARDIAC PACEMAKER IN SITU: ICD-10-CM

## 2020-04-29 LAB
MDC_IDC_LEAD_IMPLANT_DT: NORMAL
MDC_IDC_LEAD_IMPLANT_DT: NORMAL
MDC_IDC_LEAD_LOCATION: NORMAL
MDC_IDC_LEAD_LOCATION: NORMAL
MDC_IDC_LEAD_MFG: NORMAL
MDC_IDC_LEAD_MFG: NORMAL
MDC_IDC_LEAD_MODEL: NORMAL
MDC_IDC_LEAD_MODEL: NORMAL
MDC_IDC_LEAD_POLARITY_TYPE: NORMAL
MDC_IDC_LEAD_POLARITY_TYPE: NORMAL
MDC_IDC_LEAD_SERIAL: NORMAL
MDC_IDC_LEAD_SERIAL: NORMAL
MDC_IDC_MSMT_BATTERY_DTM: NORMAL
MDC_IDC_MSMT_BATTERY_IMPEDANCE: 1854 OHM
MDC_IDC_MSMT_BATTERY_REMAINING_LONGEVITY: 36 MO
MDC_IDC_MSMT_BATTERY_STATUS: NORMAL
MDC_IDC_MSMT_BATTERY_VOLTAGE: 2.76 V
MDC_IDC_MSMT_LEADCHNL_RA_IMPEDANCE_VALUE: 437 OHM
MDC_IDC_MSMT_LEADCHNL_RA_PACING_THRESHOLD_AMPLITUDE: 0.38 V
MDC_IDC_MSMT_LEADCHNL_RA_PACING_THRESHOLD_PULSEWIDTH: 0.4 MS
MDC_IDC_MSMT_LEADCHNL_RV_IMPEDANCE_VALUE: 572 OHM
MDC_IDC_MSMT_LEADCHNL_RV_PACING_THRESHOLD_AMPLITUDE: 0.38 V
MDC_IDC_MSMT_LEADCHNL_RV_PACING_THRESHOLD_PULSEWIDTH: 0.4 MS
MDC_IDC_PG_IMPLANT_DTM: NORMAL
MDC_IDC_PG_MFG: NORMAL
MDC_IDC_PG_MODEL: NORMAL
MDC_IDC_PG_SERIAL: NORMAL
MDC_IDC_PG_TYPE: NORMAL
MDC_IDC_SESS_CLINIC_NAME: NORMAL
MDC_IDC_SESS_DTM: NORMAL
MDC_IDC_SESS_TYPE: NORMAL
MDC_IDC_SET_BRADY_AT_MODE_SWITCH_MODE: NORMAL
MDC_IDC_SET_BRADY_AT_MODE_SWITCH_RATE: 175 {BEATS}/MIN
MDC_IDC_SET_BRADY_LOWRATE: 70 {BEATS}/MIN
MDC_IDC_SET_BRADY_MAX_SENSOR_RATE: 130 {BEATS}/MIN
MDC_IDC_SET_BRADY_MAX_TRACKING_RATE: 130 {BEATS}/MIN
MDC_IDC_SET_BRADY_MODE: NORMAL
MDC_IDC_SET_BRADY_PAV_DELAY_LOW: 150 MS
MDC_IDC_SET_BRADY_SAV_DELAY_LOW: 120 MS
MDC_IDC_SET_LEADCHNL_RA_PACING_AMPLITUDE: 1.5 V
MDC_IDC_SET_LEADCHNL_RA_PACING_CAPTURE_MODE: NORMAL
MDC_IDC_SET_LEADCHNL_RA_PACING_POLARITY: NORMAL
MDC_IDC_SET_LEADCHNL_RA_PACING_PULSEWIDTH: 0.4 MS
MDC_IDC_SET_LEADCHNL_RA_SENSING_POLARITY: NORMAL
MDC_IDC_SET_LEADCHNL_RA_SENSING_SENSITIVITY: 0.25 MV
MDC_IDC_SET_LEADCHNL_RV_PACING_AMPLITUDE: 2 V
MDC_IDC_SET_LEADCHNL_RV_PACING_CAPTURE_MODE: NORMAL
MDC_IDC_SET_LEADCHNL_RV_PACING_POLARITY: NORMAL
MDC_IDC_SET_LEADCHNL_RV_PACING_PULSEWIDTH: 0.4 MS
MDC_IDC_SET_LEADCHNL_RV_SENSING_POLARITY: NORMAL
MDC_IDC_SET_LEADCHNL_RV_SENSING_SENSITIVITY: 4 MV
MDC_IDC_SET_ZONE_DETECTION_INTERVAL: 333.33 MS
MDC_IDC_SET_ZONE_DETECTION_INTERVAL: 342.86 MS
MDC_IDC_SET_ZONE_TYPE: NORMAL
MDC_IDC_SET_ZONE_TYPE: NORMAL
MDC_IDC_STAT_AT_BURDEN_PERCENT: 0 %
MDC_IDC_STAT_AT_DTM_END: NORMAL
MDC_IDC_STAT_AT_DTM_START: NORMAL
MDC_IDC_STAT_AT_MODE_SW_COUNT: 53
MDC_IDC_STAT_BRADY_AP_VP_PERCENT: 3 %
MDC_IDC_STAT_BRADY_AP_VS_PERCENT: 89 %
MDC_IDC_STAT_BRADY_AS_VP_PERCENT: 1 %
MDC_IDC_STAT_BRADY_AS_VS_PERCENT: 7 %
MDC_IDC_STAT_BRADY_DTM_END: NORMAL
MDC_IDC_STAT_BRADY_DTM_START: NORMAL
MDC_IDC_STAT_EPISODE_RECENT_COUNT: 12
MDC_IDC_STAT_EPISODE_RECENT_COUNT: 6
MDC_IDC_STAT_EPISODE_RECENT_COUNT_DTM_END: NORMAL
MDC_IDC_STAT_EPISODE_RECENT_COUNT_DTM_END: NORMAL
MDC_IDC_STAT_EPISODE_RECENT_COUNT_DTM_START: NORMAL
MDC_IDC_STAT_EPISODE_RECENT_COUNT_DTM_START: NORMAL
MDC_IDC_STAT_EPISODE_TYPE: NORMAL
MDC_IDC_STAT_EPISODE_TYPE: NORMAL

## 2020-04-29 PROCEDURE — 93294 REM INTERROG EVL PM/LDLS PM: CPT | Performed by: INTERNAL MEDICINE

## 2020-04-29 PROCEDURE — 93296 REM INTERROG EVL PM/IDS: CPT | Performed by: INTERNAL MEDICINE

## 2020-04-30 ENCOUNTER — HOSPITAL ENCOUNTER (EMERGENCY)
Facility: CLINIC | Age: 85
Discharge: HOME | End: 2020-04-30
Attending: EMERGENCY MEDICINE | Admitting: EMERGENCY MEDICINE
Payer: COMMERCIAL

## 2020-04-30 ENCOUNTER — TELEPHONE (OUTPATIENT)
Dept: CARDIOLOGY | Facility: CLINIC | Age: 85
End: 2020-04-30

## 2020-04-30 ENCOUNTER — NURSE TRIAGE (OUTPATIENT)
Dept: INTERNAL MEDICINE | Facility: CLINIC | Age: 85
End: 2020-04-30

## 2020-04-30 ENCOUNTER — DOCUMENTATION ONLY (OUTPATIENT)
Dept: INTERNAL MEDICINE | Facility: CLINIC | Age: 85
End: 2020-04-30

## 2020-04-30 VITALS
RESPIRATION RATE: 20 BRPM | SYSTOLIC BLOOD PRESSURE: 158 MMHG | OXYGEN SATURATION: 92 % | TEMPERATURE: 98.7 F | BODY MASS INDEX: 26.86 KG/M2 | HEART RATE: 76 BPM | HEIGHT: 65 IN | DIASTOLIC BLOOD PRESSURE: 63 MMHG

## 2020-04-30 DIAGNOSIS — K64.4 BLEEDING EXTERNAL HEMORRHOIDS: ICD-10-CM

## 2020-04-30 DIAGNOSIS — K64.4 EXTERNAL HEMORRHOIDS: ICD-10-CM

## 2020-04-30 DIAGNOSIS — Z79.01 CHRONIC ANTICOAGULATION: ICD-10-CM

## 2020-04-30 LAB
ALBUMIN SERPL-MCNC: 3.4 G/DL (ref 3.4–5)
ALP SERPL-CCNC: 136 U/L (ref 40–150)
ALT SERPL W P-5'-P-CCNC: 28 U/L (ref 0–50)
ANION GAP SERPL CALCULATED.3IONS-SCNC: 5 MMOL/L (ref 3–14)
AST SERPL W P-5'-P-CCNC: 20 U/L (ref 0–45)
BASOPHILS # BLD AUTO: 0 10E9/L (ref 0–0.2)
BASOPHILS NFR BLD AUTO: 0.5 %
BILIRUB SERPL-MCNC: 0.4 MG/DL (ref 0.2–1.3)
BUN SERPL-MCNC: 21 MG/DL (ref 7–30)
CALCIUM SERPL-MCNC: 9 MG/DL (ref 8.5–10.1)
CHLORIDE SERPL-SCNC: 105 MMOL/L (ref 94–109)
CO2 SERPL-SCNC: 25 MMOL/L (ref 20–32)
CREAT SERPL-MCNC: 0.94 MG/DL (ref 0.52–1.04)
DIFFERENTIAL METHOD BLD: ABNORMAL
EOSINOPHIL # BLD AUTO: 0.1 10E9/L (ref 0–0.7)
EOSINOPHIL NFR BLD AUTO: 1.3 %
ERYTHROCYTE [DISTWIDTH] IN BLOOD BY AUTOMATED COUNT: 17.4 % (ref 10–15)
GFR SERPL CREATININE-BSD FRML MDRD: 53 ML/MIN/{1.73_M2}
GLUCOSE SERPL-MCNC: 209 MG/DL (ref 70–99)
HCT VFR BLD AUTO: 37.4 % (ref 35–47)
HEMOCCULT STL QL: NEGATIVE
HGB BLD-MCNC: 11.8 G/DL (ref 11.7–15.7)
IMM GRANULOCYTES # BLD: 0 10E9/L (ref 0–0.4)
IMM GRANULOCYTES NFR BLD: 0.1 %
INR PPP: 2.31 (ref 0.86–1.14)
LACTATE BLD-SCNC: 1.8 MMOL/L (ref 0.7–2)
LYMPHOCYTES # BLD AUTO: 2.3 10E9/L (ref 0.8–5.3)
LYMPHOCYTES NFR BLD AUTO: 30.7 %
MCH RBC QN AUTO: 24 PG (ref 26.5–33)
MCHC RBC AUTO-ENTMCNC: 31.6 G/DL (ref 31.5–36.5)
MCV RBC AUTO: 76 FL (ref 78–100)
MONOCYTES # BLD AUTO: 0.6 10E9/L (ref 0–1.3)
MONOCYTES NFR BLD AUTO: 7.9 %
NEUTROPHILS # BLD AUTO: 4.5 10E9/L (ref 1.6–8.3)
NEUTROPHILS NFR BLD AUTO: 59.5 %
NRBC # BLD AUTO: 0 10*3/UL
NRBC BLD AUTO-RTO: 0 /100
PLATELET # BLD AUTO: 251 10E9/L (ref 150–450)
POTASSIUM SERPL-SCNC: 4.2 MMOL/L (ref 3.4–5.3)
PROT SERPL-MCNC: 7.3 G/DL (ref 6.8–8.8)
RBC # BLD AUTO: 4.92 10E12/L (ref 3.8–5.2)
SARS-COV-2 PCR COMMENT: NORMAL
SARS-COV-2 RNA SPEC QL NAA+PROBE: NEGATIVE
SARS-COV-2 RNA SPEC QL NAA+PROBE: NORMAL
SODIUM SERPL-SCNC: 135 MMOL/L (ref 133–144)
SPECIMEN SOURCE: NORMAL
SPECIMEN SOURCE: NORMAL
WBC # BLD AUTO: 7.5 10E9/L (ref 4–11)

## 2020-04-30 PROCEDURE — 82272 OCCULT BLD FECES 1-3 TESTS: CPT | Performed by: EMERGENCY MEDICINE

## 2020-04-30 PROCEDURE — 99283 EMERGENCY DEPT VISIT LOW MDM: CPT

## 2020-04-30 PROCEDURE — 86923 COMPATIBILITY TEST ELECTRIC: CPT | Performed by: EMERGENCY MEDICINE

## 2020-04-30 PROCEDURE — 83605 ASSAY OF LACTIC ACID: CPT | Performed by: EMERGENCY MEDICINE

## 2020-04-30 PROCEDURE — 87635 SARS-COV-2 COVID-19 AMP PRB: CPT | Performed by: EMERGENCY MEDICINE

## 2020-04-30 PROCEDURE — 46600 DIAGNOSTIC ANOSCOPY SPX: CPT

## 2020-04-30 PROCEDURE — 86901 BLOOD TYPING SEROLOGIC RH(D): CPT | Performed by: EMERGENCY MEDICINE

## 2020-04-30 PROCEDURE — 86850 RBC ANTIBODY SCREEN: CPT | Performed by: EMERGENCY MEDICINE

## 2020-04-30 PROCEDURE — 80053 COMPREHEN METABOLIC PANEL: CPT | Performed by: EMERGENCY MEDICINE

## 2020-04-30 PROCEDURE — 86900 BLOOD TYPING SEROLOGIC ABO: CPT | Performed by: EMERGENCY MEDICINE

## 2020-04-30 PROCEDURE — 85025 COMPLETE CBC W/AUTO DIFF WBC: CPT | Performed by: EMERGENCY MEDICINE

## 2020-04-30 PROCEDURE — 85610 PROTHROMBIN TIME: CPT | Performed by: EMERGENCY MEDICINE

## 2020-04-30 RX ORDER — HYDROCORTISONE 25 MG/G
CREAM TOPICAL
Qty: 28 G | Refills: 0 | Status: SHIPPED | OUTPATIENT
Start: 2020-04-30 | End: 2020-07-29

## 2020-04-30 ASSESSMENT — ENCOUNTER SYMPTOMS
COUGH: 0
ANAL BLEEDING: 1
ABDOMINAL PAIN: 0
BLOOD IN STOOL: 0
DIARRHEA: 0
LIGHT-HEADEDNESS: 0
DIZZINESS: 0
FEVER: 0
VOMITING: 0

## 2020-04-30 NOTE — PROGRESS NOTES
ANTICOAGULATION  MANAGEMENT: Discharge Review    Madie Silva chart reviewed for anticoagulation continuity of care    Emergency room visit on 4/30 for rectal bleeding.    Discharge disposition: Home    Results:    Recent Labs   Lab Test 04/30/20  1016   INR 2.31*       Anticoagulation inpatient management:     not applicable     Anticoagulation discharge instructions:     Warfarin dosing: home regimen continued   Bridging: No   INR goal change: No      Medication changes affecting anticoagulation: No    Additional factors affecting anticoagulation: No    Plan     No adjustment to anticoagulation plan needed    Patient not contacted    patient is scheduled to hold warfarin starting today for an upcoming TAVR.  she states she will be inpatient following the procedure and will follow up as advised on discharge.     Connie Hickman RN

## 2020-04-30 NOTE — TELEPHONE ENCOUNTER
Madie called to report rectal bleeding. She could not quantify the amount, but stated she passed some clots yesterday. She called her PCP this morning who advised her to proceed to the ER. I requested she come to [facility], she verbalized understanding and stated her neighbor was going to drive her over now. Kim Wong RN

## 2020-04-30 NOTE — TELEPHONE ENCOUNTER
Pt is home from ED, ED note reports pt dx for hemorrhoids. Pt states she is feeling well. Reviewed patient appt for tomorrow for TAVR H&P. Labs completed in ED today. Pt verbalized understanding. Kim Wong RN    Wellness Screening Tool  Symptom Screening:  Do you have one of the following new symptoms:      Fever or reported chills?  No    A new cough (started within the past 14 days)? No    Shortness of breath (started within the past 14 days)? No (has SOB, but not new)    Nausea, vomiting or diarrhea? No  Within the past 3 weeks, have you been exposed to someone with a known positive illness below?    COVID - 19 (known or suspected) No    Chicken pox?  No    Measles? No    Pertussis? No    Patient notified of visitor restriction: Yes  Patient informed to wear a mask: Yes    Patient's appointment status: Patient will be seen in clinic as scheduled on 5/1/20. Kim Wong RN

## 2020-04-30 NOTE — TELEPHONE ENCOUNTER
"Pt calling into clinic.    States she is having heart surgery tomorrow. Yesterday and today she has been having rectal bleeding. \"I wouldn't be that concerned, except that I feel like I'm having a period that's how much blood is coming out!\" Hx of polyps. States she had some surgery for this \"about 6 or 7 years ago but I know they didn't get them all out\". Denies constipation or diarrhea, straining to use the restroom. States BMs are fine, this is just bleeding.     With profuse bleeding, age, and coumadin usage, RN directed pt to ED. Pt states son will drive her there.    Pt requested FYI about this issue be sent to PCP as well as cardiologist.     Reason for Disposition    Sounds like a life-threatening emergency to the triager    Answer Assessment - Initial Assessment Questions  1. SYMPTOM:  \"What's the main symptom you're concerned about?\" (e.g., pain, itching, swelling, rash)      Rectal bleeding    2. ONSET: \"When did the bleeding  start?\"      2 days    3. RECTAL PAIN: \"Do you have any pain around your rectum?\" \"How bad is the pain?\"  (Scale 1-10; or mild, moderate, severe)   - MILD (1-3): doesn't interfere with normal activities    - MODERATE (4-7): interferes with normal activities or awakens from sleep, limping    - SEVERE (8-10): excruciating pain, unable to have a bowel movement       No pain    4. RECTAL ITCHING: \"Do you have any itching in this area?\" \"How bad is the itching?\"  (Scale 1-10; or mild, moderate, severe)   - MILD - doesn't interfere with normal activities    - MODERATE-SEVERE: interferes with normal activities or awakens from sleep      No    5. CONSTIPATION: \"Do you have constipation?\" If so, \"How bad is it?\"      No    6. CAUSE: \"What do you think is causing the anus symptoms?\"      Polyps    7. OTHER SYMPTOMS: \"Do you have any other symptoms?\"  (e.g., rectal bleeding, abdominal pain, vomiting, fever)      No    8. PREGNANCY: \"Is there any chance you are pregnant?\" \"When was your last " "menstrual period?\"      N/a    Protocols used: RECTAL SYMPTOMS-A-OH      "

## 2020-04-30 NOTE — ED TRIAGE NOTES
Rectal bleeding - pt noticed blood clot from rectum the other day now bright red rectal bleeding this morning - denies any other symptoms - pt on blood thinner - schedule for some type of heart surgery in near future

## 2020-04-30 NOTE — ED PROVIDER NOTES
History     Chief Complaint:  Rectal Bleeding       The history is provided by the patient.      Madie Silva is a 89 year old female who presents with rectal bleeding.  Patient first noted a clot 2 days ago and then with bowel movement this morning had active bleeding.  She denies abdominal pain, diarrhea, vomiting.  She lives alone at home in a house and has had no coronavirus exposures.  She denies fever, cough, cold, congestion.  She takes Coumadin daily and was due for an INR check tomorrow.  She does have surgery scheduled for valve replacement in the near future and they were doing a screening COVID test tomorrow as well.  She denies any COVID symptoms.  Patient states her last colonoscopy was 6 to 7 years ago and she did have 7 polyps removed and 1 was remaining.  She has noticed no bleeding with her stool or dark or black stools recently.  She denies dizziness or lightheadedness.      Allergies:  Amoxicillin  Bisphosphonates  Boniva [Ibandronate Sodium]  Fosamax [Alendronic Acid]  Lisinopril  Niacin  Simvastatin     Medications:    Aspirin  Atorvastatin  Calcium  Coenzyme Q-10  Gabapentin  Norco  Levothyroxine  Losartan  Metformin  Nitroglycerin  Omeprazole  Miralax  Warfarin    Past Medical History:    Walters's esophagus   Chronic vertigo  Colonic Adenoma   Coronary artery disease   Costochondritis   Depression   Diabetes type II   DJD   DVT of Leg, postop   GERD   Hiatal hernia  Hyperlipidemia  Hypothyroidism    Obesity, unspecified   Osteoporosis, unspecified   Peripheral neuropathy  Polymyalgia rheumatica   Post Herpetic Neuralgia   Restless leg syndrome   Stricture and stenosis of esophagus   Vitamin D deficiency     Past Surgical History:    CABG x4  Appendectomy  Tonsillectomy and adenoidectomy  CV angiogram coronary graft  CV Aortogram abdominal  CV Aortogram thoracic  CV Left heart cath  Embolectomy lower extremity  Hysterectomy, partial  Left ankle fracture repair    Family History:   "  Alzheimer's  Heart Disease    Social History:  The patient presents to the ED alone.  Smoking Status: Never Smoker  Smokeless Tobacco: Never Used  Alcohol Use: No  Drug Use: No  PCP: De Obregon     Review of Systems   Constitutional: Negative for fever.   HENT: Negative for congestion.    Respiratory: Negative for cough.    Gastrointestinal: Positive for anal bleeding. Negative for abdominal pain, blood in stool, diarrhea and vomiting.   Neurological: Negative for dizziness and light-headedness.   All other systems reviewed and are negative.      Physical Exam     Patient Vitals for the past 24 hrs:   BP Temp Temp src Pulse Resp SpO2 Height   04/30/20 0953 (!) 158/63 98.7  F (37.1  C) Oral 76 20 92 % 1.651 m (5' 5\")       Physical Exam  General: Patient is alert and normal appearing.  HEENT: Head atraumatic    Eyes: pupils equal and reactive. Conjunctiva clear   Nares: patent   Oropharynx: no lesions, uvula midline, no palatal draping, normal voice, no trismus  Neck: Supple without lymphadenopathy, no meningismus  Chest: Heart regular rate and rhythm.   Lungs: Equal clear to auscultation with no wheeze or rales  Abdomen: Soft, non tender, nondistended, normal bowel sounds  Back: No costovertebral angle tenderness, no midline C, T or L spine tenderness  Neuro: Grossly nonfocal, normal speech, strength equal bilaterally, CN 2-12 intact  Extremities: No deformities, equal radial and DP pulses. No clubbing, cyanosis.  No edema  Skin: Warm and dry with no rash.   Rectal: External hemorrhoid with obvious irritation and source of bleeding, digital rectal exam with no masses and brown stool noted    Emergency Department Course     Laboratory:  Laboratory findings were communicated with the patient who voiced understanding of the findings.    CBC: WBC: 7.5, HGB: 11.8, PLT: 251    CMP: Glucose 209 (high), GFR 53 (low), o/w WNL (Creatinine: 0.94)    INR: 2.31 (high)    1016 Lactic Acid Whole Blood: 1.8      ABO/Rh " type and Screen: B pos    COVID-19 Virus (Coronavirus) PCR: Pending     Occult Blood Stool: Negative    Emergency Department Course:  Past medical records, nursing notes, and vitals reviewed.    1000 I performed an exam of the patient as documented above.     IV was inserted and blood was drawn for laboratory testing, results above.    1105 I rechecked the patient and discussed the results of her workup thus far.     Findings and plan explained to the patient. Patient discharged home with instructions regarding supportive care, medications, and reasons to return. The importance of close follow-up was reviewed. The patient was prescribed hydrocortisone cream.    I personally reviewed the laboratory results with the patient and answered all related questions prior to discharge.     Impression & Plan       Medical Decision Making:  Madie Silva is a 89 year old female who presents for evaluation of rectal bleeding.  A broad differential for their pain was considered including fissure, hemorrhoid, mass/tumor, perirectal abscess, inflammatory bowel disease, foreign body, cancer etc.  The workup and anoscopy findings indicated that patient is having bleeding from acute hemorrhoids.  Although patient is on chronic anticoagulation her INR is in therapeutic range.  Her stool was brown and no evidence of blood noted on Hemoccult.  Patient is hemodynamically stable.  Screening COVID test was ordered today as that was scheduled with her INR check tomorrow therefore she will not have to attend laboratory appointment.  She is encouraged to continue to keep her physician appointment scheduled for tomorrow.   There are no signs of worrisome findings to warrant further workup, colorectal surgery consultation or admission.   Medications for discharge are noted above.  Return precautions the emergency department and bleeding precautions were reviewed at length.      Diagnosis:    ICD-10-CM    1. External hemorrhoids  K64.4    2.  Bleeding external hemorrhoids  K64.4    3. Chronic anticoagulation  Z79.01        Disposition:  Discharged to home.    Discharge Medications:  New Prescriptions    HYDROCORTISONE, PERIANAL, (ANUSOL-HC) 2.5 % CREAM    Apply twice daily to hemorrhoid.       Scribe Disclosure:  I, Bairon Lima, am serving as a scribe at 10:05 AM on 4/30/2020 to document services personally performed by Em Thompson MD based on my observations and the provider's statements to me.      Em Thompson MD  04/30/20 1138

## 2020-05-01 ENCOUNTER — OFFICE VISIT (OUTPATIENT)
Dept: CARDIOLOGY | Facility: CLINIC | Age: 85
End: 2020-05-01
Payer: COMMERCIAL

## 2020-05-01 VITALS
HEART RATE: 103 BPM | DIASTOLIC BLOOD PRESSURE: 75 MMHG | SYSTOLIC BLOOD PRESSURE: 149 MMHG | BODY MASS INDEX: 26.33 KG/M2 | OXYGEN SATURATION: 98 % | WEIGHT: 158 LBS | HEIGHT: 65 IN

## 2020-05-01 DIAGNOSIS — I35.0 SEVERE AORTIC STENOSIS: Primary | ICD-10-CM

## 2020-05-01 PROCEDURE — 99214 OFFICE O/P EST MOD 30 MIN: CPT | Performed by: NURSE PRACTITIONER

## 2020-05-01 RX ORDER — LIDOCAINE 40 MG/G
CREAM TOPICAL
Status: CANCELLED | OUTPATIENT
Start: 2020-05-01

## 2020-05-01 RX ORDER — ASPIRIN 81 MG/1
81 TABLET ORAL DAILY
Status: CANCELLED | OUTPATIENT
Start: 2020-05-01

## 2020-05-01 RX ORDER — SODIUM CHLORIDE 9 MG/ML
INJECTION, SOLUTION INTRAVENOUS CONTINUOUS
Status: CANCELLED | OUTPATIENT
Start: 2020-05-01

## 2020-05-01 RX ORDER — CLINDAMYCIN PHOSPHATE 900 MG/50ML
900 INJECTION, SOLUTION INTRAVENOUS
Status: CANCELLED | OUTPATIENT
Start: 2020-05-01

## 2020-05-01 ASSESSMENT — MIFFLIN-ST. JEOR: SCORE: 1137.56

## 2020-05-01 NOTE — LETTER
5/1/2020    De Obregon MD  [address] [facility] [address]    RE: Madie SHAGUFTA Silva       Dear Colleague,    I had the pleasure of seeing Madie Silva in the [facility].  Primary Cardiologist: Dr. Isrrael Ramachandran presents for pre-operative H&P in preparation for elective TAVR on May 5, 2020 at [facility].     Ms. Silva has a past medical history significant for aortic stenosis, coronary artery disease status post prior four-vessel CABG (LIMA to LAD, SVG to ramus, SVG to OM, SVG to PDA) in 2011, symptomatic bradycardia status post pacemaker implantation and paroxysmal atrial fibrillation.    The aortic stenosis was previously followed by Dr. Red and upon his group home was transitioned to Dr. Arcos.  In October 2019, she developed significant shortness of breath with exertion.  The patient was not interested in moving forward with TAVR at that time.  However, she continued to have progressive dyspnea on exertion. The echocardiogram in January 2019 showed an aortic valve area of 0.8 and a dimensional index of 0.24 with a mean gradient of 26 mmHg.  She agreed to move forward with TAVR.    She was seen in structural heart clinic on February 13, 2020 for symptoms of dyspnea on exertion.  She underwent TAVR workup consisting of     ECG: Atrial paced, 78 bpm    Imaging Studies:    Coronary Angiogram-patent ALEJANDRE to LAD, patent SVG to OM, patent SVG to right PDA, occluded SVG to ramus.  Normal right heart catheterization.    Echocardiogram-V-max 3.43 cm/s, mean gradient 26 mmHg, VICKY 0.78 cm , dimensional index 0.24    TAVR CT - Moderately calcified trileafletaortic valve. Aortic valve calcium  score is 696. The LVOT is not calcified. The ascending aorta is mildly  calcified, aortic arch is  mildly calcified, the descending thoracic  aorta is moderately calcified.     She was evaluated in TAVR conference and felt to be a good candidate for TAVR via transfemoral  approach, using a Khan valve.     Unfortunately, she presented to the emergency department on April 30, 2019 with complaints of rectal bleeding.  Upon evaluation, she was noted to have bleeding from a hemorrhoid.  She was discharged that day.    She present today feeling well however she continues to have progressive dyspnea on exertion. Denies chest pain, palpitations, PND, orthopnea, presyncope, syncope, or LE edema.     Blood pressure today is 149/75, on recheck after sitting for quite some time her blood pressure came down to 132/76.    Labs showed a remarkable    Assessment and Plan     1.  Severe Aortic Stenosis - The patient has severe, symptomatic aortic stenosis. She is scheduled to undergo a TAVR procedure on May 5, 2020. All the risks and benefits were discussed with the patient,including the risk of heart attack, stroke and mortality. Additional risks include vascular injury and bleeding as well as the potential need for a pacemaker following the procedure.  She has accepted these risks and is willing to move forward with the procedure.    History of blood transfusions/reactions: no  History of abnormal bleeding/anti-platelet use: no  Steroid use in the last year: no  Personal or family history with difficulty with anesthesia: no     Type and screen orders completed. Supplies for scrubbing provided. Patient is cleared by their dentist.     Patient has no fever, chills, or urinary symptoms. Patient does not have contrast dye allergy.     Patient is optimized and is acceptable candidate for the proposed procedure.  No further diagnostic evaluation is needed. Pre-TAVR instructions provided in written format.     Medication Recommendations:  Coumadin/NOAC -continue holding      2. Coronary artery disease status post prior four-vessel CABG (LIMA to LAD, SVG to ramus, SVG to OM, SVG to PDA) in 2011.  Most recent coronary angiogram showed a  of the SVG to ramus.  The remainder of the bypass grafts were  patent.  No new stenosis noted.  She denies chest pain.  Continue aspirin and statin therapy.    3. Paroxysmal atrial fibrillation.  Last EKG shows atrial paced.  Continue warfarin for anticoagulation.  However, this is currently held for the upcoming procedure.    4. Symptomatic bradycardia status post pacemaker implantation.  Continue with routine device checks.      HAROLDO Davis, CNP  5/1/2020    Current Medications:  Current Outpatient Medications   Medication Sig Dispense Refill     acetaminophen (TYLENOL ARTHRITIS PAIN) 650 MG CR tablet Take 2 tablets (1,300 mg) by mouth 2 times daily       aspirin EC 81 MG EC tablet Take 1 tablet by mouth daily.       atorvastatin (LIPITOR) 80 MG tablet TAKE ONE TABLET BY MOUTH ONCE DAILY 90 tablet 3     calcium 600 MG tablet Take 1 tablet by mouth 2 times daily.       Cholecalciferol (VITAMIN D-400 PO) Take by mouth daily       co-enzyme Q-10 (COENZYME Q-10) 100 MG CAPS Take 1 capsule by mouth daily.       gabapentin (NEURONTIN) 100 MG capsule TAKE ONE CAPSULE BY MOUTH THREE TIMES A DAY (Patient taking differently: 300 mg At Bedtime ) 270 capsule 3     HYDROcodone-acetaminophen (NORCO) 5-325 MG tablet Take 0.5-1 tablets by mouth every 6 hours as needed for moderate to severe pain 12 tablet 0     hydrocortisone, Perianal, (ANUSOL-HC) 2.5 % cream Apply twice daily to hemorrhoid. 28 g 0     levothyroxine (SYNTHROID/LEVOTHROID) 50 MCG tablet Take 1 tablet (50 mcg) by mouth daily 90 tablet 3     losartan (COZAAR) 100 MG tablet Take 1 tablet (100 mg) by mouth daily 90 tablet 3     metFORMIN (GLUCOPHAGE) 500 MG tablet TAKE ONE TABLET BY MOUTH DAILY WITH BREAKFAST 90 tablet 3     Multiple Vitamins-Minerals (CENTRUM SILVER) per tablet Take 1 tablet by mouth daily.       Multiple Vitamins-Minerals (PRESERVISION AREDS 2 PO)        nitroGLYCERIN (NITROSTAT) 0.4 MG SL tablet Place 0.4 mg under the tongue every 5 minutes as needed. Up to 3 doses per episode        omeprazole  (PRILOSEC) 20 MG DR capsule Take 1 capsule (20 mg) by mouth daily 90 capsule 3     polyethylene glycol (MIRALAX) powder Take 17 g (1 capful) by mouth daily 1 Bottle 3     Psyllium (FIBER) 0.52 g CAPS Take 1 capsule by mouth daily 90 capsule 0     warfarin ANTICOAGULANT (JANTOVEN ANTICOAGULANT) 3 MG tablet Take one tablet daily except 1 1/2 tabs on Tues/Thurs/Sat as directed by the anticoagulation clinic 100 tablet 1       Allergies:     Allergies   Allergen Reactions     Amoxicillin      hives     Bisphosphonates      Swallowing disorder     Boniva [Ibandronate Sodium] Nausea and Vomiting     Diarrhea, N/V with oral.  IV caused arm to swell      Fosamax [Alendronic Acid]      Severe gastrointestinal reaction     Lisinopril      cough     Niacin      rash     Simvastatin      myalgias       Past Medical History:  Past Medical History:   Diagnosis Date     Walters's esophagus 7/09     Bradycardia     post-op CABG 2011 - s/p pacemaker implanted 2011     CHRONIC VERTIGO 9/99     Colonic Adenoma 3/90, 11/98     Coronary artery disease     CABG x4 2011: LIMA to her LAD, saphenous vein graft to her ramus, saphenous vein graft to her OM, saphenous vein graft to her PDA.     Costochondritis      Depression      DIABETES MELLITUS TYPE II 10/07     DJD      DVT of Leg, postop 11/09    6 months of warfarin     Gastritis 10/89     GERD      HIATAL HERNIA      HYPERLIPIDEMIA      HYPOTHYROIDISM (aka HASHIMOTO)      Impaired fasting glucose      L Ankle Fracture 10/09     Obesity, unspecified      Osteoporosis, unspecified      PERIPHERAL NEUROPATHY      Polymyalgia rheumatica (H)      Post Herpetic Neuralgia 4/03    R lumbar distribution     Restless leg syndrome      Small vessel cerebrovascular changes 9/99     Stricture and stenosis of esophagus 10/89    Dilated     Syncope     1/06, 8/08, 2/10     VITAMIN D DEFICIENCY 12/07    per Dr. Petty       Past Surgical History:  Past Surgical History:   Procedure Laterality Date      BYPASS GRAFT ARTERY CORONARY  11/2/2011    CABG x 4  Dr. EDWIN ACOSTA NONSPECIFIC PROCEDURE  age 14    appendectomy     C NONSPECIFIC PROCEDURE  as a child    T&A     CV ANGIOGRAM CORONARY GRAFT N/A 1/27/2020    Procedure: Angiogram Coronary Graft;  Surgeon: Jenny Luther MD;  Location:  HEART CARDIAC CATH LAB     CV AORTOGRAM ABDOMINAL N/A 1/27/2020    Procedure: Aortogram Abdominal;  Surgeon: Jenny Luther MD;  Location:  HEART [facility]ARD[facility] CATH LAB     CV AORTOGRAM THORACIC N/A 1/27/2020    Procedure: Aortogram Thoracic;  Surgeon: Jenny Luther MD;  Location:  HEART CARDIAC CATH LAB     CV LEFT HEART CATH N/A 1/27/2020    Procedure: Right and Left heart catheterization for TAVR workup;  Surgeon: Jenny Luther MD;  Location:  HEART CARDIAC CATH LAB     EMBOLECTOMY LOWER EXTREMITY  11/9/2011    EMBOLECTOMY LOWER EXTREMITY; left leg femoral embolectomy.; Surgeon:JOLEEN BOONE; Location: OR     HYSTERECTOMY, CERVIX STATUS UNKNOWN  1978    partial hysterectomy     SURGICAL HISTORY OF -   10/09    L ankle fracture repair    Dr. Jose Roberto Trevino       Family History:  Family History   Problem Relation Age of Onset     Alzheimer Disease Sister      Heart Disease Mother      Heart Disease Father      Heart Disease Son      Heart Disease Brother        Social History:  Social History     Socioeconomic History     Marital status:      Spouse name: None     Number of children: None     Years of education: None     Highest education level: None   Occupational History     None   Social Needs     Financial resource strain: None     Food insecurity     Worry: None     Inability: None     Transportation needs     Medical: None     Non-medical: None   Tobacco Use     Smoking status: Never Smoker     Smokeless tobacco: Never Used   Substance and Sexual Activity     Alcohol use: No     Drug use: No     Sexual activity: Never   Lifestyle     Physical activity     Days per  "week: None     Minutes per session: None     Stress: None   Relationships     Social connections     Talks on phone: None     Gets together: None     Attends Zoroastrianism service: None     Active member of club or organization: None     Attends meetings of clubs or organizations: None     Relationship status: None     Intimate partner violence     Fear of current or ex partner: None     Emotionally abused: None     Physically abused: None     Forced sexual activity: None   Other Topics Concern      Service Not Asked     Blood Transfusions Not Asked     Caffeine Concern No     Occupational Exposure Not Asked     Hobby Hazards Not Asked     Sleep Concern Not Asked     Stress Concern Not Asked     Weight Concern Not Asked     Special Diet No     Back Care Not Asked     Exercise No     Bike Helmet Not Asked     Seat Belt Not Asked     Self-Exams Not Asked     Parent/sibling w/ CABG, MI or angioplasty before 65F 55M? No   Social History Narrative     None       Review of Systems:  Skin:  Negative       Eyes:  Positive for glasses    ENT:  Negative      Respiratory:  Positive for dyspnea on exertion     Cardiovascular:  Negative      Gastroenterology: Negative      Genitourinary:  not assessed      Musculoskeletal:  Positive for arthritis    Neurologic:  Negative      Psychiatric:  Negative      Heme/Lymph/Imm:  Negative      Endocrine:  Positive for thyroid disorder        Physical Exam:  Vitals: BP (!) 149/75   Pulse 103   Ht 1.651 m (5' 5\")   Wt 71.7 kg (158 lb)   SpO2 98%   BMI 26.29 kg/m      Constitutional:  cooperative;in no acute distress        Skin:  warm and dry to the touch          Head:  normocephalic        Eyes:  pupils equal and round;sclera white        Lymph:      ENT:  no pallor or cyanosis        Neck:  JVP normal;carotid pulses are full and equal bilaterally transmitted murmur      Respiratory:  normal breath sounds, clear to auscultation, normal A-P diameter, normal symmetry, normal " respiratory excursion, no use of accessory muscles         Cardiac: regular rhythm;normal S1 and S2       systolic ejection murmur;grade 2;radiation to the carotid        pulses full and equal     2+             1+                    GI:  abdomen soft;non-tender        Extremities and Muscular Skeletal:  no edema;no deformities, clubbing, cyanosis, erythema observed              Neurological:  no gross motor deficits;affect appropriate        Psych:  Alert and Oriented x 3    No diagnosis found.      Laboratory Studies:  LIPID RESULTS:  Lab Results   Component Value Date    CHOL 214 (H) 06/06/2019    HDL 61 06/06/2019    LDL 99 06/06/2019    TRIG 270 (H) 06/06/2019    CHOLHDLRATIO 2.8 04/20/2015       LIVER ENZYME RESULTS:  Lab Results   Component Value Date    AST 20 04/30/2020    ALT 28 04/30/2020       CBC RESULTS:  Lab Results   Component Value Date    WBC 7.5 04/30/2020    RBC 4.92 04/30/2020    HGB 11.8 04/30/2020    HCT 37.4 04/30/2020    MCV 76 (L) 04/30/2020    MCH 24.0 (L) 04/30/2020    MCHC 31.6 04/30/2020    RDW 17.4 (H) 04/30/2020     04/30/2020       BMP RESULTS:  Lab Results   Component Value Date     04/30/2020    POTASSIUM 4.2 04/30/2020    CHLORIDE 105 04/30/2020    CO2 25 04/30/2020    ANIONGAP 5 04/30/2020     (H) 04/30/2020    BUN 21 04/30/2020    CR 0.94 04/30/2020    GFRESTIMATED 53 (L) 04/30/2020    GFRESTBLACK 62 04/30/2020    SHAWNEE 9.0 04/30/2020        A1C RESULTS:  Lab Results   Component Value Date    A1C 6.8 (H) 12/16/2019       INR RESULTS:  Lab Results   Component Value Date    INR 2.31 (H) 04/30/2020    INR 2.4 (A) 03/19/2020    INR 2.0 (A) 02/06/2020    INR 1.29 (H) 01/27/2020       Thank you for allowing me to participate in the care of your patient.    Sincerely,     HAROLDO Davis [facility]

## 2020-05-01 NOTE — PROGRESS NOTES
STRUCTURAL HEART CARE   pre-TAVR H&P     Primary Cardiologist: Dr. Isrrael PRESCOTT   Madie presents for pre-operative H&P in preparation for elective TAVR on May 5, 2020 at [facility].     Ms. Silva has a past medical history significant for aortic stenosis, coronary artery disease status post prior four-vessel CABG (LIMA to LAD, SVG to ramus, SVG to OM, SVG to PDA) in 2011, symptomatic bradycardia status post pacemaker implantation and paroxysmal atrial fibrillation.    The aortic stenosis was previously followed by Dr. Red and upon his jail was transitioned to Dr. Arcos.  In October 2019, she developed significant shortness of breath with exertion.  The patient was not interested in moving forward with TAVR at that time.  However, she continued to have progressive dyspnea on exertion. The echocardiogram in January 2019 showed an aortic valve area of 0.8 and a dimensional index of 0.24 with a mean gradient of 26 mmHg.  She agreed to move forward with TAVR.    She was seen in structural heart clinic on February 13, 2020 for symptoms of dyspnea on exertion.  She underwent TAVR workup consisting of     ECG: Atrial paced, 78 bpm    Imaging Studies:    Coronary Angiogram-patent ALEJANDRE to LAD, patent SVG to OM, patent SVG to right PDA, occluded SVG to ramus.  Normal right heart catheterization.    Echocardiogram-V-max 3.43 cm/s, mean gradient 26 mmHg, VICKY 0.78 cm , dimensional index 0.24    TAVR CT - Moderately calcified trileafletaortic valve. Aortic valve calcium  score is 696. The LVOT is not calcified. The ascending aorta is mildly  calcified, aortic arch is  mildly calcified, the descending thoracic  aorta is moderately calcified.     She was evaluated in TAVR conference and felt to be a good candidate for TAVR via transfemoral approach, using a Khan valve.     Unfortunately, she presented to the emergency department on April 30, 2019 with complaints of rectal bleeding.  Upon evaluation, she  was noted to have bleeding from a hemorrhoid.  She was discharged that day.    She present today feeling well however she continues to have progressive dyspnea on exertion. Denies chest pain, palpitations, PND, orthopnea, presyncope, syncope, or LE edema.     Blood pressure today is 149/75, on recheck after sitting for quite some time her blood pressure came down to 132/76.    Labs showed a remarkable    Assessment and Plan     1.  Severe Aortic Stenosis - The patient has severe, symptomatic aortic stenosis. She is scheduled to undergo a TAVR procedure on May 5, 2020. All the risks and benefits were discussed with the patient,including the risk of heart attack, stroke and mortality. Additional risks include vascular injury and bleeding as well as the potential need for a pacemaker following the procedure.  She has accepted these risks and is willing to move forward with the procedure.    History of blood transfusions/reactions: no  History of abnormal bleeding/anti-platelet use: no  Steroid use in the last year: no  Personal or family history with difficulty with anesthesia: no     Type and screen orders completed. Supplies for scrubbing provided. Patient is cleared by their dentist.     Patient has no fever, chills, or urinary symptoms. Patient does not have contrast dye allergy.     Patient is optimized and is acceptable candidate for the proposed procedure.  No further diagnostic evaluation is needed. Pre-TAVR instructions provided in written format.     Medication Recommendations:  Coumadin/NOAC -continue holding      2. Coronary artery disease status post prior four-vessel CABG (LIMA to LAD, SVG to ramus, SVG to OM, SVG to PDA) in 2011.  Most recent coronary angiogram showed a  of the SVG to ramus.  The remainder of the bypass grafts were patent.  No new stenosis noted.  She denies chest pain.  Continue aspirin and statin therapy.    3. Paroxysmal atrial fibrillation.  Last EKG shows atrial paced.  Continue  warfarin for anticoagulation.  However, this is currently held for the upcoming procedure.    4. Symptomatic bradycardia status post pacemaker implantation.  Continue with routine device checks.      HAROLDO Davis, CNP  5/1/2020    Current Medications:  Current Outpatient Medications   Medication Sig Dispense Refill     acetaminophen (TYLENOL ARTHRITIS PAIN) 650 MG CR tablet Take 2 tablets (1,300 mg) by mouth 2 times daily       aspirin EC 81 MG EC tablet Take 1 tablet by mouth daily.       atorvastatin (LIPITOR) 80 MG tablet TAKE ONE TABLET BY MOUTH ONCE DAILY 90 tablet 3     calcium 600 MG tablet Take 1 tablet by mouth 2 times daily.       Cholecalciferol (VITAMIN D-400 PO) Take by mouth daily       co-enzyme Q-10 (COENZYME Q-10) 100 MG CAPS Take 1 capsule by mouth daily.       gabapentin (NEURONTIN) 100 MG capsule TAKE ONE CAPSULE BY MOUTH THREE TIMES A DAY (Patient taking differently: 300 mg At Bedtime ) 270 capsule 3     HYDROcodone-acetaminophen (NORCO) 5-325 MG tablet Take 0.5-1 tablets by mouth every 6 hours as needed for moderate to severe pain 12 tablet 0     hydrocortisone, Perianal, (ANUSOL-HC) 2.5 % cream Apply twice daily to hemorrhoid. 28 g 0     levothyroxine (SYNTHROID/LEVOTHROID) 50 MCG tablet Take 1 tablet (50 mcg) by mouth daily 90 tablet 3     losartan (COZAAR) 100 MG tablet Take 1 tablet (100 mg) by mouth daily 90 tablet 3     metFORMIN (GLUCOPHAGE) 500 MG tablet TAKE ONE TABLET BY MOUTH DAILY WITH BREAKFAST 90 tablet 3     Multiple Vitamins-Minerals (CENTRUM SILVER) per tablet Take 1 tablet by mouth daily.       Multiple Vitamins-Minerals (PRESERVISION AREDS 2 PO)        nitroGLYCERIN (NITROSTAT) 0.4 MG SL tablet Place 0.4 mg under the tongue every 5 minutes as needed. Up to 3 doses per episode        omeprazole (PRILOSEC) 20 MG DR capsule Take 1 capsule (20 mg) by mouth daily 90 capsule 3     polyethylene glycol (MIRALAX) powder Take 17 g (1 capful) by mouth daily 1 Bottle 3     Psyllium  (FIBER) 0.52 g CAPS Take 1 capsule by mouth daily 90 capsule 0     warfarin ANTICOAGULANT (JANTOVEN ANTICOAGULANT) 3 MG tablet Take one tablet daily except 1 1/2 tabs on Tues/Thurs/Sat as directed by the anticoagulation clinic 100 tablet 1       Allergies:     Allergies   Allergen Reactions     Amoxicillin      hives     Bisphosphonates      Swallowing disorder     Boniva [Ibandronate Sodium] Nausea and Vomiting     Diarrhea, N/V with oral.  IV caused arm to swell      Fosamax [Alendronic Acid]      Severe gastrointestinal reaction     Lisinopril      cough     Niacin      rash     Simvastatin      myalgias       Past Medical History:  Past Medical History:   Diagnosis Date     Walters's esophagus 7/09     Bradycardia     post-op CABG 2011 - s/p pacemaker implanted 2011     CHRONIC VERTIGO 9/99     Colonic Adenoma 3/90, 11/98     Coronary artery disease     CABG x4 2011: LIMA to her LAD, saphenous vein graft to her ramus, saphenous vein graft to her OM, saphenous vein graft to her PDA.     Costochondritis      Depression      DIABETES MELLITUS TYPE II 10/07     DJD      DVT of Leg, postop 11/09    6 months of warfarin     Gastritis 10/89     GERD      HIATAL HERNIA      HYPERLIPIDEMIA      HYPOTHYROIDISM (aka HASHIMOTO)      Impaired fasting glucose      L Ankle Fracture 10/09     Obesity, unspecified      Osteoporosis, unspecified      PERIPHERAL NEUROPATHY      Polymyalgia rheumatica (H)      Post Herpetic Neuralgia 4/03    R lumbar distribution     Restless leg syndrome      Small vessel cerebrovascular changes 9/99     Stricture and stenosis of esophagus 10/89    Dilated     Syncope     1/06, 8/08, 2/10     VITAMIN D DEFICIENCY 12/07    per Dr. Petty       Past Surgical History:  Past Surgical History:   Procedure Laterality Date     BYPASS GRAFT ARTERY CORONARY  11/2/2011    CABG x 4  Dr. PINEDA     C NONSPECIFIC PROCEDURE  age 14    appendectomy     C NONSPECIFIC PROCEDURE  as a child    T&A     CV ANGIOGRAM  CORONARY GRAFT N/A 1/27/2020    Procedure: Angiogram Coronary Graft;  Surgeon: Jenny Luther MD;  Location:  HEART CARDIAC CATH LAB     CV AORTOGRAM ABDOMINAL N/A 1/27/2020    Procedure: Aortogram Abdominal;  Surgeon: Jenny Luther MD;  Location:  HEART CARDIAC CATH LAB     CV AORTOGRAM THORACIC N/A 1/27/2020    Procedure: Aortogram Thoracic;  Surgeon: Jenny Luther MD;  Location:  HEART CARDIAC CATH LAB     CV LEFT HEART CATH N/A 1/27/2020    Procedure: Right and Left heart catheterization for TAVR workup;  Surgeon: Jenny Luther MD;  Location: [facility] CARDIAC CATH LAB     EMBOLECTOMY LOWER EXTREMITY  11/9/2011    EMBOLECTOMY LOWER EXTREMITY; left leg femoral embolectomy.; Surgeon:JOLEEN BOONE; Location: OR     HYSTERECTOMY, CERVIX STATUS UNKNOWN  1978    partial hysterectomy     SURGICAL HISTORY OF -   10/09    L ankle fracture repair    Dr. Jose Roberto Trevino       Family History:  Family History   Problem Relation Age of Onset     Alzheimer Disease Sister      Heart Disease Mother      Heart Disease Father      Heart Disease Son      Heart Disease Brother        Social History:  Social History     Socioeconomic History     Marital status:      Spouse name: None     Number of children: None     Years of education: None     Highest education level: None   Occupational History     None   Social Needs     Financial resource strain: None     Food insecurity     Worry: None     Inability: None     Transportation needs     Medical: None     Non-medical: None   Tobacco Use     Smoking status: Never Smoker     Smokeless tobacco: Never Used   Substance and Sexual Activity     Alcohol use: No     Drug use: No     Sexual activity: Never   Lifestyle     Physical activity     Days per week: None     Minutes per session: None     Stress: None   Relationships     Social connections     Talks on phone: None     Gets together: None     Attends [employer] witness service: None  "    Active member of club or organization: None     Attends meetings of clubs or organizations: None     Relationship status: None     Intimate partner violence     Fear of current or ex partner: None     Emotionally abused: None     Physically abused: None     Forced sexual activity: None   Other Topics Concern      Service Not Asked     Blood Transfusions Not Asked     Caffeine Concern No     Occupational Exposure Not Asked     Hobby Hazards Not Asked     Sleep Concern Not Asked     Stress Concern Not Asked     Weight Concern Not Asked     Special Diet No     Back Care Not Asked     Exercise No     Bike Helmet Not Asked     Seat Belt Not Asked     Self-Exams Not Asked     Parent/sibling w/ CABG, MI or angioplasty before 65F 55M? No   Social History Narrative     None       Review of Systems:  Skin:  Negative       Eyes:  Positive for glasses    ENT:  Negative      Respiratory:  Positive for dyspnea on exertion     Cardiovascular:  Negative      Gastroenterology: Negative      Genitourinary:  not assessed      Musculoskeletal:  Positive for arthritis    Neurologic:  Negative      Psychiatric:  Negative      Heme/Lymph/Imm:  Negative      Endocrine:  Positive for thyroid disorder        Physical Exam:  Vitals: BP (!) 149/75   Pulse 103   Ht 1.651 m (5' 5\")   Wt 71.7 kg (158 lb)   SpO2 98%   BMI 26.29 kg/m      Constitutional:  cooperative;in no acute distress        Skin:  warm and dry to the touch          Head:  normocephalic        Eyes:  pupils equal and round;sclera white        Lymph:      ENT:  no pallor or cyanosis        Neck:  JVP normal;carotid pulses are full and equal bilaterally transmitted murmur      Respiratory:  normal breath sounds, clear to auscultation, normal A-P diameter, normal symmetry, normal respiratory excursion, no use of accessory muscles         Cardiac: regular rhythm;normal S1 and S2       systolic ejection murmur;grade 2;radiation to the carotid        pulses full and " equal     2+             1+                    GI:  abdomen soft;non-tender        Extremities and Muscular Skeletal:  no edema;no deformities, clubbing, cyanosis, erythema observed              Neurological:  no gross motor deficits;affect appropriate        Psych:  Alert and Oriented x 3    No diagnosis found.      Laboratory Studies:  LIPID RESULTS:  Lab Results   Component Value Date    CHOL 214 (H) 06/06/2019    HDL 61 06/06/2019    LDL 99 06/06/2019    TRIG 270 (H) 06/06/2019    CHOLHDLRATIO 2.8 04/20/2015       LIVER ENZYME RESULTS:  Lab Results   Component Value Date    AST 20 04/30/2020    ALT 28 04/30/2020       CBC RESULTS:  Lab Results   Component Value Date    WBC 7.5 04/30/2020    RBC 4.92 04/30/2020    HGB 11.8 04/30/2020    HCT 37.4 04/30/2020    MCV 76 (L) 04/30/2020    MCH 24.0 (L) 04/30/2020    MCHC 31.6 04/30/2020    RDW 17.4 (H) 04/30/2020     04/30/2020       BMP RESULTS:  Lab Results   Component Value Date     04/30/2020    POTASSIUM 4.2 04/30/2020    CHLORIDE 105 04/30/2020    CO2 25 04/30/2020    ANIONGAP 5 04/30/2020     (H) 04/30/2020    BUN 21 04/30/2020    CR 0.94 04/30/2020    GFRESTIMATED 53 (L) 04/30/2020    GFRESTBLACK 62 04/30/2020    SHAWNEE 9.0 04/30/2020        A1C RESULTS:  Lab Results   Component Value Date    A1C 6.8 (H) 12/16/2019       INR RESULTS:  Lab Results   Component Value Date    INR 2.31 (H) 04/30/2020    INR 2.4 (A) 03/19/2020    INR 2.0 (A) 02/06/2020    INR 1.29 (H) 01/27/2020

## 2020-05-01 NOTE — NURSING NOTE
TAVR COORDINATOR VISIT:  Met with patient after visit with Mehnaz RODRIGUEZ. Pt is feeling well, ready for TAVR. TAVR education provided as well as day-of procedure information. Pt has been holding her Coumadin, last dose 4/29/20. Pt will hold her metformin the day of TAVR. She will take her aspirin, levothyroxine and omeprazole Tuesday morning. Discussed consent including emergent open heart bypass, she states she would think about this over the weekend and let me know on Tuesday. Hibiclens soap provided as well as instructions for use. Family contact information verified for day of TAVR.     5m walk: 7 sec    [facility] Results:   1a. 3  1b. 1  1c. 6  2. 5  3. 2  4. 2  5. 5  6. 2  7. 2  8a. 2  8b. 2  8c. 2    Kim Wong RN

## 2020-05-04 RX ORDER — WARFARIN SODIUM 3 MG/1
3-4.5 TABLET ORAL DAILY
Status: ON HOLD | COMMUNITY
End: 2020-08-01

## 2020-05-04 NOTE — PROGRESS NOTES
PTA medications updated by Medication Scribe day before surgery via phone call with patient      -LAST DOSES ENTERED BY NURSE-    Medication history sources: Patient, Surescripts and H&P  Medication history source reliability: Good  Adherence assessment: N/A Not Observed    Significant changes made to the medication list:  None      Additional medication history information:   None        Prior to Admission medications    Medication Sig Last Dose Taking? Auth Provider   acetaminophen (TYLENOL ARTHRITIS PAIN) 650 MG CR tablet Take 2 tablets (1,300 mg) by mouth 2 times daily  Yes De Obregon MD   aspirin EC 81 MG EC tablet Take 1 tablet by mouth daily.  at AM Yes Hunter Mantilla MD   atorvastatin (LIPITOR) 80 MG tablet TAKE ONE TABLET BY MOUTH ONCE DAILY  at PM Yes De Obregon MD   calcium 600 MG tablet Take 1 tablet by mouth 2 times daily.  Yes De Obregon MD   Cholecalciferol (VITAMIN D-400 PO) Take 1 tablet by mouth daily   at AM Yes Reported, Patient   co-enzyme Q-10 (COENZYME Q-10) 100 MG CAPS Take 1 capsule by mouth daily.  at PM Yes Reported, Patient   gabapentin (NEURONTIN) 100 MG capsule TAKE ONE CAPSULE BY MOUTH THREE TIMES A DAY  Patient taking differently: Take 300 mg by mouth At Bedtime (Takes 3 x 100mg)  at PM Yes De Obregon MD   hydrocortisone, Perianal, (ANUSOL-HC) 2.5 % cream Apply twice daily to hemorrhoid.  Yes Em Thompson MD   levothyroxine (SYNTHROID/LEVOTHROID) 50 MCG tablet Take 1 tablet (50 mcg) by mouth daily  at AM Yes De Obregon MD   losartan (COZAAR) 100 MG tablet Take 1 tablet (100 mg) by mouth daily  at PM Yes De Obregon MD   metFORMIN (GLUCOPHAGE) 500 MG tablet TAKE ONE TABLET BY MOUTH DAILY WITH BREAKFAST  at AM Yes De Obregon MD   Multiple Vitamins-Minerals (CENTRUM SILVER) per tablet Take 1 tablet by mouth daily.  at PM Yes De Obregon MD   Multiple Vitamins-Minerals (PRESERVISION AREDS 2 PO) Take 1 tablet by  mouth 2 times daily   Yes Reported, Patient   nitroGLYCERIN (NITROSTAT) 0.4 MG SL tablet Place 0.4 mg under the tongue every 5 minutes as needed. Up to 3 doses per episode  more than a month at PRN Yes Unknown, Entered By History   omeprazole (PRILOSEC) 20 MG DR capsule Take 1 capsule (20 mg) by mouth daily  at AM Yes De Obregon MD   polyethylene glycol (MIRALAX) powder Take 17 g (1 capful) by mouth daily  at PM Yes Anna Burger PA-C   Psyllium (FIBER) 0.52 g CAPS Take 1 capsule by mouth daily  at PM Yes Anna Burger PA-C   warfarin ANTICOAGULANT (COUMADIN) 3 MG tablet Take 3-4.5 mg by mouth daily Takes  4.5mg on Tuesday, Thursday, Saturday  3mg all other days (Mon,Wed,Fri,Sun)  Yes Reported, Patient   HYDROcodone-acetaminophen (NORCO) 5-325 MG tablet Take 0.5-1 tablets by mouth every 6 hours as needed for moderate to severe pain  at PRN  De Obregon MD

## 2020-05-05 ENCOUNTER — ANESTHESIA (OUTPATIENT)
Dept: CARDIOLOGY | Facility: CLINIC | Age: 85
DRG: 267 | End: 2020-05-05
Payer: COMMERCIAL

## 2020-05-05 ENCOUNTER — HOSPITAL ENCOUNTER (OUTPATIENT)
Dept: CARDIOLOGY | Facility: CLINIC | Age: 85
DRG: 267 | End: 2020-05-05
Attending: INTERNAL MEDICINE | Admitting: INTERNAL MEDICINE
Payer: COMMERCIAL

## 2020-05-05 ENCOUNTER — HOSPITAL ENCOUNTER (INPATIENT)
Facility: CLINIC | Age: 85
LOS: 1 days | Discharge: HOME | DRG: 267 | End: 2020-05-06
Attending: INTERNAL MEDICINE | Admitting: INTERNAL MEDICINE
Payer: COMMERCIAL

## 2020-05-05 ENCOUNTER — ANESTHESIA EVENT (OUTPATIENT)
Dept: CARDIOLOGY | Facility: CLINIC | Age: 85
DRG: 267 | End: 2020-05-05
Payer: COMMERCIAL

## 2020-05-05 ENCOUNTER — SURGERY (OUTPATIENT)
Age: 85
End: 2020-05-05
Payer: COMMERCIAL

## 2020-05-05 DIAGNOSIS — Z95.2 S/P TAVR (TRANSCATHETER AORTIC VALVE REPLACEMENT): Primary | ICD-10-CM

## 2020-05-05 DIAGNOSIS — I35.0 NONRHEUMATIC AORTIC VALVE STENOSIS: Primary | ICD-10-CM

## 2020-05-05 DIAGNOSIS — I10 ESSENTIAL HYPERTENSION: ICD-10-CM

## 2020-05-05 DIAGNOSIS — I35.0 AORTIC STENOSIS, SEVERE: ICD-10-CM

## 2020-05-05 DIAGNOSIS — I10 BENIGN ESSENTIAL HYPERTENSION: ICD-10-CM

## 2020-05-05 DIAGNOSIS — I35.0 SEVERE AORTIC STENOSIS: ICD-10-CM

## 2020-05-05 DIAGNOSIS — I35.0 NONRHEUMATIC AORTIC VALVE STENOSIS: ICD-10-CM

## 2020-05-05 DIAGNOSIS — E11.21 TYPE 2 DIABETES MELLITUS WITH DIABETIC NEPHROPATHY, WITHOUT LONG-TERM CURRENT USE OF INSULIN (H): ICD-10-CM

## 2020-05-05 LAB
ABO + RH BLD: NORMAL
ABO + RH BLD: NORMAL
ALBUMIN SERPL-MCNC: 2.9 G/DL (ref 3.4–5)
ALP SERPL-CCNC: 105 U/L (ref 40–150)
ALT SERPL W P-5'-P-CCNC: 22 U/L (ref 0–50)
ANION GAP SERPL CALCULATED.3IONS-SCNC: 6 MMOL/L (ref 3–14)
AST SERPL W P-5'-P-CCNC: 19 U/L (ref 0–45)
BILIRUB SERPL-MCNC: 0.4 MG/DL (ref 0.2–1.3)
BLD GP AB SCN SERPL QL: NORMAL
BLD PROD TYP BPU: NORMAL
BLD UNIT ID BPU: 0
BLD UNIT ID BPU: 0
BLOOD BANK CMNT PATIENT-IMP: NORMAL
BLOOD BANK CMNT PATIENT-IMP: NORMAL
BLOOD PRODUCT CODE: NORMAL
BLOOD PRODUCT CODE: NORMAL
BPU ID: NORMAL
BPU ID: NORMAL
BUN SERPL-MCNC: 21 MG/DL (ref 7–30)
CALCIUM SERPL-MCNC: 8.2 MG/DL (ref 8.5–10.1)
CHLORIDE SERPL-SCNC: 107 MMOL/L (ref 94–109)
CO2 SERPL-SCNC: 23 MMOL/L (ref 20–32)
CREAT SERPL-MCNC: 0.86 MG/DL (ref 0.52–1.04)
ERYTHROCYTE [DISTWIDTH] IN BLOOD BY AUTOMATED COUNT: 17.3 % (ref 10–15)
GFR SERPL CREATININE-BSD FRML MDRD: 59 ML/MIN/{1.73_M2}
GLUCOSE BLDC GLUCOMTR-MCNC: 105 MG/DL (ref 70–99)
GLUCOSE BLDC GLUCOMTR-MCNC: 105 MG/DL (ref 70–99)
GLUCOSE BLDC GLUCOMTR-MCNC: 118 MG/DL (ref 70–99)
GLUCOSE SERPL-MCNC: 137 MG/DL (ref 70–99)
GLUCOSE SERPL-MCNC: 140 MG/DL (ref 70–99)
HBA1C MFR BLD: 6.9 % (ref 0–5.6)
HCT VFR BLD AUTO: 31.8 % (ref 35–47)
HGB BLD-MCNC: 10.1 G/DL (ref 11.7–15.7)
HGB BLD-MCNC: 11.8 G/DL (ref 11.7–15.7)
INR PPP: 1.02 (ref 0.86–1.14)
KCT BLD-ACNC: 102 SEC (ref 75–150)
KCT BLD-ACNC: 267 SEC (ref 75–150)
KCT BLD-ACNC: 292 SEC (ref 75–150)
MAGNESIUM SERPL-MCNC: 2.1 MG/DL (ref 1.6–2.3)
MCH RBC QN AUTO: 24 PG (ref 26.5–33)
MCHC RBC AUTO-ENTMCNC: 31.8 G/DL (ref 31.5–36.5)
MCV RBC AUTO: 76 FL (ref 78–100)
NUM BPU REQUESTED: 2
PHOSPHATE SERPL-MCNC: 3.7 MG/DL (ref 2.5–4.5)
PLATELET # BLD AUTO: 201 10E9/L (ref 150–450)
POTASSIUM SERPL-SCNC: 4.1 MMOL/L (ref 3.4–5.3)
POTASSIUM SERPL-SCNC: 4.7 MMOL/L (ref 3.4–5.3)
PROT SERPL-MCNC: 6.1 G/DL (ref 6.8–8.8)
RBC # BLD AUTO: 4.2 10E12/L (ref 3.8–5.2)
SODIUM SERPL-SCNC: 136 MMOL/L (ref 133–144)
SPECIMEN EXP DATE BLD: NORMAL
TRANSFUSION STATUS PATIENT QL: NORMAL
WBC # BLD AUTO: 7.2 10E9/L (ref 4–11)

## 2020-05-05 PROCEDURE — 25800030 ZZH RX IP 258 OP 636: Performed by: INTERNAL MEDICINE

## 2020-05-05 PROCEDURE — 93010 ELECTROCARDIOGRAM REPORT: CPT | Mod: 76 | Performed by: INTERNAL MEDICINE

## 2020-05-05 PROCEDURE — 83735 ASSAY OF MAGNESIUM: CPT | Performed by: STUDENT IN AN ORGANIZED HEALTH CARE EDUCATION/TRAINING PROGRAM

## 2020-05-05 PROCEDURE — 85610 PROTHROMBIN TIME: CPT | Performed by: ANESTHESIOLOGY

## 2020-05-05 PROCEDURE — 93306 TTE W/DOPPLER COMPLETE: CPT

## 2020-05-05 PROCEDURE — 36415 COLL VENOUS BLD VENIPUNCTURE: CPT | Performed by: PHYSICIAN ASSISTANT

## 2020-05-05 PROCEDURE — C1730 CATH, EP, 19 OR FEW ELECT: HCPCS | Performed by: INTERNAL MEDICINE

## 2020-05-05 PROCEDURE — 00000146 ZZHCL STATISTIC GLUCOSE BY METER IP

## 2020-05-05 PROCEDURE — 80053 COMPREHEN METABOLIC PANEL: CPT | Performed by: STUDENT IN AN ORGANIZED HEALTH CARE EDUCATION/TRAINING PROGRAM

## 2020-05-05 PROCEDURE — 27210749 ZZH CATH CR2: Performed by: INTERNAL MEDICINE

## 2020-05-05 PROCEDURE — C1894 INTRO/SHEATH, NON-LASER: HCPCS | Performed by: INTERNAL MEDICINE

## 2020-05-05 PROCEDURE — 25000125 ZZHC RX 250: Performed by: INTERNAL MEDICINE

## 2020-05-05 PROCEDURE — 25000128 H RX IP 250 OP 636: Performed by: NURSE ANESTHETIST, CERTIFIED REGISTERED

## 2020-05-05 PROCEDURE — C1760 CLOSURE DEV, VASC: HCPCS | Performed by: INTERNAL MEDICINE

## 2020-05-05 PROCEDURE — 85027 COMPLETE CBC AUTOMATED: CPT | Performed by: STUDENT IN AN ORGANIZED HEALTH CARE EDUCATION/TRAINING PROGRAM

## 2020-05-05 PROCEDURE — 84132 ASSAY OF SERUM POTASSIUM: CPT | Performed by: ANESTHESIOLOGY

## 2020-05-05 PROCEDURE — 37000009 ZZH ANESTHESIA TECHNICAL FEE, EACH ADDTL 15 MIN: Performed by: INTERNAL MEDICINE

## 2020-05-05 PROCEDURE — 25000132 ZZH RX MED GY IP 250 OP 250 PS 637: Performed by: PHYSICIAN ASSISTANT

## 2020-05-05 PROCEDURE — 33361 REPLACE AORTIC VALVE PERQ: CPT | Mod: 62 | Performed by: INTERNAL MEDICINE

## 2020-05-05 PROCEDURE — 02RF38Z REPLACEMENT OF AORTIC VALVE WITH ZOOPLASTIC TISSUE, PERCUTANEOUS APPROACH: ICD-10-PCS | Performed by: INTERNAL MEDICINE

## 2020-05-05 PROCEDURE — 25000132 ZZH RX MED GY IP 250 OP 250 PS 637: Performed by: STUDENT IN AN ORGANIZED HEALTH CARE EDUCATION/TRAINING PROGRAM

## 2020-05-05 PROCEDURE — 99223 1ST HOSP IP/OBS HIGH 75: CPT | Mod: AI | Performed by: PHYSICIAN ASSISTANT

## 2020-05-05 PROCEDURE — C1769 GUIDE WIRE: HCPCS | Performed by: INTERNAL MEDICINE

## 2020-05-05 PROCEDURE — 83036 HEMOGLOBIN GLYCOSYLATED A1C: CPT | Performed by: PHYSICIAN ASSISTANT

## 2020-05-05 PROCEDURE — 21000000 ZZH R&B IMCU HEART CARE

## 2020-05-05 PROCEDURE — 85347 COAGULATION TIME ACTIVATED: CPT

## 2020-05-05 PROCEDURE — 25000125 ZZHC RX 250: Performed by: ANESTHESIOLOGY

## 2020-05-05 PROCEDURE — 36415 COLL VENOUS BLD VENIPUNCTURE: CPT | Performed by: ANESTHESIOLOGY

## 2020-05-05 PROCEDURE — 93005 ELECTROCARDIOGRAM TRACING: CPT

## 2020-05-05 PROCEDURE — 36415 COLL VENOUS BLD VENIPUNCTURE: CPT | Performed by: STUDENT IN AN ORGANIZED HEALTH CARE EDUCATION/TRAINING PROGRAM

## 2020-05-05 PROCEDURE — 85018 HEMOGLOBIN: CPT | Performed by: ANESTHESIOLOGY

## 2020-05-05 PROCEDURE — 27210794 ZZH OR GENERAL SUPPLY STERILE: Performed by: INTERNAL MEDICINE

## 2020-05-05 PROCEDURE — 82947 ASSAY GLUCOSE BLOOD QUANT: CPT | Performed by: ANESTHESIOLOGY

## 2020-05-05 PROCEDURE — 25800030 ZZH RX IP 258 OP 636: Performed by: NURSE ANESTHETIST, CERTIFIED REGISTERED

## 2020-05-05 PROCEDURE — 25000125 ZZHC RX 250: Performed by: NURSE ANESTHETIST, CERTIFIED REGISTERED

## 2020-05-05 PROCEDURE — 93306 TTE W/DOPPLER COMPLETE: CPT | Mod: 26 | Performed by: INTERNAL MEDICINE

## 2020-05-05 PROCEDURE — 84100 ASSAY OF PHOSPHORUS: CPT | Performed by: STUDENT IN AN ORGANIZED HEALTH CARE EDUCATION/TRAINING PROGRAM

## 2020-05-05 PROCEDURE — 33361 REPLACE AORTIC VALVE PERQ: CPT | Mod: Q0 | Performed by: INTERNAL MEDICINE

## 2020-05-05 PROCEDURE — 27810348 ZZH KIT EDWARDS SAPIEN VALVE/RETRO DELIVERY SYSTEM: Performed by: INTERNAL MEDICINE

## 2020-05-05 PROCEDURE — 37000008 ZZH ANESTHESIA TECHNICAL FEE, 1ST 30 MIN: Performed by: INTERNAL MEDICINE

## 2020-05-05 PROCEDURE — 25800030 ZZH RX IP 258 OP 636: Performed by: STUDENT IN AN ORGANIZED HEALTH CARE EDUCATION/TRAINING PROGRAM

## 2020-05-05 DEVICE — IMPLANTABLE DEVICE: Type: IMPLANTABLE DEVICE | Status: FUNCTIONAL

## 2020-05-05 RX ORDER — SODIUM CHLORIDE 9 MG/ML
INJECTION, SOLUTION INTRAVENOUS CONTINUOUS
Status: DISCONTINUED | OUTPATIENT
Start: 2020-05-05 | End: 2020-05-05 | Stop reason: HOSPADM

## 2020-05-05 RX ORDER — NALOXONE HYDROCHLORIDE 0.4 MG/ML
.1-.4 INJECTION, SOLUTION INTRAMUSCULAR; INTRAVENOUS; SUBCUTANEOUS
Status: DISCONTINUED | OUTPATIENT
Start: 2020-05-05 | End: 2020-05-05

## 2020-05-05 RX ORDER — SODIUM CHLORIDE, SODIUM LACTATE, POTASSIUM CHLORIDE, CALCIUM CHLORIDE 600; 310; 30; 20 MG/100ML; MG/100ML; MG/100ML; MG/100ML
INJECTION, SOLUTION INTRAVENOUS CONTINUOUS PRN
Status: DISCONTINUED | OUTPATIENT
Start: 2020-05-05 | End: 2020-05-05

## 2020-05-05 RX ORDER — HYDRALAZINE HYDROCHLORIDE 20 MG/ML
10 INJECTION INTRAMUSCULAR; INTRAVENOUS
Status: DISCONTINUED | OUTPATIENT
Start: 2020-05-05 | End: 2020-05-06 | Stop reason: HOSPADM

## 2020-05-05 RX ORDER — FENTANYL CITRATE 50 UG/ML
INJECTION, SOLUTION INTRAMUSCULAR; INTRAVENOUS PRN
Status: DISCONTINUED | OUTPATIENT
Start: 2020-05-05 | End: 2020-05-05

## 2020-05-05 RX ORDER — CLOPIDOGREL BISULFATE 75 MG/1
75 TABLET ORAL DAILY
Status: DISCONTINUED | OUTPATIENT
Start: 2020-05-06 | End: 2020-05-06 | Stop reason: HOSPADM

## 2020-05-05 RX ORDER — ACETAMINOPHEN 325 MG/1
325-650 TABLET ORAL EVERY 4 HOURS PRN
Status: DISCONTINUED | OUTPATIENT
Start: 2020-05-05 | End: 2020-05-06 | Stop reason: HOSPADM

## 2020-05-05 RX ORDER — GABAPENTIN 300 MG/1
300 CAPSULE ORAL AT BEDTIME
Status: DISCONTINUED | OUTPATIENT
Start: 2020-05-05 | End: 2020-05-06 | Stop reason: HOSPADM

## 2020-05-05 RX ORDER — ATORVASTATIN CALCIUM 80 MG/1
80 TABLET, FILM COATED ORAL DAILY
Status: DISCONTINUED | OUTPATIENT
Start: 2020-05-06 | End: 2020-05-06 | Stop reason: HOSPADM

## 2020-05-05 RX ORDER — SODIUM CHLORIDE 9 MG/ML
INJECTION, SOLUTION INTRAVENOUS CONTINUOUS
Status: ACTIVE | OUTPATIENT
Start: 2020-05-05 | End: 2020-05-05

## 2020-05-05 RX ORDER — ASPIRIN 81 MG/1
81 TABLET ORAL DAILY
Status: DISCONTINUED | OUTPATIENT
Start: 2020-05-06 | End: 2020-05-06 | Stop reason: HOSPADM

## 2020-05-05 RX ORDER — CLINDAMYCIN PHOSPHATE 900 MG/50ML
900 INJECTION, SOLUTION INTRAVENOUS
Status: COMPLETED | OUTPATIENT
Start: 2020-05-05 | End: 2020-05-05

## 2020-05-05 RX ORDER — ONDANSETRON 4 MG/1
4 TABLET, ORALLY DISINTEGRATING ORAL EVERY 6 HOURS PRN
Status: DISCONTINUED | OUTPATIENT
Start: 2020-05-05 | End: 2020-05-06 | Stop reason: HOSPADM

## 2020-05-05 RX ORDER — SODIUM CHLORIDE, SODIUM LACTATE, POTASSIUM CHLORIDE, CALCIUM CHLORIDE 600; 310; 30; 20 MG/100ML; MG/100ML; MG/100ML; MG/100ML
INJECTION, SOLUTION INTRAVENOUS CONTINUOUS
Status: DISCONTINUED | OUTPATIENT
Start: 2020-05-05 | End: 2020-05-05

## 2020-05-05 RX ORDER — ASPIRIN 81 MG/1
81 TABLET ORAL DAILY
Status: DISCONTINUED | OUTPATIENT
Start: 2020-05-05 | End: 2020-05-05 | Stop reason: HOSPADM

## 2020-05-05 RX ORDER — ONDANSETRON 4 MG/1
4 TABLET, ORALLY DISINTEGRATING ORAL EVERY 30 MIN PRN
Status: DISCONTINUED | OUTPATIENT
Start: 2020-05-05 | End: 2020-05-05

## 2020-05-05 RX ORDER — HYDROMORPHONE HYDROCHLORIDE 1 MG/ML
0.1 INJECTION, SOLUTION INTRAMUSCULAR; INTRAVENOUS; SUBCUTANEOUS
Status: DISCONTINUED | OUTPATIENT
Start: 2020-05-05 | End: 2020-05-06 | Stop reason: HOSPADM

## 2020-05-05 RX ORDER — HEPARIN SODIUM 1000 [USP'U]/ML
INJECTION, SOLUTION INTRAVENOUS; SUBCUTANEOUS PRN
Status: DISCONTINUED | OUTPATIENT
Start: 2020-05-05 | End: 2020-05-05

## 2020-05-05 RX ORDER — POLYETHYLENE GLYCOL 3350 17 G/17G
17 POWDER, FOR SOLUTION ORAL EVERY EVENING
Status: DISCONTINUED | OUTPATIENT
Start: 2020-05-05 | End: 2020-05-06 | Stop reason: HOSPADM

## 2020-05-05 RX ORDER — POLYETHYLENE GLYCOL 3350 17 G/17G
17 POWDER, FOR SOLUTION ORAL EVERY EVENING
Status: DISCONTINUED | OUTPATIENT
Start: 2020-05-06 | End: 2020-05-05

## 2020-05-05 RX ORDER — NALOXONE HYDROCHLORIDE 0.4 MG/ML
.1-.4 INJECTION, SOLUTION INTRAMUSCULAR; INTRAVENOUS; SUBCUTANEOUS
Status: DISCONTINUED | OUTPATIENT
Start: 2020-05-05 | End: 2020-05-06 | Stop reason: HOSPADM

## 2020-05-05 RX ORDER — LIDOCAINE 40 MG/G
CREAM TOPICAL
Status: DISCONTINUED | OUTPATIENT
Start: 2020-05-05 | End: 2020-05-05 | Stop reason: HOSPADM

## 2020-05-05 RX ORDER — FENTANYL CITRATE 50 UG/ML
25-50 INJECTION, SOLUTION INTRAMUSCULAR; INTRAVENOUS
Status: DISCONTINUED | OUTPATIENT
Start: 2020-05-05 | End: 2020-05-06 | Stop reason: HOSPADM

## 2020-05-05 RX ORDER — LABETALOL 20 MG/4 ML (5 MG/ML) INTRAVENOUS SYRINGE
10
Status: DISCONTINUED | OUTPATIENT
Start: 2020-05-05 | End: 2020-05-05

## 2020-05-05 RX ORDER — HYDROMORPHONE HYDROCHLORIDE 1 MG/ML
.3-.5 INJECTION, SOLUTION INTRAMUSCULAR; INTRAVENOUS; SUBCUTANEOUS EVERY 5 MIN PRN
Status: DISCONTINUED | OUTPATIENT
Start: 2020-05-05 | End: 2020-05-05

## 2020-05-05 RX ORDER — NICOTINE POLACRILEX 4 MG
15-30 LOZENGE BUCCAL
Status: DISCONTINUED | OUTPATIENT
Start: 2020-05-05 | End: 2020-05-06 | Stop reason: HOSPADM

## 2020-05-05 RX ORDER — ONDANSETRON 2 MG/ML
INJECTION INTRAMUSCULAR; INTRAVENOUS PRN
Status: DISCONTINUED | OUTPATIENT
Start: 2020-05-05 | End: 2020-05-05

## 2020-05-05 RX ORDER — ONDANSETRON 2 MG/ML
4 INJECTION INTRAMUSCULAR; INTRAVENOUS EVERY 30 MIN PRN
Status: DISCONTINUED | OUTPATIENT
Start: 2020-05-05 | End: 2020-05-05

## 2020-05-05 RX ORDER — NITROGLYCERIN 0.4 MG/1
0.4 TABLET SUBLINGUAL EVERY 5 MIN PRN
Status: DISCONTINUED | OUTPATIENT
Start: 2020-05-05 | End: 2020-05-05

## 2020-05-05 RX ORDER — DEXTROSE MONOHYDRATE 25 G/50ML
25-50 INJECTION, SOLUTION INTRAVENOUS
Status: DISCONTINUED | OUTPATIENT
Start: 2020-05-05 | End: 2020-05-06 | Stop reason: HOSPADM

## 2020-05-05 RX ORDER — LEVOTHYROXINE SODIUM 50 UG/1
50 TABLET ORAL
Status: DISCONTINUED | OUTPATIENT
Start: 2020-05-06 | End: 2020-05-06 | Stop reason: HOSPADM

## 2020-05-05 RX ORDER — NITROGLYCERIN 0.4 MG/1
0.4 TABLET SUBLINGUAL EVERY 5 MIN PRN
Status: DISCONTINUED | OUTPATIENT
Start: 2020-05-05 | End: 2020-05-06 | Stop reason: HOSPADM

## 2020-05-05 RX ORDER — ONDANSETRON 2 MG/ML
4 INJECTION INTRAMUSCULAR; INTRAVENOUS EVERY 6 HOURS PRN
Status: DISCONTINUED | OUTPATIENT
Start: 2020-05-05 | End: 2020-05-06 | Stop reason: HOSPADM

## 2020-05-05 RX ORDER — HYDRALAZINE HYDROCHLORIDE 20 MG/ML
2.5-5 INJECTION INTRAMUSCULAR; INTRAVENOUS EVERY 10 MIN PRN
Status: DISCONTINUED | OUTPATIENT
Start: 2020-05-05 | End: 2020-05-05

## 2020-05-05 RX ORDER — PROTAMINE SULFATE 10 MG/ML
INJECTION, SOLUTION INTRAVENOUS PRN
Status: DISCONTINUED | OUTPATIENT
Start: 2020-05-05 | End: 2020-05-05

## 2020-05-05 RX ORDER — CLOPIDOGREL 300 MG/1
300 TABLET, FILM COATED ORAL ONCE
Status: COMPLETED | OUTPATIENT
Start: 2020-05-05 | End: 2020-05-05

## 2020-05-05 RX ORDER — NITROGLYCERIN 10 MG/100ML
INJECTION INTRAVENOUS PRN
Status: DISCONTINUED | OUTPATIENT
Start: 2020-05-05 | End: 2020-05-05

## 2020-05-05 RX ADMIN — FENTANYL CITRATE 25 MCG: 50 INJECTION, SOLUTION INTRAMUSCULAR; INTRAVENOUS at 08:34

## 2020-05-05 RX ADMIN — DEXMEDETOMIDINE HYDROCHLORIDE 20 MCG: 100 INJECTION, SOLUTION INTRAVENOUS at 07:36

## 2020-05-05 RX ADMIN — POLYETHYLENE GLYCOL 3350 17 G: 17 POWDER, FOR SOLUTION ORAL at 20:23

## 2020-05-05 RX ADMIN — CLOPIDOGREL BISULFATE 300 MG: 300 TABLET, FILM COATED ORAL at 15:55

## 2020-05-05 RX ADMIN — GABAPENTIN 300 MG: 300 CAPSULE ORAL at 20:23

## 2020-05-05 RX ADMIN — PROTAMINE SULFATE 100 MG: 10 INJECTION, SOLUTION INTRAVENOUS at 08:43

## 2020-05-05 RX ADMIN — HEPARIN SODIUM 11000 UNITS: 1000 INJECTION INTRAVENOUS; SUBCUTANEOUS at 08:20

## 2020-05-05 RX ADMIN — DEXMEDETOMIDINE HYDROCHLORIDE 0.7 MCG/KG/HR: 100 INJECTION, SOLUTION INTRAVENOUS at 07:36

## 2020-05-05 RX ADMIN — FENTANYL CITRATE 25 MCG: 50 INJECTION, SOLUTION INTRAMUSCULAR; INTRAVENOUS at 07:51

## 2020-05-05 RX ADMIN — ACETAMINOPHEN 650 MG: 325 TABLET, FILM COATED ORAL at 13:40

## 2020-05-05 RX ADMIN — ONDANSETRON 4 MG: 2 INJECTION INTRAMUSCULAR; INTRAVENOUS at 07:36

## 2020-05-05 RX ADMIN — SODIUM CHLORIDE, SODIUM LACTATE, POTASSIUM CHLORIDE, CALCIUM CHLORIDE: 600; 310; 30; 20 INJECTION, SOLUTION INTRAVENOUS at 08:16

## 2020-05-05 RX ADMIN — CLINDAMYCIN PHOSPHATE 900 MG: 900 INJECTION, SOLUTION INTRAVENOUS at 07:47

## 2020-05-05 RX ADMIN — LIDOCAINE HYDROCHLORIDE 0.3 ML: 10 INJECTION, SOLUTION EPIDURAL; INFILTRATION; INTRACAUDAL; PERINEURAL at 06:45

## 2020-05-05 RX ADMIN — HEPARIN SODIUM 3000 UNITS: 1000 INJECTION INTRAVENOUS; SUBCUTANEOUS at 08:31

## 2020-05-05 RX ADMIN — SODIUM CHLORIDE: 9 INJECTION, SOLUTION INTRAVENOUS at 06:45

## 2020-05-05 RX ADMIN — SODIUM CHLORIDE, PRESERVATIVE FREE: 5 INJECTION INTRAVENOUS at 12:36

## 2020-05-05 RX ADMIN — FENTANYL CITRATE 50 MCG: 50 INJECTION, SOLUTION INTRAMUSCULAR; INTRAVENOUS at 07:35

## 2020-05-05 RX ADMIN — NITROGLYCERIN 10 MCG: 10 INJECTION INTRAVENOUS at 08:36

## 2020-05-05 ASSESSMENT — ENCOUNTER SYMPTOMS: DYSRHYTHMIAS: 1

## 2020-05-05 ASSESSMENT — MIFFLIN-ST. JEOR: SCORE: 1140.28

## 2020-05-05 NOTE — OR NURSING
CAUTERY PAD PLACED TO LEFT POSTERIOR THIGH, CAUTERY SETTINGS, CUT:30 AND COA.  INITIAL SOFT COUNT DONE PER WES DIA RN AND ST PATRICIA. FINAL SOFT COUNT NOT PERFORMED DUE TO PROCEDURE REMAINED PERCUTANEOUS, DR. PALMER AWARE.  PATIENT PREPPED FROM CHIN TO KNEES WITH CHLORAPREP PER WES MATSON RN AND JOSÉ MIGUEL BAUTISTA RN.

## 2020-05-05 NOTE — PROVIDER NOTIFICATION
MD Notification    Notified Person Name: Dr. Johansen    Notification Date/Time: May 5, 2020 @1219    Notification Interaction: text page    Purpose of Notification: Post-TAVR IVF ordered NS @ 100mL/hr. How long do you want this to run? Thanks!    Orders Received: Continue until 1800

## 2020-05-05 NOTE — H&P
[facility]    History and Physical - Hospitalist Service       Date of Admission:  5/5/2020    Assessment & Plan   Madie Silva is a 89 year old female with PMHx of severe aortic stenosis, CAD s/p 4v CABG, hypertension, dyslipidemia, symptomatic bradycardia s/p PPM placement, and PAF on chronic anticoagulation with coumadin who was admitted on 5/5/2020 for TAVR. Hospitalist service was contacted for admission.     Severe aortic stenosis s/p TAVR (5/5/2020): Followed by both Dr. Red and Dr. Arcos outpatient. Worsening symptoms of SOB since 10/2019. Repeat echocardiogram in 1/2020 showed severe aortic valve stenosis with valve area of 0.8 and was interested in pursuing TAVR.   -- Cardiology consulted, appreciate recommendations   -- Plan for Plavix load with 300 mg followed by 75 mg po every day X6 months, resumption of PTA coumadin on 5/6/2020  -- Telemetry   -- Note unsuccessful closure of right femoral arteriotomy due to suture failure with need for femstop placement, bedrest X6 hours   -- Echocardiogram     CAD s/p 4v CABG (2011)  Hypertension, dyslipidemia: LIMA-LAD, SVG-ramus, SVG-OM and SVG-PDA. Most recent angiogram with  of SVG to ramus, remainder of grafts patent.   -- Continue Atorvastatin 80 mg po every day  -- Hold Losartan 100 mg po every day, resume in the AM pending BPs and renal function     Paroxysmal atrial fibrillation, rate controlled  Chronic anticoagulation use  Symptomatic bradycardia s/p PPM placement:   -- Per Cardiology, plan to resume coumadin 5/6/2020   -- Not on rate controlling medications     Walters's esophagus  GERD  Hiatal hernia  Esophageal stricture s/p dilatation: Continue Prilosec 20 mg po every day     T2DM, non-insulin dependent, controlled  Peripheral neuropathy: A1C 6.8 in 12/2019.   -- Hold PTA Metformin  -- Continue medium sliding scale insulin  -- BS per protocol   -- Monitor for hypoglycemia   -- Continue Gabapentin 300 mg at bedtime      Hypothyroidism: Continue Synthroid 30 mcg po every day      Diet: NPO for Medical/Clinical Reasons Except for: Other; Specify: NPO after midnight OR 8 hours prior to the cath lab procedure.    DVT Prophylaxis: Warfarin and Pneumatic Compression Devices  Hendrix Catheter: not present  Code Status: Full Code     Disposition Plan   Expected discharge: 1-2, recommended to per cardiac rehab eval once post-TAVR monitoring complete.  Entered: Raeann Archer PA-C 05/05/2020, 10:09 AM     The patient's care was discussed with the Attending Physician, Dr. Vergara, Bedside Nurse and Patient.    Raeann Archer PA-C  [facility]  ______________________________________________________________________    Chief Complaint   Symptomatic aortic stenosis    History is obtained from the patient    History of Present Illness   Madie Silva is a 89 year old female with PMHx of severe aortic stenosis, CAD s/p 4v CABG, hypertension, dyslipidemia, symptomatic bradycardia s/p PPM placement, and PAF on chronic anticoagulation with coumadin who was admitted on 5/5/2020 for TAVR. Hospitalist service was contacted for admission.     Pt follows with Dr. Arcos of Cardiology. Was seen in 10/2019 for severe symptomatic aortic valve stenosis with significant SOB. TAVR recommended at that time, however pt declined. Since that time, she has had progressive ZHAO. Repeat echocardiogram in 1/2020 showed severe aortic valve stenosis with valve area of 0.8 and was interested in pursuing TAVR.     Pt underwent uncomplicated TAVR on 5/5/2020. Seen in the post-operative period. Resting comfortably in bed. Describes mild shortness of breath. Denies chest pain, palpitations, dizziness, lightheadedness. No recent illness, nausea, vomiting. Urinating well. Concerned about constipation. No recent medication changes. No acute concerns.     Review of Systems    The 10 point Review of Systems is negative other than  noted in the HPI.    Past Medical History    I have reviewed this patient's medical history and updated it with pertinent information if needed.   Past Medical History:   Diagnosis Date     Awlters's esophagus 7/09     Bradycardia     post-op CABG 2011 - s/p pacemaker implanted 2011     CHRONIC VERTIGO 9/99     Colonic Adenoma 3/90, 11/98     Coronary artery disease     CABG x4 2011: LIMA to her LAD, saphenous vein graft to her ramus, saphenous vein graft to her OM, saphenous vein graft to her PDA.     Costochondritis      Depression      DIABETES MELLITUS TYPE II 10/07     DJD      DVT of Leg, postop 11/09    6 months of warfarin     Gastritis 10/89     GERD      HIATAL HERNIA      HYPERLIPIDEMIA      HYPOTHYROIDISM (aka HASHIMOTO)      Impaired fasting glucose      L Ankle Fracture 10/09     Obesity, unspecified      Osteoporosis, unspecified      PERIPHERAL NEUROPATHY      Polymyalgia rheumatica (H)      Post Herpetic Neuralgia 4/03    R lumbar distribution     Restless leg syndrome      Small vessel cerebrovascular changes 9/99     Stricture and stenosis of esophagus 10/89    Dilated     Syncope     1/06, 8/08, 2/10     VITAMIN D DEFICIENCY 12/07    per Dr. Petty     Past Surgical History   I have reviewed this patient's surgical history and updated it with pertinent information if needed.  Past Surgical History:   Procedure Laterality Date     BYPASS GRAFT ARTERY CORONARY  11/2/2011    CABG x 4  Dr. PINEDA     C NONSPECIFIC PROCEDURE  age 14    appendectomy     C NONSPECIFIC PROCEDURE  as a child    T&A     CV ANGIOGRAM CORONARY GRAFT N/A 1/27/2020    Procedure: Angiogram Coronary Graft;  Surgeon: Jenny Luther MD;  Location: [facility] CARDIAC CATH LAB     CV AORTOGRAM ABDOMINAL N/A 1/27/2020    Procedure: Aortogram Abdominal;  Surgeon: Jenny Luther MD;  Location: [facility] CARDIAC CATH LAB     CV AORTOGRAM THORACIC N/A 1/27/2020    Procedure: Aortogram Thoracic;  Surgeon: Jenny Luther  MD Eloy;  Location:  HEART CARDIAC CATH LAB     CV LEFT HEART CATH N/A 1/27/2020    Procedure: Right and Left heart catheterization for TAVR workup;  Surgeon: Jenny Luther MD;  Location:  HEART CARDIAC CATH LAB     EMBOLECTOMY LOWER EXTREMITY  11/9/2011    EMBOLECTOMY LOWER EXTREMITY; left leg femoral embolectomy.; Surgeon:JOLEEN BOONE; Location: OR     HYSTERECTOMY, CERVIX STATUS UNKNOWN  1978    partial hysterectomy     SURGICAL HISTORY OF -   10/09    L ankle fracture repair    Dr. Jose Roberto Trevino     Social History   I have reviewed this patient's social history and updated it with pertinent information if needed.  Social History     Tobacco Use     Smoking status: Never Smoker     Smokeless tobacco: Never Used   Substance Use Topics     Alcohol use: No     Drug use: No     Family History   I have reviewed this patient's family history and updated it with pertinent information if needed.   Family History   Problem Relation Age of Onset     Alzheimer Disease Sister      Heart Disease Mother      Heart Disease Father      Heart Disease Son      Heart Disease Brother      Prior to Admission Medications   Prior to Admission Medications   Prescriptions Last Dose Informant Patient Reported? Taking?   Cholecalciferol (VITAMIN D-400 PO)  at AM Self Yes Yes   Sig: Take 1 tablet by mouth daily    HYDROcodone-acetaminophen (NORCO) 5-325 MG tablet  at PRN Self No Yes   Sig: Take 0.5-1 tablets by mouth every 6 hours as needed for moderate to severe pain   Multiple Vitamins-Minerals (CENTRUM SILVER) per tablet 5/4/2020 at PM Self Yes Yes   Sig: Take 1 tablet by mouth daily.   Multiple Vitamins-Minerals (PRESERVISION AREDS 2 PO)  Self Yes Yes   Sig: Take 1 tablet by mouth 2 times daily    Psyllium (FIBER) 0.52 g CAPS  at PM Self No Yes   Sig: Take 1 capsule by mouth daily   acetaminophen (TYLENOL ARTHRITIS PAIN) 650 MG CR tablet  Self No Yes   Sig: Take 2 tablets (1,300 mg) by mouth 2 times daily    aspirin EC 81 MG EC tablet 5/5/2020 at AM Self No Yes   Sig: Take 1 tablet by mouth daily.   atorvastatin (LIPITOR) 80 MG tablet 5/4/2020 at PM Self No Yes   Sig: TAKE ONE TABLET BY MOUTH ONCE DAILY   calcium 600 MG tablet 5/4/2020 at Unknown time Self Yes Yes   Sig: Take 1 tablet by mouth 2 times daily.   co-enzyme Q-10 (COENZYME Q-10) 100 MG CAPS 5/4/2020 at PM Self Yes Yes   Sig: Take 1 capsule by mouth daily.   gabapentin (NEURONTIN) 100 MG capsule 5/4/2020 at PM Self No Yes   Sig: TAKE ONE CAPSULE BY MOUTH THREE TIMES A DAY   Patient taking differently: Take 300 mg by mouth At Bedtime (Takes 3 x 100mg)   hydrocortisone, Perianal, (ANUSOL-HC) 2.5 % cream  Self No Yes   Sig: Apply twice daily to hemorrhoid.   levothyroxine (SYNTHROID/LEVOTHROID) 50 MCG tablet 5/5/2020 at AM Self No Yes   Sig: Take 1 tablet (50 mcg) by mouth daily   losartan (COZAAR) 100 MG tablet 5/4/2020 at PM Self No Yes   Sig: Take 1 tablet (100 mg) by mouth daily   metFORMIN (GLUCOPHAGE) 500 MG tablet  at AM Self No Yes   Sig: TAKE ONE TABLET BY MOUTH DAILY WITH BREAKFAST   nitroGLYCERIN (NITROSTAT) 0.4 MG SL tablet more than a month at PRN Self Yes Yes   Sig: Place 0.4 mg under the tongue every 5 minutes as needed. Up to 3 doses per episode    omeprazole (PRILOSEC) 20 MG DR capsule 5/5/2020 at AM Self No Yes   Sig: Take 1 capsule (20 mg) by mouth daily   polyethylene glycol (MIRALAX) powder  at PM Self No Yes   Sig: Take 17 g (1 capful) by mouth daily   warfarin ANTICOAGULANT (COUMADIN) 3 MG tablet 4/29/2020 Self Yes Yes   Sig: Take 3-4.5 mg by mouth daily Takes  4.5mg on Tuesday, Thursday, Saturday  3mg all other days (Mon,Wed,Fri,Sun)      Facility-Administered Medications: None     Allergies   Allergies   Allergen Reactions     Amoxicillin      hives     Bisphosphonates      Swallowing disorder     Boniva [Ibandronate Sodium] Nausea and Vomiting     Diarrhea, N/V with oral.  IV caused arm to swell      Fosamax [Alendronic Acid]       Severe gastrointestinal reaction     Lisinopril      cough     Niacin      rash     Simvastatin      myalgias       Physical Exam   Vital Signs: Temp: 97.9  F (36.6  C) Temp src: Temporal BP: 115/56 Pulse: 73 Heart Rate: 73 Resp: 16 SpO2: 93 % O2 Device: Nasal cannula Oxygen Delivery: 3 LPM  Weight: 158 lbs 9.6 oz    CONSTITUTIONAL: Pt laying in bed, dressed in comfortable. Appears comfortable. Cooperative with interview.   HEENT: Normocephalic, atraumatic. Negative for conjunctival redness or scleral icterus.    CARDIOVASCULAR: RRR, no murmurs, rubs, or extra heart sounds appreciated. Pulses +2/4 and regular in upper and lower extremities, bilaterally.   RESPIRATORY: No increased work of breathing.  CTA, bilat; no wheezes, rales, or rhonchi appreciated.  GASTROINTESTINAL:  Abdomen soft, non-distended. BS auscultated in all four quadrants. Negative for tenderness to palpation.  No masses or organomegaly noted.  MUSCULOSKELETAL: No gross deformities noted. Normal muscle tone.   HEMATOLOGIC/LYMPHATIC/IMMUNOLOGIC: Negative for lower extremity edema, bilaterally.  NEUROLOGIC: Alert and oriented to person, place, and time.  strength intact. No focal neuro deficits.   SKIN: Fem stop on the right groin negative for hematoma, no palpable hematoma in left groin.     Data   Data reviewed today: I reviewed all medications, new labs and imaging results over the last 24 hours. EKG with a paced rhythm. All labs and imaging reviewed.     Recent Labs   Lab 05/05/20  0946 05/05/20  0558 04/30/20  1016   WBC 7.2  --  7.5   HGB 10.1* 11.8 11.8   MCV 76*  --  76*     --  251   INR  --  1.02 2.31*     --  135   POTASSIUM 4.7 4.1 4.2   CHLORIDE 107  --  105   CO2 23  --  25   BUN 21  --  21   CR 0.86  --  0.94   ANIONGAP 6  --  5   SHAWNEE 8.2*  --  9.0   * 140* 209*   ALBUMIN 2.9*  --  3.4   PROTTOTAL 6.1*  --  7.3   BILITOTAL 0.4  --  0.4   ALKPHOS 105  --  136   ALT 22  --  28   AST 19  --  20     Recent Results  (from the past 24 hour(s))   Cardiac Catheterization    Narrative    1. Symptomatic severe aortic stenosis.  2. Successful transfemoral transcatheter aortic valve replacement with a   20mm Khan Nica 3 valve. No paravalvular leak.  3. Temporary pacemaker insertion and removal.  4. Left heart catheterization with LVEDP of 14 mmHg.  5. Unsuccessful closure of the right femoral arteriotomy due to failure of   the single Perclose Proglide suture. Manual pressure was applied and a   Femstop was placed for successful hemostasis.

## 2020-05-05 NOTE — ANESTHESIA POSTPROCEDURE EVALUATION
Patient: Madie Silva    Procedure(s):  Transcatheter Aortic Valve Replacement    Diagnosis:heart valve condition  Diagnosis Additional Information: No value filed.    Anesthesia Type:  MAC    Note:  Anesthesia Post Evaluation    Patient location during evaluation: PACU  Patient participation: Able to fully participate in evaluation  Level of consciousness: awake  Pain management: adequate  Airway patency: patent  Cardiovascular status: acceptable  Respiratory status: acceptable  Hydration status: acceptable  PONV: none             Last vitals:  Vitals:    05/05/20 0940 05/05/20 0950 05/05/20 1000   BP: 125/59 118/56 115/56   Pulse: 71 69 73   Resp: 19 25 16   Temp:   36.6  C (97.9  F)   SpO2: 98% 94% 93%         Electronically Signed By: Eliazar Saravia MD  May 5, 2020  10:06 AM

## 2020-05-05 NOTE — ANESTHESIA PREPROCEDURE EVALUATION
Anesthesia Pre-Procedure Evaluation    Patient: Madie Silva   MRN: [id number] : 1929          Preoperative Diagnosis: heart valve condition    Procedure(s):  Transcatheter Aortic Valve Replacement    Past Medical History:   Diagnosis Date     Walters's esophagus      Bradycardia     post-op CABG  - s/p pacemaker implanted      CHRONIC VERTIGO      Colonic Adenoma 3/90,      Coronary artery disease     CABG x4 2011: LIMA to her LAD, saphenous vein graft to her ramus, saphenous vein graft to her OM, saphenous vein graft to her PDA.     Costochondritis      Depression      DIABETES MELLITUS TYPE II 10/07     DJD      DVT of Leg, postop     6 months of warfarin     Gastritis 10/89     GERD      HIATAL HERNIA      HYPERLIPIDEMIA      HYPOTHYROIDISM (aka HASHIMOTO)      Impaired fasting glucose      L Ankle Fracture 10/09     Obesity, unspecified      Osteoporosis, unspecified      PERIPHERAL NEUROPATHY      Polymyalgia rheumatica (H)      Post Herpetic Neuralgia     R lumbar distribution     Restless leg syndrome      Small vessel cerebrovascular changes      Stricture and stenosis of esophagus 10/89    Dilated     Syncope     , , 2/10     VITAMIN D DEFICIENCY     per Dr. Petty     Past Surgical History:   Procedure Laterality Date     BYPASS GRAFT ARTERY CORONARY  2011    CABG x 4  Dr. EDWIN ACOSTA NONSPECIFIC PROCEDURE  age 14    appendectomy     C NONSPECIFIC PROCEDURE  as a child    T&A     CV ANGIOGRAM CORONARY GRAFT N/A 2020    Procedure: Angiogram Coronary Graft;  Surgeon: Jenny Luther MD;  Location: [facility] CARDIAC CATH LAB     CV AORTOGRAM ABDOMINAL N/A 2020    Procedure: Aortogram Abdominal;  Surgeon: Jenny Luther MD;  Location: [facility] CARDIAC CATH LAB     CV AORTOGRAM THORACIC N/A 2020    Procedure: Aortogram Thoracic;  Surgeon: Jenny Luther MD;  Location: [facility] CARDIAC CATH LAB     CV LEFT  HEART CATH N/A 1/27/2020    Procedure: Right and Left heart catheterization for TAVR workup;  Surgeon: Jenny Luther MD;  Location:  HEART CARDIAC CATH LAB     EMBOLECTOMY LOWER EXTREMITY  11/9/2011    EMBOLECTOMY LOWER EXTREMITY; left leg femoral embolectomy.; Surgeon:JOLEEN BOONE; Location: OR     HYSTERECTOMY, CERVIX STATUS UNKNOWN  1978    partial hysterectomy     SURGICAL HISTORY OF -   10/09    L ankle fracture repair    Dr. Jose Roberto Trevino       Anesthesia Evaluation     . Pt has had prior anesthetic.            ROS/MED HX    ENT/Pulmonary:       Neurologic:     (+)neuropathy - Post-herpetic neuralgia; peripheral neuropathy, CVA other neuro Vertigo    Cardiovascular: Comment: Carotid Doppler:  1. Right: The max velocity in the ICA measures 178 cm/sec, corresponding to 50-69% stenosis.  2. Left: The max velocity in the ICA measures  132 cm/sec, corresponding to <50% stenosis.  3. Normal antegrade direction flow in both vertebral arteries.    (+) Dyslipidemia, hypertension-Peripheral Vascular Disease-CAD, -CABG-. Taking blood thinners : . . fainting (syncope). pacemaker Reason placed: Atrial arrhythmia:type: Medtronic Sensia (D) settings: DDDR - Patient is dependent on pacemaker . dysrhythmias a-flutter, valvular problems/murmurs type: AS Severe AS:. Previous cardiac testing Echodate:1/9/20results:The left ventricle is normal in size. The left ventricular ejection fraction is normal. Grade I or early diastolic dysfunction. The right ventricle is normal in structure, function and size. There is mild to moderate mitral annular calcification. There is trace mitral regurgitation. Severe valvular aortic stenosis, mean gradient 26 mmHg, IVCKY 0.8 cm2, DI .24. Compared to the last echo of 6 months ago, there has been very slight progression of the AS (mean gradient 25..26, DI 26 to 24).date: results: date: results:Cath date: 1/27/20 results:1. Multivessel native coronary artery disease  2. Patent  LIMA to LAD  3. Patent saphenous vein graft to OM  4. Patent saphenous vein graft to right PDA  5. Occluded saphenous vein graft to ramus intermediate (adequate native flow)  6. Normal right heart cath  7. Normal bilateral iliofemoral arteries for transfemoral TAVR  8. Potential coplanar angle is AP 0, 0          METS/Exercise Tolerance:  >4 METS   Hematologic:     (+) History of blood clots -      Musculoskeletal:   (+) arthritis,  -       GI/Hepatic:     (+) GERD hiatal hernia, Other GI/Hepatic Walters's; Gastritis      Renal/Genitourinary:     (+) chronic renal disease, type: CRI,       Endo:     (+) type II DM thyroid problem hypothyroidism, Chronic steroid usage (Polymyalgia rheumatica) for Arthritis .   (-) Type I DM and obesity   Psychiatric:         Infectious Disease:         Malignancy:         Other:                          Physical Exam      Airway   Mallampati: III  TM distance: >3 FB  Neck ROM: full    Dental   (+) caps    Cardiovascular   Rhythm and rate: regular      Pulmonary    breath sounds clear to auscultation            Lab Results   Component Value Date    WBC 7.5 04/30/2020    HGB 11.8 05/05/2020    HCT 37.4 04/30/2020     04/30/2020    SED 24 04/03/2013     04/30/2020    POTASSIUM 4.2 04/30/2020    CHLORIDE 105 04/30/2020    CO2 25 04/30/2020    BUN 21 04/30/2020    CR 0.94 04/30/2020     (H) 04/30/2020    SHAWNEE 9.0 04/30/2020    MAG 2.0 11/11/2011    ALBUMIN 3.4 04/30/2020    PROTTOTAL 7.3 04/30/2020    ALT 28 04/30/2020    AST 20 04/30/2020    GGT 45 (H) 07/25/2008    ALKPHOS 136 04/30/2020    BILITOTAL 0.4 04/30/2020    LIPASE 154 06/04/2010    PTT 22 01/27/2020    INR 2.31 (H) 04/30/2020    FIBR 210 11/02/2011    TSH 1.26 06/06/2019    T4 1.78 08/13/2012       Preop Vitals  BP Readings from Last 3 Encounters:   05/01/20 (!) 149/75   04/30/20 (!) 158/63   02/13/20 (!) 140/58    Pulse Readings from Last 3 Encounters:   05/01/20 103   04/30/20 76   02/13/20 80      Resp  "Readings from Last 3 Encounters:   04/30/20 20   01/27/20 16   12/20/19 20    SpO2 Readings from Last 3 Encounters:   05/01/20 98%   04/30/20 92%   01/27/20 98%      Temp Readings from Last 1 Encounters:   04/30/20 37.1  C (98.7  F) (Oral)    Ht Readings from Last 1 Encounters:   05/01/20 1.651 m (5' 5\")      Wt Readings from Last 1 Encounters:   05/01/20 71.7 kg (158 lb)    Estimated body mass index is 26.29 kg/m  as calculated from the following:    Height as of 5/1/20: 1.651 m (5' 5\").    Weight as of 5/1/20: 71.7 kg (158 lb).       Anesthesia Plan      History & Physical Review  History and physical reviewed and following examination; no interval change.    ASA Status:  4 .    NPO Status:  > 8 hours    Plan for MAC Reason for MAC:  Deep or markedly invasive procedure (G8)    Additional equipment: Arterial Line and Central Line (To be obtained by proceduralist) - Precedex gtt with propofol prn  - Discussed DNR/DNI with patient, she states that she would want extreme measures (defibrillation and chest compressions) performed, however if at any time, it would seem futile or likely only be successful with high morbidity, then she would want to forego further resuscitative therapy          Postoperative Care  Postoperative pain management:  IV analgesics and Multi-modal analgesia.      Consents  Anesthetic plan, risks, benefits and alternatives discussed with:  Patient..                 Eliazar Saravia MD  "

## 2020-05-05 NOTE — PLAN OF CARE
Pt A/O, neuros intact except forgetful at times. Baseline numbness in right hand. [place]. VSS on RA. Tele: 100% A-paced. Pt denies pain/SOB. Right and Left groin sites C/D/I, CMS intact. Pulses present with doppler. Up to bathroom with SBA and walker/gait belt. Tolerating regular diet. Plan for repeat echo and cardiac rehab tomorrow.

## 2020-05-05 NOTE — PROGRESS NOTES
CV Surgery    Patient seen, plans for TAVR reviewed. In light of her age and previous sternotomy/CABG, we will not plan on reopening the chest for any emergent complications. Patient agrees.    Vitor Mendoza MD

## 2020-05-05 NOTE — ANESTHESIA CARE TRANSFER NOTE
Patient: Madie Silva    Procedure(s):  Transcatheter Aortic Valve Replacement    Diagnosis: heart valve condition  Diagnosis Additional Information: No value filed.    Anesthesia Type:   MAC     Note:  Airway :Face Mask  Patient transferred to:PACU  Handoff Report: Identifed the Patient, Identified the Reponsible Provider, Reviewed the pertinent medical history, Discussed the surgical course, Reviewed Intra-OP anesthesia mangement and issues during anesthesia, Set expectations for post-procedure period and Allowed opportunity for questions and acknowledgement of understanding      Vitals: (Last set prior to Anesthesia Care Transfer)    CRNA VITALS  5/5/2020 0858 - 5/5/2020 0940      5/5/2020             Resp Rate (set):  10                Electronically Signed By: HAROLDO Corey CRNA  May 5, 2020  9:40 AM

## 2020-05-06 ENCOUNTER — APPOINTMENT (OUTPATIENT)
Dept: CARDIOLOGY | Facility: CLINIC | Age: 85
DRG: 267 | End: 2020-05-06
Attending: STUDENT IN AN ORGANIZED HEALTH CARE EDUCATION/TRAINING PROGRAM
Payer: COMMERCIAL

## 2020-05-06 ENCOUNTER — APPOINTMENT (OUTPATIENT)
Dept: PHYSICAL THERAPY | Facility: CLINIC | Age: 85
DRG: 267 | End: 2020-05-06
Attending: STUDENT IN AN ORGANIZED HEALTH CARE EDUCATION/TRAINING PROGRAM
Payer: COMMERCIAL

## 2020-05-06 VITALS
OXYGEN SATURATION: 95 % | WEIGHT: 158.6 LBS | RESPIRATION RATE: 16 BRPM | BODY MASS INDEX: 26.42 KG/M2 | TEMPERATURE: 98.2 F | HEART RATE: 71 BPM | DIASTOLIC BLOOD PRESSURE: 49 MMHG | SYSTOLIC BLOOD PRESSURE: 151 MMHG | HEIGHT: 65 IN

## 2020-05-06 LAB
ANION GAP SERPL CALCULATED.3IONS-SCNC: 6 MMOL/L (ref 3–14)
BUN SERPL-MCNC: 20 MG/DL (ref 7–30)
CALCIUM SERPL-MCNC: 8.6 MG/DL (ref 8.5–10.1)
CHLORIDE SERPL-SCNC: 108 MMOL/L (ref 94–109)
CO2 SERPL-SCNC: 25 MMOL/L (ref 20–32)
CREAT SERPL-MCNC: 1 MG/DL (ref 0.52–1.04)
ERYTHROCYTE [DISTWIDTH] IN BLOOD BY AUTOMATED COUNT: 17.3 % (ref 10–15)
GFR SERPL CREATININE-BSD FRML MDRD: 49 ML/MIN/{1.73_M2}
GLUCOSE BLDC GLUCOMTR-MCNC: 121 MG/DL (ref 70–99)
GLUCOSE BLDC GLUCOMTR-MCNC: 178 MG/DL (ref 70–99)
GLUCOSE SERPL-MCNC: 113 MG/DL (ref 70–99)
HCT VFR BLD AUTO: 33 % (ref 35–47)
HGB BLD-MCNC: 10.6 G/DL (ref 11.7–15.7)
INR PPP: 1.12 (ref 0.86–1.14)
INTERPRETATION ECG - MUSE: NORMAL
INTERPRETATION ECG - MUSE: NORMAL
MAGNESIUM SERPL-MCNC: 2.1 MG/DL (ref 1.6–2.3)
MCH RBC QN AUTO: 24.4 PG (ref 26.5–33)
MCHC RBC AUTO-ENTMCNC: 32.1 G/DL (ref 31.5–36.5)
MCV RBC AUTO: 76 FL (ref 78–100)
PHOSPHATE SERPL-MCNC: 3.8 MG/DL (ref 2.5–4.5)
PLATELET # BLD AUTO: 186 10E9/L (ref 150–450)
POTASSIUM SERPL-SCNC: 4.5 MMOL/L (ref 3.4–5.3)
RBC # BLD AUTO: 4.35 10E12/L (ref 3.8–5.2)
SODIUM SERPL-SCNC: 139 MMOL/L (ref 133–144)
WBC # BLD AUTO: 7.6 10E9/L (ref 4–11)

## 2020-05-06 PROCEDURE — 97530 THERAPEUTIC ACTIVITIES: CPT | Mod: GP

## 2020-05-06 PROCEDURE — 97161 PT EVAL LOW COMPLEX 20 MIN: CPT | Mod: GP

## 2020-05-06 PROCEDURE — 99238 HOSP IP/OBS DSCHRG MGMT 30/<: CPT | Performed by: INTERNAL MEDICINE

## 2020-05-06 PROCEDURE — 25000132 ZZH RX MED GY IP 250 OP 250 PS 637: Performed by: INTERNAL MEDICINE

## 2020-05-06 PROCEDURE — 93321 DOPPLER ECHO F-UP/LMTD STD: CPT | Mod: 26 | Performed by: INTERNAL MEDICINE

## 2020-05-06 PROCEDURE — 93005 ELECTROCARDIOGRAM TRACING: CPT

## 2020-05-06 PROCEDURE — 25000132 ZZH RX MED GY IP 250 OP 250 PS 637: Performed by: STUDENT IN AN ORGANIZED HEALTH CARE EDUCATION/TRAINING PROGRAM

## 2020-05-06 PROCEDURE — 25500064 ZZH RX 255 OP 636: Performed by: INTERNAL MEDICINE

## 2020-05-06 PROCEDURE — 36415 COLL VENOUS BLD VENIPUNCTURE: CPT | Performed by: INTERNAL MEDICINE

## 2020-05-06 PROCEDURE — 84100 ASSAY OF PHOSPHORUS: CPT | Performed by: STUDENT IN AN ORGANIZED HEALTH CARE EDUCATION/TRAINING PROGRAM

## 2020-05-06 PROCEDURE — 99222 1ST HOSP IP/OBS MODERATE 55: CPT | Performed by: INTERNAL MEDICINE

## 2020-05-06 PROCEDURE — 80048 BASIC METABOLIC PNL TOTAL CA: CPT | Performed by: STUDENT IN AN ORGANIZED HEALTH CARE EDUCATION/TRAINING PROGRAM

## 2020-05-06 PROCEDURE — 00000146 ZZHCL STATISTIC GLUCOSE BY METER IP

## 2020-05-06 PROCEDURE — 85610 PROTHROMBIN TIME: CPT | Performed by: INTERNAL MEDICINE

## 2020-05-06 PROCEDURE — 25000132 ZZH RX MED GY IP 250 OP 250 PS 637: Performed by: PHYSICIAN ASSISTANT

## 2020-05-06 PROCEDURE — 83735 ASSAY OF MAGNESIUM: CPT | Performed by: STUDENT IN AN ORGANIZED HEALTH CARE EDUCATION/TRAINING PROGRAM

## 2020-05-06 PROCEDURE — 25000132 ZZH RX MED GY IP 250 OP 250 PS 637: Performed by: NURSE PRACTITIONER

## 2020-05-06 PROCEDURE — 93308 TTE F-UP OR LMTD: CPT | Mod: 26 | Performed by: INTERNAL MEDICINE

## 2020-05-06 PROCEDURE — 93010 ELECTROCARDIOGRAM REPORT: CPT | Performed by: INTERNAL MEDICINE

## 2020-05-06 PROCEDURE — 36415 COLL VENOUS BLD VENIPUNCTURE: CPT | Performed by: STUDENT IN AN ORGANIZED HEALTH CARE EDUCATION/TRAINING PROGRAM

## 2020-05-06 PROCEDURE — 93325 DOPPLER ECHO COLOR FLOW MAPG: CPT | Mod: 26 | Performed by: INTERNAL MEDICINE

## 2020-05-06 PROCEDURE — 97110 THERAPEUTIC EXERCISES: CPT | Mod: GP

## 2020-05-06 PROCEDURE — 40000264 ECHOCARDIOGRAM LIMITED

## 2020-05-06 PROCEDURE — 85027 COMPLETE CBC AUTOMATED: CPT | Performed by: STUDENT IN AN ORGANIZED HEALTH CARE EDUCATION/TRAINING PROGRAM

## 2020-05-06 RX ORDER — LOSARTAN POTASSIUM 50 MG/1
25 TABLET ORAL DAILY
Qty: 30 TABLET | Refills: 3 | Status: SHIPPED | OUTPATIENT
Start: 2020-05-06 | End: 2020-07-16

## 2020-05-06 RX ORDER — LOSARTAN POTASSIUM 25 MG/1
25 TABLET ORAL DAILY
Status: DISCONTINUED | OUTPATIENT
Start: 2020-05-06 | End: 2020-05-06 | Stop reason: HOSPADM

## 2020-05-06 RX ORDER — METOPROLOL SUCCINATE 25 MG/1
12.5 TABLET, EXTENDED RELEASE ORAL DAILY
Qty: 30 TABLET | Refills: 3 | Status: SHIPPED | OUTPATIENT
Start: 2020-05-06 | End: 2020-07-16

## 2020-05-06 RX ORDER — CLOPIDOGREL BISULFATE 75 MG/1
75 TABLET ORAL DAILY
Qty: 5 TABLET | Refills: 0 | Status: SHIPPED | OUTPATIENT
Start: 2020-05-07 | End: 2020-05-11

## 2020-05-06 RX ADMIN — LEVOTHYROXINE SODIUM 50 MCG: 50 TABLET ORAL at 06:17

## 2020-05-06 RX ADMIN — CLOPIDOGREL BISULFATE 75 MG: 75 TABLET, FILM COATED ORAL at 09:32

## 2020-05-06 RX ADMIN — LOSARTAN POTASSIUM 25 MG: 25 TABLET, FILM COATED ORAL at 09:32

## 2020-05-06 RX ADMIN — ATORVASTATIN CALCIUM 80 MG: 80 TABLET, FILM COATED ORAL at 09:32

## 2020-05-06 RX ADMIN — OMEPRAZOLE 20 MG: 20 CAPSULE, DELAYED RELEASE ORAL at 09:32

## 2020-05-06 RX ADMIN — ASPIRIN 81 MG: 81 TABLET, DELAYED RELEASE ORAL at 09:32

## 2020-05-06 RX ADMIN — HUMAN ALBUMIN MICROSPHERES AND PERFLUTREN 9 ML: 10; .22 INJECTION, SOLUTION INTRAVENOUS at 09:14

## 2020-05-06 RX ADMIN — METOPROLOL SUCCINATE 12.5 MG: 25 TABLET, EXTENDED RELEASE ORAL at 13:56

## 2020-05-06 ASSESSMENT — MIFFLIN-ST. JEOR: SCORE: 1140.28

## 2020-05-06 NOTE — DISCHARGE INSTRUCTIONS
TAVR Discharge Instructions  You have just had your aortic valve replaced with a new biological tissue valve. You should feel better after your surgery, but complete recovery may take several weeks. Please follow these instructions carefully and please call the [facility] [place] ([phone]) with any questions or concerns.      AFTER YOU GO HOME:    Drink extra fluids for 2 days.    You may resume your normal diet.    Do not smoke.    Do not drink alcohol.    Relax and take it easy.    Do not make any important or legal decisions.    FOR 1 WEEK AFTER TAVR:    Do not drive or operate machines at home or at work. No driving until you return for your 1 week follow-up appointment. You and the provider will discuss this at the appointment.     Refrain from sexual intercourse for 1 week.    You may shower the day after your procedure. Do NOT take a bath, or use a hot tub or pool for at least 1 week. Do NOT scrub the site. Do not use lotion or powder near the puncture site.    Do not lift more than 10 pounds.     Do not do any heavy activity such as exercise, lifting, or straining.  No housework, yard work, or any activities that make you sweat.    CARE OF WRIST AND GROIN SITES:    For 2 days, when you cough, sneeze, laugh or move your bowels, hold your hand over the puncture site and press firmly on/above the site.    Remove the bandage after 24 hours. If there is minor oozing, apply another bandage and remove it after 12 hours.    It is normal to have bruising or pea size lump at the site.    If you have a wrist site puncture: Do not use your hand or arm to support your weight (such as rising from a chair)or bend your wrist (such as lifting a garage door) for 1 week.    No stooping or squatting for 1 week.     BLEEDING:  If you start bleeding from the site in your wrist:    Sit down and press firmly on/above the site with your fingers for 10 minutes.     Once bleeding stops, keep your arm still  for 2 hours.     Call [facility] ([phone]) as soon as you can.  If you start bleeding from the site in your groin:    Lie down flat and press firmly on/above the site for 10 minutes.    Once bleeding stops lay flat for 2 hours.     Call [facility] ([phone]) as soon as you can.  Call 911 right away if you have heavy bleeding or bleeding that does not stop.    MEDICINES:    You will likely be started on an anti-platelet medication, such as Plavix. Please call the [facility] with any questions regarding this medication.    If you have stopped any medicines, check with your provider about when to restart them.    You will require lifetime prophylactic antibiotics for dental cleanings and dental work after your TAVR, please inquire with your provider prior to your scheduled cleanings.     FOLLOW-UP APPOINTMENTS:    Follow-up with a Structural Heart Nurse Practitioner at [facility] in 7-10 days after your procedure.    You will also follow-up at [facility] 1 month after your procedure which will include a visit with a nurse practitioner, an echocardiogram (Echo), and electrocardiogram (ECG), and lab work. We will also want to see you back in the clinic 1 year after your procedure.    Cardiac Rehab will contact you for follow up care. You can enroll at a site convenient to you.     CALL THE CLINIC IF:     You have increased pain or a large or growing hard lump around the site.    The site is red, swollen, hot, or tender.    Blood or fluid is draining from the site.    You have chills or a fever greater than 101 F (38 C).    Your arm or leg feels numb, cool, or changes color.    You have hives, a rash, or unusual itching.    New pain in the back or belly that you cannot control with Tylenol.    Any questions or concerns.    OTHER INSTRUCTIONS:    You will receive a card with your new valve information in the  mail, directly from the .     CONTACT INFORMATION:   [facility]:  [phone] (7 days a week)  Valve Coordinators (Muriel RN, iKm RN, and Stefan MENDOZA) can be reached at:  [phone]

## 2020-05-06 NOTE — PLAN OF CARE
Discharge Planner PT   Patient plan for discharge: Home  Current status: Pt is a 89 year old female s/p TAVR. At baseline, pt lives alone in a ranch style home with steps to basement, where laundry is located. Pt reports independence with mobility and ADLs, no use of AD prior to admit.  This date, pt is independent with bed mobility and transfers. Pt ambulates 150' x 2 with FWW modified independent, see flow sheet for CV response to activity.  Barriers to return to prior living situation: None  Recommendations for discharge: Home with outpatient CR, FWW for ambulation  Rationale for recommendations: Pt is independent with mobility and safe to discharge home. Pt will benefit from outpatient CR to further increase activity tolerance and for cardiac education. Pt reports that she has a FWW for use in home environment. Pt advised to have assistance with laundry for first 1-2 weeks as it is located in basement, patient in agreement.       Entered by: Rebecca Padilla 05/06/2020 10:41 AM        Physical Therapy Discharge Summary    Reason for therapy discharge:    Discharged to home with outpatient therapy.    Progress towards therapy goal(s). See goals on Care Plan in [facility] electronic health record for goal details.  Goals not met.  Barriers to achieving goals:   discharge from facility.    Therapy recommendation(s):    Continued therapy is recommended.  Rationale/Recommendations:  For further increase endurance and continue cardiac education.

## 2020-05-06 NOTE — PROGRESS NOTES
[facility]- TAVR Progress Note     Date of Service: 05/06/20     [facility]  Madie Silva is a 89 year old female who was admitted on 5/5/2020 for elective Transfemoral Transcatheter Aortic Valve Replacement (TAVR).     Ms. Silva has a past medical history significant for aortic stenosis, coronary artery disease status post prior four-vessel CABG (LIMA to LAD, SVG to ramus, SVG to OM, SVG to PDA) in 2011, symptomatic bradycardia status post pacemaker implantation and paroxysmal atrial fibrillation.    Assessment and Plan  1) S/p TAVR using a 20 mm [place]ica 3 Valve    - ECG demonstrated A Paced with occasional PVC   - Intra-Op Echocardiogram showed preserved EF at 65%, AoV mean gradient 7 mmHg, No AI or PVL   - Echocardiogram today shows a hyperdynamic function >70%, AoV mean gradient 19 mmHg   - Access Site free of hematoma or bruit   - Neuro check : intact (R hand numbness at baseline)   - Antiplatelet therapy : ASA + Plavix x6mo (currently)   - CBC : WBC 7.6, Hgb 10.6 (10.1 pre-op),    - BMP Na 139, Hgb 4.5, cr 1.00, GFR 49     2. Coronary artery disease status post prior four-vessel CABG (LIMA to LAD, SVG to ramus, SVG to OM, SVG to PDA) in 2011.  Most recent coronary angiogram showed a  of the SVG to ramus.  The remainder of the bypass grafts were patent.  No new stenosis noted.  She denies chest pain.  Continue statin therapy. Will resume ASA once 6 mo course of Plavix is completed.      3. Paroxysmal atrial fibrillation.  EKG shows atrial paced.  PTA warfarin for anticoagulation.       4. Symptomatic bradycardia status post pacemaker implantation.  Tele shows A-Pace. Continue with routine device checks.    5. Hypertension. Blood pressure is elevated at 142/91. PTA Losartan 100 mg daily     Plan:  1. Out of bed with cardiac Rehab  2. Groin precautions discussed   3. Continue Aspirin and Plavix. Resume PTA Warfarin. On Monday May 11, 2020, please discontinue  Plavix and continue Aspirin and Warfarin.    4. Resume Losartan at 25 mg daily    Interval History: resting in bed, not acute events overnight     Physical Exam   Temp: 98.6  F (37  C) Temp src: Oral BP: (!) 142/91 Pulse: 71 Heart Rate: 71 Resp: 26 SpO2: 96 % O2 Device: None (Room air) Oxygen Delivery: 1 LPM  Vitals:    05/05/20 0626 05/06/20 0430   Weight: 71.9 kg (158 lb 9.6 oz) 71.9 kg (158 lb 9.6 oz)     Vital Signs with Ranges  Temp:  [97.3  F (36.3  C)-98.6  F (37  C)] 98.6  F (37  C)  Pulse:  [69-77] 71  Heart Rate:  [69-87] 71  Resp:  [10-26] 26  BP: (105-151)/(42-95) 142/91  SpO2:  [93 %-99 %] 96 %  I/O last 3 completed shifts:  In: 2010 [P.O.:510; I.V.:1500]  Out: 1350 [Urine:1350]    Constitutional: awake, alert, cooperative, no apparent distress, and appears stated age  Eyes: pupils equal, round and reactive to light  Respiratory: No increased work of breathing, good air exchange, clear to auscultation bilaterally, no crackles or wheezing  Cardiovascular: Normal apical impulse, regular rate and rhythm, normal S1 and S2, no S3 or S4, and no murmur noted  GI: soft and nontender  Skin: warm and dry, bilateral groins free of hematoma and bruit  Musculoskeletal: There is no redness, warmth, or swelling of the joints.  Full range of motion noted.  Motor strength is 5 out of 5 all extremities bilaterally.  Tone is normal.  Neurologic: Awake, alert, oriented to name, place and time.    Neuropsychiatric: Level of consciousness: alert / normal  Affect: normal    Medications       aspirin  81 mg Oral Daily     atorvastatin  80 mg Oral Daily     clopidogrel  75 mg Oral Daily     gabapentin  300 mg Oral At Bedtime     insulin aspart  1-7 Units Subcutaneous TID AC     insulin aspart  1-5 Units Subcutaneous At Bedtime     levothyroxine  50 mcg Oral QAM AC     omeprazole  20 mg Oral Daily     polyethylene glycol  17 g Oral QPM       Data   Recent Labs   Lab 05/06/20  0536 05/05/20  0946 05/05/20  0558 04/30/20  1016    WBC 7.6 7.2  --  7.5   HGB 10.6* 10.1* 11.8 11.8   MCV 76* 76*  --  76*    201  --  251   INR  --   --  1.02 2.31*    136  --  135   POTASSIUM 4.5 4.7 4.1 4.2   CHLORIDE 108 107  --  105   CO2 25 23  --  25   BUN 20 21  --  21   CR 1.00 0.86  --  0.94   ANIONGAP 6 6  --  5   SHAWNEE 8.6 8.2*  --  9.0   * 137* 140* 209*   ALBUMIN  --  2.9*  --  3.4   PROTTOTAL  --  6.1*  --  7.3   BILITOTAL  --  0.4  --  0.4   ALKPHOS  --  105  --  136   ALT  --  22  --  28   AST  --  19  --  20           HAROLDO Davis, CNP  Pager [phone]  5/6/2020

## 2020-05-06 NOTE — PROGRESS NOTES
05/06/20 1000   Quick Adds   Type of Visit Initial PT Evaluation   Living Environment   Lives With alone   Living Arrangements house   Home Accessibility stairs within home   Number of Stairs, Within Home, Primary 10;other (see comments)  (To basement, where laundry is located)   Stair Railings, Within Home, Primary railings safe and in good condition;railing on right side (ascending)   Transportation Anticipated family or friend will provide   Self-Care   Usual Activity Tolerance good   Current Activity Tolerance moderate   Regular Exercise No   Equipment Currently Used at Home none  (Has access to FWW)   Functional Level Prior   Ambulation 0-->independent   Transferring 0-->independent   Fall history within last six months no   Which of the above functional risks had a recent onset or change? none   Prior Functional Level Comment Pt reports independence with mobility prior to admit, no use of AD.   General Information   Onset of Illness/Injury or Date of Surgery - Date 05/05/20   Referring Physician Dr. Luther   Patient/Family Goals Statement To go home   Pertinent History of Current Problem (include personal factors and/or comorbidities that impact the POC) Pt is a 89 year old female s/p TAVR.   Cognitive Status Examination   Orientation orientation to person, place and time   Level of Consciousness alert   Follows Commands and Answers Questions 100% of the time   Pain Assessment   Patient Currently in Pain No   Range of Motion (ROM)   ROM Quick Adds No deficits were identified   Strength   Manual Muscle Testing Quick Adds No deficits were identified   Bed Mobility   Bed Mobility Comments Independent   Transfer Skills   Transfer Comments Independent   Gait   Gait Comments Modified independent with FWW   Balance   Balance Comments Good in sitting and standing   General Therapy Interventions   Planned Therapy Interventions risk factor education;home program guidelines;progressive activity/exercise   Clinical  "Impression   Criteria for Skilled Therapeutic Intervention yes, treatment indicated   PT Diagnosis Impaired endurance   Influenced by the following impairments Decreased endurance   Functional limitations due to impairments Difficulty with long distance ambulation, stair management   Clinical Presentation Stable/Uncomplicated   Clinical Presentation Rationale VSS, pain controlled   Clinical Decision Making (Complexity) Low complexity   Therapy Frequency 2x/day   Predicted Duration of Therapy Intervention (days/wks) 3 days   Anticipated Equipment Needs at Discharge walker   Anticipated Discharge Disposition Home with Outpatient Therapy   Risk & Benefits of therapy have been explained Yes   Patient, Family & other staff in agreement with plan of care Yes   [facility] TM \"6 Clicks\"   2016, Trustees of [facility], under license to [employer].  All rights reserved.   6 Clicks Short Forms Basic Mobility Inpatient Short Form   [facility]  \"6 Clicks\" V.2 Basic Mobility Inpatient Short Form   1. Turning from your back to your side while in a flat bed without using bedrails? 4 - None   2. Moving from lying on your back to sitting on the side of a flat bed without using bedrails? 4 - None   3. Moving to and from a bed to a chair (including a wheelchair)? 4 - None   4. Standing up from a chair using your arms (e.g., wheelchair, or bedside chair)? 4 - None   5. To walk in hospital room? 4 - None   6. Climbing 3-5 steps with a railing? 3 - A Little   Basic Mobility Raw Score (Score out of 24.Lower scores equate to lower levels of function) 23   Total Evaluation Time   Total Evaluation Time (Minutes) 10     "

## 2020-05-06 NOTE — PLAN OF CARE
Discharge instructions and medications given and discussed with patient. All questions and concerns addressed. Patient left via wheel chair with nursing staff.

## 2020-05-06 NOTE — PHARMACY-ANTICOAGULATION SERVICE
Clinical Pharmacy - Warfarin Dosing Consult     Pharmacy has been consulted to manage this patient s warfarin therapy.  Indication: Atrial Fibrillation  Therapy Goal: INR 2-3  Warfarin Prior to Admission: Yes  Warfarin PTA Regimen: 4.5 mg Tu/Th/Sa; 3 mg AOD  Recent documented change in oral intake/nutrition: Unknown    INR   Date Value Ref Range Status   05/06/2020 1.12 0.86 - 1.14 Final   05/05/2020 1.02 0.86 - 1.14 Final       Recommend warfarin 4.5 mg today.  Pharmacy will monitor Madie Silva daily and order warfarin doses to achieve specified goal.      Please contact pharmacy as soon as possible if the warfarin needs to be held for a procedure or if the warfarin goals change.    Aaliyah Sullivan, PharmD, BCPS

## 2020-05-06 NOTE — PLAN OF CARE
[id number]: A&Ox4 but forgetful. [place]. VSS on RA. Neuros intact except for baseline R hand numbness. R groin site CDI, CMS intact, pulses doppled. L groin site CDI, CMS intact, pulses doppled. Tele 100% A paced. Bgm checks. Denies CP. Up SBA. Plan for home 5-6 after repeat echo and EKG.

## 2020-05-06 NOTE — PROGRESS NOTES
A&O x4. VSS on room air. Tele SR w/ occasional A pacing. Denies CP/SOB/pain. Up with standby assist w/ walker. Bilat groin site CDI, dressings removed. Pedal pulses present with doppler. ECHO and rehab completed. AVS reviewed with pt and son via telephone, all questions answered. Medications filled here. Pt discharged home with son.

## 2020-05-06 NOTE — DISCHARGE SUMMARY
[facility]  Discharge Summary        Madie Silva MRN# [id number]   YOB: 1929 Age: 89 year old     Date of Admission: 5/5/2020  Date of Discharge: 5/6/2020  Admitting Physician: Mervin Vergara MD  Discharge Physician: Mervin Vergara MD     Primary Provider: De Obregon  Primary Care Physician Phone Number: [phone]         Discharge Diagnoses:   1. Severe aortic stenosis s/p TAVR (5/5/2020).  2. Anemia, suspect dilutional component.        Other Chronic Medical Problems:      1. DM2 with peripheral neuropathy.  2. CAD s/p 4v CABG (2011).  3. Hypertension (benign essential).  4. DLD.  5. Paroxysmal atrial fibrillation.  6. Symptomatic bradycardia s/p PPM placement:   7. GERD.  8. Hiatal hernia.  9. Esophageal stricture s/p dilatation.  10. Walters's esophagus.  11. Hypothyroidism.       Allergies:         Allergies   Allergen Reactions     Amoxicillin      hives     Bisphosphonates      Swallowing disorder     Boniva [Ibandronate Sodium] Nausea and Vomiting     Diarrhea, N/V with oral.  IV caused arm to swell      Fosamax [Alendronic Acid]      Severe gastrointestinal reaction     Lisinopril      cough     Niacin      rash     Simvastatin      myalgias           Discharge Medications:        Current Discharge Medication List      START taking these medications    Details   clopidogrel (PLAVIX) 75 MG tablet Take 1 tablet (75 mg) by mouth daily ; stop after 5/11/2020.  Qty: 5 tablet, Refills: 0    Associated Diagnoses: Nonrheumatic aortic valve stenosis      metoprolol succinate ER (TOPROL-XL) 25 MG 24 hr tablet Take 0.5 tablets (12.5 mg) by mouth daily  Qty: 30 tablet, Refills: 3    Associated Diagnoses: Essential hypertension         CONTINUE these medications which have CHANGED    Details   losartan (COZAAR) 50 MG tablet Take 0.5 tablets (25 mg) by mouth daily  Qty: 30 tablet, Refills: 3    Associated Diagnoses: Benign essential hypertension; Essential  hypertension      metFORMIN (GLUCOPHAGE) 500 MG tablet TAKE ONE TABLET BY MOUTH DAILY WITH BREAKFAST  Qty: 90 tablet, Refills: 3    Associated Diagnoses: Type 2 diabetes mellitus with diabetic nephropathy, without long-term current use of insulin (H)         CONTINUE these medications which have NOT CHANGED    Details   acetaminophen (TYLENOL ARTHRITIS PAIN) 650 MG CR tablet Take 2 tablets (1,300 mg) by mouth 2 times daily    Associated Diagnoses: Primary osteoarthritis involving multiple joints      aspirin EC 81 MG EC tablet Take 1 tablet by mouth daily.    Associated Diagnoses: S/P coronary artery bypass graft x 3      atorvastatin (LIPITOR) 80 MG tablet TAKE ONE TABLET BY MOUTH ONCE DAILY  Qty: 90 tablet, Refills: 3    Associated Diagnoses: Hyperlipidemia LDL goal <100      calcium 600 MG tablet Take 1 tablet by mouth 2 times daily.      Cholecalciferol (VITAMIN D-400 PO) Take 1 tablet by mouth daily       co-enzyme Q-10 (COENZYME Q-10) 100 MG CAPS Take 1 capsule by mouth daily.      gabapentin (NEURONTIN) 100 MG capsule TAKE ONE CAPSULE BY MOUTH THREE TIMES A DAY  Qty: 270 capsule, Refills: 3    Associated Diagnoses: Post herpetic neuralgia; Restless leg syndrome      HYDROcodone-acetaminophen (NORCO) 5-325 MG tablet Take 0.5-1 tablets by mouth every 6 hours as needed for moderate to severe pain  Qty: 12 tablet, Refills: 0    Associated Diagnoses: Multiple joint pain      hydrocortisone, Perianal, (ANUSOL-HC) 2.5 % cream Apply twice daily to hemorrhoid.  Qty: 28 g, Refills: 0      levothyroxine (SYNTHROID/LEVOTHROID) 50 MCG tablet Take 1 tablet (50 mcg) by mouth daily  Qty: 90 tablet, Refills: 3    Comments: Profile Rx: patient will call when needed  Associated Diagnoses: Acquired hypothyroidism      Multiple Vitamins-Minerals (CENTRUM SILVER) per tablet Take 1 tablet by mouth daily.      Multiple Vitamins-Minerals (PRESERVISION AREDS 2 PO) Take 1 tablet by mouth 2 times daily       nitroGLYCERIN (NITROSTAT)  0.4 MG SL tablet Place 0.4 mg under the tongue every 5 minutes as needed. Up to 3 doses per episode       omeprazole (PRILOSEC) 20 MG DR capsule Take 1 capsule (20 mg) by mouth daily  Qty: 90 capsule, Refills: 3    Comments: Profile Rx: patient will call when needed  Associated Diagnoses: Hiatal hernia; Walters's esophagus with dysplasia      polyethylene glycol (MIRALAX) powder Take 17 g (1 capful) by mouth daily  Qty: 1 Bottle, Refills: 3    Associated Diagnoses: Constipation, unspecified constipation type      Psyllium (FIBER) 0.52 g CAPS Take 1 capsule by mouth daily  Qty: 90 capsule, Refills: 0    Associated Diagnoses: Constipation, unspecified constipation type      warfarin ANTICOAGULANT (COUMADIN) 3 MG tablet Take 3-4.5 mg by mouth daily Takes  4.5mg on Tuesday, Thursday, Saturday  3mg all other days (Mon,Wed,Fri,Sun)                 Discharge Instructions and Follow-Up:      Discharge Orders      Reason for your hospital stay    Aortic stenosis (severe) status post TAVR.     Follow-up and recommended labs and tests    Follow-up with primary care provider, De Obregon, within 7 days for hospital follow-up.  The following labs/tests are recommended: CBC, BMP. Follow-up INR this week; any changes in warfarin dosing per primary anticoagulation clinic.  Cardiac follow-up per Cardiology.     Activity    Your activity upon discharge: activity as tolerated     Diet    Follow this diet upon discharge: Orders Placed This Encounter      Low Consistent CHO Diet             Consultations This Hospital Stay:      None.        Admission History:      Please see the H&P by Raeann Archer PA-C on 5/5/2020 for complete details. Briefly, Madie Silva is a 89 year old female with PMHx including DM2, CAD with prior CABG, HTN, PAF, DLD, PPM and severe aortic stenosis who was admitted on 5/5/2020 for TAVR.        Problem Oriented Hospital Course:      Severe aortic stenosis s/p TAVR (5/5/2020).  * Followed by both Dr. Red and  Dr. Arcos outpatient. Worsening symptoms of SOB since 10/2019. Repeat echocardiogram in 1/2020 showed severe aortic valve stenosis with valve area of 0.8 and was interested in pursuing TAVR.   * Underwent TAVR 5/5. Noted unsuccessful closure of right femoral arteriotomy due to suture failure with need for femstop placement and 6h bedrest. Echo 5/5 showed TAVR implanted, no AI or PVL.  - Continue ASA and Plavix; discontinue Plavix 5/11/2020 and continue with ASA thereafter (already on warfarin) per Cardiology rec's.  - Follow-up with Cardiology.     CAD s/p 4v CABG (2011).  Hypertension (benign essential).  DLD.  [PTA BP meds: losartan 100 mg daily.]  S/p CABG 4v 2011 (LIMA-LAD, SVG-ramus, SVG-OM and SVG-PDA). Most recent angiogram 1/2020 with  of SVG to ramus, remainder of grafts patent. Started low dose metoprolol XL by Cardiology.  - Continue atorvastatin 80 mg po every day  - Continue losartan 25 mg daily (decreased), metoprolol XL 12.5 mg daily (new).    DM2 with peripheral neuropathy.  [PTA: metformin 500 mg daily.]  Hgb A1C 6.8 in 12/2019.   Recent Labs   Lab 05/06/20  1128 05/06/20  0536 05/06/20  0227 05/05/20  2204 05/05/20  1742 05/05/20  1232 05/05/20  0946 05/05/20  0558 04/30/20  1016   GLC  --  113*  --   --   --   --  137* 140* 209*   *  --  121* 118* 105* 105*  --   --   --    - Continue low CHO diet.  - Resume metformin after discharge.  - Continue gabapentin.      Paroxysmal atrial fibrillation.  Symptomatic bradycardia s/p PPM placement:   - Continue warfarin.     Anemia, suspect dilutional component.  Hgb normal on admit 5/5. No overt clinical signs of major bleeding.   Recent Labs   Lab 05/06/20  0536 05/05/20  0946 05/05/20  0558 04/30/20  1016   HGB 10.6* 10.1* 11.8 11.8   - Monitor CBC outpatient.     GERD.  Hiatal hernia.  Esophageal stricture s/p dilatation.  Walters's esophagus.  - Continue omeprazole.     Hypothyroidism.  - Continue levothyroxine.           Pending Results:         Unresulted Labs Ordered in the Past 30 Days of this Admission     Date and Time Order Name Status Description    2020 1249 INR In process                 Discharge Disposition:      Discharged to home.        Discharge Time:      Less than 30 minutes.        Key Imaging Studies, Lab Findings and Procedures/Surgeries:      Procedure 2020:  Cardiac Catheterization    Narrative    1. Symptomatic severe aortic stenosis.  2. Successful transfemoral transcatheter aortic valve replacement with a   20mm Khan Nica 3 valve. No paravalvular leak.  3. Temporary pacemaker insertion and removal.  4. Left heart catheterization with LVEDP of 14 mmHg.  5. Unsuccessful closure of the right femoral arteriotomy due to failure of   the single Perclose Proglide suture. Manual pressure was applied and a   Femstop was placed for successful hemostasis.             Results for orders placed or performed during the hospital encounter of 20   Echo Limited    Narrative    [id number]  [id number]  [id number]  [id number]^RENEE^SAMREEN^[facility]  Echocardiography Laboratory  [address] [place] [address]        Name: CORDELL AMEZQUITA  MRN: [id number]  : 1929  Study Date: 2020 07:59 AM  Age: 89 yrs  Gender: Female  Patient Location: [facility]  Reason For Study: Aortic Valve Replacement  Ordering Physician: SAMREEN DURAN  Referring Physician: De Obregon  Performed By: Anna Scott     BSA: 1.8 m2  Height: 65 in  Weight: 158 lb  HR: 87  BP: 142/91 mmHg  _____________________________________________________________________________  __        Procedure  Limited Portable Echo Adult. Optison (NDC #3219-5786) given intravenously.  _____________________________________________________________________________  __        Interpretation Summary     Hyperdynamic left ventricular function. LVEF is estimated at >70%.  Right ventricle is not well visualized, however global systolic  function is  probably normal.  History of TAVR with a 20mm Khan Nica 3 bioprosthetic aortic valve in  place. Doppler interrogation shows elevated transvalvular velocities at the  upper limits of normal. Vmax 2.9 m/s, mean gradient 19 mmHg, VICKY 1.3cm2. No  valvular insufficiency or paravalvular leak.     This study was compared to a prior TTE from 5/5/2020. Global LV function is  hyperdynamic on this present study. Evaluation of the prosthetic aortic valve  shows higher transvalvular velocities/gradients on this present study,  previously Vmax 1.7m/s, mean gradient 7 mmHg, interrogation was also done from  the apical 4-chamber window. The prosthetic valve leaflets appear to be  opening normally on this present study, and so this increase in transvalvular  velocities may be accounted for, at least partially, by the relative  hyperdynamic state. There is also a sigmoid septum, though it does not appear  that there is a significant dynamic LVOT gradient present.  _____________________________________________________________________________  __        Left Ventricle  A sigmoid septum is present. Hyperdynamic left ventricular function. The  visual ejection fraction is estimated at >70%. LVEF 73% based on biplane 2D  tracing. No regional wall motion abnormalities noted.     Right Ventricle  The right ventricle is not well visualized. Right ventricle is not well  visualized, however global systolic function is probably normal.     Mitral Valve  There is moderate mitral annular calcification. There is mild (1+) mitral  regurgitation.     Tricuspid Valve  There is mild (1+) tricuspid regurgitation. The right ventricular systolic  pressure is approximated at 35mmHg plus the right atrial pressure.        Aortic Valve  The prosthetic aortic valve is well-seated. History of TAVR with a 20mm  Khan Nica 3 bioprosthetic aortic valve in place. Doppler interrogation  shows elevated transvalvular velocities at the upper  limits of normal. Vmax  2.9 m/s, mean gradient 19 mmHg, VICKY 1.3cm2. No valvular insufficiency or  paravalvular leak.     Pulmonic Valve  The pulmonic valve is not well seen, but is grossly normal.     Vessels  The aortic root is normal size. Normal size ascending aorta.     Pericardium  There is no pericardial effusion.     _____________________________________________________________________________  __  MMode/2D Measurements & Calculations  Ao root diam: 2.8 cm  asc Aorta Diam: 3.0 cm  LVOT diam: 2.0 cm  LVOT area: 3.0 cm2        Doppler Measurements & Calculations  Ao V2 max: 297.2 cm/sec  Ao max P.0 mmHg  Ao V2 mean: 207.1 cm/sec  Ao mean P.3 mmHg  Ao V2 VTI: 66.4 cm  VICKY(I,D): 1.3 cm2  VICKY(V,D): 1.3 cm2  LV V1 max P.1 mmHg  LV V1 max: 123.6 cm/sec  LV V1 VTI: 28.5 cm     SV(LVOT): 86.2 ml  SI(LVOT): 48.2 ml/m2  TR max kevin: 296.8 cm/sec  TR max P.2 mmHg  AV Kevin Ratio (DI): 0.42  VICKY Index (cm2/m2): 0.73           _____________________________________________________________________________  __           Report approved by: Randy Reid 2020 10:07 AM

## 2020-05-06 NOTE — PROGRESS NOTES
[facility]    Internal Medicine Hospitalist Progress Note  05/06/2020  I evaluated patient on the above date.    Mervin Vergara Jr., MD  [phone] (p)  Text Page        Assessment & Plan New actions/orders today (05/06/2020) are underlined.    Madie Silva is a 89 year old female with PMHx including DM2, CAD with prior CABG, HTN, PAF, DLD, PPM and severe aortic stenosis who was admitted on 5/5/2020 for TAVR.     Severe aortic stenosis s/p TAVR (5/5/2020).  * Followed by both Dr. Red and Dr. Arcos outpatient. Worsening symptoms of SOB since 10/2019. Repeat echocardiogram in 1/2020 showed severe aortic valve stenosis with valve area of 0.8 and was interested in pursuing TAVR.   * Underwent TAVR 5/5. Noted unsuccessful closure of right femoral arteriotomy due to suture failure with need for femstop placement and 6h bedrest. Echo 5/5 showed TAVR implanted, no AI or PVL.  - Continue ASA and Plavix; discontinue Plavix 5/11/2020 and continue with ASA thereafter (already on warfarin) per Cardiology rec's.  - Follow-up with Cardiology.     CAD s/p 4v CABG (2011).  Hypertension (benign essential).  DLD.  [PTA BP meds: losartan 100 mg daily.]  S/p CABG 4v 2011 (LIMA-LAD, SVG-ramus, SVG-OM and SVG-PDA). Most recent angiogram 1/2020 with  of SVG to ramus, remainder of grafts patent.   - Continue atorvastatin 80 mg po every day  - Continue losartan 25 mg daily (decreased).  - Add low dose metoprolol at discharge per Cardiology rec's.    DM2 with peripheral neuropathy.  [PTA: metformin 500 mg daily.]  Hgb A1C 6.8 in 12/2019.   Recent Labs   Lab 05/06/20  0536 05/06/20  0227 05/05/20  2204 05/05/20  1742 05/05/20  1232 05/05/20  0946 05/05/20  0558 04/30/20  1016   *  --   --   --   --  137* 140* 209*   BGM  --  121* 118* 105* 105*  --   --   --    - Continue low CHO diet.  - Continue ISS.  - Resume metformin at discharge.  - Continue gabapentin.      Paroxysmal atrial fibrillation.  Symptomatic  "bradycardia s/p PPM placement:   - Resume warfarin today 5/6.    Anemia, suspect dilutional component.  Hgb normal on admit 5/5. No overt clinical signs of major bleeding.   Recent Labs   Lab 05/06/20  0536 05/05/20  0946 05/05/20  0558 04/30/20  1016   HGB 10.6* 10.1* 11.8 11.8   - Monitor CBC.  - Consider prbc transfusion if hgb </= 7.0 or if significant bleeding with hemodynamic instability or if symptomatic.    GERD.  Hiatal hernia.  Esophageal stricture s/p dilatation.  Walters's esophagus.  - Continue omeprazole.     Hypothyroidism.  - Continue levothyroxine.       Diet: Regular Diet Adult    Prophylaxis: PCD's, ambulation.   Hendrix Catheter: not present  Code Status: Full Code      Disposition Plan   Expected discharge: Today, recommended to prior living arrangement.  Entered: Mervin Vergara MD 05/06/2020, 7:43 AM         Interval History   Doing better overall.    -Data reviewed today: I reviewed all new labs and imaging over the last 24 hours. I personally reviewed no images or EKG's today.    Physical Exam   Heart Rate: 71, Blood pressure (!) 142/91, pulse 71, temperature 98.6  F (37  C), temperature source Oral, resp. rate 26, height 1.651 m (5' 5\"), weight 71.9 kg (158 lb 9.6 oz), SpO2 96 %, not currently breastfeeding.  Vitals:    05/05/20 0626 05/06/20 0430   Weight: 71.9 kg (158 lb 9.6 oz) 71.9 kg (158 lb 9.6 oz)     Vital Signs with Ranges  Temp:  [97.3  F (36.3  C)-98.6  F (37  C)] 98.6  F (37  C)  Pulse:  [69-77] 71  Heart Rate:  [69-87] 71  Resp:  [10-26] 26  BP: (105-151)/(42-95) 142/91  SpO2:  [93 %-99 %] 96 %  Patient Vitals for the past 24 hrs:   BP Temp Temp src Pulse Heart Rate Resp SpO2 Weight   05/06/20 0608 (!) 142/91 -- -- 71 71 26 96 % --   05/06/20 0430 123/77 98.6  F (37  C) Oral 69 72 21 96 % 71.9 kg (158 lb 9.6 oz)   05/06/20 0225 134/71 -- -- 75 70 15 98 % --   05/06/20 0000 119/69 98.4  F (36.9  C) Oral -- 87 18 98 % --   05/05/20 2200 (!) 151/64 -- -- 70 70 17 97 % -- "   05/05/20 2000 127/42 98.6  F (37  C) Oral 69 69 16 96 % --   05/05/20 1900 -- 98.6  F (37  C) Oral -- -- -- -- --   05/05/20 1815 (!) 142/90 -- -- -- 72 18 96 % --   05/05/20 1600 (!) 144/47 97.4  F (36.3  C) Oral 69 70 18 98 % --   05/05/20 1500 (!) 143/58 97.3  F (36.3  C) Oral 69 70 16 99 % --   05/05/20 1425 -- -- -- -- 70 15 94 % --   05/05/20 1400 137/72 -- -- 69 69 12 95 % --   05/05/20 1340 -- -- -- -- 69 18 97 % --   05/05/20 1330 -- -- -- -- 70 18 96 % --   05/05/20 1300 136/61 -- -- 70 69 16 97 % --   05/05/20 1230 -- -- -- -- 70 20 97 % --   05/05/20 1200 107/59 98.4  F (36.9  C) Oral 71 70 19 98 % --   05/05/20 1130 122/88 -- -- 77 75 18 98 % --   05/05/20 1100 113/56 -- -- 69 69 15 98 % --   05/05/20 1050 116/49 97.8  F (36.6  C) Temporal 70 74 19 98 % --   05/05/20 1047 112/52 -- -- -- 71 10 99 % --   05/05/20 1040 (!) 105/95 -- -- 72 71 15 98 % --   05/05/20 1030 117/65 -- -- 69 70 21 99 % --   05/05/20 1020 124/58 -- -- 70 70 14 98 % --   05/05/20 1010 116/68 -- -- 70 71 12 97 % --   05/05/20 1000 115/56 97.9  F (36.6  C) Temporal 73 73 16 93 % --   05/05/20 0950 118/56 -- -- 69 70 25 94 % --   05/05/20 0940 125/59 -- -- 71 71 19 98 % --   05/05/20 0933 124/63 97.4  F (36.3  C) Temporal 77 -- 20 98 % --     I/O's Last 24 hours  I/O last 3 completed shifts:  In: 2010 [P.O.:510; I.V.:1500]  Out: 1350 [Urine:1350]    Constitutional: Awake, alert.  Respiratory: Diminished in bases. No crackles or wheezes.  Cardiovascular: RRR, +I-II/VI systolic m, no g/r.  GI: Soft, nt, nd, +BS.  Skin/Integumen:   Other:        Data   Recent Labs   Lab 05/06/20  0536 05/05/20  0946 05/05/20  0558 04/30/20  1016   WBC 7.6 7.2  --  7.5   HGB 10.6* 10.1* 11.8 11.8   MCV 76* 76*  --  76*    201  --  251   INR  --   --  1.02 2.31*    136  --  135   POTASSIUM 4.5 4.7 4.1 4.2   CHLORIDE 108 107  --  105   CO2 25 23  --  25   BUN 20 21  --  21   CR 1.00 0.86  --  0.94   ANIONGAP 6 6  --  5   SHAWNEE 8.6 8.2*  --  9.0    * 137* 140* 209*   ALBUMIN  --  2.9*  --  3.4   PROTTOTAL  --  6.1*  --  7.3   BILITOTAL  --  0.4  --  0.4   ALKPHOS  --  105  --  136   ALT  --  22  --  28   AST  --  19  --  20     Recent Labs   Lab Test 20  0536 20  0227 20  2204 20  1742 20  1232 20  0946 20  0558 20  1016 20  0917   *  --   --   --   --  137* 140* 209* 160*   BGM  --  121* 118* 105* 105*  --   --   --   --          Recent Results (from the past 24 hour(s))   Cardiac Catheterization    Narrative    1. Symptomatic severe aortic stenosis.  2. Successful transfemoral transcatheter aortic valve replacement with a   20mm Khan Nica 3 valve. No paravalvular leak.  3. Temporary pacemaker insertion and removal.  4. Left heart catheterization with LVEDP of 14 mmHg.  5. Unsuccessful closure of the right femoral arteriotomy due to failure of   the single Perclose Proglide suture. Manual pressure was applied and a   Femstop was placed for successful hemostasis.     Echocardiogram Complete    Narrative    [id number]  [id number]  [id number]  [id number]^ANABEL^CARL^RENATO           [facility]  Echocardiography Laboratory  [address] [place] [address]        Name: CORDELL AMEZQUITA  MRN: [id number]  : 1929  Study Date: 2020 07:06 AM  Age: 89 yrs  Gender: Female  Patient Location: [facility]  Reason For Study: Nonrheumatic aortic valve stenosis  Ordering Physician: CARL LITTLE  Referring Physician: CARL LITTLE  Performed By: Lori Trevino     BSA: 1.8 m2  Height: 65 in  Weight: 158 lb  HR: 94  BP: 161/83 mmHg  _____________________________________________________________________________  __        Procedure  Complete Portable Echo Adult.  _____________________________________________________________________________  __        Interpretation Summary     1. The left ventricle is normal in structure, function and size. The visual  ejection fraction is estimated at  65%.  2. The right ventricle is normal in structure, function and size.  3. TAVR implanted with 20mm Khan Nica 3. Mean 7mmHg, Vmax 1.7m/s, DI  0.47. No AI or PVL.     No changes compared to echo from 20.  _____________________________________________________________________________  __        Left Ventricle  The left ventricle is normal in structure, function and size. There is normal  left ventricular wall thickness. The visual ejection fraction is estimated at  65%. Left ventricular diastolic function is indeterminate. Normal left  ventricular wall motion.     Right Ventricle  The right ventricle is normal in structure, function and size.     Atria  The left atrium is mildly dilated. Right atrial size is normal. There is no  atrial shunt seen.     Mitral Valve  There is mild mitral annular calcification. There is mild (1+) mitral  regurgitation.        Tricuspid Valve  There is mild (1+) tricuspid regurgitation. The right ventricular systolic  pressure is approximated at 39mmHg plus the right atrial pressure.     Aortic Valve  No aortic regurgitation is present. Severe valvular aortic stenosis. Pre-TAVR,  mean 32mmHg. TAVR implanted with 20mm Khan Nica 3. Mean 7mmHg, Vmax  1.7m/s, DI 0.47. No AI or PVL.     Pulmonic Valve  The pulmonic valve is normal in structure and function.     Vessels  Normal ascending, transverse (arch), and descending aorta. The inferior vena  cava was normal in size with preserved respiratory variability.     Pericardium  There is no pericardial effusion.        Rhythm  Sinus rhythm was noted.  _____________________________________________________________________________  __  MMode/2D Measurements & Calculations  LVOT diam: 1.7 cm  LVOT area: 2.2 cm2           Doppler Measurements & Calculations  Ao V2 max: 170.1 cm/sec  Ao max P.0 mmHg  Ao V2 mean: 122.5 cm/sec  Ao mean P.6 mmHg  Ao V2 VTI: 40.5 cm  VICKY(I,D): 1.0 cm2  VICKY(V,D): 1.0 cm2  LV V1 max P.5 mmHg  LV V1  max: 79.7 cm/sec  LV V1 VTI: 18.5 cm  SV(LVOT): 40.8 ml  SI(LVOT): 22.8 ml/m2  TV V2 max: 312.2 cm/sec  TV max P.0 mmHg  AV Kevin Ratio (DI): 0.47  VICKY Index (cm2/m2): 0.56           _____________________________________________________________________________  __           Report approved by: Randy Calderon 2020 10:38 AM          Medications   All medications were reviewed.      aspirin  81 mg Oral Daily     atorvastatin  80 mg Oral Daily     clopidogrel  75 mg Oral Daily     gabapentin  300 mg Oral At Bedtime     insulin aspart  1-7 Units Subcutaneous TID AC     insulin aspart  1-5 Units Subcutaneous At Bedtime     levothyroxine  50 mcg Oral QAM AC     omeprazole  20 mg Oral Daily     polyethylene glycol  17 g Oral QPM

## 2020-05-07 ENCOUNTER — TELEPHONE (OUTPATIENT)
Dept: INTERNAL MEDICINE | Facility: CLINIC | Age: 85
End: 2020-05-07

## 2020-05-07 ENCOUNTER — PATIENT OUTREACH (OUTPATIENT)
Dept: CARE COORDINATION | Facility: CLINIC | Age: 85
End: 2020-05-07

## 2020-05-07 ENCOUNTER — DOCUMENTATION ONLY (OUTPATIENT)
Dept: INTERNAL MEDICINE | Facility: CLINIC | Age: 85
End: 2020-05-07

## 2020-05-07 ENCOUNTER — TELEPHONE (OUTPATIENT)
Dept: CARDIOLOGY | Facility: CLINIC | Age: 85
End: 2020-05-07

## 2020-05-07 DIAGNOSIS — E11.21 TYPE 2 DIABETES MELLITUS WITH DIABETIC NEPHROPATHY, WITHOUT LONG-TERM CURRENT USE OF INSULIN (H): Primary | ICD-10-CM

## 2020-05-07 DIAGNOSIS — I48.0 PAROXYSMAL ATRIAL FIBRILLATION (H): ICD-10-CM

## 2020-05-07 NOTE — PROGRESS NOTES
Clinic Care Coordination Contact  Community Health Worker Initial Outreach    CHW Initial Information Gathering:  Referral Source: IP Report  Preferred Hospital: [facility]  [phone]  No PCP office visit in Past Year: No    Patient accepts CC: No, Patient stated that she would like more information first.  An introduction will be mailed.     IZAIAH Gloria  Clinic Care Coordination  [facility], [facility], and [facility]  Phone: [phone]

## 2020-05-07 NOTE — PROGRESS NOTES
ANTICOAGULATION  MANAGEMENT: Discharge Review    Madie Silva chart reviewed for anticoagulation continuity of care    Hospital Admission on 5/5 for TAVR .    Discharge disposition: Home    Results:    Recent labs: (last 7 days)     05/06/20  1312   INR 1.12     Anticoagulation inpatient management:     home regimen continued and told to take warfarin 4.5 mg yesterday    Anticoagulation discharge instructions:     Warfarin dosing: home regimen continued   Bridging: No   INR goal change: No      Medication changes affecting anticoagulation: Yes: plavix    Additional factors affecting anticoagulation: No    Plan     Recommend to check INR on 5/12    Spoke with Madie    Anticoagulation Calendar updated    Connie Hickman RN

## 2020-05-07 NOTE — TELEPHONE ENCOUNTER
Spoke with patient regarding INR follow up and INR scheduled for 5/12. Pt to resume previous dosing.  Connie Hickman RN  [facility]

## 2020-05-07 NOTE — LETTER
[facility] CARE COORDINATION      May 7, 2020      Madie Silva  [address] [place] [id number]      Dear Madie,    I am a clinic community health worker who works with De Obregon MD at [facility]. I wanted to thank you for spending the time to talk with me.  Below is a description of clinic care coordination and how I can further assist you.      The clinic care coordination team is made up of a registered nurse,  and community health worker who understand the health care system. The goal of clinic care coordination is to help you manage your health and improve access to the health care system in the most efficient manner. The team can assist you in meeting your health care goals by providing education, coordinating services, strengthening the communication among your providers and supporting you with any resource needs.    Please feel free to contact me at [phone] with any questions or concerns. We are focused on providing you with the highest-quality healthcare experience possible and that all starts with you.     Sincerely,     IZAIAH Gloria  Clinic Care Coordination  [facility]  Phone: [phone]

## 2020-05-07 NOTE — TELEPHONE ENCOUNTER
Aortic stenosis (severe) status post TAVR.    START taking these medications     Details   clopidogrel (PLAVIX) 75 MG tablet Take 1 tablet (75 mg) by mouth daily ; stop after 5/11/2020.  Qty: 5 tablet, Refills: 0     Associated Diagnoses: Nonrheumatic aortic valve stenosis       metoprolol succinate ER (TOPROL-XL) 25 MG 24 hr tablet Take 0.5 tablets (12.5 mg) by mouth daily  Qty: 30 tablet, Refills: 3     Associated Diagnoses: Essential hypertension                CONTINUE these medications which have CHANGED     Details   losartan (COZAAR) 50 MG tablet Take 0.5 tablets (25 mg) by mouth daily  Qty: 30 tablet, Refills: 3     Associated Diagnoses: Benign essential hypertension; Essential hypertension       metFORMIN (GLUCOPHAGE) 500 MG tablet TAKE ONE TABLET BY MOUTH DAILY WITH BREAKFAST  Qty: 90 tablet, Refills: 3     Associated Diagnoses: Type 2 diabetes mellitus with diabetic nephropathy, without long-term current use of insulin (H)     Reason for your hospital stay     Aortic stenosis (severe) status post TAVR.          Follow-up and recommended labs and tests     Follow-up with primary care provider, De Obregon, within 7 days for hospital follow-up.  The following labs/tests are recommended: CBC, BMP. Follow-up INR this week; any changes in warfarin dosing per primary anticoagulation clinic.  Cardiac follow-up per Cardiology.          Activity     Your activity upon discharge: activity as tolerated          Diet     Follow this diet upon discharge: Orders Placed This Encounter      Low Consistent CHO Diet

## 2020-05-07 NOTE — TELEPHONE ENCOUNTER
Orders placed.  Patient should follow-up with cardiology and does not need to see me at this time.

## 2020-05-07 NOTE — TELEPHONE ENCOUNTER
Pt has multiple concerns and questions. She is unsure about INR follow up, about PCP follow up, unsure about her medications and taking them correctly.    PCP- please sign for MTM referral and advise on whether you want to see pt for hospital follow up. She has cardiology follow up on 05/12/20. Also, diabetic educator referral pended as pt does not know how to manage recommended consistent carb diet.    Anticoag- Pt not sure whether she needs a follow up appointment. She is on warfarin and just restarted. Please advise.

## 2020-05-07 NOTE — TELEPHONE ENCOUNTER
"Hospital/TCU/ED for chronic condition Discharge Protocol    \"Hi, my name is July Rios RN, a registered nurse, and I am calling from [facility].  I am calling to follow up and see how things are going for you after your recent emergency visit/hospital/TCU stay.\"    Tell me how you are doing now that you are home?\"     Biggest issue is that pt can't bend over. She can not pick things up off the floor. Son was with her last night and is staying with her today.       Discharge Instructions    \"Let's review your discharge instructions.  What is/are the follow-up recommendations?  Pt. Response: Follow up with cardiology. She will be doing this next week over the phone.     \"Has an appointment with your primary care provider been scheduled?\"   No (schedule appointment)    \"When you see the provider, I would recommend that you bring your medications with you.\"    Medications    \"Tell me what changed about your medicines when you discharged?\"    Changes to chronic meds?    Patient does not know - Epic MTM referral needed    \"What questions do you have about your medications?\"    She is having trouble with medication and figuring out her medication schedule - Questions cannot be easily answered - Epic MTM referral needed     New diagnoses of heart failure, COPD, diabetes, or MI?    No       On warfarin: \"Were you given any recommendations for follow-up with the anticoagulation clinic?\" No - (Can recommendation be found in discharge instructions/discharge summary?)  No -  Continuing on warfarin and has had medication changes - schedule within 3 days for Anticoagulation clinic    Post Discharge Medication Reconciliation Status: unable to reconcile discharge medications due to pt does not know.    Was MTM referral placed (*Make sure to put transitions as reason for referral)?   Yes - \"The Medication Therapy  will call you within the next few days to schedule an appointment with an MTM pharmacist " "to discuss your medicines and make sure they are working as well as they possibly can for you. This visit can be done in a [place] clinic or on the phone and is at no charge to you.\"    Call Summary    \"What questions or concerns do you have about your recent visit and your follow-up care?\"     none    \"If you have questions or things don't continue to improve, we encourage you contact us through the main clinic number (give number).  Even if the clinic is not open, triage nurses are available 24/7 to help you.     We would like you to know that our clinic has extended hours (provide information).  We also have urgent care (provide details on closest location and hours/contact info)\"      \"Thank you for your time and take care!\"             "

## 2020-05-07 NOTE — TELEPHONE ENCOUNTER
Called patient post-hospital discharge. She is feeling well, enjoying sitting outside in the sun. Her son and daughter in law stayed with her last night. Inquired about her access sites, she states she has trouble seeing them. She will have her daughter in law take a look today. Pt has lab appt tomorrow and INR scheduled for 5/12. Pt does not have any questions at this time, other than she wants to confirm she can drive in 1 week. Follow-up telephone call with Lori RODRIGUEZ scheduled 5/12/20. She has my direct contact information if needed. Kim Wong RN

## 2020-05-08 DIAGNOSIS — Z95.2 S/P TAVR (TRANSCATHETER AORTIC VALVE REPLACEMENT): ICD-10-CM

## 2020-05-08 DIAGNOSIS — I35.0 AORTIC STENOSIS, SEVERE: ICD-10-CM

## 2020-05-08 LAB
ANION GAP SERPL CALCULATED.3IONS-SCNC: 6 MMOL/L (ref 3–14)
BUN SERPL-MCNC: 16 MG/DL (ref 7–30)
CALCIUM SERPL-MCNC: 8.7 MG/DL (ref 8.5–10.1)
CHLORIDE SERPL-SCNC: 106 MMOL/L (ref 94–109)
CO2 SERPL-SCNC: 26 MMOL/L (ref 20–32)
CREAT SERPL-MCNC: 0.91 MG/DL (ref 0.52–1.04)
ERYTHROCYTE [DISTWIDTH] IN BLOOD BY AUTOMATED COUNT: 18.1 % (ref 10–15)
GFR SERPL CREATININE-BSD FRML MDRD: 55 ML/MIN/{1.73_M2}
GLUCOSE SERPL-MCNC: 147 MG/DL (ref 70–99)
HCT VFR BLD AUTO: 34 % (ref 35–47)
HGB BLD-MCNC: 11 G/DL (ref 11.7–15.7)
INR PPP: 1.02 (ref 0.86–1.14)
INTERPRETATION ECG - MUSE: NORMAL
MCH RBC QN AUTO: 24.6 PG (ref 26.5–33)
MCHC RBC AUTO-ENTMCNC: 32.4 G/DL (ref 31.5–36.5)
MCV RBC AUTO: 76 FL (ref 78–100)
PLATELET # BLD AUTO: 173 10E9/L (ref 150–450)
POTASSIUM SERPL-SCNC: 4.6 MMOL/L (ref 3.4–5.3)
RBC # BLD AUTO: 4.48 10E12/L (ref 3.8–5.2)
SODIUM SERPL-SCNC: 138 MMOL/L (ref 133–144)
WBC # BLD AUTO: 10.1 10E9/L (ref 4–11)

## 2020-05-08 PROCEDURE — 85027 COMPLETE CBC AUTOMATED: CPT | Performed by: INTERNAL MEDICINE

## 2020-05-08 PROCEDURE — 36415 COLL VENOUS BLD VENIPUNCTURE: CPT | Performed by: INTERNAL MEDICINE

## 2020-05-08 PROCEDURE — 85610 PROTHROMBIN TIME: CPT | Performed by: INTERNAL MEDICINE

## 2020-05-08 PROCEDURE — 80048 BASIC METABOLIC PNL TOTAL CA: CPT | Performed by: INTERNAL MEDICINE

## 2020-05-09 NOTE — OP NOTE
Procedure Date: 05/05/2020      SURGEON:  Vitor Mendoza MD      INTERVENTIONAL CARDIOLOGIST:  Jenny Luther MD      ASSISTANT:  Kodak Thorpe MD      PREOPERATIVE DIAGNOSIS:  Severe symptomatic aortic stenosis.      POSTOPERATIVE DIAGNOSIS:  Severe symptomatic aortic stenosis.      NAME OF OPERATION:  Transcatheter aortic valve replacement (TAVR) with a 20 mm Khan Nica 3 valve, percutaneous right transfemoral approach.      ANESTHESIA:  Monitored anesthesia care.      INDICATIONS FOR PROCEDURE:  Ms. Silva is a very pleasant 89-year-old, frail woman who was recently evaluated for severe symptomatic aortic stenosis.  She was deemed an appropriate TAVR candidate and was taken to the hybrid suite today for transcatheter aortic valve replacement.  Given her advanced age, she was not deemed a candidate for emergent sternotomy.      DESCRIPTION OF PROCEDURE:  After informed consent was obtained, the patient was brought down to the operating room and was placed on the OR table in a supine position.  Access was obtained in the right and left common femoral arteries and the left common femoral vein by Interventional Cardiology.  Please refer to their dictation for details of the procedure.  Temporary pacer was placed in the RV through the left common femoral vein.  The right common femoral artery used for valve delivery was pre-closed with Perclose device.  The Khan eSheath was inserted through the right common femoral artery.  Access was obtained in the left ventricle.  S3 valve was positioned across the native valve in the 80/20 position.  Under rapid pacing at 160 beats per minute, the valve was successfully deployed with a final balloon volume of nominal +1 mL.  Subsequent TTE and ascending aortogram demonstrated no paravalvular leak.  The delivery system was removed, and the right common femoral artery was closed with the Perclose device.  There were no intraoperative complications, and the patient  tolerated the operation well.         MANJIT PALMER MD             D: 2020   T: 2020   MT: LLOYD      Name:     CORDELL AMEZQUITA   MRN:      [id number]        Account:        [id number]   :      1929           Procedure Date: 2020      Document: [id number]

## 2020-05-10 NOTE — PROGRESS NOTES
MTM ENCOUNTER  SUBJECTIVE/OBJECTIVE:                           Madie Silva is a 89 year old female called for a transitions of care visit. She was discharged from [facility] on 5-6-2020 for TAVR.      Patient consented to a telehealth visit: yes  Telemedicine Visit Details  Type of service:  Telephone visit  Start Time: 2:30 PM  End Time: 3:20 PM  Originating Location (pt. Location): Home  Distant Location (provider location):  [facility] M[facility]  Mode of Communication:  Telephone    Chief Complaint:   Feels better -- diarrhea, ha , she thought   She;d like to know when to take her bp meds etc.         Personal Healthcare Goals: recover from TAVR , stay ambulatory and be able to garden this summer.     Allergies/ADRs: Reviewed in Epic  Tobacco:  reports that she has never smoked. She has never used smokeless tobacco.  Alcohol: not currently using  Caffeine: no caffeine  Activity:  Likes to be active --gardening.  Balance issues --no more snow shoveling.   PMH: Reviewed in Epic    Discharge Summary   Mervin Vergara MD (Physician)     Hospitalist      [facility]  Discharge Summary        Madie Silva MRN# [id number]   YOB: 1929 Age: 89 year old      Date of Admission:      5/5/2020  Date of Discharge:      5/6/2020  Admitting Physician:   Mervin Vergara MD  Discharge Physician:  Mervin Vergara MD     Primary Provider: De Obregon  Primary Care Physician Phone Number: [phone]         Discharge Diagnoses:   1. Severe aortic stenosis s/p TAVR (5/5/2020).  2. Anemia, suspect dilutional component.         Other Chronic Medical Problems:      1. DM2 with peripheral neuropathy.  2. CAD s/p 4v CABG (2011).  3. Hypertension (benign essential).  4. DLD.  5. Paroxysmal atrial fibrillation.  6. Symptomatic bradycardia s/p PPM placement:   7. GERD.  8. Hiatal hernia.  9. Esophageal stricture s/p dilatation.  10. Walters's esophagus.  11.  Hypothyroidism.        Allergies:         Allergies   Allergen Reactions     Amoxicillin         hives     Bisphosphonates         Swallowing disorder     Boniva [Ibandronate Sodium] Nausea and Vomiting       Diarrhea, N/V with oral.  IV caused arm to swell      Fosamax [Alendronic Acid]         Severe gastrointestinal reaction     Lisinopril         cough     Niacin         rash     Simvastatin         myalgias            Discharge Medications:              Current Discharge Medication List            START taking these medications     Details   clopidogrel (PLAVIX) 75 MG tablet Take 1 tablet (75 mg) by mouth daily ; stop after 5/11/2020.  She stated she had diarrhea, headache, nausea etc form this drug --feels better today -- this is her last day of this drug.   Qty: 5 tablet, Refills: 0     Associated Diagnoses: Nonrheumatic aortic valve stenosis       metoprolol succinate ER (TOPROL-XL) 25 MG 24 hr tablet Take 0.5 tablets (12.5 mg) by mouth daily  Qty: 30 tablet, Refills: 3    Not checking BP at home .   BP Readings from Last 3 Encounters:   05/06/20 (!) 151/49   05/01/20 (!) 149/75   04/30/20 (!) 158/63              Associated Diagnoses: Essential hypertension                CONTINUE these medications which have CHANGED     Details   losartan (COZAAR) 50 MG tablet Take 0.5 tablets (25 mg) by mouth daily    She has 100mg tabs - she has quite a bit left - asking if she can quarter them ?     Qty: 30 tablet, Refills: 3     Associated Diagnoses: Benign essential hypertension; Essential hypertension       metFORMIN (GLUCOPHAGE) 500 MG tablet TAKE ONE TABLET BY MOUTH DAILY WITH BREAKFAST      Lab Results   Component Value Date    A1C 6.9 05/05/2020    A1C 6.8 12/16/2019    A1C 6.7 06/06/2019    A1C 6.6 12/06/2018    A1C 6.9 04/18/2018         Qty: 90 tablet, Refills: 3     Associated Diagnoses: Type 2 diabetes mellitus with diabetic nephropathy, without long-term current use of insulin (H)                CONTINUE  these medications which have NOT CHANGED     Details   acetaminophen (TYLENOL ARTHRITIS PAIN) 650 MG CR tablet Take 1 tablet at bedtime daily.      Associated Diagnoses: Primary osteoarthritis involving multiple joints       aspirin EC 81 MG EC tablet Take 1 tablet by mouth daily.     Associated Diagnoses: S/P coronary artery bypass graft x 3       atorvastatin (LIPITOR) 80 MG tablet TAKE ONE TABLET BY MOUTH ONCE DAILY  Qty: 90 tablet, Refills: 3     Associated Diagnoses: Hyperlipidemia LDL goal <100       calcium 600 MG tablet Take 1 tablet by mouth 2 times daily.       Cholecalciferol (VITAMIN D-400 PO) Take 1 tablet by mouth daily        co-enzyme Q-10 (COENZYME Q-10) 100 MG CAPS Take 1 capsule by mouth daily.       gabapentin (NEURONTIN) 100 MG capsule TAKE ONE CAPSULE BY MOUTH THREE TIMES A DAY      She prefers to take 3 capsules at bedtime for PHN --it works for her --will continue just at bedtime dosing.    Qty: 270 capsule, Refills: 3     Associated Diagnoses: Post herpetic neuralgia; Restless leg syndrome       HYDROcodone-acetaminophen (NORCO) 5-325 MG tablet Take 0.5-1 tablets by mouth every 6 hours as needed for moderate to severe pain    Takes 1/4 tab if severe pain.     Qty: 12 tablet, Refills: 0     Associated Diagnoses: Multiple joint pain       hydrocortisone, Perianal, (ANUSOL-HC) 2.5 % cream Apply twice daily to hemorrhoid.  Qty: 28 g, Refills: 0       levothyroxine (SYNTHROID/LEVOTHROID) 50 MCG tablet Take 1 tablet (50 mcg) by mouth daily    She takes with her omeprazole before b-fast .     Qty: 90 tablet, Refills: 3     Comments: Profile Rx: patient will call when needed  Associated Diagnoses: Acquired hypothyroidism       Multiple Vitamins-Minerals (CENTRUM SILVER) per tablet Take 1 tablet by mouth daily.       Multiple Vitamins-Minerals (PRESERVISION AREDS 2 PO) Take 1 tablet by mouth 2 times daily        nitroGLYCERIN (NITROSTAT) 0.4 MG SL tablet Place 0.4 mg under the tongue every 5 minutes  as needed. Up to 3 doses per episode     fyi--her bottle is 6 years old --never needed them - see plan .        omeprazole (PRILOSEC) 20 MG DR capsule Take 1 capsule (20 mg) by mouth daily--am on empty stomach .  Qty: 90 capsule, Refills: 3     Comments: Profile Rx: patient will call when needed  Associated Diagnoses: Hiatal hernia; Walters's esophagus with dysplasia       polyethylene glycol (MIRALAX) powder Take 17 g (1 capful) by mouth daily  Qty: 1 Bottle, Refills: 3     Associated Diagnoses: Constipation, unspecified constipation type       Psyllium (FIBER) 0.52 g CAPS Take 1 capsule by mouth daily  Qty: 90 capsule, Refills: 0     Associated Diagnoses: Constipation, unspecified constipation type       warfarin ANTICOAGULANT (COUMADIN) 3 MG tablet Take 3-4.5 mg by mouth daily Takes  4.5mg on Tuesday, Thursday, Saturday  3mg all other days (Mon,Wed,Fri,Sun)    Was off for 5 days for TAVR --restarted 5-6-2020 in pm .             INR   Date Value Ref Range Status   05/08/2020 1.02 0.86 - 1.14 Final    INR recheck 5-.            Discharge Instructions and Follow-Up:         Discharge Orders            Reason for your hospital stay     Aortic stenosis (severe) status post TAVR.          Follow-up and recommended labs and tests     Follow-up with primary care provider, De Obregon, within 7 days for hospital follow-up.  The following labs/tests are recommended: CBC, BMP. Follow-up INR this week; any changes in warfarin dosing per primary anticoagulation clinic.  Cardiac follow-up per Cardiology.          Activity     Your activity upon discharge: activity as tolerated          Diet     Follow this diet upon discharge: Orders Placed This Encounter      Low Consistent CHO Diet               Consultations This Hospital Stay:      None.         Admission History:      Please see the H&P by Raeann Archer PA-C on 5/5/2020 for complete details. Briefly, Madie Silva is a 89 year old female with PMHx including DM2, CAD  with prior CABG, HTN, PAF, DLD, PPM and severe aortic stenosis who was admitted on 5/5/2020 for TAVR.         Problem Oriented Hospital Course:      Severe aortic stenosis s/p TAVR (5/5/2020).  * Followed by both Dr. Red and Dr. Arcos outpatient. Worsening symptoms of SOB since 10/2019. Repeat echocardiogram in 1/2020 showed severe aortic valve stenosis with valve area of 0.8 and was interested in pursuing TAVR.   * Underwent TAVR 5/5. Noted unsuccessful closure of right femoral arteriotomy due to suture failure with need for femstop placement and 6h bedrest. Echo 5/5 showed TAVR implanted, no AI or PVL.  - Continue ASA and Plavix; discontinue Plavix 5/11/2020 and continue with ASA thereafter (already on warfarin) per Cardiology rec's.  - Follow-up with Cardiology.     CAD s/p 4v CABG (2011).  Hypertension (benign essential).  DLD.  [PTA BP meds: losartan 100 mg daily.]  S/p CABG 4v 2011 (LIMA-LAD, SVG-ramus, SVG-OM and SVG-PDA). Most recent angiogram 1/2020 with  of SVG to ramus, remainder of grafts patent. Started low dose metoprolol XL by Cardiology.  - Continue atorvastatin 80 mg po every day  - Continue losartan 25 mg daily (decreased), metoprolol XL 12.5 mg daily (new).     DM2 with peripheral neuropathy.  [PTA: metformin 500 mg daily.]  Hgb A1C 6.8 in 12/2019.               Recent Labs   Lab 05/06/20  1128 05/06/20  0536 05/06/20  0227 05/05/20  2204 05/05/20  1742 05/05/20  1232 05/05/20  0946 05/05/20  0558 04/30/20  1016   GLC  --  113*  --   --   --   --  137* 140* 209*   *  --  121* 118* 105* 105*  --   --   --    - Continue low CHO diet.  - Resume metformin after discharge.  - Continue gabapentin.      Paroxysmal atrial fibrillation.  Symptomatic bradycardia s/p PPM placement:   - Continue warfarin.     Anemia, suspect dilutional component.  Hgb normal on admit 5/5. No overt clinical signs of major bleeding.          Recent Labs   Lab 05/06/20  0536 05/05/20  0946 05/05/20  0558  04/30/20  1016   HGB 10.6* 10.1* 11.8 11.8   - Monitor CBC outpatient.     GERD.  Hiatal hernia.  Esophageal stricture s/p dilatation.  Walters's esophagus.  - Continue omeprazole.     Hypothyroidism.  - Continue levothyroxine.            Pending Results:                 Unresulted Labs Ordered in the Past 30 Days of this Admission      Date and Time Order Name Status Description     5/6/2020 1249 INR In process                   Discharge Disposition:      Discharged to home.         Discharge Time:      Less than 30 minutes.         Key Imaging Studies, Lab Findings and Procedures/Surgeries:      Procedure 5/5/2020:      Cardiac Catheterization     Narrative     1. Symptomatic severe aortic stenosis.  2. Successful transfemoral transcatheter aortic valve replacement with a   20mm Khan Nica 3 valve. No paravalvular leak.  3. Temporary pacemaker insertion and removal.  4. Left heart catheterization with LVEDP of 14 mmHg.  5. Unsuccessful closure of the right femoral arteriotomy due to failure of   the single Perclose Proglide suture. Manual pressure was applied and a   Femstop was placed for successful hemostasis.                         ASSESSMENT:                              Medication Adherence: excellent, no issues identified    TAVR/HTN:  Improving --tough procedure per patient , doing better now --we discussed don't overdo it physically in first 30 days , take BP meds losartan -1/2 tab of the 50mg. qam and 1/2 tab Metoprolol hs daily.   Her sl-nitroglycerin bottle is 6 years old --ask for a new one if she wants to carry around with her ?    PHN: stable-her discharge sheet states 100mg. tid --she states works best for her at [id number] at hs -- will have her continue that dose.     A-FIB:  INR low post hospital discharge as she was off warfarin x 5 days --now back on last 5 days --having INR recheck 5-.     PLAN:                          Post Discharge Medication Reconciliation Status: discharge  medications reconciled and changed, per note/orders (see AVS).      1. FYI-- Use the Losartan 50mg tablets --take 1/2 tablet daily , save your bottle of 100mg. Tablets in case your dose is adjusted in the future --it may be a little too difficult to try and quarter the 100mg tablet .     2. FYI-- Ask for a fresh bottle of under the tongue nitroglycerin pills if your going to carry them with you as a security if chest pain develops.    3. FYI--INR tomorrow --see if your in the goal range .     4. FYI--For your blood pressure pills--Take your 1/2 tablet Losartan in AM and 1/2 tablet Metoprolol in PM daily.         It was great to speak with you today.  I value your experience and would be very thankful for your time with providing feedback on our clinic survey. You may receive a survey via email or text message in the next few days.     Next MTM visit: Follow up with Dr. Obregon as scheduled.       I spent 50 minutes with this patient today. All changes were made via collaborative practice agreement with De Obregon  . A copy of the visit note was provided to the patient's primary care provider.      The patient was mailed a summary of these recommendations.     Dayana Hillman Rph.  Medication Therapy Management Provider  [phone]

## 2020-05-11 ENCOUNTER — TELEPHONE (OUTPATIENT)
Dept: NEUROLOGY | Facility: CLINIC | Age: 85
End: 2020-05-11

## 2020-05-11 ENCOUNTER — ALLIED HEALTH/NURSE VISIT (OUTPATIENT)
Dept: PHARMACY | Facility: CLINIC | Age: 85
End: 2020-05-11
Payer: COMMERCIAL

## 2020-05-11 DIAGNOSIS — B02.29 POST HERPETIC NEURALGIA: ICD-10-CM

## 2020-05-11 DIAGNOSIS — I48.0 PAROXYSMAL ATRIAL FIBRILLATION (H): ICD-10-CM

## 2020-05-11 DIAGNOSIS — I10 ESSENTIAL HYPERTENSION: ICD-10-CM

## 2020-05-11 DIAGNOSIS — Z95.2 S/P TAVR (TRANSCATHETER AORTIC VALVE REPLACEMENT): Primary | ICD-10-CM

## 2020-05-11 PROCEDURE — 99607 MTMS BY PHARM ADDL 15 MIN: CPT | Performed by: PHARMACIST

## 2020-05-11 PROCEDURE — 99605 MTMS BY PHARM NP 15 MIN: CPT | Performed by: PHARMACIST

## 2020-05-11 NOTE — Clinical Note
Dr. Obregon;   This patient was referred for a MTM visit after their recent hospital discharge as part of [place]'s transitions of care work. I reviewed Madie's medications. She is doing quite well --suggested a few minor changes --see my plan if needed?. Please see my note for more details and contact me with any questions. This pt is scheduled to see you on 6-.     Thank you,  Dayana Hillman Rph.  Medication Therapy Management Provider  [phone]

## 2020-05-11 NOTE — PATIENT INSTRUCTIONS
Recommendations from today's MTM visit:                                                    MTM (medication therapy management) is a service provided by a clinical pharmacist designed to help you get the most of out of your medicines.   Today we reviewed what your medicines are for, how to know if they are working, that your medicines are safe and how to make your medicine regimen as easy as possible.      1. FYI-- Use the Losartan 50mg tablets --take 1/2 tablet daily , save your bottle of 100mg. Tablets in case your dose is adjusted in the future --it may be a little too difficult to try and quarter the 100mg tablet .     2. FYI-- Ask for a fresh bottle of under the tongue nitroglycerin pills if your going to carry them with you as a security if chest pain develops.    3. FYI--INR tomorrow --see if your in the goal range .     4. FYI--For your blood pressure pills--Take your 1/2 tablet Losartan in AM and 1/2 tablet Metoprolol in PM daily.         It was great to speak with you today.  I value your experience and would be very thankful for your time with providing feedback on our clinic survey. You may receive a survey via email or text message in the next few days.     Next MTM visit: Follow up with Dr. Obregon as scheduled.     To schedule another MTM appointment, please call the clinic directly or you may call the MTM scheduling line at [phone] or toll-free at [phone].     My Clinical Pharmacist's contact information:                                                      It was a pleasure talking with you today!  Please feel free to contact me with any questions or concerns you have.      Dayana Hillman [facility].  Medication Therapy Management Provider  [phone]

## 2020-05-12 ENCOUNTER — VIRTUAL VISIT (OUTPATIENT)
Dept: CARDIOLOGY | Facility: CLINIC | Age: 85
End: 2020-05-12
Attending: INTERNAL MEDICINE
Payer: COMMERCIAL

## 2020-05-12 ENCOUNTER — ALLIED HEALTH/NURSE VISIT (OUTPATIENT)
Dept: INTERNAL MEDICINE | Facility: CLINIC | Age: 85
End: 2020-05-12

## 2020-05-12 ENCOUNTER — ANTICOAGULATION THERAPY VISIT (OUTPATIENT)
Dept: NURSING | Facility: CLINIC | Age: 85
End: 2020-05-12
Payer: COMMERCIAL

## 2020-05-12 VITALS — DIASTOLIC BLOOD PRESSURE: 52 MMHG | SYSTOLIC BLOOD PRESSURE: 136 MMHG

## 2020-05-12 DIAGNOSIS — E78.5 HYPERLIPIDEMIA LDL GOAL <70: ICD-10-CM

## 2020-05-12 DIAGNOSIS — I25.10 CORONARY ARTERY DISEASE INVOLVING NATIVE CORONARY ARTERY OF NATIVE HEART WITHOUT ANGINA PECTORIS: ICD-10-CM

## 2020-05-12 DIAGNOSIS — Z95.2 S/P TAVR (TRANSCATHETER AORTIC VALVE REPLACEMENT): ICD-10-CM

## 2020-05-12 DIAGNOSIS — I48.0 PAROXYSMAL ATRIAL FIBRILLATION (H): ICD-10-CM

## 2020-05-12 DIAGNOSIS — I35.0 SEVERE AORTIC STENOSIS: Primary | ICD-10-CM

## 2020-05-12 DIAGNOSIS — I10 ESSENTIAL HYPERTENSION: Primary | ICD-10-CM

## 2020-05-12 LAB
CAPILLARY BLOOD COLLECTION: NORMAL
INR PPP: 1.2 (ref 0.86–1.14)

## 2020-05-12 PROCEDURE — 99213 OFFICE O/P EST LOW 20 MIN: CPT | Mod: 95 | Performed by: NURSE PRACTITIONER

## 2020-05-12 PROCEDURE — 85610 PROTHROMBIN TIME: CPT | Performed by: INTERNAL MEDICINE

## 2020-05-12 PROCEDURE — 99207 ZZC NO CHARGE NURSE ONLY: CPT | Performed by: INTERNAL MEDICINE

## 2020-05-12 PROCEDURE — 36416 COLLJ CAPILLARY BLOOD SPEC: CPT | Performed by: INTERNAL MEDICINE

## 2020-05-12 NOTE — PATIENT INSTRUCTIONS
Thanks for participating in a telephone visit with the [facility] clinic today.    Doing well 1 week post TAVR  Reviewed results of echocardiogram, labs, and medications.  Continues with exertional shortness of breath, unchanged post TAVR  Increased fatigue after starting low-dose metoprolol, recommend switching to evening.  Encourage exercise  Continue on warfarin, aspirin, metoprolol, losartan, and statin    Follow up in 3 weeks with echo and labs followed by GREG visit in 4 weeks    Please call my nurse at [phone] with any questions or concerns.    Scheduling phone number: [phone]  Reminder: Please bring in all current medications, over the counter supplements and vitamin bottles to your next appointment.

## 2020-05-12 NOTE — PROGRESS NOTES
ANTICOAGULATION FOLLOW-UP CLINIC VISIT    Patient Name:  Madie Silva  Date:  2020  Contact Type:  Telephone/ Left a detailed voicenail message    SUBJECTIVE:  Patient Findings     Positives:   Missed doses    Comments:   Pt recently held warfarin for a procedure. INR starting to trend back up        Clinical Outcomes     Comments:   Pt recently held warfarin for a procedure. INR starting to trend back up           OBJECTIVE    Recent labs: (last 7 days)     20  1416   INR 1.20*       ASSESSMENT / PLAN  INR assessment SUB    Recheck INR In: 1 WEEK    INR Location Outside lab      Anticoagulation Summary  As of 2020    INR goal:   2.0-3.0   TTR:   90.5 % (11.9 mo)   INR used for dosin.20! (2020)   Warfarin maintenance plan:   4.5 mg (3 mg x 1.5) every Tue, Thu, Sat; 3 mg (3 mg x 1) all other days   Full warfarin instructions:   : 6 mg; Otherwise 4.5 mg every Tue, Thu, Sat; 3 mg all other days   Weekly warfarin total:   25.5 mg   Plan last modified:   Stella Cha RN (10/25/2018)   Next INR check:   2020   Target end date:   Indefinite    Indications    Long-term (current) use of anticoagulants [Z79.01] [Z79.01]  Phlebitis and thrombophlebitis of deep veins of lower extremities (H) [I80.209]  Acute myocardial infarction  initial episode of care (H) (Resolved) [I21.9]  Atrial fibrillation  Paroxysmal (H) [I48.0]             Anticoagulation Episode Summary     INR check location:       Preferred lab:       Send INR reminders to:   [facility]    Comments:         Anticoagulation Care Providers     Provider Role Specialty Phone number    De Obregon MD Referring Internal Medicine [phone]            See the Encounter Report to view Anticoagulation Flowsheet and Dosing Calendar (Go to Encounters tab in chart review, and find the Anticoagulation Therapy Visit)    Pt INR is 1.2 today. See findings. Left a detailed voicemail message advising that pt  take 6 mg today 5/12/20 then continue maintenance dose of 4.5 mg on Tuesdays, Thursdays, Saturdays and 3 mg all the other days. Recheck INR in 1 week through [place] lab on 5/19/20 at 2:15 pm. Pt given the central INR number to call if questions, updates or concerns.    Raya Edwards RN

## 2020-05-12 NOTE — PROGRESS NOTES
Madie Silva was evaluated at [facility] on May 12, 2020 at which time her blood pressure was:    BP Readings from Last 3 Encounters:   05/12/20 136/52   05/06/20 (!) 151/49   05/01/20 (!) 149/75     Pulse Readings from Last 3 Encounters:   05/06/20 71   05/01/20 103   04/30/20 76       Reviewed lifestyle modifications for blood pressure control and reduction: including making healthy food choices, managing weight, getting regular exercise, smoking cessation, reducing alcohol consumption, monitoring blood pressure regularly.     Symptoms: None    BP Goal:< 140/90 mmHg    BP Assessment:  BP at goal    Potential Reasons for BP too high: NA - Not applicable    BP Follow-Up Plan: Recheck BP in 6 months at pharmacy    Recommendation to Provider: Continue current therapy    Note completed by: Pravin Winston, PharmD  [facility]  [phone]

## 2020-05-12 NOTE — PROGRESS NOTES
"Madie Silva is a 89 year old female who is being evaluated via a billable telephone visit.      The patient has been notified of following:     \"This telephone visit will be conducted via a call between you and your physician/provider. We have found that certain health care needs can be provided without the need for a physical exam.  This service lets us provide the care you need with a short phone conversation.  If a prescription is necessary we can send it directly to your pharmacy.  If lab work is needed we can place an order for that and you can then stop by our lab to have the test done at a later time.    Telephone visits are billed at different rates depending on your insurance coverage. During this emergency period, for some insurers they may be billed the same as an in-person visit.  Please reach out to your insurance provider with any questions.    If during the course of the call the physician/provider feels a telephone visit is not appropriate, you will not be charged for this service.\"    Unable to obtain blood pressure, pulse and weight at home.  Review Of Systems  Skin: NEGATIVE  Eyes:Ears/Nose/Throat: glasses  Respiratory: SOB when laying down, ZHAO  Cardiovascular:groin discomfort after TAVR surgery, lightheadedness, exercise tolerance, fatigue, vertigo  Gastrointestinal: diarrhea last week after surgery  Genitourinary:NEGATIVE   Musculoskeletal: NEGATIVE  Neurologic: NEGATIVE  Psychiatric: NEGATIVE  Hematologic/Lymphatic/Immunologic: NEGATIVE  Endocrine:thyroid disease, diabetes  Tamika Yung LPN    Patient has given verbal consent for Telephone visit?  Yes    What phone number would you like to be contacted at? [phone]    How would you like to obtain your AVS? Mail a copy    HPI    Madie Silva is a very pleasant 89-year-old female with a past medical history significant for symptomatic severe aortic stenosis, coronary artery disease status post four-vessel bypass with a LIMA to the LAD, SVG " to ramus, SVG to OM, SVG to PDA in 2011, symptomatic bradycardia status post pacemaker implant, paroxysmal atrial fibrillation, and large hiatal hernia with much of the stomach in the chest.     On 5/5/2020, she underwent elective transfemoral transcatheter aortic valve replacement using a 20 mm Khan nati 3 valve.  Follow-up echocardiogram demonstrated a hyperdynamic LV estimated at greater than 70%, normal RV size and function, mean AOV pressure gradient of 19 mmHg, and mild MR. Given the hyperdynamic LV and elevated gradient, low dose Toprol was added. Admission was uncomplicated and patient was discharged on POD #1.     Today she is participating in a 1 week post TAVR follow-up.  She reports no noticeable improvement in shortness of breath since TAVR.  She states, now that her valve is fixed, she is unsure if her shortness of breath is primarily related to the significant hiatal hernia pushing her stomach into her chest.  She has noticed some fatigue over the last week, likely related to low-dose metoprolol.  She denies chest pain, PND, orthopnea, presyncope, syncope, or lower extremity edema.    She does not monitor blood pressures at home.  Last blood pressure on the day of discharge was 142/91.  Last BMP showed a sodium of 138, potassium 4.6, BUN 16, creatinine 0.91, GFR 55  Hemoglobin 11.0 and platelet 173, she denies any evidence of bleeding on warfarin and aspirin.  She was instructed to stop Plavix on 5/11/2020. Groin sites are soft, flat, and mildly tender.     Last device interrogation on 4/29/2020 demonstrated 92% atrial pacing and 4% ventricular pacing, 41 mode switches compromising less than 1% with the longest episode 6 minutes and 15 seconds.  4 ventricular arrhythmias, SVT with the longest lasting 1 minute 5 seconds    She has opted out of cardiac rehab and wishes to keep active at home.  She plans to resume driving today as she is scheduled for an INR appointment.      Eating and sleeping  well  Compliant with all medications.     Assessment/ Plan   1.  Severe aortic stenosis -status post transcatheter aortic valve replacement using a 20 mm Khan nati 3 valve on 5/5/2020. Follow-up echocardiogram demonstrated a hyperdynamic LV estimated at greater than 70%, normal RV size and function, mean AOV pressure gradient of 19 mmHg, and mild MR. Given the hyperdynamic LV and elevated gradient, low dose Toprol was added.  Continue on warfarin and aspirin.  Due to fatigue, I recommended she switch her metoprolol to evening.  Follow-up echocardiogram and labs in 3 weeks.     2. CAD -remote history of four-vessel CABG in 2011 with a LIMA to the LAD, SVG to the ramus, SVG to OM, and SVG to PDA.  Last coronary angiogram showed  of the SVG to ramus, all other bypass grafts were patent.  Continue on aspirin, metoprolol, losartan, and statin.    3.  Paroxysmal atrial fibrillation/permanent pacemaker - Last device interrogation on 4/29/2020 demonstrated 92% atrial pacing and 4% ventricular pacing, 41 mode switches compromising less than 1% with the longest episode 6 minutes and 15 seconds.  4 ventricular arrhythmias, SVT with the longest lasting 1 minute 5 seconds.  Continue on low-dose metoprolol and warfarin for anticoagulation    4.  Hyperlipidemia -on high-dose statin    5.  Hiatal hernia -PCP managing      Follow-up plan -follow-up echocardiogram and labs in 3 weeks.  TAVR GREG follow-up in 1 month      Phone call duration:  10 minutes    HAROLDO Hernandez CNP

## 2020-05-12 NOTE — LETTER
5/12/2020      RE: Madie Silva  [address] [place] [id number]       Dear Colleague,    Thank you for the opportunity to participate in the care of your patient, Madie Silva, at the [facility]. Please see a copy of my visit note below.    Madie Silva is a very pleasant 89-year-old female with a past medical history significant for symptomatic severe aortic stenosis, coronary artery disease status post four-vessel bypass with a LIMA to the LAD, SVG to ramus, SVG to OM, SVG to PDA in 2011, symptomatic bradycardia status post pacemaker implant, paroxysmal atrial fibrillation, and large hiatal hernia with much of the stomach in the chest.     On 5/5/2020, she underwent elective transfemoral transcatheter aortic valve replacement using a 20 mm Khan nati 3 valve.  Follow-up echocardiogram demonstrated a hyperdynamic LV estimated at greater than 70%, normal RV size and function, mean AOV pressure gradient of 19 mmHg, and mild MR. Given the hyperdynamic LV and elevated gradient, low dose Toprol was added. Admission was uncomplicated and patient was discharged on POD #1.     Today she is participating in a 1 week post TAVR follow-up.  She reports no noticeable improvement in shortness of breath since TAVR.  She states, now that her valve is fixed, she is unsure if her shortness of breath is primarily related to the significant hiatal hernia pushing her stomach into her chest.  She has noticed some fatigue over the last week, likely related to low-dose metoprolol.  She denies chest pain, PND, orthopnea, presyncope, syncope, or lower extremity edema.    She does not monitor blood pressures at home.  Last blood pressure on the day of discharge was 142/91.  Last BMP showed a sodium of 138, potassium 4.6, BUN 16, creatinine 0.91, GFR 55  Hemoglobin 11.0 and platelet 173, she denies any evidence of bleeding on warfarin  and aspirin.  She was instructed to stop Plavix on 5/11/2020. Groin sites are soft, flat, and mildly tender.     Last device interrogation on 4/29/2020 demonstrated 92% atrial pacing and 4% ventricular pacing, 41 mode switches compromising less than 1% with the longest episode 6 minutes and 15 seconds.  4 ventricular arrhythmias, SVT with the longest lasting 1 minute 5 seconds    She has opted out of cardiac rehab and wishes to keep active at home.  She plans to resume driving today as she is scheduled for an INR appointment.      Eating and sleeping well  Compliant with all medications.     Assessment/ Plan   1.  Severe aortic stenosis -status post transcatheter aortic valve replacement using a 20 mm Khan nati 3 valve on 5/5/2020. Follow-up echocardiogram demonstrated a hyperdynamic LV estimated at greater than 70%, normal RV size and function, mean AOV pressure gradient of 19 mmHg, and mild MR. Given the hyperdynamic LV and elevated gradient, low dose Toprol was added.  Continue on warfarin and aspirin.  Due to fatigue, I recommended she switch her metoprolol to evening.  Follow-up echocardiogram and labs in 3 weeks.     2. CAD -remote history of four-vessel CABG in 2011 with a LIMA to the LAD, SVG to the ramus, SVG to OM, and SVG to PDA.  Last coronary angiogram showed  of the SVG to ramus, all other bypass grafts were patent.  Continue on aspirin, metoprolol, losartan, and statin.    3.  Paroxysmal atrial fibrillation/permanent pacemaker - Last device interrogation on 4/29/2020 demonstrated 92% atrial pacing and 4% ventricular pacing, 41 mode switches compromising less than 1% with the longest episode 6 minutes and 15 seconds.  4 ventricular arrhythmias, SVT with the longest lasting 1 minute 5 seconds.  Continue on low-dose metoprolol and warfarin for anticoagulation    4.  Hyperlipidemia -on high-dose statin    5.  Hiatal hernia -PCP managing    Follow-up plan -follow-up echocardiogram and labs in 3  weeks.  TAVR GREG follow-up in 1 month    Please do not hesitate to contact me if you have any questions/concerns.     Sincerely,     HAROLDO Hernandez CNP

## 2020-05-15 ENCOUNTER — TELEPHONE (OUTPATIENT)
Dept: INTERNAL MEDICINE | Facility: CLINIC | Age: 85
End: 2020-05-15

## 2020-05-15 NOTE — TELEPHONE ENCOUNTER
Diabetes Education Scheduling Outreach #1:    Call to patient to schedule. Patient declined to schedule and wants to wait. She has a lot of appointments for her upcoming surgery.    Kim Fontaine OnCall  Diabetes and Nutrition Scheduling

## 2020-05-19 ENCOUNTER — ANTICOAGULATION THERAPY VISIT (OUTPATIENT)
Dept: ANTICOAGULATION | Facility: CLINIC | Age: 85
End: 2020-05-19

## 2020-05-19 DIAGNOSIS — I48.0 PAROXYSMAL ATRIAL FIBRILLATION (H): ICD-10-CM

## 2020-05-19 DIAGNOSIS — I80.209 PHLEBITIS AND THROMBOPHLEBITIS OF DEEP VEINS OF LOWER EXTREMITIES (H): ICD-10-CM

## 2020-05-19 DIAGNOSIS — Z79.01 LONG TERM CURRENT USE OF ANTICOAGULANT THERAPY: ICD-10-CM

## 2020-05-19 LAB
CAPILLARY BLOOD COLLECTION: NORMAL
INR PPP: 2 (ref 0.86–1.14)

## 2020-05-19 PROCEDURE — 36416 COLLJ CAPILLARY BLOOD SPEC: CPT | Performed by: INTERNAL MEDICINE

## 2020-05-19 PROCEDURE — 85610 PROTHROMBIN TIME: CPT | Performed by: INTERNAL MEDICINE

## 2020-05-19 NOTE — PROGRESS NOTES
Patient recently had TAVR and states that she still continues to have SOB, has been talking with Cardiology regarding this.  Symptoms are same as before procedure.    Will give boost of 6 mg tonight and then resume maintenance dose and recheck INR in 2 weeks.    Connie Hickman RN  [facility]

## 2020-06-02 ENCOUNTER — ANTICOAGULATION THERAPY VISIT (OUTPATIENT)
Dept: ANTICOAGULATION | Facility: CLINIC | Age: 85
End: 2020-06-02

## 2020-06-02 DIAGNOSIS — I48.0 PAROXYSMAL ATRIAL FIBRILLATION (H): ICD-10-CM

## 2020-06-02 DIAGNOSIS — Z79.01 LONG TERM CURRENT USE OF ANTICOAGULANT THERAPY: ICD-10-CM

## 2020-06-02 DIAGNOSIS — I80.209 PHLEBITIS AND THROMBOPHLEBITIS OF DEEP VEINS OF LOWER EXTREMITIES (H): ICD-10-CM

## 2020-06-02 LAB
CAPILLARY BLOOD COLLECTION: NORMAL
INR PPP: 2.7 (ref 0.86–1.14)

## 2020-06-02 PROCEDURE — 85610 PROTHROMBIN TIME: CPT | Performed by: INTERNAL MEDICINE

## 2020-06-02 PROCEDURE — 36416 COLLJ CAPILLARY BLOOD SPEC: CPT | Performed by: INTERNAL MEDICINE

## 2020-06-02 NOTE — PROGRESS NOTES
Pt denies any changes in diet,health or activity. Patient to take 4.5mg Tu/Th/Sa and 3 mg AOD and recheck INR in 6 weeks. Madie is aware if signs of clotting (pain, tenderness, swelling, color change in leg or arm, SOB) and bleeding occur (blood in stool, urine, large bruising, bleeding gums, nosebleeds) to have INR check sooner. If sx severe report to ER or concerned for stroke call 911. If general questions or concerns arise, call clinic.  Connie Hickman RN  [facility]

## 2020-06-04 ENCOUNTER — TELEPHONE (OUTPATIENT)
Dept: LAB | Facility: CLINIC | Age: 85
End: 2020-06-04

## 2020-06-04 NOTE — TELEPHONE ENCOUNTER

## 2020-06-05 ENCOUNTER — HOSPITAL ENCOUNTER (OUTPATIENT)
Dept: CARDIOLOGY | Facility: CLINIC | Age: 85
Discharge: HOME | End: 2020-06-05
Attending: INTERNAL MEDICINE | Admitting: INTERNAL MEDICINE
Payer: COMMERCIAL

## 2020-06-05 ENCOUNTER — TELEPHONE (OUTPATIENT)
Dept: CARDIOLOGY | Facility: CLINIC | Age: 85
End: 2020-06-05

## 2020-06-05 DIAGNOSIS — Z95.2 S/P TAVR (TRANSCATHETER AORTIC VALVE REPLACEMENT): ICD-10-CM

## 2020-06-05 LAB
ANION GAP SERPL CALCULATED.3IONS-SCNC: 7 MMOL/L (ref 3–14)
BUN SERPL-MCNC: 28 MG/DL (ref 7–30)
CALCIUM SERPL-MCNC: 9.3 MG/DL (ref 8.5–10.1)
CHLORIDE SERPL-SCNC: 106 MMOL/L (ref 94–109)
CO2 SERPL-SCNC: 26 MMOL/L (ref 20–32)
CREAT SERPL-MCNC: 1.07 MG/DL (ref 0.52–1.04)
ERYTHROCYTE [DISTWIDTH] IN BLOOD BY AUTOMATED COUNT: 17.7 % (ref 10–15)
GFR SERPL CREATININE-BSD FRML MDRD: 45 ML/MIN/{1.73_M2}
GLUCOSE SERPL-MCNC: 125 MG/DL (ref 70–99)
HCT VFR BLD AUTO: 37 % (ref 35–47)
HGB BLD-MCNC: 11.7 G/DL (ref 11.7–15.7)
MCH RBC QN AUTO: 24.5 PG (ref 26.5–33)
MCHC RBC AUTO-ENTMCNC: 31.6 G/DL (ref 31.5–36.5)
MCV RBC AUTO: 77 FL (ref 78–100)
PLATELET # BLD AUTO: 189 10E9/L (ref 150–450)
POTASSIUM SERPL-SCNC: 4.9 MMOL/L (ref 3.4–5.3)
RBC # BLD AUTO: 4.78 10E12/L (ref 3.8–5.2)
SODIUM SERPL-SCNC: 139 MMOL/L (ref 133–144)
WBC # BLD AUTO: 6.9 10E9/L (ref 4–11)

## 2020-06-05 PROCEDURE — 93306 TTE W/DOPPLER COMPLETE: CPT

## 2020-06-05 PROCEDURE — 93306 TTE W/DOPPLER COMPLETE: CPT | Mod: 26 | Performed by: INTERNAL MEDICINE

## 2020-06-05 PROCEDURE — 80048 BASIC METABOLIC PNL TOTAL CA: CPT | Performed by: INTERNAL MEDICINE

## 2020-06-05 PROCEDURE — 36415 COLL VENOUS BLD VENIPUNCTURE: CPT | Performed by: INTERNAL MEDICINE

## 2020-06-05 PROCEDURE — 85027 COMPLETE CBC AUTOMATED: CPT | Performed by: INTERNAL MEDICINE

## 2020-06-05 NOTE — TELEPHONE ENCOUNTER

## 2020-06-08 ENCOUNTER — VIRTUAL VISIT (OUTPATIENT)
Dept: CARDIOLOGY | Facility: CLINIC | Age: 85
End: 2020-06-08
Attending: INTERNAL MEDICINE
Payer: COMMERCIAL

## 2020-06-08 DIAGNOSIS — I10 ESSENTIAL HYPERTENSION: ICD-10-CM

## 2020-06-08 DIAGNOSIS — I35.0 SEVERE AORTIC STENOSIS: Primary | ICD-10-CM

## 2020-06-08 DIAGNOSIS — E78.5 HYPERLIPIDEMIA LDL GOAL <70: ICD-10-CM

## 2020-06-08 DIAGNOSIS — Z95.2 S/P TAVR (TRANSCATHETER AORTIC VALVE REPLACEMENT): ICD-10-CM

## 2020-06-08 DIAGNOSIS — I25.10 CORONARY ARTERY DISEASE INVOLVING NATIVE CORONARY ARTERY OF NATIVE HEART WITHOUT ANGINA PECTORIS: ICD-10-CM

## 2020-06-08 DIAGNOSIS — I48.0 PAROXYSMAL ATRIAL FIBRILLATION (H): ICD-10-CM

## 2020-06-08 PROCEDURE — 99214 OFFICE O/P EST MOD 30 MIN: CPT | Mod: 95 | Performed by: NURSE PRACTITIONER

## 2020-06-08 NOTE — PATIENT INSTRUCTIONS
CARDIOLOGY CLINIC INSTRUCTIONS:   --  Reviewed results of echocardiogram and LABS - Stable  - Continue to monitor shortness of breath, call office if worsens  - Continue on current medications  - Encourage exercise   -- Remember you will need antibiotic medication prior to ANY dental cleaning/work.    -- Come back in 6 for follow up with Dr. Cardenas. Follow up in 1 year with TAVR GREG with echo  prior.     IMPORTANT PHONE NUMBERS:  Cardiology Scheduling~[phone]  Cardiology Clinic Nurse Muriel ~[phone]  Cardiology Clinic Nurse [place] ~[phone]

## 2020-06-08 NOTE — LETTER
6/8/2020    De Obregon MD  [address] [facility] [id number]    RE: Madie Silva       Dear Colleague,    I had the pleasure of seeing Madie Silva in the [facility].    Madie Silva is a 89 year old female who is being evaluated via a billable telephone visit.      Madie Silva is a very pleasant 89-year-old female with a past medical history significant for symptomatic severe aortic stenosis s/p TAVR (5/5/20) using a 20 mm Khan nati 3 valve, coronary artery disease status post four-vessel bypass with a LIMA to the LAD, SVG to ramus, SVG to OM, SVG to PDA in 2011, symptomatic bradycardia status post pacemaker implant, paroxysmal atrial fibrillation, and large hiatal hernia with much of the stomach in the chest.     She was last seen on 5/12/2020 for a 1 week post TAVR follow-up.  She had not noticed any improvement in her shortness of breath since TAVR and reported more fatigue after the addition of low-dose metoprolol.  She was asked to switch from metoprolol to evening.  Today she is following up for a 1 month post TAVR telephone visit.    She continues to have exertional shortness of breath,  minimally improved over the last few weeks.  She denies chest pain, palpitations, PND, orthopnea, syncope, or lower extremity edema.  She also reports chronic lightheadedness which is unchanged for years. NYHA CLASS: Class 2-3    She recently underwent an echocardiogram that demonstrates a well-seated 20 mm Khan nati 3 valve with a mean AOV pressure gradient of 16 mmHg (improved from 19 mmHg 1 day post TAVR) and very trace valvular aortic regurgitation.  Ejection fraction is normal at 65 to 70% with mild LVH.  There is moderate to severe mitral annular calcification, thickened mitral valve anterior leaflet with borderline mitral stenosis.    She does not monitor blood pressures at home.  Last reading on the day of echocardiogram was 155/68.  Last BMP showed a sodium of 139,  potassium 4.9, BUN 28, creatinine 1.07, GFR 45  Hemoglobin 11.7 and platelet 189, denies any evidence of bleeding on Coumadin and aspirin    Last device interrogation on 4/29/2020 demonstrated 92% atrial pacing and 4% ventricular pacing, 41 mode switches compromising less than 1% with the longest episode 6 minutes and 15 seconds.  4 ventricular arrhythmias, SVT with the longest lasting 1 minute 5 seconds.    She has opted out of cardiac rehab due to transportation issues  She continues to live alone and works frequently in her yard and garden.    Eating and sleeping well  Compliant with all medications.    KCCQ  Q1A 5  Q1B 2  Q1C 1  Q2 5  Q3 4  Q4 2  Q5 5  Q6 5  Q7 3  Q8A 5  Q8B 3  Q8C 4      Assessment and Plan     1.  Severe aortic stenosis -status post transcatheter aortic valve replacement using a 20 mm Khan nati 3 valve on 5/5/2020. Follow-up echocardiogram demonstrated a well-seated 20 mm Khan nati 3 valve with a mean AOV pressure gradient of 16 mmHg (improved from 19 mmHg 1 day post TAVR) and very trace valvular aortic regurgitation.  Ejection fraction is normal at 65 to 70% with mild LVH.  There is moderate to severe mitral annular calcification, thickened mitral valve anterior leaflet with borderline mitral stenosis.  Continue on medical therapy.  Encourage exercise.  Follow-up echocardiogram in 1 year.    2. CAD -remote history of four-vessel CABG in 2011 with a LIMA to the LAD, SVG to the ramus, SVG to OM, and SVG to PDA.  Last coronary angiogram showed  of the SVG to ramus, all other bypass grafts were patent.  Continue on aspirin, metoprolol, losartan, and statin.     3.  Paroxysmal atrial fibrillation/permanent pacemaker - Last device interrogation on 4/29/2020 demonstrated 92% atrial pacing and 4% ventricular pacing, 41 mode switches compromising less than 1% with the longest episode 6 minutes and 15 seconds.  4 ventricular arrhythmias, SVT with the longest lasting 1 minute 5 seconds.   Continue on low-dose metoprolol and warfarin for anticoagulation     4.  Hyperlipidemia -on high-dose statin     5.  Hiatal hernia -PCP managing      Follow up plan :  Follow up in 6 months with primary cardiologist. Follow up in 1 year with TAVR GREG with echo prior.   Phone call duration: 20 minutes    HAROLDO Hernandez, CNP  Pager [phone]  6/8/2020    Thank you for allowing me to participate in the care of your patient.    Sincerely,     HAROLDO Hernandez CNP     [facility]

## 2020-06-08 NOTE — PROGRESS NOTES
"Madie Silva is a 89 year old female who is being evaluated via a billable telephone visit.      The patient has been notified of following:     \"This telephone visit will be conducted via a call between you and your physician/provider. We have found that certain health care needs can be provided without the need for a physical exam.  This service lets us provide the care you need with a short phone conversation.  If a prescription is necessary we can send it directly to your pharmacy.  If lab work is needed we can place an order for that and you can then stop by our lab to have the test done at a later time.    Telephone visits are billed at different rates depending on your insurance coverage. During this emergency period, for some insurers they may be billed the same as an in-person visit.  Please reach out to your insurance provider with any questions.    If during the course of the call the physician/provider feels a telephone visit is not appropriate, you will not be charged for this service.\"    Patient has given verbal consent for Telephone visit?  Yes    What phone number would you like to be contacted at? [phone]    How would you like to obtain your AVS? Mail a copy    Pt reported blood pressure: no cuff at home       Review Of Systems  Skin: neg  Eyes:Ears/Nose/Throat: hearing loss   Respiratory: SOB  Cardiovascular:lightheaded, dizzy getting worse  Gastrointestinal: neg  Genitourinary:   Musculoskeletal: back pain, arthritis  Neurologic: arm tingles  Psychiatric: neg  Hematologic/Lymphatic/Immunologic:   Endocrine:  Thyroid, diabetes     Rosario Lund [facility]     HPI  Madie Silva is a very pleasant 89-year-old female with a past medical history significant for symptomatic severe aortic stenosis s/p TAVR (5/5/20) using a 20 mm Khan nati 3 valve, coronary artery disease status post four-vessel bypass with a LIMA to the LAD, SVG to ramus, SVG to OM, SVG to PDA in 2011, symptomatic bradycardia status " post pacemaker implant, paroxysmal atrial fibrillation, and large hiatal hernia with much of the stomach in the chest.     She was last seen on 5/12/2020 for a 1 week post TAVR follow-up.  She had not noticed any improvement in her shortness of breath since TAVR and reported more fatigue after the addition of low-dose metoprolol.  She was asked to switch from metoprolol to evening.  Today she is following up for a 1 month post TAVR telephone visit.    She continues to have exertional shortness of breath,  minimally improved over the last few weeks.  She denies chest pain, palpitations, PND, orthopnea, syncope, or lower extremity edema.  She also reports chronic lightheadedness which is unchanged for years. NYHA CLASS: Class 2-3    She recently underwent an echocardiogram that demonstrates a well-seated 20 mm Khan nati 3 valve with a mean AOV pressure gradient of 16 mmHg (improved from 19 mmHg 1 day post TAVR) and very trace valvular aortic regurgitation.  Ejection fraction is normal at 65 to 70% with mild LVH.  There is moderate to severe mitral annular calcification, thickened mitral valve anterior leaflet with borderline mitral stenosis.    She does not monitor blood pressures at home.  Last reading on the day of echocardiogram was 155/68.  Last BMP showed a sodium of 139, potassium 4.9, BUN 28, creatinine 1.07, GFR 45  Hemoglobin 11.7 and platelet 189, denies any evidence of bleeding on Coumadin and aspirin    Last device interrogation on 4/29/2020 demonstrated 92% atrial pacing and 4% ventricular pacing, 41 mode switches compromising less than 1% with the longest episode 6 minutes and 15 seconds.  4 ventricular arrhythmias, SVT with the longest lasting 1 minute 5 seconds.    She has opted out of cardiac rehab due to transportation issues  She continues to live alone and works frequently in her yard and garden.    Eating and sleeping well  Compliant with all medications.    KCCQ  Q1A 5  Q1B 2  Q1C 1  Q2  5  Q3 4  Q4 2  Q5 5  Q6 5  Q7 3  Q8A 5  Q8B 3  Q8C 4      Assessment and Plan     1.  Severe aortic stenosis -status post transcatheter aortic valve replacement using a 20 mm Khan nati 3 valve on 5/5/2020. Follow-up echocardiogram demonstrated a well-seated 20 mm Khan nati 3 valve with a mean AOV pressure gradient of 16 mmHg (improved from 19 mmHg 1 day post TAVR) and very trace valvular aortic regurgitation.  Ejection fraction is normal at 65 to 70% with mild LVH.  There is moderate to severe mitral annular calcification, thickened mitral valve anterior leaflet with borderline mitral stenosis.  Continue on medical therapy.  Encourage exercise.  Follow-up echocardiogram in 1 year.    2. CAD -remote history of four-vessel CABG in 2011 with a LIMA to the LAD, SVG to the ramus, SVG to OM, and SVG to PDA.  Last coronary angiogram showed  of the SVG to ramus, all other bypass grafts were patent.  Continue on aspirin, metoprolol, losartan, and statin.     3.  Paroxysmal atrial fibrillation/permanent pacemaker - Last device interrogation on 4/29/2020 demonstrated 92% atrial pacing and 4% ventricular pacing, 41 mode switches compromising less than 1% with the longest episode 6 minutes and 15 seconds.  4 ventricular arrhythmias, SVT with the longest lasting 1 minute 5 seconds.  Continue on low-dose metoprolol and warfarin for anticoagulation     4.  Hyperlipidemia -on high-dose statin     5.  Hiatal hernia -PCP managing      Follow up plan :  Follow up in 6 months with primary cardiologist. Follow up in 1 year with TAVR GREG with echo prior.   Phone call duration: 20 minutes    HAROLDO Hernandez, CNP  Pager [phone]  6/8/2020

## 2020-06-26 NOTE — TELEPHONE ENCOUNTER
Diabetes Education Scheduling Outreach #2:    Call to patient to schedule. Patient declined to schedule.    Kim Ye  [facility]  Diabetes and Nutrition Scheduling

## 2020-07-03 DIAGNOSIS — K44.9 HIATAL HERNIA: ICD-10-CM

## 2020-07-03 DIAGNOSIS — K22.719 BARRETT'S ESOPHAGUS WITH DYSPLASIA: ICD-10-CM

## 2020-07-12 ENCOUNTER — HOSPITAL ENCOUNTER (EMERGENCY)
Facility: CLINIC | Age: 85
Discharge: HOME | End: 2020-07-12
Attending: NURSE PRACTITIONER | Admitting: NURSE PRACTITIONER
Payer: COMMERCIAL

## 2020-07-12 ENCOUNTER — NURSE TRIAGE (OUTPATIENT)
Dept: NURSING | Facility: CLINIC | Age: 85
End: 2020-07-12

## 2020-07-12 VITALS
OXYGEN SATURATION: 99 % | TEMPERATURE: 97.5 F | SYSTOLIC BLOOD PRESSURE: 188 MMHG | DIASTOLIC BLOOD PRESSURE: 69 MMHG | HEART RATE: 74 BPM | RESPIRATION RATE: 20 BRPM

## 2020-07-12 DIAGNOSIS — R79.1 SUPRATHERAPEUTIC INR: ICD-10-CM

## 2020-07-12 DIAGNOSIS — S00.419A EAR CANAL ABRASION: ICD-10-CM

## 2020-07-12 LAB
BASOPHILS # BLD AUTO: 0 10E9/L (ref 0–0.2)
BASOPHILS NFR BLD AUTO: 0.6 %
DIFFERENTIAL METHOD BLD: ABNORMAL
EOSINOPHIL # BLD AUTO: 0.2 10E9/L (ref 0–0.7)
EOSINOPHIL NFR BLD AUTO: 2.5 %
ERYTHROCYTE [DISTWIDTH] IN BLOOD BY AUTOMATED COUNT: 17.5 % (ref 10–15)
HCT VFR BLD AUTO: 35.8 % (ref 35–47)
HGB BLD-MCNC: 11.5 G/DL (ref 11.7–15.7)
IMM GRANULOCYTES # BLD: 0 10E9/L (ref 0–0.4)
IMM GRANULOCYTES NFR BLD: 0 %
INR PPP: 3.11 (ref 0.86–1.14)
LYMPHOCYTES # BLD AUTO: 2.5 10E9/L (ref 0.8–5.3)
LYMPHOCYTES NFR BLD AUTO: 36.7 %
MCH RBC QN AUTO: 24.7 PG (ref 26.5–33)
MCHC RBC AUTO-ENTMCNC: 32.1 G/DL (ref 31.5–36.5)
MCV RBC AUTO: 77 FL (ref 78–100)
MONOCYTES # BLD AUTO: 0.9 10E9/L (ref 0–1.3)
MONOCYTES NFR BLD AUTO: 13 %
NEUTROPHILS # BLD AUTO: 3.2 10E9/L (ref 1.6–8.3)
NEUTROPHILS NFR BLD AUTO: 47.2 %
NRBC # BLD AUTO: 0 10*3/UL
NRBC BLD AUTO-RTO: 0 /100
PLATELET # BLD AUTO: 201 10E9/L (ref 150–450)
RBC # BLD AUTO: 4.65 10E12/L (ref 3.8–5.2)
WBC # BLD AUTO: 6.8 10E9/L (ref 4–11)

## 2020-07-12 PROCEDURE — 85610 PROTHROMBIN TIME: CPT | Performed by: NURSE PRACTITIONER

## 2020-07-12 PROCEDURE — 85025 COMPLETE CBC W/AUTO DIFF WBC: CPT | Performed by: NURSE PRACTITIONER

## 2020-07-12 PROCEDURE — 99283 EMERGENCY DEPT VISIT LOW MDM: CPT

## 2020-07-12 ASSESSMENT — ENCOUNTER SYMPTOMS: HEADACHES: 0

## 2020-07-12 NOTE — ED AVS SNAPSHOT
Emergency Department  [address] [place] [id number]  Phone:  [phone]  Fax:  [phone]                                    Madie Silva   MRN: [id number]    Department:   Emergency Department   Date of Visit:  7/12/2020           After Visit Summary Signature Page    I have received my discharge instructions, and my questions have been answered. I have discussed any challenges I see with this plan with the nurse or doctor.    ..........................................................................................................................................  Patient/Patient Representative Signature      ..........................................................................................................................................  Patient Representative Print Name and Relationship to Patient    ..................................................               ................................................  Date                                   Time    ..........................................................................................................................................  Reviewed by Signature/Title    ...................................................              ..............................................  Date                                               Time          22EPIC Rev 08/18

## 2020-07-12 NOTE — TELEPHONE ENCOUNTER
Patient calling - says she woke up this morning to blood coming out of her ear.  Blood was all over her pillow and hands.  She says it has continued bleeding all day.  She is on blood thinners.    Triaged to disposition of Go to ED Now.  Advised patient that if she does not have a ride she should call for an ambulance.    Maryjane Ames RN  Triage Nurse Advisor    COVID 19 Nurse Triage Plan/Patient Instructions    Please be aware that novel coronavirus (COVID-19) may be circulating in the community. If you develop symptoms such as fever, cough, or SOB or if you have concerns about the presence of another infection including coronavirus (COVID-19), please contact your health care provider or visit [email or web address].     Disposition/Instructions    ED Visit recommended. Follow protocol based instructions.     Bring Your Own Device:  Please also bring your smart device(s) (smart phones, tablets, laptops) and their charging cables for your personal use and to communicate with your care team during your visit.    Thank you for taking steps to prevent the spread of this virus.  o Limit your contact with others.  o Wear a simple mask to cover your cough.  o Wash your hands well and often.    Resources    [facility]: About COVID-19: [email or web address]    CDC: What to Do If You're Sick: www.cdc.gov/coronavirus/2019-ncov/about/steps-when-sick.html    CDC: Ending Home Isolation: www.cdc.gov/coronavirus/2019-ncov/hcp/disposition-in-home-patients.html     CDC: Caring for Someone: www.cdc.gov/coronavirus/2019-ncov/if-you-are-sick/care-for-someone.html     [facility]: Interim Guidance for Hospital Discharge to Home: www.health.[facility].[place]/diseases/coronavirus/hcp/hospdischarge.pdf    [facility] clinical trials (COVID-19 research studies): clinicalaffairs.[facility]/umn-clinical-trials     Below are the COVID-19 hotlines at the [place] Department of Health ([facility]). Interpreters are available.   o For health  questions: Call 389-755-3378 or 1-600.839.7476 (7 a.m. to 7 p.m.)  o For questions about schools and childcare: Call 502-644-1891 or 1-287.480.7097 (7 a.m. to 7 p.m.)       Reason for Disposition    [1] Unexplained bleeding AND [2] lasts > 10 minutes or large amount (Exception: if a few drops of blood, continue with triage)    Additional Information    Negative: [1] Bloody discharge AND [2] it followed ear trauma    Negative: [1] Followed head or face injury AND [2] clear or bloody fluid    Protocols used: EAR - [id number]

## 2020-07-13 ENCOUNTER — NURSE TRIAGE (OUTPATIENT)
Dept: INTERNAL MEDICINE | Facility: CLINIC | Age: 85
End: 2020-07-13

## 2020-07-13 DIAGNOSIS — Z71.89 OTHER SPECIFIED COUNSELING: ICD-10-CM

## 2020-07-13 NOTE — TELEPHONE ENCOUNTER
Suspect blood pressure may have been elevated from the stress of the ER and the bleeding.     Would patient be amenable to a curbside blood pressure check?

## 2020-07-13 NOTE — DISCHARGE INSTRUCTIONS
Hold your Coumadin tonight and tomorrow evening. Recheck INR on Tuesday.     If bleeding restarts or worsens return to the ER.

## 2020-07-13 NOTE — TELEPHONE ENCOUNTER
Patient calling to report high blood pressure. She states she does NOT have a monitor at home- she is calling based on BP in ED which was 188/69. She was told to follow-up with her primary doctor.     Elevated BP and Symptoms:  Patient states she feels anxious and has a hard time sleeping at night.   Denies headache. States she has been feeling lightheaded. She does have blurred visiton - but states this has been coming on gradually for last few months. She states she has shortness of breath, but notes it is not new and is related to her heart condition. She has history of open heart surgery with valve replacement 3/5/2020. Pt states she does have weakness and difficulty with balance, but states it is likely due to age.    Per conversation - it appears none of the symptoms are new except possibly lightheadedness. Patient is aware to seek care in ED if symptoms worsening - however she was just evaluated last evening.    ED Visit:   Patient states that she was evaluated because she had blood running down her jaw line which was coming from her ear. They suspect she did something to it in her sleep - they found an abrasion once it was cleaned out. Also notes bleeding on her nose. Expresses concerns about her INR and requests to speak with INR nurse.     Provider to review symptoms and advise on next steps. Would you like in-person visit? Virtual? Med change?     Refills:   She states she needs refills and is frustrated that requests haven't been approved. Reviewed chart with patient and refill for omeprazole was processed. She does not know of any other medication refills she needs at this time.    Additional Information    Negative: Sounds like a life-threatening emergency to the triager    Negative: Pregnant > 20 weeks or postpartum (< 6 weeks after delivery) and new hand or face swelling    Negative: Pregnant > 20 weeks and BP > 140/90    Negative: Systolic BP >= 160 OR Diastolic >= 100, and any cardiac or neurologic  "symptoms (e.g., chest pain, difficulty breathing, unsteady gait, blurred vision)    Negative: Patient sounds very sick or weak to the triager    Negative: BP Systolic BP >= 140 OR Diastolic >= 90 and postpartum (from 0 to 6 weeks after delivery)    Systolic BP >= 180 OR Diastolic >= 110, and missed most recent dose of blood pressure medication    Answer Assessment - Initial Assessment Questions  1. BLOOD PRESSURE: \"What is the blood pressure?\" \"Did you take at least two measurements 5 minutes apart?\"    Went to emergency room last night. BP was 188/69. They said they don't treat blood pressure, it is up to primary doctor.         2. ONSET: \"When did you take your blood pressure?\"     Does not have a monitor at home       3. HOW: \"How did you obtain the blood pressure?\" (e.g., visiting nurse, automatic home BP monitor)        ED - yesterday evening.    4. HISTORY: \"Do you have a history of high blood pressure?\"    Yes        5. MEDICATIONS: \"Are you taking any medications for blood pressure?\" \"Have you missed any doses recently?\"        Losartan (for quite a while)    6. OTHER SYMPTOMS: \"Do you have any symptoms?\" (e.g., headache, chest pain, blurred vision, difficulty breathing, weakness)    Feeling anxious, cannot sleep   NO headache -  YES - feeling lightheaded  YES- blurred vision (coming on gradually last few months)   YES - SOB, states she has a heart condition (Hx open heart surgery, had heart valve replacement (March 5, 2020))  YES - weakness, also states that cold be related to age        7. PREGNANCY: \"Is there any chance you are pregnant?\" \"When was your last menstrual period?\"      Not asked    Cannot get medicine right now because  Doesn't answer calls from pharmacy.    Protocols used: HIGH BLOOD PRESSURE-A-OH      "

## 2020-07-13 NOTE — TELEPHONE ENCOUNTER
Discussed with Madie her INR. It was 3.11.  She had alittle ozzing of blood from her nostrils this am but that is clotted now. There is dried bloot in her ear cavity but not fresh blood. She states that both were cauterized  In the ER. She was told to hold yesterday and today. She did hold yesterday and  is going to take 1/2 tab 1.5 mg today and have an INR check tomorrow and we will evaluate her for any new bleeding then. Discussed going in to ER if any fresh bleeding occurs. Discussed relaxing and trying to take her attention off her BP. Will watch to make sure that her BP is addressed.

## 2020-07-13 NOTE — ED TRIAGE NOTES
Pt states woke up this morning and noticed blood all over hands. Pt noticed blood coming from rt ear. No pain no obvious injury. On blood thinner

## 2020-07-14 ENCOUNTER — OFFICE VISIT (OUTPATIENT)
Dept: INTERNAL MEDICINE | Facility: CLINIC | Age: 85
End: 2020-07-14
Payer: COMMERCIAL

## 2020-07-14 ENCOUNTER — TELEPHONE (OUTPATIENT)
Dept: INTERNAL MEDICINE | Facility: CLINIC | Age: 85
End: 2020-07-14

## 2020-07-14 ENCOUNTER — ANTICOAGULATION THERAPY VISIT (OUTPATIENT)
Dept: NURSING | Facility: CLINIC | Age: 85
End: 2020-07-14
Payer: COMMERCIAL

## 2020-07-14 VITALS
HEART RATE: 77 BPM | SYSTOLIC BLOOD PRESSURE: 122 MMHG | RESPIRATION RATE: 16 BRPM | WEIGHT: 156 LBS | OXYGEN SATURATION: 98 % | DIASTOLIC BLOOD PRESSURE: 50 MMHG | BODY MASS INDEX: 25.96 KG/M2 | TEMPERATURE: 97.7 F

## 2020-07-14 DIAGNOSIS — M25.50 MULTIPLE JOINT PAIN: ICD-10-CM

## 2020-07-14 DIAGNOSIS — Z79.01 LONG TERM CURRENT USE OF ANTICOAGULANT THERAPY: ICD-10-CM

## 2020-07-14 DIAGNOSIS — R79.1 SUPRATHERAPEUTIC INR: ICD-10-CM

## 2020-07-14 DIAGNOSIS — S00.411A ABRASION OF RIGHT EAR CANAL, INITIAL ENCOUNTER: ICD-10-CM

## 2020-07-14 DIAGNOSIS — I80.209 PHLEBITIS AND THROMBOPHLEBITIS OF DEEP VEINS OF LOWER EXTREMITIES (H): ICD-10-CM

## 2020-07-14 DIAGNOSIS — I48.0 PAROXYSMAL ATRIAL FIBRILLATION (H): ICD-10-CM

## 2020-07-14 DIAGNOSIS — L81.9 BLEEDING PIGMENTED SKIN LESION: Primary | ICD-10-CM

## 2020-07-14 LAB
CAPILLARY BLOOD COLLECTION: NORMAL
INR PPP: 2.5 (ref 0.86–1.14)

## 2020-07-14 PROCEDURE — 85610 PROTHROMBIN TIME: CPT | Performed by: INTERNAL MEDICINE

## 2020-07-14 PROCEDURE — 36416 COLLJ CAPILLARY BLOOD SPEC: CPT | Performed by: INTERNAL MEDICINE

## 2020-07-14 PROCEDURE — 99207 ZZC NO CHARGE NURSE ONLY: CPT

## 2020-07-14 PROCEDURE — 99214 OFFICE O/P EST MOD 30 MIN: CPT | Performed by: PHYSICIAN ASSISTANT

## 2020-07-14 RX ORDER — HYDROCODONE BITARTRATE AND ACETAMINOPHEN 5; 325 MG/1; MG/1
.5-1 TABLET ORAL EVERY 6 HOURS PRN
Qty: 12 TABLET | Refills: 0 | Status: ON HOLD | OUTPATIENT
Start: 2020-07-14 | End: 2020-08-01

## 2020-07-14 NOTE — PATIENT INSTRUCTIONS
Keep appt with Dr Obregon - telephone appointment for 7/30   Transferring medication to [facility]    Get your INR done and they will call you later this afternoon.

## 2020-07-14 NOTE — TELEPHONE ENCOUNTER
ANTICOAGULATION  MANAGEMENT: Discharge Review    Madie Silva chart reviewed for anticoagulation continuity of care    ED on 7/12 for  Et ear bleeding.    Discharge disposition: Home    Results:    Recent labs: (last 7 days)     07/12/20 1945   INR 3.11*     Anticoagulation inpatient management:     held warfarin due to bleeding     Anticoagulation discharge instructions:     Warfarin dosing: hold 7/14   Bridging: No   INR goal change: No      Medication changes affecting anticoagulation: No    Additional factors affecting anticoagulation: No    Plan     Recommend to adjust dose to take 1/2 dose on 7/13    Spoke with Madie  Discussed with Madie 7/13/202.Her INR. It was 3.11.  She had alittle ozzing of blood from her nostrils this am but that is clotted now. There is dried bloot in her ear cavity but not fresh blood. She states that both were cauterized  In the ER. She was told to hold 7/12 and 7/13. She did hold 7/12 and  is going to take 1/2 tab 1.5 mg 7/13 and have an INR check 7/14 and we will evaluate her for any new bleeding then. Discussed going in to ER if any fresh bleeding occurs. Discussed relaxing and trying to take her attention off her BP. Will watch to make sure that her BP is addressed.Discussed with Madie her INR. It was 3.11.  She had alittle ozzing of blood from her nostrils this am but that is clotted now. There is dried bloot in her ear cavity but not fresh blood. She states that both were cauterized  In the ER. Anticoagulation Calendar updated    Alexsandra Marvin RN

## 2020-07-14 NOTE — PROGRESS NOTES
Subjective     Madie Silva is a 89 year old female who presents to clinic today for the following health issues:    HPI   ED/UC Followup:    Facility:  [facility] ED  Date of visit: 7/12/20  Reason for visit: ENT problem  Current Status: Patient has concerns about her blood pressure. States nose is still bleeding. Right ear feels like it squishes and bleeding in the right ear.      Patient with ski lesion on the nose that has been intermittently bleeding for months. Seems that she bumps it and it bleeds again  This was bleeding when she went to the ED - cautery done. Other issue was ear canal abrasion - also cauterized. No new bleeding noted.   Worried about bleeding  Has INR check today and will review with [facility]     Will be transferring prescriptions to [facility]- has printout to give to [facility] and they will need to call to transfer for her explained this to her again today ( pharmacy had reviewed this already )   Requesting refill on hydrocodone for joint pains. Last fill in 12/2019 # 12.     Was very worried about her blood pressure. It was elevated in ED - in clinic normal today . Reassurance given       -------------------------------------    BP Readings from Last 3 Encounters:   07/14/20 122/50   07/12/20 (!) 188/69   05/12/20 136/52    Wt Readings from Last 3 Encounters:   07/14/20 70.8 kg (156 lb)   05/06/20 71.9 kg (158 lb 9.6 oz)   05/01/20 71.7 kg (158 lb)                    -------------------------------------  Reviewed and updated as needed this visit by Provider  Allergies  Meds         Review of Systems   Constitutional, HEENT, cardiovascular, pulmonary, gi and gu systems are negative, except as otherwise noted.      Objective    /50 (BP Location: Left arm, Patient Position: Chair, Cuff Size: Adult Regular)   Pulse 77   Temp 97.7  F (36.5  C) (Tympanic)   Resp 16   Wt 70.8 kg (156 lb)   SpO2 98%   BMI 25.96 kg/m    Body mass index is 25.96 kg/m .  Physical Exam   GENERAL: healthy, alert and no  "distress  HENT: normal cephalic/atraumatic, right ear: canal with old blood noted and small abrasion , oropharynx clear and oral mucous membranes moist  RESP: lungs clear to auscultation - no rales, rhonchi or wheezes  CV: regular rates and rhythm and normal S1 S2, no S3 or S4  MS: no gross musculoskeletal defects noted, no edema  SKIN: nare- right of bridge of nose. With papular lesion with recent bleeding     Diagnostic Test Results:  Labs reviewed in Epic        Assessment & Plan     1. Bleeding pigmented skin lesion  Needs to see derm with continued non healing lesion and bleeding   - DERMATOLOGY REFERRAL    2. Abrasion of right ear canal, initial encounter  Reassurance given     3. Multiple joint pain  Refill done   - HYDROcodone-acetaminophen (NORCO) 5-325 MG tablet; Take 0.5-1 tablets by mouth every 6 hours as needed for moderate to severe pain  Dispense: 12 tablet; Refill: 0    4. Supratherapeutic INR   [facility] managing        BMI:   Estimated body mass index is 25.96 kg/m  as calculated from the following:    Height as of 5/5/20: 1.651 m (5' 5\").    Weight as of this encounter: 70.8 kg (156 lb).     25 minutes spent with patient > 50 %of time on counseling and plan of care.       Patient Instructions   Keep appt with Dr Obregon - telephone appointment for 7/30   Transferring medication to [facility]    Get your INR done and they will call you later this afternoon.               Return in about 2 weeks (around 7/28/2020) for regular primary provider telephone .    Anna Burger PA-C  [facility]      "

## 2020-07-14 NOTE — PROGRESS NOTES
ANTICOAGULATION FOLLOW-UP CLINIC VISIT    Patient Name:  Madie Silva  Date:  2020  Contact Type:  Telephone    SUBJECTIVE:  Patient Findings     Comments:   The patient was assessed for diet, medication, and activity level changes, missed or extra doses, bruising or bleeding, with no problem findings.          Clinical Outcomes     Comments:   The patient was assessed for diet, medication, and activity level changes, missed or extra doses, bruising or bleeding, with no problem findings.             OBJECTIVE    Recent labs: (last 7 days)     20  1217   INR 2.50*       ASSESSMENT / PLAN  INR assessment THER    Recheck INR In: 1 WEEK    INR Location Clinic      Anticoagulation Summary  As of 2020    INR goal:   2.0-3.0   TTR:   89.0 % (11.9 mo)   INR used for dosin.50 (2020)   Warfarin maintenance plan:   4.5 mg (3 mg x 1.5) every Tue, Thu, Sat; 3 mg (3 mg x 1) all other days   Full warfarin instructions:   4.5 mg every Tue, Thu, Sat; 3 mg all other days   Weekly warfarin total:   25.5 mg   No change documented:   Alexsandra Marvin RN   Plan last modified:   Stella Cha RN (10/25/2018)   Next INR check:   2020   Target end date:   Indefinite    Indications    Long-term (current) use of anticoagulants [Z79.01] [Z79.01]  Phlebitis and thrombophlebitis of deep veins of lower extremities (H) [I80.209]  Acute myocardial infarction  initial episode of care (H) (Resolved) [I21.9]  Atrial fibrillation  Paroxysmal (H) [I48.0]             Anticoagulation Episode Summary     INR check location:       Preferred lab:       Send INR reminders to:   [facility]    Comments:         Anticoagulation Care Providers     Provider Role Specialty Phone number    De Obregon MD Referring Internal Medicine [phone]            See the Encounter Report to view Anticoagulation Flowsheet and Dosing Calendar (Go to Encounters tab in chart review, and find the  Anticoagulation Therapy Visit)        Alexsandra Marvin RN

## 2020-07-15 ENCOUNTER — PATIENT OUTREACH (OUTPATIENT)
Dept: NURSING | Facility: CLINIC | Age: 85
End: 2020-07-15
Payer: COMMERCIAL

## 2020-07-15 DIAGNOSIS — I10 BENIGN ESSENTIAL HYPERTENSION: ICD-10-CM

## 2020-07-15 DIAGNOSIS — I10 ESSENTIAL HYPERTENSION: ICD-10-CM

## 2020-07-15 NOTE — PROGRESS NOTES
Clinic Care Coordination Contact  Community Health Worker Initial Outreach    CHW Initial Information Gathering:  Referral Source: ED Follow-Up  Preferred Hospital: [facility]  [phone]  Current living arrangement:: I live in a private home  Community Resources: None  Supplies used at home:: None  Equipment Currently Used at Home: cane, quad  Informal Support system:: Family  No PCP office visit in Past Year: No  Transportation means:: Family  CHW Additional Questions  If ED/Hospital discharge, follow-up appointment scheduled as recommended?: Yes  Medication changes made following ED/Hospital discharge?: No  MyChart active?: No  Patient agreeable to assistance with activating MyChart?: No(Patient does not have a computer)    Patient accepts CC: Yes. Patient scheduled for assessment with NEGRITO Ramey on 7/16/20 at 10:30am. Patient noted desire to discuss house cleaning resources.     IZAIAH Gloria  Clinic Care Coordination  [facility]: [facility]   Phone: [phone]

## 2020-07-16 ENCOUNTER — PATIENT OUTREACH (OUTPATIENT)
Dept: NURSING | Facility: CLINIC | Age: 85
End: 2020-07-16
Payer: COMMERCIAL

## 2020-07-16 RX ORDER — LOSARTAN POTASSIUM 50 MG/1
25 TABLET ORAL DAILY
Qty: 90 TABLET | Refills: 3 | Status: SHIPPED | OUTPATIENT
Start: 2020-07-16 | End: 2020-01-01

## 2020-07-16 RX ORDER — METOPROLOL SUCCINATE 25 MG/1
12.5 TABLET, EXTENDED RELEASE ORAL DAILY
Qty: 30 TABLET | Refills: 11 | Status: SHIPPED | OUTPATIENT
Start: 2020-07-16 | End: 2020-01-01

## 2020-07-16 ASSESSMENT — ACTIVITIES OF DAILY LIVING (ADL): DEPENDENT_IADLS:: INDEPENDENT

## 2020-07-16 NOTE — PROGRESS NOTES
Clinic Care Coordination Contact    Clinic Care Coordination Contact  OUTREACH    Referral Information:  Referral Source: ED Follow-Up    Primary Diagnosis: Psychosocial    Chief Complaint   Patient presents with     Clinic Care Coordination - Initial     Support at home        Keo Utilization:   Clinic Utilization  Difficulty keeping appointments: No  Compliance Concerns: No  No-Show Concerns: No  No PCP office visit in Past Year: No  Utilization    Last refreshed: 7/16/2020  7:52 AM:  Hospital Admissions 2           Last refreshed: 7/16/2020  7:52 AM:  ED Visits 3           Last refreshed: 7/16/2020  7:52 AM:  No Show Count (past year) 0              Current as of: 7/16/2020  7:52 AM              Clinical Concerns:  JASPREET MOORE outreach to pt for resources for house cleaning. Pt was brief and is not sure if she needs it or not. She may ask her son or grandkids to help her.     JASPREET MOORE offered community resources, pt declined.    Medication Management:  No questions. Has upcoming PCP appt.    Functional Status:  Dependent ADLs: Independent  Dependent IADLs: Independent  Bed or wheelchair confined: No  Mobility Status: Independent  Fallen 2 or more times in the past year?: No  Any fall with injury in the past year?: No    Living Situation:  Current living arrangement: I live in a private home  Type of residence: Private home - stairs    Lifestyle & Psychosocial Needs:  Transportation means: Family  Shinto or spiritual beliefs that impact treatment: No  Mental health DX: No  Mental health management concern: No  Informal Support system: Family     Socioeconomic History     Marital status:      Spouse name: Not on file     Number of children: Not on file     Years of education: Not on file     Highest education level: Not on file     Tobacco Use     Smoking status: Never Smoker     Smokeless tobacco: Never Used   Substance and Sexual Activity     Alcohol use: No     Drug use: No     Sexual activity: Never           Resources and Interventions:  Current Resources:   Community Resources: None  Supplies used at home: None  Equipment Currently Used at Home: cane, quad    Advance Care Plan/Directive  Advanced Care Plans/Directives on file: Yes  Type Advanced Care Plans/Directives: Advanced Directive - On File  Advanced Care Plan/Directive Status: Not Applicable    Referrals Placed: None     Patient/Caregiver understanding: Pt reports understanding and denies any additional questions or concerns at this times. JASPREET CC engaged in AIDET communication during encounter.       Future Appointments              In 5 days [facility]    In 1 week Cristy Lieberman PA-C [facility]    In 2 weeks De Obregon MD [facility]    In 2 weeks CONKLIN TECH1 [facility]          Plan: No further outreaches will be made at this time unless a new referral is made or a change in the pt's status occurs. Patient was provided with this writer's contact information and encouraged to call with any questions or concerns. JASPREET CC will mail care coordination introduction letter and 24 hr access plan to patient for future use. Pt will attend upcoming appts. Pt will reach out to family for assistance at home.     ALFRED Ramey   Social Work Clinic Care Coordinator   [facility]   PH: [phone]  [email or web address]

## 2020-07-16 NOTE — LETTER
[place] CARE COORDINATION  [facility]  [address] [address]  Tel. [phone]  Fax [phone]    July 16, 2020    Madie Silva  [address] [place] [id number]      Dear Madie,    I am a clinic care coordinator who works with De Obregon MD at [facility]. I wanted to thank you for spending the time to talk with me.  Below is a description of clinic care coordination and how I can further assist you.      The clinic care coordination team is made up of a registered nurse,  and community health worker who understand the health care system. The goal of clinic care coordination is to help you manage your health and improve access to the health care system in the most efficient manner. The team can assist you in meeting your health care goals by providing education, coordinating services, strengthening the communication among your providers and supporting you with any resource needs.    Please feel free to contact the Community Health Worker at [phone] with any questions or concerns. We are focused on providing you with the highest-quality healthcare experience possible and that all starts with you.     Sincerely,     ALFRED Ramey   Social Work Clinic Care Coordinator   [facility], and [facility]   PH: [phone]  [email or web address]   Enclosed: I have enclosed a copy of a 24 Hour Access Plan. This has helpful phone numbers for you to call when needed. Please keep this in an easy to access place to use as needed.

## 2020-07-16 NOTE — LETTER
Health Care Home - Access Care Plan    About Me:    Patient Name:  Madie Amezquita    YOB: 1929  Age:                      89 year old   Zhen MRN:     [id number] Telephone Information:   Home Phone [phone]   Mobile [phone]       Address:  [address] [place] [id number] Email address:  No e-mail address on record      Emergency Contact(s)   Name Relationship Lgl Grd Work Phone Home Phone Mobile Phone   1. SUSAN AMEZQUITA Son No [phone]   2. NATAN AMEZQUITA No  [phone]   3. JOSE LUIS AMEZQUITA No  [phone]             Health Maintenance:    Health Maintenance Due   Topic Date Due     ADVANCE CARE PLANNING  07/21/1929     HEPATITIS B IMMUNIZATION (1 of 3 - Risk 3-dose series) 07/21/1948     ZOSTER IMMUNIZATION (1 of 2) 07/21/1979     MEDICARE ANNUAL WELLNESS VISIT  07/21/1994     EYE EXAM  01/01/2019     DIABETIC FOOT EXAM  12/13/2019     PHQ-2  01/01/2020     COLORECTAL CANCER SCREENING  05/14/2020     LIPID  06/06/2020     TSH W/FREE T4 REFLEX  06/06/2020     FALL RISK ASSESSMENT  06/13/2020     My Access Plan  Medical Emergency 911   Questions or concerns during clinic hours Primary Clinic Line, I will call the clinic directly: [facility] [phone]   24 Hour Appointment Line [phone] or  [phone]) (toll free)   24 Hour Nurse Line [phone] (toll free)   Questions or concerns outside clinic hours 24 Hour Appointment Line, I will call the after-hours on-call line:   [facility] [phone] or [phone]) (toll-free)   Preferred Urgent Care     Preferred Hospital     Preferred Pharmacy [facility] [address]     Behavioral Health Crisis Line The National Suicide Prevention Lifeline at 1-548.614.7377 or 911     My Care Team Members  Patient Care Team       Relationship Specialty  Notifications Start End    De Obregon MD PCP - General   2/15/02     Phone: [phone] Pager: [phone] Fax: [phone] [address] [place] [phone]    De Obregon MD Assigned PCP   10/4/12     Phone: [phone] Pager: [phone] Fax: [phone]        [address] [place] [address]    Dayana Hillman [facility] Pharmacist Pharmacist  5/11/20      [id number] [address] [facility] [id number]           My Medical and Care Information  Problem List   Patient Active Problem List   Diagnosis     Hyperlipidemia LDL goal <100     GERD     Colonic Adenoma     Obesity     Osteoarthritis involving multiple joints     Osteoporosis     Small vessel cerebrovascular changes     Essential hypertension     Post herpetic neuralgia     CHRONIC VERTIGO     Diaphragmatic hernia     Polymyalgia rheumatica (H)     Hypothyroidism     Type 2 diabetes mellitus with diabetic nephropathy (H)     Vitamin D deficiency     Hereditary and idiopathic peripheral neuropathy     Walters's esophagus     Restless leg syndrome     Vasovagal syncope     Preoperative evaluation to rule out surgical contraindication     CKD (chronic kidney disease) stage 3, GFR 30-59 ml/min (H)     Health Care Home     PPD positive     Phlebitis and thrombophlebitis of deep veins of lower extremities (H)     Shortness of breath     Kidney stone     Constipation     Long-term (current) use of anticoagulants [Z79.01]     Atrial fibrillation, Paroxysmal (H)     Aortic valve stenosis, severe     Stricture and stenosis of esophagus     Coronary artery disease     Bradycardia     Status post coronary angiogram     Aortic stenosis, severe      Current Medications and Allergies:   Current Outpatient Medications   Medication     acetaminophen (TYLENOL ARTHRITIS PAIN) 650 MG CR tablet     aspirin EC 81 MG EC tablet     atorvastatin (LIPITOR) 80 MG tablet     calcium 600 MG tablet     Cholecalciferol (VITAMIN D-400 PO)     co-enzyme Q-10  (COENZYME Q-10) 100 MG CAPS     gabapentin (NEURONTIN) 100 MG capsule     HYDROcodone-acetaminophen (NORCO) 5-325 MG tablet     hydrocortisone, Perianal, (ANUSOL-HC) 2.5 % cream     levothyroxine (SYNTHROID/LEVOTHROID) 50 MCG tablet     losartan (COZAAR) 50 MG tablet     metFORMIN (GLUCOPHAGE) 500 MG tablet     metoprolol succinate ER (TOPROL-XL) 25 MG 24 hr tablet     Multiple Vitamins-Minerals (CENTRUM SILVER) per tablet     Multiple Vitamins-Minerals (PRESERVISION AREDS 2 PO)     nitroGLYCERIN (NITROSTAT) 0.4 MG SL tablet     omeprazole (PRILOSEC) 20 MG DR capsule     polyethylene glycol (MIRALAX) powder     Psyllium (FIBER) 0.52 g CAPS     warfarin ANTICOAGULANT (COUMADIN) 3 MG tablet     No current facility-administered medications for this visit.

## 2020-07-21 ENCOUNTER — ANTICOAGULATION THERAPY VISIT (OUTPATIENT)
Dept: NURSING | Facility: CLINIC | Age: 85
End: 2020-07-21
Payer: COMMERCIAL

## 2020-07-21 DIAGNOSIS — I80.209 PHLEBITIS AND THROMBOPHLEBITIS OF DEEP VEINS OF LOWER EXTREMITIES (H): ICD-10-CM

## 2020-07-21 DIAGNOSIS — I48.0 PAROXYSMAL ATRIAL FIBRILLATION (H): ICD-10-CM

## 2020-07-21 DIAGNOSIS — Z79.01 LONG TERM CURRENT USE OF ANTICOAGULANT THERAPY: ICD-10-CM

## 2020-07-21 LAB
CAPILLARY BLOOD COLLECTION: NORMAL
INR PPP: 3 (ref 0.86–1.14)

## 2020-07-21 PROCEDURE — 85610 PROTHROMBIN TIME: CPT | Performed by: INTERNAL MEDICINE

## 2020-07-21 PROCEDURE — 36416 COLLJ CAPILLARY BLOOD SPEC: CPT | Performed by: INTERNAL MEDICINE

## 2020-07-21 PROCEDURE — 99207 ZZC NO CHARGE NURSE ONLY: CPT

## 2020-07-21 NOTE — PROGRESS NOTES
ANTICOAGULATION FOLLOW-UP CLINIC VISIT    Patient Name:  Madie Silva  Date:  7/21/2020  Contact Type:  Telephone    SUBJECTIVE:  Patient Findings     Comments:   The patient was assessed for diet, medication, and activity level changes, missed or extra doses, bruising or bleeding, with no problem findings. She has had nose bleeds recently so lowered dosing 1.5 mg per week.        Clinical Outcomes     Comments:   The patient was assessed for diet, medication, and activity level changes, missed or extra doses, bruising or bleeding, with no problem findings. She has had nose bleeds recently so lowered dosing 1.5 mg per week.           OBJECTIVE    Recent labs: (last 7 days)     07/21/20  1055   INR 3.00*       ASSESSMENT / PLAN  INR assessment THER    Recheck INR In: 2 WEEKS    INR Location Clinic      Anticoagulation Summary  As of 7/21/2020    INR goal:   2.0-3.0   TTR:   89.0 % (11.9 mo)   INR used for dosing:   3.00 (7/21/2020)   Warfarin maintenance plan:   4.5 mg (3 mg x 1.5) every Tue, Sat; 3 mg (3 mg x 1) all other days   Full warfarin instructions:   4.5 mg every Tue, Sat; 3 mg all other days   Weekly warfarin total:   24 mg   Plan last modified:   Alexsandra Marvin RN (7/21/2020)   Next INR check:   8/4/2020   Priority:   Maintenance   Target end date:   Indefinite    Indications    Long-term (current) use of anticoagulants [Z79.01] [Z79.01]  Phlebitis and thrombophlebitis of deep veins of lower extremities (H) [I80.209]  Acute myocardial infarction  initial episode of care (H) (Resolved) [I21.9]  Atrial fibrillation  Paroxysmal (H) [I48.0]             Anticoagulation Episode Summary     INR check location:       Preferred lab:       Send INR reminders to:   [facility]    Comments:         Anticoagulation Care Providers     Provider Role Specialty Phone number    De Obregon MD Referring Internal Medicine [phone]            See the Encounter Report to view Anticoagulation  Flowsheet and Dosing Calendar (Go to Encounters tab in chart review, and find the Anticoagulation Therapy Visit)        Alexsandra Marvin RN

## 2020-07-28 ENCOUNTER — OFFICE VISIT (OUTPATIENT)
Dept: DERMATOLOGY | Facility: CLINIC | Age: 85
End: 2020-07-28
Payer: COMMERCIAL

## 2020-07-28 VITALS — OXYGEN SATURATION: 97 % | HEART RATE: 72 BPM | DIASTOLIC BLOOD PRESSURE: 51 MMHG | SYSTOLIC BLOOD PRESSURE: 136 MMHG

## 2020-07-28 DIAGNOSIS — L82.1 SEBORRHEIC KERATOSIS: ICD-10-CM

## 2020-07-28 DIAGNOSIS — D48.5 NEOPLASM OF UNCERTAIN BEHAVIOR OF SKIN: Primary | ICD-10-CM

## 2020-07-28 PROCEDURE — 88305 TISSUE EXAM BY PATHOLOGIST: CPT | Mod: TC | Performed by: PHYSICIAN ASSISTANT

## 2020-07-28 PROCEDURE — 11102 TANGNTL BX SKIN SINGLE LES: CPT | Performed by: PHYSICIAN ASSISTANT

## 2020-07-28 PROCEDURE — 99214 OFFICE O/P EST MOD 30 MIN: CPT | Mod: 25 | Performed by: PHYSICIAN ASSISTANT

## 2020-07-28 NOTE — PATIENT INSTRUCTIONS
Wound Care Instructions     FOR SUPERFICIAL WOUNDS     [facility] [phone]    [facility] [phone]                       AFTER 24 HOURS YOU SHOULD REMOVE THE BANDAGE AND BEGIN DAILY DRESSING CHANGES AS FOLLOWS:     1) Remove Dressing.     2) Clean and dry the area with tap water using a Q-tip or sterile gauze pad.     3) Apply Vaseline, Aquaphor, Polysporin ointment or Bacitracin ointment over entire wound.  Do NOT use Neosporin ointment.     4) Cover the wound with a band-aid, or a sterile non-stick gauze pad and micropore paper tape      REPEAT THESE INSTRUCTIONS AT LEAST ONCE A DAY UNTIL THE WOUND HAS COMPLETELY HEALED.    It is an old wives tale that a wound heals better when it is exposed to air and allowed to dry out. The wound will heal faster with a better cosmetic result if it is kept moist with ointment and covered with a bandage.    **Do not let the wound dry out.**      Supplies Needed:      *Cotton tipped applicators (Q-tips)    *Polysporin Ointment or Bacitracin Ointment (NOT NEOSPORIN)    *Band-aids or non-stick gauze pads and micropore paper tape.      PATIENT INFORMATION:    During the healing process you will notice a number of changes. All wounds develop a small halo of redness surrounding the wound.  This means healing is occurring. Severe itching with extensive redness usually indicates sensitivity to the ointment or bandage tape used to dress the wound.  You should call our office if this develops.      Swelling  and/or discoloration around your surgical site is common, particularly when performed around the eye.    All wounds normally drain.  The larger the wound the more drainage there will be.  After 7-10 days, you will notice the wound beginning to shrink and new skin will begin to grow.  The wound is healed when you can see skin has formed over the entire area.  A healed wound has a healthy, shiny look to the surface and is red to dark pink in color  to normalize.  Wounds may take approximately 4-6 weeks to heal.  Larger wounds may take 6-8 weeks.  After the wound is healed you may discontinue dressing changes.    You may experience a sensation of tightness as your wound heals. This is normal and will gradually subside.    Your healed wound may be sensitive to temperature changes. This sensitivity improves with time, but if you re having a lot of discomfort, try to avoid temperature extremes.    Patients frequently experience itching after their wound appears to have healed because of the continue healing under the skin.  Plain Vaseline will help relieve the itching.        POSSIBLE COMPLICATIONS    BLEEDIN. Leave the bandage in place.  2. Use tightly rolled up gauze or a cloth to apply direct pressure over the bandage for 30  minutes.  3. Reapply pressure for an additional 30 minutes if necessary  4. Use additional gauze and tape to maintain pressure once the bleeding has stopped.

## 2020-07-28 NOTE — LETTER
7/28/2020         RE: Madie Silva  [address] [place] [id number]        Dear Colleague,    Thank you for referring your patient, Madie Silva, to the [facility]. Please see a copy of my visit note below.    HPI:   Chief complaints: Madie Silva is a 89 year old female who presents for evaluation of a spot on the nose. Area has been present for several years and will bleed.   Condition present for:  Years - approx 10-15 years   Previous treatments include: none    No history of skin CA      Review Of Systems  Eyes: negative  Ears/Nose/Throat: negative  Respiratory: No shortness of breath, dyspnea on exertion, cough, or hemoptysis  Cardiovascular: negative  Gastrointestinal: negative  Genitourinary: negative  Musculoskeletal: negative  Neurologic: negative  Psychiatric: negative  Skin: positive for lesions      PHYSICAL EXAM:    /51   Pulse 72   SpO2 97%   Skin exam performed as follows: Type 2 skin. Mood appropriate  Alert and Oriented X 3. Well developed, well nourished in no distress.  General appearance: Normal  Head including face: Normal  Eyes: conjunctiva and lids: Normal  Mouth: Lips, teeth, gums: Normal  Neck: Normal  Chest-breast/axillae: Normal  Back: Normal  Spleen and liver: Normal  Cardiovascular: Exam of peripheral vascular system by observation for swelling, varicosities, edema: Normal  Genitalia: groin, buttocks: Normal  Extremities: digits/nails (clubbing): Normal  Eccrine and Apocrine glands: Normal  Right upper extremity: Normal  Left upper extremity: Normal  Right lower extremity: Normal  Left lower extremity: Normal  Skin: Scalp and body hair: See below    1. 5 mm pink papule on the right nasal bridge   2. Keratotic papules on the face, neck    ASSESSMENT/PLAN:     1. R/o BCC on the right nasal bridge. Shave biopsy performed.  Area cleaned and anesthetized with 1% lidocaine with epinephrine.  Dermablade used to remove the lesion and sent  to pathology. Bleeding was cauterized. Pt tolerated procedure well with no complications.   2. Seborrheic keratoses on the face and neck - advised benign no treatment needed.         Follow-up: pending path  CC:   Scribed By: Cristy Lieberman MS, SANTOS        Again, thank you for allowing me to participate in the care of your patient.        Sincerely,        Cristy Lieberman PA-C

## 2020-07-28 NOTE — PROGRESS NOTES
HPI:   Chief complaints: Madie Silva is a 89 year old female who presents for evaluation of a spot on the nose. Area has been present for several years and will bleed.   Condition present for:  Years - approx 10-15 years   Previous treatments include: none    No history of skin CA      Review Of Systems  Eyes: negative  Ears/Nose/Throat: negative  Respiratory: No shortness of breath, dyspnea on exertion, cough, or hemoptysis  Cardiovascular: negative  Gastrointestinal: negative  Genitourinary: negative  Musculoskeletal: negative  Neurologic: negative  Psychiatric: negative  Skin: positive for lesions      PHYSICAL EXAM:    /51   Pulse 72   SpO2 97%   Skin exam performed as follows: Type 2 skin. Mood appropriate  Alert and Oriented X 3. Well developed, well nourished in no distress.  General appearance: Normal  Head including face: Normal  Eyes: conjunctiva and lids: Normal  Mouth: Lips, teeth, gums: Normal  Neck: Normal  Chest-breast/axillae: Normal  Back: Normal  Spleen and liver: Normal  Cardiovascular: Exam of peripheral vascular system by observation for swelling, varicosities, edema: Normal  Genitalia: groin, buttocks: Normal  Extremities: digits/nails (clubbing): Normal  Eccrine and Apocrine glands: Normal  Right upper extremity: Normal  Left upper extremity: Normal  Right lower extremity: Normal  Left lower extremity: Normal  Skin: Scalp and body hair: See below    1. 5 mm pink papule on the right nasal bridge   2. Keratotic papules on the face, neck    ASSESSMENT/PLAN:     1. R/o BCC on the right nasal bridge. Shave biopsy performed.  Area cleaned and anesthetized with 1% lidocaine with epinephrine.  Dermablade used to remove the lesion and sent to pathology. Bleeding was cauterized. Pt tolerated procedure well with no complications.   2. Seborrheic keratoses on the face and neck - advised benign no treatment needed.         Follow-up: pending path  CC:   Scribed By: Cristy Lieberman, MS, PA-C

## 2020-07-29 ENCOUNTER — APPOINTMENT (OUTPATIENT)
Dept: CT IMAGING | Facility: CLINIC | Age: 85
DRG: 563 | End: 2020-07-29
Attending: PHYSICIAN ASSISTANT
Payer: COMMERCIAL

## 2020-07-29 ENCOUNTER — APPOINTMENT (OUTPATIENT)
Dept: GENERAL RADIOLOGY | Facility: CLINIC | Age: 85
DRG: 563 | End: 2020-07-29
Attending: PHYSICIAN ASSISTANT
Payer: COMMERCIAL

## 2020-07-29 ENCOUNTER — HOSPITAL ENCOUNTER (INPATIENT)
Facility: CLINIC | Age: 85
LOS: 1 days | Discharge: NURSING, REHAB OR LONG-TERM CARE | DRG: 563 | End: 2020-08-01
Attending: PHYSICIAN ASSISTANT | Admitting: STUDENT IN AN ORGANIZED HEALTH CARE EDUCATION/TRAINING PROGRAM
Payer: COMMERCIAL
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
## 2020-07-29 DIAGNOSIS — G25.81 RESTLESS LEG SYNDROME: ICD-10-CM

## 2020-07-29 DIAGNOSIS — S42.295A OTHER CLOSED NONDISPLACED FRACTURE OF PROXIMAL END OF LEFT HUMERUS, INITIAL ENCOUNTER: Primary | ICD-10-CM

## 2020-07-29 DIAGNOSIS — B02.29 POST HERPETIC NEURALGIA: ICD-10-CM

## 2020-07-29 DIAGNOSIS — I48.0 PAROXYSMAL ATRIAL FIBRILLATION (H): ICD-10-CM

## 2020-07-29 DIAGNOSIS — E11.21 TYPE 2 DIABETES MELLITUS WITH DIABETIC NEPHROPATHY, WITHOUT LONG-TERM CURRENT USE OF INSULIN (H): ICD-10-CM

## 2020-07-29 DIAGNOSIS — S42.292A CLOSED FRACTURE OF HEAD OF LEFT HUMERUS, INITIAL ENCOUNTER: ICD-10-CM

## 2020-07-29 PROBLEM — M25.519 SHOULDER PAIN: Status: ACTIVE | Noted: 2020-07-29

## 2020-07-29 LAB
ANION GAP SERPL CALCULATED.3IONS-SCNC: 6 MMOL/L (ref 3–14)
BASOPHILS # BLD AUTO: 0 10E9/L (ref 0–0.2)
BASOPHILS NFR BLD AUTO: 0.4 %
BUN SERPL-MCNC: 23 MG/DL (ref 7–30)
CALCIUM SERPL-MCNC: 8.8 MG/DL (ref 8.5–10.1)
CHLORIDE SERPL-SCNC: 108 MMOL/L (ref 94–109)
CO2 SERPL-SCNC: 22 MMOL/L (ref 20–32)
CREAT SERPL-MCNC: 1.09 MG/DL (ref 0.52–1.04)
DIFFERENTIAL METHOD BLD: ABNORMAL
EOSINOPHIL # BLD AUTO: 0.1 10E9/L (ref 0–0.7)
EOSINOPHIL NFR BLD AUTO: 1.3 %
ERYTHROCYTE [DISTWIDTH] IN BLOOD BY AUTOMATED COUNT: 16.9 % (ref 10–15)
GFR SERPL CREATININE-BSD FRML MDRD: 44 ML/MIN/{1.73_M2}
GLUCOSE BLDC GLUCOMTR-MCNC: 179 MG/DL (ref 70–99)
GLUCOSE SERPL-MCNC: 148 MG/DL (ref 70–99)
HCT VFR BLD AUTO: 35.5 % (ref 35–47)
HGB BLD-MCNC: 11.4 G/DL (ref 11.7–15.7)
IMM GRANULOCYTES # BLD: 0 10E9/L (ref 0–0.4)
IMM GRANULOCYTES NFR BLD: 0.2 %
INR PPP: 2.31 (ref 0.86–1.14)
INTERPRETATION ECG - MUSE: NORMAL
LYMPHOCYTES # BLD AUTO: 2.4 10E9/L (ref 0.8–5.3)
LYMPHOCYTES NFR BLD AUTO: 26.2 %
MCH RBC QN AUTO: 24.5 PG (ref 26.5–33)
MCHC RBC AUTO-ENTMCNC: 32.1 G/DL (ref 31.5–36.5)
MCV RBC AUTO: 76 FL (ref 78–100)
MONOCYTES # BLD AUTO: 1 10E9/L (ref 0–1.3)
MONOCYTES NFR BLD AUTO: 11.1 %
NEUTROPHILS # BLD AUTO: 5.6 10E9/L (ref 1.6–8.3)
NEUTROPHILS NFR BLD AUTO: 60.8 %
NRBC # BLD AUTO: 0 10*3/UL
NRBC BLD AUTO-RTO: 0 /100
PLATELET # BLD AUTO: 199 10E9/L (ref 150–450)
POTASSIUM SERPL-SCNC: 5.2 MMOL/L (ref 3.4–5.3)
RBC # BLD AUTO: 4.65 10E12/L (ref 3.8–5.2)
SODIUM SERPL-SCNC: 136 MMOL/L (ref 133–144)
TROPONIN I SERPL-MCNC: <0.015 UG/L (ref 0–0.04)
WBC # BLD AUTO: 9.2 10E9/L (ref 4–11)

## 2020-07-29 PROCEDURE — 70450 CT HEAD/BRAIN W/O DYE: CPT

## 2020-07-29 PROCEDURE — 72125 CT NECK SPINE W/O DYE: CPT

## 2020-07-29 PROCEDURE — 25000132 ZZH RX MED GY IP 250 OP 250 PS 637: Performed by: STUDENT IN AN ORGANIZED HEALTH CARE EDUCATION/TRAINING PROGRAM

## 2020-07-29 PROCEDURE — 99285 EMERGENCY DEPT VISIT HI MDM: CPT | Mod: 25

## 2020-07-29 PROCEDURE — 73060 X-RAY EXAM OF HUMERUS: CPT | Mod: LT

## 2020-07-29 PROCEDURE — U0003 INFECTIOUS AGENT DETECTION BY NUCLEIC ACID (DNA OR RNA); SEVERE ACUTE RESPIRATORY SYNDROME CORONAVIRUS 2 (SARS-COV-2) (CORONAVIRUS DISEASE [COVID-19]), AMPLIFIED PROBE TECHNIQUE, MAKING USE OF HIGH THROUGHPUT TECHNOLOGIES AS DESCRIBED BY CMS-2020-01-R: HCPCS | Performed by: PHYSICIAN ASSISTANT

## 2020-07-29 PROCEDURE — 93005 ELECTROCARDIOGRAM TRACING: CPT

## 2020-07-29 PROCEDURE — 25000128 H RX IP 250 OP 636: Performed by: PHYSICIAN ASSISTANT

## 2020-07-29 PROCEDURE — 99220 ZZC INITIAL OBSERVATION CARE,LEVL III: CPT | Performed by: STUDENT IN AN ORGANIZED HEALTH CARE EDUCATION/TRAINING PROGRAM

## 2020-07-29 PROCEDURE — 00000146 ZZHCL STATISTIC GLUCOSE BY METER IP

## 2020-07-29 PROCEDURE — 71045 X-RAY EXAM CHEST 1 VIEW: CPT

## 2020-07-29 PROCEDURE — 85025 COMPLETE CBC W/AUTO DIFF WBC: CPT | Performed by: PHYSICIAN ASSISTANT

## 2020-07-29 PROCEDURE — 73030 X-RAY EXAM OF SHOULDER: CPT | Mod: LT

## 2020-07-29 PROCEDURE — 84484 ASSAY OF TROPONIN QUANT: CPT | Performed by: PHYSICIAN ASSISTANT

## 2020-07-29 PROCEDURE — C9803 HOPD COVID-19 SPEC COLLECT: HCPCS

## 2020-07-29 PROCEDURE — 96374 THER/PROPH/DIAG INJ IV PUSH: CPT

## 2020-07-29 PROCEDURE — 96375 TX/PRO/DX INJ NEW DRUG ADDON: CPT

## 2020-07-29 PROCEDURE — G0378 HOSPITAL OBSERVATION PER HR: HCPCS

## 2020-07-29 PROCEDURE — 96376 TX/PRO/DX INJ SAME DRUG ADON: CPT

## 2020-07-29 PROCEDURE — 80048 BASIC METABOLIC PNL TOTAL CA: CPT | Performed by: PHYSICIAN ASSISTANT

## 2020-07-29 PROCEDURE — 85610 PROTHROMBIN TIME: CPT | Performed by: PHYSICIAN ASSISTANT

## 2020-07-29 RX ORDER — ONDANSETRON 4 MG/1
4 TABLET, ORALLY DISINTEGRATING ORAL EVERY 6 HOURS PRN
Status: DISCONTINUED | OUTPATIENT
Start: 2020-07-29 | End: 2020-08-01 | Stop reason: HOSPADM

## 2020-07-29 RX ORDER — LEVOTHYROXINE SODIUM 50 UG/1
50 TABLET ORAL
Status: DISCONTINUED | OUTPATIENT
Start: 2020-07-30 | End: 2020-08-01 | Stop reason: HOSPADM

## 2020-07-29 RX ORDER — GABAPENTIN 100 MG/1
100 CAPSULE ORAL 3 TIMES DAILY
Status: DISCONTINUED | OUTPATIENT
Start: 2020-07-29 | End: 2020-08-01 | Stop reason: HOSPADM

## 2020-07-29 RX ORDER — HYDROMORPHONE HYDROCHLORIDE 1 MG/ML
0.5 INJECTION, SOLUTION INTRAMUSCULAR; INTRAVENOUS; SUBCUTANEOUS EVERY 4 HOURS PRN
Status: DISCONTINUED | OUTPATIENT
Start: 2020-07-29 | End: 2020-08-01 | Stop reason: HOSPADM

## 2020-07-29 RX ORDER — LOSARTAN POTASSIUM 25 MG/1
25 TABLET ORAL DAILY
Status: DISCONTINUED | OUTPATIENT
Start: 2020-07-30 | End: 2020-08-01 | Stop reason: HOSPADM

## 2020-07-29 RX ORDER — LIDOCAINE 40 MG/G
CREAM TOPICAL
Status: DISCONTINUED | OUTPATIENT
Start: 2020-07-29 | End: 2020-08-01 | Stop reason: HOSPADM

## 2020-07-29 RX ORDER — ONDANSETRON 2 MG/ML
4 INJECTION INTRAMUSCULAR; INTRAVENOUS ONCE
Status: COMPLETED | OUTPATIENT
Start: 2020-07-29 | End: 2020-07-29

## 2020-07-29 RX ORDER — HYDROMORPHONE HYDROCHLORIDE 1 MG/ML
0.5 INJECTION, SOLUTION INTRAMUSCULAR; INTRAVENOUS; SUBCUTANEOUS
Status: DISCONTINUED | OUTPATIENT
Start: 2020-07-29 | End: 2020-07-29

## 2020-07-29 RX ORDER — OXYCODONE HYDROCHLORIDE 5 MG/1
5-10 TABLET ORAL EVERY 4 HOURS PRN
Status: DISCONTINUED | OUTPATIENT
Start: 2020-07-29 | End: 2020-07-31

## 2020-07-29 RX ORDER — HYDROMORPHONE HYDROCHLORIDE 1 MG/ML
0.2 INJECTION, SOLUTION INTRAMUSCULAR; INTRAVENOUS; SUBCUTANEOUS
Status: COMPLETED | OUTPATIENT
Start: 2020-07-29 | End: 2020-07-29

## 2020-07-29 RX ORDER — NICOTINE POLACRILEX 4 MG
15-30 LOZENGE BUCCAL
Status: DISCONTINUED | OUTPATIENT
Start: 2020-07-29 | End: 2020-08-01 | Stop reason: HOSPADM

## 2020-07-29 RX ORDER — NALOXONE HYDROCHLORIDE 0.4 MG/ML
.1-.4 INJECTION, SOLUTION INTRAMUSCULAR; INTRAVENOUS; SUBCUTANEOUS
Status: DISCONTINUED | OUTPATIENT
Start: 2020-07-29 | End: 2020-08-01 | Stop reason: HOSPADM

## 2020-07-29 RX ORDER — WARFARIN SODIUM 3 MG/1
3 TABLET ORAL
Status: COMPLETED | OUTPATIENT
Start: 2020-07-29 | End: 2020-07-29

## 2020-07-29 RX ORDER — ONDANSETRON 2 MG/ML
4 INJECTION INTRAMUSCULAR; INTRAVENOUS EVERY 6 HOURS PRN
Status: DISCONTINUED | OUTPATIENT
Start: 2020-07-29 | End: 2020-08-01 | Stop reason: HOSPADM

## 2020-07-29 RX ORDER — DEXTROSE MONOHYDRATE 25 G/50ML
25-50 INJECTION, SOLUTION INTRAVENOUS
Status: DISCONTINUED | OUTPATIENT
Start: 2020-07-29 | End: 2020-08-01 | Stop reason: HOSPADM

## 2020-07-29 RX ORDER — ATORVASTATIN CALCIUM 20 MG/1
80 TABLET, FILM COATED ORAL DAILY
Status: DISCONTINUED | OUTPATIENT
Start: 2020-07-30 | End: 2020-08-01 | Stop reason: HOSPADM

## 2020-07-29 RX ORDER — ASPIRIN 81 MG/1
81 TABLET ORAL DAILY
Status: DISCONTINUED | OUTPATIENT
Start: 2020-07-30 | End: 2020-08-01 | Stop reason: HOSPADM

## 2020-07-29 RX ORDER — ACETAMINOPHEN 325 MG/1
650 TABLET ORAL EVERY 4 HOURS PRN
Status: DISCONTINUED | OUTPATIENT
Start: 2020-07-29 | End: 2020-08-01 | Stop reason: HOSPADM

## 2020-07-29 RX ADMIN — HYDROMORPHONE HYDROCHLORIDE 0.2 MG: 1 INJECTION, SOLUTION INTRAMUSCULAR; INTRAVENOUS; SUBCUTANEOUS at 16:29

## 2020-07-29 RX ADMIN — WARFARIN SODIUM 3 MG: 3 TABLET ORAL at 20:54

## 2020-07-29 RX ADMIN — ONDANSETRON 4 MG: 2 INJECTION INTRAMUSCULAR; INTRAVENOUS at 18:26

## 2020-07-29 RX ADMIN — HYDROMORPHONE HYDROCHLORIDE 0.5 MG: 1 INJECTION, SOLUTION INTRAMUSCULAR; INTRAVENOUS; SUBCUTANEOUS at 17:14

## 2020-07-29 RX ADMIN — GABAPENTIN 100 MG: 100 CAPSULE ORAL at 20:54

## 2020-07-29 RX ADMIN — HYDROMORPHONE HYDROCHLORIDE 0.5 MG: 1 INJECTION, SOLUTION INTRAMUSCULAR; INTRAVENOUS; SUBCUTANEOUS at 17:51

## 2020-07-29 ASSESSMENT — ACTIVITIES OF DAILY LIVING (ADL)
RETIRED_EATING: 0-->INDEPENDENT
TOILETING: 0-->INDEPENDENT
NUMBER_OF_TIMES_PATIENT_HAS_FALLEN_WITHIN_LAST_SIX_MONTHS: 1
SWALLOWING: 0-->SWALLOWS FOODS/LIQUIDS WITHOUT DIFFICULTY
BATHING: 0-->INDEPENDENT
AMBULATION: 1-->ASSISTIVE EQUIPMENT
DRESS: 0-->INDEPENDENT
WHICH_OF_THE_ABOVE_FUNCTIONAL_RISKS_HAD_A_RECENT_ONSET_OR_CHANGE?: AMBULATION;FALL HISTORY
TRANSFERRING: 1-->ASSISTIVE EQUIPMENT
COGNITION: 0 - NO COGNITION ISSUES REPORTED
RETIRED_COMMUNICATION: 0-->UNDERSTANDS/COMMUNICATES WITHOUT DIFFICULTY
FALL_HISTORY_WITHIN_LAST_SIX_MONTHS: YES

## 2020-07-29 ASSESSMENT — ENCOUNTER SYMPTOMS
NECK PAIN: 0
BACK PAIN: 0
DIZZINESS: 1
ABDOMINAL PAIN: 0
HEADACHES: 0

## 2020-07-29 ASSESSMENT — MIFFLIN-ST. JEOR: SCORE: 1157.51

## 2020-07-29 NOTE — ED PROVIDER NOTES
Emergency Department Attending Supervision Note  7/29/2020  5:53 PM      I evaluated this patient in conjunction with Poli Aaron PA-C.      Briefly, the patient presented with a fall. The patient was working outside when she had a mechanical fall onto her left side. Here, the patient has left shoulder pain which radiates to her upper chest. For pain the patient took 1 hydrocodone prior to arrival. Prior to the fall the patient states she did not have a sudden onset of dizziness, but she is chronically dizzy due to polypharmacy. She denies loss of consciousness, headache, head trauma, vision change, or other pain.    On my exam:  Physical Exam   General:  Sitting on bed with son at bedside, comfortable appearing.   HENT:  No obvious trauma to head  Right Ear:  External ear normal.   Left Ear:  External ear normal.   Nose:  Nose normal.   Eyes:  Conjunctivae and EOM are normal.  Neck: Normal range of motion. Neck supple. No tracheal deviation present.   Pulm/Chest: No respiratory distress  M/S: Normal range of motion. Tenderness to the left proximal humerus with mild deformity.  Compartments soft.  Radial pulses +2.  Neuro: Alert. GCS 15.  Skin: Skin is warm and dry. No rash noted. Not diaphoretic.   Psych: Normal mood and affect. Behavior is normal.     Brief MDM:  Madie Silva is a very pleasant 89 year old year old patient who presents to the emergency department with concern of a mechanical fall.  She has a proximal humeral fracture.  She did have a CT scan of the head and neck and additional x-rays.  The only objective finding is a proximal humeral fracture.  She was placed in an arm sling.  She lives independently on her own and does not feel safe at home.  She  was amenable to admission.  The hospitalist was contacted who has agreed to admit the patient.    Diagnosis    ICD-10-CM    1. Closed fracture of head of left humerus, initial encounter  S42.292A    2. Fall, initial encounter  W19.XXXA           Jesse Mantilla DO      Scribe Disclosure:  I, Ania Stevenson, am serving as a scribe at 6:04 PM on 7/29/2020 to document services personally performed by Jesse Mantilla DO based on my observations and the provider's statements to me.         Jesse Mantilla DO  07/29/20 1813

## 2020-07-29 NOTE — ED PROVIDER NOTES
History     Chief Complaint:  Fall    HPI   Madie Silva is a right handed 89 year old female with a history of CKD, type II diabetes, Coronary Artery Disease, hyperlipidemia, amongst others, who presents with fall. The patient report that today she was outside working in her backyard when she lost her balance, sustaining a mechanical fall onto her left side. She is unsure when this occurred but states that she laid in the shade for a little and was able to get up and call her son around 1500. She has had left shoulder pain radiating slightly into the chest since the fall and took half of a hydrocodone prior to arrival. The patient reports chronic dizziness attributed to polypharmacy but did not have a sudden onset of room spinning prior to the fall. She denies chest pressure/pain proceeding the fall, abdominal pain, loss of consciousness, head trauma, headache, vision changes, neck pain, back pain, or leg/hip pain.     Allergies:  Amoxicillin  Bisphosphonates  Boniva  Fosamax  Lisinopril  Niacin  Simvastatin      Medications:    aspirin   atorvastatin  gabapentin   levothyroxine   losartan   Metformin   metoprolol succinate ER  Nitroglycerin   omeprazole  Coumadin  Hydrocodone      Past Medical History:    Walters's esophagus   Bradycardia   Vertigo, chronic   Colonic Adenoma   Coronary artery disease   Costochondritis   CKD  Depression   Type II diabetes    DJD   DVT of Leg, postop   Gastritis   GERD   hiatal hernia  Hyperlipidemia   Hypothyroidism   Impaired fasting glucose   L Ankle Fracture   Obesity, unspecified   Osteoporosis, unspecified   Peripheral neuropathy  Polymyalgia rheumatica    Post Herpetic Neuralgia   Restless leg syndrome   Small vessel cerebrovascular changes   Stricture and stenosis of esophagus   Syncope   Vitamin D deficiency     Past Surgical History:    Bypass graft coronary artery   Appendectomy   T&A   Angiogram coronary graft   Aortogram abdominal   Aortogram thoracic   CV left heart  "cath   Transcatheter aortic valve replacement   Embolectomy lower extremity   Hysterectomy  Left ankle surgery    Family History:    Mother: heart disease  Father: heart disease  Son: heart disease  Brother: heart disease  Sister: alzheimer disease     Social History:  The patient was accompanied to the ED by son.  Smoking Status: Never Smoker  Smokeless Tobacco: Never Used  Alcohol Use: Negative   Drug Use: Negative  PCP: De Obregon   Marital Status:        Review of Systems   Eyes: Negative for visual disturbance.   Cardiovascular: Positive for chest pain (radiaiting from shoulder).        No chest pressure   Gastrointestinal: Negative for abdominal pain.   Musculoskeletal: Negative for back pain and neck pain.        Right shoulder pain  No leg or hip pain   Neurological: Positive for dizziness (chronic). Negative for headaches.        No loss of consciousness   All other systems reviewed and are negative.    Physical Exam     Patient Vitals for the past 24 hrs:   BP Temp Temp src Pulse Resp SpO2 Height Weight   07/29/20 1755 -- -- -- -- -- 97 % -- --   07/29/20 1750 (!) 186/63 -- -- 70 -- 98 % -- --   07/29/20 1607 (!) 185/58 96.6  F (35.9  C) Temporal 72 20 98 % 1.676 m (5' 6\") 72.6 kg (160 lb)      Physical Exam     Constitutional: Appears in pain. Cooperative.  Eyes: Pupils equally round and reactive  HENT: No scalp hematoma. No scalp tenderness. No bony step-off or crepitus. No facial bone tenderness or instability. No hemotympanum. No periorbital ecchymosis or Link signs. Oropharynx is normal with moist mucus membranes. No evidence for intraoral injury.  Cardiovascular: Regular rate and rhythm and without murmurs.  Respiratory: Normal respiratory effort, lungs are clear bilaterally.  GI: Abdomen is soft, non-tender, non-distended. No guarding, rebound, or rigidity.  Musculoskeletal: No midline cervical, thoracic, or lumbar tenderness. L paraspinal TTP. Normal painless ROM of the neck. No " clavicular tenderness. Diffuse TTP of L shoulder, proximal humerus. Non-tender to upper extremities otherwise. No lower extremity tenderness. Normal ROM of extremities other than LUE. No tenderness with compression of the rib cage or pelvis. 2+ radial pulses bilaterally.  Skin: No rashes. No lacerations or abrasions noted.  Neurologic: Cranial nerves II-XII intact, normal cognition, no focal deficits. Alert and oriented x 3. Normal  strength. Normal leg raise. Sensation to light touch intact throughout all 4 extremities. 5/5 strength with dorsiflexion and plantarflexion bilaterally. GCS 15  Psychiatric: Normal affect.  Nursing notes and vital signs reviewed.    Emergency Department Course     ECG:  ECG taken at 1624  Atrial Paced Rhythm   Nonspecific ST and T wave abnormality   Abnormal ECG  Rate 71 bpm. SD interval 188 ms. QRS duration 68 ms. QT/QTc 378/410 ms. P-R-T axes * 86 96.    Imaging:  Radiology findings were communicated with the patient who voiced understanding of the findings.    XR Chest 1 View  Sternotomy. Aortic valve prosthesis. Dual-lead cardiac  device left chest wall, with lead tips projected over the RA and RV.  Large hiatal hernia. Shallow inspiration accentuates heart size and  pulmonary vascularity. No pneumothorax. Mildly displaced and impacted  acute fracture of the left humoral neck.  MARGARETTE PABON MD  Reading per radiology     Humerus XR,  G/E 2 views, left  Acute comminuted moderately displaced fracture of the left humeral head and surgical neck with medial displacement of the humeral shaft. The humeral head remains normally located. No evidence of additional fractures.   Reading per radiology    XR Shoulder Left G/E 3 Views  Acute comminuted moderately displaced fracture of the left humeral head and surgical neck with medial displacement of the humeral shaft. The humeral head remains normally located. No evidence of additional fractures.   Reading per radiology    Cervical spine  CT w/o contrast  There is normal alignment of the cervical vertebrae.  Vertebral body heights of the cervical spine are normal.  Craniocervical alignment is normal. There are no fractures of the  cervical spine.  There is no significant degenerative spinal canal  narrowing of the cervical spine. There is no prevertebral soft tissue  swelling.  Reading per radiology    CT Head w/o Contrast  Diffuse cerebral volume loss and cerebral white matter  changes consistent with chronic small vessel ischemic disease. No  evidence for acute intracranial pathology.  Reading per radiology      Laboratory:  Laboratory findings were communicated with the patient who voiced understanding of the findings.    Asymptomatic COVID-19 Virus (Coronavirus) by PCR Nasopharyngeal swab: Pending     CBC: WBC 9.2, HGB 11.4 (L),   BMP: Glucose 148 (H), GFR 44 (L), o/w WNL (Creatinine 1.09 (H))    Troponin (Collected 1628): <0.015     Procedures:    Interventions:  1629 Dilaudid 0.2 mg IV   1714 Dilaudid 0.5 mg IV   1751 Dilaudid 0.5 mg IV   1826 Zofran 4 mg IV      Emergency Department Course:    1610 Nursing notes and vitals reviewed. I performed an exam of the patient as documented above.     1624 EKG obtained as noted above.     1629 IV was inserted and blood was drawn for laboratory testing, results above.     1633 The patient was sent for a CT while in the emergency department, results above.      1723 The patient was sent for a XR while in the emergency department, results above.      1800 Dr. Mantilla currently in the patient's room for evaluation.     1828 I spoke with Dr. Reyes of the hospitalist service from [facility] regarding patient's presentation, findings, and plan of care.     1851 A COVID-19 nasal swab PCR test was obtained for laboratory testing as documented above.     Prior to admission, I personally reviewed the results with the patient and all related questions were answered. The patient verbalized  understanding and is amenable to plan.     Impression & Plan      Medical Decision Making:  Madie Silva is a 89 year old female who presents to the emergency department today for evaluation after a fall.  Patient states she has chronic balance issues and lost her balance outside just prior to arrival.  She landed on her left side and has since had left shoulder pain.  She also notes mild chest pain.  No preceding symptoms.  See HPI as above for additional details.  Vitals and physical exam as above.  Imaging obtained as above reveals left humeral head fracture.  Remainder imaging is reassuring.  Patient provided the above medications with some improvement of her pain.  Ultimately, patient's pain was not able to be adequately controlled however.  In this setting, discussed admission with the patient, who is in agreement.  Patient will likely need TCU thereafter as she lives by herself.  Discussed the case with the hospitalist, who is in agreement for admission.  All questions answered.  Patient admitted in stable condition.    Covid-19  Madie Silva was evaluated during a global COVID-19 pandemic, which necessitated consideration that the patient might be at risk for infection with the SARS-CoV-2 virus that causes COVID-19.   Applicable protocols for evaluation were followed during the patient's care.   COVID-19 was considered as part of the patient's evaluation. The plan for testing is:  a test was obtained during this visit.    Diagnosis:    ICD-10-CM    1. Closed fracture of head of left humerus, initial encounter  S42.292A Asymptomatic COVID-19 Virus (Coronavirus) by PCR     INR     INR     CANCELED: INR   2. Fall, initial encounter  W19.XXXA      Disposition:   The patient is admitted into the care of Dr. Reyes.     Scribe Disclosure:  I, Orla Severson, am serving as a scribe at 4:17 PM on 7/29/2020 to document services personally performed by Poli Aaron PA-C based on my observations and the provider's  statements to me.    EMERGENCY DEPARTMENT    This record was created at least in part using electronic voice recognition software, so please excuse any typographical errors.       Poli Aaron PA-C  07/29/20 1937       Poli Aaron PA-C  07/29/20 2056

## 2020-07-29 NOTE — ED NOTES
Pain more controlled with dilaudid but O2 sats dropped slightly so she was placed on 2 LPM O2 via nc

## 2020-07-29 NOTE — PHARMACY-ADMISSION MEDICATION HISTORY
Pharmacy Medication History  Admission medication history interview status for the 7/29/2020  admission is complete. See EPIC admission navigator for prior to admission medications     Medication history sources: Care Everywhere, AVS summary dated 7/28/20 & pt reported taking am meds today.  Medication history source reliability: Good  Adherence assessment: Moderate    Medication reconciliation completed by provider prior to medication history? No    Time spent in this activity: 15 minutes      Prior to Admission medications    Medication Sig Last Dose Taking? Auth Provider   acetaminophen (TYLENOL ARTHRITIS PAIN) 650 MG CR tablet Take 2 tablets (1,300 mg) by mouth 2 times daily 7/29/2020 at am Yes De Obregon MD   aspirin EC 81 MG EC tablet Take 1 tablet by mouth daily. 7/29/2020 at am Yes Hunter Mantilla MD   atorvastatin (LIPITOR) 80 MG tablet TAKE ONE TABLET BY MOUTH ONCE DAILY 7/29/2020 at am Yes De Obregon MD   calcium 600 MG tablet Take 1 tablet by mouth 2 times daily. 7/29/2020 at am Yes De Obregon MD   Cholecalciferol (VITAMIN D-400 PO) Take 1 tablet by mouth daily  7/29/2020 at am Yes Reported, Patient   co-enzyme Q-10 (COENZYME Q-10) 100 MG CAPS Take 1 capsule by mouth daily. 7/29/2020 at am Yes Reported, Patient   gabapentin (NEURONTIN) 100 MG capsule TAKE ONE CAPSULE BY MOUTH THREE TIMES A DAY  Patient taking differently: Take 100 mg by mouth 3 times daily  7/29/2020 at am Yes De Obregon MD   HYDROcodone-acetaminophen (NORCO) 5-325 MG tablet Take 0.5-1 tablets by mouth every 6 hours as needed for moderate to severe pain 7/29/2020 at 1530 Yes Anna Burger PA-C   levothyroxine (SYNTHROID/LEVOTHROID) 50 MCG tablet Take 1 tablet (50 mcg) by mouth daily 7/29/2020 at am Yes De Obregon MD   losartan (COZAAR) 50 MG tablet Take 0.5 tablets (25 mg) by mouth daily 7/29/2020 at am Yes De Obregon MD   metFORMIN (GLUCOPHAGE) 500 MG tablet TAKE ONE  TABLET BY MOUTH DAILY WITH BREAKFAST 7/29/2020 at am Yes Mervin Vergara MD   metoprolol succinate ER (TOPROL-XL) 25 MG 24 hr tablet Take 0.5 tablets (12.5 mg) by mouth daily 7/29/2020 at am Yes De Obregon MD   Multiple Vitamins-Minerals (CENTRUM SILVER) per tablet Take 1 tablet by mouth daily. 7/29/2020 at am Yes De Obregon MD   Multiple Vitamins-Minerals (PRESERVISION AREDS 2 PO) Take 1 tablet by mouth 2 times daily  7/29/2020 at am Yes Reported, Patient   nitroGLYCERIN (NITROSTAT) 0.4 MG SL tablet Place 0.4 mg under the tongue every 5 minutes as needed. Up to 3 doses per episode   at prn Yes Unknown, Entered By History   omeprazole (PRILOSEC) 20 MG DR capsule Take 1 capsule (20 mg) by mouth daily 7/29/2020 at am Yes Bee Orr MD   polyethylene glycol (MIRALAX) powder Take 17 g (1 capful) by mouth daily 7/29/2020 at am Yes Anna Burger PA-C   Psyllium (FIBER) 0.52 g CAPS Take 1 capsule by mouth daily 7/29/2020 at am Yes Anna Burger PA-C   warfarin ANTICOAGULANT (COUMADIN) 3 MG tablet Take 3-4.5 mg by mouth daily Takes  4.5mg on Tuesday, Thursday, Saturday  3mg all other days (Mon,Wed,Fri,Sun) 7/28/2020 at 4.5 mg Yes Reported, Patient

## 2020-07-29 NOTE — ED NOTES
"[facility]  ED Nurse Handoff Report    ED Chief complaint: No chief complaint on file.      ED Diagnosis:   Final diagnoses:   None       Code Status: Discuss upon admission    Allergies:   Allergies   Allergen Reactions     Amoxicillin      hives     Bisphosphonates      Swallowing disorder     Boniva [Ibandronate Sodium] Nausea and Vomiting     Diarrhea, N/V with oral.  IV caused arm to swell      Fosamax [Alendronic Acid]      Severe gastrointestinal reaction     Lisinopril      cough     Niacin      rash     Simvastatin      myalgias       Patient Story: Pt was working in backyard, turned around and fell landing on L shoulder. Pt has chronic vertigo.  Focused Assessment:  Extreme pain with any movement causing pt to hyperventilate. No deformity noted . Xray confirms humerus fracture.     Treatments and/or interventions provided: IV dilaudid  Patient's response to treatments and/or interventions: Eventual reduction in pain     To be done/followed up on inpatient unit:  See inpatient orders    Does this patient have any cognitive concerns?: A&OX4    Activity level - Baseline/Home:  Stand with Assist and Cane  Activity Level - Current:   Stand with assist x2 and Cane    Patient's Preferred language: English   Needed?: No    Isolation: None  Infection: Not Applicable  Bariatric?: No    Vital Signs:   Vitals:    07/29/20 1607 07/29/20 1750 07/29/20 1755   BP: (!) 185/58 (!) 186/63    Pulse: 72 70    Resp: 20     Temp: 96.6  F (35.9  C)     TempSrc: Temporal     SpO2: 98% 98% 97%   Weight: 72.6 kg (160 lb)     Height: 1.676 m (5' 6\")         Cardiac Rhythm:     Was the PSS-3 completed:   Yes  What interventions are required if any?               Family Comments: Son at bedside  OBS brochure/video discussed/provided to patient/family: N/A        For the majority of the shift this patient's behavior was Green.   Behavioral interventions performed were None.    ED NURSE PHONE NUMBER: " [phone]

## 2020-07-30 ENCOUNTER — APPOINTMENT (OUTPATIENT)
Dept: PHYSICAL THERAPY | Facility: CLINIC | Age: 85
DRG: 563 | End: 2020-07-30
Attending: STUDENT IN AN ORGANIZED HEALTH CARE EDUCATION/TRAINING PROGRAM
Payer: COMMERCIAL

## 2020-07-30 ENCOUNTER — APPOINTMENT (OUTPATIENT)
Dept: OCCUPATIONAL THERAPY | Facility: CLINIC | Age: 85
DRG: 563 | End: 2020-07-30
Attending: STUDENT IN AN ORGANIZED HEALTH CARE EDUCATION/TRAINING PROGRAM
Payer: COMMERCIAL

## 2020-07-30 LAB
ANION GAP SERPL CALCULATED.3IONS-SCNC: 5 MMOL/L (ref 3–14)
BUN SERPL-MCNC: 24 MG/DL (ref 7–30)
CALCIUM SERPL-MCNC: 8.3 MG/DL (ref 8.5–10.1)
CHLORIDE SERPL-SCNC: 105 MMOL/L (ref 94–109)
CO2 SERPL-SCNC: 25 MMOL/L (ref 20–32)
CREAT SERPL-MCNC: 0.91 MG/DL (ref 0.52–1.04)
ERYTHROCYTE [DISTWIDTH] IN BLOOD BY AUTOMATED COUNT: 16.8 % (ref 10–15)
GFR SERPL CREATININE-BSD FRML MDRD: 55 ML/MIN/{1.73_M2}
GLUCOSE BLDC GLUCOMTR-MCNC: 128 MG/DL (ref 70–99)
GLUCOSE BLDC GLUCOMTR-MCNC: 154 MG/DL (ref 70–99)
GLUCOSE SERPL-MCNC: 131 MG/DL (ref 70–99)
HCT VFR BLD AUTO: 30.5 % (ref 35–47)
HGB BLD-MCNC: 9.8 G/DL (ref 11.7–15.7)
INR PPP: 2.49 (ref 0.86–1.14)
MCH RBC QN AUTO: 24.5 PG (ref 26.5–33)
MCHC RBC AUTO-ENTMCNC: 32.1 G/DL (ref 31.5–36.5)
MCV RBC AUTO: 76 FL (ref 78–100)
PLATELET # BLD AUTO: 181 10E9/L (ref 150–450)
POTASSIUM SERPL-SCNC: 4.8 MMOL/L (ref 3.4–5.3)
RBC # BLD AUTO: 4 10E12/L (ref 3.8–5.2)
SARS-COV-2 RNA SPEC QL NAA+PROBE: NOT DETECTED
SODIUM SERPL-SCNC: 135 MMOL/L (ref 133–144)
SPECIMEN SOURCE: NORMAL
TROPONIN I SERPL-MCNC: <0.015 UG/L (ref 0–0.04)
WBC # BLD AUTO: 9.1 10E9/L (ref 4–11)

## 2020-07-30 PROCEDURE — 97535 SELF CARE MNGMENT TRAINING: CPT | Mod: GO | Performed by: OCCUPATIONAL THERAPIST

## 2020-07-30 PROCEDURE — G0378 HOSPITAL OBSERVATION PER HR: HCPCS

## 2020-07-30 PROCEDURE — 80048 BASIC METABOLIC PNL TOTAL CA: CPT | Performed by: STUDENT IN AN ORGANIZED HEALTH CARE EDUCATION/TRAINING PROGRAM

## 2020-07-30 PROCEDURE — 25000131 ZZH RX MED GY IP 250 OP 636 PS 637: Performed by: STUDENT IN AN ORGANIZED HEALTH CARE EDUCATION/TRAINING PROGRAM

## 2020-07-30 PROCEDURE — 85610 PROTHROMBIN TIME: CPT | Performed by: STUDENT IN AN ORGANIZED HEALTH CARE EDUCATION/TRAINING PROGRAM

## 2020-07-30 PROCEDURE — 97161 PT EVAL LOW COMPLEX 20 MIN: CPT | Mod: GP | Performed by: PHYSICAL THERAPIST

## 2020-07-30 PROCEDURE — 93005 ELECTROCARDIOGRAM TRACING: CPT

## 2020-07-30 PROCEDURE — 99225 ZZC SUBSEQUENT OBSERVATION CARE,LEVEL II: CPT | Performed by: STUDENT IN AN ORGANIZED HEALTH CARE EDUCATION/TRAINING PROGRAM

## 2020-07-30 PROCEDURE — 25000132 ZZH RX MED GY IP 250 OP 250 PS 637: Performed by: NURSE PRACTITIONER

## 2020-07-30 PROCEDURE — 84484 ASSAY OF TROPONIN QUANT: CPT | Performed by: NURSE PRACTITIONER

## 2020-07-30 PROCEDURE — 93010 ELECTROCARDIOGRAM REPORT: CPT | Performed by: INTERNAL MEDICINE

## 2020-07-30 PROCEDURE — 97116 GAIT TRAINING THERAPY: CPT | Mod: GP | Performed by: PHYSICAL THERAPIST

## 2020-07-30 PROCEDURE — 25000132 ZZH RX MED GY IP 250 OP 250 PS 637: Performed by: STUDENT IN AN ORGANIZED HEALTH CARE EDUCATION/TRAINING PROGRAM

## 2020-07-30 PROCEDURE — 36415 COLL VENOUS BLD VENIPUNCTURE: CPT | Performed by: NURSE PRACTITIONER

## 2020-07-30 PROCEDURE — 99207 ZZC CDG-CODE CATEGORY CHANGED: CPT | Performed by: STUDENT IN AN ORGANIZED HEALTH CARE EDUCATION/TRAINING PROGRAM

## 2020-07-30 PROCEDURE — 36415 COLL VENOUS BLD VENIPUNCTURE: CPT | Performed by: STUDENT IN AN ORGANIZED HEALTH CARE EDUCATION/TRAINING PROGRAM

## 2020-07-30 PROCEDURE — 96376 TX/PRO/DX INJ SAME DRUG ADON: CPT

## 2020-07-30 PROCEDURE — 96372 THER/PROPH/DIAG INJ SC/IM: CPT

## 2020-07-30 PROCEDURE — 97165 OT EVAL LOW COMPLEX 30 MIN: CPT | Mod: GO | Performed by: OCCUPATIONAL THERAPIST

## 2020-07-30 PROCEDURE — 85027 COMPLETE CBC AUTOMATED: CPT | Performed by: STUDENT IN AN ORGANIZED HEALTH CARE EDUCATION/TRAINING PROGRAM

## 2020-07-30 PROCEDURE — 00000146 ZZHCL STATISTIC GLUCOSE BY METER IP

## 2020-07-30 PROCEDURE — 25000128 H RX IP 250 OP 636: Performed by: STUDENT IN AN ORGANIZED HEALTH CARE EDUCATION/TRAINING PROGRAM

## 2020-07-30 PROCEDURE — 99233 SBSQ HOSP IP/OBS HIGH 50: CPT | Performed by: NURSE PRACTITIONER

## 2020-07-30 RX ORDER — CYCLOBENZAPRINE HCL 10 MG
10 TABLET ORAL EVERY 8 HOURS PRN
Status: DISCONTINUED | OUTPATIENT
Start: 2020-07-30 | End: 2020-07-31

## 2020-07-30 RX ADMIN — ASPIRIN 81 MG: 81 TABLET, COATED ORAL at 08:10

## 2020-07-30 RX ADMIN — OXYCODONE HYDROCHLORIDE 5 MG: 5 TABLET ORAL at 04:38

## 2020-07-30 RX ADMIN — ONDANSETRON 4 MG: 4 TABLET, ORALLY DISINTEGRATING ORAL at 17:49

## 2020-07-30 RX ADMIN — ACETAMINOPHEN 650 MG: 325 TABLET, FILM COATED ORAL at 13:40

## 2020-07-30 RX ADMIN — OXYCODONE HYDROCHLORIDE 5 MG: 5 TABLET ORAL at 20:25

## 2020-07-30 RX ADMIN — GABAPENTIN 100 MG: 100 CAPSULE ORAL at 08:10

## 2020-07-30 RX ADMIN — OMEPRAZOLE 20 MG: 20 CAPSULE, DELAYED RELEASE ORAL at 06:24

## 2020-07-30 RX ADMIN — ACETAMINOPHEN 650 MG: 325 TABLET, FILM COATED ORAL at 21:01

## 2020-07-30 RX ADMIN — LOSARTAN POTASSIUM 25 MG: 25 TABLET, FILM COATED ORAL at 08:10

## 2020-07-30 RX ADMIN — METOPROLOL SUCCINATE 12.5 MG: 25 TABLET, EXTENDED RELEASE ORAL at 08:10

## 2020-07-30 RX ADMIN — LEVOTHYROXINE SODIUM 50 MCG: 50 TABLET ORAL at 06:23

## 2020-07-30 RX ADMIN — GABAPENTIN 100 MG: 100 CAPSULE ORAL at 21:01

## 2020-07-30 RX ADMIN — HYDROMORPHONE HYDROCHLORIDE 0.5 MG: 1 INJECTION, SOLUTION INTRAMUSCULAR; INTRAVENOUS; SUBCUTANEOUS at 22:56

## 2020-07-30 RX ADMIN — CYCLOBENZAPRINE 10 MG: 10 TABLET, FILM COATED ORAL at 23:27

## 2020-07-30 RX ADMIN — INSULIN ASPART 1 UNITS: 100 INJECTION, SOLUTION INTRAVENOUS; SUBCUTANEOUS at 17:42

## 2020-07-30 RX ADMIN — HYDROMORPHONE HYDROCHLORIDE 0.5 MG: 1 INJECTION, SOLUTION INTRAMUSCULAR; INTRAVENOUS; SUBCUTANEOUS at 01:11

## 2020-07-30 RX ADMIN — WARFARIN SODIUM 4.5 MG: 4 TABLET ORAL at 19:05

## 2020-07-30 RX ADMIN — INSULIN ASPART 1 UNITS: 100 INJECTION, SOLUTION INTRAVENOUS; SUBCUTANEOUS at 11:37

## 2020-07-30 RX ADMIN — ATORVASTATIN CALCIUM 80 MG: 20 TABLET, FILM COATED ORAL at 08:10

## 2020-07-30 RX ADMIN — HYDROMORPHONE HYDROCHLORIDE 0.5 MG: 1 INJECTION, SOLUTION INTRAMUSCULAR; INTRAVENOUS; SUBCUTANEOUS at 06:24

## 2020-07-30 RX ADMIN — GABAPENTIN 100 MG: 100 CAPSULE ORAL at 16:32

## 2020-07-30 ASSESSMENT — MIFFLIN-ST. JEOR: SCORE: 1162.04

## 2020-07-30 NOTE — PROGRESS NOTES
Pt is A/OX4. AX1 w/pivot. Using bedside commode. Pt pain managed w/tylenol. Dilaudid seems to make pt feel groggy. NWB MUSA. Will continue to monitor.

## 2020-07-30 NOTE — PROGRESS NOTES
07/30/20 1157   Quick Adds   Type of Visit Initial Occupational Therapy Evaluation   Living Environment   Lives With alone   Living Arrangements house   Home Accessibility stairs to enter home   Number of Stairs, Main Entrance 1   Transportation Anticipated car, drives self   Living Environment Comment Stairs up to bathroom/bedroom; tub with grab bar without chair, higher toilet with grab bars   Self-Care   Usual Activity Tolerance good   Current Activity Tolerance fair   Equipment Currently Used at Home cane, straight   Activity/Exercise/Self-Care Comment Patient independent in ADLs/mobility; uses SEC for longer distances (e.g. dr. wilfredo kimble).  Patient drives.     Functional Level   Ambulation 0-->independent   Transferring 0-->independent   Toileting 0-->independent   Bathing 0-->independent   Dressing 0-->independent   Eating 0-->independent   Communication 0-->understands/communicates without difficulty   Swallowing 0-->swallows foods/liquids without difficulty   Cognition 0 - no cognition issues reported   Fall history within last six months yes   Number of times patient has fallen within last six months 1   Prior Functional Level Comment Reports holding on with both hands to get to basement.   General Information   Onset of Illness/Injury or Date of Surgery - Date 07/29/20   Referring Physician Dr. Reyes   Patient/Family Goals Statement return home to baseline   Additional Occupational Profile Info/Pertinent History of Current Problem Madie Silva is a 89 year old female with PMHx of severe aortic stenosis, CAD s/p 4v CABG, hypertension, dyslipidemia, symptomatic bradycardia s/p PPM placement, and PAF on chronic anticoagulation with coumadin, s/p TAVR who presents with shoulder pain.  L humerus fx   Precautions/Limitations other (see comments)  (sling; ok to move elbow/wrist/hand)   Weight-Bearing Status - LUE nonweight-bearing  (not more than a cup of coffee)   General Observations On RA, alert    Cognitive Status Examination   Orientation orientation to person, place and time   Level of Consciousness alert   Follows Commands (Cognition) WNL   Memory intact   Pain Assessment   Patient Currently in Pain Yes, see Vital Sign flowsheet   Range of Motion (ROM)   ROM Comment ROM of R UE limited due to humerus fracture   Mobility   Bed Mobility Bed mobility skill: Supine to sit   Bed Mobility Skill: Supine to Sit   Level of [place]: Supine/Sit contact guard   Assistive Device: Supine/Sit bedrail   Transfer Skill: Bed to Chair/Chair to Bed   Level of [place]: Bed to Chair contact guard   Transfer Skill: Sit to Stand   Level of [place]: Sit/Stand contact guard   Transfer Skill: Toilet Transfer   Level of [place]: Toilet contact guard   Assistive Device grab bars   Upper Body Dressing   Level of [place]: Dress Upper Body maximum assist (25% patients effort)   Lower Body Dressing   Level of [place]: Dress Lower Body other (see comments)  (not assessed)   Toileting   Level of [place]: Toilet stand-by assist   Grooming   Level of [place]: Grooming stand-by assist   Eating/Self Feeding   Level of [place]: Eating stand-by assist   Activities of Daily Living Analysis   Impairments Contributing to Impaired Activities of Daily Living pain;post surgical precautions;ROM decreased   General Therapy Interventions   Planned Therapy Interventions ADL retraining   Clinical Impression   Criteria for Skilled Therapeutic Interventions Met yes, treatment indicated   OT Diagnosis impaired self-cares/mobility   Influenced by the following impairments pain; decreased ROM   Assessment of Occupational Performance 1-3 Performance Deficits   Identified Performance Deficits dressing, bathing, toileting   Clinical Decision Making (Complexity) Low complexity   Therapy Frequency Daily   Predicted Duration of Therapy Intervention (days/wks) 2-3 days   Anticipated Discharge Disposition Home with Home  "Therapy;Transitional Care Facility   Risks and Benefits of Treatment have been explained. Yes   Patient, Family & other staff in agreement with plan of care Yes   [facility] TM \"6 Clicks\"   2016, Trustees of [facility], under license to [employer].  All rights reserved.   6 Clicks Short Forms Daily Activity Inpatient Short Form   [facility]  \"6 Clicks\" Daily Activity Inpatient Short Form   1. Putting on and taking off regular lower body clothing? 2 - A Lot   2. Bathing (including washing, rinsing, drying)? 2 - A Lot   3. Toileting, which includes using toilet, bedpan or urinal? 3 - A Little   4. Putting on and taking off regular upper body clothing? 2 - A Lot   5. Taking care of personal grooming such as brushing teeth? 3 - A Little   6. Eating meals? 3 - A Little   Daily Activity Raw Score (Score out of 24.Lower scores equate to lower levels of function) 15   Total Evaluation Time   Total Evaluation Time (Minutes) 8     "

## 2020-07-30 NOTE — H&P
[facility]    History and Physical - Hospitalist Service       Date of Admission:  7/29/2020    Assessment & Plan      Madie Silva is a 89 year old female with PMHx of severe aortic stenosis, CAD s/p 4v CABG, hypertension, dyslipidemia, symptomatic bradycardia s/p PPM placement, and PAF on chronic anticoagulation with coumadin, s/p TAVR who presents with shoulder pain.     Shoulder Pain  Fracture of the left humeral head and surgical neck   Assessment: XR Humerus shows acute comminuted moderately displaced fracture of the left humeral head and surgical neck with medial displacement of the humeral shaft.   Plan:   - admit to observation  - Orthopedic surgery eval  - Pain control as needed  - PT/OT/SW  - Fall precautions.      Severe aortic stenosis s/p TAVR (5/5/2020): Followed by both Dr. Red and Dr. Arcos outpatient. Worsening symptoms of SOB since 10/2019. Repeat echocardiogram in 1/2020 showed severe aortic valve stenosis with valve area of 0.8 and was interested in pursuing TAVR.   Plan:  -- Continue 75 mg po and PTA coumadin   -- Telemetry      CAD s/p 4v CABG (2011)  Hypertension, dyslipidemia: LIMA-LAD, SVG-ramus, SVG-OM and SVG-PDA. Most recent angiogram with  of SVG to ramus, remainder of grafts patent.   Plan:  -- Continue Atorvastatin 80 mg po every day  -- Resume Losartan 100 mg po every day, resume in the AM pending BPs      Paroxysmal atrial fibrillation, rate controlled  Chronic anticoagulation use  Symptomatic bradycardia s/p PPM placement:   -- Continue PTA Warfarin     Walters's esophagus  GERD  Hiatal hernia  Esophageal stricture s/p dilatation: Continue Prilosec 20 mg po every day      T2DM, non-insulin dependent, controlled  Peripheral neuropathy: A1C 6.8 in 12/2019.   -- Hold PTA Metformin  -- Continue medium sliding scale insulin  -- BS per protocol   -- Monitor for hypoglycemia   -- Continue Gabapentin 300 mg at bedtime      Hypothyroidism: Continue Synthroid 30  mcg po every day         Diet: Cardiac Diet  DVT Prophylaxis: Warfarin  Hendrix Catheter: not present  Code Status: FULL CODE         Disposition Plan   Expected discharge: Tomorrow, recommended to transitional care unit once adequate pain management/ tolerating PO medications and safe disposition plan/ TCU bed available.  Entered: Etienne Reyes MD 07/29/2020, 7:35 PM     The patient's care was discussed with the Patient and ED Provider.    Etienne Reyes MD  [facility]    ______________________________________________________________________    Chief Complaint     Shoulder Pain    History is obtained from the patient    History of Present Illness     Madie Silva is a 89 year old female with PMHx of severe aortic stenosis, CAD s/p 4v CABG, hypertension, dyslipidemia, symptomatic bradycardia s/p PPM placement, and PAF on chronic anticoagulation with coumadin, s/p TAVR who presents with shoulder pain.     Patient reports that she was in her baseline state of health on the day admission when she was working outside in her backyard when she lost her balance and sustained a mechanical fall onto her left shoulder.  She laid initiated for a little while and was eventually able to get up on her home and called her son around 1500 hrs.  Since her fall, she reports of significant left shoulder pain with any movement.  She does have some baseline chronic dizziness, but she does not think that was what caused her fall today.  She denies any chest pain or shortness of breath, she denies any lightheadedness or syncopal symptoms prior to her fall.  She denies any head trauma, she denies any current headaches, vision changes, speech, localized weakness or numbness of her extremities.  She denies any recent fevers or chills, no weight loss, no night sweats, no blood in her stool.  She denies any urinary complaints of urgency/frequency/hematuria.  He otherwise has no complaints this time.  She currently resides  independently home.    Review of Systems    The 10 point Review of Systems is negative other than noted in the HPI or here.     Past Medical History    I have reviewed this patient's medical history and updated it with pertinent information if needed.   Past Medical History:   Diagnosis Date     Walters's esophagus 7/09     Bradycardia     post-op CABG 2011 - s/p pacemaker implanted 2011     CHRONIC VERTIGO 9/99     Colonic Adenoma 3/90, 11/98     Coronary artery disease     CABG x4 2011: LIMA to her LAD, saphenous vein graft to her ramus, saphenous vein graft to her OM, saphenous vein graft to her PDA.     Costochondritis      Depression      DIABETES MELLITUS TYPE II 10/07     DJD      DVT of Leg, postop 11/09    6 months of warfarin     Gastritis 10/89     GERD      HIATAL HERNIA      HYPERLIPIDEMIA      HYPOTHYROIDISM (aka HASHIMOTO)      Impaired fasting glucose      L Ankle Fracture 10/09     Obesity, unspecified      Osteoporosis, unspecified      PERIPHERAL NEUROPATHY      Polymyalgia rheumatica (H)      Post Herpetic Neuralgia 4/03    R lumbar distribution     Restless leg syndrome      Small vessel cerebrovascular changes 9/99     Stricture and stenosis of esophagus 10/89    Dilated     Syncope     1/06, 8/08, 2/10     VITAMIN D DEFICIENCY 12/07    per Dr. Petty       Past Surgical History   I have reviewed this patient's surgical history and updated it with pertinent information if needed.  Past Surgical History:   Procedure Laterality Date     BYPASS GRAFT ARTERY CORONARY  11/2/2011    CABG x 4  Dr. EDWIN ACOSTA NONSPECIFIC PROCEDURE  age 14    appendectomy     C NONSPECIFIC PROCEDURE  as a child    T&A     CV ANGIOGRAM CORONARY GRAFT N/A 1/27/2020    Procedure: Angiogram Coronary Graft;  Surgeon: Jenny Luther MD;  Location: [facility] CARDIAC CATH LAB     CV AORTOGRAM ABDOMINAL N/A 1/27/2020    Procedure: Aortogram Abdominal;  Surgeon: Jenny Luther MD;  Location: [facility] CARDIAC  CATH LAB     CV AORTOGRAM THORACIC N/A 1/27/2020    Procedure: Aortogram Thoracic;  Surgeon: Jenny Luther MD;  Location:  HEART CARDIAC CATH LAB     CV LEFT HEART CATH N/A 1/27/2020    Procedure: Right and Left heart catheterization for TAVR workup;  Surgeon: Jenny Luther MD;  Location:  HEART CARDIAC CATH LAB     CV TRANSCATHETER AORTIC VALVE REPLACEMENT N/A 5/5/2020    Procedure: Transcatheter Aortic Valve Replacement;  Surgeon: Jenny Luther MD;  Location:  HEART CARDIAC CATH LAB     EMBOLECTOMY LOWER EXTREMITY  11/9/2011    EMBOLECTOMY LOWER EXTREMITY; left leg femoral embolectomy.; Surgeon:JOLEEN BOONE; Location: OR     HYSTERECTOMY, CERVIX STATUS UNKNOWN  1978    partial hysterectomy     SURGICAL HISTORY OF -   10/09    L ankle fracture repair    Dr. Jose Roberto Trevino       Social History   I have reviewed this patient's social history and updated it with pertinent information if needed.      Family History   I have reviewed this patient's family history and updated it with pertinent information if needed.  Family History   Problem Relation Age of Onset     Alzheimer Disease Sister      Heart Disease Mother      Heart Disease Father      Heart Disease Son      Heart Disease Brother        Prior to Admission Medications   Prior to Admission Medications   Prescriptions Last Dose Informant Patient Reported? Taking?   Cholecalciferol (VITAMIN D-400 PO) 7/29/2020 at am Self Yes Yes   Sig: Take 1 tablet by mouth daily    HYDROcodone-acetaminophen (NORCO) 5-325 MG tablet 7/29/2020 at 1530 Self No Yes   Sig: Take 0.5-1 tablets by mouth every 6 hours as needed for moderate to severe pain   Multiple Vitamins-Minerals (CENTRUM SILVER) per tablet 7/29/2020 at am Self Yes Yes   Sig: Take 1 tablet by mouth daily.   Multiple Vitamins-Minerals (PRESERVISION AREDS 2 PO) 7/29/2020 at am Self Yes Yes   Sig: Take 1 tablet by mouth 2 times daily    Psyllium (FIBER) 0.52 g CAPS  7/29/2020 at am Self No Yes   Sig: Take 1 capsule by mouth daily   acetaminophen (TYLENOL ARTHRITIS PAIN) 650 MG CR tablet 7/29/2020 at am Self No Yes   Sig: Take 2 tablets (1,300 mg) by mouth 2 times daily   aspirin EC 81 MG EC tablet 7/29/2020 at am Self No Yes   Sig: Take 1 tablet by mouth daily.   atorvastatin (LIPITOR) 80 MG tablet 7/29/2020 at am Self No Yes   Sig: TAKE ONE TABLET BY MOUTH ONCE DAILY   calcium 600 MG tablet 7/29/2020 at am Self Yes Yes   Sig: Take 1 tablet by mouth 2 times daily.   co-enzyme Q-10 (COENZYME Q-10) 100 MG CAPS 7/29/2020 at am Self Yes Yes   Sig: Take 1 capsule by mouth daily.   gabapentin (NEURONTIN) 100 MG capsule 7/29/2020 at am Self No Yes   Sig: TAKE ONE CAPSULE BY MOUTH THREE TIMES A DAY   Patient taking differently: Take 100 mg by mouth 3 times daily    levothyroxine (SYNTHROID/LEVOTHROID) 50 MCG tablet 7/29/2020 at am Self No Yes   Sig: Take 1 tablet (50 mcg) by mouth daily   losartan (COZAAR) 50 MG tablet 7/29/2020 at am Self No Yes   Sig: Take 0.5 tablets (25 mg) by mouth daily   metFORMIN (GLUCOPHAGE) 500 MG tablet 7/29/2020 at am Self No Yes   Sig: TAKE ONE TABLET BY MOUTH DAILY WITH BREAKFAST   metoprolol succinate ER (TOPROL-XL) 25 MG 24 hr tablet 7/29/2020 at am Self No Yes   Sig: Take 0.5 tablets (12.5 mg) by mouth daily   nitroGLYCERIN (NITROSTAT) 0.4 MG SL tablet  at prn Self Yes Yes   Sig: Place 0.4 mg under the tongue every 5 minutes as needed. Up to 3 doses per episode    omeprazole (PRILOSEC) 20 MG DR capsule 7/29/2020 at am Self No Yes   Sig: Take 1 capsule (20 mg) by mouth daily   polyethylene glycol (MIRALAX) powder 7/29/2020 at am Self No Yes   Sig: Take 17 g (1 capful) by mouth daily   warfarin ANTICOAGULANT (COUMADIN) 3 MG tablet 7/28/2020 at 4.5 mg Self Yes Yes   Sig: Take 3-4.5 mg by mouth daily Takes  4.5mg on Tuesday, Thursday, Saturday  3mg all other days (Mon,Wed,Fri,Sun)      Facility-Administered Medications: None     Allergies   Allergies    Allergen Reactions     Amoxicillin      hives     Bisphosphonates      Swallowing disorder     Boniva [Ibandronate Sodium] Nausea and Vomiting     Diarrhea, N/V with oral.  IV caused arm to swell      Fosamax [Alendronic Acid]      Severe gastrointestinal reaction     Lisinopril      cough     Niacin      rash     Simvastatin      myalgias       Physical Exam   Vital Signs: Temp: 96.6  F (35.9  C) Temp src: Temporal BP: (!) 185/52 Pulse: 70   Resp: 20 SpO2: 98 % O2 Device: None (Room air)    Weight: 160 lbs 0 oz    Constitutional: awake, alert, cooperative, no apparent distress.   Eyes: Lids and lashes normal, pupils equal, round and reactive to light   ENT: Normocephalic, without obvious abnormality, atraumatic, sinuses nontender on palpation   Hematologic / Lymphatic: no cervical lymphadenopathy   Respiratory: CTABL   Cardiovascular: RRR with no m/r/g   GI: Normal bowel sounds, soft, non-distended, non-tender.   Skin: normal skin color, texture, turgor   Musculoskeletal: Left shoulder in sling, ROM limited due to pain.   Neurologic: Awake, alert, oriented to name, place and time. Cranial nerves II-XII are grossly intact. Motor is 5 out of 5 bilaterally. Sensory is intact.   Neuropsychiatric: normal mood and affect    Data   Data reviewed today: I reviewed all medications, new labs and imaging results over the last 24 hours. I personally reviewed the chest x-ray image(s) showing see below and the Humerus XR/ Cervical Spine CT/ XR Shoulder image(s) showing See below.    XR Chest 1 View  Sternotomy. Aortic valve prosthesis. Dual-lead cardiac  device left chest wall, with lead tips projected over the RA and RV.  Large hiatal hernia. Shallow inspiration accentuates heart size and  pulmonary vascularity. No pneumothorax. Mildly displaced and impacted  acute fracture of the left humoral neck.  MARGARETTE PABON MD  Reading per radiology      Humerus XR,  G/E 2 views, left  Acute comminuted moderately displaced fracture  of the left humeral head and surgical neck with medial displacement of the humeral shaft. The humeral head remains normally located. No evidence of additional fractures.   Reading per radiology     XR Shoulder Left G/E 3 Views  Acute comminuted moderately displaced fracture of the left humeral head and surgical neck with medial displacement of the humeral shaft. The humeral head remains normally located. No evidence of additional fractures.   Reading per radiology     Cervical spine CT w/o contrast  There is normal alignment of the cervical vertebrae.  Vertebral body heights of the cervical spine are normal.  Craniocervical alignment is normal. There are no fractures of the  cervical spine.  There is no significant degenerative spinal canal  narrowing of the cervical spine. There is no prevertebral soft tissue  swelling.  Reading per radiology     CT Head w/o Contrast  Diffuse cerebral volume loss and cerebral white matter  changes consistent with chronic small vessel ischemic disease. No  evidence for acute intracranial pathology.  Reading per radiology     Most Recent 3 CBC's:  Recent Labs   Lab Test 07/29/20  1628 07/12/20  1945 06/05/20  1203   WBC 9.2 6.8 6.9   HGB 11.4* 11.5* 11.7   MCV 76* 77* 77*    201 189     Most Recent 3 BMP's:  Recent Labs   Lab Test 07/29/20  1628 06/05/20  1203 05/08/20  1031    139 138   POTASSIUM 5.2 4.9 4.6   CHLORIDE 108 106 106   CO2 22 26 26   BUN 23 28 16   CR 1.09* 1.07* 0.91   ANIONGAP 6 7 6   SHAWNEE 8.8 9.3 8.7   * 125* 147*     Most Recent 3 Troponin's:  Recent Labs   Lab Test 07/29/20  1628 10/09/19  1424 10/09/19  1215   TROPI <0.015 <0.015 <0.015     Recent Results (from the past 24 hour(s))   CT Head w/o Contrast    Narrative    CT OF THE HEAD WITHOUT CONTRAST 7/29/2020 4:51 PM     COMPARISON: Brain MRI 1/28/2006    HISTORY: Fall, question hit head.    TECHNIQUE: 5 mm thick axial CT images of the head were acquired  without IV contrast  material.    FINDINGS: Thin lipoma associated with the superior convexity of the  corpus callosum again noted. There is moderate diffuse cerebral volume  loss. There are subtle patchy areas of decreased density in the  cerebral white matter bilaterally that are consistent with sequela of  chronic small vessel ischemic disease.    The ventricles and basal cisterns are within normal limits in  configuration given the degree of cerebral volume loss. There is no  midline shift. There are no extra-axial fluid collections.    No intracranial hemorrhage, mass or recent infarct.    The visualized paranasal sinuses are well-aerated. There is no  mastoiditis. There are no fractures of the visualized bones.      Impression    IMPRESSION: Diffuse cerebral volume loss and cerebral white matter  changes consistent with chronic small vessel ischemic disease. No  evidence for acute intracranial pathology.      Radiation dose for this scan was reduced using automated exposure  control, adjustment of the mA and/or kV according to patient size, or  iterative reconstruction technique       MO GROVER MD   Cervical spine CT w/o contrast    Narrative    CT OF THE CERVICAL SPINE WITHOUT CONTRAST   7/29/2020 4:52 PM     COMPARISON: None    HISTORY: Fall, question hit head.     TECHNIQUE: Axial images of the cervical spine were acquired without  intravenous contrast. Multiplanar reformations were created.        Impression    IMPRESSION: There is normal alignment of the cervical vertebrae.  Vertebral body heights of the cervical spine are normal.  Craniocervical alignment is normal. There are no fractures of the  cervical spine.  There is no significant degenerative spinal canal  narrowing of the cervical spine. There is no prevertebral soft tissue  swelling.      Radiation dose for this scan was reduced using automated exposure  control, adjustment of the mA and/or kV according to patient size, or  iterative reconstruction  technique    MO GROVER MD   XR Shoulder Left G/E 3 Views    Narrative    EXAM: XR SHOULDER LT G/E 3 VW, XR HUMERUS LT G/E 2 VW  LOCATION: [facility]  DATE/TIME: 7/29/2020 5:00 PM    INDICATION: Left shoulder pain. Recent fall.  COMPARISON: None.      Impression    IMPRESSION: Acute comminuted moderately displaced fracture of the left humeral head and surgical neck with medial displacement of the humeral shaft. The humeral head remains normally located. No evidence of additional fractures.    Humerus XR,  G/E 2 views, left    Narrative    EXAM: XR SHOULDER LT G/E 3 VW, XR HUMERUS LT G/E 2 VW  LOCATION: [facility]  DATE/TIME: 7/29/2020 5:00 PM    INDICATION: Left shoulder pain. Recent fall.  COMPARISON: None.      Impression    IMPRESSION: Acute comminuted moderately displaced fracture of the left humeral head and surgical neck with medial displacement of the humeral shaft. The humeral head remains normally located. No evidence of additional fractures.    XR Chest 1 View    Narrative    CHEST ONE VIEW  7/29/2020 5:26 PM     HISTORY:  Chest pain. Fall.    COMPARISON: 2/12/2018.      Impression    IMPRESSION: Sternotomy. Aortic valve prosthesis. Dual-lead cardiac  device left chest wall, with lead tips projected over the RA and RV.  Large hiatal hernia. Shallow inspiration accentuates heart size and  pulmonary vascularity. No pneumothorax. Mildly displaced and impacted  acute fracture of the left humeral neck.    MARGARETTE PABON MD

## 2020-07-30 NOTE — PHARMACY-ANTICOAGULATION SERVICE
Clinical Pharmacy - Warfarin Dosing Consult     Pharmacy has been consulted to manage this patient s warfarin therapy.  Indication: Atrial Fibrillation  Therapy Goal: INR 2-3  Warfarin Prior to Admission: Yes  Warfarin PTA Regimen: 4.5mg on Tuesday, Thursday, Saturday; 3mg all other days (Mon,Wed,Fri,Sun)  Significant drug interactions: aspirin, atorvastatin, co-q10  Recent documented change in oral intake/nutrition: Unknown    INR   Date Value Ref Range Status   07/29/2020 2.31 (H) 0.86 - 1.14 Final   07/21/2020 3.00 (H) 0.86 - 1.14 Final     Comment:     This test is intended for monitoring Coumadin therapy.  Results are not   accurate in patients with prolonged INR due to factor deficiency.         Recommend warfarin 3 mg today.  Pharmacy will monitor Madie Silva daily and order warfarin doses to achieve specified goal.      Please contact pharmacy as soon as possible if the warfarin needs to be held for a procedure or if the warfarin goals change.      Cindy Manning, CarlosD

## 2020-07-30 NOTE — PROGRESS NOTES
RECEIVING UNIT ED HANDOFF REVIEW    ED Nurse Handoff Report was reviewed by: Justice Aquino RN on July 29, 2020 at 7:14 PM

## 2020-07-30 NOTE — PROGRESS NOTES
Consult received    Left proximal humerus fracture    Non-operative treatment  Sling  Non-WB left UE, nothing heavier than cup of coffee  Pain medication as needed, limit narcotics as able  ROM ok at elbow, wrist and fingers  PT/OT  Social Work Consult based on PT recs    Formal Consult note to follow    Mariella Villeda PAC

## 2020-07-30 NOTE — CONSULTS
[facility]    Orthopedic Consultation    Madie Silva MRN# [id number]   Age: 89 year old YOB: 1929     Date of Admission:  7/29/2020    Reason for consult: Left humerus fracture       Requesting physician: Dr. Reyes       Level of consult: One-time consult to assist in determining a diagnosis, recommend an appropriate treatment plan and place orders           Assessment and Plan:   Assessment:   Left proximal humerus fracture, minimal displacement      Plan:   Non-operative treatment  Sling  Non-WB left UE, nothing heavier than cup of coffee  Pain medication as needed, limit narcotics as able  ROM ok at elbow, wrist and fingers  PT/OT  Social Work Consult based on PT recs  Follow up with Dr. Longo , at (location with clinic name or city) [facility], within [facility] days  Call for appointment (Rebecca - Patient Coordinator): [phone]  After hours: [phone]           Chief Complaint:   Left shoulder pain post fall         History of Present Illness:   This patient is a 89 year old female who presents with the following condition requiring a hospital admission:    The patient report that today she was outside working in her backyard when she lost her balance, sustaining a mechanical fall onto her left side. She is unsure when this occurred but states that she laid in the shade for a little and was able to get up and call her son around 1500. She has had left shoulder pain radiating slightly into the chest since the fall and took half of a hydrocodone prior to arrival. The patient reports chronic dizziness attributed to polypharmacy but did not have a sudden onset of room spinning prior to the fall. She denies chest pressure/pain proceeding the fall, abdominal pain, loss of consciousness, head trauma, headache, vision changes, neck pain, back pain, or leg/hip pain.   She is anticoagulated on Coumadin          Past Medical History:     Past Medical History:   Diagnosis  Date     Walters's esophagus 7/09     Bradycardia     post-op CABG 2011 - s/p pacemaker implanted 2011     CHRONIC VERTIGO 9/99     Colonic Adenoma 3/90, 11/98     Coronary artery disease     CABG x4 2011: LIMA to her LAD, saphenous vein graft to her ramus, saphenous vein graft to her OM, saphenous vein graft to her PDA.     Costochondritis      Depression      DIABETES MELLITUS TYPE II 10/07     DJD      DVT of Leg, postop 11/09    6 months of warfarin     Gastritis 10/89     GERD      HIATAL HERNIA      HYPERLIPIDEMIA      HYPOTHYROIDISM (aka HASHIMOTO)      Impaired fasting glucose      L Ankle Fracture 10/09     Obesity, unspecified      Osteoporosis, unspecified      PERIPHERAL NEUROPATHY      Polymyalgia rheumatica (H)      Post Herpetic Neuralgia 4/03    R lumbar distribution     Restless leg syndrome      Small vessel cerebrovascular changes 9/99     Stricture and stenosis of esophagus 10/89    Dilated     Syncope     1/06, 8/08, 2/10     VITAMIN D DEFICIENCY 12/07    per Dr. Petty             Past Surgical History:     Past Surgical History:   Procedure Laterality Date     BYPASS GRAFT ARTERY CORONARY  11/2/2011    CABG x 4  Dr. PINEDA     C NONSPECIFIC PROCEDURE  age 14    appendectomy     C NONSPECIFIC PROCEDURE  as a child    T&A     CV ANGIOGRAM CORONARY GRAFT N/A 1/27/2020    Procedure: Angiogram Coronary Graft;  Surgeon: Jenny Luther MD;  Location: [facility] CARDIAC CATH LAB     CV AORTOGRAM ABDOMINAL N/A 1/27/2020    Procedure: Aortogram Abdominal;  Surgeon: Jenny Luther MD;  Location: [facility] CARDIAC CATH LAB     CV AORTOGRAM THORACIC N/A 1/27/2020    Procedure: Aortogram Thoracic;  Surgeon: Jenny Luther MD;  Location: [facility] CARDIAC CATH LAB     CV LEFT HEART CATH N/A 1/27/2020    Procedure: Right and Left heart catheterization for TAVR workup;  Surgeon: Jenny Luther MD;  Location: [facility] CARDIAC CATH LAB     CV TRANSCATHETER AORTIC VALVE  REPLACEMENT N/A 5/5/2020    Procedure: Transcatheter Aortic Valve Replacement;  Surgeon: Jenny Luther MD;  Location:  HEART CARDIAC CATH LAB     EMBOLECTOMY LOWER EXTREMITY  11/9/2011    EMBOLECTOMY LOWER EXTREMITY; left leg femoral embolectomy.; Surgeon:JOLEEN BOONE; Location: OR     HYSTERECTOMY, CERVIX STATUS UNKNOWN  1978    partial hysterectomy     SURGICAL HISTORY OF -   10/09    L ankle fracture repair    Dr. Jose Roberto Trevino             Social History:     Social History     Tobacco Use     Smoking status: Never Smoker     Smokeless tobacco: Never Used   Substance Use Topics     Alcohol use: No             Family History:     Family History   Problem Relation Age of Onset     Alzheimer Disease Sister      Heart Disease Mother      Heart Disease Father      Heart Disease Son      Heart Disease Brother              Immunizations:     VACCINE/DOSE   Diptheria   DPT   DTAP   HBIG   Hepatitis A   Hepatitis B   HIB   Influenza   Measles   Meningococcal   MMR   Mumps   Pneumococcal   Polio   Rubella   Small Pox   TDAP   Varicella   Zoster             Allergies:     Allergies   Allergen Reactions     Amoxicillin      hives     Bisphosphonates      Swallowing disorder     Boniva [Ibandronate Sodium] Nausea and Vomiting     Diarrhea, N/V with oral.  IV caused arm to swell      Fosamax [Alendronic Acid]      Severe gastrointestinal reaction     Lisinopril      cough     Niacin      rash     Simvastatin      myalgias             Medications:     Current Facility-Administered Medications   Medication     acetaminophen (TYLENOL) tablet 650 mg     aspirin EC tablet 81 mg     atorvastatin (LIPITOR) tablet 80 mg     glucose gel 15-30 g    Or     dextrose 50 % injection 25-50 mL    Or     glucagon injection 1 mg     gabapentin (NEURONTIN) capsule 100 mg     HYDROmorphone (PF) (DILAUDID) injection 0.5 mg     insulin aspart (NovoLOG) injection (RAPID ACTING)     insulin aspart (NovoLOG) injection (RAPID  "ACTING)     levothyroxine (SYNTHROID/LEVOTHROID) tablet 50 mcg     lidocaine (LMX4) cream     lidocaine 1 % 0.1-1 mL     losartan (COZAAR) tablet 25 mg     melatonin tablet 1 mg     metoprolol succinate (TOPROL-XL) half-tab 12.5 mg     naloxone (NARCAN) injection 0.1-0.4 mg     omeprazole (priLOSEC) CR capsule 20 mg     ondansetron (ZOFRAN-ODT) ODT tab 4 mg    Or     ondansetron (ZOFRAN) injection 4 mg     oxyCODONE (ROXICODONE) tablet 5-10 mg     sodium chloride (PF) 0.9% PF flush 3 mL     sodium chloride (PF) 0.9% PF flush 3 mL     warfarin ANTICOAGULANT (COUMADIN) half-tab 4.5 mg     Warfarin Therapy Reminder (Check START DATE - warfarin may be starting in the FUTURE)             Review of Systems:   CV: NEGATIVE for chest pain, palpitations or peripheral edema  C: NEGATIVE for fever, chills, change in weight  E/M: NEGATIVE for ear, mouth and throat problems  R: NEGATIVE for significant cough or SOB          Physical Exam:   All vitals have been reviewed  Patient Vitals for the past 24 hrs:   BP Temp Temp src Pulse Resp SpO2 Height Weight   07/30/20 0755 (!) 160/84 98.3  F (36.8  C) Oral 69 16 93 % -- --   07/30/20 0633 -- -- -- -- 16 -- -- --   07/30/20 0624 -- -- -- -- 16 -- -- --   07/30/20 0620 -- -- -- -- -- -- -- 73 kg (161 lb)   07/30/20 0300 (!) 155/58 -- -- 73 16 91 % -- --   07/30/20 0130 -- -- -- -- 16 -- -- --   07/30/20 0111 -- -- -- -- 16 -- -- --   07/29/20 2300 (!) 151/49 97.2  F (36.2  C) Oral 73 16 95 % -- --   07/29/20 2032 (!) 165/65 97.4  F (36.3  C) Oral 71 16 96 % -- --   07/29/20 1800 (!) 185/52 -- -- 70 -- 98 % -- --   07/29/20 1755 -- -- -- -- -- 97 % -- --   07/29/20 1750 (!) 186/63 -- -- 70 -- 98 % -- --   07/29/20 1607 (!) 185/58 96.6  F (35.9  C) Temporal 72 20 98 % 1.676 m (5' 6\") 72.6 kg (160 lb)       Intake/Output Summary (Last 24 hours) at 7/30/2020 1030  Last data filed at 7/30/2020 0956  Gross per 24 hour   Intake 100 ml   Output 650 ml   Net -550 ml     Sling in " place  Sensation intact in upper arm over biceps as well as in hand r/m/u nerve distribution  Able to abduct and oppose left thumb  Able to flex and extend left wrist  Mild ecchymosis noted   +Radial pulse  +cap refill     Full ROM of elbow and wrist             Data:   All laboratory data reviewed  Results for orders placed or performed during the hospital encounter of 07/29/20   CT Head w/o Contrast     Status: None    Narrative    CT OF THE HEAD WITHOUT CONTRAST 7/29/2020 4:51 PM     COMPARISON: Brain MRI 1/28/2006    HISTORY: Fall, question hit head.    TECHNIQUE: 5 mm thick axial CT images of the head were acquired  without IV contrast material.    FINDINGS: Thin lipoma associated with the superior convexity of the  corpus callosum again noted. There is moderate diffuse cerebral volume  loss. There are subtle patchy areas of decreased density in the  cerebral white matter bilaterally that are consistent with sequela of  chronic small vessel ischemic disease.    The ventricles and basal cisterns are within normal limits in  configuration given the degree of cerebral volume loss. There is no  midline shift. There are no extra-axial fluid collections.    No intracranial hemorrhage, mass or recent infarct.    The visualized paranasal sinuses are well-aerated. There is no  mastoiditis. There are no fractures of the visualized bones.      Impression    IMPRESSION: Diffuse cerebral volume loss and cerebral white matter  changes consistent with chronic small vessel ischemic disease. No  evidence for acute intracranial pathology.      Radiation dose for this scan was reduced using automated exposure  control, adjustment of the mA and/or kV according to patient size, or  iterative reconstruction technique       MO GROVER MD   Cervical spine CT w/o contrast     Status: None    Narrative    CT OF THE CERVICAL SPINE WITHOUT CONTRAST   7/29/2020 4:52 PM     COMPARISON: None    HISTORY: Fall, question hit head.      TECHNIQUE: Axial images of the cervical spine were acquired without  intravenous contrast. Multiplanar reformations were created.        Impression    IMPRESSION: There is normal alignment of the cervical vertebrae.  Vertebral body heights of the cervical spine are normal.  Craniocervical alignment is normal. There are no fractures of the  cervical spine.  There is no significant degenerative spinal canal  narrowing of the cervical spine. There is no prevertebral soft tissue  swelling.      Radiation dose for this scan was reduced using automated exposure  control, adjustment of the mA and/or kV according to patient size, or  iterative reconstruction technique    MO GROVER MD   XR Shoulder Left G/E 3 Views     Status: None    Narrative    EXAM: XR SHOULDER LT G/E 3 VW, XR HUMERUS LT G/E 2 VW  LOCATION: [facility]  DATE/TIME: 7/29/2020 5:00 PM    INDICATION: Left shoulder pain. Recent fall.  COMPARISON: None.      Impression    IMPRESSION: Acute comminuted moderately displaced fracture of the left humeral head and surgical neck with medial displacement of the humeral shaft. The humeral head remains normally located. No evidence of additional fractures.    Humerus XR,  G/E 2 views, left     Status: None    Narrative    EXAM: XR SHOULDER LT G/E 3 VW, XR HUMERUS LT G/E 2 VW  LOCATION: [facility]  DATE/TIME: 7/29/2020 5:00 PM    INDICATION: Left shoulder pain. Recent fall.  COMPARISON: None.      Impression    IMPRESSION: Acute comminuted moderately displaced fracture of the left humeral head and surgical neck with medial displacement of the humeral shaft. The humeral head remains normally located. No evidence of additional fractures.    XR Chest 1 View     Status: None    Narrative    CHEST ONE VIEW  7/29/2020 5:26 PM     HISTORY:  Chest pain. Fall.    COMPARISON: 2/12/2018.      Impression    IMPRESSION: Sternotomy. Aortic valve prosthesis. Dual-lead cardiac  device left chest wall,  with lead tips projected over the RA and RV.  Large hiatal hernia. Shallow inspiration accentuates heart size and  pulmonary vascularity. No pneumothorax. Mildly displaced and impacted  acute fracture of the left humeral neck.    MARGARETTE PABON MD   CBC with platelets differential     Status: Abnormal   Result Value Ref Range    WBC 9.2 4.0 - 11.0 10e9/L    RBC Count 4.65 3.8 - 5.2 10e12/L    Hemoglobin 11.4 (L) 11.7 - 15.7 g/dL    Hematocrit 35.5 35.0 - 47.0 %    MCV 76 (L) 78 - 100 fl    MCH 24.5 (L) 26.5 - 33.0 pg    MCHC 32.1 31.5 - 36.5 g/dL    RDW 16.9 (H) 10.0 - 15.0 %    Platelet Count 199 150 - 450 10e9/L    Diff Method Automated Method     % Neutrophils 60.8 %    % Lymphocytes 26.2 %    % Monocytes 11.1 %    % Eosinophils 1.3 %    % Basophils 0.4 %    % Immature Granulocytes 0.2 %    Nucleated RBCs 0 0 /100    Absolute Neutrophil 5.6 1.6 - 8.3 10e9/L    Absolute Lymphocytes 2.4 0.8 - 5.3 10e9/L    Absolute Monocytes 1.0 0.0 - 1.3 10e9/L    Absolute Eosinophils 0.1 0.0 - 0.7 10e9/L    Absolute Basophils 0.0 0.0 - 0.2 10e9/L    Abs Immature Granulocytes 0.0 0 - 0.4 10e9/L    Absolute Nucleated RBC 0.0    Basic metabolic panel     Status: Abnormal   Result Value Ref Range    Sodium 136 133 - 144 mmol/L    Potassium 5.2 3.4 - 5.3 mmol/L    Chloride 108 94 - 109 mmol/L    Carbon Dioxide 22 20 - 32 mmol/L    Anion Gap 6 3 - 14 mmol/L    Glucose 148 (H) 70 - 99 mg/dL    Urea Nitrogen 23 7 - 30 mg/dL    Creatinine 1.09 (H) 0.52 - 1.04 mg/dL    GFR Estimate 44 (L) >60 mL/min/[1.73_m2]    GFR Estimate If Black 51 (L) >60 mL/min/[1.73_m2]    Calcium 8.8 8.5 - 10.1 mg/dL   Troponin I     Status: None   Result Value Ref Range    Troponin I ES <0.015 0.000 - 0.045 ug/L   INR     Status: Abnormal   Result Value Ref Range    INR 2.31 (H) 0.86 - 1.14   Glucose by meter     Status: Abnormal   Result Value Ref Range    Glucose 179 (H) 70 - 99 mg/dL   INR     Status: Abnormal   Result Value Ref Range    INR 2.49 (H) 0.86 -  1.14   Basic metabolic panel     Status: Abnormal   Result Value Ref Range    Sodium 135 133 - 144 mmol/L    Potassium 4.8 3.4 - 5.3 mmol/L    Chloride 105 94 - 109 mmol/L    Carbon Dioxide 25 20 - 32 mmol/L    Anion Gap 5 3 - 14 mmol/L    Glucose 131 (H) 70 - 99 mg/dL    Urea Nitrogen 24 7 - 30 mg/dL    Creatinine 0.91 0.52 - 1.04 mg/dL    GFR Estimate 55 (L) >60 mL/min/[1.73_m2]    GFR Estimate If Black 64 >60 mL/min/[1.73_m2]    Calcium 8.3 (L) 8.5 - 10.1 mg/dL   CBC with platelets     Status: Abnormal   Result Value Ref Range    WBC 9.1 4.0 - 11.0 10e9/L    RBC Count 4.00 3.8 - 5.2 10e12/L    Hemoglobin 9.8 (L) 11.7 - 15.7 g/dL    Hematocrit 30.5 (L) 35.0 - 47.0 %    MCV 76 (L) 78 - 100 fl    MCH 24.5 (L) 26.5 - 33.0 pg    MCHC 32.1 31.5 - 36.5 g/dL    RDW 16.8 (H) 10.0 - 15.0 %    Platelet Count 181 150 - 450 10e9/L   Glucose by meter     Status: Abnormal   Result Value Ref Range    Glucose 154 (H) 70 - 99 mg/dL   Glucose by meter     Status: Abnormal   Result Value Ref Range    Glucose 128 (H) 70 - 99 mg/dL   EKG 12-lead, tracing only     Status: None   Result Value Ref Range    Interpretation ECG Click View Image link to view waveform and result           Attestation:  I have reviewed today's vital signs, notes, medications, labs and imaging with Dr. Longo.  Amount of time performed on this consult: 30 minutes.    Mariella Villeda PA-C

## 2020-07-30 NOTE — PROGRESS NOTES
[facility]    Medicine Progress Note - Hospitalist Service       Date of Admission:  7/29/2020  Date of Service: 07/30/2020    Assessment & Plan      Madie Silva is a 89 year old female with PMHx of severe aortic stenosis, CAD s/p 4v CABG, hypertension, dyslipidemia, symptomatic bradycardia s/p PPM placement, and PAF on chronic anticoagulation with coumadin, s/p TAVR who presents with shoulder pain.     Shoulder Pain  Fracture of the left humeral head and surgical neck   Assessment: XR Humerus shows acute comminuted moderately displaced fracture of the left humeral head and surgical neck with medial displacement of the humeral shaft.   Plan:   Plan:  - Orthopedic surgery evaluated -> non-surgical  - Pain control as needed  - PT/OT/SW for placement, will need TCU  - Fall precautions.      Severe aortic stenosis s/p TAVR (5/5/2020): Followed by both Dr. Red and Dr. Arcos outpatient. Worsening symptoms of SOB since 10/2019. Repeat echocardiogram in 1/2020 showed severe aortic valve stenosis with valve area of 0.8 and was interested in pursuing TAVR.   Plan:  -- Continue 75 mg po and PTA coumadin   -- Telemetry stopped     CAD s/p 4v CABG (2011)  Hypertension, dyslipidemia: LIMA-LAD, SVG-ramus, SVG-OM and SVG-PDA. Most recent angiogram with  of SVG to ramus, remainder of grafts patent.   Plan:  -- Continue Atorvastatin 80 mg po every day  -- Resume Losartan 100 mg po every day, resume in the AM pending BPs      Paroxysmal atrial fibrillation, rate controlled  Chronic anticoagulation use  Symptomatic bradycardia s/p PPM placement:   -- Continue PTA Warfarin, pharmacy to dose     Walters's esophagus  GERD  Hiatal hernia  Esophageal stricture s/p dilatation: Continue Prilosec 20 mg po every day      T2DM, non-insulin dependent, controlled  Peripheral neuropathy: A1C 6.8 in 12/2019.   -- Hold PTA Metformin  -- Continue medium sliding scale insulin  -- BS per protocol   -- Monitor for hypoglycemia   --  Continue Gabapentin 300 mg at bedtime      Hypothyroidism: Continue Synthroid 30 mcg po every day           Diet: Moderate Consistent CHO Diet    DVT Prophylaxis: Pneumatic Compression Devices  Hendrix Catheter: not present  Code Status: Full Code           Disposition Plan   Expected discharge: Tomorrow, recommended to transitional care unit once adequate pain management/ tolerating PO medications and safe disposition plan/ TCU bed available.  Entered: Etienne Reyes MD 07/30/2020, 4:47 PM       The patient's care was discussed with the Bedside Nurse and Patient.    Etienne Reyes MD  Hospitalist Service  [facility]    ______________________________________________________________________    Interval History     Pain better in shoulder today  No CP/SOB  No nausea/vomiting, abdominal pain  Doesn't think she can go back to be independent with shoulder injury  No fevers or chills, no new complaints.     Data reviewed today: I reviewed all medications, new labs and imaging results over the last 24 hours. I personally reviewed no images or EKG's today.    Physical Exam   Vital Signs: Temp: 98.2  F (36.8  C) Temp src: Oral BP: 121/50 Pulse: 69   Resp: 16 SpO2: 92 % O2 Device: None (Room air)    Weight: 161 lbs 0 oz    Constitutional: no apparent distress  Hematologic / Lymphatic: no cervical lymphadenopathy   Respiratory: CTABL   Cardiovascular: RRR with no m/r/g   GI: Normal bowel sounds, soft, non-distended, non-tender.   Musculoskeletal: Left shoulder in sling, ROM limited due to pain.   Neurologic: Awake, alert, oriented to name, place and time. Motor is 5 out of 5 bilaterally. Sensory is intact.   Neuropsychiatric: normal mood and affect    Data   Recent Labs   Lab 07/30/20  0611 07/29/20  1628   WBC 9.1 9.2   HGB 9.8* 11.4*   MCV 76* 76*    199   INR 2.49* 2.31*    136   POTASSIUM 4.8 5.2   CHLORIDE 105 108   CO2 25 22   BUN 24 23   CR 0.91 1.09*   ANIONGAP 5 6   SHAWNEE 8.3* 8.8   * 148*    TROPI  --  <0.015     Recent Results (from the past 24 hour(s))   CT Head w/o Contrast    Narrative    CT OF THE HEAD WITHOUT CONTRAST 7/29/2020 4:51 PM     COMPARISON: Brain MRI 1/28/2006    HISTORY: Fall, question hit head.    TECHNIQUE: 5 mm thick axial CT images of the head were acquired  without IV contrast material.    FINDINGS: Thin lipoma associated with the superior convexity of the  corpus callosum again noted. There is moderate diffuse cerebral volume  loss. There are subtle patchy areas of decreased density in the  cerebral white matter bilaterally that are consistent with sequela of  chronic small vessel ischemic disease.    The ventricles and basal cisterns are within normal limits in  configuration given the degree of cerebral volume loss. There is no  midline shift. There are no extra-axial fluid collections.    No intracranial hemorrhage, mass or recent infarct.    The visualized paranasal sinuses are well-aerated. There is no  mastoiditis. There are no fractures of the visualized bones.      Impression    IMPRESSION: Diffuse cerebral volume loss and cerebral white matter  changes consistent with chronic small vessel ischemic disease. No  evidence for acute intracranial pathology.      Radiation dose for this scan was reduced using automated exposure  control, adjustment of the mA and/or kV according to patient size, or  iterative reconstruction technique       MO GROVER MD   Cervical spine CT w/o contrast    Narrative    CT OF THE CERVICAL SPINE WITHOUT CONTRAST   7/29/2020 4:52 PM     COMPARISON: None    HISTORY: Fall, question hit head.     TECHNIQUE: Axial images of the cervical spine were acquired without  intravenous contrast. Multiplanar reformations were created.        Impression    IMPRESSION: There is normal alignment of the cervical vertebrae.  Vertebral body heights of the cervical spine are normal.  Craniocervical alignment is normal. There are no fractures of the  cervical  spine.  There is no significant degenerative spinal canal  narrowing of the cervical spine. There is no prevertebral soft tissue  swelling.      Radiation dose for this scan was reduced using automated exposure  control, adjustment of the mA and/or kV according to patient size, or  iterative reconstruction technique    MO GROVER MD   XR Shoulder Left G/E 3 Views    Narrative    EXAM: XR SHOULDER LT G/E 3 VW, XR HUMERUS LT G/E 2 VW  LOCATION: [facility]  DATE/TIME: 7/29/2020 5:00 PM    INDICATION: Left shoulder pain. Recent fall.  COMPARISON: None.      Impression    IMPRESSION: Acute comminuted moderately displaced fracture of the left humeral head and surgical neck with medial displacement of the humeral shaft. The humeral head remains normally located. No evidence of additional fractures.    Humerus XR,  G/E 2 views, left    Narrative    EXAM: XR SHOULDER LT G/E 3 VW, XR HUMERUS LT G/E 2 VW  LOCATION: [facility]  DATE/TIME: 7/29/2020 5:00 PM    INDICATION: Left shoulder pain. Recent fall.  COMPARISON: None.      Impression    IMPRESSION: Acute comminuted moderately displaced fracture of the left humeral head and surgical neck with medial displacement of the humeral shaft. The humeral head remains normally located. No evidence of additional fractures.    XR Chest 1 View    Narrative    CHEST ONE VIEW  7/29/2020 5:26 PM     HISTORY:  Chest pain. Fall.    COMPARISON: 2/12/2018.      Impression    IMPRESSION: Sternotomy. Aortic valve prosthesis. Dual-lead cardiac  device left chest wall, with lead tips projected over the RA and RV.  Large hiatal hernia. Shallow inspiration accentuates heart size and  pulmonary vascularity. No pneumothorax. Mildly displaced and impacted  acute fracture of the left humeral neck.    MARGARETTE PABON MD     Medications     Warfarin Therapy Reminder         aspirin  81 mg Oral Daily     atorvastatin  80 mg Oral Daily     gabapentin  100 mg Oral TID      insulin aspart  1-3 Units Subcutaneous TID AC     insulin aspart  1-3 Units Subcutaneous At Bedtime     levothyroxine  50 mcg Oral QAM AC     losartan  25 mg Oral Daily     metoprolol succinate ER  12.5 mg Oral Daily     omeprazole  20 mg Oral QAM AC     sodium chloride (PF)  3 mL Intracatheter Q8H     warfarin ANTICOAGULANT  4.5 mg Oral ONCE at 18:00

## 2020-07-30 NOTE — PROGRESS NOTES
-diagnostic tests and consults completed and resulted  NO  -vital signs normal or at patient baseline YES, elevated BP at times w/ pain  -tolerating oral intake to maintain hydration YES  -returns to baseline functional status NOT MET  -safe disposition plan has been identified NOT MET, SW consulted      Pt A&Ox4, forgetful. CMS intact. VSS. Up w/ A1 to BSC. Taking IV dilaudid for pain. L arm in sling. Voiding adequately. Continue to monitor.

## 2020-07-30 NOTE — PLAN OF CARE
Discharge Planner OT   Patient plan for discharge: Prefers home/considering TCU.  Patient lives alone at baseline, uses cane for longer distances and drives.  Current status: Patient is alert and oriented, no cognitive concerns.  Patient anxious upon arrival- trying to order lunch and unable to hear on phone but calms quickly.  Patient CGA for supine to sit, CGA/Min A to ambulate to bathroom with patient preferring furniture walking to A. Patient completed toilet transfer with CGA, Min A for clothing management.  Patient set up for g/h tasks seated.  Needs assist for dressing tasks due to pain with any movement of L UE.    Barriers to return to prior living situation: NWB/decreased ROM of L UE, falls risk, balance  Recommendations for discharge: TCU; patient has reported has a son in area but would not be able to stay with him as does not get along with DIL.  Recommend assist with all ADLs and mobility.  Rationale for recommendations: Continued therapy recommended for maximum independence in ADLs/mobility; will have PT evaluate patient for mobility/balance.       Entered by: Daniela Park 07/30/2020 12:37 PM

## 2020-07-30 NOTE — PLAN OF CARE
[facility]      OUTPATIENT OCCUPATIONAL THERAPY  EVALUATION  PLAN OF TREATMENT FOR OUTPATIENT REHABILITATION  (COMPLETE FOR INITIAL CLAIMS ONLY)  Patient's Last Name, First Name, M.I.  YOB: 1929  Madie Silva                          Provider's Name  [facility] Medical Record No.  [id number]                               Onset Date:  07/29/20   Start of Care Date:        Type:     ___PT   _X_OT   ___SLP Medical Diagnosis:                           OT Diagnosis:  impaired self-cares/mobility   Visits from SOC:  1   _________________________________________________________________________________  Plan of Treatment/Functional Goals    Planned Interventions: ADL retraining,       Goals: See Occupational Therapy Goals on Care Plan in [facility] electronic health record.    Therapy Frequency: Daily  Predicted Duration of Therapy Intervention: 2-3 days  _________________________________________________________________________________    I CERTIFY THE NEED FOR THESE SERVICES FURNISHED UNDER        THIS PLAN OF TREATMENT AND WHILE UNDER MY CARE     (Physician co-signature of this document indicates review and certification of the therapy plan).                 ,      Referring Physician: Dr. Reyes            Initial Assessment        See Occupational Therapy evaluation dated   in Epic electronic health record.

## 2020-07-30 NOTE — PLAN OF CARE
"Discharge Planner PT   Patient plan for discharge: Does not feel ready for home today, but does not want TCU saying \"they'll keep me there for 3 weeks\"; open to home care; states she has limited social support    Current status: PT orders received, eval completed, treatment initiated. Pt is a 89yo female admitted under observation status after fall resulting in humeral fracture; NWB, non-operative management; sling.     Pt requires use of rail for bed mobility. Pt transfers sit<>stand with CGA and pt bracing LEs against bed for balance. Pt ambulated 110' with SEC, initially quite unsteady needing Trell for balance, improved to CGA over distance but unsteady on feet with 1 LOB requiring assist to stabilize. Discussed options for more support (quad cane vs hemiwalker) however pt declines.  Barriers to return to prior living situation: Lives alone, high fall risk, NWB LUE, impaired balance  Recommendations for discharge: TCU  Rationale for recommendations: Pt is below baseline limited by impaired balance and is at increased risk for falls; thus pt would benefit from continued PT at TCU to improve functional strength, balance, activity tolerance, safety and IND with functional mobility and reduce falls risk prior to returning home alone. If pt returns home, would recommend Ax1 for transfers and ambulation, Home PT/OT/HHA and use of cane for all mobility.       Entered by: Giovanna Victoria 07/30/2020 2:44 PM       "

## 2020-07-31 ENCOUNTER — APPOINTMENT (OUTPATIENT)
Dept: OCCUPATIONAL THERAPY | Facility: CLINIC | Age: 85
DRG: 563 | End: 2020-07-31
Payer: COMMERCIAL

## 2020-07-31 PROBLEM — S42.309A HUMERAL FRACTURE: Status: ACTIVE | Noted: 2020-07-31

## 2020-07-31 LAB
GLUCOSE BLDC GLUCOMTR-MCNC: 115 MG/DL (ref 70–99)
GLUCOSE BLDC GLUCOMTR-MCNC: 122 MG/DL (ref 70–99)
GLUCOSE BLDC GLUCOMTR-MCNC: 137 MG/DL (ref 70–99)
GLUCOSE BLDC GLUCOMTR-MCNC: 167 MG/DL (ref 70–99)
GLUCOSE BLDC GLUCOMTR-MCNC: 176 MG/DL (ref 70–99)
INR PPP: 2.81 (ref 0.86–1.14)

## 2020-07-31 PROCEDURE — 85610 PROTHROMBIN TIME: CPT | Performed by: STUDENT IN AN ORGANIZED HEALTH CARE EDUCATION/TRAINING PROGRAM

## 2020-07-31 PROCEDURE — 25000132 ZZH RX MED GY IP 250 OP 250 PS 637: Performed by: STUDENT IN AN ORGANIZED HEALTH CARE EDUCATION/TRAINING PROGRAM

## 2020-07-31 PROCEDURE — G0378 HOSPITAL OBSERVATION PER HR: HCPCS

## 2020-07-31 PROCEDURE — 96372 THER/PROPH/DIAG INJ SC/IM: CPT

## 2020-07-31 PROCEDURE — 97530 THERAPEUTIC ACTIVITIES: CPT | Mod: GO | Performed by: OCCUPATIONAL THERAPIST

## 2020-07-31 PROCEDURE — 25000128 H RX IP 250 OP 636: Performed by: STUDENT IN AN ORGANIZED HEALTH CARE EDUCATION/TRAINING PROGRAM

## 2020-07-31 PROCEDURE — 99232 SBSQ HOSP IP/OBS MODERATE 35: CPT | Performed by: STUDENT IN AN ORGANIZED HEALTH CARE EDUCATION/TRAINING PROGRAM

## 2020-07-31 PROCEDURE — 00000146 ZZHCL STATISTIC GLUCOSE BY METER IP

## 2020-07-31 PROCEDURE — 12000000 ZZH R&B MED SURG/OB

## 2020-07-31 PROCEDURE — 36415 COLL VENOUS BLD VENIPUNCTURE: CPT | Performed by: STUDENT IN AN ORGANIZED HEALTH CARE EDUCATION/TRAINING PROGRAM

## 2020-07-31 RX ORDER — SENNOSIDES 8.6 MG
1-2 TABLET ORAL 2 TIMES DAILY PRN
Status: DISCONTINUED | OUTPATIENT
Start: 2020-07-31 | End: 2020-08-01 | Stop reason: HOSPADM

## 2020-07-31 RX ORDER — WARFARIN SODIUM 3 MG/1
3 TABLET ORAL
Status: COMPLETED | OUTPATIENT
Start: 2020-07-31 | End: 2020-07-31

## 2020-07-31 RX ORDER — CYCLOBENZAPRINE HCL 5 MG
5-10 TABLET ORAL EVERY 8 HOURS PRN
Status: DISCONTINUED | OUTPATIENT
Start: 2020-07-31 | End: 2020-08-01 | Stop reason: HOSPADM

## 2020-07-31 RX ORDER — HYDROCODONE BITARTRATE AND ACETAMINOPHEN 7.5; 325 MG/1; MG/1
1-2 TABLET ORAL EVERY 6 HOURS PRN
Status: DISCONTINUED | OUTPATIENT
Start: 2020-07-31 | End: 2020-08-01 | Stop reason: HOSPADM

## 2020-07-31 RX ORDER — CYCLOBENZAPRINE HCL 5 MG
5 TABLET ORAL EVERY 8 HOURS PRN
Status: DISCONTINUED | OUTPATIENT
Start: 2020-07-31 | End: 2020-07-31

## 2020-07-31 RX ADMIN — HYDROCODONE BITARTRATE AND ACETAMINOPHEN 1 TABLET: 7.5; 325 TABLET ORAL at 17:35

## 2020-07-31 RX ADMIN — OXYCODONE HYDROCHLORIDE 5 MG: 5 TABLET ORAL at 08:20

## 2020-07-31 RX ADMIN — INSULIN ASPART 1 UNITS: 100 INJECTION, SOLUTION INTRAVENOUS; SUBCUTANEOUS at 12:05

## 2020-07-31 RX ADMIN — ACETAMINOPHEN 650 MG: 325 TABLET, FILM COATED ORAL at 13:09

## 2020-07-31 RX ADMIN — OMEPRAZOLE 20 MG: 20 CAPSULE, DELAYED RELEASE ORAL at 06:25

## 2020-07-31 RX ADMIN — LEVOTHYROXINE SODIUM 50 MCG: 50 TABLET ORAL at 06:26

## 2020-07-31 RX ADMIN — METOPROLOL SUCCINATE 12.5 MG: 25 TABLET, EXTENDED RELEASE ORAL at 08:20

## 2020-07-31 RX ADMIN — HYDROMORPHONE HYDROCHLORIDE 0.5 MG: 1 INJECTION, SOLUTION INTRAMUSCULAR; INTRAVENOUS; SUBCUTANEOUS at 23:55

## 2020-07-31 RX ADMIN — CYCLOBENZAPRINE HYDROCHLORIDE 5 MG: 5 TABLET, FILM COATED ORAL at 15:48

## 2020-07-31 RX ADMIN — LOSARTAN POTASSIUM 25 MG: 25 TABLET, FILM COATED ORAL at 08:20

## 2020-07-31 RX ADMIN — WARFARIN SODIUM 3 MG: 3 TABLET ORAL at 17:35

## 2020-07-31 RX ADMIN — GABAPENTIN 100 MG: 100 CAPSULE ORAL at 15:48

## 2020-07-31 RX ADMIN — ACETAMINOPHEN 650 MG: 325 TABLET, FILM COATED ORAL at 08:20

## 2020-07-31 RX ADMIN — HYDROCODONE BITARTRATE AND ACETAMINOPHEN 2 TABLET: 7.5; 325 TABLET ORAL at 23:46

## 2020-07-31 RX ADMIN — GABAPENTIN 100 MG: 100 CAPSULE ORAL at 08:20

## 2020-07-31 RX ADMIN — SENNOSIDES 1 TABLET: 8.6 TABLET, FILM COATED ORAL at 21:55

## 2020-07-31 RX ADMIN — ACETAMINOPHEN 650 MG: 325 TABLET, FILM COATED ORAL at 21:55

## 2020-07-31 RX ADMIN — GABAPENTIN 100 MG: 100 CAPSULE ORAL at 21:55

## 2020-07-31 RX ADMIN — ATORVASTATIN CALCIUM 80 MG: 20 TABLET, FILM COATED ORAL at 08:20

## 2020-07-31 RX ADMIN — ASPIRIN 81 MG: 81 TABLET, COATED ORAL at 08:20

## 2020-07-31 ASSESSMENT — ACTIVITIES OF DAILY LIVING (ADL): ADLS_ACUITY_SCORE: 15

## 2020-07-31 ASSESSMENT — MIFFLIN-ST. JEOR: SCORE: 1166.58

## 2020-07-31 NOTE — PLAN OF CARE
Pt A&Ox4, forgetful. CMS intact. VSS. Up w/ A! To BR. Taking IV dilaudid, oxy and flexaril and for pain. Voiding adequately. Continue to monitor.

## 2020-07-31 NOTE — CONSULTS
Care Transition Initial Assessment - JASPREET     Met with: Patient and Family (son Oliver and DIL Precious)   Active Problems:    Type 2 diabetes mellitus with diabetic nephropathy (H)    Hyperlipidemia LDL goal <100    GERD    Essential hypertension    Polymyalgia rheumatica (H)    Hypothyroidism    Walters's esophagus    CKD (chronic kidney disease) stage 3, GFR 30-59 ml/min (H)    Long-term (current) use of anticoagulants [Z79.01]    Atrial fibrillation, Paroxysmal (H)    Aortic stenosis, severe    Shoulder pain       DATA  Lives With: alone   Living Arrangements: house  Quality of Family Relationships: supportive, involved  Description of Support System: Involved, Supportive  Who is your support system?: Children  Support Assessment: Adequate family and caregiver support.   Identified issues/concerns regarding health management:    Quality of Family Relationships: supportive, involved  Transportation Anticipated: car, drives self    ASSESSMENT  Cognitive Status:  awake and oriented  Concerns to be addressed: discharge planning .    Social Work consult for discharge planning. Patient admitted on 7/29/20 for displaced fracture of left humeral head with a tentative discharge date of 8/1/20. Reviewed chart and spoke to patient. Per therapy recommendations patient is recommended for TCU at discharge. Patient reported that she didn't really want to go because she feels that the last time she went, she wasn't able to leave when she felt she was ready. Discussed going home and asked if she has 24/7 help and she reported that she does not. She stated that today she tried to get herself dressed and was unable to. She stated that she's been to [facility] and wishes to not return there. She has been to [facility] in the past and would like a referral sent there. Asked if JASPREET could update anyone and she said that earlier her DIL called and they may have other options. She gave JASPREET permission to contact Precious and Oliver. Call placed and they  reported that they agreed not to go to [facility] and that they feel [facility] is a good place. They would also like  to send a referral to [place] as a 2nd choice. Referrals sent via [facility].   PLAN  Financial costs for the patient includes transportation cost as they would like a ride scheduled for patient .  Patient given options and choices for discharge yes .  Patient/family is agreeable to the plan?  Yes:   Transportation/person available to transport on day of discharge  is TBD and have they been notified/set up TBD  Patient Goals and Preferences: TCU .  Patient anticipates discharging to:  TCU .    Will continue to follow    ALFRED Maurer    [facility]

## 2020-07-31 NOTE — PROGRESS NOTES
07/30/20 1400   Quick Adds   Quick Adds Certification   Type of Visit Initial PT Evaluation   Living Environment   Lives With alone   Living Arrangements house   Home Accessibility stairs to enter home;stairs within home   Number of Stairs, Main Entrance 1   Number of Stairs, Within Home, Primary 10   Transportation Anticipated car, drives self   Living Environment Comment Stairs up to bed/bath   Self-Care   Usual Activity Tolerance good   Current Activity Tolerance fair   Regular Exercise No   Equipment Currently Used at Home cane, straight   Activity/Exercise/Self-Care Comment IND with mobility except recently using SEC in community for longer distances   Functional Level Prior   Ambulation 1-->assistive equipment   Transferring 0-->independent   Toileting 0-->independent   Bathing 0-->independent   Fall history within last six months yes   Number of times patient has fallen within last six months 1   Which of the above functional risks had a recent onset or change? ambulation;transferring;fall history   General Information   Onset of Illness/Injury or Date of Surgery - Date 07/29/20   Referring Physician Etienne Reyes MD   Patient/Family Goals Statement Hoping to go home   Pertinent History of Current Problem (include personal factors and/or comorbidities that impact the POC) 89 year old female with PMHx of severe aortic stenosis, CAD s/p 4v CABG, hypertension, dyslipidemia, symptomatic bradycardia s/p PPM placement, and PAF on chronic anticoagulation with coumadin, s/p TAVR who presents with shoulder pain, found to have L humeral fracture, non-operative treatment, sling, NWB   Precautions/Limitations fall precautions   Weight-Bearing Status - LUE nonweight-bearing   General Observations Pt resting in bed upon arrival, sling on LUE   Cognitive Status Examination   Orientation orientation to person, place and time   Level of Consciousness alert   Follows Commands and Answers Questions 100% of the time   Personal  Safety and Judgment at risk behaviors demonstrated   Pain Assessment   Patient Currently in Pain Yes, see Vital Sign flowsheet  (denies at rest, pain with movement)   Posture    Posture Forward head position;Protracted shoulders   Range of Motion (ROM)   ROM Comment BLE WFL for mobility   Strength   Strength Comments BLE WFL for mobility, demo's antigravity strength in knee and hip extensors as well as hip flexors   Bed Mobility   Bed Mobility Comments use of rail and HOB elevated    Transfer Skills   Transfer Comments Trell for balance sit>stand, pt bracing LEs against bed to stand   Gait   Gait Comments 5' with SEC in RUE, sling to LUE, unstaedy on feet with cane, holds cane too far away with short choppy steps and forward flexed posture   Balance   Balance Comments Trell to stand without RUE support, CGA With cane for static stance but impaired dynamic balance noted with mobility   General Therapy Interventions   Planned Therapy Interventions balance training;bed mobility training;gait training;neuromuscular re-education;strengthening;transfer training;progressive activity/exercise   Clinical Impression   Criteria for Skilled Therapeutic Intervention yes, treatment indicated   PT Diagnosis Impaired gait   Influenced by the following impairments Impaired balance, LUE in sling, decreased safety awareness   Functional limitations due to impairments Decreased safety and IND with functional transfers, gait, stairs   Clinical Presentation Stable/Uncomplicated   Clinical Decision Making (Complexity) Low complexity   Therapy Frequency 3x/week   Predicted Duration of Therapy Intervention (days/wks) 4 days   Anticipated Equipment Needs at Discharge quad cane   Anticipated Discharge Disposition Transitional Care Facility;Home with Home Therapy   Risk & Benefits of therapy have been explained Yes   Patient, Family & other staff in agreement with plan of care Yes   Therapy Certification   Start of care date 07/30/20  "  Certification date from 07/30/20   Certification date to 08/02/20   Medical Diagnosis L humeral fracture   [facility] AM-PAC TM \"6 Clicks\"   2016, Trustees of [facility], under license to [employer].  All rights reserved.   6 Clicks Short Forms Basic Mobility Inpatient Short Form   [facility] AM-PAC  \"6 Clicks\" V.2 Basic Mobility Inpatient Short Form   1. Turning from your back to your side while in a flat bed without using bedrails? 3 - A Little   2. Moving from lying on your back to sitting on the side of a flat bed without using bedrails? 3 - A Little   3. Moving to and from a bed to a chair (including a wheelchair)? 3 - A Little   4. Standing up from a chair using your arms (e.g., wheelchair, or bedside chair)? 3 - A Little   5. To walk in hospital room? 3 - A Little   6. Climbing 3-5 steps with a railing? 3 - A Little   Basic Mobility Raw Score (Score out of 24.Lower scores equate to lower levels of function) 18   Total Evaluation Time   Total Evaluation Time (Minutes) 8     "

## 2020-07-31 NOTE — PROVIDER NOTIFICATION
Pt is very lethargic when on oxycodone.  Web paged  to see if pt can have her medications switched to norco, pt stated this medication works for her and that is what she takes at home.  Awaiting call back/new orders.  Web paged hospitalist again since no response earlier.

## 2020-07-31 NOTE — PROGRESS NOTES
[facility]    Medicine Progress Note - Hospitalist Service       Date of Admission:  7/29/2020  Date of Service: 07/31/2020    Assessment & Plan      Madie Silva is a 89 year old female with PMHx of severe aortic stenosis, CAD s/p 4v CABG, hypertension, dyslipidemia, symptomatic bradycardia s/p PPM placement, and PAF on chronic anticoagulation with coumadin, s/p TAVR who presents with shoulder pain.     Shoulder Pain  Fracture of the left humeral head and surgical neck   Assessment: XR Humerus shows acute comminuted moderately displaced fracture of the left humeral head and surgical neck with medial displacement of the humeral shaft. Improving, but pain control still difficult, needing IV pain meds.   Plan:   Plan:  - Orthopedic surgery evaluated -> non-surgical  - Pain control as needed -> switched Oxycodone to Norco today  - PT/OT/SW for placement, will need TCU -> working on placement.   - Fall precautions.      Severe aortic stenosis s/p TAVR (5/5/2020): Followed by both Dr. Red and Dr. Arcos outpatient. Worsening symptoms of SOB since 10/2019. Repeat echocardiogram in 1/2020 showed severe aortic valve stenosis with valve area of 0.8 and was interested in pursuing TAVR.   Plan:  -- Continue PTA coumadin   -- Telemetry stopped     CAD s/p 4v CABG (2011)  Hypertension, dyslipidemia: LIMA-LAD, SVG-ramus, SVG-OM and SVG-PDA. Most recent angiogram with  of SVG to ramus, remainder of grafts patent.   Plan:  -- Continue Atorvastatin 80 mg po every day  -- Resume Losartan 100 mg po every day     Paroxysmal atrial fibrillation, rate controlled  Chronic anticoagulation use  Symptomatic bradycardia s/p PPM placement:   -- Continue PTA Warfarin, pharmacy to dose     Walters's esophagus  GERD  Hiatal hernia  Esophageal stricture s/p dilatation: Continue Prilosec 20 mg po every day      T2DM, non-insulin dependent, controlled  Peripheral neuropathy: A1C 6.8 in 12/2019.   -- Hold PTA Metformin  --  Continue medium sliding scale insulin  -- BS per protocol   -- Monitor for hypoglycemia   -- Continue Gabapentin 300 mg at bedtime      Hypothyroidism: Continue Synthroid 30 mcg po every day           Diet: Moderate Consistent CHO Diet    DVT Prophylaxis: Pneumatic Compression Devices  Hendrix Catheter: not present  Code Status: Full Code           Disposition Plan   Expected discharge: Tomorrow, recommended to transitional care unit once adequate pain management/ tolerating PO medications and safe disposition plan/ TCU bed available.  Entered: Etienne Reyes MD 07/31/2020, 4:11 PM       The patient's care was discussed with the Bedside Nurse and Patient.    Etienne Reyes MD  Hospitalist Service  [facility]    ______________________________________________________________________    Interval History     Pain better in shoulder today, but still uncontrolled at times.   Nursing reports oxycodone makes patient very somnolent.   No CP/SOB  No nausea/vomiting, abdominal pain  No fevers or chills, no new complaints.     Data reviewed today: I reviewed all medications, new labs and imaging results over the last 24 hours. I personally reviewed no images or EKG's today.    Physical Exam   Vital Signs: Temp: 98  F (36.7  C) Temp src: Axillary BP: 131/61 Pulse: 82   Resp: 16 SpO2: 90 % O2 Device: None (Room air)    Weight: 162 lbs 0 oz    Constitutional: no apparent distress  Hematologic / Lymphatic: no cervical lymphadenopathy   Respiratory: CTABL   Cardiovascular: RRR with no m/r/g   GI: Normal bowel sounds, soft, non-distended, non-tender.   Musculoskeletal: Left shoulder in sling, ROM limited due to pain.   Neurologic: Awake, alert, oriented to name, place and time. Motor is 5 out of 5 bilaterally. Sensory is intact.   Neuropsychiatric: normal mood and affect    Data   Recent Labs   Lab 07/31/20  0550 07/30/20  2251 07/30/20  0611 07/29/20  1628   WBC  --   --  9.1 9.2   HGB  --   --  9.8* 11.4*   MCV  --   --   76* 76*   PLT  --   --  181 199   INR 2.81*  --  2.49* 2.31*   NA  --   --  135 136   POTASSIUM  --   --  4.8 5.2   CHLORIDE  --   --  105 108   CO2  --   --  25 22   BUN  --   --  24 23   CR  --   --  0.91 1.09*   ANIONGAP  --   --  5 6   SHAWNEE  --   --  8.3* 8.8   GLC  --   --  131* 148*   TROPI  --  <0.015  --  <0.015     No results found for this or any previous visit (from the past 24 hour(s)).  Medications     Warfarin Therapy Reminder         aspirin  81 mg Oral Daily     atorvastatin  80 mg Oral Daily     gabapentin  100 mg Oral TID     insulin aspart  1-3 Units Subcutaneous TID AC     insulin aspart  1-3 Units Subcutaneous At Bedtime     levothyroxine  50 mcg Oral QAM AC     losartan  25 mg Oral Daily     metoprolol succinate ER  12.5 mg Oral Daily     omeprazole  20 mg Oral QAM AC     sodium chloride (PF)  3 mL Intracatheter Q8H     warfarin ANTICOAGULANT  3 mg Oral ONCE at 18:00

## 2020-07-31 NOTE — PLAN OF CARE
"Discharge Planner OT   Patient plan for discharge: \"My son said I can not go home like this\".  Current status: Per nurse, patient had asked for pain meds prior to session.  Patient now sleepy, having trouble following directions during session, eyes closed.  Patient limited by intolerable pain with attempt at donning/doffing sling, attempting any UB dressing.  Barriers to return to prior living situation: Pain, decreased ROM, weakness  Recommendations for discharge: TCU  Rationale for recommendations: Continued therapy for maximum independence in ADLs/mobility.       Entered by: Daniela Park 07/31/2020 9:22 AM       "

## 2020-07-31 NOTE — PROGRESS NOTES
Asked pt if this writer could give information to her daughter in law Precious and pt did give permission.  Gave Precious up date on pt.  They are concerned about pt going home and they would like the pt to go to rehab.  Pt's family would like pt to go some where close to them, they live in .  Pt's daughter will wait for sliding scale to call and let them know if they have found pt placement.  Will continue to monitor pt.

## 2020-07-31 NOTE — PROGRESS NOTES
[facility]      OUTPATIENT PHYSICAL THERAPY EVALUATION  PLAN OF TREATMENT FOR OUTPATIENT REHABILITATION  (COMPLETE FOR INITIAL CLAIMS ONLY)  Patient's Last Name, First Name, M.I.  YOB: 1929  Madie Silva                        Provider's Name  [facility] Medical Record No.  [id number]                               Onset Date:  07/29/20   Start of Care Date:  07/30/20      Type:     _X_PT   ___OT   ___SLP Medical Diagnosis:  L humeral fracture                        PT Diagnosis:  Impaired gait   Visits from SOC:  1   _________________________________________________________________________________  Plan of Treatment/Functional Goals    Planned Interventions:  ,    balance training, bed mobility training, gait training, neuromuscular re-education, strengthening, transfer training, progressive activity/exercise,       Goals: See Physical Therapy Goals on Care Plan in [employer] electronic health record.    Therapy Frequency: 3x/week  Predicted Duration of Therapy Intervention: 4 days  _________________________________________________________________________________    I CERTIFY THE NEED FOR THESE SERVICES FURNISHED UNDER        THIS PLAN OF TREATMENT AND WHILE UNDER MY CARE     (Physician co-signature of this document indicates review and certification of the therapy plan).                Certification date from: 07/30/20, Certification date to: 08/02/20    Referring Physician: Etienne Reyes MD            Initial Assessment        See Physical Therapy evaluation dated 07/30/20 in Epic electronic health record.

## 2020-07-31 NOTE — PLAN OF CARE
Comments: -diagnostic tests and consults completed and resulted- met  -vital signs normal or at patient baseline- Met  -tolerating oral intake to maintain hydration- Met  -returns to baseline functional status- In progress  -safe disposition plan has been identified- In progress  Nurse to notify provider when observation goals have been met and patient is ready for discharge.

## 2020-07-31 NOTE — UTILIZATION REVIEW
Admission Status; Secondary Review Determination       Under the authority of the Utilization Management Committee, the utilization review process indicated a secondary review on the above patient. The review outcome is based on review of the medical records, discussions with staff, and applying clinical experience noted on the date of the review.     (x) Inpatient Status Appropriate - This patient's medical care is consistent with medical management for inpatient care and reasonable inpatient medical practice.     RATIONALE FOR DETERMINATION   89 year old female with PMHx of severe aortic stenosis, CAD s/p 4v CABG, hypertension, dyslipidemia, symptomatic bradycardia s/p PPM placement, and PAF on chronic anticoagulation with coumadin, s/p TAVR who presents with shoulder pain. Assessment: XR Humerus shows acute comminuted moderately displaced fracture of the left humeral head and surgical neck with medial displacement of the humeral shaft.  Patient has been on observation for more than 2 midnights, continues to have difficulty and pain controlled.  Attending physician notified of patientDr Reyes pain is still uncontrolled requiring ongoing hospital care beyond his third midnight he should be admitted to inpatient.      This document was produced using voice recognition software       The information on this document is developed by the utilization review team in order for the business office to ensure compliance. This only denotes the appropriateness of proper admission status and does not reflect the quality of care rendered.   The definitions of Inpatient Status and Observation Status used in making the determination above are those provided in the CMS Coverage Manual, Chapter 1 and Chapter 6, section 70.4.   Sincerely,   LEONARDO MINAYA MD   System Medical Director   Utilization Management   [facility].

## 2020-07-31 NOTE — PROGRESS NOTES
SW:    D: Bed confirmed at [facility] for Saturday. Ride scheduled with [facility] via wheelchair 8/1/20 @ 3097. Call placed to [employer] Precious per son's request and updated on time of ride.     P: Will continue to follow    ALFRED Maurer    [facility]

## 2020-07-31 NOTE — PROGRESS NOTES
Patient continuing to complain of pain-now more specifically in her heart. Pt crouch over on side of bed with sharp pain. RRT called r/t patient significant heart history.

## 2020-07-31 NOTE — PROGRESS NOTES
Web paged Dr. Reyes to get a stool softener ordered. Awaiting response.    New order placed for senna.

## 2020-08-01 VITALS
DIASTOLIC BLOOD PRESSURE: 52 MMHG | TEMPERATURE: 98.8 F | BODY MASS INDEX: 26.07 KG/M2 | SYSTOLIC BLOOD PRESSURE: 114 MMHG | HEART RATE: 74 BPM | WEIGHT: 162.2 LBS | HEIGHT: 66 IN | OXYGEN SATURATION: 100 % | RESPIRATION RATE: 16 BRPM

## 2020-08-01 PROBLEM — Z86.73 HISTORY OF STROKE: Status: ACTIVE | Noted: 2020-08-01

## 2020-08-01 PROBLEM — M25.519 SHOULDER PAIN: Status: RESOLVED | Noted: 2020-07-29 | Resolved: 2020-08-01

## 2020-08-01 LAB
COPATH REPORT: NORMAL
GLUCOSE BLDC GLUCOMTR-MCNC: 116 MG/DL (ref 70–99)
GLUCOSE BLDC GLUCOMTR-MCNC: 130 MG/DL (ref 70–99)
INR PPP: 3.17 (ref 0.86–1.14)

## 2020-08-01 PROCEDURE — 99207 ZZC CDG-CODE CATEGORY CHANGED: CPT | Performed by: INTERNAL MEDICINE

## 2020-08-01 PROCEDURE — 99238 HOSP IP/OBS DSCHRG MGMT 30/<: CPT | Performed by: INTERNAL MEDICINE

## 2020-08-01 PROCEDURE — 85610 PROTHROMBIN TIME: CPT | Performed by: STUDENT IN AN ORGANIZED HEALTH CARE EDUCATION/TRAINING PROGRAM

## 2020-08-01 PROCEDURE — 25000132 ZZH RX MED GY IP 250 OP 250 PS 637: Performed by: STUDENT IN AN ORGANIZED HEALTH CARE EDUCATION/TRAINING PROGRAM

## 2020-08-01 PROCEDURE — 36415 COLL VENOUS BLD VENIPUNCTURE: CPT | Performed by: STUDENT IN AN ORGANIZED HEALTH CARE EDUCATION/TRAINING PROGRAM

## 2020-08-01 PROCEDURE — 00000146 ZZHCL STATISTIC GLUCOSE BY METER IP

## 2020-08-01 RX ORDER — CYCLOBENZAPRINE HCL 5 MG
5 TABLET ORAL 3 TIMES DAILY PRN
Qty: 10 TABLET | Refills: 0 | Status: SHIPPED | OUTPATIENT
Start: 2020-08-01 | End: 2020-01-01

## 2020-08-01 RX ORDER — ACETAMINOPHEN 325 MG/1
650 TABLET ORAL EVERY 4 HOURS PRN
Refills: 0
Start: 2020-08-01 | End: 2020-08-11

## 2020-08-01 RX ORDER — HYDROCODONE BITARTRATE AND ACETAMINOPHEN 5; 325 MG/1; MG/1
1 TABLET ORAL EVERY 6 HOURS PRN
Qty: 18 TABLET | Refills: 0 | Status: SHIPPED | OUTPATIENT
Start: 2020-08-01 | End: 2020-08-01

## 2020-08-01 RX ORDER — WARFARIN SODIUM 3 MG/1
3-4.5 TABLET ORAL DAILY
Refills: 0
Start: 2020-08-02 | End: 2020-01-01 | Stop reason: DRUGHIGH

## 2020-08-01 RX ORDER — SENNOSIDES 8.6 MG
1-2 TABLET ORAL 2 TIMES DAILY
Refills: 0
Start: 2020-08-01 | End: 2021-01-01

## 2020-08-01 RX ORDER — HYDROCODONE BITARTRATE AND ACETAMINOPHEN 5; 325 MG/1; MG/1
1 TABLET ORAL EVERY 4 HOURS PRN
Qty: 15 TABLET | Refills: 0 | Status: SHIPPED | OUTPATIENT
Start: 2020-08-01 | End: 2020-08-11

## 2020-08-01 RX ORDER — GABAPENTIN 100 MG/1
100 CAPSULE ORAL 3 TIMES DAILY
Qty: 15 CAPSULE | Refills: 0 | Status: SHIPPED | OUTPATIENT
Start: 2020-08-01 | End: 2021-01-01

## 2020-08-01 RX ADMIN — ASPIRIN 81 MG: 81 TABLET, COATED ORAL at 08:13

## 2020-08-01 RX ADMIN — HYDROCODONE BITARTRATE AND ACETAMINOPHEN 1 TABLET: 7.5; 325 TABLET ORAL at 13:28

## 2020-08-01 RX ADMIN — GABAPENTIN 100 MG: 100 CAPSULE ORAL at 08:14

## 2020-08-01 RX ADMIN — ATORVASTATIN CALCIUM 80 MG: 20 TABLET, FILM COATED ORAL at 08:13

## 2020-08-01 RX ADMIN — CYCLOBENZAPRINE HYDROCHLORIDE 5 MG: 5 TABLET, FILM COATED ORAL at 11:59

## 2020-08-01 RX ADMIN — HYDROCODONE BITARTRATE AND ACETAMINOPHEN 1 TABLET: 7.5; 325 TABLET ORAL at 08:14

## 2020-08-01 RX ADMIN — LEVOTHYROXINE SODIUM 50 MCG: 50 TABLET ORAL at 06:42

## 2020-08-01 RX ADMIN — LOSARTAN POTASSIUM 25 MG: 25 TABLET, FILM COATED ORAL at 08:13

## 2020-08-01 RX ADMIN — METOPROLOL SUCCINATE 12.5 MG: 25 TABLET, EXTENDED RELEASE ORAL at 08:14

## 2020-08-01 RX ADMIN — OMEPRAZOLE 20 MG: 20 CAPSULE, DELAYED RELEASE ORAL at 06:42

## 2020-08-01 ASSESSMENT — ACTIVITIES OF DAILY LIVING (ADL)
ADLS_ACUITY_SCORE: 15

## 2020-08-01 ASSESSMENT — MIFFLIN-ST. JEOR: SCORE: 1167.48

## 2020-08-01 NOTE — PLAN OF CARE
Pt got dressed, pt's son in to visit pt.  Pt is ready to go to rehab. Pt has met all her goals and will be transferring, AVS given and pt signed her paperwork.

## 2020-08-01 NOTE — PROGRESS NOTES
SW:  D: SW completed PAS - on chart. Discharge orders received and faxed to [facility]. Scripts still needed.    PAS-RR    D: Per DHS regulation, SW completed and submitted PAS-RR to [place] [employer] via the Senior [employer] Line.  PAS-RR confirmation # is : [id number]    I: SW spoke with patient and they are aware a PAS-RR has been submitted.  SW reviewed with patient that they may be contacted for a follow up appointment within 10 days of hospital discharge if their SNF stay is < 30 days.  Contact information for [employer] Line was also provided.    A: Patient verbalized understanding.    P: Further questions may be directed to [facility] LinkAge Line at #[phone], option #4 for PAS-RR staff.      P: Will continue to follow.      ALFRED Comer

## 2020-08-01 NOTE — PLAN OF CARE
A&Ox4, forgetful. VSS, ex hypertensive, on room air. Tele: NSR. Pain managed with PRN Norco, IV Dilaudid given x1 for breakthrough pain. Mod carb diet. . Up with assist of one. CMS intact, ex for intermittent numbness in upper extremities, which is baseline per pt. Voiding adequately in bathroom. IV saline locked.

## 2020-08-01 NOTE — DISCHARGE SUMMARY
[facility]    Discharge Summary  Hospitalist    Date of Admission:  7/29/2020  Date of Discharge:  8/1/2020  Discharging Provider: Kel Márquez MD    Discharge Diagnoses   Principal Problem:    Left Prox Humerus Fract      Fall      PAF -- on Warfarin     Active Problems:    DM type 2 -- Hgb A1C 6.9 on 5/5/20      Walters's esophagus, GERD      Essential hypertension      Polymyalgia rheumatica       Hypothyroidism      CKD (chronic kidney disease) stage 3, GFR 30-59 ml/min         CAD, S/P CABG x 4 in 2011      Symptomatic bradycardia -- S/P PPM 2011      Severe AS -- S/P TAVR on 5/5/20      History of stroke      History of Present Illness   89 year old female with PMHx of severe aortic stenosis, CAD s/p 4v CABG, hypertension, dyslipidemia, symptomatic bradycardia s/p PPM placement, and PAF on chronic anticoagulation with coumadin, s/p TAVR who presents with shoulder pain.      Patient reports that she was in her baseline state of health on the day admission when she was working outside in her backyard when she lost her balance and sustained a mechanical fall onto her left shoulder.  She laid initiated for a little while and was eventually able to get up on her home and called her son around 1500 hrs.  Since her fall, she reports of significant left shoulder pain with any movement.  She does have some baseline chronic dizziness, but she does not think that was what caused her fall today.  She denies any chest pain or shortness of breath, she denies any lightheadedness or syncopal symptoms prior to her fall.  She denies any head trauma, she denies any current headaches, vision changes, speech, localized weakness or numbness of her extremities.  She denies any recent fevers or chills, no weight loss, no night sweats, no blood in her stool.  She denies any urinary complaints of urgency/frequency/hematuria.  He otherwise has no complaints this time.  She currently resides independently  home.     Hospital Course   Placed in observation, seen by [facility] who recommended a sling for left humerus fracture, and will follow-up in 3 weeks with Xray planned at that time.     Norco 5 mg q4hr prn pain, and added Flexeril 5 mg tid prn muscle spasms.      Seen by PT, advised [facility], and [facility] transfer arranged.     She has paroxysmal afib, was in NSR on admit.  INR 3.17 at discharge, so will hold Warfarin today, and reduce dose slightly to 3 mg daily, and check INR every Monday.  Also will request desired INR range by 2.0 to 2.5 because of patient's age (>89 YO) and risk of falling, plus is also on Aspirin EC 81 mg daily as well.      Kel Márquez MD, MD  Pager: [phone]  Cell Phone:  [phone]       Significant Results and Procedures   As above    Pending Results   These results will be followed up by Dr. Márquez  Unresulted Labs Ordered in the Past 30 Days of this Admission     Date and Time Order Name Status Description    7/28/2020 1519 DERMATOPATHOLOGY In process           Code Status   Full Code       Primary Care Physician   De Obregon    Physical Exam   Temp: 97.8  F (36.6  C) Temp src: Oral BP: 124/50 Pulse: 65   Resp: 16 SpO2: 95 % O2 Device: None (Room air)    Vitals:    07/30/20 0620 07/31/20 0700 08/01/20 0651   Weight: 73 kg (161 lb) 73.5 kg (162 lb) 73.6 kg (162 lb 3.2 oz)     Vital Signs with Ranges  Temp:  [97.6  F (36.4  C)-98.7  F (37.1  C)] 97.8  F (36.6  C)  Pulse:  [65-82] 65  Resp:  [16-18] 16  BP: ()/(45-62) 124/50  SpO2:  [90 %-95 %] 95 %  I/O last 3 completed shifts:  In: 200 [P.O.:200]  Out: -     Exam on discharge:    Alert, Ox3  Left arm in a sling    Discharge Disposition   Discharged to short-term care facility  Condition at discharge: Fair    Consultations This Hospital Stay   PHARMACY TO DOSE WARFARIN  ORTHOPEDIC SURGERY IP CONSULT  PHYSICAL THERAPY ADULT IP CONSULT  OCCUPATIONAL THERAPY ADULT IP CONSULT  SOCIAL WORK IP CONSULT  PHYSICAL THERAPY  ADULT IP CONSULT  OCCUPATIONAL THERAPY ADULT IP CONSULT    Time Spent on this Encounter   I spent a total of 25 minutes discharging this patient.     Discharge Orders      General info for SNF    Length of Stay Estimate: Short Term Care: Estimated # of Days <30  Condition at Discharge: Improving  Level of care:skilled   Rehabilitation Potential: Good  Admission H&P remains valid and up-to-date: Yes  Recent Chemotherapy: N/A  Use Nursing Home Standing Orders: Yes     Mantoux instructions    Give two-step Mantoux (PPD) Per Facility Policy Yes     Reason for your hospital stay    Fall with left humerus fracture     Glucose monitor nursing POCT    Before meals 3 times a day     Additional Discharge Instructions    Call Dr. Thompson if any medical questions at Cell Phone [phone].     Activity - Up with nursing assistance     Additional Discharge Instructions    Left arm sling for comfort     Follow Up and recommended labs and tests    Follow up with [facility], Dr. Maciel Longo, in 3 weeks.  [facility] usually makes this appointment and notifies you.  Call [phone] with any questions regarding this appointment.    INR every Monday and as needed -- on Warfarin 3 mg daily for desired INR of 2.0 to 2.5 for PAF.     Full Code     Physical Therapy Adult Consult    Evaluate and treat as clinically indicated.    Reason:  Weakness     Occupational Therapy Adult Consult    Evaluate and treat as clinically indicated.    Reason:  Assist with activities of daily living     Advance Diet as Tolerated    Follow this diet upon discharge: Orders Placed This Encounter      Moderate Consistent CHO Diet     Discharge Medications   Current Discharge Medication List      START taking these medications    Details   acetaminophen (TYLENOL) 325 MG tablet Take 2 tablets (650 mg) by mouth every 4 hours as needed for mild pain  Qty:  , Refills: 0    Associated Diagnoses: Other closed nondisplaced fracture of proximal end of left humerus, initial encounter       cyclobenzaprine (FLEXERIL) 5 MG tablet Take 1 tablet (5 mg) by mouth 3 times daily as needed for muscle spasms  Qty: 10 tablet, Refills: 0    Associated Diagnoses: Other closed nondisplaced fracture of proximal end of left humerus, initial encounter      insulin aspart (NOVOLOG PEN) 100 UNIT/ML pen Three times a day with meals, For glucometer 150-200 give 2 units, 201-250 4 units, >250 6 units.  Refills: 0    Comments: Three times a day with meals, For glucometer 150-200 give 2 units, 201-250 4 units, >250 6 units.  Associated Diagnoses: Type 2 diabetes mellitus with diabetic nephropathy, without long-term current use of insulin (H)      sennosides (SENOKOT) 8.6 MG tablet Take 1-2 tablets by mouth 2 times daily  Refills: 0    Comments: For 1 week then as needed for constipation  Associated Diagnoses: Other closed nondisplaced fracture of proximal end of left humerus, initial encounter         CONTINUE these medications which have CHANGED    Details   gabapentin (NEURONTIN) 100 MG capsule Take 1 capsule (100 mg) by mouth 3 times daily  Qty: 15 capsule, Refills: 0    Associated Diagnoses: Post herpetic neuralgia; Restless leg syndrome      HYDROcodone-acetaminophen (NORCO) 5-325 MG tablet Take 1 tablet by mouth every 4 hours as needed for pain  Qty: 15 tablet, Refills: 0    Associated Diagnoses: Other closed nondisplaced fracture of proximal end of left humerus, initial encounter      warfarin ANTICOAGULANT (COUMADIN) 3 MG tablet Take 1-1.5 tablets (3-4.5 mg) by mouth daily Takes  4.5mg on Tuesday, Thursday, Saturday  3mg all other days (Mon,Wed,Fri,Sun)  Qty:  , Refills: 0    Associated Diagnoses: Paroxysmal atrial fibrillation (H)         CONTINUE these medications which have NOT CHANGED    Details   aspirin EC 81 MG EC tablet Take 1 tablet by mouth daily.    Associated Diagnoses: S/P coronary artery bypass graft x 3      atorvastatin (LIPITOR) 80 MG tablet TAKE ONE TABLET BY MOUTH ONCE DAILY  Qty: 90 tablet,  Refills: 3    Associated Diagnoses: Hyperlipidemia LDL goal <100      calcium 600 MG tablet Take 1 tablet by mouth 2 times daily.      Cholecalciferol (VITAMIN D-400 PO) Take 1 tablet by mouth daily       co-enzyme Q-10 (COENZYME Q-10) 100 MG CAPS Take 1 capsule by mouth daily.      levothyroxine (SYNTHROID/LEVOTHROID) 50 MCG tablet Take 1 tablet (50 mcg) by mouth daily  Qty: 90 tablet, Refills: 3    Comments: Profile Rx: patient will call when needed  Associated Diagnoses: Acquired hypothyroidism      losartan (COZAAR) 50 MG tablet Take 0.5 tablets (25 mg) by mouth daily  Qty: 90 tablet, Refills: 3    Associated Diagnoses: Benign essential hypertension; Essential hypertension      metFORMIN (GLUCOPHAGE) 500 MG tablet TAKE ONE TABLET BY MOUTH DAILY WITH BREAKFAST  Qty: 90 tablet, Refills: 3    Associated Diagnoses: Type 2 diabetes mellitus with diabetic nephropathy, without long-term current use of insulin (H)      metoprolol succinate ER (TOPROL-XL) 25 MG 24 hr tablet Take 0.5 tablets (12.5 mg) by mouth daily  Qty: 30 tablet, Refills: 11    Associated Diagnoses: Essential hypertension      Multiple Vitamins-Minerals (CENTRUM SILVER) per tablet Take 1 tablet by mouth daily.      Multiple Vitamins-Minerals (PRESERVISION AREDS 2 PO) Take 1 tablet by mouth 2 times daily       nitroGLYCERIN (NITROSTAT) 0.4 MG SL tablet Place 0.4 mg under the tongue every 5 minutes as needed. Up to 3 doses per episode       omeprazole (PRILOSEC) 20 MG DR capsule Take 1 capsule (20 mg) by mouth daily  Qty: 90 capsule, Refills: 3    Associated Diagnoses: Hiatal hernia; Walters's esophagus with dysplasia      polyethylene glycol (MIRALAX) powder Take 17 g (1 capful) by mouth daily  Qty: 1 Bottle, Refills: 3    Associated Diagnoses: Constipation, unspecified constipation type      Psyllium (FIBER) 0.52 g CAPS Take 1 capsule by mouth daily  Qty: 90 capsule, Refills: 0    Associated Diagnoses: Constipation, unspecified constipation type          STOP taking these medications       acetaminophen (TYLENOL ARTHRITIS PAIN) 650 MG CR tablet Comments:   Reason for Stopping:             Allergies   Allergies   Allergen Reactions     Amoxicillin      hives     Bisphosphonates      Swallowing disorder     Boniva [Ibandronate Sodium] Nausea and Vomiting     Diarrhea, N/V with oral.  IV caused arm to swell      Fosamax [Alendronic Acid]      Severe gastrointestinal reaction     Lisinopril      cough     Niacin      rash     Simvastatin      myalgias     Data   Most Recent 3 CBC's:  Recent Labs   Lab Test 07/30/20  0611 07/29/20  1628 07/12/20  1945   WBC 9.1 9.2 6.8   HGB 9.8* 11.4* 11.5*   MCV 76* 76* 77*    199 201      Most Recent 3 BMP's:  Recent Labs   Lab Test 07/30/20  0611 07/29/20  1628 06/05/20  1203    136 139   POTASSIUM 4.8 5.2 4.9   CHLORIDE 105 108 106   CO2 25 22 26   BUN 24 23 28   CR 0.91 1.09* 1.07*   ANIONGAP 5 6 7   SHAWNEE 8.3* 8.8 9.3   * 148* 125*     Most Recent 2 LFT's:  Recent Labs   Lab Test 05/05/20  0946 04/30/20  1016   AST 19 20   ALT 22 28   ALKPHOS 105 136   BILITOTAL 0.4 0.4     Most Recent INR's and Anticoagulation Dosing History:  Anticoagulation Dose History     Recent Dosing and Labs Latest Ref Rng & Units 7/12/2020 7/14/2020 7/21/2020 7/29/2020 7/30/2020 7/31/2020 8/1/2020    ZZ IMS TEMPLATE - - - - - 4.5 mg - -    Warfarin 3 mg - - - - 3 mg - 3 mg -    INR 0.86 - 1.14 3.11(H) 2.50(H) 3.00(H) 2.31(H) 2.49(H) 2.81(H) 3.17(H)    INR 0.86 - 1.14 - - - - - - -        Most Recent 3 Troponin's:  Recent Labs   Lab Test 07/30/20  2251 07/29/20  1628 10/09/19  1424   TROPI <0.015 <0.015 <0.015     Most Recent Cholesterol Panel:  Recent Labs   Lab Test 06/06/19  0855   CHOL 214*   LDL 99   HDL 61   TRIG 270*     Most Recent 6 Bacteria Isolates From Any Culture (See EPIC Reports for Culture Details):  Recent Labs   Lab Test 09/13/18  1000 08/02/18  1259   CULT 10,000 to 50,000 colonies/mL  mixed urogenital rafi   <10,000  colonies/mL  mixed urogenital rafi       Most Recent TSH, T4 and A1c Labs:  Recent Labs   Lab Test 05/05/20  1253  06/06/19  0855  08/13/12  0901   TSH  --   --  1.26   < > 2.10   T4  --   --   --   --  1.78   A1C 6.9*   < > 6.7*   < > 6.2*    < > = values in this interval not displayed.

## 2020-08-01 NOTE — PLAN OF CARE
A/Ox4. VSS RA. Tele 100% paced. Up with 1 to BR and chair. Pt up to chair 2 times this evening. Pain management with PRN tylenol, norco, and flexeril. Blood glucose checks, no coverage needed. Carb count. Ride for ION Signature tomorrow at 1345. Continue to monitor.

## 2020-08-02 ENCOUNTER — TELEPHONE (OUTPATIENT)
Dept: GERIATRICS | Facility: CLINIC | Age: 85
End: 2020-08-02

## 2020-08-02 NOTE — TELEPHONE ENCOUNTER
INR today 3.1; received Coumadin 4.5mg last evening. Last INR 3.17 on 8/1 - no INR recheck ordered.    ORDERS  -Recheck INR on 8/4/2020  -Continue Coumadin as ordered    Dr. Geneva Genao, APRN, DNP, A/GNP-[facility]  [facility] [address]  [place] [address]     Cell: [phone]  Fax: [phone]  Email: [email or web address]

## 2020-08-02 NOTE — PLAN OF CARE
Physical Therapy Discharge Summary    Reason for therapy discharge:    Discharged to transitional care facility.    Progress towards therapy goal(s). See goals on Care Plan in [facility] electronic health record for goal details.  Goals not met.  Barriers to achieving goals:   discharge from facility.    Therapy recommendation(s):    Continued therapy is recommended.  Rationale/Recommendations:  To progress independence and safety with functional mobility.

## 2020-08-03 ENCOUNTER — TELEPHONE (OUTPATIENT)
Dept: INTERNAL MEDICINE | Facility: CLINIC | Age: 85
End: 2020-08-03

## 2020-08-03 ENCOUNTER — NURSING HOME VISIT (OUTPATIENT)
Dept: GERIATRICS | Facility: CLINIC | Age: 85
End: 2020-08-03
Payer: COMMERCIAL

## 2020-08-03 ENCOUNTER — TELEPHONE (OUTPATIENT)
Dept: DERMATOLOGY | Facility: CLINIC | Age: 85
End: 2020-08-03

## 2020-08-03 VITALS
WEIGHT: 159.1 LBS | TEMPERATURE: 97.1 F | HEIGHT: 66 IN | DIASTOLIC BLOOD PRESSURE: 60 MMHG | OXYGEN SATURATION: 95 % | BODY MASS INDEX: 25.57 KG/M2 | SYSTOLIC BLOOD PRESSURE: 138 MMHG | HEART RATE: 70 BPM | RESPIRATION RATE: 20 BRPM

## 2020-08-03 DIAGNOSIS — I10 ESSENTIAL HYPERTENSION: ICD-10-CM

## 2020-08-03 DIAGNOSIS — K59.03 DRUG-INDUCED CONSTIPATION: ICD-10-CM

## 2020-08-03 DIAGNOSIS — N18.30 CKD (CHRONIC KIDNEY DISEASE) STAGE 3, GFR 30-59 ML/MIN (H): ICD-10-CM

## 2020-08-03 DIAGNOSIS — Z71.89 OTHER SPECIFIED COUNSELING: ICD-10-CM

## 2020-08-03 DIAGNOSIS — I25.10 CORONARY ARTERY DISEASE INVOLVING NATIVE CORONARY ARTERY OF NATIVE HEART WITHOUT ANGINA PECTORIS: ICD-10-CM

## 2020-08-03 DIAGNOSIS — S42.202D CLOSED FRACTURE OF PROXIMAL END OF LEFT HUMERUS WITH ROUTINE HEALING, UNSPECIFIED FRACTURE MORPHOLOGY, SUBSEQUENT ENCOUNTER: Primary | ICD-10-CM

## 2020-08-03 DIAGNOSIS — R53.81 PHYSICAL DECONDITIONING: ICD-10-CM

## 2020-08-03 DIAGNOSIS — I48.0 PAROXYSMAL ATRIAL FIBRILLATION (H): ICD-10-CM

## 2020-08-03 DIAGNOSIS — I35.0 AORTIC STENOSIS, SEVERE: ICD-10-CM

## 2020-08-03 LAB — INTERPRETATION ECG - MUSE: NORMAL

## 2020-08-03 PROCEDURE — 99309 SBSQ NF CARE MODERATE MDM 30: CPT | Performed by: NURSE PRACTITIONER

## 2020-08-03 PROCEDURE — 99207 ZZC CDG-CODE INCORRECT PER BILLING BASED ON TIME: CPT | Performed by: NURSE PRACTITIONER

## 2020-08-03 ASSESSMENT — MIFFLIN-ST. JEOR: SCORE: 1153.42

## 2020-08-03 NOTE — PROGRESS NOTES
[facility]  PRIMARY CARE PROVIDER AND CLINIC:  De Obregon MD, [address] [place] [address] / [place] [id number]  Chief Complaint   Patient presents with     Hospital F/U     [facility] Record Number:  [id number]  Place of Service where encounter took place:  [facility] [[id number]]    Madie Silva  is a 89 year old  (7/21/1929), admitted to the above facility from  [facility]. Hospital stay 7/29/20 through 8/1/20..  Admitted to this facility for  rehab, medical management and nursing care.    HPI:    HPI information obtained from: facility chart records, facility staff, patient report and [facility] chart review.   Brief Summary of Hospital Course:   PMHx of severe aortic stenosis, CAD s/p 4v CABG, hypertension, dyslipidemia, symptomatic bradycardia s/p PPM placement, and PAF on chronic anticoagulation with coumadin, s/p TAVR who presents with shoulder pain after a fall  Left proximal humerus Fx: ortho consult, conservative management sling for comfort f/u in 3 weeks  No other complications during hospital  Updates on Status Since Skilled nursing Admission: On exam patient resting in bed, states she just finished with therapy so her shoulder is very sore, rates left shoulder pain as 8/10, denies fever, chills, cough, congestion, SOB, N/V/D or consipation.     CODE STATUS/ADVANCE DIRECTIVES DISCUSSION:   CPR/Full code   Patient's living condition: lives alone  ALLERGIES: Amoxicillin; Bisphosphonates; Boniva [ibandronate sodium]; Fosamax [alendronic acid]; Lisinopril; Niacin; and Simvastatin  PAST MEDICAL HISTORY:  has a past medical history of Walters's esophagus (7/09), Bradycardia, CHRONIC VERTIGO (9/99), Colonic Adenoma (3/90, 11/98), Coronary artery disease, Costochondritis, Depression, DIABETES MELLITUS TYPE II (10/07), DJD, DVT of Leg, postop (11/09), Gastritis (10/89), GERD, HIATAL HERNIA, HYPERLIPIDEMIA, HYPOTHYROIDISM (aka HASHIMOTO), Impaired fasting  glucose, L Ankle Fracture (10/09), Obesity, unspecified, Osteoporosis, unspecified, PERIPHERAL NEUROPATHY, Polymyalgia rheumatica (H), Post Herpetic Neuralgia (4/03), Restless leg syndrome, Small vessel cerebrovascular changes (9/99), Stricture and stenosis of esophagus (10/89), Syncope, and VITAMIN D DEFICIENCY (12/07).  PAST SURGICAL HISTORY:   has a past surgical history that includes NONSPECIFIC PROCEDURE (age 14); NONSPECIFIC PROCEDURE (as a child); hysterectomy, cervix status unknown (1978); surgical history of -  (10/09); Bypass graft artery coronary (11/2/2011); Embolectomy lower extremity (11/9/2011); Left Heart Cath (N/A, 1/27/2020); Aortogram Abdominal (N/A, 1/27/2020); Aortogram Thoracic (N/A, 1/27/2020); Angiogram Coronary Graft (N/A, 1/27/2020); and Transcatheter Aortic Valve Replacement (N/A, 5/5/2020).  FAMILY HISTORY: family history includes Alzheimer Disease in her sister; Heart Disease in her brother, father, mother, and son.  SOCIAL HISTORY:   reports that she has never smoked. She has never used smokeless tobacco. She reports that she does not drink alcohol or use drugs.    Post Discharge Medication Reconciliation Status: discharge medications reconciled, continue medications without change    Current Outpatient Medications   Medication Sig Dispense Refill     acetaminophen (TYLENOL) 325 MG tablet Take 2 tablets (650 mg) by mouth every 4 hours as needed for mild pain  0     aspirin EC 81 MG EC tablet Take 1 tablet by mouth daily.       atorvastatin (LIPITOR) 80 MG tablet TAKE ONE TABLET BY MOUTH ONCE DAILY 90 tablet 3     calcium 600 MG tablet Take 1 tablet by mouth 2 times daily.       Cholecalciferol (VITAMIN D-400 PO) Take 1 tablet by mouth daily        co-enzyme Q-10 (COENZYME Q-10) 100 MG CAPS Take 1 capsule by mouth daily.       cyclobenzaprine (FLEXERIL) 5 MG tablet Take 1 tablet (5 mg) by mouth 3 times daily as needed for muscle spasms 10 tablet 0     gabapentin (NEURONTIN) 100 MG  "capsule Take 1 capsule (100 mg) by mouth 3 times daily 15 capsule 0     HYDROcodone-acetaminophen (NORCO) 5-325 MG tablet Take 1 tablet by mouth every 4 hours as needed for pain 15 tablet 0     insulin aspart (NOVOLOG PEN) 100 UNIT/ML pen Three times a day with meals, For glucometer 150-200 give 2 units, 201-250 4 units, >250 6 units.  0     levothyroxine (SYNTHROID/LEVOTHROID) 50 MCG tablet Take 1 tablet (50 mcg) by mouth daily 90 tablet 3     losartan (COZAAR) 50 MG tablet Take 0.5 tablets (25 mg) by mouth daily 90 tablet 3     metFORMIN (GLUCOPHAGE) 500 MG tablet TAKE ONE TABLET BY MOUTH DAILY WITH BREAKFAST 90 tablet 3     metoprolol succinate ER (TOPROL-XL) 25 MG 24 hr tablet Take 0.5 tablets (12.5 mg) by mouth daily 30 tablet 11     Multiple Vitamins-Minerals (CENTRUM SILVER) per tablet Take 1 tablet by mouth daily.       Multiple Vitamins-Minerals (PRESERVISION AREDS 2 PO) Take 1 tablet by mouth 2 times daily        nitroGLYCERIN (NITROSTAT) 0.4 MG SL tablet Place 0.4 mg under the tongue every 5 minutes as needed. Up to 3 doses per episode        omeprazole (PRILOSEC) 20 MG DR capsule Take 1 capsule (20 mg) by mouth daily 90 capsule 3     polyethylene glycol (MIRALAX) powder Take 17 g (1 capful) by mouth daily 1 Bottle 3     Psyllium (FIBER) 0.52 g CAPS Take 1 capsule by mouth daily 90 capsule 0     sennosides (SENOKOT) 8.6 MG tablet Take 1-2 tablets by mouth 2 times daily  0     warfarin ANTICOAGULANT (COUMADIN) 3 MG tablet Take 1-1.5 tablets (3-4.5 mg) by mouth daily Takes  4.5mg on Tuesday, Thursday, Saturday  3mg all other days (Mon,Wed,Fri,Sun)  0         ROS:  10 point ROS of systems including Constitutional, Eyes, Respiratory, Cardiovascular, Gastroenterology, Genitourinary, Integumentary, Musculoskeletal, Psychiatric were all negative except for pertinent positives noted in my HPI.    Vitals:  /60   Pulse 70   Temp 97.1  F (36.2  C)   Resp 20   Ht 1.676 m (5' 6\")   Wt 72.2 kg (159 lb 1.6 " oz)   SpO2 95%   BMI 25.68 kg/m    Exam:  GENERAL APPEARANCE:  Alert, in no distress  ENT:  Mouth and posterior oropharynx normal, moist mucous membranes, [place]  EYES:  EOM, conjunctivae, lids, pupils and irises normal, PERRL  RESP:  respiratory effort and palpation of chest normal, lungs clear to auscultation , no respiratory distress  CV:  Palpation and auscultation of heart done , regular rate and rhythm, no murmur, rub, or gallop, no edema  ABDOMEN:  normal bowel sounds, soft, nontender, no hepatosplenomegaly or other masses  M/S:   Examination of:   right upper extremity, right lower extremity and left lower extremity  Inspection, ROM, stability and muscle strength normal and LUE in sling  SKIN:  Inspection of skin and subcutaneous tissue baseline  NEURO:   Cranial nerves 2-12 are normal tested and grossly at patient's baseline, speech WNL    Lab/Diagnostic data:    Most Recent 3 CBC's:  Recent Labs   Lab Test 07/30/20  0611 07/29/20  1628 07/12/20  1945   WBC 9.1 9.2 6.8   HGB 9.8* 11.4* 11.5*   MCV 76* 76* 77*    199 201     Most Recent 3 BMP's:  Recent Labs   Lab Test 07/30/20  0611 07/29/20  1628 06/05/20  1203    136 139   POTASSIUM 4.8 5.2 4.9   CHLORIDE 105 108 106   CO2 25 22 26   BUN 24 23 28   CR 0.91 1.09* 1.07*   ANIONGAP 5 6 7   SHAWNEE 8.3* 8.8 9.3   * 148* 125*       ASSESSMENT/PLAN:  Closed fracture of proximal end of left humerus with routine healing, unspecified fracture morphology, subsequent encounter  Physical deconditioning  Acute/ongoing: sling to left arm  PT and OT   Tylenol 650mg TID scheduled, norco 5/325mg q 4 hours prn, flexeril 5mg TID prn  F/u with ortho in 3 weeks    Coronary artery disease involving native coronary artery of native heart without angina pectoris  Severe AS -- S/P TAVR on 5/5/20  PAF -- on Warfarin   CKD (chronic kidney disease) stage 3, GFR 30-59 ml/min (H)  Essential hypertension  Ongoing: vitals daily and prn, BMP follow, continue losartan 25mg  QD, toprol xl 12.5mg QD,  ASA 81mg QD and coumadin as directed INR goal of 2-3    Drug-induced constipation  Acute/ongoing: miralax 17gm QD, senna s 1 PO BID scheduled and 1 PO BIC prn, DC metamucil       Orders written by provider at facility  BMP and Hgb on Thursday  Tylenol 650mg TID scheduled  Senna s 1 PO BID scheduled and BID prn  DC metamucil    Total time spent with patient visit at the skilled nursing facility was 35 min including patient visit and review of past records. Greater than 50% of total time spent with counseling and coordinating care due to .     Electronically signed by:  Tonya Lynn Haase, APRN CNP

## 2020-08-03 NOTE — TELEPHONE ENCOUNTER
Called and LM for patient to call back in regards to biopsy results x1.    REJI Goodwin-BSN-PHN  [facility]  [phone]

## 2020-08-03 NOTE — TELEPHONE ENCOUNTER
----- Message from Cristy Lieberman PA-C sent at 8/2/2020  2:50 PM CDT -----  Right nasal bridge BCC please schedule for mohs

## 2020-08-03 NOTE — LETTER
[facility]  [address]  [id number] [phone]    8/7/2020       Madie Silva  [address] [place] [id number]      Dear Madie:    You are scheduled for Mohs Surgery on: 10/8/20 @10:00am.    Please check in at [facility], [address].     You don't need to arrive more than 5-10 minutes prior to your appointment time.     Be sure to eat a good breakfast and bathe and wash your hair prior to surgery.     If you are taking any anti-coagulants that are prescribed by your Doctor (such as Coumadin/Warfarin, Plavix, Aspirin, Ibuprofen), please continue taking them.     However, if you are taking anti-coagulants over the counter without a Doctor's order for a medical condition, please discontinue them 10 days prior to surgery.     Please wear loose comfortable clothing as it could possibly be 4-6 hours until your surgery is completed depending upon how many layers of tissue need to be removed.      Thank you,    BRAIN Cowan MD

## 2020-08-03 NOTE — LETTER
8/3/2020        RE: Madie Silva  [address] [place] [id number]        [facility]  PRIMARY CARE PROVIDER AND CLINIC:  De Obregon MD, [address] [facility] [id number]  Chief Complaint   Patient presents with     Hospital F/U     [facility] Record Number:  [id number]  Place of Service where encounter took place:  [facility] ([facility] [[id number]]    Madie Silva  is a 89 year old  (7/21/1929), admitted to the above facility from  [facility]. Hospital stay 7/29/20 through 8/1/20..  Admitted to this facility for  rehab, medical management and nursing care.    HPI:    HPI information obtained from: facility chart records, facility staff, patient report and [facility] chart review.   Brief Summary of Hospital Course:   PMHx of severe aortic stenosis, CAD s/p 4v CABG, hypertension, dyslipidemia, symptomatic bradycardia s/p PPM placement, and PAF on chronic anticoagulation with coumadin, s/p TAVR who presents with shoulder pain after a fall  Left proximal humerus Fx: ortho consult, conservative management sling for comfort f/u in 3 weeks  No other complications during hospital  Updates on Status Since Skilled nursing Admission: On exam patient resting in bed, states she just finished with therapy so her shoulder is very sore, rates left shoulder pain as 8/10, denies fever, chills, cough, congestion, SOB, N/V/D or consipation.     CODE STATUS/ADVANCE DIRECTIVES DISCUSSION:   CPR/Full code   Patient's living condition: lives alone  ALLERGIES: Amoxicillin; Bisphosphonates; Boniva [ibandronate sodium]; Fosamax [alendronic acid]; Lisinopril; Niacin; and Simvastatin  PAST MEDICAL HISTORY:  has a past medical history of Walters's esophagus (7/09), Bradycardia, CHRONIC VERTIGO (9/99), Colonic Adenoma (3/90, 11/98), Coronary artery disease, Costochondritis, Depression, DIABETES MELLITUS TYPE II (10/07), DJD, DVT of Leg, postop (11/09), Gastritis  (10/89), GERD, HIATAL HERNIA, HYPERLIPIDEMIA, HYPOTHYROIDISM (aka HASHIMOTO), Impaired fasting glucose, L Ankle Fracture (10/09), Obesity, unspecified, Osteoporosis, unspecified, PERIPHERAL NEUROPATHY, Polymyalgia rheumatica (H), Post Herpetic Neuralgia (4/03), Restless leg syndrome, Small vessel cerebrovascular changes (9/99), Stricture and stenosis of esophagus (10/89), Syncope, and VITAMIN D DEFICIENCY (12/07).  PAST SURGICAL HISTORY:   has a past surgical history that includes NONSPECIFIC PROCEDURE (age 14); NONSPECIFIC PROCEDURE (as a child); hysterectomy, cervix status unknown (1978); surgical history of -  (10/09); Bypass graft artery coronary (11/2/2011); Embolectomy lower extremity (11/9/2011); Left Heart Cath (N/A, 1/27/2020); Aortogram Abdominal (N/A, 1/27/2020); Aortogram Thoracic (N/A, 1/27/2020); Angiogram Coronary Graft (N/A, 1/27/2020); and Transcatheter Aortic Valve Replacement (N/A, 5/5/2020).  FAMILY HISTORY: family history includes Alzheimer Disease in her sister; Heart Disease in her brother, father, mother, and son.  SOCIAL HISTORY:   reports that she has never smoked. She has never used smokeless tobacco. She reports that she does not drink alcohol or use drugs.    Post Discharge Medication Reconciliation Status: discharge medications reconciled, continue medications without change    Current Outpatient Medications   Medication Sig Dispense Refill     acetaminophen (TYLENOL) 325 MG tablet Take 2 tablets (650 mg) by mouth every 4 hours as needed for mild pain  0     aspirin EC 81 MG EC tablet Take 1 tablet by mouth daily.       atorvastatin (LIPITOR) 80 MG tablet TAKE ONE TABLET BY MOUTH ONCE DAILY 90 tablet 3     calcium 600 MG tablet Take 1 tablet by mouth 2 times daily.       Cholecalciferol (VITAMIN D-400 PO) Take 1 tablet by mouth daily        co-enzyme Q-10 (COENZYME Q-10) 100 MG CAPS Take 1 capsule by mouth daily.       cyclobenzaprine (FLEXERIL) 5 MG tablet Take 1 tablet (5 mg) by mouth  3 times daily as needed for muscle spasms 10 tablet 0     gabapentin (NEURONTIN) 100 MG capsule Take 1 capsule (100 mg) by mouth 3 times daily 15 capsule 0     HYDROcodone-acetaminophen (NORCO) 5-325 MG tablet Take 1 tablet by mouth every 4 hours as needed for pain 15 tablet 0     insulin aspart (NOVOLOG PEN) 100 UNIT/ML pen Three times a day with meals, For glucometer 150-200 give 2 units, 201-250 4 units, >250 6 units.  0     levothyroxine (SYNTHROID/LEVOTHROID) 50 MCG tablet Take 1 tablet (50 mcg) by mouth daily 90 tablet 3     losartan (COZAAR) 50 MG tablet Take 0.5 tablets (25 mg) by mouth daily 90 tablet 3     metFORMIN (GLUCOPHAGE) 500 MG tablet TAKE ONE TABLET BY MOUTH DAILY WITH BREAKFAST 90 tablet 3     metoprolol succinate ER (TOPROL-XL) 25 MG 24 hr tablet Take 0.5 tablets (12.5 mg) by mouth daily 30 tablet 11     Multiple Vitamins-Minerals (CENTRUM SILVER) per tablet Take 1 tablet by mouth daily.       Multiple Vitamins-Minerals (PRESERVISION AREDS 2 PO) Take 1 tablet by mouth 2 times daily        nitroGLYCERIN (NITROSTAT) 0.4 MG SL tablet Place 0.4 mg under the tongue every 5 minutes as needed. Up to 3 doses per episode        omeprazole (PRILOSEC) 20 MG DR capsule Take 1 capsule (20 mg) by mouth daily 90 capsule 3     polyethylene glycol (MIRALAX) powder Take 17 g (1 capful) by mouth daily 1 Bottle 3     Psyllium (FIBER) 0.52 g CAPS Take 1 capsule by mouth daily 90 capsule 0     sennosides (SENOKOT) 8.6 MG tablet Take 1-2 tablets by mouth 2 times daily  0     warfarin ANTICOAGULANT (COUMADIN) 3 MG tablet Take 1-1.5 tablets (3-4.5 mg) by mouth daily Takes  4.5mg on Tuesday, Thursday, Saturday  3mg all other days (Mon,Wed,Fri,Sun)  0         ROS:  10 point ROS of systems including Constitutional, Eyes, Respiratory, Cardiovascular, Gastroenterology, Genitourinary, Integumentary, Musculoskeletal, Psychiatric were all negative except for pertinent positives noted in my HPI.    Vitals:  /60   Pulse 70  "  Temp 97.1  F (36.2  C)   Resp 20   Ht 1.676 m (5' 6\")   Wt 72.2 kg (159 lb 1.6 oz)   SpO2 95%   BMI 25.68 kg/m    Exam:  GENERAL APPEARANCE:  Alert, in no distress  ENT:  Mouth and posterior oropharynx normal, moist mucous membranes, [place]  EYES:  EOM, conjunctivae, lids, pupils and irises normal, PERRL  RESP:  respiratory effort and palpation of chest normal, lungs clear to auscultation , no respiratory distress  CV:  Palpation and auscultation of heart done , regular rate and rhythm, no murmur, rub, or gallop, no edema  ABDOMEN:  normal bowel sounds, soft, nontender, no hepatosplenomegaly or other masses  M/S:   Examination of:   right upper extremity, right lower extremity and left lower extremity  Inspection, ROM, stability and muscle strength normal and LUE in sling  SKIN:  Inspection of skin and subcutaneous tissue baseline  NEURO:   Cranial nerves 2-12 are normal tested and grossly at patient's baseline, speech WNL    Lab/Diagnostic data:    Most Recent 3 CBC's:  Recent Labs   Lab Test 07/30/20  0611 07/29/20  1628 07/12/20  1945   WBC 9.1 9.2 6.8   HGB 9.8* 11.4* 11.5*   MCV 76* 76* 77*    199 201     Most Recent 3 BMP's:  Recent Labs   Lab Test 07/30/20  0611 07/29/20  1628 06/05/20  1203    136 139   POTASSIUM 4.8 5.2 4.9   CHLORIDE 105 108 106   CO2 25 22 26   BUN 24 23 28   CR 0.91 1.09* 1.07*   ANIONGAP 5 6 7   SHAWNEE 8.3* 8.8 9.3   * 148* 125*       ASSESSMENT/PLAN:  Closed fracture of proximal end of left humerus with routine healing, unspecified fracture morphology, subsequent encounter  Physical deconditioning  Acute/ongoing: sling to left arm  PT and OT   Tylenol 650mg TID scheduled, norco 5/325mg q 4 hours prn, flexeril 5mg TID prn  F/u with ortho in 3 weeks    Coronary artery disease involving native coronary artery of native heart without angina pectoris  Severe AS -- S/P TAVR on 5/5/20  PAF -- on Warfarin   CKD (chronic kidney disease) stage 3, GFR 30-59 ml/min " (H)  Essential hypertension  Ongoing: vitals daily and prn, BMP follow, continue losartan 25mg QD, toprol xl 12.5mg QD,  ASA 81mg QD and coumadin as directed INR goal of 2-3    Drug-induced constipation  Acute/ongoing: miralax 17gm QD, senna s 1 PO BID scheduled and 1 PO BIC prn, DC metamucil       Orders written by provider at facility  BMP and Hgb on Thursday  Tylenol 650mg TID scheduled  Senna s 1 PO BID scheduled and BID prn  DC metamucil    Total time spent with patient visit at the skilled nursing facility was 35 min including patient visit and review of past records. Greater than 50% of total time spent with counseling and coordinating care due to .     Electronically signed by:  Tonya Lynn Haase, APRN CNP                         Sincerely,        Tonya Lynn Haase, APRN CNP

## 2020-08-04 ENCOUNTER — PATIENT OUTREACH (OUTPATIENT)
Dept: CARE COORDINATION | Facility: CLINIC | Age: 85
End: 2020-08-04

## 2020-08-04 ASSESSMENT — ACTIVITIES OF DAILY LIVING (ADL): DEPENDENT_IADLS:: INDEPENDENT

## 2020-08-04 NOTE — TELEPHONE ENCOUNTER
Called and LM for patient to call back in regards to biopsy results x1.    REJI Goodwin-BSN-PHN  [facility]  [phone]

## 2020-08-04 NOTE — PROGRESS NOTES
Clinic Care Coordination Contact  Care Coordination Transition Communication    Referral Source: IP Report    Clinical Data: Patient was hospitalized at [facility] from 7/29/20 to 8/1/20 with diagnosis of mechanical fall with sustained left humerus fracture.     Transition to Facility:              Facility Name: [facility]              Contact name and phone number/fax: [phone]    Fax sent to facility containing CC RN contact information and request for notification when patient discharging.    Plan: RN/SW Care Coordinator will await notification from facility staff informing RN/SW Care Coordinator of patient's discharge plans/needs. RN/SW Care Coordinator will review chart and outreach to facility staff every 4 weeks and as needed.     Jeet Parish RN  Clinic Care Coordinator  [facility]  Ph: [phone]

## 2020-08-04 NOTE — LETTER
[facility]   To:   [facility]          Please give to facility    From:   Jeet Parish RN Care Coordinator [facility]     Patient Name:  Madie Silva YOB: 1929   Admit date: 8/1/20      *Information Needed:  Please contact me when the patient will discharge (or if they will move to long term care)- include the discharge date, disposition, and main diagnosis   - If the patient is discharged with home care services, please provide the name of the agency    Also- Please inform me if a care conference is being held.   Phone, Fax or Email with information       Thank You,   Jeet Parish, RN  Clinic Care Coordinator  [facility]  Ph: [phone]    [email or web address]

## 2020-08-05 ENCOUNTER — ANCILLARY PROCEDURE (OUTPATIENT)
Dept: CARDIOLOGY | Facility: CLINIC | Age: 85
End: 2020-08-05
Attending: INTERNAL MEDICINE
Payer: COMMERCIAL

## 2020-08-05 DIAGNOSIS — Z95.0 CARDIAC PACEMAKER IN SITU: ICD-10-CM

## 2020-08-05 PROCEDURE — 93294 REM INTERROG EVL PM/LDLS PM: CPT | Performed by: INTERNAL MEDICINE

## 2020-08-05 PROCEDURE — 93296 REM INTERROG EVL PM/IDS: CPT | Performed by: INTERNAL MEDICINE

## 2020-08-05 NOTE — TELEPHONE ENCOUNTER
Called and LM for patient to call back in regards to biopsy results x1.    REJI Goodwin-BSN-PHN  [facility]  [phone]

## 2020-08-06 ENCOUNTER — NURSING HOME VISIT (OUTPATIENT)
Dept: GERIATRICS | Facility: CLINIC | Age: 85
End: 2020-08-06
Payer: COMMERCIAL

## 2020-08-06 DIAGNOSIS — S42.202D CLOSED FRACTURE OF PROXIMAL END OF LEFT HUMERUS WITH ROUTINE HEALING, UNSPECIFIED FRACTURE MORPHOLOGY, SUBSEQUENT ENCOUNTER: Primary | ICD-10-CM

## 2020-08-06 DIAGNOSIS — I10 ESSENTIAL HYPERTENSION: ICD-10-CM

## 2020-08-06 DIAGNOSIS — D62 ANEMIA DUE TO BLOOD LOSS, ACUTE: ICD-10-CM

## 2020-08-06 DIAGNOSIS — I25.10 CORONARY ARTERY DISEASE INVOLVING NATIVE CORONARY ARTERY OF NATIVE HEART WITHOUT ANGINA PECTORIS: ICD-10-CM

## 2020-08-06 DIAGNOSIS — K59.03 DRUG-INDUCED CONSTIPATION: ICD-10-CM

## 2020-08-06 LAB
MDC_IDC_LEAD_IMPLANT_DT: NORMAL
MDC_IDC_LEAD_IMPLANT_DT: NORMAL
MDC_IDC_LEAD_LOCATION: NORMAL
MDC_IDC_LEAD_LOCATION: NORMAL
MDC_IDC_LEAD_MFG: NORMAL
MDC_IDC_LEAD_MFG: NORMAL
MDC_IDC_LEAD_MODEL: NORMAL
MDC_IDC_LEAD_MODEL: NORMAL
MDC_IDC_LEAD_POLARITY_TYPE: NORMAL
MDC_IDC_LEAD_POLARITY_TYPE: NORMAL
MDC_IDC_LEAD_SERIAL: NORMAL
MDC_IDC_LEAD_SERIAL: NORMAL
MDC_IDC_MSMT_BATTERY_DTM: NORMAL
MDC_IDC_MSMT_BATTERY_IMPEDANCE: 1857 OHM
MDC_IDC_MSMT_BATTERY_REMAINING_LONGEVITY: 36 MO
MDC_IDC_MSMT_BATTERY_STATUS: NORMAL
MDC_IDC_MSMT_BATTERY_VOLTAGE: 2.76 V
MDC_IDC_MSMT_LEADCHNL_RA_IMPEDANCE_VALUE: 430 OHM
MDC_IDC_MSMT_LEADCHNL_RA_PACING_THRESHOLD_AMPLITUDE: 0.25 V
MDC_IDC_MSMT_LEADCHNL_RA_PACING_THRESHOLD_PULSEWIDTH: 0.4 MS
MDC_IDC_MSMT_LEADCHNL_RV_IMPEDANCE_VALUE: 480 OHM
MDC_IDC_MSMT_LEADCHNL_RV_PACING_THRESHOLD_AMPLITUDE: 0.5 V
MDC_IDC_MSMT_LEADCHNL_RV_PACING_THRESHOLD_PULSEWIDTH: 0.4 MS
MDC_IDC_PG_IMPLANT_DTM: NORMAL
MDC_IDC_PG_MFG: NORMAL
MDC_IDC_PG_MODEL: NORMAL
MDC_IDC_PG_SERIAL: NORMAL
MDC_IDC_PG_TYPE: NORMAL
MDC_IDC_SESS_CLINIC_NAME: NORMAL
MDC_IDC_SESS_DTM: NORMAL
MDC_IDC_SESS_TYPE: NORMAL
MDC_IDC_SET_BRADY_AT_MODE_SWITCH_MODE: NORMAL
MDC_IDC_SET_BRADY_AT_MODE_SWITCH_RATE: 175 {BEATS}/MIN
MDC_IDC_SET_BRADY_LOWRATE: 70 {BEATS}/MIN
MDC_IDC_SET_BRADY_MAX_SENSOR_RATE: 130 {BEATS}/MIN
MDC_IDC_SET_BRADY_MAX_TRACKING_RATE: 130 {BEATS}/MIN
MDC_IDC_SET_BRADY_MODE: NORMAL
MDC_IDC_SET_BRADY_PAV_DELAY_LOW: 150 MS
MDC_IDC_SET_BRADY_SAV_DELAY_LOW: 120 MS
MDC_IDC_SET_LEADCHNL_RA_PACING_AMPLITUDE: 1.5 V
MDC_IDC_SET_LEADCHNL_RA_PACING_CAPTURE_MODE: NORMAL
MDC_IDC_SET_LEADCHNL_RA_PACING_POLARITY: NORMAL
MDC_IDC_SET_LEADCHNL_RA_PACING_PULSEWIDTH: 0.4 MS
MDC_IDC_SET_LEADCHNL_RA_SENSING_POLARITY: NORMAL
MDC_IDC_SET_LEADCHNL_RA_SENSING_SENSITIVITY: 0.25 MV
MDC_IDC_SET_LEADCHNL_RV_PACING_AMPLITUDE: 2 V
MDC_IDC_SET_LEADCHNL_RV_PACING_CAPTURE_MODE: NORMAL
MDC_IDC_SET_LEADCHNL_RV_PACING_POLARITY: NORMAL
MDC_IDC_SET_LEADCHNL_RV_PACING_PULSEWIDTH: 0.4 MS
MDC_IDC_SET_LEADCHNL_RV_SENSING_POLARITY: NORMAL
MDC_IDC_SET_LEADCHNL_RV_SENSING_SENSITIVITY: 2.8 MV
MDC_IDC_SET_ZONE_DETECTION_INTERVAL: 333.33 MS
MDC_IDC_SET_ZONE_DETECTION_INTERVAL: 342.86 MS
MDC_IDC_SET_ZONE_TYPE: NORMAL
MDC_IDC_SET_ZONE_TYPE: NORMAL
MDC_IDC_STAT_AT_BURDEN_PERCENT: 0 %
MDC_IDC_STAT_AT_DTM_END: NORMAL
MDC_IDC_STAT_AT_DTM_START: NORMAL
MDC_IDC_STAT_AT_MODE_SW_COUNT: 73
MDC_IDC_STAT_BRADY_AP_VP_PERCENT: 5 %
MDC_IDC_STAT_BRADY_AP_VS_PERCENT: 88 %
MDC_IDC_STAT_BRADY_AS_VP_PERCENT: 1 %
MDC_IDC_STAT_BRADY_AS_VS_PERCENT: 6 %
MDC_IDC_STAT_BRADY_DTM_END: NORMAL
MDC_IDC_STAT_BRADY_DTM_START: NORMAL
MDC_IDC_STAT_EPISODE_RECENT_COUNT: 15
MDC_IDC_STAT_EPISODE_RECENT_COUNT: 8
MDC_IDC_STAT_EPISODE_RECENT_COUNT_DTM_END: NORMAL
MDC_IDC_STAT_EPISODE_RECENT_COUNT_DTM_END: NORMAL
MDC_IDC_STAT_EPISODE_RECENT_COUNT_DTM_START: NORMAL
MDC_IDC_STAT_EPISODE_RECENT_COUNT_DTM_START: NORMAL
MDC_IDC_STAT_EPISODE_TYPE: NORMAL
MDC_IDC_STAT_EPISODE_TYPE: NORMAL

## 2020-08-06 PROCEDURE — 99305 1ST NF CARE MODERATE MDM 35: CPT | Performed by: INTERNAL MEDICINE

## 2020-08-06 NOTE — TELEPHONE ENCOUNTER
Called and LM for patient to call back in regards to biopsy results x1.    REJI Goodwin-BSN-PHN  [facility]  [phone]

## 2020-08-07 NOTE — PROGRESS NOTES
[facility]  PRIMARY CARE PROVIDER AND CLINIC:  De Obregon MD, [address] [place] [address] / [facility] [id number]    Patient was seen by Dr. Leal at the [facility] on August 6, 2020, for an initial TCU visit.    Patient is a 89 year old  (7/21/1929), admitted to the above facility from  [facility]. Hospital stay 7/29/20 through 8/1/20..  Admitted to this facility for  rehab, medical management and nursing care.      Hospital course was reviewed by me, is as per the hospital discharge summary and nurse practitioner note.    Patient was hospitalized for the management of a proximal humerus fracture following a fall.  She was treated conservatively with a sling.    Hospital course was uncomplicated.    History of coronary artery disease status post bypass surgery, aortic stenosis, status post TAVR, history of paroxysmal atrial fibrillation and bradycardia, status post pacemaker placement, on chronic anticoagulation with Coumadin.    Hgb was 9.8 prior to discharge.    Patient notes moderate shoulder pain.  Pain medications have been helpful.  She denies cough, chest pain, shortness of breath, dizziness, nausea, vomiting.  She has been constipated , but has had bowel movements since hospitalization.        CODE STATUS/ADVANCE DIRECTIVES DISCUSSION:   CPR/Full code   Patient's living condition: lives alone  ALLERGIES: Amoxicillin; Bisphosphonates; Boniva [ibandronate sodium]; Fosamax [alendronic acid]; Lisinopril; Niacin; and Simvastatin  PAST MEDICAL HISTORY:  has a past medical history of Walters's esophagus (7/09), Bradycardia, CHRONIC VERTIGO (9/99), Colonic Adenoma (3/90, 11/98), Coronary artery disease, Costochondritis, Depression, DIABETES MELLITUS TYPE II (10/07), DJD, DVT of Leg, postop (11/09), Gastritis (10/89), GERD, HIATAL HERNIA, HYPERLIPIDEMIA, HYPOTHYROIDISM (aka HASHIMOTO), Impaired fasting glucose, L Ankle Fracture (10/09), Obesity, unspecified, Osteoporosis,  unspecified, PERIPHERAL NEUROPATHY, Polymyalgia rheumatica (H), Post Herpetic Neuralgia (4/03), Restless leg syndrome, Small vessel cerebrovascular changes (9/99), Stricture and stenosis of esophagus (10/89), Syncope, and VITAMIN D DEFICIENCY (12/07).  PAST SURGICAL HISTORY:   has a past surgical history that includes NONSPECIFIC PROCEDURE (age 14); NONSPECIFIC PROCEDURE (as a child); hysterectomy, cervix status unknown (1978); surgical history of -  (10/09); Bypass graft artery coronary (11/2/2011); Embolectomy lower extremity (11/9/2011); Left Heart Cath (N/A, 1/27/2020); Aortogram Abdominal (N/A, 1/27/2020); Aortogram Thoracic (N/A, 1/27/2020); Angiogram Coronary Graft (N/A, 1/27/2020); and Transcatheter Aortic Valve Replacement (N/A, 5/5/2020).  FAMILY HISTORY: family history includes Alzheimer Disease in her sister; Heart Disease in her brother, father, mother, and son.  SOCIAL HISTORY:   reports that she has never smoked. She has never used smokeless tobacco. She reports that she does not drink alcohol or use drugs.        Current Outpatient Medications   Medication Sig Dispense Refill     acetaminophen (TYLENOL) 325 MG tablet Take 2 tablets (650 mg) by mouth every 4 hours as needed for mild pain  0     aspirin EC 81 MG EC tablet Take 1 tablet by mouth daily.       atorvastatin (LIPITOR) 80 MG tablet TAKE ONE TABLET BY MOUTH ONCE DAILY 90 tablet 3     calcium 600 MG tablet Take 1 tablet by mouth 2 times daily.       Cholecalciferol (VITAMIN D-400 PO) Take 1 tablet by mouth daily        co-enzyme Q-10 (COENZYME Q-10) 100 MG CAPS Take 1 capsule by mouth daily.       cyclobenzaprine (FLEXERIL) 5 MG tablet Take 1 tablet (5 mg) by mouth 3 times daily as needed for muscle spasms 10 tablet 0     gabapentin (NEURONTIN) 100 MG capsule Take 1 capsule (100 mg) by mouth 3 times daily 15 capsule 0     HYDROcodone-acetaminophen (NORCO) 5-325 MG tablet Take 1 tablet by mouth every 4 hours as needed for pain 15 tablet 0      insulin aspart (NOVOLOG PEN) 100 UNIT/ML pen Three times a day with meals, For glucometer 150-200 give 2 units, 201-250 4 units, >250 6 units.  0     levothyroxine (SYNTHROID/LEVOTHROID) 50 MCG tablet Take 1 tablet (50 mcg) by mouth daily 90 tablet 3     losartan (COZAAR) 50 MG tablet Take 0.5 tablets (25 mg) by mouth daily 90 tablet 3     metFORMIN (GLUCOPHAGE) 500 MG tablet TAKE ONE TABLET BY MOUTH DAILY WITH BREAKFAST 90 tablet 3     metoprolol succinate ER (TOPROL-XL) 25 MG 24 hr tablet Take 0.5 tablets (12.5 mg) by mouth daily 30 tablet 11     Multiple Vitamins-Minerals (CENTRUM SILVER) per tablet Take 1 tablet by mouth daily.       Multiple Vitamins-Minerals (PRESERVISION AREDS 2 PO) Take 1 tablet by mouth 2 times daily        nitroGLYCERIN (NITROSTAT) 0.4 MG SL tablet Place 0.4 mg under the tongue every 5 minutes as needed. Up to 3 doses per episode        omeprazole (PRILOSEC) 20 MG DR capsule Take 1 capsule (20 mg) by mouth daily 90 capsule 3     polyethylene glycol (MIRALAX) powder Take 17 g (1 capful) by mouth daily 1 Bottle 3     Psyllium (FIBER) 0.52 g CAPS Take 1 capsule by mouth daily 90 capsule 0     sennosides (SENOKOT) 8.6 MG tablet Take 1-2 tablets by mouth 2 times daily  0     warfarin ANTICOAGULANT (COUMADIN) 3 MG tablet Take 1-1.5 tablets (3-4.5 mg) by mouth daily Takes  4.5mg on Tuesday, Thursday, Saturday  3mg all other days (Mon,Wed,Fri,Sun)  0         ROS:  10 point ROS neg except as noted above.        Most Recent 3 CBC's:  Recent Labs   Lab Test 07/30/20  0611 07/29/20  1628 07/12/20  1945   WBC 9.1 9.2 6.8   HGB 9.8* 11.4* 11.5*   MCV 76* 76* 77*    199 201     Most Recent 3 BMP's:  Recent Labs   Lab Test 07/30/20  0611 07/29/20  1628 06/05/20  1203    136 139   POTASSIUM 4.8 5.2 4.9   CHLORIDE 105 108 106   CO2 25 22 26   BUN 24 23 28   CR 0.91 1.09* 1.07*   ANIONGAP 5 6 7   SHAWNEE 8.3* 8.8 9.3   * 148* 125*     Patient is alert, lying in bed, appears comfortable  She  is fully oriented  HEENT: Oral mucosa moist  Neck supple  Lungs clear  CV regular rhythm  Abdomen soft  Left arm in sling.  Moderate swelling back of left hand.  Pulses intact.  Strength intact        ASSESSMENT/PLAN:      Closed fracture of proximal end of left humerus with routine healing, unspecified fracture morphology, subsequent encounter  Physical deconditioning  Acute/ongoing: sling to left arm  PT and OT   Tylenol 650mg TID scheduled, norco 5/325mg q 4 hours prn, flexeril 5mg TID prn  F/u with ortho in 3 weeks. Bowel regimen    Coronary artery disease involving native coronary artery of native heart without angina pectoris  Severe AS -- S/P TAVR on 5/5/20  PAF -- on Warfarin   CKD (chronic kidney disease) stage 3, GFR 30-59 ml/min (H)  Essential hypertension, stable  Plan: Continue current medications, monitor vital signs, avoid hypotension.  BMP.    Acute BL anemia  Asymptomatic  Plan monitor Hgb        Jeremy Leal MD

## 2020-08-07 NOTE — TELEPHONE ENCOUNTER
Called and spoke to patients son, Oliver (CTC on file).    Educated Oliver on patients biopsy results- BCC.    Educated Oliver on BCC, mohs, scheduled mohs, and letter/packet sent.    Oliver voiced understanding.    REJI Goodwni-BSN-N  [facility]  [phone]

## 2020-08-09 ASSESSMENT — MIFFLIN-ST. JEOR: SCORE: 1149.79

## 2020-08-10 ENCOUNTER — TELEPHONE (OUTPATIENT)
Dept: GERIATRICS | Facility: CLINIC | Age: 85
End: 2020-08-10

## 2020-08-10 VITALS
SYSTOLIC BLOOD PRESSURE: 124 MMHG | HEART RATE: 72 BPM | TEMPERATURE: 97.6 F | WEIGHT: 158.3 LBS | BODY MASS INDEX: 25.44 KG/M2 | HEIGHT: 66 IN | DIASTOLIC BLOOD PRESSURE: 61 MMHG | RESPIRATION RATE: 20 BRPM | OXYGEN SATURATION: 97 %

## 2020-08-10 DIAGNOSIS — S42.202D CLOSED FRACTURE OF PROXIMAL END OF LEFT HUMERUS WITH ROUTINE HEALING, UNSPECIFIED FRACTURE MORPHOLOGY, SUBSEQUENT ENCOUNTER: Primary | ICD-10-CM

## 2020-08-10 RX ORDER — HYDROCODONE BITARTRATE AND ACETAMINOPHEN 5; 325 MG/1; MG/1
1-2 TABLET ORAL EVERY 4 HOURS PRN
Qty: 45 TABLET | Refills: 0 | Status: SHIPPED | OUTPATIENT
Start: 2020-08-10 | End: 2020-08-11

## 2020-08-10 NOTE — PROGRESS NOTES
[place] [facility]  [place] Medical Record Number: [id number]  Place of Service where encounter took place: [facility] ([facility]) [[id number]]  Chief Complaint   Patient presents with     RECHECK       HPI:    Madie Silva is a 89 year old (7/21/1929), who is being seen today for an episodic care visit. HPI information obtained from: facility chart records, facility staff and patient report. Today's concern is:  Left humerus Fx: on exam today patient is resting in bed, she sat up in bed on her own at time of exam, states she is having left arm pain rated 7/10, she has been working with therapy, nursing state pain is improved since yesterday.   CAD/HTN: BP stable 132/71, 124/61, 145/61 with HR in 70's denies CP, palpitations, SOB  Anemia: see labs  Constipation: states she had a BM yesterday  Past Medical and Surgical History reviewed in Epic today.    MEDICATIONS:    Current Outpatient Medications   Medication Sig Dispense Refill     acetaminophen (TYLENOL) 325 MG tablet Take 2 tablets (650 mg) by mouth every 4 hours as needed for mild pain  0     aspirin EC 81 MG EC tablet Take 1 tablet by mouth daily.       atorvastatin (LIPITOR) 80 MG tablet TAKE ONE TABLET BY MOUTH ONCE DAILY 90 tablet 3     calcium 600 MG tablet Take 1 tablet by mouth 2 times daily.       Cholecalciferol (VITAMIN D-400 PO) Take 1 tablet by mouth daily        co-enzyme Q-10 (COENZYME Q-10) 100 MG CAPS Take 1 capsule by mouth daily.       cyclobenzaprine (FLEXERIL) 5 MG tablet Take 1 tablet (5 mg) by mouth 3 times daily as needed for muscle spasms 10 tablet 0     gabapentin (NEURONTIN) 100 MG capsule Take 1 capsule (100 mg) by mouth 3 times daily 15 capsule 0     HYDROcodone-acetaminophen (NORCO) 5-325 MG tablet Take 1-2 tablets by mouth every 4 hours as needed for pain 45 tablet 0     HYDROcodone-acetaminophen (NORCO) 5-325 MG tablet Take 1 tablet by mouth every 4 hours as needed for pain 15 tablet 0     insulin aspart (NOVOLOG PEN)  "100 UNIT/ML pen Three times a day with meals, For glucometer 150-200 give 2 units, 201-250 4 units, >250 6 units.  0     levothyroxine (SYNTHROID/LEVOTHROID) 50 MCG tablet Take 1 tablet (50 mcg) by mouth daily 90 tablet 3     losartan (COZAAR) 50 MG tablet Take 0.5 tablets (25 mg) by mouth daily 90 tablet 3     metFORMIN (GLUCOPHAGE) 500 MG tablet TAKE ONE TABLET BY MOUTH DAILY WITH BREAKFAST 90 tablet 3     metoprolol succinate ER (TOPROL-XL) 25 MG 24 hr tablet Take 0.5 tablets (12.5 mg) by mouth daily 30 tablet 11     Multiple Vitamins-Minerals (CENTRUM SILVER) per tablet Take 1 tablet by mouth daily.       Multiple Vitamins-Minerals (PRESERVISION AREDS 2 PO) Take 1 tablet by mouth 2 times daily        nitroGLYCERIN (NITROSTAT) 0.4 MG SL tablet Place 0.4 mg under the tongue every 5 minutes as needed. Up to 3 doses per episode        omeprazole (PRILOSEC) 20 MG DR capsule Take 1 capsule (20 mg) by mouth daily 90 capsule 3     polyethylene glycol (MIRALAX) powder Take 17 g (1 capful) by mouth daily 1 Bottle 3     Psyllium (FIBER) 0.52 g CAPS Take 1 capsule by mouth daily 90 capsule 0     sennosides (SENOKOT) 8.6 MG tablet Take 1-2 tablets by mouth 2 times daily  0     warfarin ANTICOAGULANT (COUMADIN) 3 MG tablet Take 1-1.5 tablets (3-4.5 mg) by mouth daily Takes  4.5mg on Tuesday, Thursday, Saturday  3mg all other days (Mon,Wed,Fri,Sun)  0     REVIEW OF SYSTEMS:  10 point ROS of systems including Constitutional, Eyes, Respiratory, Cardiovascular, Gastroenterology, Genitourinary, Integumentary, Musculoskeletal, Psychiatric were all negative except for pertinent positives noted in my HPI.    Objective:  /61   Pulse 72   Temp 97.6  F (36.4  C)   Resp 20   Ht 1.676 m (5' 6\")   Wt 71.8 kg (158 lb 4.8 oz)   SpO2 97%   BMI 25.55 kg/m    Exam:  GENERAL APPEARANCE:  Alert, in no distress  ENT:  Mouth and posterior oropharynx normal, moist mucous membranes, [place]  EYES:  EOM, conjunctivae, lids, pupils and irises " normal, PERRL  RESP:  respiratory effort and palpation of chest normal, lungs clear to auscultation , no respiratory distress  CV:  Palpation and auscultation of heart done , regular rate and rhythm, no murmur, rub, or gallop, no edema  ABDOMEN:  normal bowel sounds, soft, nontender, no hepatosplenomegaly or other masses  M/S:   Examination of:   right upper extremity, right lower extremity and left lower extremity  Inspection, ROM, stability and muscle strength normal and LUE in sling  SKIN:  Inspection of skin and subcutaneous tissue baseline  NEURO:   Cranial nerves 2-12 are normal tested and grossly at patient's baseline, speech WNL    Labs:     Most Recent 3 CBC's:  Recent Labs   Lab Test 07/30/20  0611 07/29/20  1628 07/12/20  1945   WBC 9.1 9.2 6.8   HGB 9.8* 11.4* 11.5*   MCV 76* 76* 77*    199 201     Most Recent 3 BMP's:  Recent Labs   Lab Test 07/30/20  0611 07/29/20  1628 06/05/20  1203    136 139   POTASSIUM 4.8 5.2 4.9   CHLORIDE 105 108 106   CO2 25 22 26   BUN 24 23 28   CR 0.91 1.09* 1.07*   ANIONGAP 5 6 7   SHAWNEE 8.3* 8.8 9.3   * 148* 125*       ASSESSMENT/PLAN:  Closed fracture of proximal end of left humerus with routine healing, unspecified fracture morphology, subsequent encounter  Physical deconditioning  Acute/ongoing: sling to left arm  PT and OT   Change tylenol to 650mg q 6 hours prn , increase norco to 5/325mg 1-2 q 4 hours prn, flexeril 5mg TID prn  Do not exceed 4gm acetaminophen in 24 hours  F/u with ortho in 3 weeks     Coronary artery disease involving native coronary artery of native heart without angina pectoris  Severe AS -- S/P TAVR on 5/5/20  PAF -- on Warfarin   CKD (chronic kidney disease) stage 3, GFR 30-59 ml/min (H)  Essential hypertension  Ongoing: vitals daily and prn, BMP follow, continue losartan 25mg QD, toprol xl 12.5mg QD,  ASA 81mg QD and coumadin as directed INR goal of 2-3     Drug-induced constipation  Acute/ongoing: miralax 17gm QD, senna s 1  PO BID scheduled and 1 PO BIC prn,         Orders written by provider at facility  Change tylenol to 650mg q 6 hours prn  Change norco to 5/325mg 1-2 PO q 4 hours prn  Do not exceed 4gm Acetaminophen in 24 hours  Total time spent with patient visit at the [facility] nursing [facility] was 35 min including patient visit and review of past records. Greater than 50% of total time spent with counseling and coordinating care due to discussed pain control and switch to 1-2 norco q 4 hours, encouraged patient to use ice prn, educated on pain control and staying on top of pain. discussed patient at length with nurse manager, reviewed therapy notes and how patient is doing in thearpy.  Electronically signed by:  Tonya Lynn Haase, APRN CNP

## 2020-08-11 ENCOUNTER — NURSING HOME VISIT (OUTPATIENT)
Dept: GERIATRICS | Facility: CLINIC | Age: 85
End: 2020-08-11
Payer: COMMERCIAL

## 2020-08-11 DIAGNOSIS — K59.03 DRUG-INDUCED CONSTIPATION: ICD-10-CM

## 2020-08-11 DIAGNOSIS — S42.295A OTHER CLOSED NONDISPLACED FRACTURE OF PROXIMAL END OF LEFT HUMERUS, INITIAL ENCOUNTER: ICD-10-CM

## 2020-08-11 DIAGNOSIS — S42.202D CLOSED FRACTURE OF PROXIMAL END OF LEFT HUMERUS WITH ROUTINE HEALING, UNSPECIFIED FRACTURE MORPHOLOGY, SUBSEQUENT ENCOUNTER: Primary | ICD-10-CM

## 2020-08-11 DIAGNOSIS — I10 ESSENTIAL HYPERTENSION: ICD-10-CM

## 2020-08-11 DIAGNOSIS — R53.81 PHYSICAL DECONDITIONING: ICD-10-CM

## 2020-08-11 DIAGNOSIS — D62 ANEMIA DUE TO BLOOD LOSS, ACUTE: ICD-10-CM

## 2020-08-11 DIAGNOSIS — I25.10 CORONARY ARTERY DISEASE INVOLVING NATIVE CORONARY ARTERY OF NATIVE HEART WITHOUT ANGINA PECTORIS: ICD-10-CM

## 2020-08-11 PROCEDURE — 99310 SBSQ NF CARE HIGH MDM 45: CPT | Performed by: NURSE PRACTITIONER

## 2020-08-11 RX ORDER — HYDROCODONE BITARTRATE AND ACETAMINOPHEN 5; 325 MG/1; MG/1
1-2 TABLET ORAL EVERY 4 HOURS PRN
Qty: 45 TABLET | Refills: 0
Start: 2020-08-11 | End: 2020-08-17 | Stop reason: DRUGHIGH

## 2020-08-11 RX ORDER — ACETAMINOPHEN 325 MG/1
650 TABLET ORAL EVERY 6 HOURS PRN
Refills: 0
Start: 2020-08-11 | End: 2020-01-01 | Stop reason: DRUGHIGH

## 2020-08-11 ASSESSMENT — MIFFLIN-ST. JEOR
SCORE: 1149.79
SCORE: 1149.79

## 2020-08-11 NOTE — LETTER
8/11/2020        RE: Madie Silva  [address] [place] [id number]        [place] [facility]  [place] Medical Record Number: [id number]  Place of Service where encounter took place: [facility] ([facility]) [[id number]]  Chief Complaint   Patient presents with     RECHECK       HPI:    Madie Silva is a 89 year old (7/21/1929), who is being seen today for an episodic care visit. HPI information obtained from: facility chart records, facility staff and patient report. Today's concern is:  Left humerus Fx: on exam today patient is resting in bed, she sat up in bed on her own at time of exam, states she is having left arm pain rated 7/10, she has been working with therapy, nursing state pain is improved since yesterday.   CAD/HTN: BP stable 132/71, 124/61, 145/61 with HR in 70's denies CP, palpitations, SOB  Anemia: see labs  Constipation: states she had a BM yesterday  Past Medical and Surgical History reviewed in Epic today.    MEDICATIONS:    Current Outpatient Medications   Medication Sig Dispense Refill     acetaminophen (TYLENOL) 325 MG tablet Take 2 tablets (650 mg) by mouth every 4 hours as needed for mild pain  0     aspirin EC 81 MG EC tablet Take 1 tablet by mouth daily.       atorvastatin (LIPITOR) 80 MG tablet TAKE ONE TABLET BY MOUTH ONCE DAILY 90 tablet 3     calcium 600 MG tablet Take 1 tablet by mouth 2 times daily.       Cholecalciferol (VITAMIN D-400 PO) Take 1 tablet by mouth daily        co-enzyme Q-10 (COENZYME Q-10) 100 MG CAPS Take 1 capsule by mouth daily.       cyclobenzaprine (FLEXERIL) 5 MG tablet Take 1 tablet (5 mg) by mouth 3 times daily as needed for muscle spasms 10 tablet 0     gabapentin (NEURONTIN) 100 MG capsule Take 1 capsule (100 mg) by mouth 3 times daily 15 capsule 0     HYDROcodone-acetaminophen (NORCO) 5-325 MG tablet Take 1-2 tablets by mouth every 4 hours as needed for pain 45 tablet 0     HYDROcodone-acetaminophen (NORCO) 5-325 MG tablet Take 1  "tablet by mouth every 4 hours as needed for pain 15 tablet 0     insulin aspart (NOVOLOG PEN) 100 UNIT/ML pen Three times a day with meals, For glucometer 150-200 give 2 units, 201-250 4 units, >250 6 units.  0     levothyroxine (SYNTHROID/LEVOTHROID) 50 MCG tablet Take 1 tablet (50 mcg) by mouth daily 90 tablet 3     losartan (COZAAR) 50 MG tablet Take 0.5 tablets (25 mg) by mouth daily 90 tablet 3     metFORMIN (GLUCOPHAGE) 500 MG tablet TAKE ONE TABLET BY MOUTH DAILY WITH BREAKFAST 90 tablet 3     metoprolol succinate ER (TOPROL-XL) 25 MG 24 hr tablet Take 0.5 tablets (12.5 mg) by mouth daily 30 tablet 11     Multiple Vitamins-Minerals (CENTRUM SILVER) per tablet Take 1 tablet by mouth daily.       Multiple Vitamins-Minerals (PRESERVISION AREDS 2 PO) Take 1 tablet by mouth 2 times daily        nitroGLYCERIN (NITROSTAT) 0.4 MG SL tablet Place 0.4 mg under the tongue every 5 minutes as needed. Up to 3 doses per episode        omeprazole (PRILOSEC) 20 MG DR capsule Take 1 capsule (20 mg) by mouth daily 90 capsule 3     polyethylene glycol (MIRALAX) powder Take 17 g (1 capful) by mouth daily 1 Bottle 3     Psyllium (FIBER) 0.52 g CAPS Take 1 capsule by mouth daily 90 capsule 0     sennosides (SENOKOT) 8.6 MG tablet Take 1-2 tablets by mouth 2 times daily  0     warfarin ANTICOAGULANT (COUMADIN) 3 MG tablet Take 1-1.5 tablets (3-4.5 mg) by mouth daily Takes  4.5mg on Tuesday, Thursday, Saturday  3mg all other days (Mon,Wed,Fri,Sun)  0     REVIEW OF SYSTEMS:  10 point ROS of systems including Constitutional, Eyes, Respiratory, Cardiovascular, Gastroenterology, Genitourinary, Integumentary, Musculoskeletal, Psychiatric were all negative except for pertinent positives noted in my HPI.    Objective:  /61   Pulse 72   Temp 97.6  F (36.4  C)   Resp 20   Ht 1.676 m (5' 6\")   Wt 71.8 kg (158 lb 4.8 oz)   SpO2 97%   BMI 25.55 kg/m    Exam:  GENERAL APPEARANCE:  Alert, in no distress  ENT:  Mouth and posterior " oropharynx normal, moist mucous membranes, [place]  EYES:  EOM, conjunctivae, lids, pupils and irises normal, PERRL  RESP:  respiratory effort and palpation of chest normal, lungs clear to auscultation , no respiratory distress  CV:  Palpation and auscultation of heart done , regular rate and rhythm, no murmur, rub, or gallop, no edema  ABDOMEN:  normal bowel sounds, soft, nontender, no hepatosplenomegaly or other masses  M/S:   Examination of:   right upper extremity, right lower extremity and left lower extremity  Inspection, ROM, stability and muscle strength normal and LUE in sling  SKIN:  Inspection of skin and subcutaneous tissue baseline  NEURO:   Cranial nerves 2-12 are normal tested and grossly at patient's baseline, speech WNL    Labs:     Most Recent 3 CBC's:  Recent Labs   Lab Test 07/30/20  0611 07/29/20  1628 07/12/20  1945   WBC 9.1 9.2 6.8   HGB 9.8* 11.4* 11.5*   MCV 76* 76* 77*    199 201     Most Recent 3 BMP's:  Recent Labs   Lab Test 07/30/20  0611 07/29/20  1628 06/05/20  1203    136 139   POTASSIUM 4.8 5.2 4.9   CHLORIDE 105 108 106   CO2 25 22 26   BUN 24 23 28   CR 0.91 1.09* 1.07*   ANIONGAP 5 6 7   SHAWNEE 8.3* 8.8 9.3   * 148* 125*       ASSESSMENT/PLAN:  Closed fracture of proximal end of left humerus with routine healing, unspecified fracture morphology, subsequent encounter  Physical deconditioning  Acute/ongoing: sling to left arm  PT and OT   Change tylenol to 650mg q 6 hours prn , increase norco to 5/325mg 1-2 q 4 hours prn, flexeril 5mg TID prn  Do not exceed 4gm acetaminophen in 24 hours  F/u with ortho in 3 weeks     Coronary artery disease involving native coronary artery of native heart without angina pectoris  Severe AS -- S/P TAVR on 5/5/20  PAF -- on Warfarin   CKD (chronic kidney disease) stage 3, GFR 30-59 ml/min (H)  Essential hypertension  Ongoing: vitals daily and prn, BMP follow, continue losartan 25mg QD, toprol xl 12.5mg QD,  ASA 81mg QD and coumadin as  directed INR goal of 2-3     Drug-induced constipation  Acute/ongoing: miralax 17gm QD, senna s 1 PO BID scheduled and 1 PO BIC prn,         Orders written by provider at facility  Change tylenol to 650mg q 6 hours prn  Change norco to 5/325mg 1-2 PO q 4 hours prn  Do not exceed 4gm Acetaminophen in 24 hours  Total time spent with patient visit at the [facility] nursing [facility] was 35 min including patient visit and review of past records. Greater than 50% of total time spent with counseling and coordinating care due to discussed pain control and switch to 1-2 norco q 4 hours, encouraged patient to use ice prn, educated on pain control and staying on top of pain. discussed patient at length with nurse manager, reviewed therapy notes and how patient is doing in thearpy.  Electronically signed by:  Tonya Lynn Haase, APRN CNP         Sincerely,        Tonya Lynn Haase, APRN CNP

## 2020-08-13 NOTE — PROGRESS NOTES
[place] [facility]  [place] Medical Record Number:  [id number]  Place of Service where encounter took place:  [facility] ([facility]) [[id number]]  Chief Complaint   Patient presents with     Nursing Home Acute       HPI:    Madie Silva  is a 89 year old (7/21/1929), who is being seen today for an episodic care visit.  HPI information obtained from: facility chart records, facility staff, patient report and [facility] chart review. Today's concern is:  Left humerus Fx: nursing report pain control improved with increase in norco, on exam today patient states she feels like she got too much norco last night she called the nurse and was feeling very sick, patient feeling well today, she was resting in bed, discussed decreasing norco and scheduling tylenol and patient is happy with that plan  CAD/HTN: BP stable 169/60, 148/76, 152/78 with HR in 70's denies CP, palpitations, SOB  Anemia: see labs  Constipation: states bowels are stable    Past Medical and Surgical History reviewed in Epic today.    MEDICATIONS:    Current Outpatient Medications   Medication Sig Dispense Refill     acetaminophen (TYLENOL) 325 MG tablet Take 2 tablets (650 mg) by mouth every 6 hours as needed for mild pain  0     aspirin EC 81 MG EC tablet Take 1 tablet by mouth daily.       atorvastatin (LIPITOR) 80 MG tablet TAKE ONE TABLET BY MOUTH ONCE DAILY 90 tablet 3     calcium 600 MG tablet Take 1 tablet by mouth 2 times daily.       Cholecalciferol (VITAMIN D-400 PO) Take 1 tablet by mouth daily        co-enzyme Q-10 (COENZYME Q-10) 100 MG CAPS Take 1 capsule by mouth daily.       cyclobenzaprine (FLEXERIL) 5 MG tablet Take 1 tablet (5 mg) by mouth 3 times daily as needed for muscle spasms 10 tablet 0     gabapentin (NEURONTIN) 100 MG capsule Take 1 capsule (100 mg) by mouth 3 times daily 15 capsule 0     HYDROcodone-acetaminophen (NORCO) 5-325 MG tablet Take 1-2 tablets by mouth every 4 hours as needed for pain 45 tablet 0      "insulin aspart (NOVOLOG PEN) 100 UNIT/ML pen Three times a day with meals, For glucometer 150-200 give 2 units, 201-250 4 units, >250 6 units.  0     levothyroxine (SYNTHROID/LEVOTHROID) 50 MCG tablet Take 1 tablet (50 mcg) by mouth daily 90 tablet 3     losartan (COZAAR) 50 MG tablet Take 0.5 tablets (25 mg) by mouth daily 90 tablet 3     metFORMIN (GLUCOPHAGE) 500 MG tablet TAKE ONE TABLET BY MOUTH DAILY WITH BREAKFAST 90 tablet 3     metoprolol succinate ER (TOPROL-XL) 25 MG 24 hr tablet Take 0.5 tablets (12.5 mg) by mouth daily 30 tablet 11     Multiple Vitamins-Minerals (CENTRUM SILVER) per tablet Take 1 tablet by mouth daily.       Multiple Vitamins-Minerals (PRESERVISION AREDS 2 PO) Take 1 tablet by mouth 2 times daily        nitroGLYCERIN (NITROSTAT) 0.4 MG SL tablet Place 0.4 mg under the tongue every 5 minutes as needed. Up to 3 doses per episode        omeprazole (PRILOSEC) 20 MG DR capsule Take 1 capsule (20 mg) by mouth daily 90 capsule 3     polyethylene glycol (MIRALAX) powder Take 17 g (1 capful) by mouth daily 1 Bottle 3     Psyllium (FIBER) 0.52 g CAPS Take 1 capsule by mouth daily 90 capsule 0     sennosides (SENOKOT) 8.6 MG tablet Take 1-2 tablets by mouth 2 times daily  0     warfarin ANTICOAGULANT (COUMADIN) 3 MG tablet Take 1-1.5 tablets (3-4.5 mg) by mouth daily Takes  4.5mg on Tuesday, Thursday, Saturday  3mg all other days (Mon,Wed,Fri,Sun)  0         REVIEW OF SYSTEMS:  10 point ROS of systems including Constitutional, Eyes, Respiratory, Cardiovascular, Gastroenterology, Genitourinary, Integumentary, Musculoskeletal, Psychiatric were all negative except for pertinent positives noted in my HPI.    Objective:  BP (!) 169/60   Pulse 70   Temp 97.4  F (36.3  C)   Resp 18   Ht 1.676 m (5' 6\")   Wt 71.8 kg (158 lb 4.8 oz)   SpO2 96%   BMI 25.55 kg/m    Exam:  GENERAL APPEARANCE:  Alert, in no distress  ENT:  Mouth and posterior oropharynx normal, moist mucous membranes, [place]  EYES:  EOM, " conjunctivae, lids, pupils and irises normal, PERRL  RESP:  respiratory effort and palpation of chest normal, lungs clear to auscultation , no respiratory distress  CV:  Palpation and auscultation of heart done , regular rate and rhythm, no murmur, rub, or gallop, no edema  ABDOMEN:  normal bowel sounds, soft, nontender, no hepatosplenomegaly or other masses  M/S:   Examination of:   right upper extremity, right lower extremity and left lower extremity  Inspection, ROM, stability and muscle strength normal and LUE in sling  SKIN:  Inspection of skin and subcutaneous tissue baseline  NEURO:   Cranial nerves 2-12 are normal tested and grossly at patient's baseline, speech WNL    Labs:     Most Recent 3 CBC's:  Recent Labs   Lab Test 07/30/20  0611 07/29/20  1628 07/12/20  1945   WBC 9.1 9.2 6.8   HGB 9.8* 11.4* 11.5*   MCV 76* 76* 77*    199 201     Most Recent 3 BMP's:  Recent Labs   Lab Test 07/30/20  0611 07/29/20  1628 06/05/20  1203    136 139   POTASSIUM 4.8 5.2 4.9   CHLORIDE 105 108 106   CO2 25 22 26   BUN 24 23 28   CR 0.91 1.09* 1.07*   ANIONGAP 5 6 7   SHAWNEE 8.3* 8.8 9.3   * 148* 125*       ASSESSMENT/PLAN:  Closed fracture of proximal end of left humerus with routine healing, unspecified fracture morphology, subsequent encounter  Physical deconditioning  Acute/ongoing: sling to left arm  PT and OT   Change tylenol to 650mg TID scheduled and qhs prn , change norco to 7.5/325mg 1 q 4 hours prn, continue flexeril 5mg TID prn  Do not exceed 4gm acetaminophen in 24 hours  F/u with ortho in 3 weeks  Encouraged patient to use ice prn     Coronary artery disease involving native coronary artery of native heart without angina pectoris  Severe AS -- S/P TAVR on 5/5/20  PAF -- on Warfarin   CKD (chronic kidney disease) stage 3, GFR 30-59 ml/min (H)  Essential hypertension  Ongoing: vitals daily and prn, BMP follow, continue losartan 25mg QD, toprol xl 12.5mg QD,  ASA 81mg QD and coumadin as  directed INR goal of 2-3     Drug-induced constipation  Acute/ongoing: miralax 17gm QD, senna s 1 PO BID scheduled and 1 PO BIC prn,             Orders written by provider at facility  No new orders today      Electronically signed by:  Tonya Lynn Haase, APRN CNP

## 2020-08-14 ENCOUNTER — NURSING HOME VISIT (OUTPATIENT)
Dept: GERIATRICS | Facility: CLINIC | Age: 85
End: 2020-08-14
Payer: COMMERCIAL

## 2020-08-14 VITALS
SYSTOLIC BLOOD PRESSURE: 169 MMHG | HEIGHT: 66 IN | DIASTOLIC BLOOD PRESSURE: 60 MMHG | HEART RATE: 70 BPM | WEIGHT: 158.3 LBS | TEMPERATURE: 97.4 F | RESPIRATION RATE: 18 BRPM | BODY MASS INDEX: 25.44 KG/M2 | OXYGEN SATURATION: 96 %

## 2020-08-14 DIAGNOSIS — I10 ESSENTIAL HYPERTENSION: ICD-10-CM

## 2020-08-14 DIAGNOSIS — D62 ANEMIA DUE TO BLOOD LOSS, ACUTE: ICD-10-CM

## 2020-08-14 DIAGNOSIS — I25.10 CORONARY ARTERY DISEASE INVOLVING NATIVE CORONARY ARTERY OF NATIVE HEART WITHOUT ANGINA PECTORIS: ICD-10-CM

## 2020-08-14 DIAGNOSIS — K59.03 DRUG-INDUCED CONSTIPATION: ICD-10-CM

## 2020-08-14 DIAGNOSIS — S42.202D CLOSED FRACTURE OF PROXIMAL END OF LEFT HUMERUS WITH ROUTINE HEALING, UNSPECIFIED FRACTURE MORPHOLOGY, SUBSEQUENT ENCOUNTER: Primary | ICD-10-CM

## 2020-08-14 DIAGNOSIS — R53.81 PHYSICAL DECONDITIONING: ICD-10-CM

## 2020-08-14 PROCEDURE — 99309 SBSQ NF CARE MODERATE MDM 30: CPT | Performed by: NURSE PRACTITIONER

## 2020-08-14 NOTE — LETTER
8/14/2020        RE: Madie Silva  [address] [place] [id number]        [facility]  [place] Medical Record Number:  [id number]  Place of Service where encounter took place:  [facility] ([facility]) [[id number]]  Chief Complaint   Patient presents with     Nursing Home Acute       HPI:    Madie Silva  is a 89 year old (7/21/1929), who is being seen today for an episodic care visit.  HPI information obtained from: facility chart records, facility staff, patient report and [facility] chart review. Today's concern is:  Left humerus Fx: nursing report pain control improved with increase in norco, on exam today patient states she feels like she got too much norco last night she called the nurse and was feeling very sick, patient feeling well today, she was resting in bed, discussed decreasing norco and scheduling tylenol and patient is happy with that plan  CAD/HTN: BP stable 169/60, 148/76, 152/78 with HR in 70's denies CP, palpitations, SOB  Anemia: see labs  Constipation: states bowels are stable    Past Medical and Surgical History reviewed in Epic today.    MEDICATIONS:    Current Outpatient Medications   Medication Sig Dispense Refill     acetaminophen (TYLENOL) 325 MG tablet Take 2 tablets (650 mg) by mouth every 6 hours as needed for mild pain  0     aspirin EC 81 MG EC tablet Take 1 tablet by mouth daily.       atorvastatin (LIPITOR) 80 MG tablet TAKE ONE TABLET BY MOUTH ONCE DAILY 90 tablet 3     calcium 600 MG tablet Take 1 tablet by mouth 2 times daily.       Cholecalciferol (VITAMIN D-400 PO) Take 1 tablet by mouth daily        co-enzyme Q-10 (COENZYME Q-10) 100 MG CAPS Take 1 capsule by mouth daily.       cyclobenzaprine (FLEXERIL) 5 MG tablet Take 1 tablet (5 mg) by mouth 3 times daily as needed for muscle spasms 10 tablet 0     gabapentin (NEURONTIN) 100 MG capsule Take 1 capsule (100 mg) by mouth 3 times daily 15 capsule 0     HYDROcodone-acetaminophen (NORCO)  "5-325 MG tablet Take 1-2 tablets by mouth every 4 hours as needed for pain 45 tablet 0     insulin aspart (NOVOLOG PEN) 100 UNIT/ML pen Three times a day with meals, For glucometer 150-200 give 2 units, 201-250 4 units, >250 6 units.  0     levothyroxine (SYNTHROID/LEVOTHROID) 50 MCG tablet Take 1 tablet (50 mcg) by mouth daily 90 tablet 3     losartan (COZAAR) 50 MG tablet Take 0.5 tablets (25 mg) by mouth daily 90 tablet 3     metFORMIN (GLUCOPHAGE) 500 MG tablet TAKE ONE TABLET BY MOUTH DAILY WITH BREAKFAST 90 tablet 3     metoprolol succinate ER (TOPROL-XL) 25 MG 24 hr tablet Take 0.5 tablets (12.5 mg) by mouth daily 30 tablet 11     Multiple Vitamins-Minerals (CENTRUM SILVER) per tablet Take 1 tablet by mouth daily.       Multiple Vitamins-Minerals (PRESERVISION AREDS 2 PO) Take 1 tablet by mouth 2 times daily        nitroGLYCERIN (NITROSTAT) 0.4 MG SL tablet Place 0.4 mg under the tongue every 5 minutes as needed. Up to 3 doses per episode        omeprazole (PRILOSEC) 20 MG DR capsule Take 1 capsule (20 mg) by mouth daily 90 capsule 3     polyethylene glycol (MIRALAX) powder Take 17 g (1 capful) by mouth daily 1 Bottle 3     Psyllium (FIBER) 0.52 g CAPS Take 1 capsule by mouth daily 90 capsule 0     sennosides (SENOKOT) 8.6 MG tablet Take 1-2 tablets by mouth 2 times daily  0     warfarin ANTICOAGULANT (COUMADIN) 3 MG tablet Take 1-1.5 tablets (3-4.5 mg) by mouth daily Takes  4.5mg on Tuesday, Thursday, Saturday  3mg all other days (Mon,Wed,Fri,Sun)  0         REVIEW OF SYSTEMS:  10 point ROS of systems including Constitutional, Eyes, Respiratory, Cardiovascular, Gastroenterology, Genitourinary, Integumentary, Musculoskeletal, Psychiatric were all negative except for pertinent positives noted in my HPI.    Objective:  BP (!) 169/60   Pulse 70   Temp 97.4  F (36.3  C)   Resp 18   Ht 1.676 m (5' 6\")   Wt 71.8 kg (158 lb 4.8 oz)   SpO2 96%   BMI 25.55 kg/m    Exam:  GENERAL APPEARANCE:  Alert, in no " distress  ENT:  Mouth and posterior oropharynx normal, moist mucous membranes, [place]  EYES:  EOM, conjunctivae, lids, pupils and irises normal, PERRL  RESP:  respiratory effort and palpation of chest normal, lungs clear to auscultation , no respiratory distress  CV:  Palpation and auscultation of heart done , regular rate and rhythm, no murmur, rub, or gallop, no edema  ABDOMEN:  normal bowel sounds, soft, nontender, no hepatosplenomegaly or other masses  M/S:   Examination of:   right upper extremity, right lower extremity and left lower extremity  Inspection, ROM, stability and muscle strength normal and LUE in sling  SKIN:  Inspection of skin and subcutaneous tissue baseline  NEURO:   Cranial nerves 2-12 are normal tested and grossly at patient's baseline, speech WNL    Labs:     Most Recent 3 CBC's:  Recent Labs   Lab Test 07/30/20  0611 07/29/20  1628 07/12/20  1945   WBC 9.1 9.2 6.8   HGB 9.8* 11.4* 11.5*   MCV 76* 76* 77*    199 201     Most Recent 3 BMP's:  Recent Labs   Lab Test 07/30/20  0611 07/29/20  1628 06/05/20  1203    136 139   POTASSIUM 4.8 5.2 4.9   CHLORIDE 105 108 106   CO2 25 22 26   BUN 24 23 28   CR 0.91 1.09* 1.07*   ANIONGAP 5 6 7   SHAWNEE 8.3* 8.8 9.3   * 148* 125*       ASSESSMENT/PLAN:  Closed fracture of proximal end of left humerus with routine healing, unspecified fracture morphology, subsequent encounter  Physical deconditioning  Acute/ongoing: sling to left arm  PT and OT   Change tylenol to 650mg TID scheduled and qhs prn , change norco to 7.5/325mg 1 q 4 hours prn, continue flexeril 5mg TID prn  Do not exceed 4gm acetaminophen in 24 hours  F/u with ortho in 3 weeks  Encouraged patient to use ice prn     Coronary artery disease involving native coronary artery of native heart without angina pectoris  Severe AS -- S/P TAVR on 5/5/20  PAF -- on Warfarin   CKD (chronic kidney disease) stage 3, GFR 30-59 ml/min (H)  Essential hypertension  Ongoing: vitals daily and prn,  BMP follow, continue losartan 25mg QD, toprol xl 12.5mg QD,  ASA 81mg QD and coumadin as directed INR goal of 2-3     Drug-induced constipation  Acute/ongoing: miralax 17gm QD, senna s 1 PO BID scheduled and 1 PO BIC prn,             Orders written by provider at facility  No new orders today      Electronically signed by:  Tonya Lynn Haase, APRN CNP             Sincerely,        Tonya Lynn Haase, APRN CNP

## 2020-08-17 ENCOUNTER — TRANSFERRED RECORDS (OUTPATIENT)
Dept: HEALTH INFORMATION MANAGEMENT | Facility: CLINIC | Age: 85
End: 2020-08-17

## 2020-08-17 ENCOUNTER — NURSING HOME VISIT (OUTPATIENT)
Dept: GERIATRICS | Facility: CLINIC | Age: 85
End: 2020-08-17
Payer: COMMERCIAL

## 2020-08-17 VITALS
DIASTOLIC BLOOD PRESSURE: 66 MMHG | RESPIRATION RATE: 20 BRPM | HEIGHT: 66 IN | OXYGEN SATURATION: 100 % | HEART RATE: 70 BPM | BODY MASS INDEX: 25.44 KG/M2 | TEMPERATURE: 96.6 F | SYSTOLIC BLOOD PRESSURE: 157 MMHG | WEIGHT: 158.3 LBS

## 2020-08-17 DIAGNOSIS — I25.10 CORONARY ARTERY DISEASE INVOLVING NATIVE CORONARY ARTERY OF NATIVE HEART WITHOUT ANGINA PECTORIS: ICD-10-CM

## 2020-08-17 DIAGNOSIS — I10 ESSENTIAL HYPERTENSION: ICD-10-CM

## 2020-08-17 DIAGNOSIS — K59.03 DRUG-INDUCED CONSTIPATION: ICD-10-CM

## 2020-08-17 DIAGNOSIS — S42.202D CLOSED FRACTURE OF PROXIMAL END OF LEFT HUMERUS WITH ROUTINE HEALING, UNSPECIFIED FRACTURE MORPHOLOGY, SUBSEQUENT ENCOUNTER: Primary | ICD-10-CM

## 2020-08-17 DIAGNOSIS — R53.81 PHYSICAL DECONDITIONING: ICD-10-CM

## 2020-08-17 DIAGNOSIS — D62 ANEMIA DUE TO BLOOD LOSS, ACUTE: ICD-10-CM

## 2020-08-17 PROCEDURE — 99309 SBSQ NF CARE MODERATE MDM 30: CPT | Performed by: NURSE PRACTITIONER

## 2020-08-17 RX ORDER — HYDROCODONE BITARTRATE AND ACETAMINOPHEN 5; 325 MG/1; MG/1
1 TABLET ORAL EVERY 4 HOURS PRN
Qty: 18 TABLET | Refills: 0
Start: 2020-08-17 | End: 2020-01-01

## 2020-08-17 NOTE — PROGRESS NOTES
[facility]  [place] Medical Record Number:  [id number]  Place of Service where encounter took place:  [facility] (S) [[id number]]  Chief Complaint   Patient presents with     Nursing Home Acute       HPI:    Madie Silva  is a 89 year old (7/21/1929), who is being seen today for an episodic care visit.  HPI information obtained from: facility chart records, facility staff, patient report and [facility] chart review. Today's concern is:  Left humerus Fx: patient states pain has been controlled, she had a rough AM because she has had 2 therapy sessions and a shower, she has not taken any norco this AM, patient states she has been sleeping well at night  CAD/HTN: BP stable 157/66, 156/63, 160/62with HR in 70's denies CP, palpitations, SOB  Anemia: see labs  Constipation: states bowels are stable    Past Medical and Surgical History reviewed in Epic today.    MEDICATIONS:    Current Outpatient Medications   Medication Sig Dispense Refill     acetaminophen (TYLENOL) 325 MG tablet Take 2 tablets (650 mg) by mouth every 6 hours as needed for mild pain  0     aspirin EC 81 MG EC tablet Take 1 tablet by mouth daily.       atorvastatin (LIPITOR) 80 MG tablet TAKE ONE TABLET BY MOUTH ONCE DAILY 90 tablet 3     calcium 600 MG tablet Take 1 tablet by mouth 2 times daily.       Cholecalciferol (VITAMIN D-400 PO) Take 1 tablet by mouth daily        co-enzyme Q-10 (COENZYME Q-10) 100 MG CAPS Take 1 capsule by mouth daily.       cyclobenzaprine (FLEXERIL) 5 MG tablet Take 1 tablet (5 mg) by mouth 3 times daily as needed for muscle spasms 10 tablet 0     gabapentin (NEURONTIN) 100 MG capsule Take 1 capsule (100 mg) by mouth 3 times daily 15 capsule 0     HYDROcodone-acetaminophen (NORCO) 5-325 MG tablet Take 1-2 tablets by mouth every 4 hours as needed for pain 45 tablet 0     insulin aspart (NOVOLOG PEN) 100 UNIT/ML pen Three times a day with meals, For glucometer 150-200 give 2 units, 201-250 4 units,  ">250 6 units.  0     levothyroxine (SYNTHROID/LEVOTHROID) 50 MCG tablet Take 1 tablet (50 mcg) by mouth daily 90 tablet 3     losartan (COZAAR) 50 MG tablet Take 0.5 tablets (25 mg) by mouth daily 90 tablet 3     metFORMIN (GLUCOPHAGE) 500 MG tablet TAKE ONE TABLET BY MOUTH DAILY WITH BREAKFAST 90 tablet 3     metoprolol succinate ER (TOPROL-XL) 25 MG 24 hr tablet Take 0.5 tablets (12.5 mg) by mouth daily 30 tablet 11     Multiple Vitamins-Minerals (CENTRUM SILVER) per tablet Take 1 tablet by mouth daily.       Multiple Vitamins-Minerals (PRESERVISION AREDS 2 PO) Take 1 tablet by mouth 2 times daily        nitroGLYCERIN (NITROSTAT) 0.4 MG SL tablet Place 0.4 mg under the tongue every 5 minutes as needed. Up to 3 doses per episode        omeprazole (PRILOSEC) 20 MG DR capsule Take 1 capsule (20 mg) by mouth daily 90 capsule 3     polyethylene glycol (MIRALAX) powder Take 17 g (1 capful) by mouth daily 1 Bottle 3     Psyllium (FIBER) 0.52 g CAPS Take 1 capsule by mouth daily 90 capsule 0     sennosides (SENOKOT) 8.6 MG tablet Take 1-2 tablets by mouth 2 times daily  0     warfarin ANTICOAGULANT (COUMADIN) 3 MG tablet Take 1-1.5 tablets (3-4.5 mg) by mouth daily Takes  4.5mg on Tuesday, Thursday, Saturday  3mg all other days (Mon,Wed,Fri,Sun)  0         REVIEW OF SYSTEMS:  10 point ROS of systems including Constitutional, Eyes, Respiratory, Cardiovascular, Gastroenterology, Genitourinary, Integumentary, Musculoskeletal, Psychiatric were all negative except for pertinent positives noted in my HPI.    Objective:  BP (!) 157/66   Pulse 70   Temp 96.6  F (35.9  C)   Resp 20   Ht 1.676 m (5' 6\")   Wt 71.8 kg (158 lb 4.8 oz)   SpO2 100%   BMI 25.55 kg/m    Exam:  GENERAL APPEARANCE:  Alert, in no distress  ENT:  Mouth and posterior oropharynx normal, moist mucous membranes, [place]  EYES:  EOM, conjunctivae, lids, pupils and irises normal, PERRL  RESP:  no respiratory distress  CV:  peripheral edema trace+ in LE " bilaterally  ABDOMEN:  abdomen round  M/S:   patient sitting up in WC, able to move all 4 extremiteis  SKIN:  Inspection of skin and subcutaneous tissue baseline  NEURO:   speech WNL    Labs:     Most Recent 3 CBC's:  Recent Labs   Lab Test 07/30/20  0611 07/29/20  1628 07/12/20  1945   WBC 9.1 9.2 6.8   HGB 9.8* 11.4* 11.5*   MCV 76* 76* 77*    199 201     Most Recent 3 BMP's:  Recent Labs   Lab Test 07/30/20  0611 07/29/20  1628 06/05/20  1203    136 139   POTASSIUM 4.8 5.2 4.9   CHLORIDE 105 108 106   CO2 25 22 26   BUN 24 23 28   CR 0.91 1.09* 1.07*   ANIONGAP 5 6 7   SHAWNEE 8.3* 8.8 9.3   * 148* 125*       ASSESSMENT/PLAN:  Closed fracture of proximal end of left humerus with routine healing, unspecified fracture morphology, subsequent encounter  Physical deconditioning  Acute/ongoing: sling to left arm  PT and OT   Change tylenol to 650mg TID scheduled and qhs prn , continue norco to 7.5/325mg 1 q 4 hours prn, continue flexeril 5mg TID prn  Do not exceed 4gm acetaminophen in 24 hours  F/u with ortho this afternoon  Encouraged patient to use ice prn and use norco in AM before therapy     Coronary artery disease involving native coronary artery of native heart without angina pectoris  Severe AS -- S/P TAVR on 5/5/20  PAF -- on Warfarin   CKD (chronic kidney disease) stage 3, GFR 30-59 ml/min (H)  Essential hypertension  Ongoing: vitals daily and prn, BMP follow, continue losartan 25mg QD, toprol xl 12.5mg QD,  ASA 81mg QD and coumadin as directed INR goal of 2-3     Drug-induced constipation  Acute/ongoing: miralax 17gm QD, senna s 1 PO BID scheduled and 1 PO BIC prn,             Orders written by provider at facility  No new orders today      Electronically signed by:  Tonya Lynn Haase, APRN CNP

## 2020-08-17 NOTE — LETTER
8/17/2020        RE: Madie Silva  [address] [place] [id number]        [facility]  [place] Medical Record Number:  [id number]  Place of Service where encounter took place:  [facility] ([facility]) [[id number]]  Chief Complaint   Patient presents with     Nursing Home Acute       HPI:    Madie Silva  is a 89 year old (7/21/1929), who is being seen today for an episodic care visit.  HPI information obtained from: facility chart records, facility staff, patient report and [facility] chart review. Today's concern is:  Left humerus Fx: patient states pain has been controlled, she had a rough AM because she has had 2 therapy sessions and a shower, she has not taken any norco this AM, patient states she has been sleeping well at night  CAD/HTN: BP stable 157/66, 156/63, 160/62with HR in 70's denies CP, palpitations, SOB  Anemia: see labs  Constipation: states bowels are stable    Past Medical and Surgical History reviewed in Epic today.    MEDICATIONS:    Current Outpatient Medications   Medication Sig Dispense Refill     acetaminophen (TYLENOL) 325 MG tablet Take 2 tablets (650 mg) by mouth every 6 hours as needed for mild pain  0     aspirin EC 81 MG EC tablet Take 1 tablet by mouth daily.       atorvastatin (LIPITOR) 80 MG tablet TAKE ONE TABLET BY MOUTH ONCE DAILY 90 tablet 3     calcium 600 MG tablet Take 1 tablet by mouth 2 times daily.       Cholecalciferol (VITAMIN D-400 PO) Take 1 tablet by mouth daily        co-enzyme Q-10 (COENZYME Q-10) 100 MG CAPS Take 1 capsule by mouth daily.       cyclobenzaprine (FLEXERIL) 5 MG tablet Take 1 tablet (5 mg) by mouth 3 times daily as needed for muscle spasms 10 tablet 0     gabapentin (NEURONTIN) 100 MG capsule Take 1 capsule (100 mg) by mouth 3 times daily 15 capsule 0     HYDROcodone-acetaminophen (NORCO) 5-325 MG tablet Take 1-2 tablets by mouth every 4 hours as needed for pain 45 tablet 0     insulin aspart (NOVOLOG PEN) 100  "UNIT/ML pen Three times a day with meals, For glucometer 150-200 give 2 units, 201-250 4 units, >250 6 units.  0     levothyroxine (SYNTHROID/LEVOTHROID) 50 MCG tablet Take 1 tablet (50 mcg) by mouth daily 90 tablet 3     losartan (COZAAR) 50 MG tablet Take 0.5 tablets (25 mg) by mouth daily 90 tablet 3     metFORMIN (GLUCOPHAGE) 500 MG tablet TAKE ONE TABLET BY MOUTH DAILY WITH BREAKFAST 90 tablet 3     metoprolol succinate ER (TOPROL-XL) 25 MG 24 hr tablet Take 0.5 tablets (12.5 mg) by mouth daily 30 tablet 11     Multiple Vitamins-Minerals (CENTRUM SILVER) per tablet Take 1 tablet by mouth daily.       Multiple Vitamins-Minerals (PRESERVISION AREDS 2 PO) Take 1 tablet by mouth 2 times daily        nitroGLYCERIN (NITROSTAT) 0.4 MG SL tablet Place 0.4 mg under the tongue every 5 minutes as needed. Up to 3 doses per episode        omeprazole (PRILOSEC) 20 MG DR capsule Take 1 capsule (20 mg) by mouth daily 90 capsule 3     polyethylene glycol (MIRALAX) powder Take 17 g (1 capful) by mouth daily 1 Bottle 3     Psyllium (FIBER) 0.52 g CAPS Take 1 capsule by mouth daily 90 capsule 0     sennosides (SENOKOT) 8.6 MG tablet Take 1-2 tablets by mouth 2 times daily  0     warfarin ANTICOAGULANT (COUMADIN) 3 MG tablet Take 1-1.5 tablets (3-4.5 mg) by mouth daily Takes  4.5mg on Tuesday, Thursday, Saturday  3mg all other days (Mon,Wed,Fri,Sun)  0         REVIEW OF SYSTEMS:  10 point ROS of systems including Constitutional, Eyes, Respiratory, Cardiovascular, Gastroenterology, Genitourinary, Integumentary, Musculoskeletal, Psychiatric were all negative except for pertinent positives noted in my HPI.    Objective:  BP (!) 157/66   Pulse 70   Temp 96.6  F (35.9  C)   Resp 20   Ht 1.676 m (5' 6\")   Wt 71.8 kg (158 lb 4.8 oz)   SpO2 100%   BMI 25.55 kg/m    Exam:  GENERAL APPEARANCE:  Alert, in no distress  ENT:  Mouth and posterior oropharynx normal, moist mucous membranes, [place]  EYES:  EOM, conjunctivae, lids, pupils and " irises normal, PERRL  RESP:  no respiratory distress  CV:  peripheral edema trace+ in LE bilaterally  ABDOMEN:  abdomen round  M/S:   patient sitting up in WC, able to move all 4 extremiteis  SKIN:  Inspection of skin and subcutaneous tissue baseline  NEURO:   speech WNL    Labs:     Most Recent 3 CBC's:  Recent Labs   Lab Test 07/30/20  0611 07/29/20  1628 07/12/20  1945   WBC 9.1 9.2 6.8   HGB 9.8* 11.4* 11.5*   MCV 76* 76* 77*    199 201     Most Recent 3 BMP's:  Recent Labs   Lab Test 07/30/20  0611 07/29/20  1628 06/05/20  1203    136 139   POTASSIUM 4.8 5.2 4.9   CHLORIDE 105 108 106   CO2 25 22 26   BUN 24 23 28   CR 0.91 1.09* 1.07*   ANIONGAP 5 6 7   SHAWNEE 8.3* 8.8 9.3   * 148* 125*       ASSESSMENT/PLAN:  Closed fracture of proximal end of left humerus with routine healing, unspecified fracture morphology, subsequent encounter  Physical deconditioning  Acute/ongoing: sling to left arm  PT and OT   Change tylenol to 650mg TID scheduled and qhs prn , continue norco to 7.5/325mg 1 q 4 hours prn, continue flexeril 5mg TID prn  Do not exceed 4gm acetaminophen in 24 hours  F/u with ortho this afternoon  Encouraged patient to use ice prn and use norco in AM before therapy     Coronary artery disease involving native coronary artery of native heart without angina pectoris  Severe AS -- S/P TAVR on 5/5/20  PAF -- on Warfarin   CKD (chronic kidney disease) stage 3, GFR 30-59 ml/min (H)  Essential hypertension  Ongoing: vitals daily and prn, BMP follow, continue losartan 25mg QD, toprol xl 12.5mg QD,  ASA 81mg QD and coumadin as directed INR goal of 2-3     Drug-induced constipation  Acute/ongoing: miralax 17gm QD, senna s 1 PO BID scheduled and 1 PO BIC prn,             Orders written by provider at facility  No new orders today      Electronically signed by:  Tonya Lynn Haase, APRN CNP             Sincerely,        Tonya Lynn Haase, APRN CNP

## 2020-08-24 NOTE — PROGRESS NOTES
[facility]  [place] Medical Record Number: [id number]  Place of Service where encounter took place: [facility] ([facility]) [[id number]]  Chief Complaint   Patient presents with     RECHECK       HPI:    Madie Silva is a 89 year old (7/21/1929), who is being seen today for an episodic care visit. HPI information obtained from: facility chart records, facility staff, patient report and [facility] chart review. Today's concern is:  Left humerus Fx: patient states pain has been controlled, states overall she feels pain is improved, making progress in therapy , she is able to do some ROM exercises in therapy but is a little anxious about increase pain with therapy per PT  CAD/HTN: BP stable 150/61, 157/59, 160/69 with HR in 70's denies CP, palpitations, SOB  Anemia: see labs  Constipation: states bowels are stable    Past Medical and Surgical History reviewed in Epic today.    MEDICATIONS:    Current Outpatient Medications   Medication Sig Dispense Refill     acetaminophen (TYLENOL) 325 MG tablet Take 2 tablets (650 mg) by mouth every 6 hours as needed for mild pain  0     aspirin EC 81 MG EC tablet Take 1 tablet by mouth daily.       atorvastatin (LIPITOR) 80 MG tablet TAKE ONE TABLET BY MOUTH ONCE DAILY 90 tablet 3     calcium 600 MG tablet Take 1 tablet by mouth 2 times daily.       Cholecalciferol (VITAMIN D-400 PO) Take 1 tablet by mouth daily        co-enzyme Q-10 (COENZYME Q-10) 100 MG CAPS Take 1 capsule by mouth daily.       cyclobenzaprine (FLEXERIL) 5 MG tablet Take 1 tablet (5 mg) by mouth 3 times daily as needed for muscle spasms 10 tablet 0     gabapentin (NEURONTIN) 100 MG capsule Take 1 capsule (100 mg) by mouth 3 times daily 15 capsule 0     insulin aspart (NOVOLOG PEN) 100 UNIT/ML pen Three times a day with meals, For glucometer 150-200 give 2 units, 201-250 4 units, >250 6 units.  0     levothyroxine (SYNTHROID/LEVOTHROID) 50 MCG tablet Take 1 tablet (50 mcg) by mouth daily 90  "tablet 3     losartan (COZAAR) 50 MG tablet Take 0.5 tablets (25 mg) by mouth daily 90 tablet 3     metFORMIN (GLUCOPHAGE) 500 MG tablet TAKE ONE TABLET BY MOUTH DAILY WITH BREAKFAST 90 tablet 3     metoprolol succinate ER (TOPROL-XL) 25 MG 24 hr tablet Take 0.5 tablets (12.5 mg) by mouth daily 30 tablet 11     Multiple Vitamins-Minerals (CENTRUM SILVER) per tablet Take 1 tablet by mouth daily.       Multiple Vitamins-Minerals (PRESERVISION AREDS 2 PO) Take 1 tablet by mouth 2 times daily        nitroGLYCERIN (NITROSTAT) 0.4 MG SL tablet Place 0.4 mg under the tongue every 5 minutes as needed. Up to 3 doses per episode        omeprazole (PRILOSEC) 20 MG DR capsule Take 1 capsule (20 mg) by mouth daily 90 capsule 3     polyethylene glycol (MIRALAX) powder Take 17 g (1 capful) by mouth daily 1 Bottle 3     Psyllium (FIBER) 0.52 g CAPS Take 1 capsule by mouth daily 90 capsule 0     sennosides (SENOKOT) 8.6 MG tablet Take 1-2 tablets by mouth 2 times daily  0     warfarin ANTICOAGULANT (COUMADIN) 3 MG tablet Take 1-1.5 tablets (3-4.5 mg) by mouth daily Takes  4.5mg on Tuesday, Thursday, Saturday  3mg all other days (Mon,Wed,Fri,Sun)  0     REVIEW OF SYSTEMS:  10 point ROS of systems including Constitutional, Eyes, Respiratory, Cardiovascular, Gastroenterology, Genitourinary, Integumentary, Musculoskeletal, Psychiatric were all negative except for pertinent positives noted in my HPI.    Objective:  BP (!) 157/59   Pulse 73   Temp 97.8  F (36.6  C)   Resp 16   Ht 1.676 m (5' 6\")   Wt 70.3 kg (154 lb 14.4 oz)   SpO2 98%   BMI 25.00 kg/m    Exam:  GENERAL APPEARANCE:  Alert, in no distress  ENT:  Mouth and posterior oropharynx normal, moist mucous membranes, [place]  EYES:  EOM, conjunctivae, lids, pupils and irises normal, PERRL  RESP:  no respiratory distress  CV:  peripheral edema trace+ in LE bilaterally  ABDOMEN:  abdomen round  M/S:   patient sitting up in WC, able to move all 4 extremiteis  SKIN:  Inspection of " skin and subcutaneous tissue baseline  NEURO:   speech WNL    Labs:     Most Recent 3 CBC's:  Recent Labs   Lab Test 07/30/20  0611 07/29/20  1628 07/12/20  1945   WBC 9.1 9.2 6.8   HGB 9.8* 11.4* 11.5*   MCV 76* 76* 77*    199 201     Most Recent 3 BMP's:  Recent Labs   Lab Test 07/30/20  0611 07/29/20  1628 06/05/20  1203    136 139   POTASSIUM 4.8 5.2 4.9   CHLORIDE 105 108 106   CO2 25 22 26   BUN 24 23 28   CR 0.91 1.09* 1.07*   ANIONGAP 5 6 7   SHAWNEE 8.3* 8.8 9.3   * 148* 125*       ASSESSMENT/PLAN:  Closed fracture of proximal end of left humerus with routine healing, unspecified fracture morphology, subsequent encounter  Physical deconditioning  Acute/ongoing: sling to left arm  PT and OT   Change tylenol to 650mg TID scheduled and qhs prn , continue norco to 7.5/325mg 1 q 4 hours prn, continue flexeril 5mg TID prn  Do not exceed 4gm acetaminophen in 24 hours  F/u with ortho as directed  Encouraged patient to use ice prn and use norco in AM before therapy     Coronary artery disease involving native coronary artery of native heart without angina pectoris  Severe AS -- S/P TAVR on 5/5/20  PAF -- on Warfarin   CKD (chronic kidney disease) stage 3, GFR 30-59 ml/min (H)  Essential hypertension  Ongoing: vitals daily and prn, BMP follow, increase losartan to 50 mg QD, toprol xl 12.5mg QD,  ASA 81mg QD and coumadin as directed INR goal of 2-3     Drug-induced constipation  Acute/ongoing: miralax 17gm QD, senna s 1 PO BID scheduled and 1 PO BIC prn,       Orders written by provider at facility  Increase losartan to 50 mg QD  BMP on Thursday  Electronically signed by:  Tonya Lynn Haase, HAROLDO CNP

## 2020-08-25 NOTE — LETTER
8/25/2020        RE: Madie Silva  [address] [place] [id number]        [facility]  [place] Medical Record Number: [id number]  Place of Service where encounter took place: [facility] ([facility]) [[id number]]  Chief Complaint   Patient presents with     RECHECK       HPI:    Madie Silva is a 89 year old (7/21/1929), who is being seen today for an episodic care visit. HPI information obtained from: facility chart records, facility staff, patient report and [facility] chart review. Today's concern is:  Left humerus Fx: patient states pain has been controlled, states overall she feels pain is improved, making progress in therapy , she is able to do some ROM exercises in therapy but is a little anxious about increase pain with therapy per PT  CAD/HTN: BP stable 150/61, 157/59, 160/69 with HR in 70's denies CP, palpitations, SOB  Anemia: see labs  Constipation: states bowels are stable    Past Medical and Surgical History reviewed in Epic today.    MEDICATIONS:    Current Outpatient Medications   Medication Sig Dispense Refill     acetaminophen (TYLENOL) 325 MG tablet Take 2 tablets (650 mg) by mouth every 6 hours as needed for mild pain  0     aspirin EC 81 MG EC tablet Take 1 tablet by mouth daily.       atorvastatin (LIPITOR) 80 MG tablet TAKE ONE TABLET BY MOUTH ONCE DAILY 90 tablet 3     calcium 600 MG tablet Take 1 tablet by mouth 2 times daily.       Cholecalciferol (VITAMIN D-400 PO) Take 1 tablet by mouth daily        co-enzyme Q-10 (COENZYME Q-10) 100 MG CAPS Take 1 capsule by mouth daily.       cyclobenzaprine (FLEXERIL) 5 MG tablet Take 1 tablet (5 mg) by mouth 3 times daily as needed for muscle spasms 10 tablet 0     gabapentin (NEURONTIN) 100 MG capsule Take 1 capsule (100 mg) by mouth 3 times daily 15 capsule 0     insulin aspart (NOVOLOG PEN) 100 UNIT/ML pen Three times a day with meals, For glucometer 150-200 give 2 units, 201-250 4 units, >250 6 units.  0      "levothyroxine (SYNTHROID/LEVOTHROID) 50 MCG tablet Take 1 tablet (50 mcg) by mouth daily 90 tablet 3     losartan (COZAAR) 50 MG tablet Take 0.5 tablets (25 mg) by mouth daily 90 tablet 3     metFORMIN (GLUCOPHAGE) 500 MG tablet TAKE ONE TABLET BY MOUTH DAILY WITH BREAKFAST 90 tablet 3     metoprolol succinate ER (TOPROL-XL) 25 MG 24 hr tablet Take 0.5 tablets (12.5 mg) by mouth daily 30 tablet 11     Multiple Vitamins-Minerals (CENTRUM SILVER) per tablet Take 1 tablet by mouth daily.       Multiple Vitamins-Minerals (PRESERVISION AREDS 2 PO) Take 1 tablet by mouth 2 times daily        nitroGLYCERIN (NITROSTAT) 0.4 MG SL tablet Place 0.4 mg under the tongue every 5 minutes as needed. Up to 3 doses per episode        omeprazole (PRILOSEC) 20 MG DR capsule Take 1 capsule (20 mg) by mouth daily 90 capsule 3     polyethylene glycol (MIRALAX) powder Take 17 g (1 capful) by mouth daily 1 Bottle 3     Psyllium (FIBER) 0.52 g CAPS Take 1 capsule by mouth daily 90 capsule 0     sennosides (SENOKOT) 8.6 MG tablet Take 1-2 tablets by mouth 2 times daily  0     warfarin ANTICOAGULANT (COUMADIN) 3 MG tablet Take 1-1.5 tablets (3-4.5 mg) by mouth daily Takes  4.5mg on Tuesday, Thursday, Saturday  3mg all other days (Mon,Wed,Fri,Sun)  0     REVIEW OF SYSTEMS:  10 point ROS of systems including Constitutional, Eyes, Respiratory, Cardiovascular, Gastroenterology, Genitourinary, Integumentary, Musculoskeletal, Psychiatric were all negative except for pertinent positives noted in my HPI.    Objective:  BP (!) 157/59   Pulse 73   Temp 97.8  F (36.6  C)   Resp 16   Ht 1.676 m (5' 6\")   Wt 70.3 kg (154 lb 14.4 oz)   SpO2 98%   BMI 25.00 kg/m    Exam:  GENERAL APPEARANCE:  Alert, in no distress  ENT:  Mouth and posterior oropharynx normal, moist mucous membranes, [place]  EYES:  EOM, conjunctivae, lids, pupils and irises normal, PERRL  RESP:  no respiratory distress  CV:  peripheral edema trace+ in LE bilaterally  ABDOMEN:  abdomen " round  M/S:   patient sitting up in WC, able to move all 4 extremiteis  SKIN:  Inspection of skin and subcutaneous tissue baseline  NEURO:   speech WNL    Labs:     Most Recent 3 CBC's:  Recent Labs   Lab Test 07/30/20  0611 07/29/20  1628 07/12/20  1945   WBC 9.1 9.2 6.8   HGB 9.8* 11.4* 11.5*   MCV 76* 76* 77*    199 201     Most Recent 3 BMP's:  Recent Labs   Lab Test 07/30/20  0611 07/29/20  1628 06/05/20  1203    136 139   POTASSIUM 4.8 5.2 4.9   CHLORIDE 105 108 106   CO2 25 22 26   BUN 24 23 28   CR 0.91 1.09* 1.07*   ANIONGAP 5 6 7   SHAWNEE 8.3* 8.8 9.3   * 148* 125*       ASSESSMENT/PLAN:  Closed fracture of proximal end of left humerus with routine healing, unspecified fracture morphology, subsequent encounter  Physical deconditioning  Acute/ongoing: sling to left arm  PT and OT   Change tylenol to 650mg TID scheduled and qhs prn , continue norco to 7.5/325mg 1 q 4 hours prn, continue flexeril 5mg TID prn  Do not exceed 4gm acetaminophen in 24 hours  F/u with ortho as directed  Encouraged patient to use ice prn and use norco in AM before therapy     Coronary artery disease involving native coronary artery of native heart without angina pectoris  Severe AS -- S/P TAVR on 5/5/20  PAF -- on Warfarin   CKD (chronic kidney disease) stage 3, GFR 30-59 ml/min (H)  Essential hypertension  Ongoing: vitals daily and prn, BMP follow, increase losartan to 50 mg QD, toprol xl 12.5mg QD,  ASA 81mg QD and coumadin as directed INR goal of 2-3     Drug-induced constipation  Acute/ongoing: miralax 17gm QD, senna s 1 PO BID scheduled and 1 PO BIC prn,       Orders written by provider at facility  Increase losartan to 50 mg QD  BMP on Thursday  Electronically signed by:  Tonya Lynn Haase, APRN CNP         Sincerely,        Tonya Lynn Haase, APRN CNP

## 2020-08-27 NOTE — PROGRESS NOTES
Clinic Care Coordination Contact  Care Team Conversations    CC RN called and spoke with JASPREET Suarez at [facility]; she updated that at this time, she does not have any specific discharge plans for patient.  She reported that the patient continues to be non-weight-bearing so therapy is running of things they are able to work with her on to keep as skilled/coverable in their TCU setting.  Daniela updated that they will be having a meeting with patient's family this afternoon to discuss options (private-pay, respite care, etc) in order to be able to continue provider therapy for patient.  Daniela agreed to update CC RN with further developments.    CC RN will plan to review patient's chart again in approximately 3 weeks, sooner if indicated, to get updates on patient's status and discharge planning.    Jeet Parish RN  Clinic Care Coordinator  [facility]  Ph: [phone]

## 2020-08-27 NOTE — PROGRESS NOTES
[facility]  [place] Medical Record Number:  [id number]  Place of Service where encounter took place:  [facility] ([facility]) [[id number]]  Chief Complaint   Patient presents with     Nursing Home Acute       HPI:    Madie Silva  is a 89 year old (7/21/1929), who is being seen today for an episodic care visit.  HPI information obtained from: facility chart records, facility staff, patient report and [facility] chart review. Today's concern is:  Left humerus Fx: patient states pain is controlled she continues to make slow progress in therapy, cleared for [id number]  CAD/HTN: BP elevated: 173/65, 144/78, 173/76 with HR in 70's denies CP, palpitations, SOB  Anemia: see labs  Constipation: states bowels are stable       Past Medical and Surgical History reviewed in Epic today.    MEDICATIONS:    Current Outpatient Medications   Medication Sig Dispense Refill     acetaminophen (TYLENOL) 325 MG tablet Take 2 tablets (650 mg) by mouth every 6 hours as needed for mild pain  0     aspirin EC 81 MG EC tablet Take 1 tablet by mouth daily.       atorvastatin (LIPITOR) 80 MG tablet TAKE ONE TABLET BY MOUTH ONCE DAILY 90 tablet 3     calcium 600 MG tablet Take 1 tablet by mouth 2 times daily.       Cholecalciferol (VITAMIN D-400 PO) Take 1 tablet by mouth daily        co-enzyme Q-10 (COENZYME Q-10) 100 MG CAPS Take 1 capsule by mouth daily.       cyclobenzaprine (FLEXERIL) 5 MG tablet Take 1 tablet (5 mg) by mouth 3 times daily as needed for muscle spasms 10 tablet 0     gabapentin (NEURONTIN) 100 MG capsule Take 1 capsule (100 mg) by mouth 3 times daily 15 capsule 0     insulin aspart (NOVOLOG PEN) 100 UNIT/ML pen Three times a day with meals, For glucometer 150-200 give 2 units, 201-250 4 units, >250 6 units.  0     levothyroxine (SYNTHROID/LEVOTHROID) 50 MCG tablet Take 1 tablet (50 mcg) by mouth daily 90 tablet 3     losartan (COZAAR) 50 MG tablet Take 1 tablet (50 mg) by mouth daily 90 tablet 3      "metFORMIN (GLUCOPHAGE) 500 MG tablet TAKE ONE TABLET BY MOUTH DAILY WITH BREAKFAST 90 tablet 3     metoprolol succinate ER (TOPROL-XL) 25 MG 24 hr tablet Take 0.5 tablets (12.5 mg) by mouth daily 30 tablet 11     Multiple Vitamins-Minerals (CENTRUM SILVER) per tablet Take 1 tablet by mouth daily.       Multiple Vitamins-Minerals (PRESERVISION AREDS 2 PO) Take 1 tablet by mouth 2 times daily        nitroGLYCERIN (NITROSTAT) 0.4 MG SL tablet Place 0.4 mg under the tongue every 5 minutes as needed. Up to 3 doses per episode        omeprazole (PRILOSEC) 20 MG DR capsule Take 1 capsule (20 mg) by mouth daily 90 capsule 3     polyethylene glycol (MIRALAX) powder Take 17 g (1 capful) by mouth daily 1 Bottle 3     Psyllium (FIBER) 0.52 g CAPS Take 1 capsule by mouth daily 90 capsule 0     sennosides (SENOKOT) 8.6 MG tablet Take 1-2 tablets by mouth 2 times daily  0     warfarin ANTICOAGULANT (COUMADIN) 3 MG tablet Take 1-1.5 tablets (3-4.5 mg) by mouth daily Takes  4.5mg on Tuesday, Thursday, Saturday  3mg all other days (Mon,Wed,Fri,Sun)  0         REVIEW OF SYSTEMS:  10 point ROS of systems including Constitutional, Eyes, Respiratory, Cardiovascular, Gastroenterology, Genitourinary, Integumentary, Musculoskeletal, Psychiatric were all negative except for pertinent positives noted in my HPI.    Objective:  BP (!) 173/65   Pulse 58   Temp 96.6  F (35.9  C)   Resp 16   Ht 1.676 m (5' 6\")   Wt 70.3 kg (154 lb 14.4 oz)   SpO2 97%   BMI 25.00 kg/m    Exam:  GENERAL APPEARANCE:  Alert, in no distress  ENT:  Mouth and posterior oropharynx normal, moist mucous membranes, [place]  EYES:  EOM, conjunctivae, lids, pupils and irises normal, PERRL  RESP:  no respiratory distress  CV:  peripheral edema trace+ in LE bilaterally  ABDOMEN:  abdomen round  M/S:   patient sitting up in WC, able to move all 4 extremiteis  SKIN:  Inspection of skin and subcutaneous tissue baseline  NEURO:   speech WNL       Labs:     Most Recent 3 " CBC's:  Recent Labs   Lab Test 07/30/20  0611 07/29/20  1628 07/12/20  1945   WBC 9.1 9.2 6.8   HGB 9.8* 11.4* 11.5*   MCV 76* 76* 77*    199 201     Most Recent 3 BMP's:  Recent Labs   Lab Test 07/30/20  0611 07/29/20  1628 06/05/20  1203    136 139   POTASSIUM 4.8 5.2 4.9   CHLORIDE 105 108 106   CO2 25 22 26   BUN 24 23 28   CR 0.91 1.09* 1.07*   ANIONGAP 5 6 7   SHAWNEE 8.3* 8.8 9.3   * 148* 125*       ASSESSMENT/PLAN:  Closed fracture of proximal end of left humerus with routine healing, unspecified fracture morphology, subsequent encounter  Physical deconditioning  Acute/ongoing: sling to left arm  PT and OT   Change tylenol to 650mg TID scheduled and qhs prn , continue norco to 7.5/325mg 1 q 4 hours prn, continue flexeril 5mg TID prn  Do not exceed 4gm acetaminophen in 24 hours  F/u with ortho as directed  Encouraged patient to use ice prn and use norco in AM before therapy     Coronary artery disease involving native coronary artery of native heart without angina pectoris  Severe AS -- S/P TAVR on 5/5/20  PAF -- on Warfarin   CKD (chronic kidney disease) stage 3, GFR 30-59 ml/min (H)  Essential hypertension  Ongoing: vitals daily and prn, BMP follow, continue losartan to 50 mg QD, increase toprol xl to 25mg QD,  ASA 81mg QD and coumadin as directed INR goal of 2-3     Drug-induced constipation  Acute/ongoing: miralax 17gm QD, senna s 1 PO BID scheduled and 1 PO BID prn,       Orders written by provider at facility  Increase metoprolol xl to 25mg QD       Electronically signed by:  Tonya Lynn Haase, APRN CNP

## 2020-08-27 NOTE — LETTER
8/27/2020        RE: Madie Silva  [address] [place] [id number]        [facility]  [place] Medical Record Number:  [id number]  Place of Service where encounter took place:  [facility] ([facility]) [[id number]]  Chief Complaint   Patient presents with     Nursing Home Acute       HPI:    Madie Silva  is a 89 year old (7/21/1929), who is being seen today for an episodic care visit.  HPI information obtained from: facility chart records, facility staff, patient report and [facility] chart review. Today's concern is:  Left humerus Fx: patient states pain is controlled she continues to make slow progress in therapy, cleared for [id number]  CAD/HTN: BP elevated: 173/65, 144/78, 173/76 with HR in 70's denies CP, palpitations, SOB  Anemia: see labs  Constipation: states bowels are stable       Past Medical and Surgical History reviewed in Epic today.    MEDICATIONS:    Current Outpatient Medications   Medication Sig Dispense Refill     acetaminophen (TYLENOL) 325 MG tablet Take 2 tablets (650 mg) by mouth every 6 hours as needed for mild pain  0     aspirin EC 81 MG EC tablet Take 1 tablet by mouth daily.       atorvastatin (LIPITOR) 80 MG tablet TAKE ONE TABLET BY MOUTH ONCE DAILY 90 tablet 3     calcium 600 MG tablet Take 1 tablet by mouth 2 times daily.       Cholecalciferol (VITAMIN D-400 PO) Take 1 tablet by mouth daily        co-enzyme Q-10 (COENZYME Q-10) 100 MG CAPS Take 1 capsule by mouth daily.       cyclobenzaprine (FLEXERIL) 5 MG tablet Take 1 tablet (5 mg) by mouth 3 times daily as needed for muscle spasms 10 tablet 0     gabapentin (NEURONTIN) 100 MG capsule Take 1 capsule (100 mg) by mouth 3 times daily 15 capsule 0     insulin aspart (NOVOLOG PEN) 100 UNIT/ML pen Three times a day with meals, For glucometer 150-200 give 2 units, 201-250 4 units, >250 6 units.  0     levothyroxine (SYNTHROID/LEVOTHROID) 50 MCG tablet Take 1 tablet (50 mcg) by mouth daily 90 tablet 3      "losartan (COZAAR) 50 MG tablet Take 1 tablet (50 mg) by mouth daily 90 tablet 3     metFORMIN (GLUCOPHAGE) 500 MG tablet TAKE ONE TABLET BY MOUTH DAILY WITH BREAKFAST 90 tablet 3     metoprolol succinate ER (TOPROL-XL) 25 MG 24 hr tablet Take 0.5 tablets (12.5 mg) by mouth daily 30 tablet 11     Multiple Vitamins-Minerals (CENTRUM SILVER) per tablet Take 1 tablet by mouth daily.       Multiple Vitamins-Minerals (PRESERVISION AREDS 2 PO) Take 1 tablet by mouth 2 times daily        nitroGLYCERIN (NITROSTAT) 0.4 MG SL tablet Place 0.4 mg under the tongue every 5 minutes as needed. Up to 3 doses per episode        omeprazole (PRILOSEC) 20 MG DR capsule Take 1 capsule (20 mg) by mouth daily 90 capsule 3     polyethylene glycol (MIRALAX) powder Take 17 g (1 capful) by mouth daily 1 Bottle 3     Psyllium (FIBER) 0.52 g CAPS Take 1 capsule by mouth daily 90 capsule 0     sennosides (SENOKOT) 8.6 MG tablet Take 1-2 tablets by mouth 2 times daily  0     warfarin ANTICOAGULANT (COUMADIN) 3 MG tablet Take 1-1.5 tablets (3-4.5 mg) by mouth daily Takes  4.5mg on Tuesday, Thursday, Saturday  3mg all other days (Mon,Wed,Fri,Sun)  0         REVIEW OF SYSTEMS:  10 point ROS of systems including Constitutional, Eyes, Respiratory, Cardiovascular, Gastroenterology, Genitourinary, Integumentary, Musculoskeletal, Psychiatric were all negative except for pertinent positives noted in my HPI.    Objective:  BP (!) 173/65   Pulse 58   Temp 96.6  F (35.9  C)   Resp 16   Ht 1.676 m (5' 6\")   Wt 70.3 kg (154 lb 14.4 oz)   SpO2 97%   BMI 25.00 kg/m    Exam:  GENERAL APPEARANCE:  Alert, in no distress  ENT:  Mouth and posterior oropharynx normal, moist mucous membranes, [place]  EYES:  EOM, conjunctivae, lids, pupils and irises normal, PERRL  RESP:  no respiratory distress  CV:  peripheral edema trace+ in LE bilaterally  ABDOMEN:  abdomen round  M/S:   patient sitting up in WC, able to move all 4 extremiteis  SKIN:  Inspection of skin and " subcutaneous tissue baseline  NEURO:   speech WNL       Labs:     Most Recent 3 CBC's:  Recent Labs   Lab Test 07/30/20  0611 07/29/20  1628 07/12/20  1945   WBC 9.1 9.2 6.8   HGB 9.8* 11.4* 11.5*   MCV 76* 76* 77*    199 201     Most Recent 3 BMP's:  Recent Labs   Lab Test 07/30/20  0611 07/29/20  1628 06/05/20  1203    136 139   POTASSIUM 4.8 5.2 4.9   CHLORIDE 105 108 106   CO2 25 22 26   BUN 24 23 28   CR 0.91 1.09* 1.07*   ANIONGAP 5 6 7   SHAWNEE 8.3* 8.8 9.3   * 148* 125*       ASSESSMENT/PLAN:  Closed fracture of proximal end of left humerus with routine healing, unspecified fracture morphology, subsequent encounter  Physical deconditioning  Acute/ongoing: sling to left arm  PT and OT   Change tylenol to 650mg TID scheduled and qhs prn , continue norco to 7.5/325mg 1 q 4 hours prn, continue flexeril 5mg TID prn  Do not exceed 4gm acetaminophen in 24 hours  F/u with ortho as directed  Encouraged patient to use ice prn and use norco in AM before therapy     Coronary artery disease involving native coronary artery of native heart without angina pectoris  Severe AS -- S/P TAVR on 5/5/20  PAF -- on Warfarin   CKD (chronic kidney disease) stage 3, GFR 30-59 ml/min (H)  Essential hypertension  Ongoing: vitals daily and prn, BMP follow, continue losartan to 50 mg QD, increase toprol xl to 25mg QD,  ASA 81mg QD and coumadin as directed INR goal of 2-3     Drug-induced constipation  Acute/ongoing: miralax 17gm QD, senna s 1 PO BID scheduled and 1 PO BID prn,       Orders written by provider at facility  Increase metoprolol xl to 25mg QD       Electronically signed by:  Tonya Lynn Haase, APRN CNP             Sincerely,        Tonya Lynn Haase, APRN CNP

## 2020-09-02 NOTE — PROGRESS NOTES
[facility]  [place] Medical Record Number:  [id number]  Place of Service where encounter took place:  [facility] ([facility]) [[id number]]  Chief Complaint   Patient presents with     halfway Regulatory       HPI:    Madie Silva  is a 89 year old (7/21/1929), who is being seen today for an episodic care visit.  HPI information obtained from: facility chart records, facility staff, patient report and [facility] chart review. Today's concern is:  Left humerus Fx: patient states pain is controlled, no pain at time of exam, increased pain with therapy  Insomnia: patient states she is having a hard time falling asleep, someone suggested she write her worries on a piece of paper and she has a page full on bedside table, did discuss using melatonin and also may help to talk with psychologist.   CAD/HTN: BP as follows: 138/71, 136/63, 152/60 with HR in 70's denies CP, palpitations, SOB  Anemia: see labs  Constipation: states bowels are stable       Past Medical and Surgical History reviewed in Epic today.    MEDICATIONS:    Current Outpatient Medications   Medication Sig Dispense Refill     acetaminophen (TYLENOL) 325 MG tablet Take 2 tablets (650 mg) by mouth every 6 hours as needed for mild pain  0     aspirin EC 81 MG EC tablet Take 1 tablet by mouth daily.       atorvastatin (LIPITOR) 80 MG tablet TAKE ONE TABLET BY MOUTH ONCE DAILY 90 tablet 3     calcium 600 MG tablet Take 1 tablet by mouth 2 times daily.       Cholecalciferol (VITAMIN D-400 PO) Take 1 tablet by mouth daily        co-enzyme Q-10 (COENZYME Q-10) 100 MG CAPS Take 1 capsule by mouth daily.       cyclobenzaprine (FLEXERIL) 5 MG tablet Take 1 tablet (5 mg) by mouth 3 times daily as needed for muscle spasms 10 tablet 0     gabapentin (NEURONTIN) 100 MG capsule Take 1 capsule (100 mg) by mouth 3 times daily 15 capsule 0     insulin aspart (NOVOLOG PEN) 100 UNIT/ML pen Three times a day with meals, For glucometer 150-200 give 2  "units, 201-250 4 units, >250 6 units.  0     levothyroxine (SYNTHROID/LEVOTHROID) 50 MCG tablet Take 1 tablet (50 mcg) by mouth daily 90 tablet 3     losartan (COZAAR) 50 MG tablet Take 1 tablet (50 mg) by mouth daily 90 tablet 3     metFORMIN (GLUCOPHAGE) 500 MG tablet TAKE ONE TABLET BY MOUTH DAILY WITH BREAKFAST 90 tablet 3     metoprolol succinate ER (TOPROL-XL) 25 MG 24 hr tablet Take 1 tablet (25 mg) by mouth daily 30 tablet 11     Multiple Vitamins-Minerals (CENTRUM SILVER) per tablet Take 1 tablet by mouth daily.       Multiple Vitamins-Minerals (PRESERVISION AREDS 2 PO) Take 1 tablet by mouth 2 times daily        nitroGLYCERIN (NITROSTAT) 0.4 MG SL tablet Place 0.4 mg under the tongue every 5 minutes as needed. Up to 3 doses per episode        omeprazole (PRILOSEC) 20 MG DR capsule Take 1 capsule (20 mg) by mouth daily 90 capsule 3     polyethylene glycol (MIRALAX) powder Take 17 g (1 capful) by mouth daily 1 Bottle 3     Psyllium (FIBER) 0.52 g CAPS Take 1 capsule by mouth daily 90 capsule 0     sennosides (SENOKOT) 8.6 MG tablet Take 1-2 tablets by mouth 2 times daily  0     warfarin ANTICOAGULANT (COUMADIN) 3 MG tablet Take 1-1.5 tablets (3-4.5 mg) by mouth daily Takes  4.5mg on Tuesday, Thursday, Saturday  3mg all other days (Mon,Wed,Fri,Sun)  0         REVIEW OF SYSTEMS:  10 point ROS of systems including Constitutional, Eyes, Respiratory, Cardiovascular, Gastroenterology, Genitourinary, Integumentary, Musculoskeletal, Psychiatric were all negative except for pertinent positives noted in my HPI.    Objective:  /71   Pulse 70   Temp 98  F (36.7  C)   Resp 16   Ht 1.676 m (5' 6\")   Wt 71 kg (156 lb 9.6 oz)   SpO2 98%   BMI 25.28 kg/m    Exam:  GENERAL APPEARANCE:  Alert, in no distress  ENT:  Mouth and posterior oropharynx normal, moist mucous membranes, [place]  EYES:  EOM, conjunctivae, lids, pupils and irises normal, PERRL  RESP:  no respiratory distress  CV:  peripheral edema trace+ in LE " bilaterally  ABDOMEN:  abdomen round  M/S:   patient sitting up in WC, able to move all 4 extremiteis  SKIN:  Inspection of skin and subcutaneous tissue baseline  NEURO:   speech WNL    Labs:     Most Recent 3 CBC's:  Recent Labs   Lab Test 07/30/20  0611 07/29/20  1628 07/12/20  1945   WBC 9.1 9.2 6.8   HGB 9.8* 11.4* 11.5*   MCV 76* 76* 77*    199 201     Most Recent 3 BMP's:  Recent Labs   Lab Test 07/30/20  0611 07/29/20  1628 06/05/20  1203    136 139   POTASSIUM 4.8 5.2 4.9   CHLORIDE 105 108 106   CO2 25 22 26   BUN 24 23 28   CR 0.91 1.09* 1.07*   ANIONGAP 5 6 7   SHAWNEE 8.3* 8.8 9.3   * 148* 125*       ASSESSMENT/PLAN:  Closed fracture of proximal end of left humerus with routine healing, unspecified fracture morphology, subsequent encounter  Physical deconditioning  Acute/ongoing: sling to left arm  PT and OT   Continue  tylenol to 650mg TID scheduled and qhs prn , decrease norco to.5/325mg 1 q 6 hours prn, DC flexeril patient not using  Do not exceed 4gm acetaminophen in 24 hours  F/u with ortho as directed  Encouraged patient to use ice prn and use norco in AM before therapy     Coronary artery disease involving native coronary artery of native heart without angina pectoris  Severe AS -- S/P TAVR on 5/5/20  PAF -- on Warfarin   CKD (chronic kidney disease) stage 3, GFR 30-59 ml/min (H)  Essential hypertension  Ongoing: vitals daily and prn, BMP follow, continue losartan to 50 mg QD,  toprol xl to 25mg QD,  ASA 81mg QD and coumadin as directed INR goal of 2-3     Drug-induced constipation  Acute/ongoing: miralax 17gm QD, senna s 1 PO BID scheduled and 1 PO BID prn,      Insomnia:   Acute: start melatonin 6mg qhs , may want to see house psychologist.         Orders written by provider at facility  Start melatonin  OK to see house psychologist,   DC flexeril  Decrease norco to 5/325mg 1 PO q 6 hours prn    Greater than 50% of total time spent with counseling and coordinating care due to  discussed insomnia and natural interventions, using aroma therapy, background noise, eduction on melatonin, discussed pain control and decrease in pain meds.     Electronically signed by:  Tonya Lynn Haase, APRN CNP

## 2020-09-02 NOTE — LETTER
9/2/2020        RE: Madie Silva  [address] [place] [id number]        [facility]  [facility] Record Number:  [id number]  Place of Service where encounter took place:  [facility] ([facility]) [[id number]]  Chief Complaint   Patient presents with     correction Regulatory       HPI:    Madie Silva  is a 89 year old (7/21/1929), who is being seen today for an episodic care visit.  HPI information obtained from: facility chart records, facility staff, patient report and [facility] chart review. Today's concern is:  Left humerus Fx: patient states pain is controlled, no pain at time of exam, increased pain with therapy  Insomnia: patient states she is having a hard time falling asleep, someone suggested she write her worries on a piece of paper and she has a page full on bedside table, did discuss using melatonin and also may help to talk with psychologist.   CAD/HTN: BP as follows: 138/71, 136/63, 152/60 with HR in 70's denies CP, palpitations, SOB  Anemia: see labs  Constipation: states bowels are stable       Past Medical and Surgical History reviewed in Epic today.    MEDICATIONS:    Current Outpatient Medications   Medication Sig Dispense Refill     acetaminophen (TYLENOL) 325 MG tablet Take 2 tablets (650 mg) by mouth every 6 hours as needed for mild pain  0     aspirin EC 81 MG EC tablet Take 1 tablet by mouth daily.       atorvastatin (LIPITOR) 80 MG tablet TAKE ONE TABLET BY MOUTH ONCE DAILY 90 tablet 3     calcium 600 MG tablet Take 1 tablet by mouth 2 times daily.       Cholecalciferol (VITAMIN D-400 PO) Take 1 tablet by mouth daily        co-enzyme Q-10 (COENZYME Q-10) 100 MG CAPS Take 1 capsule by mouth daily.       cyclobenzaprine (FLEXERIL) 5 MG tablet Take 1 tablet (5 mg) by mouth 3 times daily as needed for muscle spasms 10 tablet 0     gabapentin (NEURONTIN) 100 MG capsule Take 1 capsule (100 mg) by mouth 3 times daily 15 capsule 0     insulin aspart  "(NOVOLOG PEN) 100 UNIT/ML pen Three times a day with meals, For glucometer 150-200 give 2 units, 201-250 4 units, >250 6 units.  0     levothyroxine (SYNTHROID/LEVOTHROID) 50 MCG tablet Take 1 tablet (50 mcg) by mouth daily 90 tablet 3     losartan (COZAAR) 50 MG tablet Take 1 tablet (50 mg) by mouth daily 90 tablet 3     metFORMIN (GLUCOPHAGE) 500 MG tablet TAKE ONE TABLET BY MOUTH DAILY WITH BREAKFAST 90 tablet 3     metoprolol succinate ER (TOPROL-XL) 25 MG 24 hr tablet Take 1 tablet (25 mg) by mouth daily 30 tablet 11     Multiple Vitamins-Minerals (CENTRUM SILVER) per tablet Take 1 tablet by mouth daily.       Multiple Vitamins-Minerals (PRESERVISION AREDS 2 PO) Take 1 tablet by mouth 2 times daily        nitroGLYCERIN (NITROSTAT) 0.4 MG SL tablet Place 0.4 mg under the tongue every 5 minutes as needed. Up to 3 doses per episode        omeprazole (PRILOSEC) 20 MG DR capsule Take 1 capsule (20 mg) by mouth daily 90 capsule 3     polyethylene glycol (MIRALAX) powder Take 17 g (1 capful) by mouth daily 1 Bottle 3     Psyllium (FIBER) 0.52 g CAPS Take 1 capsule by mouth daily 90 capsule 0     sennosides (SENOKOT) 8.6 MG tablet Take 1-2 tablets by mouth 2 times daily  0     warfarin ANTICOAGULANT (COUMADIN) 3 MG tablet Take 1-1.5 tablets (3-4.5 mg) by mouth daily Takes  4.5mg on Tuesday, Thursday, Saturday  3mg all other days (Mon,Wed,Fri,Sun)  0         REVIEW OF SYSTEMS:  10 point ROS of systems including Constitutional, Eyes, Respiratory, Cardiovascular, Gastroenterology, Genitourinary, Integumentary, Musculoskeletal, Psychiatric were all negative except for pertinent positives noted in my HPI.    Objective:  /71   Pulse 70   Temp 98  F (36.7  C)   Resp 16   Ht 1.676 m (5' 6\")   Wt 71 kg (156 lb 9.6 oz)   SpO2 98%   BMI 25.28 kg/m    Exam:  GENERAL APPEARANCE:  Alert, in no distress  ENT:  Mouth and posterior oropharynx normal, moist mucous membranes, [place]  EYES:  EOM, conjunctivae, lids, pupils and " irises normal, PERRL  RESP:  no respiratory distress  CV:  peripheral edema trace+ in LE bilaterally  ABDOMEN:  abdomen round  M/S:   patient sitting up in WC, able to move all 4 extremiteis  SKIN:  Inspection of skin and subcutaneous tissue baseline  NEURO:   speech WNL    Labs:     Most Recent 3 CBC's:  Recent Labs   Lab Test 07/30/20  0611 07/29/20  1628 07/12/20  1945   WBC 9.1 9.2 6.8   HGB 9.8* 11.4* 11.5*   MCV 76* 76* 77*    199 201     Most Recent 3 BMP's:  Recent Labs   Lab Test 07/30/20  0611 07/29/20  1628 06/05/20  1203    136 139   POTASSIUM 4.8 5.2 4.9   CHLORIDE 105 108 106   CO2 25 22 26   BUN 24 23 28   CR 0.91 1.09* 1.07*   ANIONGAP 5 6 7   SHAWNEE 8.3* 8.8 9.3   * 148* 125*       ASSESSMENT/PLAN:  Closed fracture of proximal end of left humerus with routine healing, unspecified fracture morphology, subsequent encounter  Physical deconditioning  Acute/ongoing: sling to left arm  PT and OT   Continue  tylenol to 650mg TID scheduled and qhs prn , decrease norco to.5/325mg 1 q 6 hours prn, DC flexeril patient not using  Do not exceed 4gm acetaminophen in 24 hours  F/u with ortho as directed  Encouraged patient to use ice prn and use norco in AM before therapy     Coronary artery disease involving native coronary artery of native heart without angina pectoris  Severe AS -- S/P TAVR on 5/5/20  PAF -- on Warfarin   CKD (chronic kidney disease) stage 3, GFR 30-59 ml/min (H)  Essential hypertension  Ongoing: vitals daily and prn, BMP follow, continue losartan to 50 mg QD,  toprol xl to 25mg QD,  ASA 81mg QD and coumadin as directed INR goal of 2-3     Drug-induced constipation  Acute/ongoing: miralax 17gm QD, senna s 1 PO BID scheduled and 1 PO BID prn,      Insomnia:   Acute: start melatonin 6mg qhs , may want to see house psychologist.         Orders written by provider at facility  Start melatonin  OK to see house psychologist,   DC flexeril  Decrease norco to 5/325mg 1 PO q 6 hours  prn    Greater than 50% of total time spent with counseling and coordinating care due to discussed insomnia and natural interventions, using aroma therapy, background noise, eduction on melatonin, discussed pain control and decrease in pain meds.     Electronically signed by:  Tonya Lynn Haase, APRN CNP             Sincerely,        Tonya Lynn Haase, APRN CNP

## 2020-09-09 NOTE — PROGRESS NOTES
"[facility]  [facility] Record Number:  [id number]  Place of Service where encounter took place:  [facility] ([facility]) [[id number]]  Chief Complaint   Patient presents with     Nursing Home Acute       HPI:    Madie Silva  is a 89 year old (7/21/1929), who is being seen today for an episodic care visit.  HPI information obtained from: facility chart records. Today's concern is:  Left humerus Fx: patient pain controlled, patient participating in therapy walking up to 150 feet using a quad cane   Insomnia/depression: patient just finished a visit with psychologist, has a lot of anxiety, difficulty sleeping at night, ruminating on things, has a long list of \"worries\" at her bedside, discussed starting an antidepressant patient state she is willing to try, has been taking norco to get to sleep at night.   CAD/HTN: BP as follows: 148/68, 173/68, 135/65 with HR in 70's denies CP, palpitations, SOB  Anemia: see labs  Constipation: states bowels are stable    Past Medical and Surgical History reviewed in Epic today.    MEDICATIONS:    Current Outpatient Medications   Medication Sig Dispense Refill     acetaminophen (TYLENOL) 325 MG tablet Take 2 tablets (650 mg) by mouth every 6 hours as needed for mild pain  0     aspirin EC 81 MG EC tablet Take 1 tablet by mouth daily.       atorvastatin (LIPITOR) 80 MG tablet TAKE ONE TABLET BY MOUTH ONCE DAILY 90 tablet 3     calcium 600 MG tablet Take 1 tablet by mouth 2 times daily.       Cholecalciferol (VITAMIN D-400 PO) Take 1 tablet by mouth daily        co-enzyme Q-10 (COENZYME Q-10) 100 MG CAPS Take 1 capsule by mouth daily.       cyclobenzaprine (FLEXERIL) 5 MG tablet Take 1 tablet (5 mg) by mouth 3 times daily as needed for muscle spasms 10 tablet 0     gabapentin (NEURONTIN) 100 MG capsule Take 1 capsule (100 mg) by mouth 3 times daily 15 capsule 0     insulin aspart (NOVOLOG PEN) 100 UNIT/ML pen Three times a day with meals, For glucometer 150-200 " "give 2 units, 201-250 4 units, >250 6 units.  0     levothyroxine (SYNTHROID/LEVOTHROID) 50 MCG tablet Take 1 tablet (50 mcg) by mouth daily 90 tablet 3     losartan (COZAAR) 50 MG tablet Take 1 tablet (50 mg) by mouth daily 90 tablet 3     metFORMIN (GLUCOPHAGE) 500 MG tablet TAKE ONE TABLET BY MOUTH DAILY WITH BREAKFAST 90 tablet 3     metoprolol succinate ER (TOPROL-XL) 25 MG 24 hr tablet Take 1 tablet (25 mg) by mouth daily 30 tablet 11     Multiple Vitamins-Minerals (CENTRUM SILVER) per tablet Take 1 tablet by mouth daily.       Multiple Vitamins-Minerals (PRESERVISION AREDS 2 PO) Take 1 tablet by mouth 2 times daily        nitroGLYCERIN (NITROSTAT) 0.4 MG SL tablet Place 0.4 mg under the tongue every 5 minutes as needed. Up to 3 doses per episode        omeprazole (PRILOSEC) 20 MG DR capsule Take 1 capsule (20 mg) by mouth daily 90 capsule 3     polyethylene glycol (MIRALAX) powder Take 17 g (1 capful) by mouth daily 1 Bottle 3     Psyllium (FIBER) 0.52 g CAPS Take 1 capsule by mouth daily 90 capsule 0     sennosides (SENOKOT) 8.6 MG tablet Take 1-2 tablets by mouth 2 times daily  0     warfarin ANTICOAGULANT (COUMADIN) 3 MG tablet Take 1-1.5 tablets (3-4.5 mg) by mouth daily Takes  4.5mg on Tuesday, Thursday, Saturday  3mg all other days (Mon,Wed,Fri,Sun)  0         REVIEW OF SYSTEMS:  10 point ROS of systems including Constitutional, Eyes, Respiratory, Cardiovascular, Gastroenterology, Genitourinary, Integumentary, Musculoskeletal, Psychiatric were all negative except for pertinent positives noted in my HPI.    Objective:  BP (!) 148/68   Pulse 72   Temp 98  F (36.7  C)   Resp 16   Ht 1.676 m (5' 6\")   Wt 71.1 kg (156 lb 11.2 oz)   SpO2 98%   BMI 25.29 kg/m    Exam:  GENERAL APPEARANCE:  Alert, in no distress  ENT:  Mouth and posterior oropharynx normal, moist mucous membranes, [place]  EYES:  EOM, conjunctivae, lids, pupils and irises normal, PERRL  RESP:  no respiratory distress  CV:  peripheral " edema trace+ in LE bilaterally  ABDOMEN:  abdomen round  M/S:   patient sitting up in WC, able to move all 4 extremiteis  SKIN:  Inspection of skin and subcutaneous tissue baseline  NEURO:   speech WNL    Labs:     Most Recent 3 CBC's:  Recent Labs   Lab Test 07/30/20  0611 07/29/20  1628 07/12/20  1945   WBC 9.1 9.2 6.8   HGB 9.8* 11.4* 11.5*   MCV 76* 76* 77*    199 201     Most Recent 3 BMP's:  Recent Labs   Lab Test 07/30/20  0611 07/29/20  1628 06/05/20  1203    136 139   POTASSIUM 4.8 5.2 4.9   CHLORIDE 105 108 106   CO2 25 22 26   BUN 24 23 28   CR 0.91 1.09* 1.07*   ANIONGAP 5 6 7   SHAWNEE 8.3* 8.8 9.3   * 148* 125*       ASSESSMENT/PLAN:  Closed fracture of proximal end of left humerus with routine healing, unspecified fracture morphology, subsequent encounter  Physical deconditioning  Acute/ongoing: sling to left arm  PT and OT   Continue  tylenol to 650mg TID scheduled and qhs prn , decrease norco to.5/325mg 1 q 6 hours prn, DC flexeril patient not using  Do not exceed 4gm acetaminophen in 24 hours  F/u with ortho as directed  Encouraged patient to use ice prn and use norco in AM before therapy     Coronary artery disease involving native coronary artery of native heart without angina pectoris  Severe AS -- S/P TAVR on 5/5/20  PAF -- on Warfarin   CKD (chronic kidney disease) stage 3, GFR 30-59 ml/min (H)  Essential hypertension  Ongoing: vitals daily and prn, BMP follow, continue losartan to 50 mg QD,  toprol xl to 25mg QD,  ASA 81mg QD and coumadin as directed INR goal of 2-3     Drug-induced constipation  Acute/ongoing: miralax 17gm QD, senna s 1 PO BID scheduled and 1 PO BID prn,      Insomnia:  Mild depression initial episode   Acute: continue melatonin 6mg qhs  House psychologist today  Start remeron 7.5mg qhs           Orders written by provider at facility  remeron 7.5mg qhs    In addition to the preventive visit, 30minutes   minutes of the appointment were spent evaluating and  developing a treatment plan for her additional concern(s). Of depression/anxiety, discussed medication side effects, starting medication for depression, education on use of norco and avoiding at night to help with sleep. Discussed other medications and therapy POC>         Electronically signed by:  Tonya Lynn Haase, APRN CNP

## 2020-09-10 NOTE — LETTER
"    9/10/2020        RE: Madie Silva  [address] [place] [id number]        [facility]  [facility] Record Number:  [id number]  Place of Service where encounter took place:  [facility] ([facility]) [[id number]]  Chief Complaint   Patient presents with     Nursing Home Acute       HPI:    Madie Silva  is a 89 year old (7/21/1929), who is being seen today for an episodic care visit.  HPI information obtained from: facility chart records. Today's concern is:  Left humerus Fx: patient pain controlled, patient participating in therapy walking up to 150 feet using a quad cane   Insomnia/depression: patient just finished a visit with psychologist, has a lot of anxiety, difficulty sleeping at night, ruminating on things, has a long list of \"worries\" at her bedside, discussed starting an antidepressant patient state she is willing to try, has been taking norco to get to sleep at night.   CAD/HTN: BP as follows: 148/68, 173/68, 135/65 with HR in 70's denies CP, palpitations, SOB  Anemia: see labs  Constipation: states bowels are stable    Past Medical and Surgical History reviewed in Epic today.    MEDICATIONS:    Current Outpatient Medications   Medication Sig Dispense Refill     acetaminophen (TYLENOL) 325 MG tablet Take 2 tablets (650 mg) by mouth every 6 hours as needed for mild pain  0     aspirin EC 81 MG EC tablet Take 1 tablet by mouth daily.       atorvastatin (LIPITOR) 80 MG tablet TAKE ONE TABLET BY MOUTH ONCE DAILY 90 tablet 3     calcium 600 MG tablet Take 1 tablet by mouth 2 times daily.       Cholecalciferol (VITAMIN D-400 PO) Take 1 tablet by mouth daily        co-enzyme Q-10 (COENZYME Q-10) 100 MG CAPS Take 1 capsule by mouth daily.       cyclobenzaprine (FLEXERIL) 5 MG tablet Take 1 tablet (5 mg) by mouth 3 times daily as needed for muscle spasms 10 tablet 0     gabapentin (NEURONTIN) 100 MG capsule Take 1 capsule (100 mg) by mouth 3 times daily 15 capsule 0     " "insulin aspart (NOVOLOG PEN) 100 UNIT/ML pen Three times a day with meals, For glucometer 150-200 give 2 units, 201-250 4 units, >250 6 units.  0     levothyroxine (SYNTHROID/LEVOTHROID) 50 MCG tablet Take 1 tablet (50 mcg) by mouth daily 90 tablet 3     losartan (COZAAR) 50 MG tablet Take 1 tablet (50 mg) by mouth daily 90 tablet 3     metFORMIN (GLUCOPHAGE) 500 MG tablet TAKE ONE TABLET BY MOUTH DAILY WITH BREAKFAST 90 tablet 3     metoprolol succinate ER (TOPROL-XL) 25 MG 24 hr tablet Take 1 tablet (25 mg) by mouth daily 30 tablet 11     Multiple Vitamins-Minerals (CENTRUM SILVER) per tablet Take 1 tablet by mouth daily.       Multiple Vitamins-Minerals (PRESERVISION AREDS 2 PO) Take 1 tablet by mouth 2 times daily        nitroGLYCERIN (NITROSTAT) 0.4 MG SL tablet Place 0.4 mg under the tongue every 5 minutes as needed. Up to 3 doses per episode        omeprazole (PRILOSEC) 20 MG DR capsule Take 1 capsule (20 mg) by mouth daily 90 capsule 3     polyethylene glycol (MIRALAX) powder Take 17 g (1 capful) by mouth daily 1 Bottle 3     Psyllium (FIBER) 0.52 g CAPS Take 1 capsule by mouth daily 90 capsule 0     sennosides (SENOKOT) 8.6 MG tablet Take 1-2 tablets by mouth 2 times daily  0     warfarin ANTICOAGULANT (COUMADIN) 3 MG tablet Take 1-1.5 tablets (3-4.5 mg) by mouth daily Takes  4.5mg on Tuesday, Thursday, Saturday  3mg all other days (Mon,Wed,Fri,Sun)  0         REVIEW OF SYSTEMS:  10 point ROS of systems including Constitutional, Eyes, Respiratory, Cardiovascular, Gastroenterology, Genitourinary, Integumentary, Musculoskeletal, Psychiatric were all negative except for pertinent positives noted in my HPI.    Objective:  BP (!) 148/68   Pulse 72   Temp 98  F (36.7  C)   Resp 16   Ht 1.676 m (5' 6\")   Wt 71.1 kg (156 lb 11.2 oz)   SpO2 98%   BMI 25.29 kg/m    Exam:  GENERAL APPEARANCE:  Alert, in no distress  ENT:  Mouth and posterior oropharynx normal, moist mucous membranes, [place]  EYES:  EOM, " conjunctivae, lids, pupils and irises normal, PERRL  RESP:  no respiratory distress  CV:  peripheral edema trace+ in LE bilaterally  ABDOMEN:  abdomen round  M/S:   patient sitting up in WC, able to move all 4 extremiteis  SKIN:  Inspection of skin and subcutaneous tissue baseline  NEURO:   speech WNL    Labs:     Most Recent 3 CBC's:  Recent Labs   Lab Test 07/30/20  0611 07/29/20  1628 07/12/20  1945   WBC 9.1 9.2 6.8   HGB 9.8* 11.4* 11.5*   MCV 76* 76* 77*    199 201     Most Recent 3 BMP's:  Recent Labs   Lab Test 07/30/20  0611 07/29/20  1628 06/05/20  1203    136 139   POTASSIUM 4.8 5.2 4.9   CHLORIDE 105 108 106   CO2 25 22 26   BUN 24 23 28   CR 0.91 1.09* 1.07*   ANIONGAP 5 6 7   SHAWNEE 8.3* 8.8 9.3   * 148* 125*       ASSESSMENT/PLAN:  Closed fracture of proximal end of left humerus with routine healing, unspecified fracture morphology, subsequent encounter  Physical deconditioning  Acute/ongoing: sling to left arm  PT and OT   Continue  tylenol to 650mg TID scheduled and qhs prn , decrease norco to.5/325mg 1 q 6 hours prn, DC flexeril patient not using  Do not exceed 4gm acetaminophen in 24 hours  F/u with ortho as directed  Encouraged patient to use ice prn and use norco in AM before therapy     Coronary artery disease involving native coronary artery of native heart without angina pectoris  Severe AS -- S/P TAVR on 5/5/20  PAF -- on Warfarin   CKD (chronic kidney disease) stage 3, GFR 30-59 ml/min (H)  Essential hypertension  Ongoing: vitals daily and prn, BMP follow, continue losartan to 50 mg QD,  toprol xl to 25mg QD,  ASA 81mg QD and coumadin as directed INR goal of 2-3     Drug-induced constipation  Acute/ongoing: miralax 17gm QD, senna s 1 PO BID scheduled and 1 PO BID prn,      Insomnia:  Mild depression initial episode   Acute: continue melatonin 6mg qhs  House psychologist today  Start remeron 7.5mg qhs           Orders written by provider at facility  remeron 7.5mg qhs    In  addition to the preventive visit, 30minutes   minutes of the appointment were spent evaluating and developing a treatment plan for her additional concern(s). Of depression/anxiety, discussed medication side effects, starting medication for depression, education on use of norco and avoiding at night to help with sleep. Discussed other medications and therapy POC>         Electronically signed by:  Tonya Lynn Haase, APRN CNP             Sincerely,        Tonya Lynn Haase, APRN CNP

## 2020-09-15 NOTE — LETTER
9/15/2020        RE: Madie Silva  [address] [place] [id number]        [facility]  [place] Medical Record Number:  [id number]  Place of Service where encounter took place:  [facility] (S) [[id number]]  Chief Complaint   Patient presents with     Nursing Home Acute       HPI:    Madie Silva  is a 89 year old (7/21/1929), who is being seen today for an episodic care visit.  HPI information obtained from: facility chart records, facility staff and patient report. Today's concern is:  Left humerus Fx: patient pain controlled, patient participating in therapy walking up to 150 feet using a quad cane , patient is WBAT to LUE now will start using RW  Insomnia/depression: patient states she slept well last night    CAD/HTN: BP as follows: 122/56, 123/66, 155/61 with HR in 70's denies CP, palpitations, SOB  Anemia: see labs  Constipation: states bowels are stable    Past Medical and Surgical History reviewed in Epic today.    MEDICATIONS:    Current Outpatient Medications   Medication Sig Dispense Refill     acetaminophen (TYLENOL) 325 MG tablet Take 2 tablets (650 mg) by mouth every 6 hours as needed for mild pain  0     aspirin EC 81 MG EC tablet Take 1 tablet by mouth daily.       atorvastatin (LIPITOR) 80 MG tablet TAKE ONE TABLET BY MOUTH ONCE DAILY 90 tablet 3     calcium 600 MG tablet Take 1 tablet by mouth 2 times daily.       Cholecalciferol (VITAMIN D-400 PO) Take 1 tablet by mouth daily        co-enzyme Q-10 (COENZYME Q-10) 100 MG CAPS Take 1 capsule by mouth daily.       cyclobenzaprine (FLEXERIL) 5 MG tablet Take 1 tablet (5 mg) by mouth 3 times daily as needed for muscle spasms 10 tablet 0     gabapentin (NEURONTIN) 100 MG capsule Take 1 capsule (100 mg) by mouth 3 times daily 15 capsule 0     insulin aspart (NOVOLOG PEN) 100 UNIT/ML pen Three times a day with meals, For glucometer 150-200 give 2 units, 201-250 4 units, >250 6 units.  0     levothyroxine  "(SYNTHROID/LEVOTHROID) 50 MCG tablet Take 1 tablet (50 mcg) by mouth daily 90 tablet 3     losartan (COZAAR) 50 MG tablet Take 1 tablet (50 mg) by mouth daily 90 tablet 3     metFORMIN (GLUCOPHAGE) 500 MG tablet TAKE ONE TABLET BY MOUTH DAILY WITH BREAKFAST 90 tablet 3     metoprolol succinate ER (TOPROL-XL) 25 MG 24 hr tablet Take 1 tablet (25 mg) by mouth daily 30 tablet 11     Multiple Vitamins-Minerals (CENTRUM SILVER) per tablet Take 1 tablet by mouth daily.       Multiple Vitamins-Minerals (PRESERVISION AREDS 2 PO) Take 1 tablet by mouth 2 times daily        nitroGLYCERIN (NITROSTAT) 0.4 MG SL tablet Place 0.4 mg under the tongue every 5 minutes as needed. Up to 3 doses per episode        omeprazole (PRILOSEC) 20 MG DR capsule Take 1 capsule (20 mg) by mouth daily 90 capsule 3     polyethylene glycol (MIRALAX) powder Take 17 g (1 capful) by mouth daily 1 Bottle 3     Psyllium (FIBER) 0.52 g CAPS Take 1 capsule by mouth daily 90 capsule 0     sennosides (SENOKOT) 8.6 MG tablet Take 1-2 tablets by mouth 2 times daily  0     warfarin ANTICOAGULANT (COUMADIN) 3 MG tablet Take 1-1.5 tablets (3-4.5 mg) by mouth daily Takes  4.5mg on Tuesday, Thursday, Saturday  3mg all other days (Mon,Wed,Fri,Sun)  0         REVIEW OF SYSTEMS:  10 point ROS of systems including Constitutional, Eyes, Respiratory, Cardiovascular, Gastroenterology, Genitourinary, Integumentary, Musculoskeletal, Psychiatric were all negative except for pertinent positives noted in my HPI.    Objective:  /56   Pulse 78   Temp 97.5  F (36.4  C)   Resp 18   Ht 1.676 m (5' 6\")   Wt 70.8 kg (156 lb 1.6 oz)   SpO2 96%   BMI 25.20 kg/m    Exam:  GENERAL APPEARANCE:  Alert, in no distress  ENT:  Mouth and posterior oropharynx normal, moist mucous membranes, [place]  EYES:  EOM, conjunctivae, lids, pupils and irises normal, PERRL  RESP:  no respiratory distress  CV:  peripheral edema trace+ in LE bilaterally  ABDOMEN:  abdomen round  M/S:   patient " sitting up in WC, able to move all 4 extremiteis  SKIN:  Inspection of skin and subcutaneous tissue baseline  NEURO:   speech WNL    Labs:     Most Recent 3 CBC's:  Recent Labs   Lab Test 07/30/20  0611 07/29/20  1628 07/12/20  1945   WBC 9.1 9.2 6.8   HGB 9.8* 11.4* 11.5*   MCV 76* 76* 77*    199 201     Most Recent 3 BMP's:  Recent Labs   Lab Test 07/30/20  0611 07/29/20  1628 06/05/20  1203    136 139   POTASSIUM 4.8 5.2 4.9   CHLORIDE 105 108 106   CO2 25 22 26   BUN 24 23 28   CR 0.91 1.09* 1.07*   ANIONGAP 5 6 7   SHAWNEE 8.3* 8.8 9.3   * 148* 125*       ASSESSMENT/PLAN:  Closed fracture of proximal end of left humerus with routine healing, unspecified fracture morphology, subsequent encounter  Physical deconditioning  Acute/ongoing: WBAT to left arm  PT and OT   Continue  tylenol to 650mg TID and q 6 hours prn  DC norco  F/u with ortho as directed     Coronary artery disease involving native coronary artery of native heart without angina pectoris  Severe AS -- S/P TAVR on 5/5/20  PAF -- on Warfarin   CKD (chronic kidney disease) stage 3, GFR 30-59 ml/min (H)  Essential hypertension  Ongoing: vitals daily and prn, BMP follow, continue losartan to 50 mg QD,  toprol xl to 25mg QD,  ASA 81mg QD and coumadin as directed INR goal of 2-3     Drug-induced constipation  Acute/ongoing: miralax 17gm QD, senna s 1 PO BID scheduled and 1 PO BID prn,       Mild depression initial episode   Acute: continue melatonin 6mg qhs  Continue remeron 7.5mg qhs    Orders written by provider at facility  DC norco      Electronically signed by:  Tonya Lynn Haase, APRN CNP             Sincerely,        Tonya Lynn Haase, APRN CNP

## 2020-09-15 NOTE — PROGRESS NOTES
[facility]  [place] Medical Record Number:  [id number]  Place of Service where encounter took place:  [facility] (S) [[id number]]  Chief Complaint   Patient presents with     Nursing Home Acute       HPI:    Madie Silva  is a 89 year old (7/21/1929), who is being seen today for an episodic care visit.  HPI information obtained from: facility chart records, facility staff and patient report. Today's concern is:  Left humerus Fx: patient pain controlled, patient participating in therapy walking up to 150 feet using a quad cane , patient is WBAT to LUE now will start using RW  Insomnia/depression: patient states she slept well last night    CAD/HTN: BP as follows: 122/56, 123/66, 155/61 with HR in 70's denies CP, palpitations, SOB  Anemia: see labs  Constipation: states bowels are stable    Past Medical and Surgical History reviewed in Epic today.    MEDICATIONS:    Current Outpatient Medications   Medication Sig Dispense Refill     acetaminophen (TYLENOL) 325 MG tablet Take 2 tablets (650 mg) by mouth every 6 hours as needed for mild pain  0     aspirin EC 81 MG EC tablet Take 1 tablet by mouth daily.       atorvastatin (LIPITOR) 80 MG tablet TAKE ONE TABLET BY MOUTH ONCE DAILY 90 tablet 3     calcium 600 MG tablet Take 1 tablet by mouth 2 times daily.       Cholecalciferol (VITAMIN D-400 PO) Take 1 tablet by mouth daily        co-enzyme Q-10 (COENZYME Q-10) 100 MG CAPS Take 1 capsule by mouth daily.       cyclobenzaprine (FLEXERIL) 5 MG tablet Take 1 tablet (5 mg) by mouth 3 times daily as needed for muscle spasms 10 tablet 0     gabapentin (NEURONTIN) 100 MG capsule Take 1 capsule (100 mg) by mouth 3 times daily 15 capsule 0     insulin aspart (NOVOLOG PEN) 100 UNIT/ML pen Three times a day with meals, For glucometer 150-200 give 2 units, 201-250 4 units, >250 6 units.  0     levothyroxine (SYNTHROID/LEVOTHROID) 50 MCG tablet Take 1 tablet (50 mcg) by mouth daily 90 tablet 3     losartan  "(COZAAR) 50 MG tablet Take 1 tablet (50 mg) by mouth daily 90 tablet 3     metFORMIN (GLUCOPHAGE) 500 MG tablet TAKE ONE TABLET BY MOUTH DAILY WITH BREAKFAST 90 tablet 3     metoprolol succinate ER (TOPROL-XL) 25 MG 24 hr tablet Take 1 tablet (25 mg) by mouth daily 30 tablet 11     Multiple Vitamins-Minerals (CENTRUM SILVER) per tablet Take 1 tablet by mouth daily.       Multiple Vitamins-Minerals (PRESERVISION AREDS 2 PO) Take 1 tablet by mouth 2 times daily        nitroGLYCERIN (NITROSTAT) 0.4 MG SL tablet Place 0.4 mg under the tongue every 5 minutes as needed. Up to 3 doses per episode        omeprazole (PRILOSEC) 20 MG DR capsule Take 1 capsule (20 mg) by mouth daily 90 capsule 3     polyethylene glycol (MIRALAX) powder Take 17 g (1 capful) by mouth daily 1 Bottle 3     Psyllium (FIBER) 0.52 g CAPS Take 1 capsule by mouth daily 90 capsule 0     sennosides (SENOKOT) 8.6 MG tablet Take 1-2 tablets by mouth 2 times daily  0     warfarin ANTICOAGULANT (COUMADIN) 3 MG tablet Take 1-1.5 tablets (3-4.5 mg) by mouth daily Takes  4.5mg on Tuesday, Thursday, Saturday  3mg all other days (Mon,Wed,Fri,Sun)  0         REVIEW OF SYSTEMS:  10 point ROS of systems including Constitutional, Eyes, Respiratory, Cardiovascular, Gastroenterology, Genitourinary, Integumentary, Musculoskeletal, Psychiatric were all negative except for pertinent positives noted in my HPI.    Objective:  /56   Pulse 78   Temp 97.5  F (36.4  C)   Resp 18   Ht 1.676 m (5' 6\")   Wt 70.8 kg (156 lb 1.6 oz)   SpO2 96%   BMI 25.20 kg/m    Exam:  GENERAL APPEARANCE:  Alert, in no distress  ENT:  Mouth and posterior oropharynx normal, moist mucous membranes, [place]  EYES:  EOM, conjunctivae, lids, pupils and irises normal, PERRL  RESP:  no respiratory distress  CV:  peripheral edema trace+ in LE bilaterally  ABDOMEN:  abdomen round  M/S:   patient sitting up in WC, able to move all 4 extremiteis  SKIN:  Inspection of skin and subcutaneous tissue " baseline  NEURO:   speech WNL    Labs:     Most Recent 3 CBC's:  Recent Labs   Lab Test 07/30/20  0611 07/29/20  1628 07/12/20  1945   WBC 9.1 9.2 6.8   HGB 9.8* 11.4* 11.5*   MCV 76* 76* 77*    199 201     Most Recent 3 BMP's:  Recent Labs   Lab Test 07/30/20  0611 07/29/20  1628 06/05/20  1203    136 139   POTASSIUM 4.8 5.2 4.9   CHLORIDE 105 108 106   CO2 25 22 26   BUN 24 23 28   CR 0.91 1.09* 1.07*   ANIONGAP 5 6 7   SHAWNEE 8.3* 8.8 9.3   * 148* 125*       ASSESSMENT/PLAN:  Closed fracture of proximal end of left humerus with routine healing, unspecified fracture morphology, subsequent encounter  Physical deconditioning  Acute/ongoing: WBAT to left arm  PT and OT   Continue  tylenol to 650mg TID and q 6 hours prn  DC norco  F/u with ortho as directed     Coronary artery disease involving native coronary artery of native heart without angina pectoris  Severe AS -- S/P TAVR on 5/5/20  PAF -- on Warfarin   CKD (chronic kidney disease) stage 3, GFR 30-59 ml/min (H)  Essential hypertension  Ongoing: vitals daily and prn, BMP follow, continue losartan to 50 mg QD,  toprol xl to 25mg QD,  ASA 81mg QD and coumadin as directed INR goal of 2-3     Drug-induced constipation  Acute/ongoing: miralax 17gm QD, senna s 1 PO BID scheduled and 1 PO BID prn,       Mild depression initial episode   Acute: continue melatonin 6mg qhs  Continue remeron 7.5mg qhs    Orders written by provider at facility  DC norco      Electronically signed by:  Tonya Lynn Haase, HAROLDO CNP

## 2020-09-21 NOTE — PROGRESS NOTES
[place] [facility] DISCHARGE SUMMARY  PATIENT'S NAME: Madie Silva  YOB: 1929  MEDICAL RECORD NUMBER:  [id number]  Place of Service where encounter took place:  [facility] (S) [[id number]]    PRIMARY CARE PROVIDER AND CLINIC RESPONSIBLE AFTER TRANSFER:   De Obregon MD, [address] [place] [address] / [facility] [id number]    [facility]     Transferring providers: Tonya Lynn Haase, APRN CNP, Dr. Jeremy Leal MD  Recent Hospitalization/ED:  [facility] Hospital stay 7/29/20 to 8/1/20.  Date of SNF Admission: August / 01 / 2020  Date of SNF (anticipated) Discharge: September / 23 / 2020  Discharged to: previous independent home  Cognitive Scores: BIMS: 14/15, CPT: 4.7/5.6, MOCA: 23/30 and sbt 0/28, 9/17 MOCA version 2 24/30  Physical Function: Ambulating 300 ft with 2WW  DME: Walker    CODE STATUS/ADVANCE DIRECTIVES DISCUSSION:  Full Code   ALLERGIES: Fosamax [alendronic acid]; Amoxicillin; Bisphosphonates; Boniva [ibandronate sodium]; Lisinopril; Niacin; and Simvastatin    DISCHARGE DIAGNOSIS/NURSING FACILITY COURSE:   Patient was cleared for WBAT to left arm and progressed to walking > 150 feet using a RW indep, indep with ADL's will DC home states Son will be staying with her for a few days, will have home PT, OT, RN and HHA through [facility].     Past Medical History:  has a past medical history of Walters's esophagus (7/09), Bradycardia, CHRONIC VERTIGO (9/99), Colonic Adenoma (3/90, 11/98), Coronary artery disease, Costochondritis, Depression, DIABETES MELLITUS TYPE II (10/07), DJD, DVT of Leg, postop (11/09), Gastritis (10/89), GERD, HIATAL HERNIA, HYPERLIPIDEMIA, HYPOTHYROIDISM (aka HASHIMOTO), Impaired fasting glucose, L Ankle Fracture (10/09), Obesity, unspecified, Osteoporosis, unspecified, PERIPHERAL NEUROPATHY, Polymyalgia rheumatica (H), Post Herpetic Neuralgia (4/03), Restless leg syndrome, Small vessel cerebrovascular changes (9/99), Stricture and stenosis of  esophagus (10/89), Syncope, and VITAMIN D DEFICIENCY (12/07).    Discharge Medications:    Current Outpatient Medications   Medication Sig Dispense Refill     acetaminophen (TYLENOL) 500 MG tablet Take 1,000 mg by mouth 3 times daily       aspirin EC 81 MG EC tablet Take 1 tablet by mouth daily.       atorvastatin (LIPITOR) 80 MG tablet TAKE ONE TABLET BY MOUTH ONCE DAILY 90 tablet 3     calcium 600 MG tablet Take 1 tablet by mouth 2 times daily.       Cholecalciferol (VITAMIN D-400 PO) Take 1 tablet by mouth daily        co-enzyme Q-10 (COENZYME Q-10) 100 MG CAPS Take 1 capsule by mouth daily.       gabapentin (NEURONTIN) 100 MG capsule Take 1 capsule (100 mg) by mouth 3 times daily 15 capsule 0     insulin aspart (NOVOLOG PEN) 100 UNIT/ML pen Three times a day with meals, For glucometer 150-200 give 2 units, 201-250 4 units, >250 6 units.  0     levothyroxine (SYNTHROID/LEVOTHROID) 50 MCG tablet Take 1 tablet (50 mcg) by mouth daily 90 tablet 3     Lidocaine (LIDOCARE) 4 % Patch Place 1 patch onto the skin every 24 hours Apply to left wrist topically at bedtime for Pain and remove per schedule To prevent lidocaine toxicity, patient should be patch free for 12 hrs daily.       losartan (COZAAR) 50 MG tablet Take 1 tablet (50 mg) by mouth daily 90 tablet 3     melatonin 3 MG tablet Take 3 mg by mouth At Bedtime       metFORMIN (GLUCOPHAGE) 500 MG tablet TAKE ONE TABLET BY MOUTH DAILY WITH BREAKFAST 90 tablet 3     metoprolol succinate ER (TOPROL-XL) 25 MG 24 hr tablet Take 1 tablet (25 mg) by mouth daily 30 tablet 11     mirtazapine (REMERON) 7.5 MG tablet Take 7.5 mg by mouth At Bedtime       Multiple Vitamins-Minerals (CENTRUM SILVER) per tablet Take 1 tablet by mouth daily.       Multiple Vitamins-Minerals (PRESERVISION AREDS 2 PO) Take 1 tablet by mouth 2 times daily        nitroGLYCERIN (NITROSTAT) 0.4 MG SL tablet Place 0.4 mg under the tongue every 5 minutes as needed. Up to 3 doses per episode         nystatin (MYCOSTATIN) 385455 UNIT/GM external powder Apply to breast folds topically two times a day for redness       omeprazole (PRILOSEC) 20 MG DR capsule Take 1 capsule (20 mg) by mouth daily 90 capsule 3     polyethylene glycol (MIRALAX) powder Take 17 g (1 capful) by mouth daily 1 Bottle 3     senna-docusate (SENOKOT-S/PERICOLACE) 8.6-50 MG tablet Give 1 tablet by mouth one time a day for constipation AND Give 1 tablet by mouth as needed for constipation BID PRN       Warfarin Sodium (COUMADIN PO) Give 1 tablet orally in the evening every Mon, Tue for Afib until 09/22/2020 23:59 per camp       acetaminophen (TYLENOL) 325 MG tablet Take 2 tablets (650 mg) by mouth every 6 hours as needed for mild pain (Patient not taking: Reported on 9/22/2020)  0     cyclobenzaprine (FLEXERIL) 5 MG tablet Take 1 tablet (5 mg) by mouth 3 times daily as needed for muscle spasms (Patient not taking: Reported on 9/22/2020) 10 tablet 0     Psyllium (FIBER) 0.52 g CAPS Take 1 capsule by mouth daily (Patient not taking: Reported on 9/22/2020) 90 capsule 0     sennosides (SENOKOT) 8.6 MG tablet Take 1-2 tablets by mouth 2 times daily  0     warfarin ANTICOAGULANT (COUMADIN) 3 MG tablet Take 1-1.5 tablets (3-4.5 mg) by mouth daily Takes  4.5mg on Tuesday, Thursday, Saturday  3mg all other days (Mon,Wed,Fri,Sun) (Patient not taking: Reported on 9/22/2020)  0       Medication Changes/Rationale:     Pain control an issue early during TCU coarse, increased norco to 7.5/325mg q 4 hours prn, throughout stay able to wean off norco.     Started on remeron 7.5mg qhs for insomnia and depression, patient did have psychology consult during TCU stay found to have some depressive ideation and also ongoing anxiety.     Controlled medications sent with patient:   not applicable/none     ROS:   10 point ROS of systems including Constitutional, Eyes, Respiratory, Cardiovascular, Gastroenterology, Genitourinary, Integumentary, Musculoskeletal,  "Psychiatric were all negative except for pertinent positives noted in my HPI.    Physical Exam:   Vitals: /61   Pulse 75   Temp 97.3  F (36.3  C)   Resp 16   Ht 1.676 m (5' 6\")   Wt 70.9 kg (156 lb 3.2 oz)   SpO2 93%   BMI 25.21 kg/m    BMI= Body mass index is 25.21 kg/m .  GENERAL APPEARANCE:  Alert, in no distress  ENT:  Mouth and posterior oropharynx normal, moist mucous membranes, [place]  EYES:  EOM, conjunctivae, lids, pupils and irises normal, PERRL  NECK:  .  RESP:  no respiratory distress  CV:  no edema  ABDOMEN:  normal bowel sounds, soft, nontender, no hepatosplenomegaly or other masses  M/S:   Examination of:   right upper extremity, right lower extremity and left lower extremity  Inspection, ROM, stability and muscle strength normal and decreased AROM to left arm/shoulder  SKIN:  Inspection of skin and subcutaneous tissue baseline  NEURO:   Cranial nerves 2-12 are normal tested and grossly at patient's baseline, speech WNL  PSYCH:  affect and mood normal     SNF labs:   Most Recent 3 CBC's:  Recent Labs   Lab Test 07/30/20  0611 07/29/20  1628 07/12/20  1945   WBC 9.1 9.2 6.8   HGB 9.8* 11.4* 11.5*   MCV 76* 76* 77*    199 201     Most Recent 3 BMP's:  Recent Labs   Lab Test 07/30/20  0611 07/29/20  1628 06/05/20  1203    136 139   POTASSIUM 4.8 5.2 4.9   CHLORIDE 105 108 106   CO2 25 22 26   BUN 24 23 28   CR 0.91 1.09* 1.07*   ANIONGAP 5 6 7   SHAWNEE 8.3* 8.8 9.3   * 148* 125*     INR Flow sheet at SNF:    ASSESSMENT/PLAN:  Closed fracture of proximal end of left humerus with routine healing, unspecified fracture morphology, subsequent encounter  Physical deconditioning  Acute/ongoing: WBAT to left arm  DC home with home PT, OT, RN and HHA through [facility]   Continue  tylenol to 650mg q 6 hours pr  F/u with ortho as directed      Coronary artery disease involving native coronary artery of native heart without angina pectoris  Severe AS -- S/P TAVR on 5/5/20  PAF -- on Warfarin "   CKD (chronic kidney disease) stage 3, GFR 30-59 ml/min (H)  Essential hypertension  Ongoing: continue losartan to 50 mg QD,  toprol xl to 25mg QD,  ASA 81mg QD and coumadin as directed INR goal of 2-3     Drug-induced constipation  Acute/ongoing: miralax 17gm QD, senna s 1 PO BID scheduled and 1 PO BID prn,     Mild depression initial episode   Acute: continue melatonin 6mg qhs  Continue remeron 7.5mg qhs f/u with PCP    DISCHARGE PLAN:    Follow up labs: No labs orders/due    Medical Follow Up:      Follow up with primary care provider in 1 weeks    MTM referral needed and placed by this provider: No    Current [place] scheduled appointments:  Next 5 appointments (look out 90 days)    Sep 30, 2020  3:40 PM CDT  [facility] [address] [address] [phone]           Discharge Services: Home Care:  Occupational Therapy, Physical Therapy, Registered Nurse and Home Health Aide    Discharge Instructions Verbalized to Patient at Discharge:     None      TOTAL DISCHARGE TIME:   Greater than 30 minutes  Electronically signed by:  Tonya Lynn Haase, APRN CNP

## 2020-09-22 NOTE — LETTER
9/22/2020        RE: Madie Silva  [address]  [facility] [address]        [facility] DISCHARGE SUMMARY  PATIENT'S NAME: Madie Silva  YOB: 1929  MEDICAL RECORD NUMBER:  [id number]  Place of Service where encounter took place:  [facility] [[id number]]    PRIMARY CARE PROVIDER AND CLINIC RESPONSIBLE AFTER TRANSFER:   De Obregon MD, [address] / [facility] [address]    G Provider     Transferring providers: Tonya Lynn Haase, APRN CNP, Dr. Jeremy Leal MD  Recent Hospitalization/ED:  [facility] Hospital stay 7/29/20 to 8/1/20.  Date of SNF Admission: August / 01 / 2020  Date of SNF (anticipated) Discharge: September / 23 / 2020  Discharged to: previous independent home  Cognitive Scores: BIMS: 14/15, CPT: 4.7/5.6, MOCA: 23/30 and sbt 0/28, 9/17 MOCA version 2 24/30  Physical Function: Ambulating 300 ft with 2WW  DME: Walker    CODE STATUS/ADVANCE DIRECTIVES DISCUSSION:  Full Code   ALLERGIES: Fosamax [alendronic acid]; Amoxicillin; Bisphosphonates; Boniva [ibandronate sodium]; Lisinopril; Niacin; and Simvastatin    DISCHARGE DIAGNOSIS/NURSING FACILITY COURSE:   Patient was cleared for WBAT to left arm and progressed to walking > 150 feet using a RW indep, indep with ADL's will DC home states Son will be staying with her for a few days, will have home PT, OT, RN and HHA through [facility].     Past Medical History:  has a past medical history of Walters's esophagus (7/09), Bradycardia, CHRONIC VERTIGO (9/99), Colonic Adenoma (3/90, 11/98), Coronary artery disease, Costochondritis, Depression, DIABETES MELLITUS TYPE II (10/07), DJD, DVT of Leg, postop (11/09), Gastritis (10/89), GERD, HIATAL HERNIA, HYPERLIPIDEMIA, HYPOTHYROIDISM (aka HASHIMOTO), Impaired fasting glucose, L Ankle Fracture (10/09), Obesity, unspecified, Osteoporosis, unspecified, PERIPHERAL NEUROPATHY, Polymyalgia rheumatica (H), Post Herpetic Neuralgia (4/03),  Restless leg syndrome, Small vessel cerebrovascular changes (9/99), Stricture and stenosis of esophagus (10/89), Syncope, and VITAMIN D DEFICIENCY (12/07).    Discharge Medications:    Current Outpatient Medications   Medication Sig Dispense Refill     acetaminophen (TYLENOL) 500 MG tablet Take 1,000 mg by mouth 3 times daily       aspirin EC 81 MG EC tablet Take 1 tablet by mouth daily.       atorvastatin (LIPITOR) 80 MG tablet TAKE ONE TABLET BY MOUTH ONCE DAILY 90 tablet 3     calcium 600 MG tablet Take 1 tablet by mouth 2 times daily.       Cholecalciferol (VITAMIN D-400 PO) Take 1 tablet by mouth daily        co-enzyme Q-10 (COENZYME Q-10) 100 MG CAPS Take 1 capsule by mouth daily.       gabapentin (NEURONTIN) 100 MG capsule Take 1 capsule (100 mg) by mouth 3 times daily 15 capsule 0     insulin aspart (NOVOLOG PEN) 100 UNIT/ML pen Three times a day with meals, For glucometer 150-200 give 2 units, 201-250 4 units, >250 6 units.  0     levothyroxine (SYNTHROID/LEVOTHROID) 50 MCG tablet Take 1 tablet (50 mcg) by mouth daily 90 tablet 3     Lidocaine (LIDOCARE) 4 % Patch Place 1 patch onto the skin every 24 hours Apply to left wrist topically at bedtime for Pain and remove per schedule To prevent lidocaine toxicity, patient should be patch free for 12 hrs daily.       losartan (COZAAR) 50 MG tablet Take 1 tablet (50 mg) by mouth daily 90 tablet 3     melatonin 3 MG tablet Take 3 mg by mouth At Bedtime       metFORMIN (GLUCOPHAGE) 500 MG tablet TAKE ONE TABLET BY MOUTH DAILY WITH BREAKFAST 90 tablet 3     metoprolol succinate ER (TOPROL-XL) 25 MG 24 hr tablet Take 1 tablet (25 mg) by mouth daily 30 tablet 11     mirtazapine (REMERON) 7.5 MG tablet Take 7.5 mg by mouth At Bedtime       Multiple Vitamins-Minerals (CENTRUM SILVER) per tablet Take 1 tablet by mouth daily.       Multiple Vitamins-Minerals (PRESERVISION AREDS 2 PO) Take 1 tablet by mouth 2 times daily        nitroGLYCERIN (NITROSTAT) 0.4 MG SL tablet  Place 0.4 mg under the tongue every 5 minutes as needed. Up to 3 doses per episode        nystatin (MYCOSTATIN) 551318 UNIT/GM external powder Apply to breast folds topically two times a day for redness       omeprazole (PRILOSEC) 20 MG DR capsule Take 1 capsule (20 mg) by mouth daily 90 capsule 3     polyethylene glycol (MIRALAX) powder Take 17 g (1 capful) by mouth daily 1 Bottle 3     senna-docusate (SENOKOT-S/PERICOLACE) 8.6-50 MG tablet Give 1 tablet by mouth one time a day for constipation AND Give 1 tablet by mouth as needed for constipation BID PRN       Warfarin Sodium (COUMADIN PO) Give 1 tablet orally in the evening every Mon, Tue for Afib until 09/22/2020 23:59 per camp       acetaminophen (TYLENOL) 325 MG tablet Take 2 tablets (650 mg) by mouth every 6 hours as needed for mild pain (Patient not taking: Reported on 9/22/2020)  0     cyclobenzaprine (FLEXERIL) 5 MG tablet Take 1 tablet (5 mg) by mouth 3 times daily as needed for muscle spasms (Patient not taking: Reported on 9/22/2020) 10 tablet 0     Psyllium (FIBER) 0.52 g CAPS Take 1 capsule by mouth daily (Patient not taking: Reported on 9/22/2020) 90 capsule 0     sennosides (SENOKOT) 8.6 MG tablet Take 1-2 tablets by mouth 2 times daily  0     warfarin ANTICOAGULANT (COUMADIN) 3 MG tablet Take 1-1.5 tablets (3-4.5 mg) by mouth daily Takes  4.5mg on Tuesday, Thursday, Saturday  3mg all other days (Mon,Wed,Fri,Sun) (Patient not taking: Reported on 9/22/2020)  0       Medication Changes/Rationale:     Pain control an issue early during TCU coarse, increased norco to 7.5/325mg q 4 hours prn, throughout stay able to wean off norco.     Started on remeron 7.5mg qhs for insomnia and depression, patient did have psychology consult during TCU stay found to have some depressive ideation and also ongoing anxiety.     Controlled medications sent with patient:   not applicable/none     ROS:   10 point ROS of systems including Constitutional, Eyes, Respiratory,  "Cardiovascular, Gastroenterology, Genitourinary, Integumentary, Musculoskeletal, Psychiatric were all negative except for pertinent positives noted in my HPI.    Physical Exam:   Vitals: /61   Pulse 75   Temp 97.3  F (36.3  C)   Resp 16   Ht 1.676 m (5' 6\")   Wt 70.9 kg (156 lb 3.2 oz)   SpO2 93%   BMI 25.21 kg/m    BMI= Body mass index is 25.21 kg/m .  GENERAL APPEARANCE:  Alert, in no distress  ENT:  Mouth and posterior oropharynx normal, moist mucous membranes, [place]  EYES:  EOM, conjunctivae, lids, pupils and irises normal, PERRL  NECK:  .  RESP:  no respiratory distress  CV:  no edema  ABDOMEN:  normal bowel sounds, soft, nontender, no hepatosplenomegaly or other masses  M/S:   Examination of:   right upper extremity, right lower extremity and left lower extremity  Inspection, ROM, stability and muscle strength normal and decreased AROM to left arm/shoulder  SKIN:  Inspection of skin and subcutaneous tissue baseline  NEURO:   Cranial nerves 2-12 are normal tested and grossly at patient's baseline, speech WNL  PSYCH:  affect and mood normal     SNF labs:   Most Recent 3 CBC's:  Recent Labs   Lab Test 07/30/20  0611 07/29/20  1628 07/12/20  1945   WBC 9.1 9.2 6.8   HGB 9.8* 11.4* 11.5*   MCV 76* 76* 77*    199 201     Most Recent 3 BMP's:  Recent Labs   Lab Test 07/30/20  0611 07/29/20  1628 06/05/20  1203    136 139   POTASSIUM 4.8 5.2 4.9   CHLORIDE 105 108 106   CO2 25 22 26   BUN 24 23 28   CR 0.91 1.09* 1.07*   ANIONGAP 5 6 7   SHAWNEE 8.3* 8.8 9.3   * 148* 125*     INR Flow sheet at SNF:    ASSESSMENT/PLAN:  Closed fracture of proximal end of left humerus with routine healing, unspecified fracture morphology, subsequent encounter  Physical deconditioning  Acute/ongoing: WBAT to left arm  DC home with home PT, OT, RN and HHA through [facility]   Continue  tylenol to 650mg q 6 hours pr  F/u with ortho as directed      Coronary artery disease involving native coronary artery of native " heart without angina pectoris  Severe AS -- S/P TAVR on 5/5/20  PAF -- on Warfarin   CKD (chronic kidney disease) stage 3, GFR 30-59 ml/min (H)  Essential hypertension  Ongoing: continue losartan to 50 mg QD,  toprol xl to 25mg QD,  ASA 81mg QD and coumadin as directed INR goal of 2-3     Drug-induced constipation  Acute/ongoing: miralax 17gm QD, senna s 1 PO BID scheduled and 1 PO BID prn,     Mild depression initial episode   Acute: continue melatonin 6mg qhs  Continue remeron 7.5mg qhs f/u with PCP    DISCHARGE PLAN:    Follow up labs: No labs orders/due    Medical Follow Up:      Follow up with primary care provider in 1 weeks    MTM referral needed and placed by this provider: No    Current [place] scheduled appointments:  Next 5 appointments (look out 90 days)    Sep 30, 2020  3:40 PM CDT  Office Visit with Bee Orr MD  [facility] [address] [address] [phone]           Discharge Services: Home Care:  Occupational Therapy, Physical Therapy, Registered Nurse and Home Health Aide    Discharge Instructions Verbalized to Patient at Discharge:     None      TOTAL DISCHARGE TIME:   Greater than 30 minutes  Electronically signed by:  Tonya Lynn Haase, APRN CNP                       Sincerely,        Tonya Lynn Haase, APRN CNP

## 2020-09-24 NOTE — PROGRESS NOTES
ANTICOAGULATION MANAGEMENT     Patient Name:  Madie Silva  Date:  2020    ASSESSMENT /SUBJECTIVE:    Today's INR result of 1.7 is subtherapeutic. Goal INR of 2.0-3.0      Warfarin dose taken: Warfarin taken as instructed    Diet: No new diet changes affecting INR    Medication changes/ interactions: Patient taking tylenol for pain    Previous INR: Therapeutic     S/S of bleeding or thromboembolism: No    New injury or illness: Yes: Broken arm in 2020, she has been in TCU since then, she is now home    Upcoming surgery, procedure or cardioversion: No    Additional findings: No dosing from TCU available.      PLAN:    Telephone call with home care nurse Nataliia regarding INR result and instructed:     Warfarin Dosing Instructions: Continue your current warfarin dose 4.5 mg on tues/sat and 3 mg all other days    Instructed patient to follow up no later than: 20  Orders given to  Homecare nurse/facility to recheck    Education provided: None required      Nataliia, home care, verbalizes understanding and agrees to warfarin dosing plan.    Instructed to call the Anticoagulation Clinic for any changes, questions or concerns. (#[phone])        Julianne Camilo RN      OBJECTIVE:  Recent labs: (last 7 days)     20   INR 1.7*         No question data found.  Anticoagulation Summary  As of 2020    INR goal:   2.0-3.0   TTR:   84.1 % (11.9 mo)   INR used for dosin.7! (2020)   Warfarin maintenance plan:   4.5 mg (3 mg x 1.5) every Tue, Sat; 3 mg (3 mg x 1) all other days   Full warfarin instructions:   4.5 mg every Tue, Sat; 3 mg all other days   Weekly warfarin total:   24 mg   Plan last modified:   Alexsandra Marvin RN (2020)   Next INR check:      Priority:   Maintenance   Target end date:   Indefinite    Indications    Long-term (current) use of anticoagulants [Z79.01] (Resolved) [Z79.01]  Phlebitis and thrombophlebitis of deep veins of lower extremities (H) [I80.209]  Acute  myocardial infarction  initial episode of care (H) (Resolved) [I21.9]  PAF -- on Warfarin  [I48.0]             Anticoagulation Episode Summary     INR check location:       Preferred lab:       [facility]    Comments:         Anticoagulation Care Providers     Provider Role Specialty Phone number    De Obregon MD Referring Internal Medicine [phone]

## 2020-09-25 NOTE — TELEPHONE ENCOUNTER
Per [facility] policy, I need to inform you there is a severe interaction between the Warfarin and Aspirin this patient takes.     Please close encounter to acknowledge receipt of this message.     Thanks,   Nataliia Galan RN [facility]  [phone]  [email or web address]

## 2020-09-27 NOTE — PROGRESS NOTES
Clinic Care Coordination Contact    Situation: Patient chart reviewed by care coordinator.    Background: CC RN following patient during TCU admission at [facility].    Assessment: Per chart review, patient was discharged from TCU to home on 9/23/20.  Patient receiving services through [facility].    Plan/Recommendations: CC RN will outreach to patient for TCU discharge follow-up.    Jeet Parish RN  Clinic Care Coordinator  [facility]  Ph: [phone]

## 2020-09-28 NOTE — TELEPHONE ENCOUNTER
[facility] now requests orders and shares plan of care/discharge summaries for some patients through [employer].  Please REPLY TO THIS MESSAGE OR ROUTE BACK TO THE AUTHOR in order to give authorization for orders when needed.  This is considered a verbal order, you will still receive a faxed copy of orders for signature.  Thank you for your assistance in improving collaboration for our patients.    ORDER:  PT 2w3, 1w1 for instruction in safe transfers/ambulation, balance exercises, fall prevention.    HHA 1w3 for assistance with showers/personal care.    Rubi Mari, PT  [email or web address]  [phone]

## 2020-09-30 NOTE — PROGRESS NOTES
SUBJECTIVE                                                      HPI: Madie Silva is a very pleasant 89 year old female who presents for hospital/TCU follow-up:    Accompanied by son, who assists with history as patient is hard of hearing.    Patient suffered a left proximal humerus fracture after a fall in her garden 7/29/2020.  Orthopedic surgery consulted and sling recommended. Patient discharged to TCU where she stayed until 1 week ago. She is now back home with home care services (PT, OT, RN, and home health aide).    She is generally doing well - no complaints. Using a walker to get around and still, understandably, nervous about falling again.  No significant pain related to humerus fracture. Appetite and energy are back to baseline.      No fevers or chills. No chest pain or palpitations. No shortness of breath or cough. No diarrhea or constipation.    Patient would like a flu shot today.    OBJECTIVE                                                      /52 (BP Location: Left arm, Patient Position: Chair, Cuff Size: Adult Regular)   Pulse 98   Temp 97  F (36.1  C) (Temporal)   Resp 22   Wt 70.3 kg (155 lb)   SpO2 99%   BMI 25.02 kg/m    Constitutional: well-appearing, hard hearing  Respiratory: normal respiratory effort; clear to auscultation bilaterally  Cardiovascular: regular rate and rhythm; no edema  Gastrointestinal: soft, non-tender, non-distended; no organomegaly or masses   Musculoskeletal: walks with walker  Psych: normal mood and affect; normal judgment and insight; recent and remote memory intact    ASSESSMENT/PLAN                                                      (Z09) Hospital discharge follow-up  (primary encounter diagnosis)  202D) Closed fracture of proximal end of left humerus with routine healing, unspecified fracture morphology, subsequent encounter  Comment: clinically doing well; functionally below baseline, but has home care services in place.  Plan: continue home care  services as scheduled; no additional intervention or evaluations indicated at this time.    (Z23) Need for prophylactic vaccination and inoculation against influenza  Plan: Flu shot given today.    Bee Orr MD   [address] [address]  T: [phone], F: [phone]  (Note documentation was completed, in part, with [employer] voice-recognition software. Documentation was reviewed, but some grammatical, spelling, and word errors may remain.)

## 2020-09-30 NOTE — PROGRESS NOTES
"Voicemail from patient requesting antibiotics for upcoming dental appt. Clindamycin filled per [facility] protocol.     I telephoned patient to review this prescription and instructions.  Patient fell July 2020, recently back at home from extended TCU stay. She states she has home health aides and nurses coming over. She had questions about her pacemaker and when it needed to be checked. Reviewed 8/5/20 remote check and she states she was \"so mixed up there\" and didn't know anything about that check. I offered to reach out to device nurses to review this remote check. She was appreciative stating, \"I don't know their number and that would save me a hassle.\" Message sent to device team.    Kim Wong RN  [facility]    "
Airway patent, Nasal mucosa clear. Mouth with normal mucosa. Throat has no vesicles, no oropharyngeal exudates and uvula is midline.

## 2020-09-30 NOTE — TELEPHONE ENCOUNTER
Called pt to confirm that she did have remote PPM check on 8/5/20 per records in [facility]. Set up office PPM check for 11/11/20.

## 2020-09-30 NOTE — PROGRESS NOTES
ANTICOAGULATION MANAGEMENT     Patient Name:  Madie Silva  Date:  9/30/2020    ASSESSMENT /SUBJECTIVE:    Today's INR result of 1.8 is subtherapeutic. Goal INR of 2.0-3.0      Warfarin dose taken: Warfarin taken as instructed    Diet: No new diet changes affecting INR    Medication changes/ interactions: No new medications/supplements affecting INR    Previous INR: Subtherapeutic     S/S of bleeding or thromboembolism: No    New injury or illness: No    Upcoming surgery, procedure or cardioversion: No    Additional findings: None      PLAN:    Telephone call with Madie regarding INR result and instructed:     Warfarin Dosing Instructions: Change your warfarin dose to 4.5mg Tu/THu/Sat and 3 mg AOD    Instructed patient to follow up no later than: 1 week  Lab visit scheduled    Education provided: Target INR goal and significance of current INR result      Madie verbalizes understanding and agrees to warfarin dosing plan.    Instructed to call the Anticoagulation Clinic for any changes, questions or concerns. (#[phone])        Connie Hickman RN      OBJECTIVE:  Recent labs: (last 7 days)     09/24/20 09/30/20  1601   INR 1.7* 1.80*         INR assessment SUB    Recheck INR In: 1 WEEK    INR Location Clinic      Anticoagulation Summary  As of 9/30/2020    INR goal:   2.0-3.0   TTR:   77.6 % (11.9 mo)   INR used for dosing:   No new INR was available at the time of this encounter.   Warfarin maintenance plan:   4.5 mg (3 mg x 1.5) every Tue, Thu, Sat; 3 mg (3 mg x 1) all other days   Full warfarin instructions:   4.5 mg every Tue, Thu, Sat; 3 mg all other days   Weekly warfarin total:   25.5 mg   Plan last modified:   Connie Hickman RN (9/30/2020)   Next INR check:   10/7/2020   Priority:   Maintenance   Target end date:   Indefinite    Indications    Long-term (current) use of anticoagulants [Z79.01] (Resolved) [Z79.01]  Phlebitis and thrombophlebitis of deep veins of lower extremities (H) [I80.209]  Acute  myocardial infarction  initial episode of care (H) (Resolved) [I21.9]  PAF -- on Warfarin  [I48.0]             Anticoagulation Episode Summary     INR check location:       Preferred lab:       Send INR reminders to:   [facility]    Comments:         Anticoagulation Care Providers     Provider Role Specialty Phone number    De Obregon MD Referring Internal Medicine [phone]

## 2020-10-05 NOTE — LETTER
[place] CARE COORDINATION  [facility]  [address] [place] [address]    October 5, 2020    Madie Silva  [address] [place] [id number]      Dear Madie,    I am a clinic care coordinator who works with De Obregon MD at [facility]. I wanted to thank you for spending the time to talk with me.  Below is a description of clinic care coordination and how I can further assist you.      The clinic care coordination team is made up of a registered nurse,  and community health worker who understand the health care system. The goal of clinic care coordination is to help you manage your health and improve access to the health care system in the most efficient manner. The team can assist you in meeting your health care goals by providing education, coordinating services, strengthening the communication among your providers and supporting you with any resource needs.    Please feel free to contact me with any questions or concerns. We are focused on providing you with the highest-quality healthcare experience possible and that all starts with you.     Sincerely,     Jeet Parish RN  Clinic Care Coordinator  [facility]  Ph: [phone]    Enclosed: I have enclosed a copy of the Complex Care Plan. This has helpful information and goals that we have talked about. Please keep this in an easy to access place to use as needed.

## 2020-10-05 NOTE — LETTER
[facility]  Complex Care Plan  About Me:    Patient Name:  Madie Amezquita    YOB: 1929  Age:         89 year old   Zhen MRN:    [id number] Telephone Information:  Home Phone [phone]   Mobile [phone]       Address:  [address] [place] [id number] Email address:  No e-mail address on record      Emergency Contact(s)    Name Relationship Lgl Grd Work Phone Home Phone Mobile Phone   1. SUSAN AMEZQUITA Son No [phone]   2. NATAN AMEZQUITA No  [phone]   3. JOSE LUIS AMEZQUITA No  [phone]           Primary language:  English     needed? No   [facility]:  [phone] op. 1  Other communication barriers: None  Preferred Method of Communication:  Mail  Current living arrangement: I live in a private home  Mobility Status/ Medical Equipment: Independent    Health Maintenance  Health Maintenance Reviewed: Due/Overdue   Health Maintenance Due   Topic Date Due     ADVANCE CARE PLANNING  07/21/1929     DEPRESSION ACTION PLAN  07/21/[birth year]29     MEDICARE ANNUAL WELLNESS VISIT  07/21/1994     PHQ-9  10/22/2016     EYE EXAM  01/01/2019     DIABETIC FOOT EXAM  12/13/2019     LIPID  06/06/2020     TSH W/FREE T4 REFLEX  06/06/2020     My Access Plan  Medical Emergency 911   Primary Clinic Line [facility] - [phone]   24 Hour Appointment Line [phone] or  [phone]) (toll-free)   24 Hour Nurse Line [phone] (toll-free)   Preferred Urgent Care [facility], [phone]   Preferred Hospital [facility]  [phone]   Preferred Pharmacy [facility] [place], [place] - [address] [place] [address]     Behavioral Health Crisis Line The National Suicide Prevention Lifeline at 1-375.126.3117 or 911       My Care Team Members  Patient Care Team       Relationship Specialty  Notifications Start End    De Obregon MD PCP - General   2/15/02     Phone: [phone] Pager: [phone] Fax: [phone]        [address] [place] [address]    JaidaDayana mckinley [facility] Pharmacist Pharmacist  5/11/20      [address] [address] [facility] [address]    Jeet Parish, RN Lead Care Coordinator Primary Care - CC  8/4/20     Phone: [phone]         [facility] ([facility] ([place])  9/22/20     Phone: [phone]         De Obregon MD Assigned PCP   9/27/20     Phone: [phone] Pager: [phone] Fax: [phone]        [address] [place] [address]    Rohini Herrmann MA Community Health Worker Primary Care - CC  10/5/20     Phone: [phone]                 My Care Plans  Self Management and Treatment Plan  Goals and (Comments)  Goals        General    1. Psychosocial (pt-stated)     Notes - Note edited  10/5/2020  1:38 PM by Jeet Parish, RN    Goal Statement: I want to remain as independent as I safely can.  Date Goal set: 10/5/20  Barriers: lives alone in private home with stairs, chronic vertigo, recent fall  Strengths: motivated to remain independent, supportive son  Date to Achieve By: 4/30/21  Patient expressed understanding of goal: Yes    Action steps to achieve this goal:  1. I will continue working with [facility] as indicated.  2. I will explore additional options for in-home assistance.  3. I will consider higher levels of care if needed.  4. I will continue working with Care Coordination to identify and address barriers to achieving my goal.           Action Plans on File:   None on file    Advance Care Plans/Directives Type:   Type Advanced Care Plans/Directives: Advanced Directive - On File      My Medical and Care Information  Problem List   Patient Active Problem List   Diagnosis     GERD     Colonic Adenoma     Obesity     Osteoarthritis involving multiple joints     Osteoporosis     Small vessel  cerebrovascular changes     Essential hypertension     Post herpetic neuralgia     CHRONIC VERTIGO     Diaphragmatic hernia     Polymyalgia rheumatica (H)     Hypothyroidism     DM type 2 -- Hgb A1C 6.9 on 5/5/20     Vitamin D deficiency     Hereditary and idiopathic peripheral neuropathy     Walters's esophagus     Restless leg syndrome     Vasovagal syncope     Preoperative evaluation to rule out surgical contraindication     CKD (chronic kidney disease) stage 3, GFR 30-59 ml/min (H)     Health Care Home     PPD positive     Phlebitis and thrombophlebitis of deep veins of lower extremities (H)     Kidney stone     PAF -- on Warfarin      Aortic valve stenosis, severe     Stricture and stenosis of esophagus     CAD, S/P CABG x 4 in 2011     Symptomatic bradycardia -- S/P PPM 2011     Status post coronary angiogram     Severe AS -- S/P TAVR on 5/5/20     Left Prox Humerus Fract     History of stroke      Current Medications:  Current Outpatient Medications   Medication Instructions     acetaminophen (TYLENOL) 1,000 mg, Oral, 3 TIMES DAILY     aspirin (ASA) 81 mg, Oral, DAILY     atorvastatin (LIPITOR) 80 MG tablet TAKE ONE TABLET BY MOUTH ONCE DAILY     calcium 600 MG tablet 1 tablet, Oral, 2 TIMES DAILY     Cholecalciferol (VITAMIN D-400 PO) 1 tablet, Oral, DAILY     co-enzyme Q-10 (COENZYME Q-10) 100 MG CAPS 1 capsule, Oral, DAILY     gabapentin (NEURONTIN) 100 mg, Oral, 3 TIMES DAILY     insulin aspart (NOVOLOG PEN) 100 UNIT/ML pen Three times a day with meals, For glucometer 150-200 give 2 units, 201-250 4 units, >250 6 units.     levothyroxine (SYNTHROID/LEVOTHROID) 50 mcg, Oral, DAILY     Lidocaine (LIDOCARE) 4 % Patch 1 patch, Transdermal, EVERY 24 HOURS, Apply to left wrist topically at bedtime for Pain and remove per schedule To prevent lidocaine toxicity, patient should be patch free for 12 hrs daily.      losartan (COZAAR) 50 mg, Oral, DAILY     melatonin 3 mg, Oral, AT BEDTIME     metFORMIN (GLUCOPHAGE)  500 MG tablet TAKE ONE TABLET BY MOUTH DAILY WITH BREAKFAST     metoprolol succinate ER (TOPROL-XL) 25 mg, Oral, DAILY     mirtazapine (REMERON) 7.5 mg, Oral, AT BEDTIME     Multiple Vitamins-Minerals (CENTRUM SILVER) per tablet 1 tablet, Oral, DAILY     Multiple Vitamins-Minerals (PRESERVISION AREDS 2 PO) 1 tablet, Oral, 2 TIMES DAILY     nitroGLYcerin (NITROSTAT) 0.4 mg, Sublingual, EVERY 5 MIN PRN, Up to 3 doses per episode     nystatin (MYCOSTATIN) 828619 UNIT/GM external powder Topical, Apply to breast folds topically two times a day for redness      omeprazole (PRILOSEC) 20 mg, Oral, DAILY     polyethylene glycol (MIRALAX) powder 1 capful, Oral, DAILY     senna-docusate (SENOKOT-S/PERICOLACE) 8.6-50 MG tablet Oral, Give 1 tablet by mouth one time a day for constipation AND Give 1 tablet by mouth as needed for constipation BID PRN      sennosides (SENOKOT) 8.6 MG tablet 1-2 tablets, Oral, 2 TIMES DAILY     Warfarin Sodium (COUMADIN PO) Oral, Give 1 tablet orally in the evening every Mon, Tue for Afib until 09/22/2020 23:59 per camp      Care Coordination Start Date: 10/5/2020   Frequency of Care Coordination: monthly   Form Last Updated: 10/05/2020

## 2020-10-05 NOTE — PROGRESS NOTES
"Clinic Care Coordination Contact    OUTREACH    Referral Information:  Referral Source: IP Report  Primary Diagnosis: Injury/Fall  Chief Complaint   Patient presents with     Clinic Care Coordination - Homecare/TCU     TCU discharge to home with home care     [place] Utilization: reviewed on this date  Difficulty keeping appointments: No  Compliance Concerns: No  No-Show Concerns: No  No PCP office visit in Past Year: No  Utilization    Last refreshed: 10/4/2020  7:19 AM: Hospital Admissions 3           Last refreshed: 10/4/2020  7:19 AM: ED Visits 4           Last refreshed: 10/4/2020  7:19 AM: No Show Count (past year) 2              Current as of: 10/4/2020  7:19 AM          Patient hospitalized at [facility] 7/29/20 - 8/1/20 following fall with subsequent left humerus fracture.  Patient discharged to [facility] TCU from 8/1/20 - 8/23/20 when she then discharged to home with home care.  CC RN outreach to patient for TCU discharge follow-up.    CC RN called and spoke with patient; introduced self, discussed role of Care Coordination, and explained reason for call.      Patient reported she is doing fairly well.  She is very glad to be home as she reviewed several frustrations she experienced during her TCU stay.    Patient confirmed she is receiving services through [facility].  She is working with therapies and has been able to slowly get her left arm moving more.    Patient lives alone in a private home with stairs.  She hasn't yet tried to navigate the stairs as she would like her home care PT to accompany her the first time, at least; CC RN encouraged her to do this.    Patient would like to remain living at home as long as able.  She stated \"Losing your independence is very hard\".  CC RN provided supportive listening; reassured that her team can work to support this goal of hers (to remain home) but encouraged her to remain open to other options moving forward as " ultimately her safety is the goal; she stated understanding.    Patient is in the process of getting [facility] set up through [place].  She will be getting her son's assistance with this.    Patient agreeable to ongoing Care Coordination; see Goal below.    Clinical Concerns:  Current Medical Concerns: closed fracture of proximal end of left humerus, atrial fibrillation    Current Behavioral Concerns: none noted at this time.    Education Provided to patient: provided information about Care Coordination     Pain  Pain (GOAL): No    Medication Management:  Patient's medications currently being managed by [facility]; patient denied current medication questions or concerns.    Functional Status:  Dependent ADLs: Independent  Dependent IADLs: Independent  Bed or wheelchair confined: No  Mobility Status: Independent  Fallen 2 or more times in the past year?: Yes  Any fall with injury in the past year?: Yes    Living Situation:  Current living arrangement: I live in a private home  Type of residence: Private [facility]    Lifestyle & Psychosocial Needs:  Diet: Regular  Inadequate nutrition (GOAL): No  Tube Feeding: No  Inadequate activity/exercise (GOAL): Yes  Significant changes in sleep pattern (GOAL): No  Transportation means: Family     Taoist or spiritual beliefs that impact treatment: No  Mental health DX: No  Mental health management concern (GOAL): No  Informal Support system: Family   Socioeconomic History     Marital status:      Spouse name: Not on file     Number of children: Not on file     Years of education: Not on file     Highest education level: Not on file     Tobacco Use     Smoking status: Never Smoker     Smokeless tobacco: Never Used   Substance and Sexual Activity     Alcohol use: No     Drug use: No     Sexual activity: Never     Care Coordinator has reviewed patient's Social Determinants of Health (SDoH) on this date. Upon review, changes were not  made.      Resources and  Interventions:  List of home care services: Skilled Nursing, Physicial Therapy, Occupational Therapy  Community Resources: Home Care  Supplies used at home: None  Equipment Currently Used at Home: cane, quad, walker, standard    CC RN sent e-mail to Debbie patient's RN CM with [facility]; requested home care SW visit for patient to discuss additional in-home assistance options.    Advance Care Plan/Directive  Advanced Care Plans/Directives on file: Yes  Type Advanced Care Plans/Directives: Advanced Directive - On File  Advanced Care Plan/Directive Status: Not Applicable    Referrals Placed: None     Goals:   Goals        General    1. Psychosocial (pt-stated)     Notes - Note edited  10/5/2020  1:38 PM by Jeet Parish RN    Goal Statement: I want to remain as independent as I safely can.  Date Goal set: 10/5/20  Barriers: lives alone in private home with stairs, chronic vertigo, recent fall  Strengths: motivated to remain independent, supportive son  Date to Achieve By: 4/30/21  Patient expressed understanding of goal: Yes    Action steps to achieve this goal:  1. I will continue working with [facility] as indicated.  2. I will explore additional options for in-home assistance.  3. I will consider higher levels of care if needed.  4. I will continue working with Care Coordination to identify and address barriers to achieving my goal.        Patient/Caregiver understanding: Yes    Outreach Frequency: monthly  Future Appointments              In 3 days Charles Cowan MD [facility]    In 1 month CONKLIN SOFIAN [facility]        Plan:    CC RN will send patient Care Coordination introduction letter and Complex Care Plan.    CC RN sent e-mail to patient's RN CM with [facility]; requested home care SW visit to discuss additional in-home assistance options patient can consider.    Patient will continue  working with [facility] as indicated.    Patient agreed to contact CC RN with additional questions or concerns.    Care Coordination will outreach to patient in approximately 1 month to get updates on patient status, assess goal progress, and offer additional support and resources as indicated.    Jeet Parish RN  Clinic Care Coordinator  [facility]  Ph: [phone]

## 2020-10-06 NOTE — LETTER
[facility]  [address]  [id number] [phone]    10/6/2020       Madie Silva  [address] [place] [id number]      Dear Madie:    You are scheduled for Mohs Surgery on: 11/5/20 @9:00am.    Please check in at [facility], [address].     You don't need to arrive more than 5-10 minutes prior to your appointment time.     Be sure to eat a good breakfast and bathe and wash your hair prior to surgery.     If you are taking any anti-coagulants that are prescribed by your Doctor (such as Coumadin/Warfarin, Plavix, Aspirin, Ibuprofen), please continue taking them.     However, if you are taking anti-coagulants over the counter without a Doctor's order for a medical condition, please discontinue them 10 days prior to surgery.     Please wear loose comfortable clothing as it could possibly be 4-6 hours until your surgery is completed depending upon how many layers of tissue need to be removed.      Thank you,    BRAIN Cowan MD

## 2020-10-06 NOTE — TELEPHONE ENCOUNTER
Rescheduled mohs appointment for a later date- updated letter sent.    Patient voiced understanding.    REJI Goodwin-BSN-PHN  [facility]  [phone]

## 2020-10-07 NOTE — PROGRESS NOTES
ANTICOAGULATION MANAGEMENT     Patient Name:  Madie Silva  Date:  10/7/2020    ASSESSMENT /SUBJECTIVE:    Today's INR result of 3.0 is therapeutic. Goal INR of 2.0-3.0      Warfarin dose taken: Warfarin taken as instructed    Diet: Increased greens/vitamin K in diet which may be affecting INR; plans to resume previous intake    Medication changes/ interactions: No new medications/supplements affecting INR    Previous INR: Subtherapeutic     S/S of bleeding or thromboembolism: No    New injury or illness: No    Upcoming surgery, procedure or cardioversion: No    Additional findings: None      PLAN:    Telephone call with home care nurse RN regarding INR result and instructed:     Warfarin Dosing Instructions: Continue your current warfarin dose 4.5 mg Tues/Thurs/Sat and 3 mg ROW    Instructed patient to follow up no later than: 1 week  Orders given to  Homecare nurse/facility to recheck    Education provided: None required      HCRN verbalizes understanding and agrees to warfarin dosing plan.    Instructed to call the Anticoagulation Clinic for any changes, questions or concerns. (#[phone])        Flower Brown RN      OBJECTIVE:  Recent labs: (last 7 days)     09/30/20  1601 10/07/20   INR 1.80* 3.0*         No question data found.  Anticoagulation Summary  As of 10/7/2020    INR goal:  2.0-3.0   TTR:  77.2 % (11.9 mo)   INR used for dosing:  3.0 (10/7/2020)   Warfarin maintenance plan:  4.5 mg (3 mg x 1.5) every Tue, Thu, Sat; 3 mg (3 mg x 1) all other days   Full warfarin instructions:  4.5 mg every Tue, Thu, Sat; 3 mg all other days   Weekly warfarin total:  25.5 mg   Plan last modified:  Flower Brown, RN (10/7/2020)   Next INR check:  10/14/2020   Priority:  Maintenance   Target end date:  Indefinite    Indications    Long-term (current) use of anticoagulants [Z79.01] (Resolved) [Z79.01]  Phlebitis and thrombophlebitis of deep veins of lower extremities (H) [I80.209]  Acute myocardial  infarction  initial episode of care (H) (Resolved) [I21.9]  PAF -- on Warfarin  [I48.0]             Anticoagulation Episode Summary     INR check location:      Preferred lab:      Send INR reminders to:  [facility]    Comments:        Anticoagulation Care Providers     Provider Role Specialty Phone number    De Obregon MD Referring Internal Medicine [phone]

## 2020-10-14 NOTE — PROGRESS NOTES
ANTICOAGULATION MANAGEMENT     Patient Name:  Madie Silva  Date:  10/14/2020    ASSESSMENT /SUBJECTIVE:    Today's INR result of 3.5 is supratherapeutic. Goal INR of 2.0-3.0      Warfarin dose taken: Warfarin taken as instructed    Diet: No new diet changes affecting INR    Medication changes/ interactions: No new medications/supplements affecting INR    Previous INR: Therapeutic     S/S of bleeding or thromboembolism: No    New injury or illness: No    Upcoming surgery, procedure or cardioversion: No    Additional findings: recent > from 20 mg/wk to 25.5 mg/wk      PLAN:    Telephone call with home care nurse Debbie regarding INR result and instructed:     Warfarin Dosing Instructions: 1.5 mg tonight then change your warfarin dose to 4.5 mg T.Sat; 3 mg ROW    Instructed patient to follow up no later than: 1 week  Lab visit scheduled  HC will be closed; lab appt scheduled    Education provided: None required      Debbie verbalizes understanding and agrees to warfarin dosing plan.    Instructed to call the Anticoagulation Clinic for any changes, questions or concerns. (#[phone])        Laura Mcdonnell, REJI      OBJECTIVE:  Recent labs: (last 7 days)     10/14/20   INR 3.5*         No question data found.  Anticoagulation Summary  As of 10/14/2020    INR goal:  2.0-3.0   TTR:  75.3 % (11.9 mo)   INR used for dosing:  3.5 (10/14/2020)   Warfarin maintenance plan:  4.5 mg (3 mg x 1.5) every Tue, Sat; 3 mg (3 mg x 1) all other days   Full warfarin instructions:  10/14: 1.5 mg; Otherwise 4.5 mg every Tue, Sat; 3 mg all other days   Weekly warfarin total:  24 mg   Plan last modified:  Kamila Campos RN (10/14/2020)   Next INR check:  10/21/2020   Priority:  Maintenance   Target end date:  Indefinite    Indications    Long-term (current) use of anticoagulants [Z79.01] (Resolved) [Z79.01]  Phlebitis and thrombophlebitis of deep veins of lower extremities (H) [I80.209]  Acute myocardial infarction  initial  episode of care (H) (Resolved) [I21.9]  PAF -- on Warfarin  [I48.0]             Anticoagulation Episode Summary     INR check location:      Preferred lab:      Send INR reminders to:  [facility]    Comments:        Anticoagulation Care Providers     Provider Role Specialty Phone number    eD Obregon MD Referring Internal Medicine [phone]

## 2020-10-21 NOTE — PROGRESS NOTES
ANTICOAGULATION MANAGEMENT     Patient Name:  Madie Silva  Date:  10/21/2020    ASSESSMENT /SUBJECTIVE:    Today's INR result of 3.9 is supratherapeutic. Goal INR of 2.0-3.0      Warfarin dose taken: Warfarin taken as instructed    Diet: No new diet changes affecting INR    Medication changes/ interactions: No new medications/supplements affecting INR    Previous INR: Supratherapeutic     S/S of bleeding or thromboembolism: No    New injury or illness: No    Upcoming surgery, procedure or cardioversion: No    Additional findings: patient is less active since her humerus fracture       PLAN:    Telephone call with Madie regarding INR result and instructed:     Warfarin Dosing Instructions: hold then change your warfarin dose to 3mg daily     Instructed patient to follow up no later than: 2 weeks  Lab visit scheduled    Education provided: Target INR goal and significance of current INR result      Madie verbalizes understanding and agrees to warfarin dosing plan.    Instructed to call the Anticoagulation Clinic for any changes, questions or concerns. (#[phone])        Connie Hickman RN      OBJECTIVE:  Recent labs: (last 7 days)     10/21/20  1345   INR 3.90*         INR assessment SUPRA    Recheck INR In: 2 WEEKS    INR Location Clinic      Anticoagulation Summary  As of 10/21/2020    INR goal:  2.0-3.0   TTR:  73.2 % (11.9 mo)   INR used for dosing:  No new INR was available at the time of this encounter.   Warfarin maintenance plan:  3 mg (3 mg x 1) every day   Full warfarin instructions:  10/21: Hold; Otherwise 3 mg every day   Weekly warfarin total:  21 mg   Plan last modified:  Connie Hickman RN (10/21/2020)   Next INR check:  11/4/2020   Priority:  Maintenance   Target end date:  Indefinite    Indications    Long-term (current) use of anticoagulants [Z79.01] (Resolved) [Z79.01]  Phlebitis and thrombophlebitis of deep veins of lower extremities (H) [I80.209]  Acute myocardial infarction  initial  episode of care (H) (Resolved) [I21.9]  PAF -- on Warfarin  [I48.0]             Anticoagulation Episode Summary     INR check location:      Preferred lab:      Send INR reminders to:  [facility]    Comments:        Anticoagulation Care Providers     Provider Role Specialty Phone number    De Obregon MD Referring Internal Medicine [phone]

## 2020-11-03 NOTE — PROGRESS NOTES
Surgical Office Location:  [facility]  [address] [place] [id number]

## 2020-11-04 NOTE — PROGRESS NOTES
ANTICOAGULATION MANAGEMENT     Patient Name:  Madie Silva  Date:  11/4/2020    ASSESSMENT /SUBJECTIVE:    Today's INR result of 2.8 is therapeutic. Goal INR of 2.0-3.0      Warfarin dose taken: More warfarin taken than planned which may be affecting INR    Diet: No new diet changes affecting INR    Medication changes/ interactions: No new medications/supplements affecting INR    Previous INR: Supratherapeutic     S/S of bleeding or thromboembolism: No    New injury or illness: No    Upcoming surgery, procedure or cardioversion: No    Additional findings: patient did not change dose as advised.  Took 4.5mg Tues and Sat, will keep dosing the same since INR is in range.       PLAN:    Telephone call with Madie regarding INR result and instructed:     Warfarin Dosing Instructions: Continue your current warfarin dose 4.5mg TU/Sat and 3 mg AOD    Instructed patient to follow up no later than: 3 weeks  Lab visit scheduled Requesting to come back in 3 weeks due to difficulty finding a ride     Education provided: Target INR goal and significance of current INR result      Madie verbalizes understanding and agrees to warfarin dosing plan.    Instructed to call the Anticoagulation Clinic for any changes, questions or concerns. (#[phone])        Connie Hickman RN      OBJECTIVE:  Recent labs: (last 7 days)     11/04/20  1440   INR 2.80*         INR assessment THER    Recheck INR In: 3 WEEKS    INR Location Clinic      Anticoagulation Summary  As of 11/4/2020    INR goal:  2.0-3.0   TTR:  70.0 % (11.9 mo)   INR used for dosing:  No new INR was available at the time of this encounter.   Warfarin maintenance plan:  4.5 mg (3 mg x 1.5) every Tue, Sat; 3 mg (3 mg x 1) all other days   Full warfarin instructions:  4.5 mg every Tue, Sat; 3 mg all other days   Weekly warfarin total:  24 mg   Plan last modified:  Connie Hickman RN (11/4/2020)   Next INR check:  11/25/2020   Priority:  Maintenance   Target end date:   Indefinite    Indications    Long-term (current) use of anticoagulants [Z79.01] (Resolved) [Z79.01]  Phlebitis and thrombophlebitis of deep veins of lower extremities (H) [I80.209]  Acute myocardial infarction  initial episode of care (H) (Resolved) [I21.9]  PAF -- on Warfarin  [I48.0]             Anticoagulation Episode Summary     INR check location:      Preferred lab:      Send INR reminders to:  [facility]    Comments:        Anticoagulation Care Providers     Provider Role Specialty Phone number    De Obregon MD Referring Internal Medicine [phone]

## 2020-11-05 NOTE — PROGRESS NOTES
Madie Silva is a 89 year old year old female patient here today for evaluation and managment of basal cell carcinoma on right nasal bridge.  Patient has no other skin complaints today.  Remainder of the HPI, Meds, PMH, Allergies, FH, and SH was reviewed in chart.      Past Medical History:   Diagnosis Date     Walters's esophagus 7/09     Basal cell carcinoma      Bradycardia     post-op CABG 2011 - s/p pacemaker implanted 2011     CHRONIC VERTIGO 9/99     Colonic Adenoma 3/90, 11/98     Coronary artery disease     CABG x4 2011: LIMA to her LAD, saphenous vein graft to her ramus, saphenous vein graft to her OM, saphenous vein graft to her PDA.     Costochondritis      Depression      DIABETES MELLITUS TYPE II 10/07     DJD      DVT of Leg, postop 11/09    6 months of warfarin     Gastritis 10/89     GERD      HIATAL HERNIA      HYPERLIPIDEMIA      HYPOTHYROIDISM (aka HASHIMOTO)      Impaired fasting glucose      L Ankle Fracture 10/09     Obesity, unspecified      Osteoporosis, unspecified      PERIPHERAL NEUROPATHY      Polymyalgia rheumatica (H)      Post Herpetic Neuralgia 4/03    R lumbar distribution     Restless leg syndrome      Small vessel cerebrovascular changes 9/99     Stricture and stenosis of esophagus 10/89    Dilated     Syncope     1/06, 8/08, 2/10     VITAMIN D DEFICIENCY 12/07    per Dr. Petty       Past Surgical History:   Procedure Laterality Date     BYPASS GRAFT ARTERY CORONARY  11/2/2011    CABG x 4  Dr. PINEDA     CV ANGIOGRAM CORONARY GRAFT N/A 1/27/2020    Procedure: Angiogram Coronary Graft;  Surgeon: Jenny Luther MD;  Location:  [facility]     CV AORTOGRAM ABDOMINAL N/A 1/27/2020    Procedure: Aortogram Abdominal;  Surgeon: Jenny Luther MD;  Location:  [facility]     CV AORTOGRAM THORACIC N/A 1/27/2020    Procedure: Aortogram Thoracic;  Surgeon: Jenny Luther MD;  Location:  [facility]     CV LEFT HEART CATH N/A  1/27/2020    Procedure: Right and Left heart catheterization for TAVR workup;  Surgeon: Jenny Luther MD;  Location:  [facility]     CV TRANSCATHETER AORTIC VALVE REPLACEMENT N/A 5/5/2020    Procedure: Transcatheter Aortic Valve Replacement;  Surgeon: Jenny Luther MD;  Location:  [facility]     EMBOLECTOMY LOWER EXTREMITY  11/9/2011    EMBOLECTOMY LOWER EXTREMITY; left leg femoral embolectomy.; Surgeon:JOLEEN BOONE; Location: OR     HYSTERECTOMY, CERVIX STATUS UNKNOWN  1978    partial hysterectomy     SURGICAL HISTORY OF -   10/09    L ankle fracture repair    Dr. Jose Roberto Trevino     [facility] NONSPECIFIC PROCEDURE  age 14    appendectomy     Z NONSPECIFIC PROCEDURE  as a child    T&A        Family History   Problem Relation Age of Onset     Alzheimer Disease Sister      Heart Disease Mother      Heart Disease Father      Heart Disease Son      Heart Disease Brother        Social History     Socioeconomic History     Marital status:      Spouse name: Not on file     Number of children: Not on file     Years of education: Not on file     Highest education level: Not on file   Occupational History     Not on file   Social Needs     Financial resource strain: Not on file     Food insecurity     Worry: Not on file     Inability: Not on file     Transportation needs     Medical: Not on file     Non-medical: Not on file   Tobacco Use     Smoking status: Never Smoker     Smokeless tobacco: Never Used   Substance and Sexual Activity     Alcohol use: No     Drug use: No     Sexual activity: Never   Lifestyle     Physical activity     Days per week: Not on file     Minutes per session: Not on file     Stress: Not on file   Relationships     Social connections     Talks on phone: Not on file     Gets together: Not on file     Attends [employer] service: Not on file     Active member of club or organization: Not on file     Attends meetings of clubs or organizations:  Not on file     Relationship status: Not on file     Intimate partner violence     Fear of current or ex partner: Not on file     Emotionally abused: Not on file     Physically abused: Not on file     Forced sexual activity: Not on file   Other Topics Concern      Service Not Asked     Blood Transfusions Not Asked     Caffeine Concern No     Occupational Exposure Not Asked     Hobby Hazards Not Asked     Sleep Concern Not Asked     Stress Concern Not Asked     Weight Concern Not Asked     Special Diet No     Back Care Not Asked     Exercise No     Bike Helmet Not Asked     Seat Belt Not Asked     Self-Exams Not Asked     Parent/sibling w/ CABG, MI or angioplasty before 65F 55M? No   Social History Narrative     Not on file       Outpatient Encounter Medications as of 11/5/2020   Medication Sig Dispense Refill     acetaminophen (TYLENOL) 500 MG tablet Take 1,000 mg by mouth 3 times daily       aspirin EC 81 MG EC tablet Take 1 tablet by mouth daily.       atorvastatin (LIPITOR) 80 MG tablet TAKE ONE TABLET BY MOUTH ONCE DAILY 90 tablet 3     calcium 600 MG tablet Take 1 tablet by mouth 2 times daily.       Cholecalciferol (VITAMIN D-400 PO) Take 1 tablet by mouth daily        co-enzyme Q-10 (COENZYME Q-10) 100 MG CAPS Take 1 capsule by mouth daily.       gabapentin (NEURONTIN) 100 MG capsule Take 1 capsule (100 mg) by mouth 3 times daily 15 capsule 0     insulin aspart (NOVOLOG PEN) 100 UNIT/ML pen Three times a day with meals, For glucometer 150-200 give 2 units, 201-250 4 units, >250 6 units.  0     levothyroxine (SYNTHROID/LEVOTHROID) 50 MCG tablet TAKE ONE TABLET BY MOUTH ONCE DAILY 90 tablet 3     Lidocaine (LIDOCARE) 4 % Patch Place 1 patch onto the skin every 24 hours Apply to left wrist topically at bedtime for Pain and remove per schedule To prevent lidocaine toxicity, patient should be patch free for 12 hrs daily.       losartan (COZAAR) 50 MG tablet Take 1 tablet (50 mg) by mouth daily 90 tablet 3      melatonin 3 MG tablet Take 3 mg by mouth At Bedtime       metFORMIN (GLUCOPHAGE) 500 MG tablet TAKE ONE TABLET BY MOUTH DAILY WITH BREAKFAST 90 tablet 3     metoprolol succinate ER (TOPROL-XL) 25 MG 24 hr tablet Take 1 tablet (25 mg) by mouth daily 30 tablet 11     mirtazapine (REMERON) 7.5 MG tablet Take 7.5 mg by mouth At Bedtime       Multiple Vitamins-Minerals (CENTRUM SILVER) per tablet Take 1 tablet by mouth daily.       Multiple Vitamins-Minerals (PRESERVISION AREDS 2 PO) Take 1 tablet by mouth 2 times daily        nitroGLYCERIN (NITROSTAT) 0.4 MG SL tablet Place 0.4 mg under the tongue every 5 minutes as needed. Up to 3 doses per episode        nystatin (MYCOSTATIN) 371270 UNIT/GM external powder Apply to breast folds topically two times a day for redness       omeprazole (PRILOSEC) 20 MG DR capsule Take 1 capsule (20 mg) by mouth daily 90 capsule 3     polyethylene glycol (MIRALAX) powder Take 17 g (1 capful) by mouth daily 1 Bottle 3     senna-docusate (SENOKOT-S/PERICOLACE) 8.6-50 MG tablet Give 1 tablet by mouth one time a day for constipation AND Give 1 tablet by mouth as needed for constipation BID PRN       sennosides (SENOKOT) 8.6 MG tablet Take 1-2 tablets by mouth 2 times daily  0     Warfarin Sodium (COUMADIN PO) Give 1 tablet orally in the evening every Mon, Tue for Afib until 09/22/2020 23:59 per camp       No facility-administered encounter medications on file as of 11/5/2020.              Review Of Systems  Skin: As above  Eyes: negative  Ears/Nose/Throat: negative  Respiratory: No shortness of breath, dyspnea on exertion, cough, or hemoptysis  Cardiovascular: negative  Gastrointestinal: negative  Genitourinary: negative  Musculoskeletal: negative  Neurologic: negative  Psychiatric: negative  Hematologic/Lymphatic/Immunologic: negative  Endocrine: negative      O:   NAD, WDWN, Alert & Oriented, Mood & Affect wnl, Vitals stable   Here today alone   BP (!) 157/57   Pulse 80   SpO2 100%     General appearance normal   Vitals stable   Alert, oriented and in no acute distress     R nasal bridge 5mm pink pearly papule       Eyes: Conjunctivae/lids:Normal     ENT: Lips, buccal mucosa, tongue: normal    MSK:Normal    Cardiovascular: peripheral edema none    Pulm: Breathing Normal    Neuro/Psych: Orientation:Alert and Orientedx3 ; Mood/Affect:normal       A/P:  1. R nasal bridge basal cell carcinoma   MOHS:   Location    The rationale for Mohs surgery was discussed with the patient and consent was obtained.  The risks and benefits as well as alternatives to therapy were discussed, in detail.  Specifically, the risks of infection, scarring, bleeding, prolonged wound healing, incomplete removal, allergy to anesthesia, nerve injury and recurrence were addressed.  Indication for Mohs was Location. Prior to the procedure, the treatment site was clearly identified and, if available, confirmed with previous photos and confirmed by the patient   All components of the Universal Protocol/PAUSE rule were completed.  The Mohs surgeon operated in two distinct and integrated capacities as the surgeon and pathologist.      The area was prepped with Betasept.  A rim of normal appearing skin was marked circumferentially around the lesion.  The area was infiltrated with local anesthesia.  The tumor was first debulked to remove all clinically apparent tumor.  An incision following the standard Mohs approach was done and the specimen was oriented,mapped and placed in 1 block(s).  Each specimen was then chromacoded and processed in the Mohs laboratory using standard Mohs technique and submitted for frozen section histology.  Frozen section analysis showed no residual tumor but CLEAR MARGINS.      The tumor was excised using standard Mohs technique in 1 stages(s).  CLEAR MARGINS OBTAINED and Final defect size was 1 cm.     We discussed the options for wound management in full with the patient including risks/benefits/ possible  outcomes.        REPAIR WITH BUROW'S FLAP: Because of the Because of the size and full thickness nature of the defect, Because of the tightness of the surrounding skin, To maintain form and function and Because of the proximity to the nasal tip and ala, an advancement flap was planned. After LEC anesthesia and prep, the Burow's triangles were excised. One Burow's triangle was displaced laterally along alar groove  to hide incisions within skin relaxation lines. The advancement flap was raised by dissection in the deep subcutaneous plane. The remaining wound edges were undermined and hemostasis was obtained. The flap was advanced into the defect with care to avoid distortion and was sutured into place in a layered fashion using Vicryl and Fast Absorbing sutures. Postoperative size was 4.3 x 3 cm.  EBL minimal; complications none; wound care routine.  The patient was discharged in good condition and will return in one week for wound evaluation.  BENIGN LESIONS DISCUSSED WITH PATIENT:  I discussed the specifics of tumor, prognosis, and genetics of benign lesions.  I explained that treatment of these lesions would be purely cosmetic and not medically neccessary.  I discussed with patient different removal options including excision, cautery and /or laser.      Nature and genetics of benign skin lesions dicussed with patient.  Signs and Symptoms of skin cancer discussed with patient.  Patient encouraged to perform monthly skin exams.  UV precautions reviewed with patient.  Skin care regimen reviewed with patient: Eliminate harsh soaps, i.e. Dial, zest, irsih spring; Mild soaps such as Cetaphil or Dove sensitive skin, avoid hot or cold showers, aggressive use of emollients including vanicream, cetaphil or cerave discussed with patient.    Risks of non-melanoma skin cancer discussed with patient   Return to clinic 6 months

## 2020-11-05 NOTE — NURSING NOTE
"Initial BP (!) 157/57   Pulse 80   SpO2 100%  Estimated body mass index is 25.02 kg/m  as calculated from the following:    Height as of 9/21/20: 1.676 m (5' 6\").    Weight as of 9/30/20: 70.3 kg (155 lb). .    REJI Goodwin-JIMN-N  [facility]  [phone]  "

## 2020-11-05 NOTE — LETTER
11/5/2020         RE: Madie Silva  [address] [address]        Dear Colleague,    Thank you for referring your patient, Madie Silva, to the [facility]. Please see a copy of my visit note below.    Surgical Office Location:  [facility]  [address] [place] [address]      Madie Silva is a 89 year old year old female patient here today for evaluation and managment of basal cell carcinoma on right nasal bridge.  Patient has no other skin complaints today.  Remainder of the HPI, Meds, PMH, Allergies, FH, and SH was reviewed in chart.      Past Medical History:   Diagnosis Date     Walters's esophagus 7/09     Basal cell carcinoma      Bradycardia     post-op CABG 2011 - s/p pacemaker implanted 2011     CHRONIC VERTIGO 9/99     Colonic Adenoma 3/90, 11/98     Coronary artery disease     CABG x4 2011: LIMA to her LAD, saphenous vein graft to her ramus, saphenous vein graft to her OM, saphenous vein graft to her PDA.     Costochondritis      Depression      DIABETES MELLITUS TYPE II 10/07     DJD      DVT of Leg, postop 11/09    6 months of warfarin     Gastritis 10/89     GERD      HIATAL HERNIA      HYPERLIPIDEMIA      HYPOTHYROIDISM (aka HASHIMOTO)      Impaired fasting glucose      L Ankle Fracture 10/09     Obesity, unspecified      Osteoporosis, unspecified      PERIPHERAL NEUROPATHY      Polymyalgia rheumatica (H)      Post Herpetic Neuralgia 4/03    R lumbar distribution     Restless leg syndrome      Small vessel cerebrovascular changes 9/99     Stricture and stenosis of esophagus 10/89    Dilated     Syncope     1/06, 8/08, 2/10     VITAMIN D DEFICIENCY 12/07    per Dr. Petty       Past Surgical History:   Procedure Laterality Date     BYPASS GRAFT ARTERY CORONARY  11/2/2011    CABG x 4  Dr. PINEDA     CV ANGIOGRAM CORONARY GRAFT N/A 1/27/2020    Procedure: Angiogram Coronary Graft;  Surgeon: Jenny Luther MD;   Location:  HEART CARDIAC CATH LAB     CV AORTOGRAM ABDOMINAL N/A 1/27/2020    Procedure: Aortogram Abdominal;  Surgeon: Jenny Luther MD;  Location:  HEART CARDIAC CATH LAB     CV AORTOGRAM THORACIC N/A 1/27/2020    Procedure: Aortogram Thoracic;  Surgeon: Jenny Luther MD;  Location:  HEART CARDIAC CATH LAB     CV LEFT HEART CATH N/A 1/27/2020    Procedure: Right and Left heart catheterization for TAVR workup;  Surgeon: Jenny Luther MD;  Location:  HEART CARDIAC CATH LAB     CV TRANSCATHETER AORTIC VALVE REPLACEMENT N/A 5/5/2020    Procedure: Transcatheter Aortic Valve Replacement;  Surgeon: Jenny Luther MD;  Location:  HEART CARDIAC CATH LAB     EMBOLECTOMY LOWER EXTREMITY  11/9/2011    EMBOLECTOMY LOWER EXTREMITY; left leg femoral embolectomy.; Surgeon:JOLEEN BOONE; Location: OR     HYSTERECTOMY, CERVIX STATUS UNKNOWN  1978    partial hysterectomy     SURGICAL HISTORY OF -   10/09    L ankle fracture repair    Dr. Jose Roberto Trevino     [facility] NONSPECIFIC PROCEDURE  age 14    appendectomy     [facility] NONSPECIFIC PROCEDURE  as a child    T&A        Family History   Problem Relation Age of Onset     Alzheimer Disease Sister      Heart Disease Mother      Heart Disease Father      Heart Disease Son      Heart Disease Brother        Social History     Socioeconomic History     Marital status:      Spouse name: Not on file     Number of children: Not on file     Years of education: Not on file     Highest education level: Not on file   Occupational History     Not on file   Social Needs     Financial resource strain: Not on file     Food insecurity     Worry: Not on file     Inability: Not on file     Transportation needs     Medical: Not on file     Non-medical: Not on file   Tobacco Use     Smoking status: Never Smoker     Smokeless tobacco: Never Used   Substance and Sexual Activity     Alcohol use: No     Drug use: No     Sexual activity: Never    Lifestyle     Physical activity     Days per week: Not on file     Minutes per session: Not on file     Stress: Not on file   Relationships     Social connections     Talks on phone: Not on file     Gets together: Not on file     Attends Orthodoxy service: Not on file     Active member of club or organization: Not on file     Attends meetings of clubs or organizations: Not on file     Relationship status: Not on file     Intimate partner violence     Fear of current or ex partner: Not on file     Emotionally abused: Not on file     Physically abused: Not on file     Forced sexual activity: Not on file   Other Topics Concern      Service Not Asked     Blood Transfusions Not Asked     Caffeine Concern No     Occupational Exposure Not Asked     Hobby Hazards Not Asked     Sleep Concern Not Asked     Stress Concern Not Asked     Weight Concern Not Asked     Special Diet No     Back Care Not Asked     Exercise No     Bike Helmet Not Asked     Seat Belt Not Asked     Self-Exams Not Asked     Parent/sibling w/ CABG, MI or angioplasty before 65F 55M? No   Social History Narrative     Not on file       Outpatient Encounter Medications as of 11/5/2020   Medication Sig Dispense Refill     acetaminophen (TYLENOL) 500 MG tablet Take 1,000 mg by mouth 3 times daily       aspirin EC 81 MG EC tablet Take 1 tablet by mouth daily.       atorvastatin (LIPITOR) 80 MG tablet TAKE ONE TABLET BY MOUTH ONCE DAILY 90 tablet 3     calcium 600 MG tablet Take 1 tablet by mouth 2 times daily.       Cholecalciferol (VITAMIN D-400 PO) Take 1 tablet by mouth daily        co-enzyme Q-10 (COENZYME Q-10) 100 MG CAPS Take 1 capsule by mouth daily.       gabapentin (NEURONTIN) 100 MG capsule Take 1 capsule (100 mg) by mouth 3 times daily 15 capsule 0     insulin aspart (NOVOLOG PEN) 100 UNIT/ML pen Three times a day with meals, For glucometer 150-200 give 2 units, 201-250 4 units, >250 6 units.  0     levothyroxine (SYNTHROID/LEVOTHROID) 50  MCG tablet TAKE ONE TABLET BY MOUTH ONCE DAILY 90 tablet 3     Lidocaine (LIDOCARE) 4 % Patch Place 1 patch onto the skin every 24 hours Apply to left wrist topically at bedtime for Pain and remove per schedule To prevent lidocaine toxicity, patient should be patch free for 12 hrs daily.       losartan (COZAAR) 50 MG tablet Take 1 tablet (50 mg) by mouth daily 90 tablet 3     melatonin 3 MG tablet Take 3 mg by mouth At Bedtime       metFORMIN (GLUCOPHAGE) 500 MG tablet TAKE ONE TABLET BY MOUTH DAILY WITH BREAKFAST 90 tablet 3     metoprolol succinate ER (TOPROL-XL) 25 MG 24 hr tablet Take 1 tablet (25 mg) by mouth daily 30 tablet 11     mirtazapine (REMERON) 7.5 MG tablet Take 7.5 mg by mouth At Bedtime       Multiple Vitamins-Minerals (CENTRUM SILVER) per tablet Take 1 tablet by mouth daily.       Multiple Vitamins-Minerals (PRESERVISION AREDS 2 PO) Take 1 tablet by mouth 2 times daily        nitroGLYCERIN (NITROSTAT) 0.4 MG SL tablet Place 0.4 mg under the tongue every 5 minutes as needed. Up to 3 doses per episode        nystatin (MYCOSTATIN) 635034 UNIT/GM external powder Apply to breast folds topically two times a day for redness       omeprazole (PRILOSEC) 20 MG DR capsule Take 1 capsule (20 mg) by mouth daily 90 capsule 3     polyethylene glycol (MIRALAX) powder Take 17 g (1 capful) by mouth daily 1 Bottle 3     senna-docusate (SENOKOT-S/PERICOLACE) 8.6-50 MG tablet Give 1 tablet by mouth one time a day for constipation AND Give 1 tablet by mouth as needed for constipation BID PRN       sennosides (SENOKOT) 8.6 MG tablet Take 1-2 tablets by mouth 2 times daily  0     Warfarin Sodium (COUMADIN PO) Give 1 tablet orally in the evening every Mon, Tue for Afib until 09/22/2020 23:59 per camp       No facility-administered encounter medications on file as of 11/5/2020.              Review Of Systems  Skin: As above  Eyes: negative  Ears/Nose/Throat: negative  Respiratory: No shortness of breath, dyspnea on  exertion, cough, or hemoptysis  Cardiovascular: negative  Gastrointestinal: negative  Genitourinary: negative  Musculoskeletal: negative  Neurologic: negative  Psychiatric: negative  Hematologic/Lymphatic/Immunologic: negative  Endocrine: negative      O:   NAD, WDWN, Alert & Oriented, Mood & Affect wnl, Vitals stable   Here today alone   BP (!) 157/57   Pulse 80   SpO2 100%    General appearance normal   Vitals stable   Alert, oriented and in no acute distress     R nasal bridge 5mm pink pearly papule       Eyes: Conjunctivae/lids:Normal     ENT: Lips, buccal mucosa, tongue: normal    MSK:Normal    Cardiovascular: peripheral edema none    Pulm: Breathing Normal    Neuro/Psych: Orientation:Alert and Orientedx3 ; Mood/Affect:normal       A/P:  1. R nasal bridge basal cell carcinoma   MOHS:   Location    The rationale for Mohs surgery was discussed with the patient and consent was obtained.  The risks and benefits as well as alternatives to therapy were discussed, in detail.  Specifically, the risks of infection, scarring, bleeding, prolonged wound healing, incomplete removal, allergy to anesthesia, nerve injury and recurrence were addressed.  Indication for Mohs was Location. Prior to the procedure, the treatment site was clearly identified and, if available, confirmed with previous photos and confirmed by the patient   All components of the Universal Protocol/PAUSE rule were completed.  The Mohs surgeon operated in two distinct and integrated capacities as the surgeon and pathologist.      The area was prepped with Betasept.  A rim of normal appearing skin was marked circumferentially around the lesion.  The area was infiltrated with local anesthesia.  The tumor was first debulked to remove all clinically apparent tumor.  An incision following the standard Mohs approach was done and the specimen was oriented,mapped and placed in 1 block(s).  Each specimen was then chromacoded and processed in the Mohs laboratory  using standard Mohs technique and submitted for frozen section histology.  Frozen section analysis showed no residual tumor but CLEAR MARGINS.      The tumor was excised using standard Mohs technique in 1 stages(s).  CLEAR MARGINS OBTAINED and Final defect size was 1 cm.     We discussed the options for wound management in full with the patient including risks/benefits/ possible outcomes.        REPAIR WITH BUROW'S FLAP: Because of the Because of the size and full thickness nature of the defect, Because of the tightness of the surrounding skin, To maintain form and function and Because of the proximity to the nasal tip and ala, an advancement flap was planned. After LEC anesthesia and prep, the Burow's triangles were excised. One Burow's triangle was displaced laterally along alar groove  to hide incisions within skin relaxation lines. The advancement flap was raised by dissection in the deep subcutaneous plane. The remaining wound edges were undermined and hemostasis was obtained. The flap was advanced into the defect with care to avoid distortion and was sutured into place in a layered fashion using Vicryl and Fast Absorbing sutures. Postoperative size was 4.3 x 3 cm.  EBL minimal; complications none; wound care routine.  The patient was discharged in good condition and will return in one week for wound evaluation.  BENIGN LESIONS DISCUSSED WITH PATIENT:  I discussed the specifics of tumor, prognosis, and genetics of benign lesions.  I explained that treatment of these lesions would be purely cosmetic and not medically neccessary.  I discussed with patient different removal options including excision, cautery and /or laser.      Nature and genetics of benign skin lesions dicussed with patient.  Signs and Symptoms of skin cancer discussed with patient.  Patient encouraged to perform monthly skin exams.  UV precautions reviewed with patient.  Skin care regimen reviewed with patient: Eliminate harsh soaps, i.e. Dial,  zest, irs spring; Mild soaps such as Cetaphil or Dove sensitive skin, avoid hot or cold showers, aggressive use of emollients including vanicream, cetaphil or cerave discussed with patient.    Risks of non-melanoma skin cancer discussed with patient   Return to clinic 6 months        Again, thank you for allowing me to participate in the care of your patient.        Sincerely,        Charles Cowan MD

## 2020-11-05 NOTE — PATIENT INSTRUCTIONS
Sutured Wound Care     [facility]: [phone]    [facility]: [phone]          ? No strenuous activity for 48 hours. Resume moderate activity in 48 hours. No heavy exercising until you are seen for follow up in one week.     ? Take Tylenol as needed for discomfort.                         ? Do not drink alcoholic beverages for 48 hours.     ? Keep the pressure bandage in place for 24 hours. If the bandage becomes blood tinged or loose, reinforce it with gauze and tape.        (Refer to the reverse side of this page for management of bleeding).    ? Remove pressure bandage in 24 hours     ? Leave the flat bandage in place until your follow up appointment.    ? Keep the bandage dry. Wash around it carefully.    ? If the tape becomes soiled or starts to come off, reinforce it with additional paper tape.    ? Do not smoke for 3 weeks; smoking is detrimental to wound healing.    ? It is normal to have swelling and bruising around the surgical site. The bruising will fade in approximately 10-14 days. Elevate the area to reduce swelling.    ? Numbness, itchiness and sensitivity to temperature changes can occur after surgery and may take up to 18 months to normalize.      POSSIBLE COMPLICATIONS    BLEEDIN. Leave the bandage in place.  2. Use tightly rolled up gauze or a cloth to apply direct pressure over the bandage for 20   minutes.  3. Reapply pressure for an additional 20 minutes if necessary  4. Call the office or go to the nearest emergency room if pressure fails to stop the bleeding.  5. Use additional gauze and tape to maintain pressure once the bleeding has stopped.        PAIN:    1. Post operative pain should slowly get better, never worse.  2. A severe increase in pain may indicate a problem. Call the office if this occurs.    In case of emergency phone:Dr Cowan [phone]        In one week:  -Remove all bandages  -Clean area  -Pat dry  -Apply steri strips  -Apply piece of  paper tape over steri strips  -Keep bandages on for one more week.  Keep dry.    -FOLLOW INSTRUCTIONS BELOW    WOUND CARE INSTRUCTIONS  for  ONE WEEK AFTER SURGERY          1) Leave flat bandage on your skin for one week after 11/12 bandage change.  2) On 11/19 you can remove the bandage, you may resume your regular skin care routine, including washing with mild soap and water, applying moisturizer, make-up and sunscreen.    3) If there are any open or bleeding areas at the incision/graft site you should begin to cover the area with a bandage daily as follows:    1) Clean and dry the area with plain tap water using a Q-tip or sterile gauze pad.  2) Apply Polysporin or Bacitracin ointment to the open area.  3) Cover the wound with a band-aid or a sterile non-stick gauze pad and micropore paper tape.         SIGNS OF INFECTION  - If you notice any of these signs of infection, call your doctor right away: expanding redness around the wound.  - Yellow or greenish-colored pus or cloudy wound drainage.    - Red streaking spreading from the wound.  - Increased swelling, tenderness, or pain around the wound.   - Fever.    Please remember that yellow and clear drainage from a wound can be normal and related to normal wound healing.  Isolated drainage from a wound without a combination of the above features does not indicate infection.       *Once the bandages are removed, the scar will be red and firm (especially in the lip/chin area). This is normal and will fade in time. It might take 6-12 months for this to happen.     *Massaging the area will help the scar soften and fade quicker. Begin to massage the area one month after the bandages have been removed. To massage apply pressure directly and firmly over the scar with the fingertips and move in a circular motion. Massage the area for a few minutes several times a day. Continue to massage the site for several months.    *Approximately 6-8 weeks after surgery it is not  uncommon to see the formation of  tender pimple-like  bump along the scar. This is normal. As the scar continues to mature and the stitches underneath the skin begin to dissolve, this might occur. Do not pick or squeeze, this will resolve on it s own. Should one break open producing a small amount of drainage, apply Polysporin or Bacitracin ointment a few times a day until the wound is completely healed.    *Numbness in the surgical area is expected. It might take 12-18 months for the feeling to return to normal. During this time sensations of itchiness, tingling and occasional sharp pains might be noted. These feelings are normal and will subside once the nerves have completely healed.         IN CASE OF EMERGENCY: Dr Cowan [phone]     If you were seen in [place] call: [phone]    If you were seen in [place] call: [phone]

## 2020-11-13 NOTE — PATIENT INSTRUCTIONS

## 2020-11-13 NOTE — NURSING NOTE
Pt returned to clinic for post surgery 1 week follow up bandage change. Pt has no complaints, denies pain. Bandage removed from right nasal bridge, area cleansed with normal saline. Site is healing and wound edges approximating well. Reapplied new steri strips and paper tape.    Advised to watch for signs/sx of infection; spreading redness, drainage, odor, fever. Call or report promptly to clinic. Pt given written instructions and informed to rtc as needed. Patient verbalized understanding.     REJI Goodwin-BSN-PHN  [facility]  [phone]

## 2020-11-16 NOTE — LETTER
[place] CARE COORDINATION  [facility]  [address] [place] [address]      November 16, 2020      Madie EAGLE Alirio  [address] [place] [id number]          Dear Dayan Ramachandran is an updated Complex Care Plan for your continued enrollment in Care Coordination.  Please call the [facility] at [phone] to schedule your Medicare Annual Wellness Visit as discussed.    Do not hesitate to let us know if you have additional questions, concerns, or goals that we can assist with.    Sincerely,    Jeet Parish RN        [facility]  Complex Care Plan  About Me:    Patient Name:  Madie Amezquita    YOB: 1929  Age:         89 year old   [facility] MRN:    [id number] Telephone Information:  Home Phone [phone]   Mobile [phone]       Address:  [address] [place] [address] Email address:  No e-mail address on record      Emergency Contact(s)    Name Relationship Lgl Grd Work Phone Home Phone Mobile Phone   1. SUSAN AMEZQUITA Son No [phone]   2. ALIRIONATAN CASTRO No  [phone]   3. JOSE LUIS AMEZQUITA No  [phone]           Primary language:  English     needed? No   [facility] [employer]:  [phone] op. 1  Other communication barriers: None  Preferred Method of Communication:  Mail  Current living arrangement: I live in a private home  Mobility Status/ Medical Equipment: Independent    Health Maintenance  Health Maintenance Reviewed: Due/Overdue  Health Maintenance Due   Topic Date Due     ADVANCE CARE PLANNING  07/21/1929     DEPRESSION ACTION PLAN  07/21/[birth year]29     MEDICARE ANNUAL WELLNESS VISIT  07/21/1994     PHQ-9  10/22/2016     EYE EXAM  01/01/2019     DIABETIC FOOT EXAM  12/13/2019     LIPID  06/06/2020     TSH W/FREE T4 REFLEX  06/06/2020     A1C  11/05/2020     My Access Plan  Medical Emergency 911   Primary Clinic Line [facility] - [phone]   24 Hour Appointment Line [phone] or  [phone]) (toll-free)   24 Hour Nurse Line [phone] (toll-free)   Preferred Urgent Care [facility], [phone]   Preferred Hospital [facility]  [phone]   Preferred Pharmacy [facility] [place], [place] - [address]     Behavioral Health Crisis Line The National Suicide Prevention Lifeline at 1-849.364.1496 or 913       My Care Team Members  Patient Care Team       Relationship Specialty Notifications Start End    De Obregon MD PCP - General   2/15/02     Phone: [phone] Pager: [phone] Fax: [phone]        [address] [place] [address]    Dayana Hillman [facility] Pharmacist Pharmacist  5/11/20      [address] [facility] [address]    Jeet Parish, RN Lead Care Coordinator Primary Care - CC  8/4/20     Phone: [phone]         [facility]  [facility] ([facility] ([place])  9/22/20     Phone: [phone]         De Obregon MD Assigned PCP   9/27/20     Phone: [phone] Pager: [phone] Fax: [phone]        [address] [place] [address]    Rohini Herrmann MA Community Health Worker Primary Care - CC  10/5/20     Phone: [phone]         Mian Arcos MD Assigned Heart and Vascular Provider   10/23/20     Phone: [phone] Fax: [phone] [address] [facility] [address]            My Care Plans  Self Management and Treatment Plan  Goals and (Comments)  Goals        Patient Stated      1. Psychosocial (pt-stated)      Goal Statement: I want to remain as independent as I safely can.  Date Goal set: 10/5/20 // revised on 11/16/20  Barriers: lives alone in private home with stairs, chronic vertigo, recent fall  Strengths: motivated to remain independent, supportive son  Date to Achieve By: 4/30/21  Patient expressed understanding of  goal: Yes    Action steps to achieve this goal:  1. I will continue working with [facility] Care as indicated.  2. I will do home exercises as advised by home care.  3. I will take precautions to avoid falls such as wearing appropriate footwear, using my walker, and removing any obstacles from my walking path.  4. I will explore options for a Medical Alert System.  5. I will explore additional options for in-home assistance.  6. I will consider higher levels of care if needed.  7. I will continue working with Care Coordination to identify and address barriers to achieving my goal.        Action Plans on File:  None    Advance Care Plans/Directives Type:   Type Advanced Care Plans/Directives: Advanced Directive - On File    My Medical and Care Information  Problem List   Patient Active Problem List   Diagnosis     GERD     Colonic Adenoma     Obesity     Osteoarthritis involving multiple joints     Osteoporosis     Small vessel cerebrovascular changes     Essential hypertension     Post herpetic neuralgia     CHRONIC VERTIGO     Diaphragmatic hernia     Polymyalgia rheumatica (H)     Hypothyroidism     DM type 2 -- Hgb A1C 6.9 on 5/5/20     Vitamin D deficiency     Hereditary and idiopathic peripheral neuropathy     Walters's esophagus     Restless leg syndrome     Vasovagal syncope     Preoperative evaluation to rule out surgical contraindication     CKD (chronic kidney disease) stage 3, GFR 30-59 ml/min (H)     Health Care Home     PPD positive     Phlebitis and thrombophlebitis of deep veins of lower extremities (H)     Kidney stone     PAF -- on Warfarin      Aortic valve stenosis, severe     Stricture and stenosis of esophagus     CAD, S/P CABG x 4 in 2011     Symptomatic bradycardia -- S/P PPM 2011     Status post coronary angiogram     Severe AS -- S/P TAVR on 5/5/20     Left Prox Humerus Fract     History of stroke      Current Medications:  Current Outpatient Medications   Medication Instructions      acetaminophen (TYLENOL) 1,000 mg, Oral, 3 TIMES DAILY     aspirin (ASA) 81 mg, Oral, DAILY     atorvastatin (LIPITOR) 80 MG tablet TAKE ONE TABLET BY MOUTH ONCE DAILY     calcium 600 MG tablet 1 tablet, Oral, 2 TIMES DAILY     Cholecalciferol (VITAMIN D-400 PO) 1 tablet, Oral, DAILY     co-enzyme Q-10 (COENZYME Q-10) 100 MG CAPS 1 capsule, Oral, DAILY     gabapentin (NEURONTIN) 100 mg, Oral, 3 TIMES DAILY     insulin aspart (NOVOLOG PEN) 100 UNIT/ML pen Three times a day with meals, For glucometer 150-200 give 2 units, 201-250 4 units, >250 6 units.     levothyroxine (SYNTHROID/LEVOTHROID) 50 MCG tablet TAKE ONE TABLET BY MOUTH ONCE DAILY     Lidocaine (LIDOCARE) 4 % Patch 1 patch, Transdermal, EVERY 24 HOURS, Apply to left wrist topically at bedtime for Pain and remove per schedule To prevent lidocaine toxicity, patient should be patch free for 12 hrs daily.      losartan (COZAAR) 50 mg, Oral, DAILY     melatonin 3 mg, Oral, AT BEDTIME     metFORMIN (GLUCOPHAGE) 500 MG tablet TAKE ONE TABLET BY MOUTH DAILY WITH BREAKFAST     metoprolol succinate ER (TOPROL-XL) 25 mg, Oral, DAILY     mirtazapine (REMERON) 7.5 mg, Oral, AT BEDTIME     Multiple Vitamins-Minerals (CENTRUM SILVER) per tablet 1 tablet, Oral, DAILY     Multiple Vitamins-Minerals (PRESERVISION AREDS 2 PO) 1 tablet, Oral, 2 TIMES DAILY     nitroGLYcerin (NITROSTAT) 0.4 mg, Sublingual, EVERY 5 MIN PRN, Up to 3 doses per episode     nystatin (MYCOSTATIN) 579102 UNIT/GM external powder Topical, Apply to breast folds topically two times a day for redness      omeprazole (PRILOSEC) 20 mg, Oral, DAILY     polyethylene glycol (MIRALAX) powder 1 capful, Oral, DAILY     senna-docusate (SENOKOT-S/PERICOLACE) 8.6-50 MG tablet Oral, Give 1 tablet by mouth one time a day for constipation AND Give 1 tablet by mouth as needed for constipation BID PRN      sennosides (SENOKOT) 8.6 MG tablet 1-2 tablets, Oral, 2 TIMES DAILY     Warfarin Sodium (COUMADIN PO) Oral, Give 1  tablet orally in the evening every Mon, Tue for Afib until 09/22/2020 23:59 per camp      Care Coordination Start Date: 10/5/2020   Frequency of Care Coordination: monthly   Form Last Updated: 11/16/2020

## 2020-11-16 NOTE — PROGRESS NOTES
"Clinic Care Coordination Contact    Follow Up Progress Note     Assessment:    Patient reported to be doing \"so-so\".  She has been struggling with not being as active as she used to be and said \"and now I just sit here\".  She stated \"some of it is my age and some of it is COVID\".    Patient stated she continues doing her own laundry downstairs.  Her children have been bringing her groceries and she cooks simple meals for herself.    Patient has been walk less lately due to the weather.  She does hope to go outside for a walk later this week if the temperatures are warmer.  She hasn't been doing many in-home exercises.    Patient denied any pain.    Patient has been trying to keep herself busy by talking on the phone to friends/family, knitting, and reading books.    Patient has been working to be very cautious to prevent falls.  She takes her time when ambulating, uses her walker or cane, and doesn't go down the stairs wearing slippers.    Patient had wanted to get a medical alert device but stated she ran into \"a dead end\" with [facility], though she wasn't able to provide many details about her conversation she had with them.  She was still open to exploring the option of getting Lifeline for herself.    Goals Addressed                 This Visit's Progress       Patient Stated      1. Psychosocial (pt-stated)   20%     Goal Statement: I want to remain as independent as I safely can.  Date Goal set: 10/5/20 // revised on 11/16/20  Barriers: lives alone in private home with stairs, chronic vertigo, recent fall  Strengths: motivated to remain independent, supportive son  Date to Achieve By: 4/30/21  Patient expressed understanding of goal: Yes    Action steps to achieve this goal:  1. I will continue working with [facility] as indicated.  2. I will do home exercises as advised by home care.  3. I will take precautions to avoid falls such as wearing appropriate footwear, using my walker, and removing " any obstacles from my walking path.  4. I will explore options for a Medical Alert System.  5. I will explore additional options for in-home assistance.  6. I will consider higher levels of care if needed.  7. I will continue working with Care Coordination to identify and address barriers to achieving my goal.        Intervention/Education provided during outreach:    CC RN provided encouragement to the patient for her efforts toward keeping busy despite limitations of age and pandemic.    CC RN encouraged patient to work on indoor exercises and/or seated exercises to continue keeping herself strong but minimizing risks related to walking outside alone.    CC RN facilitated a call to [employer] while on the phone with the patient.  CC RN spoke with Maryellen who updated that due to COVID-19, they are not allowing people to come into their office right now, but that they can help to assess over the phone what the patient's needs are and help them choose the best equipment.  Per this discussion, patient decided that she would like [employer] to call her son to discuss further; Maryellen with [employer] will call patient's son.    CC RN encouraged patient to schedule Medicare Annual Wellness Visit and offered to assist with scheduling; patient agreeable to visit but declined scheduling at this time.     Outreach Frequency: monthly    Plan:    CC RN will send patient updated Complex Care Plan based on goal discussion today.    [employer] is to call patient's son to discuss equipment options per patient's request.    Patient will call to schedule a Medicare Annual Wellness Visit as discussed.    Patient agreed to contact CC RN with additional questions or concerns.    CC CHW will assist with continued outreaches at this time with next outreach in approximately 1 month. CC CHW will inform CC RN of any concerns or changes regarding patient as needed.  CC RN will review patient's chart in approximately 6  weeks and outreach to patient as indicated.    Jeet Parish, RN  Clinic Care Coordinator  [facility]  Ph: [phone]

## 2020-11-23 NOTE — PROGRESS NOTES
ANTICOAGULATION MANAGEMENT     Patient Name:  Madie Silva  Date:  11/23/2020    ASSESSMENT /SUBJECTIVE:    Today's INR result of 3.3 is supratherapeutic. Goal INR of 2.0-3.0      Warfarin dose taken: Warfarin taken as instructed    Diet: No new diet changes affecting INR    Medication changes/ interactions: No new medications/supplements affecting INR    Previous INR: Therapeutic     S/S of bleeding or thromboembolism: No    New injury or illness: No    Upcoming surgery, procedure or cardioversion: No    Additional findings: None      PLAN:    Telephone call with Madie regarding INR result and instructed:     Warfarin Dosing Instructions: Change your warfarin dose to 4.5mg Sat and 3 mg AOD    Instructed patient to follow up no later than: 2 weeks  Lab visit scheduled    Education provided: Target INR goal and significance of current INR result    Patient is interested in home monitor or may also qualify for [facility] to have done at home.  Will reach out to [facility] to inquire if covered by insurance if so, then will check next INR at home.  Otherwise, will look at signing patient up for a home monitor.    Madie verbalizes understanding and agrees to warfarin dosing plan.    Instructed to call the Anticoagulation Clinic for any changes, questions or concerns. (#[phone])        Connie Hickman RN      OBJECTIVE:  Recent labs: (last 7 days)     11/23/20  1446   INR 3.30*         INR assessment SUPRA    Recheck INR In: 2 WEEKS    INR Location Clinic      Anticoagulation Summary  As of 11/23/2020    INR goal:  2.0-3.0   TTR:  66.8 % (11.9 mo)   INR used for dosing:  No new INR was available at the time of this encounter.   Warfarin maintenance plan:  4.5 mg (3 mg x 1.5) every Sat; 3 mg (3 mg x 1) all other days   Full warfarin instructions:  4.5 mg every Sat; 3 mg all other days   Weekly warfarin total:  22.5 mg   Plan last modified:  Connie Hickman, RN (11/23/2020)   Next INR check:   12/14/2020   Priority:  Maintenance   Target end date:  Indefinite    Indications    Long-term (current) use of anticoagulants [Z79.01] (Resolved) [Z79.01]  Phlebitis and thrombophlebitis of deep veins of lower extremities (H) [I80.209]  Acute myocardial infarction  initial episode of care (H) (Resolved) [I21.9]  PAF -- on Warfarin  [I48.0]             Anticoagulation Episode Summary     INR check location:      Preferred lab:      Send INR reminders to:  [facility]    Comments:        Anticoagulation Care Providers     Provider Role Specialty Phone number    De Obregon MD Referring Internal Medicine [phone]

## 2020-11-24 NOTE — TELEPHONE ENCOUNTER
Pt to have INR's done by [facility].  Did check with the office and this service is covered by patients insurance.    Order for INR completed and faxed to [facility].  Advised to recheck INR on 12/7/20.  Spoke with patient and updated on lab check and that they will call her to set up a time to come to her home to check INR.  She is in agreement.    Connie Hickman RN  [facility]tico[facility]

## 2020-12-07 NOTE — PROGRESS NOTES
ANTICOAGULATION FOLLOW-UP CLINIC VISIT    Patient Name:  Madie Silva  Date:  12/7/2020  Contact Type:  Telephone/ Madie    SUBJECTIVE:  Patient Findings     Comments:  Patient denies any identifiable changes that caused the supratherapeutic INR.           Clinical Outcomes     Negatives:  Major bleeding event, Thromboembolic event, Anticoagulation-related hospital admission, Anticoagulation-related ED visit, Anticoagulation-related fatality    Comments:  Patient denies any identifiable changes that caused the supratherapeutic INR.              OBJECTIVE    Recent labs: (last 7 days)     12/07/20   INR 3.1*       ASSESSMENT / PLAN  INR assessment SUPRA    Recheck INR In: 2 WEEKS    INR Location [facility] INR      Anticoagulation Summary  As of 12/7/2020    INR goal:  2.0-3.0   TTR:  63.0 % (11.9 mo)   INR used for dosing:  3.1 (12/7/2020)   Warfarin maintenance plan:  3 mg (3 mg x 1) every day   Full warfarin instructions:  3 mg every day   Weekly warfarin total:  21 mg   Plan last modified:  Dianne Barrow RN (12/7/2020)   Next INR check:  12/21/2020   Priority:  Maintenance   Target end date:  Indefinite    Indications    Long-term (current) use of anticoagulants [Z79.01] (Resolved) [Z79.01]  Phlebitis and thrombophlebitis of deep veins of lower extremities (H) [I80.209]  Acute myocardial infarction  initial episode of care (H) (Resolved) [I21.9]  PAF -- on Warfarin  [I48.0]             Anticoagulation Episode Summary     INR check location:      Preferred lab:      [facility]    Comments:        Anticoagulation Care Providers     Provider Role Specialty Phone number    De Obregon MD Referring Internal Medicine [phone]            See the Encounter Report to view Anticoagulation Flowsheet and Dosing Calendar (Go to Encounters tab in chart review, and find the Anticoagulation Therapy Visit)    Patient INR is supra therapeutic today.  Patient will decrease  maintenance dosing to 21 mg and follow up in 2 weeks or sooner if there are any concerns or problems.    Signs of bleeding and when appropriate to seek care for symptoms reviewed.    Adjustment Rational: 6.7%  Dosage adjustment made based on physician directed care plan.      Dianne Barrow RN

## 2020-12-21 NOTE — PROGRESS NOTES
"ANTICOAGULATION MANAGEMENT     Patient Name:  Madie Silva  Date:  2020    ASSESSMENT /SUBJECTIVE:    Today's INR result of 2.7 is therapeutic. Goal INR of 2.0-3.0      Warfarin dose taken: Warfarin taken as instructed    Diet: No new diet changes affecting INR    Medication changes/ interactions: No new medications/supplements affecting INR    Previous INR: Supratherapeutic     S/S of bleeding or thromboembolism: No    New injury or illness: No    Upcoming surgery, procedure or cardioversion: No    Additional findings: patient overall feels \"very old\" and feels the need to sleep a lot.       PLAN:    Telephone call with Madie regarding INR result and instructed:     Warfarin Dosing Instructions: Continue your current warfarin dose 3mg Daily     Instructed patient to follow up no later than: 2 weeks  Patient to recheck with home meter    Education provided: None required and Contact the anticoagulation clinic with any changes, questions or concerns at #[phone]       Madie verbalizes understanding and agrees to warfarin dosing plan.    Instructed to call the Anticoagulation Clinic for any changes, questions or concerns. (#[phone])        Nalini Hunter RN      OBJECTIVE:  Recent labs: (last 7 days)     20   INR 2.7*         No question data found.  Anticoagulation Summary  As of 2020    INR goal:  2.0-3.0   TTR:  62.0 % (11.9 mo)   INR used for dosin.7 (2020)   Warfarin maintenance plan:  3 mg (3 mg x 1) every day   Full warfarin instructions:  3 mg every day   Weekly warfarin total:  21 mg   No change documented:  Nalini Hunter RN   Plan last modified:  Dianne Barrow RN (2020)   Next INR check:  2021   Priority:  Maintenance   Target end date:  Indefinite    Indications    Long-term (current) use of anticoagulants [Z79.01] (Resolved) [Z79.01]  Phlebitis and thrombophlebitis of deep veins of lower extremities (H) [I80.209]  Acute myocardial infarction  initial " episode of care (H) (Resolved) [I21.9]  PAF -- on Warfarin  [I48.0]             Anticoagulation Episode Summary     INR check location:      Preferred lab:      Send INR reminders to:  [facility]    Comments:  [facility]      Anticoagulation Care Providers     Provider Role Specialty Phone number    De Obregon MD Referring Internal Medicine [phone]         Nalini Syed RN, BSN, PHN

## 2021-01-01 ENCOUNTER — VIRTUAL VISIT (OUTPATIENT)
Dept: INTERNAL MEDICINE | Facility: CLINIC | Age: 86
End: 2021-01-01
Payer: COMMERCIAL

## 2021-01-01 ENCOUNTER — OFFICE VISIT (OUTPATIENT)
Dept: NEUROSURGERY | Facility: CLINIC | Age: 86
End: 2021-01-01
Attending: PHYSICIAN ASSISTANT
Payer: COMMERCIAL

## 2021-01-01 ENCOUNTER — ANTICOAGULATION THERAPY VISIT (OUTPATIENT)
Dept: INTERNAL MEDICINE | Facility: CLINIC | Age: 86
End: 2021-01-01

## 2021-01-01 ENCOUNTER — TELEPHONE (OUTPATIENT)
Dept: ANTICOAGULATION | Facility: CLINIC | Age: 86
End: 2021-01-01

## 2021-01-01 ENCOUNTER — LAB REQUISITION (OUTPATIENT)
Dept: LAB | Facility: CLINIC | Age: 86
End: 2021-01-01

## 2021-01-01 ENCOUNTER — TELEPHONE (OUTPATIENT)
Dept: INTERNAL MEDICINE | Facility: CLINIC | Age: 86
End: 2021-01-01

## 2021-01-01 ENCOUNTER — APPOINTMENT (OUTPATIENT)
Dept: CT IMAGING | Facility: CLINIC | Age: 86
DRG: 543 | End: 2021-01-01
Attending: EMERGENCY MEDICINE
Payer: COMMERCIAL

## 2021-01-01 ENCOUNTER — HOSPITAL LABORATORY (OUTPATIENT)
Dept: OTHER | Facility: CLINIC | Age: 86
End: 2021-01-01

## 2021-01-01 ENCOUNTER — PATIENT OUTREACH (OUTPATIENT)
Dept: NURSING | Facility: CLINIC | Age: 86
End: 2021-01-01
Payer: COMMERCIAL

## 2021-01-01 ENCOUNTER — TRANSITIONAL CARE UNIT VISIT (OUTPATIENT)
Dept: GERIATRICS | Facility: CLINIC | Age: 86
End: 2021-01-01
Payer: COMMERCIAL

## 2021-01-01 ENCOUNTER — OFFICE VISIT (OUTPATIENT)
Dept: INTERNAL MEDICINE | Facility: CLINIC | Age: 86
End: 2021-01-01
Payer: COMMERCIAL

## 2021-01-01 ENCOUNTER — IMMUNIZATION (OUTPATIENT)
Dept: NURSING | Facility: CLINIC | Age: 86
End: 2021-01-01
Payer: COMMERCIAL

## 2021-01-01 ENCOUNTER — APPOINTMENT (OUTPATIENT)
Dept: GENERAL RADIOLOGY | Facility: CLINIC | Age: 86
DRG: 543 | End: 2021-01-01
Attending: EMERGENCY MEDICINE
Payer: COMMERCIAL

## 2021-01-01 ENCOUNTER — HOSPITAL ENCOUNTER (OUTPATIENT)
Facility: CLINIC | Age: 86
Setting detail: OBSERVATION
Discharge: HOME | End: 2021-06-15
Attending: PHYSICIAN ASSISTANT | Admitting: INTERNAL MEDICINE
Payer: COMMERCIAL

## 2021-01-01 ENCOUNTER — MEDICAL CORRESPONDENCE (OUTPATIENT)
Dept: HEALTH INFORMATION MANAGEMENT | Facility: CLINIC | Age: 86
End: 2021-01-01

## 2021-01-01 ENCOUNTER — TRANSFERRED RECORDS (OUTPATIENT)
Dept: HEALTH INFORMATION MANAGEMENT | Facility: CLINIC | Age: 86
End: 2021-01-01

## 2021-01-01 ENCOUNTER — PATIENT OUTREACH (OUTPATIENT)
Dept: CARE COORDINATION | Facility: CLINIC | Age: 86
End: 2021-01-01

## 2021-01-01 ENCOUNTER — ANCILLARY PROCEDURE (OUTPATIENT)
Dept: CARDIOLOGY | Facility: CLINIC | Age: 86
End: 2021-01-01
Attending: INTERNAL MEDICINE
Payer: COMMERCIAL

## 2021-01-01 ENCOUNTER — DOCUMENTATION ONLY (OUTPATIENT)
Dept: INTERNAL MEDICINE | Facility: CLINIC | Age: 86
End: 2021-01-01

## 2021-01-01 ENCOUNTER — ANCILLARY PROCEDURE (OUTPATIENT)
Dept: GENERAL RADIOLOGY | Facility: CLINIC | Age: 86
End: 2021-01-01
Payer: COMMERCIAL

## 2021-01-01 ENCOUNTER — NURSING HOME VISIT (OUTPATIENT)
Dept: GERIATRICS | Facility: CLINIC | Age: 86
End: 2021-01-01
Payer: COMMERCIAL

## 2021-01-01 ENCOUNTER — OFFICE VISIT (OUTPATIENT)
Dept: CARDIOLOGY | Facility: CLINIC | Age: 86
End: 2021-01-01
Payer: COMMERCIAL

## 2021-01-01 ENCOUNTER — DISCHARGE SUMMARY NURSING HOME (OUTPATIENT)
Dept: GERIATRICS | Facility: CLINIC | Age: 86
End: 2021-01-01
Payer: COMMERCIAL

## 2021-01-01 ENCOUNTER — TELEPHONE (OUTPATIENT)
Dept: NEUROSURGERY | Facility: CLINIC | Age: 86
End: 2021-01-01

## 2021-01-01 ENCOUNTER — APPOINTMENT (OUTPATIENT)
Dept: PHYSICAL THERAPY | Facility: CLINIC | Age: 86
DRG: 543 | End: 2021-01-01
Attending: STUDENT IN AN ORGANIZED HEALTH CARE EDUCATION/TRAINING PROGRAM
Payer: COMMERCIAL

## 2021-01-01 ENCOUNTER — DOCUMENTATION ONLY (OUTPATIENT)
Dept: CARDIOLOGY | Facility: CLINIC | Age: 86
End: 2021-01-01

## 2021-01-01 ENCOUNTER — HOSPITAL ENCOUNTER (EMERGENCY)
Facility: CLINIC | Age: 86
Discharge: HOME | End: 2021-07-28
Admitting: EMERGENCY MEDICINE
Payer: COMMERCIAL

## 2021-01-01 ENCOUNTER — APPOINTMENT (OUTPATIENT)
Dept: CARDIOLOGY | Facility: CLINIC | Age: 86
DRG: 543 | End: 2021-01-01
Attending: STUDENT IN AN ORGANIZED HEALTH CARE EDUCATION/TRAINING PROGRAM
Payer: COMMERCIAL

## 2021-01-01 ENCOUNTER — HOSPITAL ENCOUNTER (OUTPATIENT)
Dept: CT IMAGING | Facility: CLINIC | Age: 86
End: 2021-07-19
Attending: PHYSICIAN ASSISTANT
Payer: COMMERCIAL

## 2021-01-01 ENCOUNTER — APPOINTMENT (OUTPATIENT)
Dept: GENERAL RADIOLOGY | Facility: CLINIC | Age: 86
DRG: 280 | End: 2021-01-01
Attending: EMERGENCY MEDICINE
Payer: COMMERCIAL

## 2021-01-01 ENCOUNTER — NURSE TRIAGE (OUTPATIENT)
Dept: INTERNAL MEDICINE | Facility: CLINIC | Age: 86
End: 2021-01-01

## 2021-01-01 ENCOUNTER — APPOINTMENT (OUTPATIENT)
Dept: OCCUPATIONAL THERAPY | Facility: CLINIC | Age: 86
DRG: 280 | End: 2021-01-01
Payer: COMMERCIAL

## 2021-01-01 ENCOUNTER — TELEPHONE (OUTPATIENT)
Dept: CARDIOLOGY | Facility: CLINIC | Age: 86
End: 2021-01-01

## 2021-01-01 ENCOUNTER — HOSPITAL ENCOUNTER (OUTPATIENT)
Dept: CARDIOLOGY | Facility: CLINIC | Age: 86
Discharge: HOME | End: 2021-04-26
Attending: NURSE PRACTITIONER | Admitting: NURSE PRACTITIONER
Payer: COMMERCIAL

## 2021-01-01 ENCOUNTER — IMMUNIZATION (OUTPATIENT)
Dept: NURSING | Facility: CLINIC | Age: 86
End: 2021-01-01
Attending: INTERNAL MEDICINE
Payer: COMMERCIAL

## 2021-01-01 ENCOUNTER — TELEPHONE (OUTPATIENT)
Dept: NEUROLOGY | Facility: CLINIC | Age: 86
End: 2021-01-01

## 2021-01-01 ENCOUNTER — TELEPHONE (OUTPATIENT)
Dept: GERIATRICS | Facility: CLINIC | Age: 86
End: 2021-01-01

## 2021-01-01 ENCOUNTER — APPOINTMENT (OUTPATIENT)
Dept: GENERAL RADIOLOGY | Facility: CLINIC | Age: 86
End: 2021-01-01
Attending: PHYSICIAN ASSISTANT
Payer: COMMERCIAL

## 2021-01-01 ENCOUNTER — APPOINTMENT (OUTPATIENT)
Dept: PHYSICAL THERAPY | Facility: CLINIC | Age: 86
End: 2021-01-01
Attending: INTERNAL MEDICINE
Payer: COMMERCIAL

## 2021-01-01 ENCOUNTER — APPOINTMENT (OUTPATIENT)
Dept: CARDIOLOGY | Facility: CLINIC | Age: 86
DRG: 280 | End: 2021-01-01
Attending: HOSPITALIST
Payer: COMMERCIAL

## 2021-01-01 ENCOUNTER — HOSPITAL ENCOUNTER (INPATIENT)
Facility: CLINIC | Age: 86
LOS: 2 days | Discharge: NURSING, REHAB OR LONG-TERM CARE | DRG: 543 | End: 2021-05-11
Attending: EMERGENCY MEDICINE | Admitting: STUDENT IN AN ORGANIZED HEALTH CARE EDUCATION/TRAINING PROGRAM
Payer: COMMERCIAL

## 2021-01-01 ENCOUNTER — APPOINTMENT (OUTPATIENT)
Dept: OCCUPATIONAL THERAPY | Facility: CLINIC | Age: 86
DRG: 280 | End: 2021-01-01
Attending: HOSPITALIST
Payer: COMMERCIAL

## 2021-01-01 ENCOUNTER — HOSPITAL ENCOUNTER (INPATIENT)
Facility: CLINIC | Age: 86
LOS: 4 days | Discharge: NURSING, REHAB OR LONG-TERM CARE | DRG: 280 | End: 2021-08-06
Attending: EMERGENCY MEDICINE | Admitting: HOSPITALIST
Payer: COMMERCIAL

## 2021-01-01 ENCOUNTER — APPOINTMENT (OUTPATIENT)
Dept: PHYSICAL THERAPY | Facility: CLINIC | Age: 86
DRG: 280 | End: 2021-01-01
Attending: HOSPITALIST
Payer: COMMERCIAL

## 2021-01-01 ENCOUNTER — APPOINTMENT (OUTPATIENT)
Dept: CT IMAGING | Facility: CLINIC | Age: 86
End: 2021-01-01
Attending: PHYSICIAN ASSISTANT
Payer: COMMERCIAL

## 2021-01-01 ENCOUNTER — ANCILLARY PROCEDURE (OUTPATIENT)
Dept: GENERAL RADIOLOGY | Facility: CLINIC | Age: 86
End: 2021-01-01
Attending: INTERNAL MEDICINE
Payer: COMMERCIAL

## 2021-01-01 VITALS
RESPIRATION RATE: 20 BRPM | SYSTOLIC BLOOD PRESSURE: 165 MMHG | BODY MASS INDEX: 25.76 KG/M2 | WEIGHT: 154.8 LBS | TEMPERATURE: 97.5 F | OXYGEN SATURATION: 96 % | DIASTOLIC BLOOD PRESSURE: 79 MMHG | HEART RATE: 108 BPM

## 2021-01-01 VITALS
BODY MASS INDEX: 31.01 KG/M2 | SYSTOLIC BLOOD PRESSURE: 117 MMHG | WEIGHT: 158.8 LBS | HEART RATE: 89 BPM | RESPIRATION RATE: 28 BRPM | OXYGEN SATURATION: 97 % | TEMPERATURE: 98.1 F | DIASTOLIC BLOOD PRESSURE: 64 MMHG

## 2021-01-01 VITALS
HEIGHT: 64 IN | WEIGHT: 157.9 LBS | HEART RATE: 72 BPM | BODY MASS INDEX: 26.96 KG/M2 | DIASTOLIC BLOOD PRESSURE: 58 MMHG | OXYGEN SATURATION: 95 % | SYSTOLIC BLOOD PRESSURE: 120 MMHG

## 2021-01-01 VITALS
RESPIRATION RATE: 20 BRPM | HEART RATE: 50 BPM | DIASTOLIC BLOOD PRESSURE: 52 MMHG | BODY MASS INDEX: 26.8 KG/M2 | OXYGEN SATURATION: 97 % | WEIGHT: 157 LBS | SYSTOLIC BLOOD PRESSURE: 158 MMHG | HEIGHT: 64 IN | TEMPERATURE: 97.8 F

## 2021-01-01 VITALS
WEIGHT: 164.6 LBS | BODY MASS INDEX: 32.15 KG/M2 | TEMPERATURE: 97.6 F | HEART RATE: 97 BPM | OXYGEN SATURATION: 92 % | SYSTOLIC BLOOD PRESSURE: 106 MMHG | DIASTOLIC BLOOD PRESSURE: 64 MMHG | RESPIRATION RATE: 16 BRPM

## 2021-01-01 VITALS — DIASTOLIC BLOOD PRESSURE: 57 MMHG | SYSTOLIC BLOOD PRESSURE: 160 MMHG | OXYGEN SATURATION: 95 % | HEART RATE: 86 BPM

## 2021-01-01 VITALS
SYSTOLIC BLOOD PRESSURE: 153 MMHG | OXYGEN SATURATION: 99 % | RESPIRATION RATE: 18 BRPM | BODY MASS INDEX: 25.79 KG/M2 | TEMPERATURE: 98.5 F | WEIGHT: 155 LBS | HEART RATE: 91 BPM | DIASTOLIC BLOOD PRESSURE: 61 MMHG

## 2021-01-01 VITALS
BODY MASS INDEX: 26.78 KG/M2 | WEIGHT: 156 LBS | TEMPERATURE: 98.2 F | RESPIRATION RATE: 16 BRPM | SYSTOLIC BLOOD PRESSURE: 143 MMHG | DIASTOLIC BLOOD PRESSURE: 48 MMHG | OXYGEN SATURATION: 99 % | HEART RATE: 98 BPM

## 2021-01-01 VITALS
SYSTOLIC BLOOD PRESSURE: 153 MMHG | WEIGHT: 156 LBS | HEART RATE: 91 BPM | OXYGEN SATURATION: 99 % | TEMPERATURE: 98.5 F | DIASTOLIC BLOOD PRESSURE: 61 MMHG | RESPIRATION RATE: 18 BRPM | BODY MASS INDEX: 25.96 KG/M2

## 2021-01-01 VITALS
DIASTOLIC BLOOD PRESSURE: 62 MMHG | TEMPERATURE: 97.6 F | OXYGEN SATURATION: 98 % | HEART RATE: 79 BPM | HEIGHT: 65 IN | BODY MASS INDEX: 26.33 KG/M2 | RESPIRATION RATE: 18 BRPM | WEIGHT: 158 LBS | SYSTOLIC BLOOD PRESSURE: 143 MMHG

## 2021-01-01 VITALS
BODY MASS INDEX: 31.25 KG/M2 | TEMPERATURE: 97 F | DIASTOLIC BLOOD PRESSURE: 65 MMHG | RESPIRATION RATE: 22 BRPM | HEART RATE: 86 BPM | WEIGHT: 160 LBS | SYSTOLIC BLOOD PRESSURE: 155 MMHG | OXYGEN SATURATION: 97 %

## 2021-01-01 VITALS
HEART RATE: 89 BPM | OXYGEN SATURATION: 94 % | DIASTOLIC BLOOD PRESSURE: 65 MMHG | RESPIRATION RATE: 26 BRPM | SYSTOLIC BLOOD PRESSURE: 123 MMHG | WEIGHT: 158.8 LBS | TEMPERATURE: 97.5 F | HEIGHT: 65 IN | BODY MASS INDEX: 26.46 KG/M2

## 2021-01-01 VITALS
OXYGEN SATURATION: 97 % | TEMPERATURE: 97.2 F | DIASTOLIC BLOOD PRESSURE: 77 MMHG | HEART RATE: 93 BPM | WEIGHT: 155 LBS | HEIGHT: 65 IN | BODY MASS INDEX: 25.83 KG/M2 | SYSTOLIC BLOOD PRESSURE: 156 MMHG | RESPIRATION RATE: 18 BRPM

## 2021-01-01 VITALS
HEART RATE: 82 BPM | TEMPERATURE: 98.4 F | BODY MASS INDEX: 31.41 KG/M2 | SYSTOLIC BLOOD PRESSURE: 166 MMHG | OXYGEN SATURATION: 95 % | DIASTOLIC BLOOD PRESSURE: 54 MMHG | RESPIRATION RATE: 22 BRPM | WEIGHT: 160 LBS | HEIGHT: 60 IN

## 2021-01-01 VITALS
HEART RATE: 90 BPM | DIASTOLIC BLOOD PRESSURE: 70 MMHG | SYSTOLIC BLOOD PRESSURE: 138 MMHG | WEIGHT: 156 LBS | OXYGEN SATURATION: 98 % | HEIGHT: 65 IN | BODY MASS INDEX: 25.99 KG/M2

## 2021-01-01 VITALS
OXYGEN SATURATION: 91 % | RESPIRATION RATE: 28 BRPM | DIASTOLIC BLOOD PRESSURE: 73 MMHG | SYSTOLIC BLOOD PRESSURE: 124 MMHG | TEMPERATURE: 97.8 F | WEIGHT: 158.2 LBS | HEART RATE: 87 BPM | BODY MASS INDEX: 26.33 KG/M2

## 2021-01-01 VITALS
DIASTOLIC BLOOD PRESSURE: 69 MMHG | OXYGEN SATURATION: 93 % | RESPIRATION RATE: 18 BRPM | TEMPERATURE: 97.6 F | BODY MASS INDEX: 25.19 KG/M2 | HEART RATE: 83 BPM | WEIGHT: 156.75 LBS | HEIGHT: 66 IN | SYSTOLIC BLOOD PRESSURE: 154 MMHG

## 2021-01-01 DIAGNOSIS — N18.31 STAGE 3A CHRONIC KIDNEY DISEASE (H): ICD-10-CM

## 2021-01-01 DIAGNOSIS — I48.20 CHRONIC ATRIAL FIBRILLATION (H): ICD-10-CM

## 2021-01-01 DIAGNOSIS — R79.89 ELEVATED TROPONIN: ICD-10-CM

## 2021-01-01 DIAGNOSIS — I80.201 DEEP VEIN PHLEBITIS AND THROMBOPHLEBITIS OF RIGHT LOWER EXTREMITY (H): ICD-10-CM

## 2021-01-01 DIAGNOSIS — I50.33 DIASTOLIC DYSFUNCTION WITH ACUTE ON CHRONIC HEART FAILURE (H): ICD-10-CM

## 2021-01-01 DIAGNOSIS — I35.0 SEVERE AORTIC STENOSIS: ICD-10-CM

## 2021-01-01 DIAGNOSIS — I10 BENIGN ESSENTIAL HYPERTENSION: ICD-10-CM

## 2021-01-01 DIAGNOSIS — I10 ESSENTIAL HYPERTENSION: ICD-10-CM

## 2021-01-01 DIAGNOSIS — D50.8 IRON DEFICIENCY ANEMIA SECONDARY TO INADEQUATE DIETARY IRON INTAKE: ICD-10-CM

## 2021-01-01 DIAGNOSIS — B35.4 TINEA CORPORIS: ICD-10-CM
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
## 2021-01-01 DIAGNOSIS — M80.00XD AGE-RELATED OSTEOPOROSIS WITH CURRENT PATHOLOGICAL FRACTURE WITH ROUTINE HEALING, SUBSEQUENT ENCOUNTER: ICD-10-CM

## 2021-01-01 DIAGNOSIS — I49.5 SSS (SICK SINUS SYNDROME) (H): ICD-10-CM

## 2021-01-01 DIAGNOSIS — Z95.0 CARDIAC PACEMAKER IN SITU: ICD-10-CM

## 2021-01-01 DIAGNOSIS — D50.9 IRON DEFICIENCY ANEMIA, UNSPECIFIED IRON DEFICIENCY ANEMIA TYPE: ICD-10-CM

## 2021-01-01 DIAGNOSIS — B02.29 POST HERPETIC NEURALGIA: ICD-10-CM

## 2021-01-01 DIAGNOSIS — S12.501A CLOSED NONDISPLACED FRACTURE OF SIXTH CERVICAL VERTEBRA, UNSPECIFIED FRACTURE MORPHOLOGY, INITIAL ENCOUNTER (H): ICD-10-CM

## 2021-01-01 DIAGNOSIS — K44.9 HIATAL HERNIA: ICD-10-CM

## 2021-01-01 DIAGNOSIS — E78.5 HYPERLIPIDEMIA LDL GOAL <100: ICD-10-CM

## 2021-01-01 DIAGNOSIS — I80.209 PHLEBITIS AND THROMBOPHLEBITIS OF DEEP VEINS OF LOWER EXTREMITIES (H): Primary | ICD-10-CM

## 2021-01-01 DIAGNOSIS — R53.81 PHYSICAL DECONDITIONING: ICD-10-CM

## 2021-01-01 DIAGNOSIS — S12.501D CLOSED NONDISPLACED FRACTURE OF SIXTH CERVICAL VERTEBRA WITH ROUTINE HEALING, UNSPECIFIED FRACTURE MORPHOLOGY, SUBSEQUENT ENCOUNTER: Primary | ICD-10-CM

## 2021-01-01 DIAGNOSIS — M54.6 ACUTE LEFT-SIDED THORACIC BACK PAIN: Primary | ICD-10-CM

## 2021-01-01 DIAGNOSIS — R00.1 SYMPTOMATIC BRADYCARDIA: ICD-10-CM

## 2021-01-01 DIAGNOSIS — R42 VERTIGO: ICD-10-CM

## 2021-01-01 DIAGNOSIS — D64.9 CHRONIC ANEMIA: ICD-10-CM

## 2021-01-01 DIAGNOSIS — R06.02 SHORTNESS OF BREATH: ICD-10-CM

## 2021-01-01 DIAGNOSIS — E11.21 TYPE 2 DIABETES MELLITUS WITH DIABETIC NEPHROPATHY, WITHOUT LONG-TERM CURRENT USE OF INSULIN (H): ICD-10-CM

## 2021-01-01 DIAGNOSIS — K21.00 GASTROESOPHAGEAL REFLUX DISEASE WITH ESOPHAGITIS WITHOUT HEMORRHAGE: ICD-10-CM

## 2021-01-01 DIAGNOSIS — F41.9 ANXIETY: ICD-10-CM

## 2021-01-01 DIAGNOSIS — I48.0 PAROXYSMAL ATRIAL FIBRILLATION (H): Primary | ICD-10-CM

## 2021-01-01 DIAGNOSIS — I25.119 CORONARY ARTERY DISEASE INVOLVING NATIVE CORONARY ARTERY OF NATIVE HEART WITH ANGINA PECTORIS (H): ICD-10-CM

## 2021-01-01 DIAGNOSIS — G89.4 CHRONIC PAIN SYNDROME: ICD-10-CM

## 2021-01-01 DIAGNOSIS — Z79.01 ANTICOAGULATED: ICD-10-CM

## 2021-01-01 DIAGNOSIS — Z53.9 DIAGNOSIS NOT YET DEFINED: Primary | ICD-10-CM

## 2021-01-01 DIAGNOSIS — I48.91 ATRIAL FIBRILLATION WITH RVR (H): ICD-10-CM
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
## 2021-01-01 DIAGNOSIS — I48.0 PAROXYSMAL ATRIAL FIBRILLATION (H): ICD-10-CM

## 2021-01-01 DIAGNOSIS — E03.9 HYPOTHYROIDISM, UNSPECIFIED TYPE: ICD-10-CM

## 2021-01-01 DIAGNOSIS — G47.00 INSOMNIA, UNSPECIFIED TYPE: ICD-10-CM

## 2021-01-01 DIAGNOSIS — Z71.89 ADVANCED DIRECTIVES, COUNSELING/DISCUSSION: ICD-10-CM

## 2021-01-01 DIAGNOSIS — Z00.01 ENCOUNTER FOR GENERAL ADULT MEDICAL EXAMINATION WITH ABNORMAL FINDINGS: ICD-10-CM

## 2021-01-01 DIAGNOSIS — S09.90XA CLOSED HEAD INJURY, INITIAL ENCOUNTER: ICD-10-CM

## 2021-01-01 DIAGNOSIS — G25.81 RESTLESS LEG SYNDROME: ICD-10-CM

## 2021-01-01 DIAGNOSIS — I27.20 PULMONARY HYPERTENSION (H): ICD-10-CM

## 2021-01-01 DIAGNOSIS — R26.89 BALANCE PROBLEMS: ICD-10-CM

## 2021-01-01 DIAGNOSIS — Z95.2 S/P TAVR (TRANSCATHETER AORTIC VALVE REPLACEMENT): ICD-10-CM

## 2021-01-01 DIAGNOSIS — I48.0 PAF (PAROXYSMAL ATRIAL FIBRILLATION) (H): ICD-10-CM

## 2021-01-01 DIAGNOSIS — K59.01 SLOW TRANSIT CONSTIPATION: ICD-10-CM

## 2021-01-01 DIAGNOSIS — I50.21 ACUTE HFREF (HEART FAILURE WITH REDUCED EJECTION FRACTION) (H): Primary | ICD-10-CM

## 2021-01-01 DIAGNOSIS — Z51.81 ENCOUNTER FOR THERAPEUTIC DRUG MONITORING: ICD-10-CM

## 2021-01-01 DIAGNOSIS — M35.3 POLYMYALGIA RHEUMATICA (H): ICD-10-CM

## 2021-01-01 DIAGNOSIS — I10 ESSENTIAL HYPERTENSION: Primary | ICD-10-CM

## 2021-01-01 DIAGNOSIS — S00.83XA CONTUSION OF FOREHEAD, INITIAL ENCOUNTER: ICD-10-CM

## 2021-01-01 DIAGNOSIS — Z79.01 LONG TERM (CURRENT) USE OF ANTICOAGULANTS: ICD-10-CM

## 2021-01-01 DIAGNOSIS — R06.2 WHEEZING: ICD-10-CM

## 2021-01-01 DIAGNOSIS — I25.10 CORONARY ARTERY DISEASE INVOLVING NATIVE CORONARY ARTERY OF NATIVE HEART WITHOUT ANGINA PECTORIS: ICD-10-CM

## 2021-01-01 DIAGNOSIS — E03.9 ACQUIRED HYPOTHYROIDISM: ICD-10-CM

## 2021-01-01 DIAGNOSIS — M62.81 GENERALIZED MUSCLE WEAKNESS: ICD-10-CM

## 2021-01-01 DIAGNOSIS — I80.201 DEEP VEIN PHLEBITIS AND THROMBOPHLEBITIS OF RIGHT LOWER EXTREMITY (H): Primary | ICD-10-CM

## 2021-01-01 DIAGNOSIS — R42 DIZZINESS: ICD-10-CM

## 2021-01-01 DIAGNOSIS — R10.9 RIGHT FLANK PAIN: ICD-10-CM

## 2021-01-01 DIAGNOSIS — M54.6 ACUTE RIGHT-SIDED THORACIC BACK PAIN: ICD-10-CM

## 2021-01-01 DIAGNOSIS — I35.0 AORTIC STENOSIS, SEVERE: ICD-10-CM

## 2021-01-01 DIAGNOSIS — I80.209 PHLEBITIS AND THROMBOPHLEBITIS OF DEEP VEINS OF LOWER EXTREMITIES (H): ICD-10-CM

## 2021-01-01 DIAGNOSIS — N18.32 STAGE 3B CHRONIC KIDNEY DISEASE (H): ICD-10-CM

## 2021-01-01 DIAGNOSIS — I80.201: ICD-10-CM

## 2021-01-01 DIAGNOSIS — E11.9 TYPE 2 DIABETES MELLITUS WITHOUT COMPLICATION, WITHOUT LONG-TERM CURRENT USE OF INSULIN (H): ICD-10-CM

## 2021-01-01 DIAGNOSIS — S12.591D OTHER CLOSED NONDISPLACED FRACTURE OF SIXTH CERVICAL VERTEBRA WITH ROUTINE HEALING, SUBSEQUENT ENCOUNTER: ICD-10-CM

## 2021-01-01 DIAGNOSIS — I35.0 AORTIC STENOSIS, SEVERE: Primary | ICD-10-CM

## 2021-01-01 DIAGNOSIS — N18.31 STAGE 3A CHRONIC KIDNEY DISEASE (H): Primary | ICD-10-CM

## 2021-01-01 DIAGNOSIS — E61.1 IRON DEFICIENCY: ICD-10-CM

## 2021-01-01 DIAGNOSIS — I50.32 CHRONIC DIASTOLIC (CONGESTIVE) HEART FAILURE (H): ICD-10-CM

## 2021-01-01 DIAGNOSIS — T14.8XXA FRACTURE: Primary | ICD-10-CM

## 2021-01-01 DIAGNOSIS — K22.719 BARRETT'S ESOPHAGUS WITH DYSPLASIA: ICD-10-CM

## 2021-01-01 DIAGNOSIS — H91.93 BILATERAL HEARING LOSS, UNSPECIFIED HEARING LOSS TYPE: ICD-10-CM

## 2021-01-01 DIAGNOSIS — Z86.73 HISTORY OF STROKE: ICD-10-CM

## 2021-01-01 DIAGNOSIS — M54.6 ACUTE LEFT-SIDED THORACIC BACK PAIN: ICD-10-CM

## 2021-01-01 DIAGNOSIS — I50.9 ACUTE CONGESTIVE HEART FAILURE, UNSPECIFIED HEART FAILURE TYPE (H): ICD-10-CM

## 2021-01-01 DIAGNOSIS — R06.02 SHORTNESS OF BREATH: Primary | ICD-10-CM

## 2021-01-01 DIAGNOSIS — T14.8XXA FRACTURE: ICD-10-CM

## 2021-01-01 DIAGNOSIS — R09.81 NASAL CONGESTION: ICD-10-CM

## 2021-01-01 DIAGNOSIS — S12.591D OTHER CLOSED NONDISPLACED FRACTURE OF SIXTH CERVICAL VERTEBRA WITH ROUTINE HEALING, SUBSEQUENT ENCOUNTER: Primary | ICD-10-CM

## 2021-01-01 DIAGNOSIS — I48.20 CHRONIC ATRIAL FIBRILLATION (H): Primary | ICD-10-CM

## 2021-01-01 DIAGNOSIS — I21.4 NSTEMI (NON-ST ELEVATED MYOCARDIAL INFARCTION) (H): ICD-10-CM

## 2021-01-01 DIAGNOSIS — Z95.2 S/P TAVR (TRANSCATHETER AORTIC VALVE REPLACEMENT): Primary | ICD-10-CM

## 2021-01-01 DIAGNOSIS — E11.21 TYPE 2 DIABETES MELLITUS WITH DIABETIC NEPHROPATHY, WITHOUT LONG-TERM CURRENT USE OF INSULIN (H): Primary | ICD-10-CM

## 2021-01-01 DIAGNOSIS — Z51.5 HOSPICE CARE PATIENT: ICD-10-CM

## 2021-01-01 DIAGNOSIS — R55 NEAR SYNCOPE: ICD-10-CM

## 2021-01-01 DIAGNOSIS — R79.89 ELEVATED TROPONIN: Primary | ICD-10-CM

## 2021-01-01 DIAGNOSIS — S00.81XA ABRASION OF FOREHEAD, INITIAL ENCOUNTER: ICD-10-CM

## 2021-01-01 DIAGNOSIS — R42 DIZZINESS: Primary | ICD-10-CM

## 2021-01-01 DIAGNOSIS — J96.01 ACUTE RESPIRATORY FAILURE WITH HYPOXIA (H): ICD-10-CM

## 2021-01-01 LAB
ABO/RH(D): NORMAL
ALBUMIN SERPL-MCNC: 3.4 G/DL (ref 3.4–5)
ALBUMIN SERPL-MCNC: 3.5 G/DL (ref 3.4–5)
ALBUMIN UR-MCNC: 30 MG/DL
ALP SERPL-CCNC: 112 U/L (ref 40–150)
ALP SERPL-CCNC: 112 U/L (ref 40–150)
ALT SERPL W P-5'-P-CCNC: 23 U/L (ref 0–50)
ALT SERPL W P-5'-P-CCNC: 25 U/L (ref 0–50)
ALT SERPL W P-5'-P-CCNC: 27 U/L (ref 0–50)
ANION GAP SERPL CALCULATED.3IONS-SCNC: 4 MMOL/L (ref 3–14)
ANION GAP SERPL CALCULATED.3IONS-SCNC: 4 MMOL/L (ref 3–14)
ANION GAP SERPL CALCULATED.3IONS-SCNC: 5 MMOL/L (ref 3–14)
ANION GAP SERPL CALCULATED.3IONS-SCNC: 6 MMOL/L (ref 3–14)
ANION GAP SERPL CALCULATED.3IONS-SCNC: 6 MMOL/L (ref 3–14)
ANION GAP SERPL CALCULATED.3IONS-SCNC: 7 MMOL/L (ref 3–14)
ANION GAP SERPL CALCULATED.3IONS-SCNC: 8 MMOL/L (ref 3–14)
ANION GAP SERPL CALCULATED.3IONS-SCNC: 9 MMOL/L (ref 3–14)
APPEARANCE UR: CLEAR
APTT PPP: 36 SEC (ref 22–37)
AST SERPL W P-5'-P-CCNC: 22 U/L (ref 0–45)
AST SERPL W P-5'-P-CCNC: 22 U/L (ref 0–45)
ATRIAL RATE - MUSE: 170 BPM
ATRIAL RATE - MUSE: 357 BPM
BACTERIA SPEC CULT: NORMAL
BASE EXCESS BLDV CALC-SCNC: -2.7 MMOL/L (ref -7.7–1.9)
BASE EXCESS BLDV CALC-SCNC: -3.2 MMOL/L (ref -7.7–1.9)
BASOPHILS # BLD AUTO: 0.1 10E3/UL (ref 0–0.2)
BASOPHILS # BLD AUTO: 0.1 10E3/UL (ref 0–0.2)
BASOPHILS # BLD AUTO: 0.1 10E9/L (ref 0–0.2)
BASOPHILS # BLD AUTO: 0.1 10E9/L (ref 0–0.2)
BASOPHILS NFR BLD AUTO: 0.7 %
BASOPHILS NFR BLD AUTO: 0.8 %
BASOPHILS NFR BLD AUTO: 1 %
BASOPHILS NFR BLD AUTO: 1 %
BILIRUB SERPL-MCNC: 0.4 MG/DL (ref 0.2–1.3)
BILIRUB SERPL-MCNC: 0.4 MG/DL (ref 0.2–1.3)
BILIRUB UR QL STRIP: NEGATIVE
BLD PROD TYP BPU: NORMAL
BLD PROD TYP BPU: NORMAL
BLOOD COMPONENT TYPE: NORMAL
BLOOD COMPONENT TYPE: NORMAL
BUN SERPL-MCNC: 17 MG/DL (ref 7–30)
BUN SERPL-MCNC: 21 MG/DL (ref 7–30)
BUN SERPL-MCNC: 21 MG/DL (ref 7–30)
BUN SERPL-MCNC: 22 MG/DL (ref 7–30)
BUN SERPL-MCNC: 22 MG/DL (ref 7–30)
BUN SERPL-MCNC: 23 MG/DL (ref 7–30)
BUN SERPL-MCNC: 24 MG/DL (ref 7–30)
BUN SERPL-MCNC: 26 MG/DL (ref 7–30)
BUN SERPL-MCNC: 27 MG/DL (ref 7–30)
BUN SERPL-MCNC: 30 MG/DL (ref 7–30)
BUN SERPL-MCNC: 31 MG/DL (ref 7–30)
CALCIUM SERPL-MCNC: 8.4 MG/DL (ref 8.5–10.1)
CALCIUM SERPL-MCNC: 8.6 MG/DL (ref 8.5–10.1)
CALCIUM SERPL-MCNC: 8.7 MG/DL (ref 8.5–10.1)
CALCIUM SERPL-MCNC: 8.8 MG/DL (ref 8.5–10.1)
CALCIUM SERPL-MCNC: 8.9 MG/DL (ref 8.5–10.1)
CALCIUM SERPL-MCNC: 9.1 MG/DL (ref 8.5–10.1)
CALCIUM SERPL-MCNC: 9.8 MG/DL (ref 8.5–10.1)
CHLORIDE BLD-SCNC: 103 MMOL/L (ref 94–109)
CHLORIDE BLD-SCNC: 105 MMOL/L (ref 94–109)
CHLORIDE BLD-SCNC: 105 MMOL/L (ref 94–109)
CHLORIDE BLD-SCNC: 106 MMOL/L (ref 94–109)
CHLORIDE BLD-SCNC: 107 MMOL/L (ref 94–109)
CHLORIDE SERPL-SCNC: 105 MMOL/L (ref 94–109)
CHLORIDE SERPL-SCNC: 105 MMOL/L (ref 94–109)
CHLORIDE SERPL-SCNC: 107 MMOL/L (ref 94–109)
CHLORIDE SERPL-SCNC: 107 MMOL/L (ref 94–109)
CHLORIDE SERPL-SCNC: 108 MMOL/L (ref 94–109)
CHLORIDE SERPL-SCNC: 108 MMOL/L (ref 94–109)
CHOLEST SERPL-MCNC: 206 MG/DL
CO2 SERPL-SCNC: 21 MMOL/L (ref 20–32)
CO2 SERPL-SCNC: 23 MMOL/L (ref 20–32)
CO2 SERPL-SCNC: 23 MMOL/L (ref 20–32)
CO2 SERPL-SCNC: 25 MMOL/L (ref 20–32)
CO2 SERPL-SCNC: 26 MMOL/L (ref 20–32)
CO2 SERPL-SCNC: 27 MMOL/L (ref 20–32)
CO2 SERPL-SCNC: 27 MMOL/L (ref 20–32)
CODING SYSTEM: NORMAL
CODING SYSTEM: NORMAL
COLOR UR AUTO: ABNORMAL
CREAT SERPL-MCNC: 0.87 MG/DL (ref 0.52–1.04)
CREAT SERPL-MCNC: 0.93 MG/DL (ref 0.52–1.04)
CREAT SERPL-MCNC: 0.93 MG/DL (ref 0.52–1.04)
CREAT SERPL-MCNC: 0.99 MG/DL (ref 0.52–1.04)
CREAT SERPL-MCNC: 0.99 MG/DL (ref 0.52–1.04)
CREAT SERPL-MCNC: 1.03 MG/DL (ref 0.52–1.04)
CREAT SERPL-MCNC: 1.07 MG/DL (ref 0.52–1.04)
CREAT SERPL-MCNC: 1.13 MG/DL (ref 0.52–1.04)
CREAT SERPL-MCNC: 1.15 MG/DL (ref 0.52–1.04)
CREAT SERPL-MCNC: 1.32 MG/DL (ref 0.52–1.04)
CREAT SERPL-MCNC: 1.36 MG/DL (ref 0.52–1.04)
CREAT UR-MCNC: 245 MG/DL
DIASTOLIC BLOOD PRESSURE - MUSE: NORMAL MMHG
DIASTOLIC BLOOD PRESSURE - MUSE: NORMAL MMHG
DIFFERENTIAL METHOD BLD: ABNORMAL
DIFFERENTIAL METHOD BLD: ABNORMAL
EOSINOPHIL # BLD AUTO: 0.1 10E3/UL (ref 0–0.7)
EOSINOPHIL # BLD AUTO: 0.1 10E3/UL (ref 0–0.7)
EOSINOPHIL # BLD AUTO: 0.2 10E9/L (ref 0–0.7)
EOSINOPHIL # BLD AUTO: 0.3 10E9/L (ref 0–0.7)
EOSINOPHIL NFR BLD AUTO: 1 %
EOSINOPHIL NFR BLD AUTO: 1 %
EOSINOPHIL NFR BLD AUTO: 2.5 %
EOSINOPHIL NFR BLD AUTO: 3.3 %
ERYTHROCYTE [DISTWIDTH] IN BLOOD BY AUTOMATED COUNT: 16.6 % (ref 10–15)
ERYTHROCYTE [DISTWIDTH] IN BLOOD BY AUTOMATED COUNT: 16.7 % (ref 10–15)
ERYTHROCYTE [DISTWIDTH] IN BLOOD BY AUTOMATED COUNT: 16.9 % (ref 10–15)
ERYTHROCYTE [DISTWIDTH] IN BLOOD BY AUTOMATED COUNT: 16.9 % (ref 10–15)
ERYTHROCYTE [DISTWIDTH] IN BLOOD BY AUTOMATED COUNT: 17.3 % (ref 10–15)
ERYTHROCYTE [DISTWIDTH] IN BLOOD BY AUTOMATED COUNT: 17.6 % (ref 10–15)
ERYTHROCYTE [DISTWIDTH] IN BLOOD BY AUTOMATED COUNT: 17.8 % (ref 10–15)
ERYTHROCYTE [DISTWIDTH] IN BLOOD BY AUTOMATED COUNT: 18.1 % (ref 10–15)
ERYTHROCYTE [DISTWIDTH] IN BLOOD BY AUTOMATED COUNT: 23.4 % (ref 10–15)
ETHANOL SERPL-MCNC: <0.01 G/DL
GAMMA INTERFERON BACKGROUND BLD IA-ACNC: 0.01 IU/ML
GFR SERPL CREATININE-BSD FRML MDRD: 34 ML/MIN/1.73M2
GFR SERPL CREATININE-BSD FRML MDRD: 35 ML/MIN/1.73M2
GFR SERPL CREATININE-BSD FRML MDRD: 41 ML/MIN/1.73M2
GFR SERPL CREATININE-BSD FRML MDRD: 42 ML/MIN/1.73M2
GFR SERPL CREATININE-BSD FRML MDRD: 45 ML/MIN/{1.73_M2}
GFR SERPL CREATININE-BSD FRML MDRD: 47 ML/MIN/1.73M2
GFR SERPL CREATININE-BSD FRML MDRD: 49 ML/MIN/{1.73_M2}
GFR SERPL CREATININE-BSD FRML MDRD: 50 ML/MIN/{1.73_M2}
GFR SERPL CREATININE-BSD FRML MDRD: 53 ML/MIN/{1.73_M2}
GFR SERPL CREATININE-BSD FRML MDRD: 54 ML/MIN/{1.73_M2}
GFR SERPL CREATININE-BSD FRML MDRD: 58 ML/MIN/{1.73_M2}
GLUCOSE BLD-MCNC: 125 MG/DL (ref 70–99)
GLUCOSE BLD-MCNC: 126 MG/DL (ref 70–99)
GLUCOSE BLD-MCNC: 161 MG/DL (ref 70–99)
GLUCOSE BLD-MCNC: 169 MG/DL (ref 70–99)
GLUCOSE BLD-MCNC: 223 MG/DL (ref 70–99)
GLUCOSE BLDC GLUCOMTR-MCNC: 112 MG/DL (ref 70–99)
GLUCOSE BLDC GLUCOMTR-MCNC: 135 MG/DL (ref 70–99)
GLUCOSE BLDC GLUCOMTR-MCNC: 141 MG/DL (ref 70–99)
GLUCOSE BLDC GLUCOMTR-MCNC: 144 MG/DL (ref 70–99)
GLUCOSE BLDC GLUCOMTR-MCNC: 145 MG/DL (ref 70–99)
GLUCOSE BLDC GLUCOMTR-MCNC: 149 MG/DL (ref 70–99)
GLUCOSE BLDC GLUCOMTR-MCNC: 149 MG/DL (ref 70–99)
GLUCOSE BLDC GLUCOMTR-MCNC: 152 MG/DL (ref 70–99)
GLUCOSE BLDC GLUCOMTR-MCNC: 152 MG/DL (ref 70–99)
GLUCOSE BLDC GLUCOMTR-MCNC: 160 MG/DL (ref 70–99)
GLUCOSE BLDC GLUCOMTR-MCNC: 160 MG/DL (ref 70–99)
GLUCOSE BLDC GLUCOMTR-MCNC: 167 MG/DL (ref 70–99)
GLUCOSE BLDC GLUCOMTR-MCNC: 179 MG/DL (ref 70–99)
GLUCOSE BLDC GLUCOMTR-MCNC: 183 MG/DL (ref 70–99)
GLUCOSE BLDC GLUCOMTR-MCNC: 199 MG/DL (ref 70–99)
GLUCOSE BLDC GLUCOMTR-MCNC: 201 MG/DL (ref 70–99)
GLUCOSE BLDC GLUCOMTR-MCNC: 208 MG/DL (ref 70–99)
GLUCOSE BLDC GLUCOMTR-MCNC: 226 MG/DL (ref 70–99)
GLUCOSE SERPL-MCNC: 103 MG/DL (ref 70–99)
GLUCOSE SERPL-MCNC: 122 MG/DL (ref 70–99)
GLUCOSE SERPL-MCNC: 123 MG/DL (ref 70–99)
GLUCOSE SERPL-MCNC: 136 MG/DL (ref 70–99)
GLUCOSE SERPL-MCNC: 143 MG/DL (ref 70–99)
GLUCOSE SERPL-MCNC: 146 MG/DL (ref 70–99)
GLUCOSE UR STRIP-MCNC: NEGATIVE MG/DL
HBA1C MFR BLD: 6.8 % (ref 0–5.6)
HBA1C MFR BLD: 7.3 % (ref 0–5.6)
HCO3 BLDV-SCNC: 21 MMOL/L (ref 21–28)
HCO3 BLDV-SCNC: 22 MMOL/L (ref 21–28)
HCT VFR BLD AUTO: 27.4 % (ref 35–47)
HCT VFR BLD AUTO: 27.4 % (ref 35–47)
HCT VFR BLD AUTO: 27.8 % (ref 35–47)
HCT VFR BLD AUTO: 28.8 % (ref 35–47)
HCT VFR BLD AUTO: 28.9 % (ref 35–47)
HCT VFR BLD AUTO: 29.2 % (ref 35–47)
HCT VFR BLD AUTO: 31 % (ref 35–47)
HCT VFR BLD AUTO: 32.1 % (ref 35–47)
HCT VFR BLD AUTO: 33.4 % (ref 35–47)
HDLC SERPL-MCNC: 59 MG/DL
HEMOCCULT STL QL: NEGATIVE
HGB BLD-MCNC: 10.3 G/DL (ref 11.7–15.7)
HGB BLD-MCNC: 7.8 G/DL (ref 11.7–15.7)
HGB BLD-MCNC: 7.9 G/DL (ref 11.7–15.7)
HGB BLD-MCNC: 8.1 G/DL (ref 11.7–15.7)
HGB BLD-MCNC: 8.3 G/DL (ref 11.7–15.7)
HGB BLD-MCNC: 8.5 G/DL (ref 11.7–15.7)
HGB BLD-MCNC: 8.7 G/DL (ref 11.7–15.7)
HGB BLD-MCNC: 8.8 G/DL (ref 11.7–15.7)
HGB BLD-MCNC: 9.6 G/DL (ref 11.7–15.7)
HGB BLD-MCNC: 9.8 G/DL (ref 11.7–15.7)
HGB UR QL STRIP: NEGATIVE
IMM GRANULOCYTES # BLD: 0 10E3/UL
IMM GRANULOCYTES # BLD: 0 10E9/L (ref 0–0.4)
IMM GRANULOCYTES # BLD: 0 10E9/L (ref 0–0.4)
IMM GRANULOCYTES NFR BLD: 0.2 %
IMM GRANULOCYTES NFR BLD: 0.4 %
IMM GRANULOCYTES NFR BLD: 1 %
INR (EXTERNAL): 3 (ref 0.9–1.1)
INR PPP: 1.8 (ref 0.9–1.1)
INR PPP: 1.9 (ref 0.9–1.1)
INR PPP: 1.94 (ref 0.86–1.14)
INR PPP: 2.05 (ref 0.86–1.14)
INR PPP: 2.1 (ref 0.9–1.1)
INR PPP: 2.16 (ref 0.86–1.14)
INR PPP: 2.2 (ref 0.9–1.1)
INR PPP: 2.42 (ref 0.86–1.14)
INR PPP: 2.48 (ref 0.86–1.14)
INR PPP: 2.59 (ref 0.85–1.15)
INR PPP: 2.59 (ref 0.85–1.15)
INR PPP: 2.6
INR PPP: 2.6 (ref 0.9–1.1)
INR PPP: 3 (ref 0.9–1.1)
INR PPP: 3.3 (ref 0.9–1.1)
INR PPP: 3.3 (ref 0.9–1.1)
INR PPP: 3.43 (ref 0.85–1.15)
INR PPP: 3.43 (ref 0.85–1.15)
INR PPP: 3.8 (ref 0.9–1.1)
INTERPRETATION ECG - MUSE: NORMAL
IRON SATN MFR SERPL: 2 % (ref 15–46)
IRON SERPL-MCNC: 10 UG/DL (ref 35–180)
KETONES UR STRIP-MCNC: NEGATIVE MG/DL
LABORATORY COMMENT REPORT: NORMAL
LABORATORY COMMENT REPORT: NORMAL
LACTATE SERPL-SCNC: 1.7 MMOL/L (ref 0.7–2)
LACTATE SERPL-SCNC: 3.3 MMOL/L (ref 0.7–2)
LDLC SERPL CALC-MCNC: 90 MG/DL
LEUKOCYTE ESTERASE UR QL STRIP: ABNORMAL
LVEF ECHO: NORMAL
LYMPHOCYTES # BLD AUTO: 1.8 10E3/UL (ref 0.8–5.3)
LYMPHOCYTES # BLD AUTO: 1.9 10E3/UL (ref 0.8–5.3)
LYMPHOCYTES # BLD AUTO: 2.4 10E9/L (ref 0.8–5.3)
LYMPHOCYTES # BLD AUTO: 4.2 10E9/L (ref 0.8–5.3)
LYMPHOCYTES NFR BLD AUTO: 21 %
LYMPHOCYTES NFR BLD AUTO: 23 %
LYMPHOCYTES NFR BLD AUTO: 31.8 %
LYMPHOCYTES NFR BLD AUTO: 47.9 %
Lab: NORMAL
M TB IFN-G BLD-IMP: NEGATIVE
M TB IFN-G CD4+ BCKGRND COR BLD-ACNC: 9.99 IU/ML
MAGNESIUM SERPL-MCNC: 1.8 MG/DL (ref 1.6–2.3)
MCH RBC QN AUTO: 19.2 PG (ref 26.5–33)
MCH RBC QN AUTO: 19.3 PG (ref 26.5–33)
MCH RBC QN AUTO: 20 PG (ref 26.5–33)
MCH RBC QN AUTO: 20.5 PG (ref 26.5–33)
MCH RBC QN AUTO: 21.2 PG (ref 26.5–33)
MCH RBC QN AUTO: 21.4 PG (ref 26.5–33)
MCH RBC QN AUTO: 21.6 PG (ref 26.5–33)
MCH RBC QN AUTO: 21.9 PG (ref 26.5–33)
MCH RBC QN AUTO: 22 PG (ref 26.5–33)
MCHC RBC AUTO-ENTMCNC: 28.8 G/DL (ref 31.5–36.5)
MCHC RBC AUTO-ENTMCNC: 29.1 G/DL (ref 31.5–36.5)
MCHC RBC AUTO-ENTMCNC: 29.5 G/DL (ref 31.5–36.5)
MCHC RBC AUTO-ENTMCNC: 30.1 G/DL (ref 31.5–36.5)
MCHC RBC AUTO-ENTMCNC: 30.1 G/DL (ref 31.5–36.5)
MCHC RBC AUTO-ENTMCNC: 30.3 G/DL (ref 31.5–36.5)
MCHC RBC AUTO-ENTMCNC: 30.5 G/DL (ref 31.5–36.5)
MCHC RBC AUTO-ENTMCNC: 30.8 G/DL (ref 31.5–36.5)
MCHC RBC AUTO-ENTMCNC: 31 G/DL (ref 31.5–36.5)
MCV RBC AUTO: 65 FL (ref 78–100)
MCV RBC AUTO: 66 FL (ref 78–100)
MCV RBC AUTO: 66 FL (ref 78–100)
MCV RBC AUTO: 70 FL (ref 78–100)
MCV RBC AUTO: 70 FL (ref 78–100)
MCV RBC AUTO: 71 FL (ref 78–100)
MCV RBC AUTO: 72 FL (ref 78–100)
MDC_IDC_LEAD_IMPLANT_DT: NORMAL
MDC_IDC_LEAD_LOCATION: NORMAL
MDC_IDC_LEAD_MFG: NORMAL
MDC_IDC_LEAD_MODEL: NORMAL
MDC_IDC_LEAD_POLARITY_TYPE: NORMAL
MDC_IDC_LEAD_SERIAL: NORMAL
MDC_IDC_MSMT_BATTERY_DTM: NORMAL
MDC_IDC_MSMT_BATTERY_DTM: NORMAL
MDC_IDC_MSMT_BATTERY_IMPEDANCE: 2179 OHM
MDC_IDC_MSMT_BATTERY_IMPEDANCE: 2198 OHM
MDC_IDC_MSMT_BATTERY_REMAINING_LONGEVITY: 30 MO
MDC_IDC_MSMT_BATTERY_REMAINING_LONGEVITY: 30 MO
MDC_IDC_MSMT_BATTERY_STATUS: NORMAL
MDC_IDC_MSMT_BATTERY_STATUS: NORMAL
MDC_IDC_MSMT_BATTERY_VOLTAGE: 2.74 V
MDC_IDC_MSMT_BATTERY_VOLTAGE: 2.75 V
MDC_IDC_MSMT_LEADCHNL_RA_IMPEDANCE_VALUE: 443 OHM
MDC_IDC_MSMT_LEADCHNL_RA_IMPEDANCE_VALUE: 445 OHM
MDC_IDC_MSMT_LEADCHNL_RA_PACING_THRESHOLD_AMPLITUDE: 0.38 V
MDC_IDC_MSMT_LEADCHNL_RA_PACING_THRESHOLD_AMPLITUDE: 0.38 V
MDC_IDC_MSMT_LEADCHNL_RA_PACING_THRESHOLD_PULSEWIDTH: 0.4 MS
MDC_IDC_MSMT_LEADCHNL_RA_PACING_THRESHOLD_PULSEWIDTH: 0.4 MS
MDC_IDC_MSMT_LEADCHNL_RV_IMPEDANCE_VALUE: 446 OHM
MDC_IDC_MSMT_LEADCHNL_RV_IMPEDANCE_VALUE: 518 OHM
MDC_IDC_MSMT_LEADCHNL_RV_PACING_THRESHOLD_AMPLITUDE: 0.5 V
MDC_IDC_MSMT_LEADCHNL_RV_PACING_THRESHOLD_AMPLITUDE: 0.5 V
MDC_IDC_MSMT_LEADCHNL_RV_PACING_THRESHOLD_PULSEWIDTH: 0.4 MS
MDC_IDC_MSMT_LEADCHNL_RV_PACING_THRESHOLD_PULSEWIDTH: 0.4 MS
MDC_IDC_PG_IMPLANT_DTM: NORMAL
MDC_IDC_PG_IMPLANT_DTM: NORMAL
MDC_IDC_PG_MFG: NORMAL
MDC_IDC_PG_MFG: NORMAL
MDC_IDC_PG_MODEL: NORMAL
MDC_IDC_PG_MODEL: NORMAL
MDC_IDC_PG_SERIAL: NORMAL
MDC_IDC_PG_SERIAL: NORMAL
MDC_IDC_PG_TYPE: NORMAL
MDC_IDC_PG_TYPE: NORMAL
MDC_IDC_SESS_CLINIC_NAME: NORMAL
MDC_IDC_SESS_CLINIC_NAME: NORMAL
MDC_IDC_SESS_DTM: NORMAL
MDC_IDC_SESS_DTM: NORMAL
MDC_IDC_SESS_TYPE: NORMAL
MDC_IDC_SESS_TYPE: NORMAL
MDC_IDC_SET_BRADY_AT_MODE_SWITCH_MODE: NORMAL
MDC_IDC_SET_BRADY_AT_MODE_SWITCH_MODE: NORMAL
MDC_IDC_SET_BRADY_AT_MODE_SWITCH_RATE: 175 {BEATS}/MIN
MDC_IDC_SET_BRADY_AT_MODE_SWITCH_RATE: 175 {BEATS}/MIN
MDC_IDC_SET_BRADY_LOWRATE: 70 {BEATS}/MIN
MDC_IDC_SET_BRADY_LOWRATE: 70 {BEATS}/MIN
MDC_IDC_SET_BRADY_MAX_SENSOR_RATE: 130 {BEATS}/MIN
MDC_IDC_SET_BRADY_MAX_SENSOR_RATE: 130 {BEATS}/MIN
MDC_IDC_SET_BRADY_MAX_TRACKING_RATE: 130 {BEATS}/MIN
MDC_IDC_SET_BRADY_MAX_TRACKING_RATE: 130 {BEATS}/MIN
MDC_IDC_SET_BRADY_MODE: NORMAL
MDC_IDC_SET_BRADY_MODE: NORMAL
MDC_IDC_SET_BRADY_PAV_DELAY_LOW: 150 MS
MDC_IDC_SET_BRADY_PAV_DELAY_LOW: 150 MS
MDC_IDC_SET_BRADY_SAV_DELAY_LOW: 120 MS
MDC_IDC_SET_BRADY_SAV_DELAY_LOW: 120 MS
MDC_IDC_SET_LEADCHNL_RA_PACING_AMPLITUDE: 1.5 V
MDC_IDC_SET_LEADCHNL_RA_PACING_AMPLITUDE: 1.5 V
MDC_IDC_SET_LEADCHNL_RA_PACING_CAPTURE_MODE: NORMAL
MDC_IDC_SET_LEADCHNL_RA_PACING_CAPTURE_MODE: NORMAL
MDC_IDC_SET_LEADCHNL_RA_PACING_POLARITY: NORMAL
MDC_IDC_SET_LEADCHNL_RA_PACING_POLARITY: NORMAL
MDC_IDC_SET_LEADCHNL_RA_PACING_PULSEWIDTH: 0.4 MS
MDC_IDC_SET_LEADCHNL_RA_PACING_PULSEWIDTH: 0.4 MS
MDC_IDC_SET_LEADCHNL_RA_SENSING_POLARITY: NORMAL
MDC_IDC_SET_LEADCHNL_RA_SENSING_POLARITY: NORMAL
MDC_IDC_SET_LEADCHNL_RA_SENSING_SENSITIVITY: 0.18 MV
MDC_IDC_SET_LEADCHNL_RA_SENSING_SENSITIVITY: 0.25 MV
MDC_IDC_SET_LEADCHNL_RV_PACING_AMPLITUDE: 2 V
MDC_IDC_SET_LEADCHNL_RV_PACING_AMPLITUDE: 2 V
MDC_IDC_SET_LEADCHNL_RV_PACING_CAPTURE_MODE: NORMAL
MDC_IDC_SET_LEADCHNL_RV_PACING_CAPTURE_MODE: NORMAL
MDC_IDC_SET_LEADCHNL_RV_PACING_POLARITY: NORMAL
MDC_IDC_SET_LEADCHNL_RV_PACING_POLARITY: NORMAL
MDC_IDC_SET_LEADCHNL_RV_PACING_PULSEWIDTH: 0.4 MS
MDC_IDC_SET_LEADCHNL_RV_PACING_PULSEWIDTH: 0.4 MS
MDC_IDC_SET_LEADCHNL_RV_SENSING_POLARITY: NORMAL
MDC_IDC_SET_LEADCHNL_RV_SENSING_POLARITY: NORMAL
MDC_IDC_SET_LEADCHNL_RV_SENSING_SENSITIVITY: 4 MV
MDC_IDC_SET_LEADCHNL_RV_SENSING_SENSITIVITY: 4 MV
MDC_IDC_SET_ZONE_DETECTION_INTERVAL: 333.33 MS
MDC_IDC_SET_ZONE_DETECTION_INTERVAL: 333.33 MS
MDC_IDC_SET_ZONE_DETECTION_INTERVAL: 342.86 MS
MDC_IDC_SET_ZONE_DETECTION_INTERVAL: 342.86 MS
MDC_IDC_SET_ZONE_TYPE: NORMAL
MDC_IDC_STAT_AT_BURDEN_PERCENT: 0 %
MDC_IDC_STAT_AT_BURDEN_PERCENT: 0 %
MDC_IDC_STAT_AT_DTM_END: NORMAL
MDC_IDC_STAT_AT_DTM_END: NORMAL
MDC_IDC_STAT_AT_DTM_START: NORMAL
MDC_IDC_STAT_AT_DTM_START: NORMAL
MDC_IDC_STAT_AT_MODE_SW_COUNT: 14
MDC_IDC_STAT_AT_MODE_SW_COUNT: 61
MDC_IDC_STAT_BRADY_AP_VP_PERCENT: 6 %
MDC_IDC_STAT_BRADY_AP_VP_PERCENT: 7 %
MDC_IDC_STAT_BRADY_AP_VS_PERCENT: 80 %
MDC_IDC_STAT_BRADY_AP_VS_PERCENT: 85 %
MDC_IDC_STAT_BRADY_AS_VP_PERCENT: 1 %
MDC_IDC_STAT_BRADY_AS_VP_PERCENT: 1 %
MDC_IDC_STAT_BRADY_AS_VS_PERCENT: 13 %
MDC_IDC_STAT_BRADY_AS_VS_PERCENT: 7 %
MDC_IDC_STAT_BRADY_DTM_END: NORMAL
MDC_IDC_STAT_BRADY_DTM_END: NORMAL
MDC_IDC_STAT_BRADY_DTM_START: NORMAL
MDC_IDC_STAT_BRADY_DTM_START: NORMAL
MDC_IDC_STAT_EPISODE_RECENT_COUNT: 0
MDC_IDC_STAT_EPISODE_RECENT_COUNT: 1
MDC_IDC_STAT_EPISODE_RECENT_COUNT: 4
MDC_IDC_STAT_EPISODE_RECENT_COUNT: 5
MDC_IDC_STAT_EPISODE_RECENT_COUNT_DTM_END: NORMAL
MDC_IDC_STAT_EPISODE_RECENT_COUNT_DTM_START: NORMAL
MDC_IDC_STAT_EPISODE_TYPE: NORMAL
MICROALBUMIN UR-MCNC: 57 MG/L
MICROALBUMIN/CREAT UR: 23.18 MG/G CR (ref 0–25)
MITOGEN IGNF BCKGRD COR BLD-ACNC: 0.02 IU/ML
MITOGEN IGNF BCKGRD COR BLD-ACNC: 0.03 IU/ML
MONOCYTES # BLD AUTO: 0.8 10E3/UL (ref 0–1.3)
MONOCYTES # BLD AUTO: 0.8 10E9/L (ref 0–1.3)
MONOCYTES # BLD AUTO: 0.9 10E3/UL (ref 0–1.3)
MONOCYTES # BLD AUTO: 0.9 10E9/L (ref 0–1.3)
MONOCYTES NFR BLD AUTO: 10 %
MONOCYTES NFR BLD AUTO: 10.6 %
MONOCYTES NFR BLD AUTO: 11 %
MONOCYTES NFR BLD AUTO: 11 %
MUCOUS THREADS #/AREA URNS LPF: PRESENT /LPF
NEUTROPHILS # BLD AUTO: 3.2 10E9/L (ref 1.6–8.3)
NEUTROPHILS # BLD AUTO: 4.1 10E9/L (ref 1.6–8.3)
NEUTROPHILS # BLD AUTO: 4.9 10E3/UL (ref 1.6–8.3)
NEUTROPHILS # BLD AUTO: 5.9 10E3/UL (ref 1.6–8.3)
NEUTROPHILS NFR BLD AUTO: 37.3 %
NEUTROPHILS NFR BLD AUTO: 53.5 %
NEUTROPHILS NFR BLD AUTO: 64 %
NEUTROPHILS NFR BLD AUTO: 66 %
NITRATE UR QL: NEGATIVE
NONHDLC SERPL-MCNC: 147 MG/DL
NRBC # BLD AUTO: 0 10*3/UL
NRBC # BLD AUTO: 0 10*3/UL
NRBC # BLD AUTO: 0 10E3/UL
NRBC BLD AUTO-RTO: 0 /100
NT-PROBNP SERPL-MCNC: 523 PG/ML (ref 0–450)
NT-PROBNP SERPL-MCNC: ABNORMAL PG/ML (ref 0–1800)
O2/TOTAL GAS SETTING VFR VENT: 100 %
O2/TOTAL GAS SETTING VFR VENT: 2 %
P AXIS - MUSE: NORMAL DEGREES
P AXIS - MUSE: NORMAL DEGREES
PCO2 BLDV: 33 MM HG (ref 40–50)
PCO2 BLDV: 36 MM HG (ref 40–50)
PH BLDV: 7.39 [PH] (ref 7.32–7.43)
PH BLDV: 7.41 [PH] (ref 7.32–7.43)
PH UR STRIP: 5.5 PH (ref 5–7)
PLATELET # BLD AUTO: 235 10E3/UL (ref 150–450)
PLATELET # BLD AUTO: 247 10E9/L (ref 150–450)
PLATELET # BLD AUTO: 257 10E9/L (ref 150–450)
PLATELET # BLD AUTO: 278 10E9/L (ref 150–450)
PLATELET # BLD AUTO: 290 10E9/L (ref 150–450)
PLATELET # BLD AUTO: 303 10E9/L (ref 150–450)
PLATELET # BLD AUTO: 306 10E3/UL (ref 150–450)
PLATELET # BLD AUTO: 324 10E3/UL (ref 150–450)
PLATELET # BLD AUTO: 345 10E3/UL (ref 150–450)
PO2 BLDV: 19 MM HG (ref 25–47)
PO2 BLDV: 37 MM HG (ref 25–47)
POTASSIUM BLD-SCNC: 3.7 MMOL/L (ref 3.4–5.3)
POTASSIUM BLD-SCNC: 3.7 MMOL/L (ref 3.4–5.3)
POTASSIUM BLD-SCNC: 4.2 MMOL/L (ref 3.4–5.3)
POTASSIUM BLD-SCNC: 4.9 MMOL/L (ref 3.4–5.3)
POTASSIUM BLD-SCNC: 5.2 MMOL/L (ref 3.4–5.3)
POTASSIUM SERPL-SCNC: 3.7 MMOL/L (ref 3.4–5.3)
POTASSIUM SERPL-SCNC: 4 MMOL/L (ref 3.4–5.3)
POTASSIUM SERPL-SCNC: 4.2 MMOL/L (ref 3.4–5.3)
POTASSIUM SERPL-SCNC: 4.3 MMOL/L (ref 3.4–5.3)
POTASSIUM SERPL-SCNC: 4.6 MMOL/L (ref 3.4–5.3)
POTASSIUM SERPL-SCNC: 4.7 MMOL/L (ref 3.4–5.3)
PR INTERVAL - MUSE: NORMAL MS
PR INTERVAL - MUSE: NORMAL MS
PROT SERPL-MCNC: 7.3 G/DL (ref 6.8–8.8)
PROT SERPL-MCNC: 7.3 G/DL (ref 6.8–8.8)
QRS DURATION - MUSE: 80 MS
QRS DURATION - MUSE: 88 MS
QT - MUSE: 318 MS
QT - MUSE: 390 MS
QTC - MUSE: 469 MS
QTC - MUSE: 485 MS
QUANTIFERON MITOGEN: 10 IU/ML
QUANTIFERON NIL TUBE: 0.01 IU/ML
QUANTIFERON TB1 TUBE: 0.03 IU/ML
QUANTIFERON TB2 TUBE: 0.04
R AXIS - MUSE: -41 DEGREES
R AXIS - MUSE: 83 DEGREES
RBC # BLD AUTO: 3.85 10E6/UL (ref 3.8–5.2)
RBC # BLD AUTO: 3.91 10E12/L (ref 3.8–5.2)
RBC # BLD AUTO: 4.08 10E12/L (ref 3.8–5.2)
RBC # BLD AUTO: 4.22 10E6/UL (ref 3.8–5.2)
RBC # BLD AUTO: 4.35 10E6/UL (ref 3.8–5.2)
RBC # BLD AUTO: 4.39 10E12/L (ref 3.8–5.2)
RBC # BLD AUTO: 4.41 10E6/UL (ref 3.8–5.2)
RBC # BLD AUTO: 4.59 10E12/L (ref 3.8–5.2)
RBC # BLD AUTO: 4.69 10E12/L (ref 3.8–5.2)
RBC #/AREA URNS AUTO: 3 /HPF (ref 0–2)
SARS-COV-2 RNA RESP QL NAA+PROBE: NEGATIVE
SODIUM SERPL-SCNC: 135 MMOL/L (ref 133–144)
SODIUM SERPL-SCNC: 136 MMOL/L (ref 133–144)
SODIUM SERPL-SCNC: 137 MMOL/L (ref 133–144)
SODIUM SERPL-SCNC: 137 MMOL/L (ref 133–144)
SODIUM SERPL-SCNC: 138 MMOL/L (ref 133–144)
SODIUM SERPL-SCNC: 139 MMOL/L (ref 133–144)
SOURCE: ABNORMAL
SP GR UR STRIP: 1.02 (ref 1–1.03)
SPECIMEN EXPIRATION DATE: NORMAL
SPECIMEN SOURCE: NORMAL
SQUAMOUS #/AREA URNS AUTO: 6 /HPF (ref 0–1)
SYSTOLIC BLOOD PRESSURE - MUSE: NORMAL MMHG
SYSTOLIC BLOOD PRESSURE - MUSE: NORMAL MMHG
T AXIS - MUSE: 129 DEGREES
T AXIS - MUSE: 156 DEGREES
TIBC SERPL-MCNC: 439 UG/DL (ref 240–430)
TRIGL SERPL-MCNC: 286 MG/DL
TROPONIN I SERPL-MCNC: 0.15 UG/L (ref 0–0.04)
TROPONIN I SERPL-MCNC: 0.17 UG/L (ref 0–0.04)
TROPONIN I SERPL-MCNC: 0.18 UG/L (ref 0–0.04)
TROPONIN I SERPL-MCNC: 0.19 UG/L (ref 0–0.04)
TSH SERPL DL<=0.005 MIU/L-ACNC: 1.07 MU/L (ref 0.4–4)
UNIT ABO/RH: NORMAL
UNIT ABO/RH: NORMAL
UNIT NUMBER: NORMAL
UNIT NUMBER: NORMAL
UNIT STATUS: NORMAL
UNIT STATUS: NORMAL
UNIT TYPE ISBT: 7300
UNIT TYPE ISBT: 7300
UROBILINOGEN UR STRIP-MCNC: 0 MG/DL (ref 0–2)
VENTRICULAR RATE- MUSE: 140 BPM
VENTRICULAR RATE- MUSE: 87 BPM
VIT B12 SERPL-MCNC: 583 PG/ML (ref 193–986)
WBC # BLD AUTO: 10.3 10E3/UL (ref 4–11)
WBC # BLD AUTO: 11 10E9/L (ref 4–11)
WBC # BLD AUTO: 6.3 10E9/L (ref 4–11)
WBC # BLD AUTO: 7.6 10E3/UL (ref 4–11)
WBC # BLD AUTO: 7.6 10E9/L (ref 4–11)
WBC # BLD AUTO: 7.8 10E9/L (ref 4–11)
WBC # BLD AUTO: 8.6 10E3/UL (ref 4–11)
WBC # BLD AUTO: 8.7 10E9/L (ref 4–11)
WBC # BLD AUTO: 8.8 10E3/UL (ref 4–11)
WBC #/AREA URNS AUTO: 6 /HPF (ref 0–5)

## 2021-01-01 PROCEDURE — 99316 NF DSCHRG MGMT 30 MIN+: CPT | Performed by: NURSE PRACTITIONER

## 2021-01-01 PROCEDURE — 250N000013 HC RX MED GY IP 250 OP 250 PS 637

## 2021-01-01 PROCEDURE — G0378 HOSPITAL OBSERVATION PER HR: HCPCS

## 2021-01-01 PROCEDURE — 96375 TX/PRO/DX INJ NEW DRUG ADDON: CPT

## 2021-01-01 PROCEDURE — 83605 ASSAY OF LACTIC ACID: CPT | Performed by: EMERGENCY MEDICINE

## 2021-01-01 PROCEDURE — 99233 SBSQ HOSP IP/OBS HIGH 50: CPT | Performed by: INTERNAL MEDICINE

## 2021-01-01 PROCEDURE — 250N000011 HC RX IP 250 OP 636: Performed by: HOSPITALIST

## 2021-01-01 PROCEDURE — 82803 BLOOD GASES ANY COMBINATION: CPT | Performed by: HOSPITALIST

## 2021-01-01 PROCEDURE — 250N000013 HC RX MED GY IP 250 OP 250 PS 637: Performed by: INTERNAL MEDICINE

## 2021-01-01 PROCEDURE — 83880 ASSAY OF NATRIURETIC PEPTIDE: CPT | Performed by: EMERGENCY MEDICINE

## 2021-01-01 PROCEDURE — 99223 1ST HOSP IP/OBS HIGH 75: CPT | Mod: AI | Performed by: STUDENT IN AN ORGANIZED HEALTH CARE EDUCATION/TRAINING PROGRAM

## 2021-01-01 PROCEDURE — 36415 COLL VENOUS BLD VENIPUNCTURE: CPT | Performed by: INTERNAL MEDICINE

## 2021-01-01 PROCEDURE — 85610 PROTHROMBIN TIME: CPT | Performed by: INTERNAL MEDICINE

## 2021-01-01 PROCEDURE — 210N000002 HC R&B HEART CARE

## 2021-01-01 PROCEDURE — 83880 ASSAY OF NATRIURETIC PEPTIDE: CPT | Performed by: NURSE PRACTITIONER

## 2021-01-01 PROCEDURE — 93321 DOPPLER ECHO F-UP/LMTD STD: CPT | Mod: 26 | Performed by: INTERNAL MEDICINE

## 2021-01-01 PROCEDURE — 97161 PT EVAL LOW COMPLEX 20 MIN: CPT | Mod: GP | Performed by: PHYSICAL THERAPIST

## 2021-01-01 PROCEDURE — 71045 X-RAY EXAM CHEST 1 VIEW: CPT

## 2021-01-01 PROCEDURE — 93325 DOPPLER ECHO COLOR FLOW MAPG: CPT | Mod: 26 | Performed by: INTERNAL MEDICINE

## 2021-01-01 PROCEDURE — U0003 INFECTIOUS AGENT DETECTION BY NUCLEIC ACID (DNA OR RNA); SEVERE ACUTE RESPIRATORY SYNDROME CORONAVIRUS 2 (SARS-COV-2) (CORONAVIRUS DISEASE [COVID-19]), AMPLIFIED PROBE TECHNIQUE, MAKING USE OF HIGH THROUGHPUT TECHNOLOGIES AS DESCRIBED BY CMS-2020-01-R: HCPCS | Performed by: NURSE PRACTITIONER

## 2021-01-01 PROCEDURE — 99214 OFFICE O/P EST MOD 30 MIN: CPT | Performed by: PHYSICIAN ASSISTANT

## 2021-01-01 PROCEDURE — 82607 VITAMIN B-12: CPT | Performed by: INTERNAL MEDICINE

## 2021-01-01 PROCEDURE — 93296 REM INTERROG EVL PM/IDS: CPT | Performed by: INTERNAL MEDICINE

## 2021-01-01 PROCEDURE — 0001A PR COVID VAC PFIZER DIL RECON 30 MCG/0.3 ML IM: CPT

## 2021-01-01 PROCEDURE — U0005 INFEC AGEN DETEC AMPLI PROBE: HCPCS | Performed by: INTERNAL MEDICINE

## 2021-01-01 PROCEDURE — 82077 ASSAY SPEC XCP UR&BREATH IA: CPT | Performed by: EMERGENCY MEDICINE

## 2021-01-01 PROCEDURE — 93000 ELECTROCARDIOGRAM COMPLETE: CPT | Performed by: NURSE PRACTITIONER

## 2021-01-01 PROCEDURE — G0463 HOSPITAL OUTPT CLINIC VISIT: HCPCS

## 2021-01-01 PROCEDURE — 99214 OFFICE O/P EST MOD 30 MIN: CPT | Performed by: NURSE PRACTITIONER

## 2021-01-01 PROCEDURE — 85025 COMPLETE CBC W/AUTO DIFF WBC: CPT | Performed by: EMERGENCY MEDICINE

## 2021-01-01 PROCEDURE — 97530 THERAPEUTIC ACTIVITIES: CPT | Mod: GP | Performed by: PHYSICAL THERAPIST

## 2021-01-01 PROCEDURE — G0463 HOSPITAL OUTPT CLINIC VISIT: HCPCS | Mod: 25

## 2021-01-01 PROCEDURE — 85018 HEMOGLOBIN: CPT | Performed by: INTERNAL MEDICINE

## 2021-01-01 PROCEDURE — 85610 PROTHROMBIN TIME: CPT | Performed by: EMERGENCY MEDICINE

## 2021-01-01 PROCEDURE — 93308 TTE F-UP OR LMTD: CPT | Mod: 26 | Performed by: INTERNAL MEDICINE

## 2021-01-01 PROCEDURE — 70450 CT HEAD/BRAIN W/O DYE: CPT

## 2021-01-01 PROCEDURE — 96374 THER/PROPH/DIAG INJ IV PUSH: CPT

## 2021-01-01 PROCEDURE — 80053 COMPREHEN METABOLIC PANEL: CPT | Performed by: INTERNAL MEDICINE

## 2021-01-01 PROCEDURE — 93292 WCD DEVICE INTERROGATE: CPT

## 2021-01-01 PROCEDURE — 99306 1ST NF CARE HIGH MDM 50: CPT | Mod: AI | Performed by: INTERNAL MEDICINE

## 2021-01-01 PROCEDURE — 80048 BASIC METABOLIC PNL TOTAL CA: CPT | Performed by: INTERNAL MEDICINE

## 2021-01-01 PROCEDURE — 250N000011 HC RX IP 250 OP 636: Performed by: INTERNAL MEDICINE

## 2021-01-01 PROCEDURE — 99291 CRITICAL CARE FIRST HOUR: CPT | Mod: 25

## 2021-01-01 PROCEDURE — 97530 THERAPEUTIC ACTIVITIES: CPT | Mod: GO | Performed by: OCCUPATIONAL THERAPIST

## 2021-01-01 PROCEDURE — 36415 COLL VENOUS BLD VENIPUNCTURE: CPT | Performed by: NURSE PRACTITIONER

## 2021-01-01 PROCEDURE — 93325 DOPPLER ECHO COLOR FLOW MAPG: CPT

## 2021-01-01 PROCEDURE — 83550 IRON BINDING TEST: CPT | Performed by: INTERNAL MEDICINE

## 2021-01-01 PROCEDURE — 82043 UR ALBUMIN QUANTITATIVE: CPT | Performed by: INTERNAL MEDICINE

## 2021-01-01 PROCEDURE — 250N000013 HC RX MED GY IP 250 OP 250 PS 637: Performed by: HOSPITALIST

## 2021-01-01 PROCEDURE — 80061 LIPID PANEL: CPT | Performed by: INTERNAL MEDICINE

## 2021-01-01 PROCEDURE — 258N000003 HC RX IP 258 OP 636: Performed by: EMERGENCY MEDICINE

## 2021-01-01 PROCEDURE — 85610 PROTHROMBIN TIME: CPT | Performed by: HOSPITALIST

## 2021-01-01 PROCEDURE — 85027 COMPLETE CBC AUTOMATED: CPT | Performed by: INTERNAL MEDICINE

## 2021-01-01 PROCEDURE — 84443 ASSAY THYROID STIM HORMONE: CPT | Performed by: INTERNAL MEDICINE

## 2021-01-01 PROCEDURE — 250N000013 HC RX MED GY IP 250 OP 250 PS 637: Performed by: EMERGENCY MEDICINE

## 2021-01-01 PROCEDURE — 99292 CRITICAL CARE ADDL 30 MIN: CPT

## 2021-01-01 PROCEDURE — 250N000013 HC RX MED GY IP 250 OP 250 PS 637: Performed by: STUDENT IN AN ORGANIZED HEALTH CARE EDUCATION/TRAINING PROGRAM

## 2021-01-01 PROCEDURE — 96360 HYDRATION IV INFUSION INIT: CPT

## 2021-01-01 PROCEDURE — 86900 BLOOD TYPING SEROLOGIC ABO: CPT | Performed by: HOSPITALIST

## 2021-01-01 PROCEDURE — 93005 ELECTROCARDIOGRAM TRACING: CPT

## 2021-01-01 PROCEDURE — 99441 PR PHYSICIAN TELEPHONE EVALUATION 5-10 MIN: CPT | Mod: 95 | Performed by: INTERNAL MEDICINE

## 2021-01-01 PROCEDURE — 83735 ASSAY OF MAGNESIUM: CPT | Performed by: HOSPITALIST

## 2021-01-01 PROCEDURE — 96366 THER/PROPH/DIAG IV INF ADDON: CPT

## 2021-01-01 PROCEDURE — 99222 1ST HOSP IP/OBS MODERATE 55: CPT | Mod: 25 | Performed by: INTERNAL MEDICINE

## 2021-01-01 PROCEDURE — 99214 OFFICE O/P EST MOD 30 MIN: CPT | Performed by: INTERNAL MEDICINE

## 2021-01-01 PROCEDURE — 36415 COLL VENOUS BLD VENIPUNCTURE: CPT | Performed by: HOSPITALIST

## 2021-01-01 PROCEDURE — 22310 CLOSED TX VERT FX W/O MANJ: CPT

## 2021-01-01 PROCEDURE — 99223 1ST HOSP IP/OBS HIGH 75: CPT | Mod: GC | Performed by: PHYSICIAN ASSISTANT

## 2021-01-01 PROCEDURE — 250N000011 HC RX IP 250 OP 636: Performed by: PHYSICIAN ASSISTANT

## 2021-01-01 PROCEDURE — 250N000009 HC RX 250: Performed by: INTERNAL MEDICINE

## 2021-01-01 PROCEDURE — 94640 AIRWAY INHALATION TREATMENT: CPT

## 2021-01-01 PROCEDURE — 87635 SARS-COV-2 COVID-19 AMP PRB: CPT | Performed by: EMERGENCY MEDICINE

## 2021-01-01 PROCEDURE — 250N000012 HC RX MED GY IP 250 OP 636 PS 637: Performed by: HOSPITALIST

## 2021-01-01 PROCEDURE — C9803 HOPD COVID-19 SPEC COLLECT: HCPCS

## 2021-01-01 PROCEDURE — 250N000013 HC RX MED GY IP 250 OP 250 PS 637: Performed by: NURSE PRACTITIONER

## 2021-01-01 PROCEDURE — 99220 PR INITIAL OBSERVATION CARE,LEVEL III: CPT | Performed by: INTERNAL MEDICINE

## 2021-01-01 PROCEDURE — G0180 MD CERTIFICATION HHA PATIENT: HCPCS | Performed by: INTERNAL MEDICINE

## 2021-01-01 PROCEDURE — 87635 SARS-COV-2 COVID-19 AMP PRB: CPT | Performed by: PHYSICIAN ASSISTANT

## 2021-01-01 PROCEDURE — 85025 COMPLETE CBC W/AUTO DIFF WBC: CPT | Performed by: INTERNAL MEDICINE

## 2021-01-01 PROCEDURE — 99291 CRITICAL CARE FIRST HOUR: CPT | Mod: 25 | Performed by: INTERNAL MEDICINE

## 2021-01-01 PROCEDURE — 97116 GAIT TRAINING THERAPY: CPT | Mod: GP | Performed by: PHYSICAL THERAPIST

## 2021-01-01 PROCEDURE — 74176 CT ABD & PELVIS W/O CONTRAST: CPT

## 2021-01-01 PROCEDURE — 99310 SBSQ NF CARE HIGH MDM 45: CPT | Performed by: NURSE PRACTITIONER

## 2021-01-01 PROCEDURE — 93306 TTE W/DOPPLER COMPLETE: CPT | Mod: 26 | Performed by: INTERNAL MEDICINE

## 2021-01-01 PROCEDURE — 84484 ASSAY OF TROPONIN QUANT: CPT | Mod: 91 | Performed by: EMERGENCY MEDICINE

## 2021-01-01 PROCEDURE — 82272 OCCULT BLD FECES 1-3 TESTS: CPT | Performed by: INTERNAL MEDICINE

## 2021-01-01 PROCEDURE — 258N000003 HC RX IP 258 OP 636: Performed by: INTERNAL MEDICINE

## 2021-01-01 PROCEDURE — 85025 COMPLETE CBC W/AUTO DIFF WBC: CPT | Performed by: PHYSICIAN ASSISTANT

## 2021-01-01 PROCEDURE — 97530 THERAPEUTIC ACTIVITIES: CPT | Mod: GP

## 2021-01-01 PROCEDURE — 99213 OFFICE O/P EST LOW 20 MIN: CPT | Performed by: PHYSICIAN ASSISTANT

## 2021-01-01 PROCEDURE — 86481 TB AG RESPONSE T-CELL SUSP: CPT | Performed by: NURSE PRACTITIONER

## 2021-01-01 PROCEDURE — 97535 SELF CARE MNGMENT TRAINING: CPT | Mod: GO

## 2021-01-01 PROCEDURE — 250N000011 HC RX IP 250 OP 636: Performed by: EMERGENCY MEDICINE

## 2021-01-01 PROCEDURE — 93306 TTE W/DOPPLER COMPLETE: CPT

## 2021-01-01 PROCEDURE — 97535 SELF CARE MNGMENT TRAINING: CPT | Mod: GO | Performed by: OCCUPATIONAL THERAPIST

## 2021-01-01 PROCEDURE — 99239 HOSP IP/OBS DSCHRG MGMT >30: CPT | Performed by: INTERNAL MEDICINE

## 2021-01-01 PROCEDURE — 94640 AIRWAY INHALATION TREATMENT: CPT | Mod: 76

## 2021-01-01 PROCEDURE — 97166 OT EVAL MOD COMPLEX 45 MIN: CPT | Mod: GO | Performed by: OCCUPATIONAL THERAPIST

## 2021-01-01 PROCEDURE — 99308 SBSQ NF CARE LOW MDM 20: CPT | Performed by: NURSE PRACTITIONER

## 2021-01-01 PROCEDURE — U0005 INFEC AGEN DETEC AMPLI PROBE: HCPCS | Performed by: EMERGENCY MEDICINE

## 2021-01-01 PROCEDURE — 96376 TX/PRO/DX INJ SAME DRUG ADON: CPT

## 2021-01-01 PROCEDURE — 80048 BASIC METABOLIC PNL TOTAL CA: CPT | Performed by: EMERGENCY MEDICINE

## 2021-01-01 PROCEDURE — 258N000003 HC RX IP 258 OP 636: Performed by: STUDENT IN AN ORGANIZED HEALTH CARE EDUCATION/TRAINING PROGRAM

## 2021-01-01 PROCEDURE — 999N000104 HC STATISTIC NO CHARGE

## 2021-01-01 PROCEDURE — 72125 CT NECK SPINE W/O DYE: CPT

## 2021-01-01 PROCEDURE — 83605 ASSAY OF LACTIC ACID: CPT | Performed by: HOSPITALIST

## 2021-01-01 PROCEDURE — 93294 REM INTERROG EVL PM/LDLS PM: CPT | Performed by: INTERNAL MEDICINE

## 2021-01-01 PROCEDURE — 96365 THER/PROPH/DIAG IV INF INIT: CPT

## 2021-01-01 PROCEDURE — 80053 COMPREHEN METABOLIC PANEL: CPT | Performed by: EMERGENCY MEDICINE

## 2021-01-01 PROCEDURE — 80048 BASIC METABOLIC PNL TOTAL CA: CPT | Performed by: PHYSICIAN ASSISTANT

## 2021-01-01 PROCEDURE — 87086 URINE CULTURE/COLONY COUNT: CPT | Performed by: PHYSICIAN ASSISTANT

## 2021-01-01 PROCEDURE — 71046 X-RAY EXAM CHEST 2 VIEWS: CPT

## 2021-01-01 PROCEDURE — 120N000001 HC R&B MED SURG/OB

## 2021-01-01 PROCEDURE — 99223 1ST HOSP IP/OBS HIGH 75: CPT | Mod: AI | Performed by: HOSPITALIST

## 2021-01-01 PROCEDURE — 0002A PR COVID VAC PFIZER DIL RECON 30 MCG/0.3 ML IM: CPT

## 2021-01-01 PROCEDURE — 91300 PR COVID VAC PFIZER DIL RECON 30 MCG/0.3 ML IM: CPT

## 2021-01-01 PROCEDURE — 85027 COMPLETE CBC AUTOMATED: CPT | Performed by: HOSPITALIST

## 2021-01-01 PROCEDURE — 36415 COLL VENOUS BLD VENIPUNCTURE: CPT | Performed by: EMERGENCY MEDICINE

## 2021-01-01 PROCEDURE — 83036 HEMOGLOBIN GLYCOSYLATED A1C: CPT | Performed by: INTERNAL MEDICINE

## 2021-01-01 PROCEDURE — 99305 1ST NF CARE MODERATE MDM 35: CPT | Performed by: NURSE PRACTITIONER

## 2021-01-01 PROCEDURE — 99285 EMERGENCY DEPT VISIT HI MDM: CPT | Mod: 25

## 2021-01-01 PROCEDURE — 85610 PROTHROMBIN TIME: CPT | Performed by: PHYSICIAN ASSISTANT

## 2021-01-01 PROCEDURE — 99309 SBSQ NF CARE MODERATE MDM 30: CPT | Performed by: NURSE PRACTITIONER

## 2021-01-01 PROCEDURE — U0003 INFECTIOUS AGENT DETECTION BY NUCLEIC ACID (DNA OR RNA); SEVERE ACUTE RESPIRATORY SYNDROME CORONAVIRUS 2 (SARS-COV-2) (CORONAVIRUS DISEASE [COVID-19]), AMPLIFIED PROBE TECHNIQUE, MAKING USE OF HIGH THROUGHPUT TECHNOLOGIES AS DESCRIBED BY CMS-2020-01-R: HCPCS | Performed by: INTERNAL MEDICINE

## 2021-01-01 PROCEDURE — 999N000157 HC STATISTIC RCP TIME EA 10 MIN

## 2021-01-01 PROCEDURE — 97161 PT EVAL LOW COMPLEX 20 MIN: CPT | Mod: GP

## 2021-01-01 PROCEDURE — 80048 BASIC METABOLIC PNL TOTAL CA: CPT | Performed by: NURSE PRACTITIONER

## 2021-01-01 PROCEDURE — 93005 ELECTROCARDIOGRAM TRACING: CPT | Mod: 76

## 2021-01-01 PROCEDURE — 99222 1ST HOSP IP/OBS MODERATE 55: CPT | Performed by: INTERNAL MEDICINE

## 2021-01-01 PROCEDURE — 250N000009 HC RX 250: Performed by: EMERGENCY MEDICINE

## 2021-01-01 PROCEDURE — 85027 COMPLETE CBC AUTOMATED: CPT | Performed by: NURSE PRACTITIONER

## 2021-01-01 PROCEDURE — 84484 ASSAY OF TROPONIN QUANT: CPT | Performed by: HOSPITALIST

## 2021-01-01 PROCEDURE — 82803 BLOOD GASES ANY COMBINATION: CPT | Performed by: EMERGENCY MEDICINE

## 2021-01-01 PROCEDURE — 81001 URINALYSIS AUTO W/SCOPE: CPT | Performed by: PHYSICIAN ASSISTANT

## 2021-01-01 PROCEDURE — 255N000002 HC RX 255 OP 636: Performed by: HOSPITALIST

## 2021-01-01 PROCEDURE — 84484 ASSAY OF TROPONIN QUANT: CPT | Performed by: INTERNAL MEDICINE

## 2021-01-01 PROCEDURE — 99207 PR CDG-MDM COMPONENT: MEETS HIGH - UP CODED: CPT | Performed by: INTERNAL MEDICINE

## 2021-01-01 PROCEDURE — 85730 THROMBOPLASTIN TIME PARTIAL: CPT | Performed by: EMERGENCY MEDICINE

## 2021-01-01 PROCEDURE — 99305 1ST NF CARE MODERATE MDM 35: CPT | Mod: AI | Performed by: INTERNAL MEDICINE

## 2021-01-01 PROCEDURE — 99215 OFFICE O/P EST HI 40 MIN: CPT | Performed by: INTERNAL MEDICINE

## 2021-01-01 PROCEDURE — 84460 ALANINE AMINO (ALT) (SGPT): CPT | Performed by: INTERNAL MEDICINE

## 2021-01-01 PROCEDURE — 72040 X-RAY EXAM NECK SPINE 2-3 VW: CPT | Performed by: RADIOLOGY

## 2021-01-01 PROCEDURE — 71046 X-RAY EXAM CHEST 2 VIEWS: CPT | Performed by: RADIOLOGY

## 2021-01-01 PROCEDURE — 83036 HEMOGLOBIN GLYCOSYLATED A1C: CPT | Performed by: NURSE PRACTITIONER

## 2021-01-01 PROCEDURE — 80048 BASIC METABOLIC PNL TOTAL CA: CPT | Performed by: HOSPITALIST

## 2021-01-01 RX ORDER — GABAPENTIN 100 MG/1
200 CAPSULE ORAL AT BEDTIME
Status: DISCONTINUED | OUTPATIENT
Start: 2021-01-01 | End: 2021-01-01 | Stop reason: HOSPADM

## 2021-01-01 RX ORDER — HYDROCODONE BITARTRATE AND ACETAMINOPHEN 5; 325 MG/1; MG/1
.5-1 TABLET ORAL EVERY 6 HOURS PRN
Qty: 12 TABLET | Refills: 0 | Status: SHIPPED | OUTPATIENT
Start: 2021-01-01 | End: 2021-01-01

## 2021-01-01 RX ORDER — HYDROXYZINE HYDROCHLORIDE 25 MG/1
25 TABLET, FILM COATED ORAL EVERY 4 HOURS PRN
Refills: 0
Start: 2021-01-01 | End: 2021-01-01

## 2021-01-01 RX ORDER — NICOTINE POLACRILEX 4 MG
15-30 LOZENGE BUCCAL
Status: DISCONTINUED | OUTPATIENT
Start: 2021-01-01 | End: 2021-01-01 | Stop reason: HOSPADM

## 2021-01-01 RX ORDER — ATORVASTATIN CALCIUM 80 MG/1
80 TABLET, FILM COATED ORAL EVERY EVENING
Status: DISCONTINUED | OUTPATIENT
Start: 2021-01-01 | End: 2021-01-01 | Stop reason: HOSPADM

## 2021-01-01 RX ORDER — LIDOCAINE 40 MG/G
CREAM TOPICAL
Status: DISCONTINUED | OUTPATIENT
Start: 2021-01-01 | End: 2021-01-01 | Stop reason: HOSPADM

## 2021-01-01 RX ORDER — FUROSEMIDE 20 MG
20 TABLET ORAL DAILY
Status: DISCONTINUED | OUTPATIENT
Start: 2021-01-01 | End: 2021-01-01

## 2021-01-01 RX ORDER — LIDOCAINE 4 G/G
1 PATCH TOPICAL EVERY 24 HOURS
COMMUNITY

## 2021-01-01 RX ORDER — GABAPENTIN 100 MG/1
200 CAPSULE ORAL AT BEDTIME
Qty: 180 CAPSULE | Refills: 1 | Status: SHIPPED | OUTPATIENT
Start: 2021-01-01

## 2021-01-01 RX ORDER — LOSARTAN POTASSIUM 50 MG/1
50 TABLET ORAL EVERY EVENING
Qty: 90 TABLET | Refills: 3 | COMMUNITY
Start: 2021-01-01 | End: 2021-01-01

## 2021-01-01 RX ORDER — NALOXONE HYDROCHLORIDE 0.4 MG/ML
0.4 INJECTION, SOLUTION INTRAMUSCULAR; INTRAVENOUS; SUBCUTANEOUS
Status: DISCONTINUED | OUTPATIENT
Start: 2021-01-01 | End: 2021-01-01 | Stop reason: HOSPADM

## 2021-01-01 RX ORDER — LORAZEPAM 0.5 MG/1
0.5 TABLET ORAL 3 TIMES DAILY PRN
Status: DISCONTINUED | OUTPATIENT
Start: 2021-01-01 | End: 2021-01-01 | Stop reason: HOSPADM

## 2021-01-01 RX ORDER — POTASSIUM CHLORIDE 1500 MG/1
20 TABLET, EXTENDED RELEASE ORAL
Status: DISCONTINUED | OUTPATIENT
Start: 2021-01-01 | End: 2021-01-01

## 2021-01-01 RX ORDER — ACETAMINOPHEN 325 MG/1
975 TABLET ORAL ONCE
Status: COMPLETED | OUTPATIENT
Start: 2021-01-01 | End: 2021-01-01

## 2021-01-01 RX ORDER — FLUTICASONE PROPIONATE 50 MCG
2 SPRAY, SUSPENSION (ML) NASAL DAILY
Start: 2021-01-01

## 2021-01-01 RX ORDER — CYCLOBENZAPRINE HCL 5 MG
5 TABLET ORAL 3 TIMES DAILY PRN
Qty: 30 TABLET | Refills: 1 | Status: SHIPPED | OUTPATIENT
Start: 2021-01-01 | End: 2021-01-01

## 2021-01-01 RX ORDER — MORPHINE SULFATE 2 MG/ML
2 INJECTION, SOLUTION INTRAMUSCULAR; INTRAVENOUS ONCE
Status: COMPLETED | OUTPATIENT
Start: 2021-01-01 | End: 2021-01-01

## 2021-01-01 RX ORDER — LEVOTHYROXINE SODIUM 50 UG/1
50 TABLET ORAL DAILY
Status: DISCONTINUED | OUTPATIENT
Start: 2021-01-01 | End: 2021-01-01 | Stop reason: HOSPADM

## 2021-01-01 RX ORDER — DEXTROSE MONOHYDRATE 25 G/50ML
25-50 INJECTION, SOLUTION INTRAVENOUS
Status: DISCONTINUED | OUTPATIENT
Start: 2021-01-01 | End: 2021-01-01 | Stop reason: HOSPADM

## 2021-01-01 RX ORDER — NALOXONE HYDROCHLORIDE 0.4 MG/ML
0.2 INJECTION, SOLUTION INTRAMUSCULAR; INTRAVENOUS; SUBCUTANEOUS
Status: DISCONTINUED | OUTPATIENT
Start: 2021-01-01 | End: 2021-01-01 | Stop reason: HOSPADM

## 2021-01-01 RX ORDER — ONDANSETRON 2 MG/ML
4 INJECTION INTRAMUSCULAR; INTRAVENOUS EVERY 6 HOURS PRN
Status: DISCONTINUED | OUTPATIENT
Start: 2021-01-01 | End: 2021-01-01 | Stop reason: HOSPADM

## 2021-01-01 RX ORDER — FUROSEMIDE 40 MG
40 TABLET ORAL
Refills: 0
Start: 2021-01-01 | End: 2021-01-01

## 2021-01-01 RX ORDER — LORAZEPAM 0.5 MG/1
0.5 TABLET ORAL 3 TIMES DAILY PRN
Qty: 15 TABLET | Refills: 0 | Status: SHIPPED | OUTPATIENT
Start: 2021-01-01 | End: 2021-01-01

## 2021-01-01 RX ORDER — PROCHLORPERAZINE MALEATE 5 MG
5 TABLET ORAL EVERY 6 HOURS PRN
Status: DISCONTINUED | OUTPATIENT
Start: 2021-01-01 | End: 2021-01-01 | Stop reason: HOSPADM

## 2021-01-01 RX ORDER — ACETAMINOPHEN 500 MG
1000 TABLET ORAL 3 TIMES DAILY
Status: DISCONTINUED | OUTPATIENT
Start: 2021-01-01 | End: 2021-01-01 | Stop reason: HOSPADM

## 2021-01-01 RX ORDER — LORAZEPAM 0.5 MG/1
0.5 TABLET ORAL 3 TIMES DAILY
Qty: 30 TABLET | Refills: 0 | OUTPATIENT
Start: 2021-01-01

## 2021-01-01 RX ORDER — WARFARIN SODIUM 3 MG/1
3 TABLET ORAL
Status: DISCONTINUED | OUTPATIENT
Start: 2021-01-01 | End: 2021-01-01 | Stop reason: HOSPADM

## 2021-01-01 RX ORDER — METOPROLOL SUCCINATE 25 MG/1
12.5 TABLET, EXTENDED RELEASE ORAL DAILY
Status: ON HOLD | COMMUNITY
End: 2021-01-01

## 2021-01-01 RX ORDER — LOSARTAN POTASSIUM 50 MG/1
50 TABLET ORAL EVERY EVENING
Status: DISCONTINUED | OUTPATIENT
Start: 2021-01-01 | End: 2021-01-01 | Stop reason: HOSPADM

## 2021-01-01 RX ORDER — CYCLOBENZAPRINE HCL 5 MG
5 TABLET ORAL 3 TIMES DAILY PRN
Status: DISCONTINUED | OUTPATIENT
Start: 2021-01-01 | End: 2021-01-01 | Stop reason: HOSPADM

## 2021-01-01 RX ORDER — ASPIRIN 81 MG/1
81 TABLET ORAL DAILY
Status: DISCONTINUED | OUTPATIENT
Start: 2021-01-01 | End: 2021-01-01 | Stop reason: HOSPADM

## 2021-01-01 RX ORDER — FUROSEMIDE 40 MG
40 TABLET ORAL
Status: DISCONTINUED | OUTPATIENT
Start: 2021-01-01 | End: 2021-01-01 | Stop reason: HOSPADM

## 2021-01-01 RX ORDER — POLYETHYLENE GLYCOL 3350 17 G/17G
17 POWDER, FOR SOLUTION ORAL DAILY
Status: DISCONTINUED | OUTPATIENT
Start: 2021-01-01 | End: 2021-01-01 | Stop reason: HOSPADM

## 2021-01-01 RX ORDER — LORAZEPAM 0.5 MG/1
0.5 TABLET ORAL
Status: DISCONTINUED | OUTPATIENT
Start: 2021-01-01 | End: 2021-01-01

## 2021-01-01 RX ORDER — AMOXICILLIN 250 MG
1 CAPSULE ORAL 2 TIMES DAILY PRN
Status: DISCONTINUED | OUTPATIENT
Start: 2021-01-01 | End: 2021-01-01 | Stop reason: HOSPADM

## 2021-01-01 RX ORDER — WARFARIN SODIUM 3 MG/1
TABLET ORAL
Qty: 90 TABLET | Refills: 1 | Status: ON HOLD | OUTPATIENT
Start: 2021-01-01 | End: 2021-01-01

## 2021-01-01 RX ORDER — SODIUM CHLORIDE 9 MG/ML
INJECTION, SOLUTION INTRAVENOUS CONTINUOUS
Status: DISCONTINUED | OUTPATIENT
Start: 2021-01-01 | End: 2021-01-01

## 2021-01-01 RX ORDER — LIDOCAINE 40 MG/G
CREAM TOPICAL
Status: CANCELLED | OUTPATIENT
Start: 2021-01-01

## 2021-01-01 RX ORDER — ACETAMINOPHEN 325 MG/1
650 TABLET ORAL EVERY 6 HOURS PRN
Refills: 0
Start: 2021-01-01 | End: 2021-01-01

## 2021-01-01 RX ORDER — ATORVASTATIN CALCIUM 80 MG/1
80 TABLET, FILM COATED ORAL DAILY
Qty: 90 TABLET | Refills: 3 | Status: SHIPPED | OUTPATIENT
Start: 2021-01-01 | End: 2021-01-01

## 2021-01-01 RX ORDER — SENNOSIDES 8.6 MG
650 CAPSULE ORAL AT BEDTIME
Status: ON HOLD | COMMUNITY
End: 2021-01-01

## 2021-01-01 RX ORDER — METOPROLOL TARTRATE 25 MG/1
25 TABLET, FILM COATED ORAL 2 TIMES DAILY
Status: DISCONTINUED | OUTPATIENT
Start: 2021-01-01 | End: 2021-01-01 | Stop reason: HOSPADM

## 2021-01-01 RX ORDER — ASPIRIN 81 MG/1
243 TABLET, CHEWABLE ORAL ONCE
Status: DISCONTINUED | OUTPATIENT
Start: 2021-01-01 | End: 2021-01-01

## 2021-01-01 RX ORDER — WARFARIN SODIUM 3 MG/1
TABLET ORAL
COMMUNITY
Start: 2021-01-01 | End: 2021-01-01

## 2021-01-01 RX ORDER — ACETAMINOPHEN 650 MG/1
650 SUPPOSITORY RECTAL EVERY 4 HOURS PRN
Status: DISCONTINUED | OUTPATIENT
Start: 2021-01-01 | End: 2021-01-01 | Stop reason: HOSPADM

## 2021-01-01 RX ORDER — HYDROCODONE BITARTRATE AND ACETAMINOPHEN 5; 325 MG/1; MG/1
.5-1 TABLET ORAL EVERY 6 HOURS PRN
Qty: 12 TABLET | Refills: 0 | Status: ON HOLD | COMMUNITY
Start: 2021-01-01 | End: 2021-01-01

## 2021-01-01 RX ORDER — HYDROXYZINE HYDROCHLORIDE 25 MG/1
25 TABLET, FILM COATED ORAL EVERY 6 HOURS PRN
Status: DISCONTINUED | OUTPATIENT
Start: 2021-01-01 | End: 2021-01-01 | Stop reason: HOSPADM

## 2021-01-01 RX ORDER — WARFARIN SODIUM 3 MG/1
TABLET ORAL
Qty: 96 TABLET | Refills: 1
Start: 2021-01-01 | End: 2021-01-01

## 2021-01-01 RX ORDER — CYCLOBENZAPRINE HCL 5 MG
5 TABLET ORAL 3 TIMES DAILY PRN
Qty: 30 TABLET | Refills: 1 | Status: ON HOLD | OUTPATIENT
Start: 2021-01-01 | End: 2021-01-01

## 2021-01-01 RX ORDER — MORPHINE SULFATE 2 MG/ML
2 INJECTION, SOLUTION INTRAMUSCULAR; INTRAVENOUS
Status: DISCONTINUED | OUTPATIENT
Start: 2021-01-01 | End: 2021-01-01 | Stop reason: HOSPADM

## 2021-01-01 RX ORDER — ACETAMINOPHEN 500 MG
1000 TABLET ORAL 3 TIMES DAILY
COMMUNITY

## 2021-01-01 RX ORDER — ACETAMINOPHEN 325 MG/1
650 TABLET ORAL EVERY 6 HOURS PRN
Status: DISCONTINUED | OUTPATIENT
Start: 2021-01-01 | End: 2021-01-01 | Stop reason: HOSPADM

## 2021-01-01 RX ORDER — POTASSIUM CHLORIDE 1500 MG/1
20 TABLET, EXTENDED RELEASE ORAL
Status: CANCELLED | OUTPATIENT
Start: 2021-01-01

## 2021-01-01 RX ORDER — MECLIZINE HCL 12.5 MG 12.5 MG/1
12.5 TABLET ORAL 3 TIMES DAILY
Status: DISCONTINUED | OUTPATIENT
Start: 2021-01-01 | End: 2021-01-01

## 2021-01-01 RX ORDER — ACETAMINOPHEN 325 MG/1
650 TABLET ORAL EVERY 4 HOURS PRN
Status: DISCONTINUED | OUTPATIENT
Start: 2021-01-01 | End: 2021-01-01 | Stop reason: HOSPADM

## 2021-01-01 RX ORDER — FUROSEMIDE 20 MG
20 TABLET ORAL DAILY
Qty: 30 TABLET | Refills: 0 | Status: ON HOLD | OUTPATIENT
Start: 2021-01-01 | End: 2021-01-01

## 2021-01-01 RX ORDER — OXYCODONE HYDROCHLORIDE 5 MG/1
2.5 TABLET ORAL EVERY 4 HOURS PRN
Qty: 10 TABLET | Refills: 0 | Status: SHIPPED | OUTPATIENT
Start: 2021-01-01 | End: 2021-01-01

## 2021-01-01 RX ORDER — LABETALOL HYDROCHLORIDE 5 MG/ML
10 INJECTION, SOLUTION INTRAVENOUS
Status: DISCONTINUED | OUTPATIENT
Start: 2021-01-01 | End: 2021-01-01 | Stop reason: HOSPADM

## 2021-01-01 RX ORDER — CLINDAMYCIN HCL 300 MG
CAPSULE ORAL
Qty: 2 CAPSULE | Refills: 3 | Status: SHIPPED | OUTPATIENT
Start: 2021-01-01 | End: 2021-01-01

## 2021-01-01 RX ORDER — FUROSEMIDE 40 MG
40 TABLET ORAL
Status: DISCONTINUED | OUTPATIENT
Start: 2021-01-01 | End: 2021-01-01

## 2021-01-01 RX ORDER — FUROSEMIDE 10 MG/ML
40 INJECTION INTRAMUSCULAR; INTRAVENOUS ONCE
Status: COMPLETED | OUTPATIENT
Start: 2021-01-01 | End: 2021-01-01

## 2021-01-01 RX ORDER — ISOSORBIDE MONONITRATE 30 MG/1
30 TABLET, EXTENDED RELEASE ORAL DAILY
Refills: 0
Start: 2021-01-01 | End: 2021-01-01

## 2021-01-01 RX ORDER — ASPIRIN 325 MG
325 TABLET ORAL ONCE
Status: CANCELLED | OUTPATIENT
Start: 2021-01-01 | End: 2021-01-01

## 2021-01-01 RX ORDER — IPRATROPIUM BROMIDE AND ALBUTEROL SULFATE 2.5; .5 MG/3ML; MG/3ML
3 SOLUTION RESPIRATORY (INHALATION) EVERY 4 HOURS PRN
Status: DISCONTINUED | OUTPATIENT
Start: 2021-01-01 | End: 2021-01-01 | Stop reason: HOSPADM

## 2021-01-01 RX ORDER — DILTIAZEM HYDROCHLORIDE 5 MG/ML
15 INJECTION INTRAVENOUS ONCE
Status: COMPLETED | OUTPATIENT
Start: 2021-01-01 | End: 2021-01-01

## 2021-01-01 RX ORDER — ONDANSETRON 4 MG/1
4 TABLET, ORALLY DISINTEGRATING ORAL EVERY 6 HOURS PRN
Status: DISCONTINUED | OUTPATIENT
Start: 2021-01-01 | End: 2021-01-01 | Stop reason: HOSPADM

## 2021-01-01 RX ORDER — CALCIUM POLYCARBOPHIL 625 MG
2 TABLET ORAL DAILY
Status: ON HOLD | COMMUNITY
End: 2021-01-01

## 2021-01-01 RX ORDER — LORAZEPAM 0.5 MG/1
0.5 TABLET ORAL 3 TIMES DAILY
Qty: 30 TABLET | Refills: 0 | Status: SHIPPED | OUTPATIENT
Start: 2021-01-01

## 2021-01-01 RX ORDER — OXYCODONE HYDROCHLORIDE 5 MG/1
2.5 TABLET ORAL EVERY 6 HOURS PRN
Qty: 5 TABLET | Refills: 0 | Status: SHIPPED | OUTPATIENT
Start: 2021-01-01 | End: 2021-01-01

## 2021-01-01 RX ORDER — FUROSEMIDE 10 MG/ML
60 INJECTION INTRAMUSCULAR; INTRAVENOUS
Status: DISCONTINUED | OUTPATIENT
Start: 2021-01-01 | End: 2021-01-01

## 2021-01-01 RX ORDER — ACETAMINOPHEN 325 MG/1
650 TABLET ORAL EVERY 4 HOURS PRN
Status: DISCONTINUED | OUTPATIENT
Start: 2021-01-01 | End: 2021-01-01

## 2021-01-01 RX ORDER — ACETAMINOPHEN 650 MG/1
650 SUPPOSITORY RECTAL EVERY 6 HOURS PRN
Status: DISCONTINUED | OUTPATIENT
Start: 2021-01-01 | End: 2021-01-01 | Stop reason: HOSPADM

## 2021-01-01 RX ORDER — GABAPENTIN 100 MG/1
CAPSULE ORAL
Qty: 270 CAPSULE | Refills: 1 | Status: SHIPPED | OUTPATIENT
Start: 2021-01-01 | End: 2021-01-01

## 2021-01-01 RX ORDER — FLUTICASONE PROPIONATE 50 MCG
2 SPRAY, SUSPENSION (ML) NASAL DAILY
Status: DISCONTINUED | OUTPATIENT
Start: 2021-01-01 | End: 2021-01-01 | Stop reason: HOSPADM

## 2021-01-01 RX ORDER — MECLIZINE HCL 12.5 MG 12.5 MG/1
12.5 TABLET ORAL 3 TIMES DAILY PRN
Status: DISCONTINUED | OUTPATIENT
Start: 2021-01-01 | End: 2021-01-01 | Stop reason: HOSPADM

## 2021-01-01 RX ORDER — ISOSORBIDE MONONITRATE 30 MG/1
30 TABLET, EXTENDED RELEASE ORAL DAILY
Status: DISCONTINUED | OUTPATIENT
Start: 2021-01-01 | End: 2021-01-01 | Stop reason: HOSPADM

## 2021-01-01 RX ORDER — FUROSEMIDE 20 MG
20 TABLET ORAL DAILY PRN
Status: ON HOLD | COMMUNITY
End: 2021-01-01

## 2021-01-01 RX ORDER — LIDOCAINE 40 MG/G
CREAM TOPICAL
Status: DISCONTINUED | OUTPATIENT
Start: 2021-01-01 | End: 2021-01-01

## 2021-01-01 RX ORDER — OXYCODONE HYDROCHLORIDE 5 MG/1
2.5 TABLET ORAL EVERY 6 HOURS PRN
Qty: 10 TABLET | Refills: 0 | Status: SHIPPED | OUTPATIENT
Start: 2021-01-01 | End: 2021-01-01

## 2021-01-01 RX ORDER — WARFARIN SODIUM 5 MG/1
5 TABLET ORAL
Status: COMPLETED | OUTPATIENT
Start: 2021-01-01 | End: 2021-01-01

## 2021-01-01 RX ORDER — ACETAMINOPHEN 500 MG
1000 TABLET ORAL 3 TIMES DAILY
Status: ON HOLD | DISCHARGE
Start: 2021-01-01 | End: 2021-01-01

## 2021-01-01 RX ORDER — ACETAMINOPHEN 325 MG/1
650 TABLET ORAL AT BEDTIME
Status: DISCONTINUED | OUTPATIENT
Start: 2021-01-01 | End: 2021-01-01 | Stop reason: HOSPADM

## 2021-01-01 RX ORDER — AMOXICILLIN 250 MG
2 CAPSULE ORAL 2 TIMES DAILY PRN
Status: DISCONTINUED | OUTPATIENT
Start: 2021-01-01 | End: 2021-01-01 | Stop reason: HOSPADM

## 2021-01-01 RX ORDER — LORAZEPAM 2 MG/ML
.5-1 INJECTION INTRAMUSCULAR EVERY 4 HOURS PRN
Status: DISCONTINUED | OUTPATIENT
Start: 2021-01-01 | End: 2021-01-01

## 2021-01-01 RX ORDER — PANTOPRAZOLE SODIUM 40 MG/1
40 TABLET, DELAYED RELEASE ORAL
Status: DISCONTINUED | OUTPATIENT
Start: 2021-01-01 | End: 2021-01-01 | Stop reason: HOSPADM

## 2021-01-01 RX ORDER — ASPIRIN 325 MG
325 TABLET ORAL ONCE
Status: DISCONTINUED | OUTPATIENT
Start: 2021-01-01 | End: 2021-01-01

## 2021-01-01 RX ORDER — FUROSEMIDE 40 MG
40 TABLET ORAL ONCE
Status: COMPLETED | OUTPATIENT
Start: 2021-01-01 | End: 2021-01-01

## 2021-01-01 RX ORDER — IPRATROPIUM BROMIDE AND ALBUTEROL SULFATE 2.5; .5 MG/3ML; MG/3ML
3 SOLUTION RESPIRATORY (INHALATION) EVERY 4 HOURS PRN
Refills: 0
Start: 2021-01-01

## 2021-01-01 RX ORDER — ONDANSETRON 2 MG/ML
4 INJECTION INTRAMUSCULAR; INTRAVENOUS ONCE
Status: COMPLETED | OUTPATIENT
Start: 2021-01-01 | End: 2021-01-01

## 2021-01-01 RX ORDER — WARFARIN SODIUM 3 MG/1
TABLET ORAL
Qty: 96 TABLET | Refills: 1 | Status: ON HOLD | OUTPATIENT
Start: 2021-01-01 | End: 2021-01-01

## 2021-01-01 RX ORDER — MECLIZINE HCL 12.5 MG 12.5 MG/1
12.5 TABLET ORAL 3 TIMES DAILY PRN
DISCHARGE
Start: 2021-01-01 | End: 2021-01-01

## 2021-01-01 RX ORDER — ATORVASTATIN CALCIUM 80 MG/1
TABLET, FILM COATED ORAL
Qty: 90 TABLET | Refills: 0 | Status: SHIPPED | OUTPATIENT
Start: 2021-01-01 | End: 2021-01-01

## 2021-01-01 RX ORDER — ASPIRIN 81 MG/1
243 TABLET, CHEWABLE ORAL ONCE
Status: CANCELLED | OUTPATIENT
Start: 2021-01-01

## 2021-01-01 RX ORDER — PROCHLORPERAZINE 25 MG
12.5 SUPPOSITORY, RECTAL RECTAL EVERY 12 HOURS PRN
Status: DISCONTINUED | OUTPATIENT
Start: 2021-01-01 | End: 2021-01-01 | Stop reason: HOSPADM

## 2021-01-01 RX ORDER — TRAZODONE HYDROCHLORIDE 50 MG/1
50 TABLET, FILM COATED ORAL AT BEDTIME
Status: DISCONTINUED | OUTPATIENT
Start: 2021-01-01 | End: 2021-01-01 | Stop reason: HOSPADM

## 2021-01-01 RX ORDER — PREDNISONE 20 MG/1
TABLET ORAL
Qty: 9 TABLET | Refills: 0 | Status: SHIPPED | OUTPATIENT
Start: 2021-01-01 | End: 2021-01-01

## 2021-01-01 RX ORDER — FUROSEMIDE 10 MG/ML
40 INJECTION INTRAMUSCULAR; INTRAVENOUS
Status: DISCONTINUED | OUTPATIENT
Start: 2021-01-01 | End: 2021-01-01

## 2021-01-01 RX ORDER — LORAZEPAM 2 MG/ML
0.5 INJECTION INTRAMUSCULAR
Status: DISCONTINUED | OUTPATIENT
Start: 2021-01-01 | End: 2021-01-01

## 2021-01-01 RX ORDER — LORAZEPAM 2 MG/ML
0.5 INJECTION INTRAMUSCULAR
Status: CANCELLED | OUTPATIENT
Start: 2021-01-01

## 2021-01-01 RX ORDER — SODIUM CHLORIDE 9 MG/ML
INJECTION, SOLUTION INTRAVENOUS CONTINUOUS
Status: CANCELLED | OUTPATIENT
Start: 2021-01-01

## 2021-01-01 RX ORDER — LORAZEPAM 0.5 MG/1
0.5 TABLET ORAL
Status: CANCELLED | OUTPATIENT
Start: 2021-01-01

## 2021-01-01 RX ORDER — LEVOTHYROXINE SODIUM 50 UG/1
50 TABLET ORAL
Status: DISCONTINUED | OUTPATIENT
Start: 2021-01-01 | End: 2021-01-01 | Stop reason: HOSPADM

## 2021-01-01 RX ORDER — TRAZODONE HYDROCHLORIDE 50 MG/1
50 TABLET, FILM COATED ORAL AT BEDTIME
Start: 2021-01-01

## 2021-01-01 RX ORDER — FUROSEMIDE 10 MG/ML
40 INJECTION INTRAMUSCULAR; INTRAVENOUS EVERY 8 HOURS
Status: COMPLETED | OUTPATIENT
Start: 2021-01-01 | End: 2021-01-01

## 2021-01-01 RX ORDER — METOPROLOL TARTRATE 25 MG/1
25 TABLET, FILM COATED ORAL 2 TIMES DAILY
Start: 2021-01-01 | End: 2021-01-01

## 2021-01-01 RX ORDER — LOSARTAN POTASSIUM 50 MG/1
50 TABLET ORAL DAILY
Qty: 90 TABLET | Refills: 3 | Status: SHIPPED | OUTPATIENT
Start: 2021-01-01 | End: 2021-01-01

## 2021-01-01 RX ORDER — WARFARIN SODIUM 3 MG/1
3 TABLET ORAL
Status: COMPLETED | OUTPATIENT
Start: 2021-01-01 | End: 2021-01-01

## 2021-01-01 RX ORDER — NITROGLYCERIN 20 MG/100ML
10-200 INJECTION INTRAVENOUS CONTINUOUS
Status: DISCONTINUED | OUTPATIENT
Start: 2021-01-01 | End: 2021-01-01

## 2021-01-01 RX ORDER — AMLODIPINE BESYLATE 5 MG/1
5 TABLET ORAL DAILY
Qty: 90 TABLET | Refills: 3 | Status: SHIPPED | OUTPATIENT
Start: 2021-01-01 | End: 2021-01-01

## 2021-01-01 RX ORDER — MECLIZINE HCL 12.5 MG 12.5 MG/1
12.5 TABLET ORAL 3 TIMES DAILY PRN
Qty: 30 TABLET | Refills: 0 | Status: ON HOLD | OUTPATIENT
Start: 2021-01-01 | End: 2021-01-01

## 2021-01-01 RX ORDER — MULTIVIT-MIN/IRON/FOLIC ACID/K 18-600-40
25 CAPSULE ORAL DAILY
COMMUNITY
End: 2021-01-01

## 2021-01-01 RX ORDER — FERROUS SULFATE 325(65) MG
325 TABLET ORAL 2 TIMES DAILY
Refills: 0
Start: 2021-01-01 | End: 2021-01-01

## 2021-01-01 RX ORDER — ACETAMINOPHEN 500 MG
500 TABLET ORAL EVERY 6 HOURS PRN
Status: ON HOLD | COMMUNITY
End: 2021-01-01

## 2021-01-01 RX ORDER — FUROSEMIDE 20 MG
20 TABLET ORAL DAILY
Qty: 15 TABLET | Refills: 0 | Status: ON HOLD | OUTPATIENT
Start: 2021-01-01 | End: 2021-01-01

## 2021-01-01 RX ADMIN — OMEPRAZOLE 20 MG: 20 CAPSULE, DELAYED RELEASE ORAL at 08:30

## 2021-01-01 RX ADMIN — METOPROLOL TARTRATE 25 MG: 25 TABLET, FILM COATED ORAL at 08:26

## 2021-01-01 RX ADMIN — ACETAMINOPHEN 650 MG: 325 TABLET, FILM COATED ORAL at 09:00

## 2021-01-01 RX ADMIN — GABAPENTIN 200 MG: 100 CAPSULE ORAL at 21:19

## 2021-01-01 RX ADMIN — OMEPRAZOLE 20 MG: 20 CAPSULE, DELAYED RELEASE ORAL at 14:47

## 2021-01-01 RX ADMIN — GABAPENTIN 200 MG: 100 CAPSULE ORAL at 21:28

## 2021-01-01 RX ADMIN — PANTOPRAZOLE SODIUM 40 MG: 40 TABLET, DELAYED RELEASE ORAL at 10:16

## 2021-01-01 RX ADMIN — Medication 3 MG: at 20:36

## 2021-01-01 RX ADMIN — HYDROXYZINE HYDROCHLORIDE 25 MG: 25 TABLET ORAL at 17:41

## 2021-01-01 RX ADMIN — PANTOPRAZOLE SODIUM 40 MG: 40 TABLET, DELAYED RELEASE ORAL at 09:01

## 2021-01-01 RX ADMIN — ATORVASTATIN CALCIUM 80 MG: 80 TABLET, FILM COATED ORAL at 20:36

## 2021-01-01 RX ADMIN — ACETAMINOPHEN 650 MG: 325 TABLET, FILM COATED ORAL at 10:16

## 2021-01-01 RX ADMIN — HUMAN ALBUMIN MICROSPHERES AND PERFLUTREN 3 ML: 10; .22 INJECTION, SOLUTION INTRAVENOUS at 10:07

## 2021-01-01 RX ADMIN — POLYETHYLENE GLYCOL 3350 17 G: 17 POWDER, FOR SOLUTION ORAL at 08:17

## 2021-01-01 RX ADMIN — FUROSEMIDE 40 MG: 10 INJECTION, SOLUTION INTRAVENOUS at 15:33

## 2021-01-01 RX ADMIN — METOPROLOL TARTRATE 25 MG: 25 TABLET, FILM COATED ORAL at 21:17

## 2021-01-01 RX ADMIN — FUROSEMIDE 60 MG: 10 INJECTION, SOLUTION INTRAVENOUS at 08:50

## 2021-01-01 RX ADMIN — ASPIRIN 81 MG: 81 TABLET, COATED ORAL at 10:16

## 2021-01-01 RX ADMIN — IPRATROPIUM BROMIDE AND ALBUTEROL SULFATE 3 ML: .5; 3 SOLUTION RESPIRATORY (INHALATION) at 11:11

## 2021-01-01 RX ADMIN — ACETAMINOPHEN 650 MG: 325 TABLET, FILM COATED ORAL at 04:14

## 2021-01-01 RX ADMIN — ACETAMINOPHEN 1000 MG: 500 TABLET, FILM COATED ORAL at 08:04

## 2021-01-01 RX ADMIN — LOSARTAN POTASSIUM 50 MG: 50 TABLET, FILM COATED ORAL at 21:16

## 2021-01-01 RX ADMIN — INSULIN ASPART 1 UNITS: 100 INJECTION, SOLUTION INTRAVENOUS; SUBCUTANEOUS at 12:44

## 2021-01-01 RX ADMIN — DILTIAZEM HYDROCHLORIDE 15 MG: 5 INJECTION, SOLUTION INTRAVENOUS at 14:07

## 2021-01-01 RX ADMIN — Medication 2 CAPSULE: at 20:36

## 2021-01-01 RX ADMIN — OMEPRAZOLE 20 MG: 20 CAPSULE, DELAYED RELEASE ORAL at 08:04

## 2021-01-01 RX ADMIN — LEVOTHYROXINE SODIUM 50 MCG: 50 TABLET ORAL at 10:16

## 2021-01-01 RX ADMIN — ISOSORBIDE MONONITRATE 30 MG: 30 TABLET, EXTENDED RELEASE ORAL at 10:16

## 2021-01-01 RX ADMIN — INSULIN ASPART 1 UNITS: 100 INJECTION, SOLUTION INTRAVENOUS; SUBCUTANEOUS at 18:26

## 2021-01-01 RX ADMIN — POLYETHYLENE GLYCOL 3350 17 G: 17 POWDER, FOR SOLUTION ORAL at 08:19

## 2021-01-01 RX ADMIN — INSULIN ASPART 2 UNITS: 100 INJECTION, SOLUTION INTRAVENOUS; SUBCUTANEOUS at 13:15

## 2021-01-01 RX ADMIN — OXYCODONE HYDROCHLORIDE 2.5 MG: 5 TABLET ORAL at 22:04

## 2021-01-01 RX ADMIN — MORPHINE SULFATE 2 MG: 2 INJECTION, SOLUTION INTRAMUSCULAR; INTRAVENOUS at 21:59

## 2021-01-01 RX ADMIN — ACETAMINOPHEN 650 MG: 325 TABLET, FILM COATED ORAL at 17:31

## 2021-01-01 RX ADMIN — METOPROLOL TARTRATE 25 MG: 25 TABLET, FILM COATED ORAL at 10:16

## 2021-01-01 RX ADMIN — FUROSEMIDE 40 MG: 40 TABLET ORAL at 08:21

## 2021-01-01 RX ADMIN — ATORVASTATIN CALCIUM 80 MG: 80 TABLET, FILM COATED ORAL at 21:29

## 2021-01-01 RX ADMIN — LORAZEPAM 0.5 MG: 2 INJECTION INTRAMUSCULAR; INTRAVENOUS at 17:43

## 2021-01-01 RX ADMIN — FUROSEMIDE 40 MG: 40 TABLET ORAL at 16:36

## 2021-01-01 RX ADMIN — PANTOPRAZOLE SODIUM 40 MG: 40 TABLET, DELAYED RELEASE ORAL at 08:21

## 2021-01-01 RX ADMIN — FUROSEMIDE 60 MG: 10 INJECTION, SOLUTION INTRAVENOUS at 16:16

## 2021-01-01 RX ADMIN — METOPROLOL TARTRATE 25 MG: 25 TABLET, FILM COATED ORAL at 20:36

## 2021-01-01 RX ADMIN — WARFARIN SODIUM 5 MG: 5 TABLET ORAL at 17:04

## 2021-01-01 RX ADMIN — FUROSEMIDE 20 MG: 20 TABLET ORAL at 14:47

## 2021-01-01 RX ADMIN — ACETAMINOPHEN 650 MG: 325 TABLET, FILM COATED ORAL at 16:36

## 2021-01-01 RX ADMIN — FUROSEMIDE 40 MG: 10 INJECTION, SOLUTION INTRAVENOUS at 06:47

## 2021-01-01 RX ADMIN — ATORVASTATIN CALCIUM 80 MG: 80 TABLET, FILM COATED ORAL at 20:13

## 2021-01-01 RX ADMIN — METOPROLOL TARTRATE 25 MG: 25 TABLET, FILM COATED ORAL at 09:00

## 2021-01-01 RX ADMIN — LEVOTHYROXINE SODIUM 50 MCG: 50 TABLET ORAL at 09:01

## 2021-01-01 RX ADMIN — GABAPENTIN 200 MG: 100 CAPSULE ORAL at 20:36

## 2021-01-01 RX ADMIN — NITROGLYCERIN 10 MCG/MIN: 20 INJECTION INTRAVENOUS at 11:39

## 2021-01-01 RX ADMIN — IPRATROPIUM BROMIDE AND ALBUTEROL SULFATE 3 ML: .5; 3 SOLUTION RESPIRATORY (INHALATION) at 16:37

## 2021-01-01 RX ADMIN — LEVOTHYROXINE SODIUM 50 MCG: 50 TABLET ORAL at 08:21

## 2021-01-01 RX ADMIN — LOSARTAN POTASSIUM 50 MG: 50 TABLET, FILM COATED ORAL at 20:34

## 2021-01-01 RX ADMIN — Medication 2 CAPSULE: at 21:17

## 2021-01-01 RX ADMIN — OXYCODONE HYDROCHLORIDE 2.5 MG: 5 TABLET ORAL at 20:36

## 2021-01-01 RX ADMIN — ACETAMINOPHEN 1000 MG: 500 TABLET, FILM COATED ORAL at 09:32

## 2021-01-01 RX ADMIN — FUROSEMIDE 40 MG: 10 INJECTION, SOLUTION INTRAVENOUS at 20:35

## 2021-01-01 RX ADMIN — FUROSEMIDE 40 MG: 10 INJECTION, SOLUTION INTRAVENOUS at 14:35

## 2021-01-01 RX ADMIN — DILTIAZEM HYDROCHLORIDE 5 MG/HR: 5 INJECTION INTRAVENOUS at 14:19

## 2021-01-01 RX ADMIN — ATORVASTATIN CALCIUM 80 MG: 80 TABLET, FILM COATED ORAL at 21:19

## 2021-01-01 RX ADMIN — INSULIN ASPART 1 UNITS: 100 INJECTION, SOLUTION INTRAVENOUS; SUBCUTANEOUS at 12:31

## 2021-01-01 RX ADMIN — GABAPENTIN 200 MG: 100 CAPSULE ORAL at 20:50

## 2021-01-01 RX ADMIN — IRON SUCROSE 300 MG: 20 INJECTION, SOLUTION INTRAVENOUS at 10:03

## 2021-01-01 RX ADMIN — OXYCODONE HYDROCHLORIDE 2.5 MG: 5 TABLET ORAL at 03:34

## 2021-01-01 RX ADMIN — ACETAMINOPHEN 650 MG: 325 TABLET, FILM COATED ORAL at 21:19

## 2021-01-01 RX ADMIN — LEVOTHYROXINE SODIUM 50 MCG: 50 TABLET ORAL at 06:38

## 2021-01-01 RX ADMIN — INSULIN ASPART 2 UNITS: 100 INJECTION, SOLUTION INTRAVENOUS; SUBCUTANEOUS at 18:16

## 2021-01-01 RX ADMIN — ACETAMINOPHEN 1000 MG: 500 TABLET, FILM COATED ORAL at 21:28

## 2021-01-01 RX ADMIN — OXYCODONE HYDROCHLORIDE 2.5 MG: 5 TABLET ORAL at 06:39

## 2021-01-01 RX ADMIN — ACETAMINOPHEN 650 MG: 325 TABLET, FILM COATED ORAL at 13:33

## 2021-01-01 RX ADMIN — POLYETHYLENE GLYCOL 3350 17 G: 17 POWDER, FOR SOLUTION ORAL at 08:52

## 2021-01-01 RX ADMIN — ASPIRIN 81 MG: 81 TABLET, DELAYED RELEASE ORAL at 08:18

## 2021-01-01 RX ADMIN — ATORVASTATIN CALCIUM 80 MG: 80 TABLET, FILM COATED ORAL at 21:17

## 2021-01-01 RX ADMIN — POLYETHYLENE GLYCOL 3350 17 G: 17 POWDER, FOR SOLUTION ORAL at 10:15

## 2021-01-01 RX ADMIN — OXYCODONE HYDROCHLORIDE 2.5 MG: 5 TABLET ORAL at 12:13

## 2021-01-01 RX ADMIN — ASPIRIN 81 MG: 81 TABLET, COATED ORAL at 09:01

## 2021-01-01 RX ADMIN — LORAZEPAM 0.5 MG: 2 INJECTION INTRAMUSCULAR; INTRAVENOUS at 17:48

## 2021-01-01 RX ADMIN — LEVOTHYROXINE SODIUM 50 MCG: 50 TABLET ORAL at 14:47

## 2021-01-01 RX ADMIN — ISOSORBIDE MONONITRATE 30 MG: 30 TABLET, EXTENDED RELEASE ORAL at 13:24

## 2021-01-01 RX ADMIN — TRAZODONE HYDROCHLORIDE 50 MG: 50 TABLET ORAL at 21:18

## 2021-01-01 RX ADMIN — HYDROXYZINE HYDROCHLORIDE 25 MG: 25 TABLET ORAL at 14:59

## 2021-01-01 RX ADMIN — FUROSEMIDE 20 MG: 20 TABLET ORAL at 08:04

## 2021-01-01 RX ADMIN — CYCLOBENZAPRINE 5 MG: 5 TABLET, FILM COATED ORAL at 17:32

## 2021-01-01 RX ADMIN — ACETAMINOPHEN 650 MG: 325 TABLET, FILM COATED ORAL at 20:50

## 2021-01-01 RX ADMIN — HYDROXYZINE HYDROCHLORIDE 25 MG: 25 TABLET ORAL at 20:37

## 2021-01-01 RX ADMIN — POLYETHYLENE GLYCOL 3350 17 G: 17 POWDER, FOR SOLUTION ORAL at 09:01

## 2021-01-01 RX ADMIN — ACETAMINOPHEN 650 MG: 325 TABLET, FILM COATED ORAL at 21:17

## 2021-01-01 RX ADMIN — ASPIRIN 81 MG: 81 TABLET, COATED ORAL at 08:21

## 2021-01-01 RX ADMIN — OMEPRAZOLE 20 MG: 20 CAPSULE, DELAYED RELEASE ORAL at 08:18

## 2021-01-01 RX ADMIN — FLUTICASONE PROPIONATE 2 SPRAY: 50 SPRAY, METERED NASAL at 10:23

## 2021-01-01 RX ADMIN — INSULIN ASPART 1 UNITS: 100 INJECTION, SOLUTION INTRAVENOUS; SUBCUTANEOUS at 09:02

## 2021-01-01 RX ADMIN — LEVOTHYROXINE SODIUM 50 MCG: 50 TABLET ORAL at 06:42

## 2021-01-01 RX ADMIN — ACETAMINOPHEN 650 MG: 325 TABLET, FILM COATED ORAL at 08:30

## 2021-01-01 RX ADMIN — ASPIRIN 81 MG: 81 TABLET, DELAYED RELEASE ORAL at 13:08

## 2021-01-01 RX ADMIN — INSULIN ASPART 1 UNITS: 100 INJECTION, SOLUTION INTRAVENOUS; SUBCUTANEOUS at 09:03

## 2021-01-01 RX ADMIN — Medication 2 CAPSULE: at 21:19

## 2021-01-01 RX ADMIN — LORAZEPAM 0.5 MG: 2 INJECTION INTRAMUSCULAR; INTRAVENOUS at 00:01

## 2021-01-01 RX ADMIN — SODIUM CHLORIDE 1000 ML: 9 INJECTION, SOLUTION INTRAVENOUS at 02:51

## 2021-01-01 RX ADMIN — ACETAMINOPHEN 1000 MG: 500 TABLET, FILM COATED ORAL at 15:40

## 2021-01-01 RX ADMIN — POLYETHYLENE GLYCOL 3350 17 G: 17 POWDER, FOR SOLUTION ORAL at 08:04

## 2021-01-01 RX ADMIN — OXYCODONE HYDROCHLORIDE 2.5 MG: 5 TABLET ORAL at 18:56

## 2021-01-01 RX ADMIN — FUROSEMIDE 40 MG: 40 TABLET ORAL at 11:35

## 2021-01-01 RX ADMIN — LORAZEPAM 0.5 MG: 2 INJECTION INTRAMUSCULAR; INTRAVENOUS at 20:35

## 2021-01-01 RX ADMIN — PANTOPRAZOLE SODIUM 40 MG: 40 TABLET, DELAYED RELEASE ORAL at 09:00

## 2021-01-01 RX ADMIN — OXYCODONE HYDROCHLORIDE 2.5 MG: 5 TABLET ORAL at 01:55

## 2021-01-01 RX ADMIN — IRON SUCROSE 300 MG: 20 INJECTION, SOLUTION INTRAVENOUS at 20:58

## 2021-01-01 RX ADMIN — ONDANSETRON 4 MG: 2 INJECTION INTRAMUSCULAR; INTRAVENOUS at 21:59

## 2021-01-01 RX ADMIN — SODIUM CHLORIDE: 9 INJECTION, SOLUTION INTRAVENOUS at 06:28

## 2021-01-01 RX ADMIN — METOPROLOL TARTRATE 25 MG: 25 TABLET, FILM COATED ORAL at 21:19

## 2021-01-01 RX ADMIN — Medication 2 CAPSULE: at 20:13

## 2021-01-01 RX ADMIN — FLUTICASONE PROPIONATE 2 SPRAY: 50 SPRAY, METERED NASAL at 14:56

## 2021-01-01 RX ADMIN — GABAPENTIN 200 MG: 100 CAPSULE ORAL at 20:33

## 2021-01-01 RX ADMIN — WARFARIN SODIUM 3 MG: 3 TABLET ORAL at 17:32

## 2021-01-01 RX ADMIN — METOPROLOL TARTRATE 25 MG: 25 TABLET, FILM COATED ORAL at 20:13

## 2021-01-01 RX ADMIN — OXYCODONE HYDROCHLORIDE 2.5 MG: 5 TABLET ORAL at 18:15

## 2021-01-01 RX ADMIN — LOSARTAN POTASSIUM 50 MG: 50 TABLET, FILM COATED ORAL at 21:28

## 2021-01-01 RX ADMIN — ACETAMINOPHEN 1000 MG: 500 TABLET, FILM COATED ORAL at 08:18

## 2021-01-01 RX ADMIN — ACETAMINOPHEN 975 MG: 325 TABLET, FILM COATED ORAL at 14:29

## 2021-01-01 RX ADMIN — ACETAMINOPHEN 1000 MG: 500 TABLET, FILM COATED ORAL at 15:21

## 2021-01-01 RX ADMIN — WARFARIN SODIUM 3 MG: 3 TABLET ORAL at 17:41

## 2021-01-01 RX ADMIN — GABAPENTIN 200 MG: 100 CAPSULE ORAL at 21:16

## 2021-01-01 RX ADMIN — LEVOTHYROXINE SODIUM 50 MCG: 50 TABLET ORAL at 17:31

## 2021-01-01 RX ADMIN — ASPIRIN 81 MG: 81 TABLET, COATED ORAL at 09:00

## 2021-01-01 RX ADMIN — LEVOTHYROXINE SODIUM 50 MCG: 50 TABLET ORAL at 08:30

## 2021-01-01 ASSESSMENT — ACTIVITIES OF DAILY LIVING (ADL)
ADLS_ACUITY_SCORE: 14
ADLS_ACUITY_SCORE: 15
DEPENDENT_IADLS:: INDEPENDENT
DOING_ERRANDS_INDEPENDENTLY_DIFFICULTY: NO
DEPENDENT_IADLS:: INDEPENDENT
DEPENDENT_IADLS:: INDEPENDENT
ADLS_ACUITY_SCORE: 14
DIFFICULTY_EATING/SWALLOWING: NO
ADLS_ACUITY_SCORE: 14
ADLS_ACUITY_SCORE: 14
DEPENDENT_IADLS:: INDEPENDENT
ADLS_ACUITY_SCORE: 15
ADLS_ACUITY_SCORE: 14
ADLS_ACUITY_SCORE: 12
ADLS_ACUITY_SCORE: 17
ADLS_ACUITY_SCORE: 15
ADLS_ACUITY_SCORE: 15
DEPENDENT_IADLS:: INDEPENDENT
ADLS_ACUITY_SCORE: 15
ADLS_ACUITY_SCORE: 13
ADLS_ACUITY_SCORE: 15
DIFFICULTY_COMMUNICATING: NO
ADLS_ACUITY_SCORE: 15
ADLS_ACUITY_SCORE: 14
CONCENTRATING,_REMEMBERING_OR_MAKING_DECISIONS_DIFFICULTY: NO
FALL_HISTORY_WITHIN_LAST_SIX_MONTHS: NO
ADLS_ACUITY_SCORE: 17
DEPENDENT_IADLS:: INDEPENDENT
ADLS_ACUITY_SCORE: 15
DRESSING/BATHING_DIFFICULTY: NO
ADLS_ACUITY_SCORE: 16
ADLS_ACUITY_SCORE: 14
ADLS_ACUITY_SCORE: 13
ADLS_ACUITY_SCORE: 15
ADLS_ACUITY_SCORE: 13
TOILETING_ISSUES: NO
ADLS_ACUITY_SCORE: 15
ADLS_ACUITY_SCORE: 17
EQUIPMENT_CURRENTLY_USED_AT_HOME: CANE, QUAD
DEPENDENT_IADLS:: INDEPENDENT
WHICH_OF_THE_ABOVE_FUNCTIONAL_RISKS_HAD_A_RECENT_ONSET_OR_CHANGE?: AMBULATION
ADLS_ACUITY_SCORE: 14
ADLS_ACUITY_SCORE: 13
ADLS_ACUITY_SCORE: 15
WALKING_OR_CLIMBING_STAIRS_DIFFICULTY: YES
ADLS_ACUITY_SCORE: 12
PREVIOUS_RESPONSIBILITIES: MEAL PREP;HOUSEKEEPING;LAUNDRY;SHOPPING;YARDWORK;MEDICATION MANAGEMENT;FINANCES;DRIVING
DEPENDENT_IADLS:: INDEPENDENT
DEPENDENT_IADLS:: INDEPENDENT
ADLS_ACUITY_SCORE: 13
DEPENDENT_IADLS:: INDEPENDENT
ADLS_ACUITY_SCORE: 13
WEAR_GLASSES_OR_BLIND: NO

## 2021-01-01 ASSESSMENT — MIFFLIN-ST. JEOR
SCORE: 1116.48
SCORE: 1118.96
SCORE: 1123.49
SCORE: 1141.64
SCORE: 1057.26
SCORE: 1128.03
SCORE: 1142.75
SCORE: 1131.19
SCORE: 1112.15
SCORE: 1132.56

## 2021-01-01 ASSESSMENT — PATIENT HEALTH QUESTIONNAIRE - PHQ9: SUM OF ALL RESPONSES TO PHQ QUESTIONS 1-9: 1

## 2021-01-01 ASSESSMENT — ENCOUNTER SYMPTOMS
FREQUENCY: 0
COUGH: 1
DIFFICULTY URINATING: 0
FLANK PAIN: 1
HEADACHES: 1
SHORTNESS OF BREATH: 1
HEMATURIA: 0
NECK PAIN: 1
DYSURIA: 0
DIZZINESS: 1

## 2021-01-04 NOTE — TELEPHONE ENCOUNTER
Reason for Call:  Other call back    Detailed comments: PT stated she was suppose to have an INR at home blood draw 01/04 but the RN never arrived, pt wondering how she can reach the nurse     Phone Number Patient can be reached at: Home number on file [phone] (home)    Best Time: Anytime    Can we leave a detailed message on this number? YES    Call taken on 1/4/2021 at 1:46 PM by Ana M Haile

## 2021-01-05 NOTE — TELEPHONE ENCOUNTER
Homecare lab Never received fax or call for a INR re-check, last time INR nurse came out to draw lab was 12/21.  Will have nurse come out to draw INR for pt tomorrow.

## 2021-01-06 NOTE — PROGRESS NOTES
"Clinic Care Coordination Contact  Community Health Worker Follow Up    Goals:   Goals Addressed as of 1/6/2021 at 8:52 AM                 Today    11/16/20       Patient Stated      1. Psychosocial (pt-stated)   40%  20%    Added 10/5/20 by Jeet Parish RN     Goal Statement: I want to remain as independent as I safely can.  Date Goal set: 10/5/20 // revised on 11/16/20  Barriers: lives alone in private home with stairs, chronic vertigo, recent fall  Strengths: motivated to remain independent, supportive son  Date to Achieve By: 4/30/21  Patient expressed understanding of goal: Yes    Action steps to achieve this goal:  1. I will continue working with [facility] Care as indicated.  2. I will do home exercises as advised by home care.  3. I will take precautions to avoid falls such as wearing appropriate footwear, using my walker, and removing any obstacles from my walking path.  4. I will explore options for a Medical Alert System.  5. I will explore additional options for in-home assistance.  6. I will consider higher levels of care if needed.  7. I will continue working with Care Coordination to identify and address barriers to achieving my goal.        Discussed:  Patient stated that she is doing \"little things\" around the house such as cooking, dusting.  Patient stated that her son and his wife visit once a week to help with things around the house such as taking out the trash, opening jars, and doing the grocery shopping.   Patient stated that someone from [facility] was supposed to visit her yesterday for an INR draw, and no one showed up.  Patient stated that she has called the ne number [phone], but no one has called her back.   Patient stated that she has not had her INR checked since 12/21/20.  Patient stated that she is interested in a Life Alert button, but would like one that is waterproof.  Patient stated that she is also concerned about the fee and monthly charges.    Patient stated " that she is wearing proper footwear.  Patient stated that she is not exercising much, but is doing and moving around the house.    Intervention and Education during outreach: CHW encouraged patient to contact CC if there are any other changes or resources needed.  Patient acknowledged understanding.     Plan: CHW will contact [facility] to check to follow up on patient's INR blood draw.    Next Outreach: 2/3/21    IZAIAH Gloria  Clinic Care Coordination  [facility] Clinics: Leyla [place], [place], Women's, and [facility]  Phone: [phone]

## 2021-01-07 NOTE — PROGRESS NOTES
Clinic Care Coordination Contact    Situation: Patient chart reviewed by care coordinator.     Background: Care Coordination following patient to assist with goal as below.     Assessment: Per chart review, patient outreach completed by CC CHW on 1/6/21.  Patient is actively working to accomplish goal.  Patient has been receiving assistance around her home from son and daughter-in-law.  She has been ensuring she wears appropriate footwear to avoid falls.  Patient is considering a personal emergency response system.  Patient has not been exercising much.  Patient's goal remains appropriate and relevant at this time.    Goals        Patient Stated      1. Psychosocial (pt-stated)      Goal Statement: I want to remain as independent as I safely can.  Date Goal set: 10/5/20 // revised on 11/16/20  Barriers: lives alone in private home with stairs, chronic vertigo, recent fall  Strengths: motivated to remain independent, supportive son  Date to Achieve By: 4/30/21  Patient expressed understanding of goal: Yes    Action steps to achieve this goal:  1. I will continue working with [facility] as indicated.  2. I will do home exercises as advised by home care.  3. I will take precautions to avoid falls such as wearing appropriate footwear, using my walker, and removing any obstacles from my walking path.  4. I will explore options for a Medical Alert System.  5. I will explore additional options for in-home assistance.  6. I will consider higher levels of care if needed.  7. I will continue working with Care Coordination to identify and address barriers to achieving my goal.        Plan/Recommendations: The patient will continue working with Care Coordination to achieve goal as above.  CC CHW will continue with monthly outreaches at this time. CC CHW will inform CC RN of any concerns or changes regarding patient as needed.  CC RN will review patient's chart in approximately 6 weeks and outreach to patient as  indicated.    Jeet Parish, RN  Clinic Care Coordinator  [facility]  Leyla Howard, [facility]  Ph: [phone]

## 2021-01-07 NOTE — PROGRESS NOTES
ANTICOAGULATION MANAGEMENT     Patient Name:  Madie Silva  Date:  2021    ASSESSMENT /SUBJECTIVE:    Today's INR result of 2.1 is therapeutic. Goal INR of 2.0-3.0      Warfarin dose taken: Warfarin taken as instructed    Diet: No new diet changes affecting INR    Medication changes/ interactions: No new medications/supplements affecting INR    Previous INR: Therapeutic     S/S of bleeding or thromboembolism: No    New injury or illness: No    Upcoming surgery, procedure or cardioversion: No    Additional findings: None      PLAN:    Telephone call with Madie regarding INR result and instructed:     Warfarin Dosing Instructions: Continue your current warfarin dose 3 mg daily    Instructed patient to follow up no later than: 3 weeks  Orders given to [facility] ([phone])    Education provided: Target INR goal and significance of current INR result      Madie verbalizes understanding and agrees to warfarin dosing plan.    Instructed to call the Anticoagulation Clinic for any changes, questions or concerns. (#[phone])        Phillip Siddiqui RN      OBJECTIVE:  Recent labs: (last 7 days)     21   INR 2.1*         No question data found.  Anticoagulation Summary  As of 2021    INR goal:  2.0-3.0   TTR:  62.0 % (11.9 mo)   INR used for dosin.1 (2021)   Warfarin maintenance plan:  3 mg (3 mg x 1) every day   Full warfarin instructions:  3 mg every day   Weekly warfarin total:  21 mg   No change documented:  Phillip Siddiqui RN   Plan last modified:  Dianne Barrow RN (2020)   Next INR check:  2021   Priority:  Maintenance   Target end date:  Indefinite    Indications    Long-term (current) use of anticoagulants [Z79.01] (Resolved) [Z79.01]  Phlebitis and thrombophlebitis of deep veins of lower extremities (H) [I80.209]  Acute myocardial infarction  initial episode of care (H) (Resolved) [I21.9]  PAF -- on Warfarin  [I48.0]             Anticoagulation Episode  Summary     INR check location:      Preferred lab:      Send INR reminders to:  ADRIEN DE LOS SANTOS    Comments:  [facility]      Anticoagulation Care Providers     Provider Role Specialty Phone number    De Obregon MD Referring Internal Medicine [phone]

## 2021-01-28 NOTE — PROGRESS NOTES
ANTICOAGULATION MANAGEMENT     Patient Name:  Madie Silva  Date:  2021    ASSESSMENT /SUBJECTIVE:    Today's INR result of 2.2 is therapeutic. Goal INR of 2.0-3.0      Warfarin dose taken: Warfarin taken as instructed    Diet: No new diet changes affecting INR    Medication changes/ interactions: No new medications/supplements affecting INR    Previous INR: Therapeutic     S/S of bleeding or thromboembolism: No    New injury or illness: No    Upcoming surgery, procedure or cardioversion: No    Additional findings: None      PLAN:    Telephone call with Madie regarding INR result and instructed:     Warfarin Dosing Instructions: Continue your current warfarin dose 3 mg daily    Instructed patient to follow up no later than: 4 weeks  Orders given to [facility] ([phone])    Education provided: Target INR goal and significance of current INR result      Madie verbalizes understanding and agrees to warfarin dosing plan.    Instructed to call the Anticoagulation Clinic for any changes, questions or concerns. (#[phone])        Mili Bragg RN      OBJECTIVE:  Recent labs: (last 7 days)     21  1309   INR 2.2*         No question data found.  Anticoagulation Summary  As of 2021    INR goal:  2.0-3.0   TTR:  64.5 % (11.9 mo)   INR used for dosin.2 (2021)   Warfarin maintenance plan:  3 mg (3 mg x 1) every day   Full warfarin instructions:  3 mg every day   Weekly warfarin total:  21 mg   No change documented:  Mili Bragg RN   Plan last modified:  Dianne Barrow RN (2020)   Next INR check:  2021   Priority:  Maintenance   Target end date:  Indefinite    Indications    Long-term (current) use of anticoagulants [Z79.01] (Resolved) [Z79.01]  Phlebitis and thrombophlebitis of deep veins of lower extremities (H) [I80.209]  Acute myocardial infarction  initial episode of care (H) (Resolved) [I21.9]  PAF -- on Warfarin  [I48.0]             Anticoagulation  Episode Summary     INR check location:      Preferred lab:      Send INR reminders to:  ADRIEN DE LOS SANTOS    Comments:  [facility]      Anticoagulation Care Providers     Provider Role Specialty Phone number    De Obregon MD Referring Internal Medicine [phone]

## 2021-02-25 NOTE — PROGRESS NOTES
ANTICOAGULATION MANAGEMENT      Madie Silva due for annual renewal of referral to anticoagulation monitoring. Order pended for your review and signature.      ANTICOAGULATION SUMMARY      Warfarin indication(s)     Atrial fibrillation  Heart Valve Replacement    Heart valve present?  NO and Bioprosthetic AVR       Current goal range   INR: 2.0-3.0     Goal appropriate for indication? Yes, INR 2-3 appropriate for hx of DVT, PE, hypercoagulable state, Afib, LVAD, or bileaflet AVR without risk factors     Current duration of therapy Indefinite/long term therapy   Time in Therapeutic Range (TTR)  (Goal > 60%) 66.7 %       Office visit with referring provider's group within last year yes on 07/03/2020       Phillip Siddiqui RN

## 2021-02-25 NOTE — PROGRESS NOTES
ANTICOAGULATION MANAGEMENT     Patient Name:  Madie Silva  Date:  2021    ASSESSMENT /SUBJECTIVE:    Today's INR result of 2.2 is therapeutic. Goal INR of 2.0-3.0      Warfarin dose taken: Warfarin taken as instructed    Diet: No new diet changes affecting INR    Medication changes/ interactions: No new medications/supplements affecting INR    Previous INR: Therapeutic     S/S of bleeding or thromboembolism: No    New injury or illness: No    Upcoming surgery, procedure or cardioversion: No    Additional findings: None      PLAN:    Telephone call with Madie regarding INR result and instructed:     Warfarin Dosing Instructions: Continue your current warfarin dose 3mg daily    Instructed patient to follow up no later than: 5 weeks  Orders given to [facility] ([phone])    Education provided: Target INR goal and significance of current INR result      Madie verbalizes understanding and agrees to warfarin dosing plan.    Instructed to call the Anticoagulation Clinic for any changes, questions or concerns. (#[phone])        Phillip Siddiqui RN      OBJECTIVE:  Recent labs: (last 7 days)     21   INR 2.2*         No question data found.  Anticoagulation Summary  As of 2021    INR goal:  2.0-3.0   TTR:  66.7 % (11.9 mo)   INR used for dosin.2 (2021)   Warfarin maintenance plan:  3 mg (3 mg x 1) every day   Full warfarin instructions:  3 mg every day   Weekly warfarin total:  21 mg   No change documented:  Phillip Siddiqui RN   Plan last modified:  Dianne Barrow RN (2020)   Next INR check:  2021   Priority:  Maintenance   Target end date:  Indefinite    Indications    Long-term (current) use of anticoagulants [Z79.01] (Resolved) [Z79.01]  Phlebitis and thrombophlebitis of deep veins of lower extremities (H) [I80.209]  Acute myocardial infarction  initial episode of care (H) (Resolved) [I21.9]  PAF -- on Warfarin  [I48.0]             Anticoagulation Episode  Summary     INR check location:      Preferred lab:      Send INR reminders to:  ADRIEN DE LOS SANTOS    Comments:  [facility]      Anticoagulation Care Providers     Provider Role Specialty Phone number    De Obregon MD Referring Internal Medicine [phone]

## 2021-03-02 NOTE — PROGRESS NOTES
Clinic Care Coordination Contact  Community Health Worker Follow Up    Goals:   Goals Addressed as of 2/25/2021 at 9:31 AM                 2/25/21 1/6/21       Patient Stated      1. Psychosocial (pt-stated)   50%  40%    Added 10/5/20 by Jeet Parish RN     Goal Statement: I want to remain as independent as I safely can.  Date Goal set: 10/5/20 // revised on 11/16/20  Barriers: lives alone in private home with stairs, chronic vertigo, recent fall  Strengths: motivated to remain independent, supportive son  Date to Achieve By: 4/30/21  Patient expressed understanding of goal: Yes    Action steps to achieve this goal:  1. I will continue working with [facility] as indicated.  2. I will do home exercises as advised by home care.  3. I will take precautions to avoid falls such as wearing appropriate footwear, using my walker, and removing any obstacles from my walking path.  4. I will explore options for a Medical Alert System.  5. I will explore additional options for in-home assistance.  6. I will consider higher levels of care if needed.  7. I will continue working with Care Coordination to identify and address barriers to achieving my goal.        Discussed:    Patient reported going outside for a walk since the weather was so nice.  Patient prefers to do her walking outside if she can.    Patient reported working on her balance.    Patient reported that she is having difficulty with vacuuming and making her bed.      Patient's daughter-in-law helps her.      Patient stated that she is still exploring life alert options.    Patient stated that she has a PCP appointment on 3/3, and she has been with her PCP for 30 years.    Intervention and Education during outreach: CHW encouraged patient to contact CC if there are any other changes or resources needed.  Patient acknowledged understanding.      Plan: will follow up in one month    Next Outreach: 3/23/2021    IZAIAH Gloria  Clinic Care  Coordination  [facility] Clinics: Leyla Luther, Aniya Cheshire, Women[facility], and [facility]  Phone: [phone]

## 2021-03-03 NOTE — PROGRESS NOTES
"    Assessment & Plan     ASSESSMENT:   1.  DM with nephropathy, CKD3.  Controlled   2.  Mixed hyperlipidemia  3.  HTN, blood pressure elevated  4.  PMR.  Symptoms do not suggest active problems from this  5.  Decreased functino since arm fracture   6.  Chronic pain from post herpetic neuralgia   7.  Balance problems/instability.   Age related, worse after arm fracture.   We discussed how some meds could possibly contribute to this, including gabapentin.    Since it's not clear how much she needs this, will try to wean.  Mentation is not quite as clear as in the past, which could also possibly contribute to this and other problems.    PLAN:  1.  Stop metoprolol  2.  Decrease gabapentin to 200 mg at bedtime   3.  Increase losartan to 50 mg daily  4.  BP check at clinic in 2-3 weeks with Anna Burger PA-C   5.  Use walker consistently  6.  Home Physical Therapy order    If fatigue not better, consider recheck of echo      >53 minutes spent on the date of the encounter doing chart review, history and exam, documentation and further activities as noted above       BMI:   Estimated body mass index is 26.95 kg/m  as calculated from the following:    Height as of this encounter: 1.626 m (5' 4\").    Weight as of this encounter: 71.2 kg (157 lb).           No follow-ups on file.    De Obregon MD  [facility]    Jonas Ramachandran is a 89 year old who presents for the following health issues     HPI       Hypertension Follow-up      Do you check your blood pressure regularly outside of the clinic? No     Are you following a low salt diet? Yes    Are your blood pressures ever more than 140 on the top number (systolic) OR more   than 90 on the bottom number (diastolic), for example 140/90? No      How many servings of fruits and vegetables do you eat daily?  0-1    On average, how many sweetened beverages do you drink each day (Examples: soda, juice, sweet tea, etc.  Do NOT count diet or " artificially sweetened beverages)?   1    How many days per week do you exercise enough to make your heart beat faster? 3 or less    How many minutes a day do you exercise enough to make your heart beat faster? 9 or less    How many days per week do you miss taking your medication? 0    Additional issues to address:  1.  Follow-up of type II diabetes mellitus.   Not checking glucoses.  She reports the following symptoms/concerns:  Some polyuria, no significant polydipsia.  2.  Follow-up of chronic hyperlipidemia, on Atorvastatin 80 mg.  She reports no side effects of medications, including no significant myalgias or other side effects.    3.  Recent L upper arm fracture, 7/2020.  Conservative care given.  ROM decreased, but pain is controlled.  Homecare for follow-up arm fracture is done, but coming for INR checks.  4.  Balance is much worse since arm fracture.   Fell in July, was in hospital and rehab - patient feels that it didn't go any good.   Patient states that her vertigo symptoms are persistent, for years.   Feels that she's unstable getting up, for a minute or more.   Once up, unsteady and using walker all the time.   At home, using furniture despite being advised to use walker all the time.  - was given head turning exercises at rehab, and aat home  - doing some arm lifts at home.  - living alone  - able to fix small meals.  Goes down the basement to wash clothes.   Puts them in bag to carry up.   Doesn't use the walker in the basement.  - some difficulty putting on clothes due to ROM L arm.  - stamina much worse, like making bed/washing dishes.   Feels worse than last year, but unclear how much.    -  Feels that she would be better off continuing at home on her own, despite  -  More dyspnea on exertion, even that before TAVR.   Feels that she got no benefit from this 5/2020.  5.  Follow-up post herpetic neuralgia.   Gets a terrible pain along waist where shingles was.   Lasts for 3 days.   Uses  "hydrocodone when this happens.   Would like refill of this, small supply.  Unclear if gabapentin still helping.  Using 3 at bedtime.    6.  Follow-up hypothyroidism.  Weight stable, no hair loss, skin changes, constipation, loose stools, etc.     Review of Systems         Objective    BP (!) 148/54 (BP Location: Left arm, Patient Position: Chair, Cuff Size: Adult Regular)   Pulse 50   Temp 97.8  F (36.6  C) (Temporal)   Resp 20   Ht 1.626 m (5' 4\")   Wt 71.2 kg (157 lb)   SpO2 97%   BMI 26.95 kg/m    Body mass index is 26.95 kg/m .  Physical Exam   158/52 on recheck  GENERAL: alert and no distress.  Patient's mentation does not appear as sharp as it has been in the past.  NECK: no adenopathy, no asymmetry, masses, or scars and thyroid normal to palpation  CV: regular rate and rhythm, Reg heart tones with crisp click,  1/6 holosystolic murmur  no peripheral edema and peripheral pulses strong  RESP: Lungs are clear  ABDOMEN: soft, nontender  MS: no gross musculoskeletal defects noted, no edema  NEURO: Patient rises from chair without difficulty, but appears unstable  Full use of extremities, decreased range of motion left upper extremity after fracture   strength fairly good forearm strength slightly decreased  Hip flexion, leg strength fairly good  Mild confusion following directions                "

## 2021-03-03 NOTE — PATIENT INSTRUCTIONS
We are working hard to begin vaccinating more people against COVID-19. Currently, we are only vaccinating individuals age 70 and older and Phase 1a workers - healthcare workers who are unable to do their job remotely. Vaccine availability is very limited.    If you are 70 or older, or a healthcare worker who is unable to do your job remotely, please log in to [employer] using this link to see if we have an open appointment and schedule an appointment.  If there are no appointments left, you will be unable to schedule and need to check back later.  If you are a healthcare worker, you will be asked to provide proof of employment at your appointment. If you cannot, you will be turned away.    Vaccine appointments are being added as they become available. Please check your [employer] account frequently for availability. If you have technical difficulty using [employer], call [phone] for assistance.    You can learn more about the [facility]'s phased approach to administering the vaccine, with details on each phase, [email or web address].      As vaccine supply increases and we are able to open appointments to more groups, we will share that information on our website [email or web address]. Check this website to stay up to date on COVID-19 vaccination information.    PLAN:  1.  Stop metoprolol  2.  Decrease gabapentin to 200 mg at bedtime   3.  Increase losartan to 50 mg daily  4.  BP check at clinic in 2-3 weeks with Anna Burger PA-C   5.  Use walker consistently  6.  Home Physical Therapy order

## 2021-03-03 NOTE — LETTER
[facility] CARE COORDINATION  [facility]  [address] [place] [address]    March 3, 2021        Madie EAGLE Ricardo  [address] [place] [id number]          Dear Dayan Ramachandran is an updated Complex Care Plan for your continued enrollment in Care Coordination. Please let us know if you have additional questions, concerns, or goals that we can assist with.    Sincerely,    Jeet Parish RN        [facility]  Complex Care Plan  About Me:    Patient Name:  Madie Amezquita    YOB: 1929  Age:         89 year old   [facility] MRN:    [id number] Telephone Information:  Home Phone [phone]   Mobile [phone]       Address:  [address] [place] [address] Email address:  No e-mail address on record      Emergency Contact(s)    Name Relationship Lgl Grd Work Phone Home Phone Mobile Phone   1. SUSAN AMEZQUITA Son No [phone]   2. NATAN AMEZQUITA No  [phone]   3. JOSE LUIS AMEZQUITA No  [phone]           Primary language:  English     needed? No   [facility] [employer]:  [phone] op. 1  Other communication barriers: None  Preferred Method of Communication:  Mail  Current living arrangement: I live in a private home  Mobility Status/ Medical Equipment: Independent    Health Maintenance  Health Maintenance Reviewed: Due/Overdue   Health Maintenance Due   Topic Date Due     ADVANCE CARE PLANNING  07/21/1929     DEPRESSION ACTION PLAN  07/21/1929     COVID-19 Vaccine (1 of 2) 07/21/1945     MEDICARE ANNUAL WELLNESS VISIT  07/21/1994     PHQ-9  10/22/2016     EYE EXAM  01/01/2019     DIABETIC FOOT EXAM  12/13/2019     MICROALBUMIN  12/20/2020     My Access Plan  Medical Emergency 911   Primary Clinic Line [facility] - [phone]   24 Hour Appointment Line [phone] or  [phone] ([phone]) (toll-free)   24 Hour  Nurse Line [phone] (toll-free)   Preferred Urgent Care [facility], [phone]   Preferred Hospital [facility]  [phone]   Preferred Pharmacy [facility] - [place], [place] - [address] [place] [address]     Behavioral Health Crisis Line The National Suicide Prevention Lifeline at 1-721.956.4084 or 911     My Care Team Members  Patient Care Team       Relationship Specialty Notifications Start End    De Obregon MD PCP - General   2/15/02     Phone: [phone] Pager: [phone] Fax: [phone]        [address] [facility] [address]    Jeet Parish, RN Lead Care Coordinator Primary Care - CC  8/4/20     Phone: [phone]         [facility]  HOME HEALTH AGENCY ([facility]), ([place])  9/22/20     Phone: [phone]         Rohini Herrmann MA Community Health Worker Primary Care - CC  10/5/20     Phone: [phone]         Mian Arcos MD Assigned Heart and Vascular Provider   10/23/20     Phone: [phone] Fax: [phone] [address] [facility] [address]    Charles Cowan MD Assigned Surgical Provider   11/15/20     Phone: [phone] Pager: [phone] Fax: [phone] [address] [facility] [place] [address]    Haase, Tonya Lynn, APRN CNP Assigned PCP   2/21/21     Phone: [phone] Fax: [phone]         [address] [facility] [address] [facility] [address]            My Care Plans  Self Management and Treatment Plan  Goals and (Comments)  Goals        Patient Stated      1. Psychosocial (pt-stated)      Goal Statement: I want to remain as independent as I safely can.  Date Goal set: 10/5/20 // revised on 11/16/20  Barriers: lives alone in private home with stairs, chronic vertigo, recent fall  Strengths: motivated to remain independent, supportive son  Date to Achieve By: 4/30/21  Patient expressed understanding of goal: Yes    Action steps to achieve this goal:  1. I will continue working with  [facility] as indicated.  2. I will do home exercises as advised by home care.  3. I will take precautions to avoid falls such as wearing appropriate footwear, using my walker, and removing any obstacles from my walking path.  4. I will explore options for a Medical Alert System.  5. I will explore additional options for in-home assistance.  6. I will consider higher levels of care if needed.  7. I will continue working with Care Coordination to identify and address barriers to achieving my goal.        Action Plans on File: None  Advance Care Plans/Directives Type:   Type Advanced Care Plans/Directives: Advanced Directive - On File    My Medical and Care Information  Problem List   Patient Active Problem List   Diagnosis     GERD     Colonic Adenoma     Obesity     Osteoarthritis involving multiple joints     Osteoporosis     Small vessel cerebrovascular changes     Essential hypertension     Post herpetic neuralgia     CHRONIC VERTIGO     Diaphragmatic hernia     Polymyalgia rheumatica (H)     Hypothyroidism     Type 2 diabetes mellitus with diabetic nephropathy (H)     Vitamin D deficiency     Hereditary and idiopathic peripheral neuropathy     Walters's esophagus     Restless leg syndrome     Vasovagal syncope     Preoperative evaluation to rule out surgical contraindication     CKD (chronic kidney disease) stage 3, GFR 30-59 ml/min (H)     Health Care Home     PPD positive     Phlebitis and thrombophlebitis of deep veins of lower extremities (H)     Kidney stone     PAF -- on Warfarin      Aortic valve stenosis, severe     Stricture and stenosis of esophagus     CAD, S/P CABG x 4 in 2011     Symptomatic bradycardia -- S/P PPM 2011     Status post coronary angiogram     Severe AS -- S/P TAVR on 5/5/20     Left Prox Humerus Fract     History of stroke      Current Medications:  Current Outpatient Medications   Medication Instructions     acetaminophen (TYLENOL) 1,000 mg, Oral, 3 TIMES DAILY     aspirin  (ASA) 81 mg, Oral, DAILY     atorvastatin (LIPITOR) 80 MG tablet TAKE ONE TABLET BY MOUTH ONCE DAILY     calcium 600 MG tablet 1 tablet, Oral, 2 TIMES DAILY     Cholecalciferol (VITAMIN D-400 PO) 1 tablet, Oral, DAILY     co-enzyme Q-10 (COENZYME Q-10) 100 MG CAPS 1 capsule, Oral, DAILY     gabapentin (NEURONTIN) 100 MG capsule TAKE ONE TABLET BY MOUTH THREE TIMES A DAY     insulin aspart (NOVOLOG PEN) 100 UNIT/ML pen Three times a day with meals, For glucometer 150-200 give 2 units, 201-250 4 units, >250 6 units.     levothyroxine (SYNTHROID/LEVOTHROID) 50 MCG tablet TAKE ONE TABLET BY MOUTH ONCE DAILY     Lidocaine (LIDOCARE) 4 % Patch 1 patch, Transdermal, EVERY 24 HOURS, Apply to left wrist topically at bedtime for Pain and remove per schedule To prevent lidocaine toxicity, patient should be patch free for 12 hrs daily.      losartan (COZAAR) 50 mg, Oral, DAILY     melatonin 3 mg, Oral, AT BEDTIME     metFORMIN (GLUCOPHAGE) 500 MG tablet TAKE ONE TABLET BY MOUTH DAILY WITH BREAKFAST     metoprolol succinate ER (TOPROL-XL) 25 mg, Oral, DAILY     mirtazapine (REMERON) 7.5 mg, Oral, AT BEDTIME     Multiple Vitamins-Minerals (CENTRUM SILVER) per tablet 1 tablet, Oral, DAILY     Multiple Vitamins-Minerals (PRESERVISION AREDS 2 PO) 1 tablet, Oral, 2 TIMES DAILY     nitroGLYcerin (NITROSTAT) 0.4 mg, Sublingual, EVERY 5 MIN PRN, Up to 3 doses per episode     nystatin (MYCOSTATIN) 736299 UNIT/GM external powder Topical, Apply to breast folds topically two times a day for redness      omeprazole (PRILOSEC) 20 mg, Oral, DAILY     polyethylene glycol (MIRALAX) powder 1 capful, Oral, DAILY     senna-docusate (SENOKOT-S/PERICOLACE) 8.6-50 MG tablet Oral, Give 1 tablet by mouth one time a day for constipation AND Give 1 tablet by mouth as needed for constipation BID PRN      sennosides (SENOKOT) 8.6 MG tablet 1-2 tablets, Oral, 2 TIMES DAILY     warfarin ANTICOAGULANT (JANTOVEN ANTICOAGULANT) 3 MG tablet Take one tablet daily  as directed by the INR clinic     Warfarin Sodium (COUMADIN PO) Oral, Give 1 tablet orally in the evening every Mon, Tue for Afib until 09/22/2020 23:59 per camp      Care Coordination Start Date: 10/5/2020   Frequency of Care Coordination: monthly   Form Last Updated: 03/03/2021

## 2021-03-03 NOTE — PROGRESS NOTES
Care Coordination Clinician Chart Review    Situation: Patient chart reviewed by care coordinator.    Background: Care Coordination initial assessment and enrollment to Care Coordination was 10/5/20.  Patient centered goals were developed with participation from patient.  CC RN handed patient off to CHW for continued outreach every 30 days.    Assessment: Per chart review, patient outreach completed by CC CHW on 2/25/21.  Patient is actively working to accomplish goal.  Patient has been walking outside when able.  She is still exploring medical alert systems.  Patient noted help, when needed, from daughter-in-law.  Patient's goal remains appropriate and relevant at this time.  Patient is due for updated Complex Care Plan.  Annual assessment due 10/5/21.    Goals        Patient Stated      1. Psychosocial (pt-stated)      Goal Statement: I want to remain as independent as I safely can.  Date Goal set: 10/5/20 // revised on 11/16/20  Barriers: lives alone in private home with stairs, chronic vertigo, recent fall  Strengths: motivated to remain independent, supportive son  Date to Achieve By: 4/30/21  Patient expressed understanding of goal: Yes    Action steps to achieve this goal:  1. I will continue working with [facility] as indicated.  2. I will do home exercises as advised by home care.  3. I will take precautions to avoid falls such as wearing appropriate footwear, using my walker, and removing any obstacles from my walking path.  4. I will explore options for a Medical Alert System.  5. I will explore additional options for in-home assistance.  6. I will consider higher levels of care if needed.  7. I will continue working with Care Coordination to identify and address barriers to achieving my goal.        Plan/Recommendations: The patient will continue working with Care Coordination to achieve goal as above.  CHW will involve CC RN as needed or if patient is ready to move to maintenance.  CC RN will  continue to monitor progress to goals and CHW outreaches every 6 weeks.  Care Plan updated and sent to patient: Yes    Jeet Parish, RN  Clinic Care Coordinator  [facility]  [facility]  Ph: [phone]

## 2021-03-04 NOTE — TELEPHONE ENCOUNTER
Reason for Call:  Home Health Care    Ghada with [facility] called regarding (reason for call):     Orders are needed for this patient.     Pt Provider: Home care is very busy and won't be able to see Pt for start of care until this weekend. Home care is wondering if that delay of care is alright?    Phone Number Homecare Nurse can be reached at: [phone]    Can we leave a detailed message on this number? NO    Phone number patient can be reached at: [phone]    Best Time: any    Call taken on 3/4/2021 at 3:39 PM by Ni Mcmillan

## 2021-03-09 NOTE — TELEPHONE ENCOUNTER
[facility] 1 x  a week for 4 weeka for med management,  BP checks and PT eval and SW eval .Approved.Tash Cha RN

## 2021-03-11 NOTE — TELEPHONE ENCOUNTER
Calling for orders:  2x2 1x2 for physical therapy    Ok for verbal order     Lilly PAAGNRN BSN  [facility]  [phone]

## 2021-03-22 NOTE — TELEPHONE ENCOUNTER
"Patient calling stating she received the COVID-19 vaccine on 3/19/21 and next one is scheduled for 4/9/21 and after reading the literature she was suppose to notify provider she is on warfarin.    After talking with the patient, it was found she had a fall yesterday in her bathroom and she hit her head on the windowsill.  Patient denies LOC but states she is bruised all over her body.  Patient also states she is having dizziness and vision changes but states that is baseline, but it does seem to be worse.     Advised patient several times she needs to go to the ED for evaluation because she fell and hit her head as well as the other symptoms.  Patient states she does not have a ride and tried calling family yesterday but they did not answer.  Advised patient to call 911 if she does not have a ride because she is on warfarin and hit her head yesterday and will need imaging to ensure there is no bleeding.  Patient stated, \"I'll try and see what I can do\".  ACN strongly encouraged patient multiple times she needs to be seen in the ED for evaluation.  It is unclear if patient will go.    Routing to PCP as well as FYI.    JIM GrayN, RN  Anticoagulation Clinic    "

## 2021-03-22 NOTE — TELEPHONE ENCOUNTER
Call from OT. She reports the patient fell at home overnight. She was in the bathroom. What she remembers is using both hands to pull up her nightgown and then had a loss of balance without having her hand on something for support. She states she was barefoot in the bathroom.     Patient is reporting constant vertigo. She states it is a chronic dx. Estimates ~15-20 years. Has done eye exercises/therapy in the past. States it is getting much worse since she fell (in July).    Patient is also having low blood pressure today of 98/44. She is on blood thinners. RN advised evaluation in ED. Patient has family who will bring her to ED.    Additional Information    Negative: ACUTE NEUROLOGIC SYMPTOM and symptom present now    Negative: Knocked out (unconscious) > 1 minute    Negative: Seizure (convulsion) occurred (Exception: prior history of seizures and now alert and without Acute Neurologic Symptoms)    Negative: Neck pain after dangerous injury (e.g., MVA, diving, trampoline, contact sports, fall > 10 feet or 3 meters) (Exception: neck pain began > 1 hour after injury)    Negative: Major bleeding (actively dripping or spurting) that can't be stopped    Negative: Penetrating head injury (e.g., knife, gunshot wound, metal object)    Negative: Sounds like a life-threatening emergency to the triager    Negative: Recently examined and diagnosed with a concussion by a healthcare provider and has questions about concussion symptoms    Negative: Can't remember what happened (amnesia)    Negative: Vomiting once or more    Negative: Watery or blood-tinged fluid dripping from the nose or ears    Taking Coumadin (warfarin) or other strong blood thinner, or known bleeding disorder (e.g., thrombocytopenia)    Negative: One or two 'black eyes' (bruising, purple color of eyelids)    Negative: Bleeding won't stop after 10 minutes of direct pressure (using correct technique)    Negative: Skin is split open or gaping (length > 1/2 inch  "or 12 mm)    Negative: Large swelling or bruise > 2 inches (5 cm)    Negative: Dangerous injury (e.g., MVA, diving, trampoline, contact sports, fall > 10 feet or 3 meters) or severe blow from hard object (e.g., golf club or baseball bat)    Negative: ACUTE NEUROLOGIC SYMPTOM and now fine    Negative: Knocked out (unconscious) < 1 minute and now fine    Negative: Severe headache    Answer Assessment - Initial Assessment Questions  1. MECHANISM: \"How did the injury happen?\" For falls, ask: \"What height did you fall from?\" and \"What surface did you fall against?\"     In the middle of the night when she was going to the bathroom. She used both hands to pull up her nightgown and lost her balance.     OT checked BP which is 98/44. States it was higher when they were here before. Last check was 138/76 (3/18). On 3/15 was 112/62.         2. ONSET: \"When did the injury happen?\" (Minutes or hours ago)     Overnight        3. NEUROLOGIC SYMPTOMS: \"Was there any loss of consciousness?\" \"Are there any other neurological symptoms?\"     Reports vertigo ~15-20 years   States dizziness has worsened since fall in July         4. MENTAL STATUS: \"Does the person know who he is, who you are, and where he is?\"     No         5. LOCATION: \"What part of the head was hit?\"     Bruising on left upper forehead (described as \"light discolored bruising)        6. SCALP APPEARANCE: \"What does the scalp look like? Is it bleeding now?\" If so, ask: \"Is it difficult to stop?\"     No         7. SIZE: For cuts, bruises, or swelling, ask: \"How large is it?\" (e.g., inches or centimeters)     Yes bruise on left upper forehead       Patient states, \"I have a few black and blue marks but I can't remember where they were.\"   Found one small bruise on right thigh (quarter size)     8. PAIN: \"Is there any pain?\" If so, ask: \"How bad is it?\"  (e.g., Scale 1-10; or mild, moderate, severe)    No pain       Soreness on bruise of right forehead     9. TETANUS: For " "any breaks in the skin, ask: \"When was the last tetanus booster?\"    Not asked         10. OTHER SYMPTOMS: \"Do you have any other symptoms?\" (e.g., neck pain, vomiting)    No           11. PREGNANCY: \"Is there any chance you are pregnant?\" \"When was your last menstrual period?\"    No    Protocols used: HEAD INJURY-A-OH      "

## 2021-03-22 NOTE — TELEPHONE ENCOUNTER
Thanks.   Triage to please contact patient later this afternoon to check on her.    Also, what happened with the fall?   Did she slip on wetness or a rug?    Was she lightheaded and fell?  Any symptoms before the fall?   If having any lightheadedness, dizziness, weakness from the vaccine, etc. - then hold her warfarin.    Also, if she feels this was a reaction to the Coronavirus vaccine, we will want to also stop warfarin a couple days before her next shot, and resume a few days after.

## 2021-03-30 NOTE — PROGRESS NOTES
Clinic Care Coordination Contact  Community Health Worker Follow Up    Goals:   Goals Addressed as of 3/30/2021 at 2:57 PM                 Today    2/25/21       Patient Stated      1. Psychosocial (pt-stated)   50%  50%    Added 10/5/20 by Jeet Parish RN     Goal Statement: I want to remain as independent as I safely can.  Date Goal set: 10/5/20 // revised on 11/16/20  Barriers: lives alone in private home with stairs, chronic vertigo, recent fall  Strengths: motivated to remain independent, supportive son  Date to Achieve By: 4/30/21  Patient expressed understanding of goal: Yes    Action steps to achieve this goal:  1. I will continue working with [facility] as indicated.  2. I will do home exercises as advised by home care.  3. I will take precautions to avoid falls such as wearing appropriate footwear, using my walker, and removing any obstacles from my walking path.  4. I will explore options for a Medical Alert System.  5. I will explore additional options for in-home assistance.  6. I will consider higher levels of care if needed.  7. I will continue working with Care Coordination to identify and address barriers to achieving my goal.        Discussed:    Patient stated that she would like to continue with home care especially physical therapy.  Patient stated that she still has balance issues and is concerned about falling.    Patient stated that she fell about a week ago.    Patient stated that she is having difficulty with her reading and when looking down.      Patient stated that her blood pressure has been on the low side.  The nurse took it this morning and it was 98/44.    Patient stated that she has an appointment with her PCP on 4/22/21 to discuss her concerns.    Intervention and Education during outreach: CHW encouraged patient to contact CC if there are any other changes or resources needed.  Patient acknowledged understanding.     Plan: CHW sent a message to  Triage regarding  home care and physical therapy.  CHW will follow up in one month    Next Outreach: 4/29/21    IZAIAH Gloria  Clinic Care Coordination  [facility]: [facility] [place] [place] [facility]  Phone: [phone]

## 2021-03-31 NOTE — TELEPHONE ENCOUNTER
Clinic Care Coordination Contact  Patient stated that she would like to continue with home care especially physical therapy.  Patient stated that the home health nurse will be out today, but physical therapy has ended. Patient stated that she still has balance issues and is concerned about falling.  Patient stated that she fell about a week ago.   Please call patient to discuss.     IZAIAH Gloria  Clinic Care Coordination  [facility] Clin[facility]: [facility] [place] [place] [place] [facility]  Phone: [phone]

## 2021-03-31 NOTE — TELEPHONE ENCOUNTER
Shaina home care nurse called the clinic requesting Rx for losartan 50 mg.    Routing refill request to provider for review/approval because:  Drug not active on patient's medication list - under PCP.    BP today sitting 110/56, last week 140/64 sitting and standing, PT BP last week 112/54, other BP's from last week: 98/44, 122/64, 132/66, 128/56, 124/60.     Pt will have one more home care PT today and next week along with home care and then she will be discharged as she is meeting her goals and able to manage meds herself.     She has been dealing with dizziness and balance issues for some time and home care nurse recommending pt be referred to outpatient [facility] on [address]. Also, consider referral to ENT to get ears checked. Vision seems to be okay with reading glasses.    Since Dr. Obregon is having less availability, pt decided to transfer care to Dr. Orr.     Future Office Visit:   Next 5 appointments (look out 90 days)    Apr 22, 2021  3:00 PM  Office Visit with Bee Orr MD  [facility] ([facility] ) [address] [address] [phone]

## 2021-04-01 NOTE — PROGRESS NOTES
ANTICOAGULATION MANAGEMENT     Patient Name:  Madie Silva  Date:  2021    ASSESSMENT /SUBJECTIVE:    Today's INR result of 1.9 is subtherapeutic. Goal INR of 2.0-3.0      Warfarin dose taken: Warfarin taken as instructed    Diet: No new diet changes affecting INR    Medication changes/ interactions: Received the Covid vaccine first dose 2 weeks ago and 2nd dose on the     Previous INR: Therapeutic     S/S of bleeding or thromboembolism: No    New injury or illness: No    Upcoming surgery, procedure or cardioversion: No    Additional findings: None      PLAN:    Telephone call with Madie regarding INR result and instructed:     Warfarin Dosing Instructions: Continue your current warfarin dose 3 mg daily    Instructed patient to follow up no later than: 2 weeks  Orders given to [facility] ([phone]) Spoke with Mac and gave recheck date    Education provided: Covid vaccine and INR correlation      Madie verbalizes understanding and agrees to warfarin dosing plan.    Instructed to call the Anticoagulation Clinic for any changes, questions or concerns. (#[phone])        Julianne Camilo RN      OBJECTIVE:  Recent labs: (last 7 days)     21   INR 1.9*         No question data found.  Anticoagulation Summary  As of 2021    INR goal:  2.0-3.0   TTR:  63.5 % (11.9 mo)   INR used for dosin.9 (2021)   Warfarin maintenance plan:  3 mg (3 mg x 1) every day   Full warfarin instructions:  3 mg every day   Weekly warfarin total:  21 mg   Plan last modified:  Dianne Barrow RN (2020)   Next INR check:     Priority:  Maintenance   Target end date:  Indefinite    Indications    Long-term (current) use of anticoagulants [Z79.01] (Resolved) [Z79.01]  Phlebitis and thrombophlebitis of deep veins of lower extremities (H) [I80.209]  Acute myocardial infarction  initial episode of care (H) (Resolved) [I21.9]  Atrial Fibrillation  paroxysmal -- on Warfarin (H) [I48.0]              Anticoagulation Episode Summary     INR check location:      Preferred lab:      Send INR reminders to:  [facility]    Comments:  [facility]      Anticoagulation Care Providers     Provider Role Specialty Phone number    De Obregon MD Referring Internal Medicine [phone]       Incoming fax from [facility]    Date of INR 4/1    INR result 1.9

## 2021-04-08 NOTE — PROGRESS NOTES
Care Coordination Clinician Chart Review    Situation: Patient chart reviewed by care coordinator.    Background: Care Coordination initial assessment and enrollment to Care Coordination was 10/5/2020.  Patient centered goals were developed with participation from patient.  CC RN handed patient off to CHW for continued outreach every 30 days.    Assessment: Per chart review, patient outreach completed by CHW on 3/30/21.  Patient is actively working to accomplish goal.  Patient indicated desire to continue working with home care for ongoing efforts toward balance improvement and fall prevention, however patient has met goals with home care and was being discharged.  Home care nurse recommended referral to [facility] and/or ENT referral for ear check; these recommendations were sent to PCP clinic.  Patient's goal remains appropriate and relevant at this time.  Patient is not due for updated Complex Care Plan.  Annual assessment to be completed annually and as needed.    Goals        Patient Stated      1. Psychosocial (pt-stated)      Goal Statement: I want to remain as independent as I safely can.  Date Goal set: 10/5/20 // revised on 11/16/20  Barriers: lives alone in private home with stairs, chronic vertigo, recent fall  Strengths: motivated to remain independent, supportive son  Date to Achieve By: 4/30/21  Patient expressed understanding of goal: Yes    Action steps to achieve this goal:  1. I will continue working with [facility] as indicated.  2. I will do home exercises as advised by home care.  3. I will take precautions to avoid falls such as wearing appropriate footwear, using my walker, and removing any obstacles from my walking path.  4. I will explore options for a Medical Alert System.  5. I will explore additional options for in-home assistance.  6. I will consider higher levels of care if needed.  7. I will continue working with Care Coordination to identify and address  barriers to achieving my goal.        Plan/Recommendations: The patient will continue working with Care Coordination to achieve goal as above.  On next CHW outreach, would recommend getting updates on if patient was discharged from home and whether or not any additional referrals were received for ongoing management of balance issues (per clinic RN note 3/31/21).  CHW will involve CC RN as needed or if patient is ready to move to maintenance.  CC RN will continue to monitor progress to goals and CHW outreaches every 6 weeks.  Care Plan updated and sent to patient: Lakeisha Parish RN  Clinic Care Coordinator  [facility]  Leyla Howard, [facility]  Ph: [phone]

## 2021-04-15 NOTE — PROGRESS NOTES
ANTICOAGULATION MANAGEMENT     Patient Name:  Madie Silva  Date:  4/15/2021    ASSESSMENT /SUBJECTIVE:    Today's INR result of 1.8 is subtherapeutic. Goal INR of 2.0-3.0      Warfarin dose taken: Warfarin taken as instructed    Diet: No new diet changes affecting INR    Medication changes/ interactions: No new medications/supplements affecting INR    Previous INR: Subtherapeutic     S/S of bleeding or thromboembolism: No    New injury or illness: Reports she has been having dizzy spells, provided education on orthostatic hypotension, dehydration s/s and when she should seek medical attention.     Upcoming surgery, procedure or cardioversion: No    Additional findings: None      PLAN:    Telephone call with Madie regarding INR result and instructed:     Warfarin Dosing Instructions: Change your warfarin dose to 4.5mg every Thu; 3mg all other days of the week.   . (7.1 % change)    Instructed patient to follow up no later than: 2 weeks  typically uses in-home labs, she has an upcoming lab appointment in clinic that she will have INR completed at.     Education provided: Importance of following up for INR monitoring at instructed interval, Importance of taking warfarin as instructed, When to seek medical attention/emergency care and Information on home INR monitoring      Madie verbalizes understanding and agrees to warfarin dosing plan.    Instructed to call the Anticoagulation Clinic for any changes, questions or concerns. (#[phone])        John Smith RN      OBJECTIVE:  Recent labs: (last 7 days)     04/15/21   INR 1.8*         No question data found.  Anticoagulation Summary  As of 4/15/2021    INR goal:  2.0-3.0   TTR:  59.5 % (11.9 mo)   INR used for dosin.8 (4/15/2021)   Warfarin maintenance plan:  3 mg (3 mg x 1) every day   Full warfarin instructions:  3 mg every day   Weekly warfarin total:  21 mg   Plan last modified:  Dianne Barrow RN (2020)   Next INR check:     Priority:   Maintenance   Target end date:  Indefinite    Indications    Long-term (current) use of anticoagulants [Z79.01] (Resolved) [Z79.01]  Phlebitis and thrombophlebitis of deep veins of lower extremities (H) [I80.209]  Acute myocardial infarction  initial episode of care (H) (Resolved) [I21.9]  Atrial Fibrillation  paroxysmal -- on Warfarin (H) [I48.0]             Anticoagulation Episode Summary     INR check location:      Preferred lab:      Send INR reminders to:  ADRIEN DE LOS SANTOS    Comments:  [facility] Home [facility]      Anticoagulation Care Providers     Provider Role Specialty Phone number    De Obregon MD Referring Internal Medicine [phone]

## 2021-04-22 NOTE — PATIENT INSTRUCTIONS
Please STOP taking your Centrum Silver and Co-Q-10 supplement.    ---    Recommend formal outpatient physical therapy for gait stability/balance. Referral placed - you will be contacted to schedule an appointment.    ---    Recommend neurology evaluation - please call to schedule.     ---    Agree with echocardiogram (scheduled for tomorrow).    ---    Recommend hearing evaluation and likely hearing aids. Referral placed - you will be contacted to schedule an appointment.

## 2021-04-22 NOTE — PROGRESS NOTES
ASSESSMENT/PLAN                                                      (R42) Dizziness  (primary encounter diagnosis)  (H91.93) Bilateral hearing loss, unspecified hearing loss type  Comment: mostly lightheadedness, presyncope, and gait instability/imbalance; infrequent vertigo; negative orthostatics; recent labs and head CT unremarkable; medications reviewed and likely noncontributory; suspected bilateral sensorineural hearing loss likely contributing.  Plan: agree with upcoming echo to evaluate for potential cardiac cause of or contributor to dizziness; referred for formal outpatient physical therapy - patient will be contacted to schedule; referred for comprehensive hearing exam - patient will be contacted to schedule; referred to neurology for further evaluation - patient to schedule.     Bee Orr MD   [address] [address]  T: [phone], F: [phone]    SUBJECTIVE                                                      Madie Silva is a very pleasant 89 year old female who presents with chronic dizziness:    Accompanied by son.    Dizziness has been ongoing for decades, but has been worse over the last year or so. Patient describes her dizziness as mostly lightheadedness and occasional presyncope. Infrequent vertigo. Patient also reports gait instability/imbalance. Was being seen by home physical therapy for gait instability/imbalance with no significant improvement in the symptoms.    Patient also suffers from bilateral hearing loss. She has never had her hearing evaluated.    Medications reviewed. Patient does not take her sublingual nitroglycerin ever.  She uses her Norco maybe 6 times a year at most.  She uses gabapentin 200 mg nightly at bedtime.    Blood pressure is reasonably controlled on losartan 50 mg daily.    Recent labs generally unremarkable. Diabetes is well controlled on oral medications. Stable CKD stage III. Hypothyroidism  is well-controlled on current dose of levothyroxine.     CT head performed last year demonstrated diffuse cerebral volume loss and cerebral white matter changes consistent with chronic small vessel ischemic disease.    PMH significant for recent TAVR and paroxysmal atrial fibrillation. Echo scheduled for next week.    OBJECTIVE                                                      BP (!) 143/48   Pulse 98   Temp 98.2  F (36.8  C) (Temporal)   Resp 16   Wt 70.8 kg (156 lb)   SpO2 99%   BMI 26.78 kg/m    Laying: /77, pulse 72  Sitting: /73, pulse 78  Standing: /63, pulse 72  Constitutional: well-appearing  Respiratory: normal respiratory effort; clear to auscultation bilaterally  Cardiovascular: irregularly irregular; no edema  Gastrointestinal: soft, non-tender, non-distended; no organomegaly or masses   Musculoskeletal: walks with walker  Psych: normal mood and affect; normal judgment and insight; recent and remote memory intact    ---    (Note documentation was completed, in part, with [employer] voice-recognition software. Documentation was reviewed, but some grammatical, spelling, and word errors may remain.)

## 2021-04-26 NOTE — PROGRESS NOTES
ANTICOAGULATION MANAGEMENT     Patient Name:  Madie Silva  Date:  2021    ASSESSMENT /SUBJECTIVE:    Today's INR result of 1.94 is subtherapeutic. Goal INR of 2.0-3.0      Warfarin dose taken: Warfarin taken as instructed    Diet: No new diet changes affecting INR    Medication changes/ interactions: No new medications/supplements affecting INR    Previous INR: Subtherapeutic     S/S of bleeding or thromboembolism: No    New injury or illness: No    Upcoming surgery, procedure or cardioversion: No    Additional findings: None      PLAN:    Telephone call with Madie regarding INR result and instructed:     Warfarin Dosing Instructions: Change your warfarin dose to 4.5mg every Mon, Thu; 3mg all other days  . (7 % change)    Instructed patient to follow up no later than: 2 weeks  Orders given to [facility] ([phone])    Education provided: Target INR goal and significance of current INR result      Madie verbalizes understanding and agrees to warfarin dosing plan.    Instructed to call the Anticoagulation Clinic for any changes, questions or concerns. (#[phone])        Phillip Siddiqui RN      OBJECTIVE:  Recent labs: (last 7 days)     21  1345   INR 1.94*         No question data found.  Anticoagulation Summary  As of 2021    INR goal:  2.0-3.0   TTR:  56.4 % (11.9 mo)   INR used for dosin.94 (2021)   Warfarin maintenance plan:  4.5 mg (3 mg x 1.5) every Mon, Thu; 3 mg (3 mg x 1) all other days   Full warfarin instructions:  4.5 mg every Mon, Thu; 3 mg all other days   Weekly warfarin total:  24 mg   Plan last modified:  Phillip Siddiqui, RN (2021)   Next INR check:  5/10/2021   Priority:  Maintenance   Target end date:  Indefinite    Indications    Long-term (current) use of anticoagulants [Z79.01] (Resolved) [Z79.01]  Phlebitis and thrombophlebitis of deep veins of lower extremities (H) [I80.209]  Acute myocardial infarction  initial episode of care (H) (Resolved)  [I21.9]  Atrial Fibrillation  paroxysmal -- on Warfarin (H) [I48.0]             Anticoagulation Episode Summary     INR check location:      Preferred lab:      Send INR reminders to:  ADRIEN DE LOS SANTOS    Comments:  [facility] Home [facility]      Anticoagulation Care Providers     Provider Role Specialty Phone number    De Obregon MD Referring Internal Medicine [phone]

## 2021-04-29 PROBLEM — R42 DIZZINESS: Status: ACTIVE | Noted: 2021-01-01

## 2021-04-29 NOTE — PATIENT INSTRUCTIONS
Thanks for participating in a office visit with the [facility] today.    Reviewed echocardiogram, labs and medications.   Worsening shortness of breath.   BNP today  Start low dose lasix 20 mg daily  Monitor for lightheadedness, dizziness or low blood pressure.   Follow up BMP in 1 week  Follow up in 1 week with GREG, telephone visit is ok      Please call my nurse at [phone] with any questions or concerns.    Scheduling phone number: [phone]  Reminder: Please bring in all current medications, over the counter supplements and vitamin bottles to your next appointment.

## 2021-04-29 NOTE — PROGRESS NOTES
"    [facility]  1 year Post-TAVR Clinic Visit      HPI    Madie Silva is a very pleasant 89 year old female who carries a past medical history significant for severe aortic stenosis status post TAVR in(5/5/2020) using a 20 mm Khan S3 valve, coronary artery disease status post CABG x4 in 2011 using a LIMA to the LAD, SVG to ramus, SVG to OM, and SVG to PDA, symptomatic bradycardia status post permanent pacemaker implant, paroxysmal atrial fibrillation on anticoagulation, and hiatal hernia.  She returns to the office today for a 1 year post TAVR follow-up    She continues with chronic shortness of breath, with and without exertion, unchanged since TAVR. She also reports positional lightheadedness with  \"room spinning\". She recently seen her PCP who recommended a neurology evaluation and physical therapy. She denies chest pain, palpitations, PND, orthopnea, presyncope or LE edema. Upon exam, lungs are clear bilaterally, heart rate and rhythm regular, no neck vein distension, abdominal distension or LE edema. Weight is stable at 157 lbs.  NYHA CLASS: Class 3     In August, 2020, she did sustain a mechanical fall while working in her yard, resulting in a left humerus fracture. Per EMR, she spent ~ 6 weeks in a skilled nursing facility and subsequently discharged back to independent living. She tells me she has had multiple falls over the last few months with the last one resulting in her hitting her head. She did not seek ER evaluation.     1 year post TAVR echocardiogram demonstrates a normal ejection fraction estimated at 60 to 65%, mild LVH, increased LV filling pressures, a well-seated bioprosthetic aortic valve with trace aortic regurgitation, appearing perivalvular.  The mean AOV pressure gradient is 13 mmHg, calculated aortic valve area is 1.3 cm .  There was noted to be a small, bright mobile density that appears to be on the aortic valve or part of the leaflet.  This was reviewed with Dr. Molina " (echo reader) who felt that this was likely not a vegetation and a benign finding. No further work-up was recommended.     EKG today shows a paced rhythm.   Last device interrogation showed 92% atrial pacing and 7% ventricular pacing, 15 mode switches compromising of less than 1% of the time with one EGM showing 2 minutes and 48 seconds of paroxysmal atrial fibrillation with controlled rates. No ventricular arrhythmias were noted. She is on chronic Jantoven. In review of her INR's, she has not been therapeutic since February.     Blood pressure is well controlled at 120/58  Recent labs showed a sodium of 136, potassium 4.6, BUN 22, creatinine 0.93, GFR 53  Hemoglobin 10.3, WBC 7.8, platelet 257.         Assessment and Plan   1.  Severe aortic stenosis -status post TAVR using a 20 mm Khan nati 3 valve (5/5/2020).   1 year post TAVR echocardiogram demonstrates a normal ejection fraction estimated at 60 to 65%, mild LVH, increased LV filling pressures, a well-seated bioprosthetic aortic valve with trace aortic regurgitation, appearing perivalvular.  The mean AOV pressure gradient is 13 mmHg, calculated aortic valve area is 1.3 cm .  There was noted to be a small, bright mobile density that appears to be on the aortic valve or part of the leaflet.  This was reviewed with Dr. Molina (echo reader) who felt that this was likely not a vegetation and a benign finding. No further work-up was recommended. Follow up echo in 1 year.     2. Shortness of breath -  With and without exertion. Euvolemic upon exam. Echocardiogram showed increased LV filling pressures. Will add low dose lasix 20 mg daily x 1 week in an attempt to improve symptoms. BNP today. BMP in 1 week ( ok to have home care draw).  Monitor for lightheadedness, dizziness, or hypotension.      3. CAD -remote history of four-vessel CABG in 2011 with a LIMA to the LAD, SVG to the ramus, SVG to OM, and SVG to PDA.  Last coronary angiogram (1/2020) showed  of the  SVG to ramus, all other bypass grafts were patent.  Continue on aspirin, metoprolol, losartan, and statin.     4.  Paroxysmal atrial fibrillation/permanent pacemaker -  Last device interrogation showed 92% atrial pacing and 7% ventricular pacing, 15 mode switches compromising of less than 1% of the time with one EGM showing 2 minutes and 48 seconds of paroxysmal atrial fibrillation with controlled rates.  On chronic warfarin.  No ventricular arrhythmias were noted.   A significant amount of time was spent reviewing the benefits vs risks of anticoagulation in the setting of recurrent falls. We will continue with anticoagulation for now. Should symptoms worsen or she have recurrent falls, will need to consider discontinuing Jantoven.     5.  Hyperlipidemia -on high-dose statin  6. Dizziness/ lightheadedness - Recommended for Neurology evaluation per PCP.       PAST MEDICAL HISTORY:  Past Medical History:   Diagnosis Date     Walters's esophagus 7/09     Basal cell carcinoma      Bradycardia     post-op CABG 2011 - s/p pacemaker implanted 2011     CHRONIC VERTIGO 9/99     Colonic Adenoma 3/90, 11/98     Coronary artery disease     CABG x4 2011: LIMA to her LAD, saphenous vein graft to her ramus, saphenous vein graft to her OM, saphenous vein graft to her PDA.     Costochondritis      Depression      DIABETES MELLITUS TYPE II 10/07     DJD      DVT of Leg, postop 11/09    6 months of warfarin     Gastritis 10/89     GERD      HIATAL HERNIA      HYPERLIPIDEMIA      HYPOTHYROIDISM (aka HASHIMOTO)      Impaired fasting glucose      L Ankle Fracture 10/09     Obesity, unspecified      Osteoporosis, unspecified      PERIPHERAL NEUROPATHY      Polymyalgia rheumatica (H)      Post Herpetic Neuralgia 4/03    R lumbar distribution     Restless leg syndrome      Small vessel cerebrovascular changes 9/99     Stricture and stenosis of esophagus 10/89    Dilated     Syncope     1/06, 8/08, 2/10     VITAMIN D DEFICIENCY 12/07    per  Dr. Petty       CURRENT MEDICATIONS:  Current Outpatient Medications   Medication Sig Dispense Refill     acetaminophen (TYLENOL) 500 MG tablet Take 1,000 mg by mouth 3 times daily       aspirin EC 81 MG EC tablet Take 1 tablet by mouth daily.       atorvastatin (LIPITOR) 80 MG tablet Take 1 tablet (80 mg) by mouth daily 90 tablet 3     calcium 600 MG tablet Take 1 tablet by mouth 2 times daily.       Cholecalciferol (VITAMIN D-400 PO) Take 1 tablet by mouth daily        clindamycin (CLEOCIN) 300 MG capsule 600 mg one hr prior to any dental appointment 2 capsule 3     furosemide (LASIX) 20 MG tablet Take 1 tablet (20 mg) by mouth daily 15 tablet 0     gabapentin (NEURONTIN) 100 MG capsule Take 2 capsules (200 mg) by mouth At Bedtime 180 capsule 1     HYDROcodone-acetaminophen (NORCO) 5-325 MG tablet Take 0.5-1 tablets by mouth every 6 hours as needed for moderate to severe pain 12 tablet 0     levothyroxine (SYNTHROID/LEVOTHROID) 50 MCG tablet TAKE ONE TABLET BY MOUTH ONCE DAILY 90 tablet 3     Lidocaine (LIDOCARE) 4 % Patch Place 1 patch onto the skin every 24 hours Apply to left wrist topically at bedtime for Pain and remove per schedule To prevent lidocaine toxicity, patient should be patch free for 12 hrs daily.       losartan (COZAAR) 50 MG tablet Take 1 tablet (50 mg) by mouth every evening 90 tablet 3     metFORMIN (GLUCOPHAGE) 500 MG tablet TAKE ONE TABLET BY MOUTH DAILY WITH BREAKFAST 90 tablet 3     nitroGLYCERIN (NITROSTAT) 0.4 MG SL tablet Place 0.4 mg under the tongue every 5 minutes as needed. Up to 3 doses per episode        nystatin (MYCOSTATIN) 690279 UNIT/GM external powder Apply to breast folds topically two times a day for redness       omeprazole (PRILOSEC) 20 MG DR capsule Take 1 capsule (20 mg) by mouth daily 90 capsule 3     polyethylene glycol (MIRALAX) powder Take 17 g (1 capful) by mouth daily 1 Bottle 3     warfarin ANTICOAGULANT (JANTOVEN ANTICOAGULANT) 3 MG tablet Take one tablet daily  as directed by the INR clinic 90 tablet 1     Multiple Vitamins-Minerals (PRESERVISION AREDS 2 PO) Take 1 tablet by mouth 2 times daily          PAST SURGICAL HISTORY:  Past Surgical History:   Procedure Laterality Date     BYPASS GRAFT ARTERY CORONARY  11/2/2011    CABG x 4  Dr. PINEDA     CV ANGIOGRAM CORONARY GRAFT N/A 1/27/2020    Procedure: Angiogram Coronary Graft;  Surgeon: Jenny Luther MD;  Location:  [facility]     CV AORTOGRAM ABDOMINAL N/A 1/27/2020    Procedure: Aortogram Abdominal;  Surgeon: Jenny Luther MD;  Location:  [facility]     CV AORTOGRAM THORACIC N/A 1/27/2020    Procedure: Aortogram Thoracic;  Surgeon: Jenny Luther MD;  Location:  [facility]     CV LEFT HEART CATH N/A 1/27/2020    Procedure: Right and Left heart catheterization for TAVR workup;  Surgeon: Jenny Luther MD;  Location:  [facility]     CV TRANSCATHETER AORTIC VALVE REPLACEMENT N/A 5/5/2020    Procedure: Transcatheter Aortic Valve Replacement;  Surgeon: Jenny Luther MD;  Location:  [facility]ATH LAB     EMBOLECTOMY LOWER EXTREMITY  11/9/2011    EMBOLECTOMY LOWER EXTREMITY; left leg femoral embolectomy.; Surgeon:JOLEEN BOONE; Location: OR     HYSTERECTOMY, CERVIX STATUS UNKNOWN  1978    partial hysterectomy     SURGICAL HISTORY OF -   10/09    L ankle fracture repair    Dr. Jose Roberto Trevino     [facility] NONSPECIFIC PROCEDURE  age 14    appendectomy     [facility] NONSPECIFIC PROCEDURE  as a child    T&A       ALLERGIES     Allergies   Allergen Reactions     Fosamax [Alendronic Acid] GI Disturbance     Severe gastrointestinal reaction     Amoxicillin Hives     Bisphosphonates Other (See Comments)     Swallowing disorder     Boniva [Ibandronate Sodium] Nausea and Vomiting, Diarrhea and Swelling     Arm swelling with IV only     Lisinopril Cough     Niacin Rash     Simvastatin Muscle Pain (Myalgia)       FAMILY  HISTORY:  Family History   Problem Relation Age of Onset     Alzheimer Disease Sister      Heart Disease Mother      Heart Disease Father      Heart Disease Son      Heart Disease Brother        SOCIAL HISTORY:  Social History     Socioeconomic History     Marital status:      Spouse name: Not on file     Number of children: Not on file     Years of education: Not on file     Highest education level: Not on file   Occupational History     Not on file   Social Needs     Financial resource strain: Not on file     Food insecurity     Worry: Not on file     Inability: Not on file     Transportation needs     Medical: Not on file     Non-medical: Not on file   Tobacco Use     Smoking status: Never Smoker     Smokeless tobacco: Never Used   Substance and Sexual Activity     Alcohol use: No     Drug use: No     Sexual activity: Never   Lifestyle     Physical activity     Days per week: Not on file     Minutes per session: Not on file     Stress: Not on file   Relationships     Social connections     Talks on phone: Not on file     Gets together: Not on file     Attends Alevism service: Not on file     Active member of club or organization: Not on file     Attends meetings of clubs or organizations: Not on file     Relationship status: Not on file     Intimate partner violence     Fear of current or ex partner: Not on file     Emotionally abused: Not on file     Physically abused: Not on file     Forced sexual activity: Not on file   Other Topics Concern      Service Not Asked     Blood Transfusions Not Asked     Caffeine Concern No     Occupational Exposure Not Asked     Hobby Hazards Not Asked     Sleep Concern Not Asked     Stress Concern Not Asked     Weight Concern Not Asked     Special Diet No     Back Care Not Asked     Exercise No     Bike Helmet Not Asked     Seat Belt Not Asked     Self-Exams Not Asked     Parent/sibling w/ CABG, MI or angioplasty before 65F 55M? No   Social History Narrative      "Not on file         Review of Systems:  Skin:  Negative     Eyes:  Positive for glasses  ENT:  Positive for hearing loss  Respiratory:  Positive for dyspnea on exertion;shortness of breath;dyspnea at rest;cough;clear expectorant  Cardiovascular:    Positive for;lightheadedness;dizziness;palpitations;chest pain;edema  Gastroenterology: Negative heartburn;reflux  Genitourinary:  not assessed    Musculoskeletal:  Positive for arthritis;back pain  Neurologic:  Positive for numbness or tingling of hands  Psychiatric:  Negative sleep disturbances  Heme/Lymph/Imm:  Negative allergies;night sweats;weight loss  Endocrine:  Positive for thyroid disorder;diabetes    EXAM:  /58   Pulse 72   Ht 1.626 m (5' 4.02\")   Wt 71.6 kg (157 lb 14.4 oz)   SpO2 95%   BMI 27.09 kg/m    Physical Exam:  Vitals: /58   Pulse 72   Ht 1.626 m (5' 4.02\")   Wt 71.6 kg (157 lb 14.4 oz)   SpO2 95%   BMI 27.09 kg/m      Constitutional:  cooperative, alert and oriented, well developed, well nourished, in no acute distress        Skin:  warm and dry to the touch, no apparent skin lesions or masses noted        Head:  normocephalic        Eyes:  pupils equal and round        ENT:  no pallor or cyanosis, dentition good   [place]    Neck:  carotid pulses are full and equal bilaterally, JVP normal, no carotid bruit        Chest:  normal breath sounds, clear to auscultation, normal A-P diameter, normal symmetry, normal respiratory excursion, no use of accessory muscles        Cardiac: regular rhythm       systolic murmur          Abdomen:  not assessed this visit        Vascular: pulses full and equal                                      Extremities and Back:  no deformities, clubbing, cyanosis, erythema observed        Neurological:      Walks with walker    Labs:  LIPID RESULTS:  Lab Results   Component Value Date    CHOL 206 (H) 02/24/2021    HDL 59 02/24/2021    LDL 90 02/24/2021    TRIG 286 (H) 02/24/2021    CHOLHDLRATIO 2.8 04/20/2015 "       LIVER ENZYME RESULTS:  Lab Results   Component Value Date    AST 19 05/05/2020    ALT 25 02/24/2021       CBC RESULTS:  Lab Results   Component Value Date    WBC 7.8 04/26/2021    RBC 4.69 04/26/2021    HGB 10.3 (L) 04/26/2021    HCT 33.4 (L) 04/26/2021    MCV 71 (L) 04/26/2021    MCH 22.0 (L) 04/26/2021    MCHC 30.8 (L) 04/26/2021    RDW 17.8 (H) 04/26/2021     04/26/2021       BMP RESULTS:  Lab Results   Component Value Date     04/26/2021    POTASSIUM 4.6 04/26/2021    CHLORIDE 105 04/26/2021    CO2 26 04/26/2021    ANIONGAP 5 04/26/2021     (H) 04/26/2021    BUN 22 04/26/2021    CR 0.93 04/26/2021    GFRESTIMATED 53 (L) 04/26/2021    GFRESTBLACK 62 04/26/2021    SHAWNEE 8.8 04/26/2021        A1C RESULTS:  Lab Results   Component Value Date    A1C 6.8 (H) 02/24/2021       INR RESULTS:  Lab Results   Component Value Date    INR 1.94 (H) 04/26/2021    INR 1.8 (A) 04/15/2021         [facility]  Q1A 3  Q1B 2  Q1C 6  Q2 5  Q3 2  Q4 2  Q5 5  Q6 3  Q7 2  Q8A 2  Q8B 2  Q8C 4    Lori Kraft, APRN, CNP  Pager [phone]  4/29/2021

## 2021-04-29 NOTE — LETTER
"4/29/2021    Bee Orr MD  [address] [facility] [address]    RE: Madie Silva       Dear Colleague,    I had the pleasure of seeing Madie Silva in the [facility].    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  She returns to the office today for a 1 year post TAVR follow-up    She continues with chronic shortness of breath, with and without exertion, unchanged since TAVR. She also reports positional lightheadedness with  \"room spinning\". She recently seen her PCP who recommended a neurology evaluation and physical therapy. She denies chest pain, palpitations, PND, orthopnea, presyncope or LE edema. Upon exam, lungs are clear bilaterally, heart rate and rhythm regular, no neck vein distension, abdominal distension or LE edema. Weight is stable at 157 lbs.  NYHA CLASS: Class 3     In August, 2020, she did sustain a mechanical fall while working in her yard, resulting in a left humerus fracture. Per EMR, she spent ~ 6 weeks in a skilled nursing facility and subsequently discharged back to independent living. She tells me she has had multiple falls over the last few months with the last one resulting in her hitting her head. She did not seek ER evaluation.     1 year post TAVR echocardiogram demonstrates a normal ejection fraction estimated at 60 to 65%, mild LVH, increased LV filling pressures, a well-seated bioprosthetic aortic valve with trace aortic regurgitation, appearing perivalvular.  The " mean AOV pressure gradient is 13 mmHg, calculated aortic valve area is 1.3 cm .  There was noted to be a small, bright mobile density that appears to be on the aortic valve or part of the leaflet.  This was reviewed with Dr. Molina (echo reader) who felt that this was likely not a vegetation and a benign finding. No further work-up was recommended.     EKG today shows a paced rhythm.   Last device interrogation showed 92% atrial pacing and 7% ventricular pacing, 15 mode switches compromising of less than 1% of the time with one EGM showing 2 minutes and 48 seconds of paroxysmal atrial fibrillation with controlled rates. No ventricular arrhythmias were noted. She is on chronic Jantoven. In review of her INR's, she has not been therapeutic since February.     Blood pressure is well controlled at 120/58  Recent labs showed a sodium of 136, potassium 4.6, BUN 22, creatinine 0.93, GFR 53  Hemoglobin 10.3, WBC 7.8, platelet 257.         Assessment and Plan   1.  Severe aortic stenosis -status post TAVR using a 20 mm Khan nati 3 valve (5/5/2020).   1 year post TAVR echocardiogram demonstrates a normal ejection fraction estimated at 60 to 65%, mild LVH, increased LV filling pressures, a well-seated bioprosthetic aortic valve with trace aortic regurgitation, appearing perivalvular.  The mean AOV pressure gradient is 13 mmHg, calculated aortic valve area is 1.3 cm .  There was noted to be a small, bright mobile density that appears to be on the aortic valve or part of the leaflet.  This was reviewed with Dr. Molina (echo reader) who felt that this was likely not a vegetation and a benign finding. No further work-up was recommended. Follow up echo in 1 year.     2. Shortness of breath -  With and without exertion. Euvolemic upon exam. Echocardiogram showed increased LV filling pressures. Will add low dose lasix 20 mg daily x 1 week in an attempt to improve symptoms. BNP today. BMP in 1 week ( ok to have home care draw).   Monitor for lightheadedness, dizziness, or hypotension.      3. CAD -remote history of four-vessel CABG in 2011 with a LIMA to the LAD, SVG to the ramus, SVG to OM, and SVG to PDA.  Last coronary angiogram (1/2020) showed  of the SVG to ramus, all other bypass grafts were patent.  Continue on aspirin, metoprolol, losartan, and statin.     4.  Paroxysmal atrial fibrillation/permanent pacemaker -  Last device interrogation showed 92% atrial pacing and 7% ventricular pacing, 15 mode switches compromising of less than 1% of the time with one EGM showing 2 minutes and 48 seconds of paroxysmal atrial fibrillation with controlled rates.  On chronic warfarin.  No ventricular arrhythmias were noted.   A significant amount of time was spent reviewing the benefits vs risks of anticoagulation in the setting of recurrent falls. We will continue with anticoagulation for now. Should symptoms worsen or she have recurrent falls, will need to consider discontinuing Jantoven.     5.  Hyperlipidemia -on high-dose statin  6. Dizziness/ lightheadedness - Recommended for Neurology evaluation per PCP.       PAST MEDICAL HISTORY:  Past Medical History:   Diagnosis Date     Walters's esophagus 7/09     Basal cell carcinoma      Bradycardia     post-op CABG 2011 - s/p pacemaker implanted 2011     CHRONIC VERTIGO 9/99     Colonic Adenoma 3/90, 11/98     Coronary artery disease     CABG x4 2011: LIMA to her LAD, saphenous vein graft to her ramus, saphenous vein graft to her OM, saphenous vein graft to her PDA.     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 Petty       CURRENT MEDICATIONS:  Current Outpatient Medications   Medication Sig Dispense Refill     acetaminophen (TYLENOL) 500 MG tablet Take 1,000 mg by mouth 3 times daily       aspirin EC 81 MG EC tablet Take 1 tablet by mouth daily.       atorvastatin (LIPITOR) 80 MG tablet Take 1 tablet (80 mg) by mouth daily 90 tablet 3     calcium 600 MG tablet Take 1 tablet by mouth 2 times daily.       Cholecalciferol (VITAMIN D-400 PO) Take 1 tablet by mouth daily        clindamycin (CLEOCIN) 300 MG capsule 600 mg one hr prior to any dental appointment 2 capsule 3     furosemide (LASIX) 20 MG tablet Take 1 tablet (20 mg) by mouth daily 15 tablet 0     gabapentin (NEURONTIN) 100 MG capsule Take 2 capsules (200 mg) by mouth At Bedtime 180 capsule 1     HYDROcodone-acetaminophen (NORCO) 5-325 MG tablet Take 0.5-1 tablets by mouth every 6 hours as needed for moderate to severe pain 12 tablet 0     levothyroxine (SYNTHROID/LEVOTHROID) 50 MCG tablet TAKE ONE TABLET BY MOUTH ONCE DAILY 90 tablet 3     Lidocaine (LIDOCARE) 4 % Patch Place 1 patch onto the skin every 24 hours Apply to left wrist topically at bedtime for Pain and remove per schedule To prevent lidocaine toxicity, patient should be patch free for 12 hrs daily.       losartan (COZAAR) 50 MG tablet Take 1 tablet (50 mg) by mouth every evening 90 tablet 3     metFORMIN (GLUCOPHAGE) 500 MG tablet TAKE ONE TABLET BY MOUTH DAILY WITH BREAKFAST 90 tablet 3     nitroGLYCERIN (NITROSTAT) 0.4 MG SL tablet Place 0.4 mg under the tongue every 5 minutes as needed. Up to 3 doses per episode        nystatin (MYCOSTATIN) 172427 UNIT/GM external powder Apply to breast folds topically two times a day for redness       omeprazole (PRILOSEC)  20 MG DR capsule Take 1 capsule (20 mg) by mouth daily 90 capsule 3     polyethylene glycol (MIRALAX) powder Take 17 g (1 capful) by mouth daily 1 Bottle 3     warfarin ANTICOAGULANT (JANTOVEN ANTICOAGULANT) 3 MG tablet Take one tablet daily as directed by the INR clinic 90 tablet 1     Multiple Vitamins-Minerals (PRESERVISION AREDS 2 PO) Take 1 tablet by mouth 2 times daily          PAST SURGICAL HISTORY:  Past Surgical History:   Procedure Laterality Date     BYPASS GRAFT ARTERY CORONARY  11/2/2011    CABG x 4  Dr. PINEDA     CV ANGIOGRAM CORONARY GRAFT N/A 1/27/2020    Procedure: Angiogram Coronary Graft;  Surgeon: Jenny Luther MD;  Location:  HEART CARDIAC CATH LAB     CV AORTOGRAM ABDOMINAL N/A 1/27/2020    Procedure: Aortogram Abdominal;  Surgeon: Jenny Luther MD;  Location:  HEART CARDI[facility] CATH LAB     CV AORTOGRAM THORACIC N/A 1/27/2020    Procedure: Aortogram Thoracic;  Surgeon: Jenny Luther MD;  Location:  HEART CARDIAC CATH LAB     CV LEFT HEART CATH N/A 1/27/2020    Procedure: Right and Left heart catheterization for TAVR workup;  Surgeon: Jenny Luther MD;  Location:  HEART CARDIAC CATH LAB     CV TRANSCATHETER AORTIC VALVE REPLACEMENT N/A 5/5/2020    Procedure: Transcatheter Aortic Valve Replacement;  Surgeon: Jenny Luther MD;  Location:  HEART CARDIAC CATH LAB     EMBOLECTOMY LOWER EXTREMITY  11/9/2011    EMBOLECTOMY LOWER EXTREMITY; left leg femoral embolectomy.; Surgeon:JOLEEN BOONE; Location: OR     HYSTERECTOMY, CERVIX STATUS UNKNOWN  1978    partial hysterectomy     SURGICAL HISTORY OF -   10/09    L ankle fracture repair    Dr. Jose Roberto Trevino     [facility] NONSPECIFIC PROCEDURE  age 14    appendectomy     [facility] NONSPECIFIC PROCEDURE  as a child    T&A       ALLERGIES     Allergies   Allergen Reactions     Fosamax [Alendronic Acid] GI Disturbance     Severe gastrointestinal reaction     Amoxicillin Hives     Bisphosphonates Other  (See Comments)     Swallowing disorder     Boniva [Ibandronate Sodium] Nausea and Vomiting, Diarrhea and Swelling     Arm swelling with IV only     Lisinopril Cough     Niacin Rash     Simvastatin Muscle Pain (Myalgia)       FAMILY HISTORY:  Family History   Problem Relation Age of Onset     Alzheimer Disease Sister      Heart Disease Mother      Heart Disease Father      Heart Disease Son      Heart Disease Brother        SOCIAL HISTORY:  Social History     Socioeconomic History     Marital status:      Spouse name: Not on file     Number of children: Not on file     Years of education: Not on file     Highest education level: Not on file   Occupational History     Not on file   Social Needs     Financial resource strain: Not on file     Food insecurity     Worry: Not on file     Inability: Not on file     Transportation needs     Medical: Not on file     Non-medical: Not on file   Tobacco Use     Smoking status: Never Smoker     Smokeless tobacco: Never Used   Substance and Sexual Activity     Alcohol use: No     Drug use: No     Sexual activity: Never   Lifestyle     Physical activity     Days per week: Not on file     Minutes per session: Not on file     Stress: Not on file   Relationships     Social connections     Talks on phone: Not on file     Gets together: Not on file     Attends [employer] witness service: Not on file     Active member of club or organization: Not on file     Attends meetings of clubs or organizations: Not on file     Relationship status: Not on file     Intimate partner violence     Fear of current or ex partner: Not on file     Emotionally abused: Not on file     Physically abused: Not on file     Forced sexual activity: Not on file   Other Topics Concern      Service Not Asked     Blood Transfusions Not Asked     Caffeine Concern No     Occupational Exposure Not Asked     Hobby Hazards Not Asked     Sleep Concern Not Asked     Stress Concern Not Asked     Weight Concern Not Asked  "    Special Diet No     Back Care Not Asked     Exercise No     Bike Helmet Not Asked     Seat Belt Not Asked     Self-Exams Not Asked     Parent/sibling w/ CABG, MI or angioplasty before 65F 55M? No   Social History Narrative     Not on file         Review of Systems:  Skin:  Negative     Eyes:  Positive for glasses  ENT:  Positive for hearing loss  Respiratory:  Positive for dyspnea on exertion;shortness of breath;dyspnea at rest;cough;clear expectorant  Cardiovascular:    Positive for;lightheadedness;dizziness;palpitations;chest pain;edema  Gastroenterology: Negative heartburn;reflux  Genitourinary:  not assessed    Musculoskeletal:  Positive for arthritis;back pain  Neurologic:  Positive for numbness or tingling of hands  Psychiatric:  Negative sleep disturbances  Heme/Lymph/Imm:  Negative allergies;night sweats;weight loss  Endocrine:  Positive for thyroid disorder;diabetes    EXAM:  /58   Pulse 72   Ht 1.626 m (5' 4.02\")   Wt 71.6 kg (157 lb 14.4 oz)   SpO2 95%   BMI 27.09 kg/m    Physical Exam:  Vitals: /58   Pulse 72   Ht 1.626 m (5' 4.02\")   Wt 71.6 kg (157 lb 14.4 oz)   SpO2 95%   BMI 27.09 kg/m      Constitutional:  cooperative, alert and oriented, well developed, well nourished, in no acute distress        Skin:  warm and dry to the touch, no apparent skin lesions or masses noted        Head:  normocephalic        Eyes:  pupils equal and round        ENT:  no pallor or cyanosis, dentition good   [place]    Neck:  carotid pulses are full and equal bilaterally, JVP normal, no carotid bruit        Chest:  normal breath sounds, clear to auscultation, normal A-P diameter, normal symmetry, normal respiratory excursion, no use of accessory muscles        Cardiac: regular rhythm       systolic murmur          Abdomen:  not assessed this visit        Vascular: pulses full and equal                                      Extremities and Back:  no deformities, clubbing, cyanosis, erythema observed   "      Neurological:      Walks with walker    Labs:  LIPID RESULTS:  Lab Results   Component Value Date    CHOL 206 (H) 02/24/2021    HDL 59 02/24/2021    LDL 90 02/24/2021    TRIG 286 (H) 02/24/2021    CHOLHDLRATIO 2.8 04/20/2015       LIVER ENZYME RESULTS:  Lab Results   Component Value Date    AST 19 05/05/2020    ALT 25 02/24/2021       CBC RESULTS:  Lab Results   Component Value Date    WBC 7.8 04/26/2021    RBC 4.69 04/26/2021    HGB 10.3 (L) 04/26/2021    HCT 33.4 (L) 04/26/2021    MCV 71 (L) 04/26/2021    MCH 22.0 (L) 04/26/2021    MCHC 30.8 (L) 04/26/2021    RDW 17.8 (H) 04/26/2021     04/26/2021       BMP RESULTS:  Lab Results   Component Value Date     04/26/2021    POTASSIUM 4.6 04/26/2021    CHLORIDE 105 04/26/2021    CO2 26 04/26/2021    ANIONGAP 5 04/26/2021     (H) 04/26/2021    BUN 22 04/26/2021    CR 0.93 04/26/2021    GFRESTIMATED 53 (L) 04/26/2021    GFRESTBLACK 62 04/26/2021    SHAWNEE 8.8 04/26/2021        A1C RESULTS:  Lab Results   Component Value Date    A1C 6.8 (H) 02/24/2021       INR RESULTS:  Lab Results   Component Value Date    INR 1.94 (H) 04/26/2021    INR 1.8 (A) 04/15/2021         KCCQ  Q1A 3  Q1B 2  Q1C 6  Q2 5  Q3 2  Q4 2  Q5 5  Q6 3  Q7 2  Q8A 2  Q8B 2  Q8C 4    Lori Kraft APRN, CNP  Pager [phone]  4/29/2021     cc:   No referring provider defined for this encounter.

## 2021-05-09 PROBLEM — S12.501A CLOSED NONDISPLACED FRACTURE OF SIXTH CERVICAL VERTEBRA, UNSPECIFIED FRACTURE MORPHOLOGY, INITIAL ENCOUNTER (H): Status: ACTIVE | Noted: 2021-01-01
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
## 2021-05-09 PROBLEM — R55 NEAR SYNCOPE: Status: ACTIVE | Noted: 2021-01-01

## 2021-05-09 PROBLEM — S00.81XA ABRASION OF FOREHEAD, INITIAL ENCOUNTER: Status: ACTIVE | Noted: 2021-01-01

## 2021-05-09 PROBLEM — S00.83XA CONTUSION OF FOREHEAD, INITIAL ENCOUNTER: Status: ACTIVE | Noted: 2021-01-01

## 2021-05-09 PROBLEM — S09.90XA CLOSED HEAD INJURY, INITIAL ENCOUNTER: Status: ACTIVE | Noted: 2021-01-01

## 2021-05-09 NOTE — PHARMACY-ADMISSION MEDICATION HISTORY
Pharmacy Medication History  Admission medication history interview status for the 5/9/2021  admission is complete. See EPIC admission navigator for prior to admission medications     Location of Interview: Phone  Medication history sources: Patient, Surescripts and Care Everywhere    Significant changes made to the medication list:  Changed Tylenol (1,000mg TID to 650mg QHS)  Changed Jantoven - added current SIG, specified that it is *Brand name toven*    In the past week, patient estimated taking medication this percent of the time: greater than 90%    Additional medication history information:   Patient's current warfarin regimen was prescribed 4/26 with an INR recheck date of 5/11  Furosemide was prescribed as a 15 day supply and patient states she thinks she is supposed to take it through next Friday.     Medication reconciliation completed by provider prior to medication history? No    Time spent in this activity: 30 minutes    Prior to Admission medications    Medication Sig Last Dose Taking? Auth Provider   Acetaminophen (TYLENOL ARTHRITIS PAIN PO) Take 650 mg by mouth At BedtimeC 5/8/2021 at HS Yes Unknown, Entered By History   aspirin EC 81 MG EC tablet Take 1 tablet by mouth daily. 5/8/2021 at AM Yes Hunter Mantilla MD   atorvastatin (LIPITOR) 80 MG tablet Take 1 tablet (80 mg) by mouth daily 5/8/2021 at PM Yes De Obregon MD   calcium 600 MG tablet Take 1 tablet by mouth 2 times daily. 5/8/2021 at PM Yes De Obregon MD   Cholecalciferol (VITAMIN D-400 PO) Take 1 tablet by mouth daily  5/8/2021 at AM Yes Reported, Patient   clindamycin (CLEOCIN) 300 MG capsule 600 mg one hr prior to any dental appointment  at PRN Yes Lori Kraft APRN CNP   furosemide (LASIX) 20 MG tablet Take 1 tablet (20 mg) by mouth daily 5/8/2021 at AM Yes Lori Kraft APRN CNP   gabapentin (NEURONTIN) 100 MG capsule Take 2 capsules (200 mg) by mouth At Bedtime 5/8/2021 at HS Yes De Obregon  MD   HYDROcodone-acetaminophen (NORCO) 5-325 MG tablet Take 0.5-1 tablets by mouth every 6 hours as needed for moderate to severe pain  at PRN Yes Bee Orr MD   JANTOVEN ANTICOAGULANT 3 MG tablet Take 1 tablet (3mg) by mouth every day. Take an additional half tablet on Mondays and Thursdays (for a total dose of 4.5mg on M/Th). 5/8/2021 at PM Yes Unknown, Entered By History   levothyroxine (SYNTHROID/LEVOTHROID) 50 MCG tablet TAKE ONE TABLET BY MOUTH ONCE DAILY 5/8/2021 at AM Yes De Obregon MD   Lidocaine (LIDOCARE) 4 % Patch Place 1 patch onto the skin daily as needed Apply to left wrist topically at bedtime for Pain and remove per schedule. To prevent lidocaine toxicity, patient should be patch free for 12 hrs daily.  at PRN Yes Reported, Patient   losartan (COZAAR) 50 MG tablet Take 1 tablet (50 mg) by mouth every evening 5/8/2021 at PM Yes Lori Kraft APRN CNP   metFORMIN (GLUCOPHAGE) 500 MG tablet TAKE ONE TABLET BY MOUTH DAILY WITH BREAKFAST 5/8/2021 at AM Yes Mervin Vergara MD   Multiple Vitamins-Minerals (PRESERVISION AREDS 2 PO) Take 1 tablet by mouth 2 times daily  5/8/2021 at PM Yes Reported, Patient   nitroGLYCERIN (NITROSTAT) 0.4 MG SL tablet Place 0.4 mg under the tongue every 5 minutes as needed. Up to 3 doses per episode   at PRN Yes Unknown, Entered By History   nystatin (MYCOSTATIN) 786554 UNIT/GM external powder Apply to breast folds topically two times a day as needed for redness  at PRN Yes Reported, Patient   omeprazole (PRILOSEC) 20 MG DR capsule Take 1 capsule (20 mg) by mouth daily 5/8/2021 at AM Yes Bee Orr MD   polyethylene glycol (MIRALAX) powder Take 17 g (1 capful) by mouth daily 5/8/2021 at AM Yes Anna Burger PA-C       The information provided in this note is only as accurate as the sources available at the time of update(s)

## 2021-05-09 NOTE — PLAN OF CARE
Pt A&Ox4, VSS on ex HTN PRN labetalol  Now avail. Up w/ assist 1 gb/walker. ZHAO.  Abrasions on L side of face and medial nose. C/o face and neck pain managed with scheduled tylenol and PRN oxy 2.5mg. Tele: A paced. Purewick in- good output. Neurosurgery signed off- will follow up outpt in 1 month; pt to wear C collar until then. cervical collar remains in place.

## 2021-05-09 NOTE — PROGRESS NOTES
RECEIVING UNIT ED HANDOFF REVIEW    ED Nurse Handoff Report was reviewed by: Wilbur Pcikens RN on May 9, 2021 at 5:50 AM

## 2021-05-09 NOTE — ED NOTES
Bed: ED19  Expected date:   Expected time:   Means of arrival:   Comments:  533 89f fall head injury neck pain eta 5

## 2021-05-09 NOTE — ED PROVIDER NOTES
History   Chief Complaint:  Fall and Head Injury      HPI   Madie Silva is a 89 year old female with a history of CAD, atrial fibrillation currently on Coumadin, and a pacemaker who presents via EMS for evaluation of a fall and head injury. Tonight shortly prior to arrival the patient was in her home walking back to bed after using the bathroom when she felt dizzy and fell to the carpeted ground striking her head on the ground sustaining some abrasions to the left side of her forehead. She did not lose consciousness in the fall and was able to crawl to the phone to call 911. Since the fall she has developed a headache and severe neck pain. She was placed in a cervical collar prior to arrival.     Review of Systems   Musculoskeletal: Positive for neck pain.   Neurological: Positive for dizziness and headaches. Negative for syncope.   All other systems reviewed and are negative.    Allergies:  Fosamax   Amoxicillin  Bisphosphonates  Boniva   Lisinopril  Niacin  Simvastatin    Medications:  Tylenol   Aspirin 81 mg   Lipitor   Clindamycin   Lasix   Gabapentin  Norco  Levothyroxine   Losartan   Metformin   Nitroglycerin   Omeprazole   Miralax   Jantoven anticoagulant      Past Medical History:    Walters's esophagus   Basal cell carcinoma   Chronic vertigo   Bradycardia   CAD   Costochondritis   Depression  Diabetes mellitus, type II   DJD   DVT   GERD   Hiatal hernia  Hyperlipidemia  Hypothyroidism   Osteoporosis   Polymyalgia rheumatica  Post-herpetic neuralgia  Restless leg syndrome   Small vessel cerebrovascular changes   Stricture and stenosis of esophagus      Past Surgical History:    CABG   CV angiogram coronary graft   CV aortogram abdominal   CV aortogram thoracic   CV left heart cath   CV transcatheter aortic valve replacement   Embolectomy lower extremity left   Hysterectomy   Left ankle fracture repair  Appendectomy  Tonsillectomy and adenoidectomy      Family History:    Heart disease - Mother, father,  "brother, and son   Alzheimer disease - Sister     Social History:  The patient presents to the ED via EMS.   The patient lives alone.     Physical Exam     Patient Vitals for the past 24 hrs:   BP Temp Pulse Resp SpO2 Height Weight   05/09/21 0413 -- -- -- -- 91 % -- --   05/09/21 0400 (!) 170/70 -- 80 -- -- -- --   05/09/21 0345 (!) 169/59 -- 84 -- -- -- --   05/09/21 0330 (!) 172/69 -- 82 -- -- -- --   05/09/21 0315 (!) 175/62 -- 75 -- 95 % -- --   05/09/21 0310 -- -- 81 8 94 % -- --   05/09/21 0300 (!) 177/74 -- 74 10 95 % -- --   05/09/21 0255 -- -- -- -- 98 % -- --   05/09/21 0250 (!) 187/71 -- 79 -- 100 % -- --   05/09/21 0220 -- -- 80 20 96 % -- --   05/09/21 0215 (!) 190/74 -- 77 23 96 % -- --   05/09/21 0214 (!) 180/65 -- -- 16 98 % -- --   05/09/21 0213 -- 97.5  F (36.4  C) -- -- -- 1.651 m (5' 5\") 72.6 kg (160 lb)   05/09/21 0210 (!) 180/65 -- 73 -- 96 % -- --       Physical Exam  Physical Exam   Nursing note and vitals reviewed.  General: Oriented to person, place, and time. Appears well-developed and well-nourished.   Head: Abrasion over the left forehead with underlying swelling.   Mouth/Throat: Oropharynx is clear and moist.   Eyes: Conjunctivae are normal. Pupils are equal, round, and reactive to light.   Neck: Remained in C collar. Complaints of posterior neck pain.    Cardiovascular: Normal rate and irregular rhythm.    Respiratory: Effort normal and breath sounds normal. No respiratory distress.   Abdominal: Soft. There is no tenderness. There is no guarding.   Musculoskeletal: Normal range of motion. no edema.   Neurological: The patient is alert and oriented to person, place, and time.  PERRLA, EOMI, visual fields intact, strength in upper/lower extremities normal and symmetrical.   Sensation normal. Speech normal  GCS eye subscore is 4. GCS verbal subscore is 5. GCS motor subscore is 6.   Skin: Skin is warm and dry. No rash noted.   Psychiatric: normal mood and affect. behavior is normal. "     Emergency Department Course   ECG  ECG taken at 02:14:25, ECG read at 0215  Suspect unspecified pacemaker failure, Atrial fibrillation, Nonspecific ST and T wave abnormality, Abnormal ECG   Rate 76 bpm. ID interval * ms. QRS duration 74 ms. QT/QTc 386/434 ms. P-R-T axes * 48 78.     Imaging:  CT Cervical Spine w/o Contrast:  IMPRESSION:   1.  Right C6 articular pillar new subtle lucency concerning for acute fracture.   2.  No additional acute fractures.   3.  Diffuse bony demineralization.   4.  Multilevel spondylosis.     DR JUAN LUIS RODRIGUEZ was notified by Dr Pete Chavez at  3:58 AM 5/9/2021.  Per radiology.      CT Head w/o Contrast:  IMPRESSION:   1.  No acute intracranial process.   2.  Chronic intracranial changes described above.  Per radiology.      XR Chest:  IMPRESSION: Heart size within normal limits, status post sternotomy, CABG, and TAVR. Left-sided dual-chamber cardiac pacer. Low lung volumes with bilateral perihilar infiltrates. No visible pneumothorax or pleural effusion.  Per radiology.      Laboratory:   CBC: WBC 8.7, HGB 9.6 low,    CMP: Glucose 146 high, GFR estimate 49 low, o/w WNL (Creatinine 0.99)   INR: 2.42 high   Partial thromboplastin time: 36   Alcohol ethyl: <0.01   Asymptomatic COVID19 Virus PCR by nasopharyngeal swab: Negative        Emergency Department Course:  Reviewed:  I reviewed nursing notes, vitals and past medical history    Assessments:  0211: I obtained history and examined the patient as noted above.     0213: Partial trauma. The patient's pacemaker was interrogated.     0503: I updated and reassessed the patient.      Consults:   0305: I spoke to [employer] regarding the patient's pacemaker interrogation.     0358: I spoke with Dr. Chavez of the radiology service regarding patient's presentation, findings, and plan of care.      0457: I spoke with Yessi Morrison PA-C of the neurosurgery service regarding patient's presentation, findings, and plan of care.     0509: I  spoke with Dr. Cosby of the hospitalist service regarding patient's presentation, findings, and plan of care.     Interventions:  0251 NS 1,000 mL IV     Disposition:  The patient was admitted to the hospital under the care of Dr. Cosby.     Impression & Plan     Medical Decision Making:  Madie Silva is a 89 year old female who presents for evaluation after a fall from standing.  The etiology of her fall does not sound like true syncope.  The ekg is questionable for pace maker failure.  Remote evaluation of the pacer was done and was equivocal.  A formal evaluation will be required upon admission to the floor.  There is trauma noted on exam, see above physical exam section.  I was concerned based on the history and exam of traumatic injuries including ICH or c spine fracture.      The workup here is positive for a c spine fracture. She is neuro intact  There are no signs of serious chest, abdominal, extremity, or pelvic trauma to warrant further workup, , or transfer to a trauma center.    Neuro surgical consultation was made.  Aspen c collar was placed.    Critical Care Time: was 30 minutes for this patient excluding procedures    Covid-19  Madie Silva was evaluated during a global COVID-19 pandemic, which necessitated consideration that the patient might be at risk for infection with the SARS-CoV-2 virus that causes COVID-19.   Applicable protocols for evaluation were followed during the patient's care.   COVID-19 was considered as part of the patient's evaluation. The plan for testing is:  a test was obtained during this visit.     Diagnosis:    ICD-10-CM    1. Near syncope  R55    2. Fall, initial encounter  W19.XXXA    3. Closed nondisplaced fracture of sixth cervical vertebra, unspecified fracture morphology, initial encounter (H)  S12.501A    4. Closed head injury, initial encounter  S09.90XA    5. Contusion of forehead, initial encounter  S00.83XA    6. Abrasion of forehead, initial encounter  S00.81XA          Scribe Disclosure:  I, John Knowles, am serving as a scribe at 2:11 AM on 5/9/2021 to document services personally performed by Gallito Blandon MD based on my observations and the provider's statements to me.         Gallito Blandon MD  05/09/21 0803

## 2021-05-09 NOTE — CONSULTS
[facility]    Cardiology Consultation     Date of Admission:  5/9/2021    Assessment & Plan   Madie Silva is a 89 year old female who was admitted on 5/9/2021. I was asked to see the patient for possible pacemaker concerns.    The patient has an EKG that notes atrial pacing with some sinus beats that come before her lower threshold for pacemaker.  I do not clearly see why the EKG is read as pacemaker failure.    We are obtaining a pacemaker interrogation.  This has not been done yet.  This was ordered but this was not conveyed to the EKG tech to interrogate pacemaker.  I am sending EKG tech now.    There is an EKG with this date 06-MAY-2020 06:42:22 that essentially appears the same.    The patient had a fall and has had a C6 fracture unfortunately.  The patient reported feeling dizzy and this was right after she was on the toilet.  This could be a vasovagal type response after using the toilet.    Patient also has a history of coronary artery disease with a CABG in 2011, TAVR in 2020, type 2 diabetes and CKD.    #1 fall, possibly vasovagal presyncope  #2 EKG that is reporting possible pacemaker failure however to my review it appears an atrial paced rhythm and no significant change from over a year ago, pacemaker interrogation is pending which could change this interpretation  #3 history of CABG in 2011  #4 fall with C6 fracture  #5 TAVR in 2020    My review the EKG does not note obvious pacemaker failure.  This appears to be an atrially paced rhythm with some sinus beats in between.    We will get the pacemaker interrogated.    Electrophysiology will be available tomorrow.  They can review the case as well.    The patient is very hypertensive.  She has not received some of her home medications such as losartan.  I would work to control her blood pressure a little bit more aggressively.  However upon discharge we may need to be more liberal with a blood pressure goal of possibly 140 so  that she is not prone to vasovagal syncope if this is what this turns out to be.    Echocardiogram notes normal LVEF with a TAVR gradient of 16 mmHg.    Addendum:  Pacemaker has been interrogated and there is no concerns for failure.  The pacemaker noted a atrial pacing rate of around 80%.  This matches with EKG.        Andrew Sims MD     Code Status    Full Code    Reason for Consult   Reason for consult: Fall    Primary Care Physician   Bee Orr    Chief Complaint   Syncope    History is obtained from the patient    History of Present Illness   Madie Silva is a 89 year old female who was admitted on 5/9/2021. I was asked to see the patient for possible pacemaker concerns.    The patient has an EKG that notes atrial pacing with some sinus beats that come before her lower threshold for pacemaker.  I do not clearly see why the EKG is read as pacemaker failure.    We are obtaining a pacemaker interrogation.  This has not been done yet.  This was ordered but this was not conveyed to the EKG tech to interrogate pacemaker.  I am sending EKG tech now.    There is an EKG with this date 06-MAY-2020 06:42:22 that essentially appears the same.    The patient had a fall and has had a C6 fracture unfortunately.  The patient reported feeling dizzy and this was right after she was on the toilet.  This could be a vasovagal type response after using the toilet.    Patient also has a history of coronary artery disease with a CABG in 2011, TAVR in 2020, type 2 diabetes and CKD.    Echocardiogram notes a normal LVEF with no major changes from prior.    Past Medical History   I have reviewed this patient's medical history and updated it with pertinent information if needed.   Past Medical History:   Diagnosis Date     Walters's esophagus 7/09     Basal cell carcinoma      Bradycardia     post-op CABG 2011 - s/p pacemaker implanted 2011     CHRONIC VERTIGO 9/99     Colonic Adenoma 3/90, 11/98     Coronary artery  disease     CABG x4 2011: LIMA to her LAD, saphenous vein graft to her ramus, saphenous vein graft to her OM, saphenous vein graft to her PDA.     Costochondritis      Depression      DIABETES MELLITUS TYPE II 10/07     DJD      DVT of Leg, postop 11/09    6 months of warfarin     Gastritis 10/89     GERD      HIATAL HERNIA      HYPERLIPIDEMIA      HYPOTHYROIDISM (aka HASHIMOTO)      Impaired fasting glucose      L Ankle Fracture 10/09     Obesity, unspecified      Osteoporosis, unspecified      PERIPHERAL NEUROPATHY      Polymyalgia rheumatica ([facility])      Post Herpetic Neuralgia 4/03    R lumbar distribution     Restless leg syndrome      Small vessel cerebrovascular changes 9/99     Stricture and stenosis of esophagus 10/89    Dilated     Syncope     1/06, 8/08, 2/10     VITAMIN D DEFICIENCY 12/07    per Dr. Petty       Past Surgical History   I have reviewed this patient's surgical history and updated it with pertinent information if needed.  Past Surgical History:   Procedure Laterality Date     BYPASS GRAFT ARTERY CORONARY  11/2/2011    CABG x 4  Dr. PINEDA     CV ANGIOGRAM CORONARY GRAFT N/A 1/27/2020    Procedure: Angiogram Coronary Graft;  Surgeon: Jenny Luther MD;  Location: [facility] CARDIAC CATH LAB     CV AORTOGRAM ABDOMINAL N/A 1/27/2020    Procedure: Aortogram Abdominal;  Surgeon: Jenny Luther MD;  Location: [facility] CARDIAC CATH LAB     CV AORTOGRAM THORACIC N/A 1/27/2020    Procedure: Aortogram Thoracic;  Surgeon: Jenny Luther MD;  Location: [facility] CARDIAC CATH LAB     CV LEFT HEART CATH N/A 1/27/2020    Procedure: Right and Left heart catheterization for TAVR workup;  Surgeon: Jenny Luther MD;  Location: [facility] CARDIAC CATH LAB     CV TRANSCATHETER AORTIC VALVE REPLACEMENT N/A 5/5/2020    Procedure: Transcatheter Aortic Valve Replacement;  Surgeon: Jenny Luther MD;  Location: [facility] CARDIAC CATH LAB     EMBOLECTOMY LOWER EXTREMITY  11/9/2011     EMBOLECTOMY LOWER EXTREMITY; left leg femoral embolectomy.; Surgeon:JOLEEN BOONE; Location:[facility] OR     HYSTERECTOMY, CERVIX STATUS UNKNOWN      partial hysterectomy     SURGICAL HISTORY OF -   10/09    L ankle fracture repair    Dr. Jose Roberto Trevino     [facility] NONSPECIFIC PROCEDURE  age 14    appendectomy     [facility] NONSPECIFIC PROCEDURE  as a child    T&A       Prior to Admission Medications   Prior to Admission Medications   Prescriptions Last Dose Informant Patient Reported? Taking?   Acetaminophen (TYLENOL ARTHRITIS PAIN PO) 2021 at HS Self Yes Yes   Sig: Take 650 mg by mouth At Bedtime   Cholecalciferol (VITAMIN D-400 PO) 2021 at AM Self Yes Yes   Sig: Take 1 tablet by mouth daily    HYDROcodone-acetaminophen (NORCO) 5-325 MG tablet  at PRN Self Yes Yes   Sig: Take 0.5-1 tablets by mouth every 6 hours as needed for moderate to severe pain   JANTOVEN ANTICOAGULANT 3 MG tablet 2021 at PM  Yes Yes   Sig: Take 1 tablet (3mg) by mouth every day. Take an additional half tablet on  and  (for a total dose of 4.5mg on ).   Lidocaine (LIDOCARE) 4 % Patch  at PRN Self Yes Yes   Sig: Place 1 patch onto the skin daily as needed Apply to left wrist topically at bedtime for Pain and remove per schedule. To prevent lidocaine toxicity, patient should be patch free for 12 hrs daily.   Multiple Vitamins-Minerals (PRESERVISION AREDS 2 PO) 2021 at PM Self Yes Yes   Sig: Take 1 tablet by mouth 2 times daily    aspirin EC 81 MG EC tablet 2021 at AM Self No Yes   Sig: Take 1 tablet by mouth daily.   atorvastatin (LIPITOR) 80 MG tablet 2021 at PM Self No Yes   Sig: Take 1 tablet (80 mg) by mouth daily   calcium 600 MG tablet 2021 at PM Self Yes Yes   Sig: Take 1 tablet by mouth 2 times daily.   clindamycin (CLEOCIN) 300 MG capsule  at PRN Self No Yes   Si mg one hr prior to any dental appointment   furosemide (LASIX) 20 MG tablet 2021 at AM Self No Yes   Sig: Take 1  tablet (20 mg) by mouth daily   gabapentin (NEURONTIN) 100 MG capsule 5/8/2021 at HS Self No Yes   Sig: Take 2 capsules (200 mg) by mouth At Bedtime   levothyroxine (SYNTHROID/LEVOTHROID) 50 MCG tablet 5/8/2021 at AM Self No Yes   Sig: TAKE ONE TABLET BY MOUTH ONCE DAILY   losartan (COZAAR) 50 MG tablet 5/8/2021 at PM Self Yes Yes   Sig: Take 1 tablet (50 mg) by mouth every evening   metFORMIN (GLUCOPHAGE) 500 MG tablet 5/8/2021 at AM Self No Yes   Sig: TAKE ONE TABLET BY MOUTH DAILY WITH BREAKFAST   nitroGLYCERIN (NITROSTAT) 0.4 MG SL tablet  at PRN Self Yes Yes   Sig: Place 0.4 mg under the tongue every 5 minutes as needed. Up to 3 doses per episode    nystatin (MYCOSTATIN) 354094 UNIT/GM external powder  at PRN Self Yes Yes   Sig: Apply to breast folds topically two times a day as needed for redness   omeprazole (PRILOSEC) 20 MG DR capsule 5/8/2021 at AM Self No Yes   Sig: Take 1 capsule (20 mg) by mouth daily   polyethylene glycol (MIRALAX) powder 5/8/2021 at AM Self No Yes   Sig: Take 17 g (1 capful) by mouth daily      Facility-Administered Medications: None     Allergies   Allergies   Allergen Reactions     Fosamax [Alendronic Acid] GI Disturbance     Severe gastrointestinal reaction     Amoxicillin Hives     Bisphosphonates Other (See Comments)     Swallowing disorder     Boniva [Ibandronate Sodium] Nausea and Vomiting, Diarrhea and Swelling     Arm swelling with IV only     Lisinopril Cough     Niacin Rash     Simvastatin Muscle Pain (Myalgia)       Social History   I have reviewed this patient's social history and updated it with pertinent information if needed. Madie SHAGUFTA Silva  reports that she has never smoked. She has never used smokeless tobacco. She reports that she does not drink alcohol or use drugs.    Family History   I have reviewed this patient's family history and updated it with pertinent information if needed.   Family History   Problem Relation Age of Onset     Alzheimer Disease Sister       Heart Disease Mother      Heart Disease Father      Heart Disease Son      Heart Disease Brother        Review of Systems   The 12 point Review of Systems is negative other than noted in the HPI or here.     Physical Exam   Temp: 98.3  F (36.8  C) Temp src: Oral BP: (!) 193/76 Pulse: 70   Resp: 18 SpO2: 93 % O2 Device: None (Room air)    Vital Signs with Ranges  Temp:  [97.5  F (36.4  C)-98.5  F (36.9  C)] 98.3  F (36.8  C)  Pulse:  [70-84] 70  Resp:  [8-23] 18  BP: (167-193)/(56-76) 193/76  SpO2:  [91 %-100 %] 93 %  158 lbs 0 oz    GENERAL APPEARANCE: Alert and oriented x3. No acute distress.  In a c-collar with facial bruising  SKIN: Inspection of the skin reveals no rashes, ulcerations, warm, dry  NECK: Supple and symmetric.   Normal JVP  LUNGS: Clear breath sounds throughout bilaterally, no wheezes, no rhonchi  CARDIOVASCULAR: S1, S2, regular rate and rhythm faint systolic murmur right upper sternal border antonette.  ABDOMEN: Soft, non-tender, non-distended with normal bowel sounds.  No ascites noted.  EXTREMITIES: No edema.  NEUROLOGIC:  Normal mood and affect.  Sensation to touch was normal.      Data   Results for orders placed or performed during the hospital encounter of 05/09/21 (from the past 24 hour(s))   CBC with platelets differential   Result Value Ref Range    WBC 8.7 4.0 - 11.0 10e9/L    RBC Count 4.39 3.8 - 5.2 10e12/L    Hemoglobin 9.6 (L) 11.7 - 15.7 g/dL    Hematocrit 31.0 (L) 35.0 - 47.0 %    MCV 71 (L) 78 - 100 fl    MCH 21.9 (L) 26.5 - 33.0 pg    MCHC 31.0 (L) 31.5 - 36.5 g/dL    RDW 17.3 (H) 10.0 - 15.0 %    Platelet Count 278 150 - 450 10e9/L    Diff Method Automated Method     % Neutrophils 37.3 %    % Lymphocytes 47.9 %    % Monocytes 10.6 %    % Eosinophils 3.3 %    % Basophils 0.7 %    % Immature Granulocytes 0.2 %    Nucleated RBCs 0 0 /100    Absolute Neutrophil 3.2 1.6 - 8.3 10e9/L    Absolute Lymphocytes 4.2 0.8 - 5.3 10e9/L    Absolute Monocytes 0.9 0.0 - 1.3 10e9/L    Absolute  Eosinophils 0.3 0.0 - 0.7 10e9/L    Absolute Basophils 0.1 0.0 - 0.2 10e9/L    Abs Immature Granulocytes 0.0 0 - 0.4 10e9/L    Absolute Nucleated RBC 0.0    Comprehensive metabolic panel   Result Value Ref Range    Sodium 137 133 - 144 mmol/L    Potassium 3.7 3.4 - 5.3 mmol/L    Chloride 105 94 - 109 mmol/L    Carbon Dioxide 27 20 - 32 mmol/L    Anion Gap 5 3 - 14 mmol/L    Glucose 146 (H) 70 - 99 mg/dL    Urea Nitrogen 24 7 - 30 mg/dL    Creatinine 0.99 0.52 - 1.04 mg/dL    GFR Estimate 49 (L) >60 mL/min/[1.73_m2]    GFR Estimate If Black 57 (L) >60 mL/min/[1.73_m2]    Calcium 8.7 8.5 - 10.1 mg/dL    Bilirubin Total 0.4 0.2 - 1.3 mg/dL    Albumin 3.5 3.4 - 5.0 g/dL    Protein Total 7.3 6.8 - 8.8 g/dL    Alkaline Phosphatase 112 40 - 150 U/L    ALT 27 0 - 50 U/L    AST 22 0 - 45 U/L   INR   Result Value Ref Range    INR 2.42 (H) 0.86 - 1.14   Partial thromboplastin time   Result Value Ref Range    PTT 36 22 - 37 sec   Alcohol ethyl   Result Value Ref Range    Ethanol g/dL <0.01 <0.01 g/dL   XR Chest 1 View    Narrative    EXAM: XR CHEST 1 VIEW  LOCATION: [facility]  DATE/TIME: 5/9/2021 2:32 AM    INDICATION: Trauma, pain.  COMPARISON: 07/29/2020.      Impression    IMPRESSION: Heart size within normal limits, status post sternotomy, CABG, and TAVR. Left-sided dual-chamber cardiac pacer. Low lung volumes with bilateral perihilar infiltrates. No visible pneumothorax or pleural effusion.   CT Head w/o Contrast    Narrative    EXAM: CT HEAD W/O CONTRAST  LOCATION: [facility]  DATE/TIME: 5/9/2021 2:50 AM    INDICATION: Trauma - Head Injury  COMPARISON: None.  TECHNIQUE: Routine CT Head without IV contrast. Multiplanar reformats. Dose reduction techniques were used.    FINDINGS:  INTRACRANIAL CONTENTS: No intracranial hemorrhage, extraaxial collection, or mass effect.  No CT evidence of acute infarct. Mild to moderate presumed chronic small vessel ischemic changes. Moderate generalized volume  loss. No hydrocephalus. Left parietal   periventricular small chronic infarct.    VISUALIZED ORBITS/SINUSES/MASTOIDS: No intraorbital abnormality. No paranasal sinus mucosal disease. No middle ear or mastoid effusion.    BONES/SOFT TISSUES: Left frontal scalp hematoma. No acute displaced calvarial fracture Vascular calcifications. Dental amalgam resulting in streak artifact.      Impression    IMPRESSION:  1.  No acute intracranial process.  2.  Chronic intracranial changes described above.   CT Cervical Spine w/o Contrast    Narrative    EXAM: CT CERVICAL SPINE W/O CONTRAST  LOCATION: [facility]  DATE/TIME: 5/9/2021 2:50 AM    INDICATION: Trauma - C-Spine Injury  COMPARISON: CT cervical spine 07/29/2020  TECHNIQUE: Routine CT Cervical Spine without IV contrast. Multiplanar reformats. Dose reduction techniques were used.    FINDINGS:  VERTEBRA: Normal vertebral body heights and alignment. Right C6 articular pillar new subtle lucency concerning for acute fracture most apparent on sagittal reformat. (Sagittal image 27). No additional acute fractures. No acute post traumatic   subluxations. Diffuse bony demineralization.    CANAL/FORAMINA: Multilevel spondylosis resulting in various levels and degrees of neural foraminal stenosis. No significant central canal stenosis.    PARASPINAL: Vascular calcifications. Bilateral extracranial carotid arteries demonstrate a retropharyngeal medially deviated course impressing upon the aerodigestive airway.  Partially visualized visualized pacer leads. Interstitial edema.      Impression    IMPRESSION:  1.  Right C6 articular pillar new subtle lucency concerning for acute fracture.  2.  No additional acute fractures.  3.  Diffuse bony demineralization.  4.  Multilevel spondylosis.    DR JUAN LUIS RODRIGUEZ was notified by Dr Pete Chavez at  3:58 AM 5/9/2021.   Asymptomatic SARS-CoV-2 COVID-19 Virus (Coronavirus) by PCR    Specimen: Nasopharyngeal   Result Value Ref Range     SARS-CoV-2 Virus Specimen Source Nasopharyngeal     SARS-CoV-2 PCR Result NEGATIVE     SARS-CoV-2 PCR Comment (Note)    Neurosurgery IP Consult: Patient to be seen: Routine within 24 hrs; C6 fracture; Consultant may enter orders: Yes; Requesting provider? Hospitalist (if different from attending physician)    Narrative    Yessi Morrison PA-C     2021  5:00 PM  Neurosurgery consult dictated.             Echocardiogram Limited    Narrative    [id number]  [id number]  [id number]  [id number]^[email or web address]^ADENIKE^JAIR     [facility]  Echocardiography Laboratory  [address] [place] [address]     Name: CORDELL AMEZQUITA  MRN: [id number]  : 1929  Study Date: 2021 10:11 AM  Age: 89 yrs  Gender: Female  Patient Location: [facility]  Reason For Study: Syncope  Ordering Physician: ADENIKE ALBERT  Referring Physician: YAMILA VICENTE  Performed By: Nan Spring     BSA: 1.8 m2  Height: 65 in  Weight: 158 lb  HR: 91  BP: 167/75 mmHg  ______________________________________________________________________________  Procedure  Limited Portable Echo Adult.  ______________________________________________________________________________  Interpretation Summary     1. The left ventricle is normal in size. There is normal left ventricular wall  thickness. Hyperdynamic left ventricular function. The visual ejection  fraction is estimated at 65-70%. Grade I or early diastolic dysfunction. No  regional wall motion abnormalities noted.  2. There is a catheter/pacemaker lead seen in the right ventricle. The right  ventricle is not well visualized.  3. There is a bioprosthetic aortic valve. (20mm Khan Nica 3 implanted on  2020). The mean transaortic gradient is 16 mm Hg, with a peak gradient  of 34 mm Hg (DVI = 0.37). These values are mildly elevated for this type of  prosthesis. There is trace to mild paravalvular regurgitation.  4. Trace to mild mitral and tricuspid regurgitation.  5. No pericardial  effusion.  6. In comparison to the previous report dated 2021, the transaortic  gradients are slightly higher on today's study. The other findings are  similar.  ______________________________________________________________________________  Left Ventricle  The left ventricle is normal in size. There is normal left ventricular wall  thickness. Hyperdynamic left ventricular function. The visual ejection  fraction is estimated at 65-70%. Grade I or early diastolic dysfunction. No  regional wall motion abnormalities noted.     Right Ventricle  There is a catheter/pacemaker lead seen in the right ventricle. The right  ventricle is not well visualized.     Atria  There is mod-severe biatrial enlargement.     Mitral Valve  There is moderate mitral annular calcification. The mitral valve leaflets are  mildly thickened. There is trace mitral regurgitation.     Tricuspid Valve  There is trace to mild tricuspid regurgitation. The right ventricular systolic  pressure is approximated at 40.6 mmHg plus the right atrial pressure.     Aortic Valve  There is a bioprosthetic aortic valve. (20mm Khan Nica 3 implanted on  2020). The mean transaortic gradient is 16 mm Hg, with a peak gradient  of 34 mm Hg (DVI = 0.37). These values are mildly elevated for this type of  prosthesis. There is trace to mild paravalvular regurgitation.     Pulmonic Valve  There is no pulmonic valvular stenosis.     Vessels  The inferior vena cava is normal.     Pericardium  There is no pericardial effusion.     Rhythm  Sinus rhythm was noted.  ______________________________________________________________________________  MMode/2D Measurements & Calculations  IVSd: 1.0 cm     LVIDd: 4.1 cm  LVIDs: 2.6 cm  LVPWd: 1.1 cm  FS: 37.5 %  LV mass(C)d: 144.8 grams  LV mass(C)dI: 80.9 grams/m2  LVOT diam: 2.0 cm  LVOT area: 3.1 cm2  RWT: 0.51     Doppler Measurements & Calculations  Ao V2 max: 304.5 cm/sec  Ao max P.1 mmHg  Ao V2 mean: 203.0  cm/sec  Ao mean P.9 mmHg  Ao V2 VTI: 57.2 cm  VICKY(I,D): 1.3 cm2  VICKY(V,D): 1.1 cm2  LV V1 max P.0 mmHg  LV V1 max: 111.6 cm/sec  LV V1 VTI: 24.0 cm  SV(LVOT): 74.3 ml  SI(LVOT): 41.5 ml/m2  TR max kevin: 318.5 cm/sec  TR max P.6 mmHg  AV Kevin Ratio (DI): 0.37  VICKY Index (cm2/m2): 0.73     ______________________________________________________________________________  Report approved by: Randy Garcia 2021 12:07 PM         EKG 12-lead, tracing only   Result Value Ref Range    Interpretation ECG Click View Image link to view waveform and result      *Note: Due to a large number of results and/or encounters for the requested time period, some results have not been displayed. A complete set of results can be found in Results Review.

## 2021-05-09 NOTE — PLAN OF CARE
OT/PT: Order received and chart reviewed. Pt admitted after fall and CT of the cervical spine shows a right C6 articular pillar lucency concerning for acute fracture. Neurosurgery plan pending and RN reports pt is on bedrest. Will hold therapy today to allow for further medical decision making.

## 2021-05-09 NOTE — PROGRESS NOTES
Cardiac device check-[employer]tr[employer]-completed. Report received and sent to floor. Rep called with results and MD aware.

## 2021-05-09 NOTE — PROGRESS NOTES
"SPIRITUAL HEALTH SERVICES Progress Note  [facility]    Visited pt due to \"yes\" in [facility] column of pt chart. I introduced myself and  services and pt invited me to stay for visit.    I offered reflective listening and affirmation of feelings, meaning, and experience as pt told me about her recent fall and another all she experienced last year. She talked about her abraham and her believe in prayer, especially \"silent prayer as God knows what is in our heart\". We talked about how it is normal to have doubt about our abraham, as she sometimes does. At her request, I offered prayer for her healing and of our care and support of her.    [facility] remains available.      Muriel Roberts  Chaplain Resident    "

## 2021-05-09 NOTE — PLAN OF CARE
"   04/21/19 1200   Maternal Assessment   Breast Shape Bilateral:;pendulous   Breast Density Bilateral:;soft   Areola Bilateral:;elastic   Nipples Bilateral:;everted   Maternal Infant Feeding   Maternal Emotional State relaxed;assist needed   Infant Positioning clutch/football   Signs of Milk Transfer audible swallow   Pain with Feeding no   Latch Assistance yes   Baby showing feeding cues. Encouraged mother to nurse baby on cue. Assisted with positioning to L breast in football. Baby initially was making "squeaky" breathing noises which parents stated baby has been making. After baby latched baby would not suckle and was noted to have increased respirations which were 74 (for full minute counted). No retractions noted and color was pink. Baby pulled away from the breast but was able to latch again and nurse more rhythmically with audible swallows. Reported findings of baby's respiratory status to mother/baby nurse HOOD Valdes RN. Stated she would assess and follow up with peds.   " Pt here with  dizziness and fall with C6 fracture. A&O x4. Neuros & CMS intact except for right hand/fingers tingling & numbness . VSS except elevated SBP-MD aware. Tele A- paced. NPO except meds.Takes pills whole with water. Up with  2 assist & lift . Left side of the face & bridge of the nose superficial  abrasion open to air.  Cervical collar in place. Pain managed with prn oxycodone. Pt scoring green on the Aggression Stop Light Tool. Plan PT/OT/SW/Echo/neurosurgery/EP consult.  Will continue to monitor.

## 2021-05-09 NOTE — PROGRESS NOTES
Hospitalist update note    Patient admitted this morning by Dr. Harjeet Ramachandran SHAGUFTA Silva is a 89 year old female with hx of CAD s/p CABG (2011), severe aortic stenosis s/p TAVR (5/2020), symptomatic bradycardia s/p PPM, paroxysmal atrial fibrillation on chronic anticoagulation with warfarin, and DM2 who was admitted on 5/9/2021 for C6 fracture following a fall with concern for pacemaker malfunction    Patient doing well with acute complaints. Pain adequately controlled. Denies cp/sob, dizziness/lightheadedness, or nausea    1. Fall due to dizziness with traumatic C6 fracture  2. History of symptomatic bradycardia s/p PPM  3. Severe aortic stenosis s/p TAVR (5/2020))  4. Osteoporosis  5. CAD, native heart, native vessels s/p CABG  6. Essential hypertension  7. DM2 without complications  8. Hypothyroidsm  9. Walters's esophagus  10. GERD  11. Chronic anemia    - Check orthostatics  - Pacemaker interrogation, EP consult pending  - TTE pending  - She has been evaluated by Neurosurgery. Recs pending  - PT/OT    WES Sethi MD  Hospitalist  [phone]

## 2021-05-09 NOTE — CONSULTS
Consult Date: 05/09/2021    ASSESSMENT:  Neck pain with probable right C6 articular pillar.    PLAN:  Cervical hard collar, okay to remove for showering.  Follow up in SN clinic in 4-6 weeks with cervical x-ray's day of appt.    TYPE OF VISIT:  The Neurosurgical Service was consulted see Mrs. Silva for a cervical fracture.    HISTORY OF PRESENT ILLNESS:  Ms. Silva is a pleasant 89-year-old right-handed female with a past medical history of coronary artery disease, status post CABG, aortic valve replacement, atrial fibrillation, currently on Coumadin, and pacemaker, who presented to the Emergency Room after a fall.  She states that she lives independently.  She got up to use the restroom around 1 a.m.  As soon as she got up, she felt like the room was spinning and then she felt as though she was propelled forward into the living room, sustaining a fall.  EMS was called and she presented to the Emergency Room.  She complains of frontal head pain and slight neck pain.    PAST MEDICAL HISTORY:  Coronary artery disease, atrial fibrillation, anticoagulated on Coumadin, pacemaker, Walters's esophagus, basal cell carcinoma, diabetes type 2.    PAST SURGICAL HISTORY:  CABG, CV angiogram of coronary graft, aortic valve replacement, pacemaker, hysterectomy embolectomy of left lower leg, appendectomy.    SOCIAL HISTORY:  She lives independently.  She denies cigarette or alcohol use.  She has one son, who lives in [place].      FAMILY HISTORY:  Heart disease and Alzheimer's.    MEDICATIONS:  Reviewed per the electronic medical record, including Coumadin, as well as aspirin.    ALLERGIES:  REVIEWED.    PHYSICAL EXAMINATION:    VITAL SIGNS:  The temperature is 98.5, blood pressure is 167/75, pulse 71, respiratory rate is 18.    GENERAL:  She is awake, alert, in no acute distress, in a cervical hard collar.  She has minimal pain on palpation of the posterior cervical spine.    NEUROLOGIC:  She moves all 4 extremities well and  is neurologically intact.    DIAGNOSTIC DATA:  Head CT in the Emergency Room was negative for any acute intracranial pathology.  CT of the cervical spine shows a right C6 articular pillar lucency concerning for acute fracture.      TOTAL TIME SPENT FOR THE PATIENT:  70 minutes, including 50 minutes of counseling and coordination of care.  Discussed with Dr. Aiken.      As Dictated by TURNER SHELBY        D: 2021   T: 2021   MT: FARZANEH    Name:     CORDELL AMEZQUITAKaiser  MRN:      [id number]        Account:      [id number]   :      1929           Consult Date: 2021     Document: [id number]

## 2021-05-09 NOTE — PROGRESS NOTES
MD Notification    Notified Person: MD    Notified Person Name: Dr. Sethi    Notification Date/Time: 5/9/2021 7127    Notification Interaction: page    Purpose of Notification:/76. PRN?    Orders Received: PRN labetalol now avail. For systolic >180.     Comments:      Addendum @ 8504:MD paged again.

## 2021-05-09 NOTE — ED TRIAGE NOTES
Pt fell from standing position landing on linoleum. Pt states she was dizzy leading up to fall. Does not believe she passed out. Complaining of C-spine pain, arrives in C-collar. Syeda noted above L eye. On thinners

## 2021-05-09 NOTE — PROGRESS NOTES
Paged Neurosugery via answering service. No plan is stated in the note- per PPM need a plan so we can place patient appropriately.

## 2021-05-09 NOTE — ED NOTES
[facility] [place]  ED Nurse Handoff Report    ED Chief complaint: Fall and Trauma      ED Diagnosis:   Final diagnoses:   None       Code Status: To be discussed with admitting provider.    Allergies:   Allergies   Allergen Reactions     Fosamax [Alendronic Acid] GI Disturbance     Severe gastrointestinal reaction     Amoxicillin Hives     Bisphosphonates Other (See Comments)     Swallowing disorder     Boniva [Ibandronate Sodium] Nausea and Vomiting, Diarrhea and Swelling     Arm swelling with IV only     Lisinopril Cough     Niacin Rash     Simvastatin Muscle Pain (Myalgia)       Patient Story: Fall at home while in bathroom. Denies LOC. Reports dizziness prior to fall. Pt has hx of afib, on coumadin, and has a pacemaker placed.  Focused Assessment:  Pt alert and oriented. Partial trauma code activation. C-collar in place.    Treatments and/or interventions provided: Bilateral AC IVs started and 1L NS given. Purewick placed.  Patient's response to treatments and/or interventions: Pt alert and oriented x 4. Status stable.    To be done/followed up on inpatient unit:  See admission orders.    Does this patient have any cognitive concerns?: None noted.    Activity level - Baseline/Home:  Walker  Activity Level - Current:   Unknown    Patient's Preferred language: English   Needed?: No    Isolation: None  Infection: Not Applicable  Patient tested for COVID 19 prior to admission: YES  Bariatric?: No    Vital Signs:   Vitals:    05/09/21 0330 05/09/21 0345 05/09/21 0400 05/09/21 0413   BP: (!) 172/69 (!) 169/59 (!) 170/70    Pulse: 82 84 80    Resp:       Temp:       SpO2:    91%   Weight:       Height:         Labs Ordered and Resulted from Time of ED Arrival Up to the Time of Departure from the ED   CBC WITH PLATELETS DIFFERENTIAL - Abnormal; Notable for the following components:       Result Value    Hemoglobin 9.6 (*)     Hematocrit 31.0 (*)     MCV 71 (*)     MCH 21.9 (*)     MCHC 31.0 (*)      RDW 17.3 (*)     All other components within normal limits   COMPREHENSIVE METABOLIC PANEL - Abnormal; Notable for the following components:    Glucose 146 (*)     GFR Estimate 49 (*)     GFR Estimate If Black 57 (*)     All other components within normal limits   INR - Abnormal; Notable for the following components:    INR 2.42 (*)     All other components within normal limits   PARTIAL THROMBOPLASTIN TIME   ALCOHOL ETHYL   ASHVIN COMA SCALE (GCS) ASSESSMENT   PULSE OXIMETRY NURSING   PERIPHERAL IV CATHETER   IV ACCESS     CT Cervical Spine w/o Contrast   Final Result   IMPRESSION:   1.  Right C6 articular pillar new subtle lucency concerning for acute fracture.   2.  No additional acute fractures.   3.  Diffuse bony demineralization.   4.  Multilevel spondylosis.      DR JUAN LUIS RODRIGUEZ was notified by Dr Pete Chavez at  3:58 AM 5/9/2021.      CT Head w/o Contrast   Final Result   IMPRESSION:   1.  No acute intracranial process.   2.  Chronic intracranial changes described above.      XR Chest 1 View   Final Result   IMPRESSION: Heart size within normal limits, status post sternotomy, CABG, and TAVR. Left-sided dual-chamber cardiac pacer. Low lung volumes with bilateral perihilar infiltrates. No visible pneumothorax or pleural effusion.            Cardiac Rhythm: Currently paced on the monitor @ 70    Was the PSS-3 completed:   Yes  What interventions are required if any?  See admission orders.  Family Comments: None  OBS brochure/video discussed/provided to patient/family: N/A    For the majority of the shift this patient's behavior was Green.   Behavioral interventions performed were NONE.    ED NURSE PHONE NUMBER: [phone]

## 2021-05-09 NOTE — H&P
"[facility]    History and Physical - Hospitalist Service       Date of Admission:  5/9/2021    Assessment & Plan   Madie Silva is a 89 year old female with past medical history significant for CAD s/p CABG (2011), severe aortic stenosis s/p TAVR (5/2020), symptomatic bradycardia s/p PPM, paroxysmal atrial fibrillation on warfarin, osteoporosis, type II DM, hypothyroidism, and others admitted on 5/9/2021 with a fall.     Dizziness   Fall   Pt presents to the ED today after falling. She reports suddenly feeling dizzy and then fell to the ground. She describes the dizzy sensation as spinning \"like a top\", but it also occurred right as she stood up from the toilet. It does not sound like the patient actually lost consciousness. She has a hx of bradycardia and atrial fibrillation and has a pacemaker placed which she reports is supposed to be changed soon. EKG in the ED commented on unspecified pacemaker failure, with underlying atrial fibrillation (EKG not yet put into Epic for my review).   - Cardiac monitoring  - Check orthostatics, pt on lasix pta, will hold for now and give some gentle fluids   - Could not check for vertigo due to neck immobilization   - Pacemaker interrogation ordered by ED provider   - Will consult  EP given unclear picture with pacemaker   - TTE   - PT/OT/CC/SW     C6 Fracture   Osteoporosis  Pt endorsed severe neck pain and headache after the fall. CT scan showed possible fracture of the C6 vertebrae.  - Continue C collar  - Neurosurgery consult   - Pain control PRN   - PT/OT    Chronic Medical Hx  CAD s/p CABG (2011), continue ASA, atorvastatin when verified   Severe aortic stenosis s/p TAVR (5/2020)  Symptomatic bradycardia s/p PPM - pacemaker interrogation as noted above   Paroxysmal atrial fibrillation on warfarin, will hold warfarin for now given hematoma and significant head injury   HTN, continue losartan when verified   Type II DM, on metformin PTA, hold for now " "  HYpothyroidism, continue levothyroxine when verified   CKD stage III, Cr 0.99 - stable   Walters's esophagus, GERD, continue omeprazole when verified    Microcytic anemia, chronic, stable     Diet: NPO for Medical/Clinical Reasons Except for: Meds    DVT Prophylaxis: warfarin, holding for now   Hendrix Catheter: not present  Code Status: Full code          Disposition Plan   Expected discharge: 2 - 3 days, recommended to transitional care unit once safe disposition plan/ TCU bed available.  Entered: Reyna Cosby MD 05/09/2021, 5:57 AM     The patient's care was discussed with the Patient.    Reyna Cosby MD  [facility]  Contact information available via [facility] Paging/Directory      ______________________________________________________________________    Chief Complaint   Fall     History is obtained from the patient    History of Present Illness   Madie Silva is a 89 year old female who presents to the ED after a fall. The patient was reportedly walking back to bed after getting up to use the bathroom when she suddenly felt very dizzy and fell to the ground striking her head on the floor. She describes the dizziness and spinning \"like a top\". She tried grabbing on to the vanity, but eventually fell to the ground. She denies loss of consciousness. She was able to crawl to the phone and call 911. Since the fall she has developed a headache and severe neck pain.     She continues to endorse some headache and neck discomfort. She denies chest pain, palpitations or shortness of breath.     She was otherwise feeling quite well yesterday in her usual state of health. She was outside most of the day doing yard work. She lives independently and appears to be quite functional at baseline.     Review of Systems    The 10 point Review of Systems is negative other than noted in the HPI or here.     Past Medical History    I have reviewed this patient's medical history and updated it with " pertinent information if needed.   Past Medical History:   Diagnosis Date     Walters's esophagus 7/09     Basal cell carcinoma      Bradycardia     post-op CABG 2011 - s/p pacemaker implanted 2011     CHRONIC VERTIGO 9/99     Colonic Adenoma 3/90, 11/98     Coronary artery disease     CABG x4 2011: LIMA to her LAD, saphenous vein graft to her ramus, saphenous vein graft to her OM, saphenous vein graft to her PDA.     Costochondritis      Depression      DIABETES MELLITUS TYPE II 10/07     DJD      DVT of Leg, postop 11/09    6 months of warfarin     Gastritis 10/89     GERD      HIATAL HERNIA      HYPERLIPIDEMIA      HYPOTHYROIDISM (aka HASHIMOTO)      Impaired fasting glucose      L Ankle Fracture 10/09     Obesity, unspecified      Osteoporosis, unspecified      PERIPHERAL NEUROPATHY      Polymyalgia rheumatica (H)      Post Herpetic Neuralgia 4/03    R lumbar distribution     Restless leg syndrome      Small vessel cerebrovascular changes 9/99     Stricture and stenosis of esophagus 10/89    Dilated     Syncope     1/06, 8/08, 2/10     VITAMIN D DEFICIENCY 12/07    per Dr. Petty       Past Surgical History   I have reviewed this patient's surgical history and updated it with pertinent information if needed.  Past Surgical History:   Procedure Laterality Date     BYPASS GRAFT ARTERY CORONARY  11/2/2011    CABG x 4  Dr. PINEDA     CV ANGIOGRAM CORONARY GRAFT N/A 1/27/2020    Procedure: Angiogram Coronary Graft;  Surgeon: Jenny Luther MD;  Location: [facility] CARDIAC CATH LAB     CV AORTOGRAM ABDOMINAL N/A 1/27/2020    Procedure: Aortogram Abdominal;  Surgeon: Jenny Luther MD;  Location: [facility] CARDIAC CATH LAB     CV AORTOGRAM THORACIC N/A 1/27/2020    Procedure: Aortogram Thoracic;  Surgeon: Jenny Luther MD;  Location: [facility] CARDIAC CATH LAB     CV LEFT HEART CATH N/A 1/27/2020    Procedure: Right and Left heart catheterization for TAVR workup;  Surgeon: Jenny Luther  MD Eloy;  Location:  HEART CARDIAC CATH LAB     CV TRANSCATHETER AORTIC VALVE REPLACEMENT N/A 5/5/2020    Procedure: Transcatheter Aortic Valve Replacement;  Surgeon: Jenny Luther MD;  Location:  HEART CARDIAC CATH LAB     EMBOLECTOMY LOWER EXTREMITY  11/9/2011    EMBOLECTOMY LOWER EXTREMITY; left leg femoral embolectomy.; Surgeon:JOLEEN BOONE; Location: OR     HYSTERECTOMY, CERVIX STATUS UNKNOWN  1978    partial hysterectomy     SURGICAL HISTORY OF -   10/09    L ankle fracture repair    Dr. Jose Roberto Trevino     [facility] NONSPECIFIC PROCEDURE  age 14    appendectomy     [facility] NONSPECIFIC PROCEDURE  as a child    T&A       Social History   I have reviewed this patient's social history and updated it with pertinent information if needed.  Social History     Tobacco Use     Smoking status: Never Smoker     Smokeless tobacco: Never Used   Substance Use Topics     Alcohol use: No     Drug use: No       Family History   I have reviewed this patient's family history and updated it with pertinent information if needed.  Family History   Problem Relation Age of Onset     Alzheimer Disease Sister      Heart Disease Mother      Heart Disease Father      Heart Disease Son      Heart Disease Brother        Prior to Admission Medications   Prior to Admission Medications   Prescriptions Last Dose Informant Patient Reported? Taking?   Cholecalciferol (VITAMIN D-400 PO)  Self Yes No   Sig: Take 1 tablet by mouth daily    HYDROcodone-acetaminophen (NORCO) 5-325 MG tablet   Yes No   Sig: Take 0.5-1 tablets by mouth every 6 hours as needed for moderate to severe pain   Lidocaine (LIDOCARE) 4 % Patch   Yes No   Sig: Place 1 patch onto the skin every 24 hours Apply to left wrist topically at bedtime for Pain and remove per schedule To prevent lidocaine toxicity, patient should be patch free for 12 hrs daily.   Multiple Vitamins-Minerals (PRESERVISION AREDS 2 PO)  Self Yes No   Sig: Take 1 tablet by mouth 2 times  daily    acetaminophen (TYLENOL) 500 MG tablet   Yes No   Sig: Take 1,000 mg by mouth 3 times daily   aspirin EC 81 MG EC tablet  Self No No   Sig: Take 1 tablet by mouth daily.   atorvastatin (LIPITOR) 80 MG tablet   No No   Sig: Take 1 tablet (80 mg) by mouth daily   calcium 600 MG tablet  Self Yes No   Sig: Take 1 tablet by mouth 2 times daily.   clindamycin (CLEOCIN) 300 MG capsule   No No   Si mg one hr prior to any dental appointment   furosemide (LASIX) 20 MG tablet   No No   Sig: Take 1 tablet (20 mg) by mouth daily   gabapentin (NEURONTIN) 100 MG capsule   No No   Sig: Take 2 capsules (200 mg) by mouth At Bedtime   levothyroxine (SYNTHROID/LEVOTHROID) 50 MCG tablet   No No   Sig: TAKE ONE TABLET BY MOUTH ONCE DAILY   losartan (COZAAR) 50 MG tablet   Yes No   Sig: Take 1 tablet (50 mg) by mouth every evening   metFORMIN (GLUCOPHAGE) 500 MG tablet  Self No No   Sig: TAKE ONE TABLET BY MOUTH DAILY WITH BREAKFAST   nitroGLYCERIN (NITROSTAT) 0.4 MG SL tablet  Self Yes No   Sig: Place 0.4 mg under the tongue every 5 minutes as needed. Up to 3 doses per episode    nystatin (MYCOSTATIN) 551790 UNIT/GM external powder   Yes No   Sig: Apply to breast folds topically two times a day for redness   omeprazole (PRILOSEC) 20 MG DR capsule  Self No No   Sig: Take 1 capsule (20 mg) by mouth daily   polyethylene glycol (MIRALAX) powder  Self No No   Sig: Take 17 g (1 capful) by mouth daily   warfarin ANTICOAGULANT (JANTOVEN ANTICOAGULANT) 3 MG tablet   No No   Sig: Take one tablet daily as directed by the INR clinic      Facility-Administered Medications: None     Allergies   Allergies   Allergen Reactions     Fosamax [Alendronic Acid] GI Disturbance     Severe gastrointestinal reaction     Amoxicillin Hives     Bisphosphonates Other (See Comments)     Swallowing disorder     Boniva [Ibandronate Sodium] Nausea and Vomiting, Diarrhea and Swelling     Arm swelling with IV only     Lisinopril Cough     Niacin Rash      Simvastatin Muscle Pain (Myalgia)       Physical Exam   Vital Signs: Temp: 97.5  F (36.4  C)   BP: (!) 170/70 Pulse: 80   Resp: 8 SpO2: 91 % O2 Device: None (Room air)    Weight: 160 lbs 0 oz    Constitutional: Awake, alert, cooperative, no apparent distress.  Eyes: Conjunctiva and pupils examined and normal.  HEENT: Neck in C collar   Respiratory: Clear to auscultation bilaterally, no crackles or wheezing.  Cardiovascular: Regular rate and rhythm, normal S1 and S2, and no murmur noted.  GI: Soft, non-distended, non-tender, normal bowel sounds.  Skin: No rashes, no cyanosis, no edema. Developing bruise and abrasion over left forehead   Musculoskeletal: No joint swelling, erythema or tenderness.  Neurologic: Cranial nerves 2-12 intact, normal strength and sensation.  Psychiatric: Alert, oriented to person, place and time, no obvious anxiety or depression.      Data   Data reviewed today: I reviewed all medications, new labs and imaging results over the last 24 hours.    Recent Labs   Lab 05/09/21  0218   WBC 8.7   HGB 9.6*   MCV 71*      INR 2.42*      POTASSIUM 3.7   CHLORIDE 105   CO2 27   BUN 24   CR 0.99   ANIONGAP 5   SHAWNEE 8.7   *   ALBUMIN 3.5   PROTTOTAL 7.3   BILITOTAL 0.4   ALKPHOS 112   ALT 27   AST 22     Recent Results (from the past 24 hour(s))   XR Chest 1 View    Narrative    EXAM: XR CHEST 1 VIEW  LOCATION: [facility]  DATE/TIME: 5/9/2021 2:32 AM    INDICATION: Trauma, pain.  COMPARISON: 07/29/2020.      Impression    IMPRESSION: Heart size within normal limits, status post sternotomy, CABG, and TAVR. Left-sided dual-chamber cardiac pacer. Low lung volumes with bilateral perihilar infiltrates. No visible pneumothorax or pleural effusion.   CT Head w/o Contrast    Narrative    EXAM: CT HEAD W/O CONTRAST  LOCATION: [facility]  DATE/TIME: 5/9/2021 2:50 AM    INDICATION: Trauma - Head Injury  COMPARISON: None.  TECHNIQUE: Routine CT Head without IV contrast.  Multiplanar reformats. Dose reduction techniques were used.    FINDINGS:  INTRACRANIAL CONTENTS: No intracranial hemorrhage, extraaxial collection, or mass effect.  No CT evidence of acute infarct. Mild to moderate presumed chronic small vessel ischemic changes. Moderate generalized volume loss. No hydrocephalus. Left parietal   periventricular small chronic infarct.    VISUALIZED ORBITS/SINUSES/MASTOIDS: No intraorbital abnormality. No paranasal sinus mucosal disease. No middle ear or mastoid effusion.    BONES/SOFT TISSUES: Left frontal scalp hematoma. No acute displaced calvarial fracture Vascular calcifications. Dental amalgam resulting in streak artifact.      Impression    IMPRESSION:  1.  No acute intracranial process.  2.  Chronic intracranial changes described above.   CT Cervical Spine w/o Contrast    Narrative    EXAM: CT CERVICAL SPINE W/O CONTRAST  LOCATION: [facility]  DATE/TIME: 5/9/2021 2:50 AM    INDICATION: Trauma - C-Spine Injury  COMPARISON: CT cervical spine 07/29/2020  TECHNIQUE: Routine CT Cervical Spine without IV contrast. Multiplanar reformats. Dose reduction techniques were used.    FINDINGS:  VERTEBRA: Normal vertebral body heights and alignment. Right C6 articular pillar new subtle lucency concerning for acute fracture most apparent on sagittal reformat. (Sagittal image 27). No additional acute fractures. No acute post traumatic   subluxations. Diffuse bony demineralization.    CANAL/FORAMINA: Multilevel spondylosis resulting in various levels and degrees of neural foraminal stenosis. No significant central canal stenosis.    PARASPINAL: Vascular calcifications. Bilateral extracranial carotid arteries demonstrate a retropharyngeal medially deviated course impressing upon the aerodigestive airway.  Partially visualized visualized pacer leads. Interstitial edema.      Impression    IMPRESSION:  1.  Right C6 articular pillar new subtle lucency concerning for acute  fracture.  2.  No additional acute fractures.  3.  Diffuse bony demineralization.  4.  Multilevel spondylosis.    DR JUAN LUIS RODRIGUEZ was notified by Dr Pete Chavez at  3:58 AM 5/9/2021.

## 2021-05-10 NOTE — PLAN OF CARE
Date/Time: 5/9/21 1900-0730    Trauma/Ortho/Medical (Choose one): ortho/medical    Diagnosis: Fall, C6 fx   POD#: NA  Mental Status: A/Ox4, [place]  Activity/dangle: Ax1 GBW  Diet: regular  Pain: pain with movement, on scheduled tylenol, 2.5mg given x1 for neck/head pain  Hendrix/Voiding:purewick in place with good output  Tele/Restraints/Iso: A-paced  02/LDA: NA   D/C Date:pending progress  Other Info: neuro signed off, collar in place and plan to discharge with collar and follow up with neurosurgery outpatient, occasional SOB, cardiac hx, on PO lasix, continue to monitor

## 2021-05-10 NOTE — PROGRESS NOTES
[facility]    Medicine Progress Note - Hospitalist Service       Date of Admission:  5/9/2021    Assessment & Plan   Madie Silva is a 89 year old female with hx of CAD s/p CABG (2011), severe aortic stenosis s/p TAVR (5/2020), chronic a-fib on chronic anticoagulation with warfarin, symptomatic bradycardia s/p PPM, DM2, and vertigo who was admitted on 5/9/2021 for C6 fracture following a fall     Fall due to vertigo   Chronic atrial fibrillation  History of symptomatic bradycardia s/p PPM  Severe aortic stenosis s/p TAVR (5/2020))  Osteoporosis  Presented for evaluation following a fall that was preceded by dizziness. No LOC. Head CT on arrival showed no acute intracranial pathology She describes a spinning sensation over lightheadedness. She does have a history of vertigo with an upcoming PT appointment for treatment. There was some concern for pacemaker malfunction on admission, but pacemaker was interrogated and evaluated by EP Cardiology who did not note any significant arrhythmias. She was technically orthostatic during admission (SBP 150s to 110s) with questionable reproduction of symptoms. TTE (5/9) essentially unchanged from prior with LVEF 65-70%, bioprosthetic valve with mean gradient 16 mmHg, mild mitral and tricuspid regurgitation  - Meclizine ordered PRN for vertigo  - Hold PT maneuvers for vertigo due to existing C6 fracture  - Warfarin initially held, resume today per PharmD dosing  - Holding home Lasix due to questionable orthostasis  - PT/OT recommending TCU    Acute traumatic C6 fracture  Noted on admission CT-C spine. Neurosurgery was consulted on admission and recommended non-operative management  - C-collar in place  - On APAP 1000 mg TID, oxycodone 2.5 mg q4h prn for pain  - Follow up with Dr. Aiken in Neurosurgery clinic in 4-6 weeks with follow up cervical XR    CAD, native heart, native vessels s/p CABG  Essential hypertension  [PTA: aspirin 81 mg daily, losartan 50  mg daily, Lasix 20 mg daily, atorvastatin 80 mg daily]  - Holding Lasix as noted above  - Continues on other home meds    DM2 without complications  A1c 6.8  - Holding PTA metformin  - On medium SSI  Recent Labs   Lab 05/10/21  0647 05/09/21  0218   * 146*       Hypothyroidism  Chronic and stable on levothyroxine    Walters's esophagus  GERD  Chronic and stable on omeprazole    Chronic anemia  Hgb stable in mid 9 range    Chronic pain syndrome  - Continues on PTA gabapentin  - Holding PTA Norco while on scheduled APAP, oxycodone prn     Diet: Combination Diet Regular Diet Adult    DVT Prophylaxis: Warfarin  Hendrix Catheter: not present  Code Status: Full Code         Disposition: Expected discharge to TCU tomorrow if dizziness improves and BP stable    The patient's care was discussed with the Care Coordinator/, Patient and Patient's Family.    Katina Sethi MD  Hospitalist Service  [facility]  Contact information available via [facility] Paging/Directory    ______________________________________________________________________    Interval History   No events overnight. Dizziness this morning while working with PT, but unable to tell whether it was vertigo or lightheadedness. Denies cp/sob or nausea. Pain adequately controlled.    Time Spent on this Encounter   I spent 35 minutes on the unit/floor managing the care of Madie Silva. Over 50% of my time was spent on the following:   - Counseling the patient and/or family regarding: diagnostic results, prognosis and recommended follow-up  - Coordination of care with the: consultant(s), care coordinator/ and nurse    Katina Sethi MD      Data reviewed today: I reviewed all medications, new labs and imaging results over the last 24 hours. I personally reviewed no images or EKG's today.    Physical Exam   Vital Signs: Temp: 97.7  F (36.5  C) Temp src: Oral BP: (!) 169/78 Pulse: 77   Resp: 18 SpO2: 95 % O2  Device: None (Room air)    Weight: 158 lbs 0 oz  Constitutional: Resting comfortably, NAD  HEENT: C-collar in place  Respiratory: Breathing non-labored. Lungs CTAB - no wheezes, crackles, or rhonchi  Cardiovascular: Heart irregular rhythm, normal rate, 2/6 systolic murmur. No pedal edema  GI: +BS, abd soft/NT  Skin/Integument: No rash  Musculoskeletal: Normal muscle bulk and tone  Neuro: Alert and appropriate, forgetful. QUINONES  Psych: Calm and cooperative    Data   Recent Labs   Lab 05/10/21  0647 05/09/21  0218   WBC 11.0 8.7   HGB 9.8* 9.6*   MCV 70* 71*    278   INR  --  2.42*    137   POTASSIUM 4.2 3.7   CHLORIDE 107 105   CO2 25 27   BUN 17 24   CR 0.87 0.99   ANIONGAP 7 5   SHAWNEE 8.6 8.7   * 146*   ALBUMIN  --  3.5   PROTTOTAL  --  7.3   BILITOTAL  --  0.4   ALKPHOS  --  112   ALT  --  27   AST  --  22         No results found for this or any previous visit (from the past 24 hour(s)).    Medications     - MEDICATION INSTRUCTIONS -         acetaminophen  1,000 mg Oral TID     gabapentin  200 mg Oral At Bedtime     levothyroxine  50 mcg Oral QAM AC     losartan  50 mg Oral QPM     omeprazole  20 mg Oral Daily     polyethylene glycol  17 g Oral Daily     sodium chloride (PF)  3 mL Intracatheter Q8H

## 2021-05-10 NOTE — PROGRESS NOTES
05/10/21 0828   Quick Adds   Type of Visit Initial PT Evaluation   Living Environment   People in home alone   Current Living Arrangements house   Home Accessibility stairs to enter home;stairs within home   Number of Stairs, Main Entrance 1   Stair Railings, Main Entrance none   Number of Stairs, Within Home, Primary   (13)   Stair Railings, Within Home, Primary railing on left side (ascending)   Transportation Anticipated family or friend will provide   Self-Care   Usual Activity Tolerance good   Current Activity Tolerance fair   Equipment Currently Used at Home walker, rolling   Activity/Exercise/Self-Care Comment Pt owns FWW and SEC.    Disability/Function   Fall history within last six months yes   Number of times patient has fallen within last six months 3   General Information   Onset of Illness/Injury or Date of Surgery 05/09/21   Referring Physician Reyna Cosby MD   Patient/Family Therapy Goals Statement (PT) Pt is open to TCU.    Pertinent History of Current Problem (include personal factors and/or comorbidities that impact the POC) 89 y/o female admitted after a fall with head lacerations, noted to have C6 fracture. PMH including CAD s/p CABG, atrial fibrillation on Coumadin, pacemaker, severe aortic stenosis s/p TAVR.    Existing Precautions/Restrictions fall;spinal  (Cervial collar on at all times.)   General Observations Pt sitting EOB upon arrival of therapist.    Cognition   Orientation Status (Cognition) oriented x 3   Affect/Mental Status (Cognition) WFL   Follows Commands (Cognition) WFL   Pain Assessment   Patient Currently in Pain   (Neck pain at reset: 2/10.)   Integumentary/Edema   Integumentary/Edema Comments Not abrasions to left side of forehead and nose.    Posture    Posture Comments Noted forward head and shoulder posture sitting EOB and standing at FWW.    Range of Motion (ROM)   ROM Comment B LEs WFL.    Strength   Strength Comments Not formally assessed, pt demonstrates at  least 3/5 grossly in B LEs with functional mobility.    Bed Mobility   Comment (Bed Mobility) NT. Pt sitting EOB upon arrival of therapist.    Transfers   Transfer Safety Comments Sit <> stand with FWW and CGA.   Gait/Stairs (Locomotion)   Comment (Gait/Stairs) Pt amb 5' with FWW and Trell.   Balance   Balance Comments Noted good seated balance and fair standing/dynamic balance.   Sensory Examination   Sensory Perception Comments Pt denied numbness/tingling in B LEs.   Clinical Impression   Criteria for Skilled Therapeutic Intervention yes, treatment indicated   PT Diagnosis (PT) Difficulty with functional mobility.    Influenced by the following impairments Pain, Impaired balance, Generalized weakness, Decreased activity tolerance   Functional limitations due to impairments Limited functional mobility requiring AD and assist.    Clinical Presentation Stable/Uncomplicated   Clinical Presentation Rationale Based on PMH, current presentation, and social support.    Clinical Decision Making (Complexity) low complexity   Therapy Frequency (PT) 5x/week   Predicted Duration of Therapy Intervention (days/wks) 4 days   Planned Therapy Interventions (PT) balance training;bed mobility training;gait training;stair training;ROM (range of motion);strengthening;transfer training   Risk & Benefits of therapy have been explained patient   PT Discharge Planning    PT Discharge Recommendation (DC Rec) Transitional Care Facility   PT Rationale for DC Rec Pt is below baseline in regards to functional mobility and will benefit from continued skilled PT intervention at TCU to progress independence and safety with functional mobility.   PT Brief overview of current status  Pt currently requiring CGA for transfers with FWW and Trell for limited ambulation distances.   Total Evaluation Time   Total Evaluation Time (Minutes) 5

## 2021-05-10 NOTE — UTILIZATION REVIEW
Admission Status; Secondary Review Determination    Under the authority of the Utilization Management Committee, the utilization review process indicated a secondary review on the above patient. The review outcome is based on review of the medical records, discussions with staff, and applying clinical experience noted on the date of the review.    (x) Inpatient Status Appropriate - This patient's medical care is consistent with medical management for inpatient care and reasonable inpatient medical practice.    RATIONALE FOR DETERMINATION:  89-year-old female with history of coronary disease, atrial fibrillation on anticoagulation, pacemaker.  She was walking back from the bathroom when she acutely became dizzy and fell to the ground striking her head with some abrasions on the left side of her forehead.  No loss of consciousness.  She did subsequently developed a headache with severe neck pain.  Patient has history of chronic vertigo as well as osteoporosis and CT imaging reveals a new right C6 acute vertebral fracture.  Patient will need thorough evaluation of etiology of fall, pain control, mobility assessment appropriate for inpatient management.    At the time of admission with the information available to the attending physician more than 2 nights Hospital complex care was anticipated, based on patient risk of adverse outcome if treated as outpatient and complex care required. Inpatient admission is appropriate based on the Medicare guidelines.    This document was produced using voice recognition software    The information on this document is developed by the utilization review team in order for the business office to ensure compliance. This only denotes the appropriateness of proper admission status and does not reflect the quality of care rendered.    The definitions of Inpatient Status and Observation Status used in making the determination above are those provided in the CMS Coverage Manual, Chapter 1  and Chapter 6, section 70.4.    Sincerely,    Yusuf Rosas MD  Utilization Review  Physician Advisor  [facility].

## 2021-05-10 NOTE — PROGRESS NOTES
[facility]    Neurosurgery  Daily Note    Assessment & Plan   89 year old history of fall DOI 5/9 with radiographic evidence of right C6 articular pillar lucency concerning for acute fracture and negative head CT. Denies pain, new tingles, numbness, weakness. Admits right fingers have had intermittent numbness since her stent procedure and denies worsening or new symptoms after fall.     Plan:  -Advance activity as tolerated  -Continue supportive and symptomatic treatment  -Pain control measures  -C spine collar   -Follow up in 4-6 weeks with cervical upright XR   -Plans discussed with Dr. Aiken  -Please page with questions    Rosie Osborne PA-C  [facility] Neurosurgery  [facility]  [address]  [address]  [place] [address]    Tel [phone]  Pager [phone]    Active Problems:    Near syncope    Closed head injury, initial encounter    Fall, initial encounter    Abrasion of forehead, initial encounter    Contusion of forehead, initial encounter    Closed nondisplaced fracture of sixth cervical vertebra, unspecified fracture morphology, initial encounter (H)    Interval History   Stable.    Physical Exam   Temp: 97.7  F (36.5  C) Temp src: Oral BP: (!) 169/78 Pulse: 77   Resp: 18 SpO2: 95 % O2 Device: None (Room air)    Vitals:    05/09/21 0213 05/09/21 0620   Weight: 72.6 kg (160 lb) 71.7 kg (158 lb)     Vital Signs with Ranges  Temp:  [97.7  F (36.5  C)-98.3  F (36.8  C)] 97.7  F (36.5  C)  Pulse:  [70-77] 77  Resp:  [18-20] 18  BP: (158-193)/(63-78) 169/78  SpO2:  [93 %-95 %] 95 %  I/O last 3 completed shifts:  In: 480 [P.O.:480]  Out: 1200 [Urine:1200]    Awake, alert, NAD  c spine collar in place  QUINONES on command with full strength   No clonus BL   Full sensation to light touch throughout    Medications     - MEDICATION INSTRUCTIONS -          acetaminophen  1,000 mg Oral TID     gabapentin  200 mg Oral At Bedtime     levothyroxine  50 mcg Oral QAM  AC     losartan  50 mg Oral QPM     omeprazole  20 mg Oral Daily     polyethylene glycol  17 g Oral Daily     sodium chloride (PF)  3 mL Intracatheter Q8H       Plans discussed with Dr. Aiken who was in agreement with janki SALVADOR [facility] Neurosurgery  [address] [address]    Tel [phone]  Pager [phone]

## 2021-05-10 NOTE — CONSULTS
"[facility]    Cardiac Electrophysiology Consultation     Date of Admission:  5/9/2021  Date of Consult (When I saw the patient): 05/10/21    Assessment & Plan   Madie Silva is a 89 year old female who was admitted on 5/9/2021. I was asked to see the patient for recurrent syncope with ?pacemaker malfunction. Delightful patient with past medical history significant for symptomatic severe aortic stenosis s/p TAVR (5/5/20) using a 20 mm Khan nati 3 valve, coronary artery disease status post four-vessel bypass with a LIMA to the LAD, SVG to ramus, SVG to OM, SVG to PDA in 2011, symptomatic bradycardia status post pacemaker implant, paroxysmal atrial fibrillation, and large hiatal hernia with much of the stomach in the chest.     Patient has a history of \"vertigo\" like for at least 20+ years. On 3/21 she felt quite lightheadedness when got up from the toilet and hitting her head on the windowsill. Denies LOC but declined to seek medical attn. Similar thing happened this time as well. Felt severe dizzy with room spinning sensation prior to falling onto the floor with evidence of right C6 acute fracture. neurosurg recommended support rx this time. Warfarin has been held.    Device interrogation reviewed by myself showing normal pacemaker function. ECG showed atrial paced and ventricular sensed with 1 pac with appropriately time out which is a normal pacemaker function. Overall, AF burden < 1% with last event on 4/24 and nothing on 3/21 nor this week.     Normal echo relatively.   No arrhythmic evidence for dizziness. Pt is scheduled to visit balance center outpt. In the meantime with description of \"room spinning\" sensation not unreasonable to start Meclizine for presumptive vertigo.     Patient has been hsp a few times past year for falls resulting in fractures. I am concerned about her bleeding risk on Warfarin for which she has been taking for pAF. I did discussed briefly with the patient " about TRINH occluder, Watchman but she is not interested at this point in time. I will reach out to her PCP, Dr. Obregon. Will sign off. Please call for questions.    Constantine Dye    Code Status    Full Code    Primary Care Physician   Bee Orr    History is obtained from the patient    Past Medical History   I have reviewed this patient's medical history and updated it with pertinent information if needed.   Past Medical History:   Diagnosis Date     Walters's esophagus 7/09     Basal cell carcinoma      Bradycardia     post-op CABG 2011 - s/p pacemaker implanted 2011     CHRONIC VERTIGO 9/99     Colonic Adenoma 3/90, 11/98     Coronary artery disease     CABG x4 2011: LIMA to her LAD, saphenous vein graft to her ramus, saphenous vein graft to her OM, saphenous vein graft to her PDA.     Costochondritis      Depression      DIABETES MELLITUS TYPE II 10/07     DJD      DVT of Leg, postop 11/09    6 months of warfarin     Gastritis 10/89     GERD      HIATAL HERNIA      HYPERLIPIDEMIA      HYPOTHYROIDISM (aka HASHIMOTO)      Impaired fasting glucose      L Ankle Fracture 10/09     Obesity, unspecified      Osteoporosis, unspecified      PERIPHERAL NEUROPATHY      Polymyalgia rheumatica (H)      Post Herpetic Neuralgia 4/03    R lumbar distribution     Restless leg syndrome      Small vessel cerebrovascular changes 9/99     Stricture and stenosis of esophagus 10/89    Dilated     Syncope     1/06, 8/08, 2/10     VITAMIN D DEFICIENCY 12/07    per Dr. Petty       Past Surgical History   I have reviewed this patient's surgical history and updated it with pertinent information if needed.  Past Surgical History:   Procedure Laterality Date     BYPASS GRAFT ARTERY CORONARY  11/2/2011    CABG x 4  Dr. PINEDA     CV ANGIOGRAM CORONARY GRAFT N/A 1/27/2020    Procedure: Angiogram Coronary Graft;  Surgeon: Jenny Luther MD;  Location: [facility] CARDIAC CATH LAB     CV AORTOGRAM ABDOMINAL N/A 1/27/2020     Procedure: Aortogram Abdominal;  Surgeon: Jenny Luther MD;  Location:  HEART CARDIAC CATH LAB     CV AORTOGRAM THORACIC N/A 1/27/2020    Procedure: Aortogram Thoracic;  Surgeon: Jenny Luther MD;  Location:  HEART CARDIAC CATH LAB     CV LEFT HEART CATH N/A 1/27/2020    Procedure: Right and Left heart catheterization for TAVR workup;  Surgeon: Jenny Luther MD;  Location:  HEART CARDIAC CATH LAB     CV TRANSCATHETER AORTIC VALVE REPLACEMENT N/A 5/5/2020    Procedure: Transcatheter Aortic Valve Replacement;  Surgeon: Jenny Luther MD;  Location:  HEART CARDIAC CATH LAB     EMBOLECTOMY LOWER EXTREMITY  11/9/2011    EMBOLECTOMY LOWER EXTREMITY; left leg femoral embolectomy.; Surgeon:JOLEEN BOONE; Location: OR     HYSTERECTOMY, CERVIX STATUS UNKNOWN  1978    partial hysterectomy     SURGICAL HISTORY OF -   10/09    L ankle fracture repair    Dr. Jose Roberto Trevino     [facility] NONSPECIFIC PROCEDURE  age 14    appendectomy     [facility] NONSPECIFIC PROCEDURE  as a child    T&A       Prior to Admission Medications   Prior to Admission Medications   Prescriptions Last Dose Informant Patient Reported? Taking?   Acetaminophen (TYLENOL ARTHRITIS PAIN PO) 5/8/2021 at HS Self Yes Yes   Sig: Take 650 mg by mouth At Bedtime   Cholecalciferol (VITAMIN D-400 PO) 5/8/2021 at AM Self Yes Yes   Sig: Take 1 tablet by mouth daily    HYDROcodone-acetaminophen (NORCO) 5-325 MG tablet  at PRN Self Yes Yes   Sig: Take 0.5-1 tablets by mouth every 6 hours as needed for moderate to severe pain   JANTOVEN ANTICOAGULANT 3 MG tablet 5/8/2021 at PM  Yes Yes   Sig: Take 1 tablet (3mg) by mouth every day. Take an additional half tablet on Mondays and Thursdays (for a total dose of 4.5mg on M/Th).   Lidocaine (LIDOCARE) 4 % Patch  at PRN Self Yes Yes   Sig: Place 1 patch onto the skin daily as needed Apply to left wrist topically at bedtime for Pain and remove per schedule. To prevent lidocaine toxicity,  patient should be patch free for 12 hrs daily.   Multiple Vitamins-Minerals (PRESERVISION AREDS 2 PO) 2021 at PM Self Yes Yes   Sig: Take 1 tablet by mouth 2 times daily    aspirin EC 81 MG EC tablet 2021 at AM Self No Yes   Sig: Take 1 tablet by mouth daily.   atorvastatin (LIPITOR) 80 MG tablet 2021 at PM Self No Yes   Sig: Take 1 tablet (80 mg) by mouth daily   calcium 600 MG tablet 2021 at PM Self Yes Yes   Sig: Take 1 tablet by mouth 2 times daily.   clindamycin (CLEOCIN) 300 MG capsule  at PRN Self No Yes   Si mg one hr prior to any dental appointment   furosemide (LASIX) 20 MG tablet 2021 at AM Self No Yes   Sig: Take 1 tablet (20 mg) by mouth daily   gabapentin (NEURONTIN) 100 MG capsule 2021 at HS Self No Yes   Sig: Take 2 capsules (200 mg) by mouth At Bedtime   levothyroxine (SYNTHROID/LEVOTHROID) 50 MCG tablet 2021 at AM Self No Yes   Sig: TAKE ONE TABLET BY MOUTH ONCE DAILY   losartan (COZAAR) 50 MG tablet 2021 at PM Self Yes Yes   Sig: Take 1 tablet (50 mg) by mouth every evening   metFORMIN (GLUCOPHAGE) 500 MG tablet 2021 at AM Self No Yes   Sig: TAKE ONE TABLET BY MOUTH DAILY WITH BREAKFAST   nitroGLYCERIN (NITROSTAT) 0.4 MG SL tablet  at PRN Self Yes Yes   Sig: Place 0.4 mg under the tongue every 5 minutes as needed. Up to 3 doses per episode    nystatin (MYCOSTATIN) 519528 UNIT/GM external powder  at PRN Self Yes Yes   Sig: Apply to breast folds topically two times a day as needed for redness   omeprazole (PRILOSEC) 20 MG DR capsule 2021 at AM Self No Yes   Sig: Take 1 capsule (20 mg) by mouth daily   polyethylene glycol (MIRALAX) powder 2021 at AM Self No Yes   Sig: Take 17 g (1 capful) by mouth daily      Facility-Administered Medications: None     Allergies   Allergies   Allergen Reactions     Fosamax [Alendronic Acid] GI Disturbance     Severe gastrointestinal reaction     Amoxicillin Hives     Bisphosphonates Other (See Comments)     Swallowing  disorder     Boniva [Ibandronate Sodium] Nausea and Vomiting, Diarrhea and Swelling     Arm swelling with IV only     Lisinopril Cough     Niacin Rash     Simvastatin Muscle Pain (Myalgia)       Social History   I have reviewed this patient's social history and updated it with pertinent information if needed. Madie Silva  reports that she has never smoked. She has never used smokeless tobacco. She reports that she does not drink alcohol or use drugs.    Family History   I have reviewed this patient's family history and updated it with pertinent information if needed.   Family History   Problem Relation Age of Onset     Alzheimer Disease Sister      Heart Disease Mother      Heart Disease Father      Heart Disease Son      Heart Disease Brother        Review of Systems   Comprehensive review of systems was performed with pertinent positives and negatives listed in assessment and plan section.    Physical Exam   Temp: 97.7  F (36.5  C) Temp src: Oral BP: (!) 169/78 Pulse: 77   Resp: 18 SpO2: 95 % O2 Device: None (Room air)    Vital Signs with Ranges  Temp:  [97.7  F (36.5  C)-98.3  F (36.8  C)] 97.7  F (36.5  C)  Pulse:  [70-77] 77  Resp:  [18-20] 18  BP: (158-193)/(63-78) 169/78  SpO2:  [93 %-95 %] 95 %  158 lbs 0 oz    Constitutional: awake, alert, cooperative, no apparent distress, and appears stated age  Eyes: Lids and lashes normal, pupils equal, round, extra ocular muscles intact, sclera clear, conjunctiva normal  ENT: Normocephalic, without obvious abnormality, atraumatic, sinuses nontender on palpation, external ears without lesions, oral pharynx with moist mucous membranes, tonsils without erythema or exudates, gums normal and good dentition.  Hematologic / Lymphatic: no cervical lymphadenopathy  Respiratory: No increased work of breathing, good air exchange, clear to auscultation bilaterally, no crackles or wheezing  Cardiovascular: Normal apical impulse, regular rate and rhythm, normal S1 and S2, no S3 or  S4, and no murmur noted  GI: No scars, normal bowel sounds, soft, non-distended, non-tender, no masses palpated, no hepatosplenomegally  Skin: no bruising or bleeding  Musculoskeletal: There is no redness, warmth, or swelling of the joints.  Full range of motion noted.    Neurologic: Awake, alert, oriented to name, place and time.   Neuropsychiatric: General: normal, calm and normal eye contact    Data   I personally reviewed all recent ECGs and images.  Results for orders placed or performed during the hospital encounter of 05/09/21 (from the past 24 hour(s))   EKG 12-lead, tracing only   Result Value Ref Range    Interpretation ECG Click View Image link to view waveform and result    CBC with platelets   Result Value Ref Range    WBC 11.0 4.0 - 11.0 10e9/L    RBC Count 4.59 3.8 - 5.2 10e12/L    Hemoglobin 9.8 (L) 11.7 - 15.7 g/dL    Hematocrit 32.1 (L) 35.0 - 47.0 %    MCV 70 (L) 78 - 100 fl    MCH 21.4 (L) 26.5 - 33.0 pg    MCHC 30.5 (L) 31.5 - 36.5 g/dL    RDW 17.6 (H) 10.0 - 15.0 %    Platelet Count 247 150 - 450 10e9/L   Basic metabolic panel   Result Value Ref Range    Sodium 139 133 - 144 mmol/L    Potassium 4.2 3.4 - 5.3 mmol/L    Chloride 107 94 - 109 mmol/L    Carbon Dioxide 25 20 - 32 mmol/L    Anion Gap 7 3 - 14 mmol/L    Glucose 122 (H) 70 - 99 mg/dL    Urea Nitrogen 17 7 - 30 mg/dL    Creatinine 0.87 0.52 - 1.04 mg/dL    GFR Estimate 58 (L) >60 mL/min/[1.73_m2]    GFR Estimate If Black 67 >60 mL/min/[1.73_m2]    Calcium 8.6 8.5 - 10.1 mg/dL     *Note: Due to a large number of results and/or encounters for the requested time period, some results have not been displayed. A complete set of results can be found in Results Review.

## 2021-05-10 NOTE — PLAN OF CARE
Pt A&Ox4, VSS on ex HTN. SBA GB/W. 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 Denies pain, scheduled tylenol. Tele: A paced. Baseline numbness R hand/fingers. New tingling in toes and restless legs overnight, better throughout shift today. . Has dizziness at times. Neurosurgery signed off- will follow up outpt in 1 month; pt to wear C collar until then. Discharge rec to TCU pending dizziness and BP stable.

## 2021-05-10 NOTE — CONSULTS
Care Management Initial Consult    General Information  Assessment completed with: Patient, Family, Oliver  Type of CM/SW Visit: Initial Assessment    Primary Care Provider verified and updated as needed:     Readmission within the last 30 days:        Reason for Consult: discharge planning  Advance Care Planning:     Pt lists her son Oliver as HCA and sonMannie as first alternate       Communication Assessment  Patient's communication style: spoken language (English or Bilingual)    Hearing Difficulty or Deaf: yes   Wear Glasses or Blind: yes    Cognitive  Cognitive/Neuro/Behavioral: WDL  Level of Consciousness: alert  Arousal Level: opens eyes spontaneously  Orientation: oriented x 4  Mood/Behavior: calm, cooperative  Best Language: 0 - No aphasia  Speech: clear, spontaneous, logical    Living Environment:   People in home: alone     Current living Arrangements: house      Able to return to prior arrangements: other (see comments)(TCU recommended)       Family/Social Support:  Care provided by: self, homecare agency  Provides care for:    Marital Status:   Children, Neighbor          Description of Support System: Supportive, Involved         Current Resources:   Patient receiving home care services:  Yes     Community Resources:    Equipment currently used at home: walker, rolling  Supplies currently used at home:      Employment/Financial:  Employment Status:          Financial Concerns:   [employer]          Lifestyle & Psychosocial Needs:       Socioeconomic History     Marital status:      Spouse name: Not on file     Number of children: Not on file     Years of education: Not on file     Highest education level: Not on file     Tobacco Use     Smoking status: Never Smoker     Smokeless tobacco: Never Used   Substance and Sexual Activity     Alcohol use: No     Drug use: No     Sexual activity: Never       Functional Status:  Prior to admission patient needed assistance: Pt has  "been independent in her home. She states she has been to rehab before at [place]. Pt states she is very independent and  Likes gardening and working in the yard.   SW spoke with pt's  Son Oliver, and he indicates that he speaks to pt daily and sees her about 3 times a week.     Mental Health Status:        Chemical Dependency Status:          Values/Beliefs:  Spiritual, Cultural Beliefs, Synagogue Practices, Values that affect care:             Pt prides herself in her independence.    Additional Information:  SW met with pt and she indicates that she does not want to go to TCU if she doesn't \"have to. Pt states that her son Oliver could stay with her for a few days but is going fishing this weekend.  She states that her daughter in law, grandchildren and neighbor could assist her after that.  SW asked to call  Oliver and pt agreed.  SW contacted Oliver and he stated that he does stay in close contact with pt but did not feel he could manage her care at home. He is able to assist with chores and errands but is not comfortable in providing physical cares and states that he will have this conversation with pt and she will need to go to TCU and then restart home care when she returns home.  Referrals sent to [facility], [facility] and [facility] per request.    [facility] [address]  [place] [id number]  [phone]  "

## 2021-05-10 NOTE — PLAN OF CARE
OT: Orders received. Chart reviewed and discussed with care team, including IP PT.  Discharge plans for TCU. Appropriate to defer OT to next level of care. Defer discharge recommendations to IP PT.   Will complete orders.

## 2021-05-10 NOTE — PHARMACY-ANTICOAGULATION SERVICE
Clinical Pharmacy - Warfarin Dosing Consult     Pharmacy has been consulted to manage this patient s warfarin therapy.  Indication: Atrial Fibrillation  Therapy Goal: INR 2-3  Warfarin Prior to Admission: Yes  Warfarin PTA Regimen: 4.5mg M/Th; 3mg ROW    INR   Date Value Ref Range Status   05/10/2021 2.16 (H) 0.86 - 1.14 Final   05/09/2021 2.42 (H) 0.86 - 1.14 Final       Recommend warfarin 5 mg today.  Pharmacy will monitor Madie Moralesman daily and order warfarin doses to achieve specified goal.      Please contact pharmacy as soon as possible if the warfarin needs to be held for a procedure or if the warfarin goals change.

## 2021-05-10 NOTE — PROGRESS NOTES
Clinic Care Coordination Contact  Ambulatory Care Coordination to Inpatient Care Management   Hand-In Communication    Date:  May 10, 2021  Name: Madie Silva is enrolled in Ambulatory Care Coordination program and I am the Lead Care Coordinator.  CC Contact Information: Epic [employer]sket + phone  Payor Source: Payor: [facility] / Plan: [employer] MEDICARE NON [employer] / Product Type: HMO /   Current services in place: None   Please see the CC Snaphot and Care Management Flowsheets for specific details of this Madie Silva care plan.   Additional details/specific concerns r/t this admission:    Home Safety (high fall risk, lives alone), Discharge Disposition (likely needs additional in-home assistance or more supportive living arrangement) and Readmission Risk 83%    I will follow this admission in Epic. Please feel free to contact me with questions or for further collaboration in discharge planning.    Jeet Parish, RN  Clinic Care Coordinator  [facility]  [facility] [address] [place], [facility] [place] [facility]  Ph: [phone]

## 2021-05-11 NOTE — PLAN OF CARE
Physical Therapy Discharge Summary    Reason for therapy discharge:    Discharged to transitional care facility.    Progress towards therapy goal(s). See goals on Care Plan in [facility] electronic health record for goal details.  Goals not met.  Barriers to achieving goals:   discharge from facility.    Therapy recommendation(s):    Continued therapy is recommended.  Rationale/Recommendations:  To progress independence and safety with functional mobility.

## 2021-05-11 NOTE — DISCHARGE SUMMARY
[facility]  Hospitalist Discharge Summary      Date of Admission:  5/9/2021  Date of Discharge:  5/11/2021  Discharging Provider: Katina Sethi MD    Discharge Diagnoses   1. Fall due to vertigo   2. Chronic atrial fibrillation  3. History of symptomatic bradycardia s/p PPM  4. Severe aortic stenosis s/p TAVR (5/2020))  5. Osteoporosis  6. Acute traumatic C6 fracture  7. CAD, native heart, native vessels s/p CABG  8. Essential hypertension  9. DM2 without complications  10. Hypothyroidism  11. Walters's esophagus  12. GERDChronic anemia  13. Chronic pain syndrome    Follow-ups Needed After Discharge   Follow-up Appointments     Follow Up and recommended labs and tests      Follow up with TCU physician.  The following labs/tests are recommended:   INR on 5/12.         Follow-up and recommended labs and tests       Follow up in 4-6 weeks with cervical x rays with Neurosurgery  Call to schedule: [phone]         Follow-up and recommended labs and tests       Follow up in 4-6 weeks with upright cervical spine XR           Discharge Disposition   Discharged to home  Condition at discharge: Satisfactory    Hospital Course   Madie Silva is a 89 year old female with hx of CAD s/p CABG (2011), severe aortic stenosis s/p TAVR (5/2020), chronic a-fib on chronic anticoagulation with warfarin, symptomatic bradycardia s/p PPM, DM2, and vertigo who was admitted on 5/9/2021 for C6 fracture following a fall     Fall due to vertigo   Chronic atrial fibrillation, SSS s/p PPM  Severe aortic stenosis s/p TAVR (5/2020))  Osteoporosis  Presented for evaluation following a fall that was preceded by dizziness. No LOC. Head CT on arrival showed no acute intracranial pathology She describes a spinning sensation over lightheadedness. She does have a history of vertigo with an upcoming PT appointment for treatment. There was some concern for pacemaker malfunction on admission, but pacemaker was interrogated  and evaluated by EP Cardiology who did not note any significant arrhythmias. She was technically orthostatic during admission (SBP 150s to 110s) with questionable reproduction of symptoms. TTE (5/9) essentially unchanged from prior with LVEF 65-70%, bioprosthetic valve with mean gradient 16 mmHg, mild mitral and tricuspid regurgitation  - Meclizine PRN ordered at discharge  - She was recently initiated per outpatient Cardiology on a trial of Lasix for chronic ZHAO with questionable improvement; this will be held in case it could be contributing to orthostasis.  - Continues on therapeutic warfarin at discharge for a-fib primary stroke prevention    Acute traumatic C6 fracture  Noted on admission CT-C spine. Neurosurgery was consulted on admission and recommended non-operative management  - C-collar in place  - She will be discharged on APAP 1000 mg TID, oxycodone 2.5 mg q6h prn #5  - Follow up with Dr. Aiken in Neurosurgery clinic in 4-6 weeks with follow up cervical XR    CAD, native heart, native vessels s/p CABG  Essential hypertension  [PTA: aspirin 81 mg daily, losartan 50 mg daily, Lasix 20 mg daily, atorvastatin 80 mg daily]  - Holding Lasix as noted above  - Continues on other home meds    DM2 without complications  A1c 6.8  - Resume PTA metformin at discharge    Hypothyroidism  Chronic and stable on levothyroxine    Walters's esophagus  GERD  Chronic and stable on omeprazole    Chronic anemia  Hgb stable in mid 9 range    Chronic pain syndrome  - Continues on PTA gabapentin  - Holding PTA Norco while on scheduled APAP, oxycodone prn    Consultations This Hospital Stay   1. NEUROSURGERY IP CONSULT  2. ELECTROPHYSIOLOGY IP CONSULT  3. PHYSICAL THERAPY ADULT IP CONSULT  4. OCCUPATIONAL THERAPY ADULT IP CONSULT  5. CARE MANAGEMENT / SOCIAL WORK IP CONSULT    Code Status   Full Code    Time Spent on this Encounter   I, Katina Sethi MD, personally saw the patient today and spent 35 minutes discharging this  patient.       Katina Sethi MD  [place] [address] [facility]  [address] [place] [address]  Phone: [phone]  ______________________________________________________________________    Physical Exam   Vital Signs: Temp: 97.6  F (36.4  C) Temp src: Oral BP: (!) 143/62 Pulse: 79   Resp: 18 SpO2: 98 % O2 Device: None (Room air)    Weight: 158 lbs 0 oz    Constitutional: Resting comfortably, NAD  HEENT: Sclera white, conjunctiva clear, EOMI, MMM  Respiratory: Breathing non-labored. Lungs CTAB - no wheezes/crackles/rhonchi  Cardiovascular: Heart irregular rhythm, normal rate, 2/6 systolic murmur. No pedal edema.   GI: +BS. Abd soft/NT  Skin: Warm and dry. No rash.  Musculoskeletal: Normal muscle bulk and tone  Neurologic: Alert and appropriate. QUINONES  Psychiatric: Calm and cooperative    Primary Care Physician   Bee Orr    Discharge Orders      X-ray Cervical spine 2-3 vws    C6 fracture     Follow-up and recommended labs and tests     Follow up in 4-6 weeks with cervical x rays with Neurosurgery  Call to schedule: [phone]     Activity    Your activity upon discharge: Discharge instructions:  Cervical spine collar at all times  Limit lifting to 10 pounds, no bending/twisting until follow up visit.  Ok to walk as tolerated, avoid bed rest and prolonged sitting.  No contact sports until after follow up visit. No high impact activities such as running/jogging, snowmobile or 4 llanes riding or any other recreational vehicles. Avoid jostling/jarring activities.   Do NOT drive while taking narcotic pain medication.    Call [facility] Neurosurgery at [phone] for any other questions and concerns.     Follow-up and recommended labs and tests     Follow up in 4-6 weeks with upright cervical spine XR     Activity    Your activity upon discharge:   - continue to wear cervical collar at all times when out of bed  - Repeat XR of cervical spine AP and Lateral in 4-6  weeks.   - Return to clinic following repeat XR spine in 4-6 weeks   - Call the Spine [facility] Brain clinic for any questions or concerns during interim, at [phone]  - Seek medical attention immediately if any paresthesias, weakness, gait instability, bowel/bladder incontinence, new pain.     General info for SNF    Length of Stay Estimate: Short Term Care: Estimated # of Days <30  Condition at Discharge: Improving  Level of care:skilled   Rehabilitation Potential: Excellent  Admission H&P remains valid and up-to-date: Yes  Recent Chemotherapy: N/A  Use Nursing Home Standing Orders: Yes     Mantoux instructions    Give two-step Mantoux (PPD) Per Facility Policy Yes     Reason for your hospital stay    Fall due to dizziness. You sustained a cervical fracture     Daily weights    Call Provider for weight gain of more than 2 pounds per day or 5 pounds per week.     Follow Up and recommended labs and tests    Follow up with TCU physician.  The following labs/tests are recommended: INR on 5/12.     Activity - Up with nursing assistance     Physical Therapy Adult Consult    Evaluate and treat as clinically indicated.    Reason: Fall due to dizziness, C6 fracture     Occupational Therapy Adult Consult    Evaluate and treat as clinically indicated.    Reason:  Fall due to dizziness, C6 fracture     Fall precautions     Advance Diet as Tolerated    Follow this diet upon discharge: Regular diet       Significant Results and Procedures   Most Recent 3 CBC's:  Recent Labs   Lab Test 05/10/21  0647 05/09/21  0218 04/26/21  1345   WBC 11.0 8.7 7.8   HGB 9.8* 9.6* 10.3*   MCV 70* 71* 71*    278 257     Most Recent 3 BMP's:  Recent Labs   Lab Test 05/10/21  0647 05/09/21  0218 04/26/21  1345    137 136   POTASSIUM 4.2 3.7 4.6   CHLORIDE 107 105 105   CO2 25 27 26   BUN 17 24 22   CR 0.87 0.99 0.93   ANIONGAP 7 5 5   SHAWNEE 8.6 8.7 8.8   * 146* 123*   ,   Results for orders placed or performed during the  hospital encounter of 05/09/21   CT Head w/o Contrast    Narrative    EXAM: CT HEAD W/O CONTRAST  LOCATION: [facility]  DATE/TIME: 5/9/2021 2:50 AM    INDICATION: Trauma - Head Injury  COMPARISON: None.  TECHNIQUE: Routine CT Head without IV contrast. Multiplanar reformats. Dose reduction techniques were used.    FINDINGS:  INTRACRANIAL CONTENTS: No intracranial hemorrhage, extraaxial collection, or mass effect.  No CT evidence of acute infarct. Mild to moderate presumed chronic small vessel ischemic changes. Moderate generalized volume loss. No hydrocephalus. Left parietal   periventricular small chronic infarct.    VISUALIZED ORBITS/SINUSES/MASTOIDS: No intraorbital abnormality. No paranasal sinus mucosal disease. No middle ear or mastoid effusion.    BONES/SOFT TISSUES: Left frontal scalp hematoma. No acute displaced calvarial fracture Vascular calcifications. Dental amalgam resulting in streak artifact.      Impression    IMPRESSION:  1.  No acute intracranial process.  2.  Chronic intracranial changes described above.   CT Cervical Spine w/o Contrast    Narrative    EXAM: CT CERVICAL SPINE W/O CONTRAST  LOCATION: [facility]  DATE/TIME: 5/9/2021 2:50 AM    INDICATION: Trauma - C-Spine Injury  COMPARISON: CT cervical spine 07/29/2020  TECHNIQUE: Routine CT Cervical Spine without IV contrast. Multiplanar reformats. Dose reduction techniques were used.    FINDINGS:  VERTEBRA: Normal vertebral body heights and alignment. Right C6 articular pillar new subtle lucency concerning for acute fracture most apparent on sagittal reformat. (Sagittal image 27). No additional acute fractures. No acute post traumatic   subluxations. Diffuse bony demineralization.    CANAL/FORAMINA: Multilevel spondylosis resulting in various levels and degrees of neural foraminal stenosis. No significant central canal stenosis.    PARASPINAL: Vascular calcifications. Bilateral extracranial carotid arteries demonstrate  a retropharyngeal medially deviated course impressing upon the aerodigestive airway.  Partially visualized visualized pacer leads. Interstitial edema.      Impression    IMPRESSION:  1.  Right C6 articular pillar new subtle lucency concerning for acute fracture.  2.  No additional acute fractures.  3.  Diffuse bony demineralization.  4.  Multilevel spondylosis.    DR JUAN LUIS RODRIGUEZ was notified by Dr Pete Chavez at  3:58 AM 2021.   XR Chest 1 View    Narrative    EXAM: XR CHEST 1 VIEW  LOCATION: [facility] [place]  DATE/TIME: 2021 2:32 AM    INDICATION: Trauma, pain.  COMPARISON: 2020.      Impression    IMPRESSION: Heart size within normal limits, status post sternotomy, CABG, and TAVR. Left-sided dual-chamber cardiac pacer. Low lung volumes with bilateral perihilar infiltrates. No visible pneumothorax or pleural effusion.   Echocardiogram Limited    Narrative    [id number]  [id number]  [id number]  [id number]^ROOSEVELT^ADENIKE^JAIR     [facility]  Echocardiography Laboratory  [address] [place] [address]     Name: CORDELL AMEZQUITA  MRN: [id number]  : 1929  Study Date: 2021 10:11 AM  Age: 89 yrs  Gender: Female  [place] [facility] [place]  Reason For Study: Syncope  Ordering Physician: ADENIKE ALBERT  Referring Physician: YAMILA VICENTE  Performed By: Nan Spring     BSA: 1.8 m2  Height: 65 in  Weight: 158 lb  HR: 91  BP: 167/75 mmHg  ______________________________________________________________________________  Procedure  Limited Portable Echo Adult.  ______________________________________________________________________________  Interpretation Summary     1. The left ventricle is normal in size. There is normal left ventricular wall  thickness. Hyperdynamic left ventricular function. The visual ejection  fraction is estimated at 65-70%. Grade I or early diastolic dysfunction. No  regional wall motion abnormalities noted.  2. There is a catheter/pacemaker lead seen in  the right ventricle. The right  ventricle is not well visualized.  3. There is a bioprosthetic aortic valve. (20mm Khan Nica 3 implanted on  05/05/2020). The mean transaortic gradient is 16 mm Hg, with a peak gradient  of 34 mm Hg (DVI = 0.37). These values are mildly elevated for this type of  prosthesis. There is trace to mild paravalvular regurgitation.  4. Trace to mild mitral and tricuspid regurgitation.  5. No pericardial effusion.  6. In comparison to the previous report dated 04/26/2021, the transaortic  gradients are slightly higher on today's study. The other findings are  similar.  ______________________________________________________________________________  Left Ventricle  The left ventricle is normal in size. There is normal left ventricular wall  thickness. Hyperdynamic left ventricular function. The visual ejection  fraction is estimated at 65-70%. Grade I or early diastolic dysfunction. No  regional wall motion abnormalities noted.     Right Ventricle  There is a catheter/pacemaker lead seen in the right ventricle. The right  ventricle is not well visualized.     Atria  There is mod-severe biatrial enlargement.     Mitral Valve  There is moderate mitral annular calcification. The mitral valve leaflets are  mildly thickened. There is trace mitral regurgitation.     Tricuspid Valve  There is trace to mild tricuspid regurgitation. The right ventricular systolic  pressure is approximated at 40.6 mmHg plus the right atrial pressure.     Aortic Valve  There is a bioprosthetic aortic valve. (20mm Khan Nica 3 implanted on  05/05/2020). The mean transaortic gradient is 16 mm Hg, with a peak gradient  of 34 mm Hg (DVI = 0.37). These values are mildly elevated for this type of  prosthesis. There is trace to mild paravalvular regurgitation.     Pulmonic Valve  There is no pulmonic valvular stenosis.     Vessels  The inferior vena cava is normal.     Pericardium  There is no pericardial effusion.      Rhythm  Sinus rhythm was noted.  ______________________________________________________________________________  MMode/2D Measurements & Calculations  IVSd: 1.0 cm     LVIDd: 4.1 cm  LVIDs: 2.6 cm  LVPWd: 1.1 cm  FS: 37.5 %  LV mass(C)d: 144.8 grams  LV mass(C)dI: 80.9 grams/m2  LVOT diam: 2.0 cm  LVOT area: 3.1 cm2  RWT: 0.51     Doppler Measurements & Calculations  Ao V2 max: 304.5 cm/sec  Ao max P.1 mmHg  Ao V2 mean: 203.0 cm/sec  Ao mean P.9 mmHg  Ao V2 VTI: 57.2 cm  VICKY(I,D): 1.3 cm2  VICKY(V,D): 1.1 cm2  LV V1 max P.0 mmHg  LV V1 max: 111.6 cm/sec  LV V1 VTI: 24.0 cm  SV(LVOT): 74.3 ml  SI(LVOT): 41.5 ml/m2  TR max kevin: 318.5 cm/sec  TR max P.6 mmHg  AV Kevin Ratio (DI): 0.37  VICKY Index (cm2/m2): 0.73     ______________________________________________________________________________  Report approved by: Randy Garcia 2021 12:07 PM           *Note: Due to a large number of results and/or encounters for the requested time period, some results have not been displayed. A complete set of results can be found in Results Review.       Discharge Medications   Discharge Medication List as of 2021 11:25 AM      START taking these medications    Details   acetaminophen (TYLENOL) 500 MG tablet Take 2 tablets (1,000 mg) by mouth 3 times daily, Transitional      meclizine (ANTIVERT) 12.5 MG tablet Take 1 tablet (12.5 mg) by mouth 3 times daily as needed for dizziness, Transitional      oxyCODONE (ROXICODONE) 5 MG tablet Take 0.5 tablets (2.5 mg) by mouth every 6 hours as needed for severe pain, Disp-5 tablet, R-0, Local Print         CONTINUE these medications which have NOT CHANGED    Details   Acetaminophen (TYLENOL ARTHRITIS PAIN PO) Take 650 mg by mouth At Bedtime, Historical      aspirin EC 81 MG EC tablet Take 1 tablet by mouth daily., Transitional      atorvastatin (LIPITOR) 80 MG tablet Take 1 tablet (80 mg) by mouth daily, Disp-90 tablet, R-3, E-PrescribeProfile Rx: patient will call  when needed      calcium 600 MG tablet Take 1 tablet by mouth 2 times daily., Historical      Cholecalciferol (VITAMIN D-400 PO) Take 1 tablet by mouth daily , Historical      clindamycin (CLEOCIN) 300 MG capsule 600 mg one hr prior to any dental appointment, Disp-2 capsule, R-3, E-Prescribe      gabapentin (NEURONTIN) 100 MG capsule Take 2 capsules (200 mg) by mouth At Bedtime, Disp-180 capsule, R-1, E-Prescribe      JANTOVEN ANTICOAGULANT 3 MG tablet Take 1 tablet (3mg) by mouth every day. Take an additional half tablet on Mondays and Thursdays (for a total dose of 4.5mg on M/Th)., MATTY, Historical      levothyroxine (SYNTHROID/LEVOTHROID) 50 MCG tablet TAKE ONE TABLET BY MOUTH ONCE DAILY, Disp-90 tablet, R-3, E-Prescribe      Lidocaine (LIDOCARE) 4 % Patch Place 1 patch onto the skin daily as needed Apply to left wrist topically at bedtime for Pain and remove per schedule. To prevent lidocaine toxicity, patient should be patch free for 12 hrs daily.Historical      losartan (COZAAR) 50 MG tablet Take 1 tablet (50 mg) by mouth every evening, Disp-90 tablet, R-3, Historical      metFORMIN (GLUCOPHAGE) 500 MG tablet TAKE ONE TABLET BY MOUTH DAILY WITH BREAKFAST, Disp-90 tablet,R-3, E-Prescribe      Multiple Vitamins-Minerals (PRESERVISION AREDS 2 PO) Take 1 tablet by mouth 2 times daily , Historical      nitroGLYCERIN (NITROSTAT) 0.4 MG SL tablet Place 0.4 mg under the tongue every 5 minutes as needed. Up to 3 doses per episode , Historical      nystatin (MYCOSTATIN) 269342 UNIT/GM external powder Apply to breast folds topically two times a day as needed for rednessHistorical      omeprazole (PRILOSEC) 20 MG DR capsule Take 1 capsule (20 mg) by mouth daily, Disp-90 capsule,R-3, E-Prescribe      polyethylene glycol (MIRALAX) powder Take 17 g (1 capful) by mouth daily, Disp-1 Bottle,R-3, E-Prescribe         STOP taking these medications       furosemide (LASIX) 20 MG tablet Comments:   Reason for Stopping:          HYDROcodone-acetaminophen (NORCO) 5-325 MG tablet Comments:   Reason for Stopping:             Allergies   Allergies   Allergen Reactions     Fosamax [Alendronic Acid] GI Disturbance     Severe gastrointestinal reaction     Amoxicillin Hives     Bisphosphonates Other (See Comments)     Swallowing disorder     Boniva [Ibandronate Sodium] Nausea and Vomiting, Diarrhea and Swelling     Arm swelling with IV only     Lisinopril Cough     Niacin Rash     Simvastatin Muscle Pain (Myalgia)

## 2021-05-11 NOTE — PROGRESS NOTES
ANTICOAGULATION  MANAGEMENT: Discharge Review    Madie Silva chart reviewed for anticoagulation continuity of care    Hospital Admission on 05/09-05/11 for Fall 2/2 vertigo.    Discharge disposition: TCU    Results:    Recent labs: (last 7 days)     05/09/21  0218 05/10/21  1308 05/11/21  0744   INR 2.42* 2.16* 2.05*     Anticoagulation inpatient management:     less warfarin administered than maintenance regimen    Anticoagulation discharge instructions:     Warfarin dosing: home regimen continued   Bridging: No   INR goal change: No      Medication changes affecting anticoagulation: No    Additional factors affecting anticoagulation: No    Plan     Pt is discharging to [facility]. Spoke with staff there who state that INRs will be monitored by in-house physician. ACC to resume management when pt discharges from TCU.    Phillip Siddiqui RN

## 2021-05-11 NOTE — TELEPHONE ENCOUNTER
Left voice message for patient to return call to schedule a follow up appointment in 4-6 weeks from 5/10/21 with Xray's prior.

## 2021-05-11 NOTE — PROGRESS NOTES
"Patient presented to ED S/P near syncope with fall. ED MD thought EKG showed signs of malfunction. After pt was admitted cardiology was consulted. Interrogation ordered and found to be normal.  \"Addendum:  Pacemaker has been interrogated and there is no concerns for failure.  The pacemaker noted a atrial pacing rate of around 80%.  This matches with EKG\"    Medtronic Sensia (D) PPM  Presenting rhythm is AP/VS; programmed DDDR 70/130. 88% A-paced; 8 % V paced. No arrhythmias correlating with near syncope event. No new finding. Continue on current schedule. REJI Fischer  "

## 2021-05-11 NOTE — PLAN OF CARE
Pt a/o Brusing on face ,bathroom with sba walker belt.tele paced c collor on. Dced to rehab with paperwork and belongings.

## 2021-05-11 NOTE — PLAN OF CARE
A/O x 4, vss, up with sba walker to bathroom. Had some tingling bilaterally on feet that she says has improved a lot. Abrasion on forehead left open to air. Has c-collar on neck. Anticipating discharge today.

## 2021-05-11 NOTE — PROGRESS NOTES
Care Management Discharge Note    Discharge Date: 05/11/21       Discharge Disposition: Transitional Care, Skilled Nursing Facilty    Discharge Services: None    Discharge DME: None    Discharge Transportation: none    Private pay costs discussed: Not applicable    PAS Confirmation Code: [id number]  Patient/family educated on Medicare website which has current facility and service quality ratings: no    Education Provided on the Discharge Plan:    Persons Notified of Discharge Plans: pt   Patient/Family in Agreement with the Plan: yes    Handoff Referral Completed: No    Additional Information:  Pt was accepted at all facilities. Pt accepted bed at [facility]. Transport scheduled for 12pm. Son updated. Orders faxed.     PAS-RR    D: Per DHS regulation, SW completed and submitted PAS-RR to [place] [employer] Direct Connect via the [employer] Line.  PAS-RR confirmation # is : [id number]    I: SW spoke with pt and they are aware a PAS-RR has been submitted.  SW reviewed with pt that they may be contacted for a follow up appointment within 10 days of hospital discharge if their SNF stay is < 30 days.  Contact information for [employer] was also provided.    A: pt verbalized understanding.    P: Further questions may be directed to [employer] Line at #[phone], option #4 for PAS-RR staff.    ALFRED Garrison

## 2021-05-11 NOTE — PLAN OF CARE
Up with SBA and walker. States she is restless tonight- ambulated in halls x2. po ok. C-collar on- instructed to keep chin rest under her chin. Did not need more reminders. Anticipating discharge tomorrow.

## 2021-05-12 NOTE — PATIENT INSTRUCTIONS
[facility] TELEPHONE ENCOUNTER    Chief Complaint   Patient presents with     Hospital F/U       Madie Silva is a 89 year old  (7/21/1929),Nurse called today to report: patient is diabetic, need to order BG checks    ASSESSMENT/PLAN  DM    Please check BG daily at alternating times       Electronically signed by:   HAROLDO Acuña CNP

## 2021-05-12 NOTE — PROGRESS NOTES
[facility] GERIATRIC SERVICES  PRIMARY CARE PROVIDER AND CLINIC:  Bee Orr MD, [address] [place] [address] / [facility] [id number]  Chief Complaint   Patient presents with     Hospital F/U      [facility] Medical Record Number:  [id number]  Place of Service where encounter took place:  [facility] [[id number]]    Madie Silva  is a 89 year old  (7/21/1929), admitted to the above facility from  [facility]. Hospital stay 5/9/21 through 5/11/21.   HPI:    89 year old female with hx of CAD s/p CABG (2011), severe aortic stenosis s/p TAVR (5/2020), chronic a-fib on warfarin, symptomatic bradycardia s/p PPM, DM2, vertigo hospitalized for C6 fracture following a fall due to dizziness. Lasix held due to ?orthostasis, on meclizine PRN. C-collar in place, f/u with Dr Aiken 4-6 weeks and continue pain meds. CAD, HTN on ASA, losartan, statin. DM2 managed with metformin. Hypothyroidism on levothyroxine. GERD controlled with PPI. Anemia with stable Hgb 9 range. Chronic pain on PTA Neurontin. To TCU for therapies.     Seen today for initial TCU visit. Reports doing well. Reviewed POLST: asks for Full Code. PTA lives alone in own home, up with walker. Has had headaches on and off since fall and PRN pain meds effective. No chest pain, dyspnea, bowel or bladder issues. Since admission BP range 153-180/64-93 and HR , sats over 90% on room air.       CODE STATUS/ADVANCE DIRECTIVES DISCUSSION:  Prior  CPR/Full code   ALLERGIES:   Allergies   Allergen Reactions     Fosamax [Alendronic Acid] GI Disturbance     Severe gastrointestinal reaction     Amoxicillin Hives     Bisphosphonates Other (See Comments)     Swallowing disorder     Boniva [Ibandronate Sodium] Nausea and Vomiting, Diarrhea and Swelling     Arm swelling with IV only     Lisinopril Cough     Niacin Rash     Simvastatin Muscle Pain (Myalgia)      PAST MEDICAL HISTORY:   Past Medical History:   Diagnosis Date     Walters's esophagus 7/09      Basal cell carcinoma      Bradycardia     post-op CABG 2011 - s/p pacemaker implanted 2011     CHRONIC VERTIGO 9/99     Colonic Adenoma 3/90, 11/98     Coronary artery disease     CABG x4 2011: LIMA to her LAD, saphenous vein graft to her ramus, saphenous vein graft to her OM, saphenous vein graft to her PDA.     Costochondritis      Depression      DIABETES MELLITUS TYPE II 10/07     DJD      DVT of Leg, postop 11/09    6 months of warfarin     Gastritis 10/89     GERD      HIATAL HERNIA      HYPERLIPIDEMIA      HYPOTHYROIDISM (aka HASHIMOTO)      Impaired fasting glucose      L Ankle Fracture 10/09     Obesity, unspecified      Osteoporosis, unspecified      PERIPHERAL NEUROPATHY      Polymyalgia rheumatica (H)      Post Herpetic Neuralgia 4/03    R lumbar distribution     Restless leg syndrome      Small vessel cerebrovascular changes 9/99     Stricture and stenosis of esophagus 10/89    Dilated     Syncope     1/06, 8/08, 2/10     VITAMIN D DEFICIENCY 12/07    per Dr. Petty      PAST SURGICAL HISTORY:   has a past surgical history that includes NONSPECIFIC PROCEDURE (age 14); NONSPECIFIC PROCEDURE (as a child); hysterectomy, cervix status unknown (1978); surgical history of -  (10/09); Bypass graft artery coronary (11/2/2011); Embolectomy lower extremity (11/9/2011); Left Heart Cath (N/A, 1/27/2020); Aortogram Abdominal (N/A, 1/27/2020); Aortogram Thoracic (N/A, 1/27/2020); Angiogram Coronary Graft (N/A, 1/27/2020); and Transcatheter Aortic Valve Replacement (N/A, 5/5/2020).  FAMILY HISTORY: family history includes Alzheimer Disease in her sister; Heart Disease in her brother, father, mother, and son.  SOCIAL HISTORY:   reports that she has never smoked. She has never used smokeless tobacco. She reports that she does not drink alcohol or use drugs.  Patient's living condition: lives alone    Post Discharge Medication Reconciliation Status: discharge medications reconciled and changed, per note/orders  Current  Outpatient Medications   Medication Sig     Acetaminophen (TYLENOL ARTHRITIS PAIN PO) Take 650 mg by mouth At Bedtime     acetaminophen (TYLENOL) 500 MG tablet Take 2 tablets (1,000 mg) by mouth 3 times daily     aspirin EC 81 MG EC tablet Take 1 tablet by mouth daily.     atorvastatin (LIPITOR) 80 MG tablet Take 1 tablet (80 mg) by mouth daily     calcium 600 MG tablet Take 1 tablet by mouth 2 times daily.     Cholecalciferol (VITAMIN D-400 PO) Take 1 tablet by mouth daily      gabapentin (NEURONTIN) 100 MG capsule Take 2 capsules (200 mg) by mouth At Bedtime     JANTOVEN ANTICOAGULANT 3 MG tablet Take 3 mg by mouth daily      levothyroxine (SYNTHROID/LEVOTHROID) 50 MCG tablet TAKE ONE TABLET BY MOUTH ONCE DAILY     Lidocaine (LIDOCARE) 4 % Patch Place 1 patch onto the skin daily as needed Apply to left wrist topically at bedtime for Pain and remove per schedule. To prevent lidocaine toxicity, patient should be patch free for 12 hrs daily.     losartan (COZAAR) 50 MG tablet Take 1 tablet (50 mg) by mouth every evening     meclizine (ANTIVERT) 12.5 MG tablet Take 1 tablet (12.5 mg) by mouth 3 times daily as needed for dizziness     metFORMIN (GLUCOPHAGE) 500 MG tablet TAKE ONE TABLET BY MOUTH DAILY WITH BREAKFAST     Multiple Vitamins-Minerals (PRESERVISION AREDS 2 PO) Take 1 tablet by mouth 2 times daily      nitroGLYCERIN (NITROSTAT) 0.4 MG SL tablet Place 0.4 mg under the tongue every 5 minutes as needed. Up to 3 doses per episode      nystatin (MYCOSTATIN) 815773 UNIT/GM external powder Apply to breast folds topically two times a day as needed for redness     omeprazole (PRILOSEC) 20 MG DR capsule Take 1 capsule (20 mg) by mouth daily     oxyCODONE (ROXICODONE) 5 MG tablet Take 0.5 tablets (2.5 mg) by mouth every 6 hours as needed for severe pain     polyethylene glycol (MIRALAX) powder Take 17 g (1 capful) by mouth daily     clindamycin (CLEOCIN) 300 MG capsule 600 mg one hr prior to any dental appointment  (Patient not taking: Reported on 5/12/2021)     No current facility-administered medications for this visit.        ROS:  10 point ROS of systems including Constitutional, Eyes, Respiratory, Cardiovascular, Gastroenterology, Genitourinary, Integumentary, Musculoskeletal, Psychiatric were all negative except for pertinent positives noted in my HPI.    Vitals:  BP (!) 165/79   Pulse 108   Temp 97.5  F (36.4  C)   Resp 20   Wt 70.2 kg (154 lb 12.8 oz)   SpO2 96%   BMI 25.76 kg/m    Exam:  GENERAL APPEARANCE:  Alert, in no distress, pleasant, cooperative, oriented x 4  EYES:  EOM, lids, pupils and irises normal, sclera clear and conjunctiva normal, no discharge or mattering on lids or lashes noted  ENT:  Mouth normal, moist mucous membranes, nose normal without drainage or crusting, external ears without lesions, hearing acuity impaired  NECK: c-collar in place  RESP:  respiratory effort normal, no chest wall tenderness, no respiratory distress, Lung sounds clear, patient is on room air  CV:  Auscultation of heart done, rate and rhythm controlled and regular, no murmur, no rub or gallop. Edema none bilateral lower extremities.   ABDOMEN:  normal bowel sounds, soft, nontender, no palpable masses.  M/S:   Gait and station not assessed, no tenderness or swelling of the joints; able to move all extremities, digits normal  SKIN:  Inspection and palpation of skin and subcutaneous tissue: bilateral bruises around eyes, abrasion left forehead   NEURO: cranial nerves 2-12 grossly intact, no facial asymmetry, no speech deficits and able to follow directions, moves all extremities symmetrically  PSYCH:  insight and judgement and memory appear intact, affect and mood normal     Lab/Diagnostic data:  Most Recent 3 CBC's:  Recent Labs   Lab Test 05/10/21  0647 05/09/21  0218 04/26/21  1345   WBC 11.0 8.7 7.8   HGB 9.8* 9.6* 10.3*   MCV 70* 71* 71*    278 257     Most Recent 3 BMP's:  Recent Labs   Lab Test 05/10/21  0647  05/09/21  0218 04/26/21  1345    137 136   POTASSIUM 4.2 3.7 4.6   CHLORIDE 107 105 105   CO2 25 27 26   BUN 17 24 22   CR 0.87 0.99 0.93   ANIONGAP 7 5 5   SHAWNEE 8.6 8.7 8.8   * 146* 123*     Most Recent TSH and T4:  Recent Labs   Lab Test 02/24/21  0943   TSH 1.07     Most Recent Hemoglobin A1c:  Recent Labs   Lab Test 02/24/21  0943   A1C 6.8*       ASSESSMENT/PLAN:  Closed nondisplaced fracture of sixth cervical vertebra with routine healing, unspecified fracture morphology, subsequent encounter  Age-related osteoporosis with current pathological fracture with routine healing, subsequent encounter  Fall, subsequent encounter  Vertigo  Physical deconditioning  Acute on chronic, recent fall due to dizziness and injury. Continue calcium 600 mg BID and vit d 400 units daily, meclizine 12.5 mg TID PRN dizziness. Therapies as ordered and f/u with progress next visit. Neurosurgery follow up as recommended.     Chronic atrial fibrillation (H)  Symptomatic bradycardia  Severe aortic stenosis  S/P TAVR (transcatheter aortic valve replacement)  Coronary artery disease involving native coronary artery of native heart with angina pectoris (H)  Essential hypertension  Chronic and appears stable. Continue asa 81 mg daily, Lipitor 80 mg daily, cozaar 50 mg daily, as needed nitroglycerin. INR today 2.3 - coumadin 3 mg daily, check INR on 5/15. Monitor vs, wt and f/u with ranges next visit.     Type 2 diabetes mellitus without complication, without long-term current use of insulin (H)  Chronic, well managed with metformin 500 mg daily, monitor BG daily at alternating times and review ranges next visit.     Hypothyroidism, unspecified type  By history, continue PTA synthroid 50 mcg daily.     Gastroesophageal reflux disease with esophagitis without hemorrhage  No symptoms. Continue PTA Prilosec 20 mg daily    Chronic anemia  Appears stable. Repeat CBC next week.     Slow transit constipation  Managed well with daily  Miralax. Monitor.     Chronic pain syndrome  Chronic, well managed with scheduled tylenol 1000 mg TID and 650 mg HS, Neurontin 200 mg HS, lidocaine patch to wrist daily PRN, oxycodone 2.5 mg every 6 hrs PRN. Monitor for effectiveness.     Advanced directives, counseling/discussion  Reviewed and completed POLST - asks for Full Code     Orders:  1. Full Code  2. INR 2.3 diagnosis a fib. Coumadin 3 mg PO daily, check INR on 5/15  3. CBC, BMP on 5/18 diagnosis anemia  4. Check BG daily at alternating times diagnosis DM    Total unit/floor time of 37 minutes consisted of the following: Examination of patient, reviewing the record including pertinent labs and imaging, placing orders, and completing documentation.  More than 50% of this time (19 minutes) (>50%) was spent in coordination of care with the patient, nursing staff and other healthcare providers.   This time was spent on discussing the care plan including hospital course, hospital discharge recommendations, recent TCU course and concerns, medications, wound cares, discharge/safe placement, specialty follow up need.  Time was also spent on discussing POLST and code status.       Time with patient included: Discussion of diagnostic and imaging test results, diagnosis and prognosis, overall goal and disposition plan.      -Medical decision making and discussions included:  Risks/benefits of current pain meds  PT/OT restrictions to include up with assist, c collar in place  DM management plan to include monitor bG daily  HTN management plan to include meds, vs and f/u with BMP next week  ANEMIA management plan to include CBC next week    -Rx management plan discussion included:  Follow up needed with neurosurgeon as recommended  Disposition and discharge plan to include likely need for home care after discharge  Medication changes to include coumadin dose adjustment  Patient education concerning POLST and completion of form    -Time on communication of care  included:  Updated RN, RN manager, HUC on above orders and POC    Electronically signed by:  HAROLDO Acuña CNP

## 2021-05-13 NOTE — PROGRESS NOTES
Clinic Care Coordination Contact  Care Coordination Transition Communication    Referral Source: IP Report    Clinical Data: Patient was hospitalized at [facility] from 5/9/21 to 5/11/21 with diagnosis of fall related to dizziness, C6 fracture.     Transition to Facility:              Facility Name: [facility]              Contact name and phone number/fax: [phone]    Fax sent to facility containing CC RN contact information and request for notification when patient discharging.    Plan: RN/SW Care Coordinator will await notification from facility staff informing RN/SW Care Coordinator of patient's discharge plans/needs. RN/SW Care Coordinator will review chart and outreach to facility staff every 4 weeks and as needed.    Jeet Parish, RN  Clinic Care Coordinator  [facility]  [facility]  Ph: [phone]

## 2021-05-13 NOTE — LETTER
[facility]   To:   [facility]          Please give to facility    From:   Jeet Parish RN Care Coordinator [facility]     Patient Name:  Madie Silva  YOB: 1929   Admit date: 5/11/21      *Information Needed:  Please contact me when the patient will discharge (or if they will move to long term care)- include the discharge date, disposition, and main diagnosis   - If the patient is discharged with home care services, please provide the name of the agency    Also- Please inform me if a care conference is being held.   Phone, Fax or Email with information       Thank You,   Jeet Parish, RN  Clinic Care Coordinator  [facility]  [facility]  Ph: [phone]    [email or web address]

## 2021-05-14 NOTE — TELEPHONE ENCOUNTER
2nd call to family to schedule 4-6 week follow up visit along with xray prior to visit. Message was left on sons' mobile number

## 2021-05-14 NOTE — LETTER
"    5/14/2021        RE: Madie Silva  [address]  [facility] [address]        Madie Silva is a 89 year old female seen May 14, 2021 at [facility] where she was admitted following a fall at home.  Pt states she was up to the bathroom in the middle of the night and had a \"short circuit\"    Couldn't get her LEs to work and fell forward, hitting her face and suffering a C6 fracture.  She gives a fairly vague history of dizziness, vertigo.  Hospital workup did not reveal arrhythmias, but possible orthostasis and her furosemide was held.     Today she is seen in her room, moves about easily and is quite agile.   Feels she is improving, \"I'm going to be out of here in a week.\"   She does not have much pain, has been working with therapies, wearing Mobile collar.     Pt has a significant cardiac history, with severe aortic stenosis, s/p TAVR in May 2020.  She has CAD, s/p CABG in 2011, symptomatic bradycardia s/p pacemaker placement, PAF anticoagulated with warfarin.  She has a microcytic anemia of unclear etiology.     Past Medical History:   Diagnosis Date     Walters's esophagus 7/09     Basal cell carcinoma      Bradycardia     post-op CABG 2011 - s/p pacemaker implanted 2011     CHRONIC VERTIGO 9/99     Colonic Adenoma 3/90, 11/98     Coronary artery disease     CABG x4 2011: LIMA to her LAD, saphenous vein graft to her ramus, saphenous vein graft to her OM, saphenous vein graft to her PDA.     Costochondritis      Depression      DIABETES MELLITUS TYPE II 10/07     DJD      DVT of Leg, postop 11/09    6 months of warfarin     Gastritis 10/89     GERD      HIATAL HERNIA      HYPERLIPIDEMIA      HYPOTHYROIDISM (aka HASHIMOTO)      Impaired fasting glucose      L Ankle Fracture 10/09     Obesity, unspecified      Osteoporosis, unspecified      PERIPHERAL NEUROPATHY      Polymyalgia rheumatica (H)      Post Herpetic Neuralgia 4/03    R lumbar distribution     Restless leg syndrome      Small vessel " cerebrovascular changes 9/99     Stricture and stenosis of esophagus 10/89    Dilated     Syncope     1/06, 8/08, 2/10     VITAMIN D DEFICIENCY 12/07    per Dr. Petty       Past Surgical History:   Procedure Laterality Date     BYPASS GRAFT ARTERY CORONARY  11/2/2011    CABG x 4  Dr. PINEDA     CV ANGIOGRAM CORONARY GRAFT N/A 1/27/2020    Procedure: Angiogram Coronary Graft;  Surgeon: Jenny Luther MD;  Location:  HEART CARDIAC CATH LAB     CV AORTOGRAM ABDOMINAL N/A 1/27/2020    Procedure: Aortogram Abdominal;  Surgeon: Jenny Luther MD;  Location:  HEART CARDIAC CATH LAB     CV AORTOGRAM THORACIC N/A 1/27/2020    Procedure: Aortogram Thoracic;  Surgeon: Jenny Luther MD;  Location:  HEART CARDIAC CATH LAB     CV LEFT HEART CATH N/A 1/27/2020    Procedure: Right and Left heart catheterization for TAVR workup;  Surgeon: Jenny Luther MD;  Location:  HEART CARDIAC CATH LAB     CV TRANSCATHETER AORTIC VALVE REPLACEMENT N/A 5/5/2020    Procedure: Transcatheter Aortic Valve Replacement;  Surgeon: Jenny Luther MD;  Location:  HEART CARDIAC CATH LAB     EMBOLECTOMY LOWER EXTREMITY  11/9/2011    EMBOLECTOMY LOWER EXTREMITY; left leg femoral embolectomy.; Surgeon:JOLEEN BOONE; Location: OR     HYSTERECTOMY, CERVIX STATUS UNKNOWN  1978    partial hysterectomy     SURGICAL HISTORY OF -   10/09    L ankle fracture repair    Dr. Jose Roberto Trevino     [facility] NONSPECIFIC PROCEDURE  age 14    appendectomy     [facility] NONSPECIFIC PROCEDURE  as a child    T&A       Family History   Problem Relation Age of Onset     Alzheimer Disease Sister      Heart Disease Mother      Heart Disease Father      Heart Disease Son      Heart Disease Brother        Social History     Tobacco Use     Smoking status: Never Smoker     Smokeless tobacco: Never Used   Substance Use Topics     Alcohol use: No      SH:   2010.   Lives alone, house in [place].  One story with the  "laundry in the basement.     She has In home lab care for INR draws.       Review Of Systems  Skin: had a MOHS procedure on her nose 10/20  Eyes: impaired vision  Ears/Nose/Throat: hearing loss  Respiratory: No shortness of breath, dyspnea on exertion, cough, or hemoptysis  Cardiovascular: irregular heart beat, aortic stenosis  Gastrointestinal: reflux and Walters's esophagus   Genitourinary: negative  Musculoskeletal: uses a 4WW at home.  Now has FWW with a red tag  Left shoulder fracture 7/2020  Neurologic: vertigo, dizziness  Numbness of all 5 fingers left hand  Left hand numbness  Psychiatric: negative  Hematologic/Lymphatic/Immunologic: anemia  Endocrine: diabetes      GENERAL APPEARANCE: alert and no distress  BP (!) 156/77   Pulse 93   Temp 97.2  F (36.2  C)   Resp 18   Ht 1.651 m (5' 5\")   Wt 70.3 kg (155 lb)   SpO2 97%   BMI 25.79 kg/m     HEENT: normocephalic, bruising both orbits and large abrasion on left forehead  NECK: no adenopathy, no asymmetry, masses, or scars and thyroid normal to palpation  RESP: decreased BS left base, otherwise clear, no wheeze  CV: regular rate and rhythm, prominent S1, soft S2  ABDOMEN:  soft, nontender, no HSM or masses and bowel sounds normal  MS: extremities normal- no ext edema, FROM in all extremities.  SKIN: no suspicious lesions or rashes  NEURO: Normal strength and tone, sensory exam grossly normal, and speech normal  PSYCH: affect okay  LYMPHATICS: No cervical,  or supraclavicular nodes     Last Comprehensive Metabolic Panel:  Sodium   Date Value Ref Range Status   05/10/2021 139 133 - 144 mmol/L Final     Potassium   Date Value Ref Range Status   05/10/2021 4.2 3.4 - 5.3 mmol/L Final     Chloride   Date Value Ref Range Status   05/10/2021 107 94 - 109 mmol/L Final     Carbon Dioxide   Date Value Ref Range Status   05/10/2021 25 20 - 32 mmol/L Final     Anion Gap   Date Value Ref Range Status   05/10/2021 7 3 - 14 mmol/L Final     Glucose   Date Value Ref " Range Status   05/10/2021 122 (H) 70 - 99 mg/dL Final     Urea Nitrogen   Date Value Ref Range Status   05/10/2021 17 7 - 30 mg/dL Final     Creatinine   Date Value Ref Range Status   05/10/2021 0.87 0.52 - 1.04 mg/dL Final     GFR Estimate   Date Value Ref Range Status   05/10/2021 58 (L) >60 mL/min/[1.73_m2] Final     Comment:     Non  GFR Calc  Starting 12/18/2018, serum creatinine based estimated GFR (eGFR) will be   calculated using the Chronic Kidney Disease Epidemiology Collaboration   (CKD-EPI) equation.       Calcium   Date Value Ref Range Status   05/10/2021 8.6 8.5 - 10.1 mg/dL Final     Lab Results   Component Value Date    AST 22 05/09/2021      ALBUMIN 3.5 05/09/2021     Lab Results   Component Value Date    PROTTOTAL 7.3 05/09/2021      Lab Results   Component Value Date    ALKPHOS 112 05/09/2021     Lab Results   Component Value Date    WBC 11.0 05/10/2021      HGB 9.8 05/10/2021      MCV 70 05/10/2021       05/10/2021     ECHO 5/9/2021   Interpretation Summary  1. The left ventricle is normal in size. There is normal left ventricular wall  thickness. Hyperdynamic left ventricular function. The visual ejection  fraction is estimated at 65-70%. Grade I or early diastolic dysfunction. No regional wall motion abnormalities noted.  2. There is a catheter/pacemaker lead seen in the right ventricle. The right ventricle is not well visualized.  3. There is a bioprosthetic aortic valve. (20mm Khan Nica 3 implanted on  05/05/2020). The mean transaortic gradient is 16 mm Hg, with a peak gradient  of 34 mm Hg (DVI = 0.37). These values are mildly elevated for this type of prosthesis. There is trace to mild paravalvular regurgitation.  4. Trace to mild mitral and tricuspid regurgitation.  5. No pericardial effusion.  6. In comparison to the previous report dated 04/26/2021, the transaortic gradients are slightly higher on today's study.    ______________________________________________________________________________      IMP/PLAN:    (S12.501D) Closed nondisplaced fracture of sixth cervical vertebra with routine healing, unspecified fracture morphology, subsequent encounter  (primary encounter diagnosis)  (W19.XXXD) Fall, subsequent encounter  Plan: Aspen collar, follow up with NS as scheduled   Pain management with scheduled acetaminophen, prn oxycodone  Continue calcium and vit D replacement  PHYSICAL THERAPY / OCCUPATIONAL THERAPY for balance, gait, strengthening.   Discharge goal is return home alone with services.       (I48.20) Chronic atrial fibrillation (H)  Comment: not on rate-slowing medications     Pulse Readings from Last 4 Encounters:   05/14/21 93   05/11/21 108   05/11/21 79   04/29/21 72      Plan: warfarin per INR for stroke prophylaxis       (I10) Essential hypertension  (N18.31) Stage 3a chronic kidney disease  Comment:   BP Readings from Last 3 Encounters:   05/14/21 (!) 156/77   05/11/21 (!) 165/79   05/11/21 (!) 143/62      Plan: losartan 50 mg/day    (E11.9) Type 2 diabetes mellitus without complication, without long-term current use of insulin (H)  Comment:   Lab Results   Component Value Date    A1C 6.8 02/24/2021      Plan: metformin 500 mg/day, follow BGM   HS gabapentin for neuropathic symptoms which she reports today.       (D64.9) Chronic anemia  Comment: microcytic, on anticoagulation  Plan: monitor hgb, further workup as outpatient  Would discontinue aspirin if hgb falls further    (K21.00) Gastroesophageal reflux disease with esophagitis without hemorrhage  Comment: h/o Walters's esophagus  Plan: omeprazole 20 mg/day    (R42) Vertigo  Comment: dizziness /falls  Plan: had an appointment with PHYSICAL THERAPY prior to admission; follow up after TCU discharge.  Meclizine prn    (I35.0) Severe AS -- S/P TAVR on 5/5/20  (I25.119) Coronary artery disease involving native coronary artery of native heart with angina  pectoris (H)  Comment: furosemide discontinued during hospitalization   Plan: remains on daily ASA in addition to warfarin.  May need to hold this given microcytic anemia  Continue atorvastatin 80 mg/day     María Elena Suggs MD         Sincerely,        María Elena Suggs MD

## 2021-05-15 NOTE — PROGRESS NOTES
"Madie Silva is a 89 year old female seen May 14, 2021 at [facility] where she was admitted following a fall at home.  Pt states she was up to the bathroom in the middle of the night and had a \"short circuit\"    Couldn't get her LEs to work and fell forward, hitting her face and suffering a C6 fracture.  She gives a fairly vague history of dizziness, vertigo.  Hospital workup did not reveal arrhythmias, but possible orthostasis and her furosemide was held.     Today she is seen in her room, moves about easily and is quite agile.   Feels she is improving, \"I'm going to be out of here in a week.\"   She does not have much pain, has been working with therapies, wearing [place] collar.     Pt has a significant cardiac history, with severe aortic stenosis, s/p TAVR in May 2020.  She has CAD, s/p CABG in 2011, symptomatic bradycardia s/p pacemaker placement, PAF anticoagulated with warfarin.  She has a microcytic anemia of unclear etiology.     Past Medical History:   Diagnosis Date     Walters's esophagus 7/09     Basal cell carcinoma      Bradycardia     post-op CABG 2011 - s/p pacemaker implanted 2011     CHRONIC VERTIGO 9/99     Colonic Adenoma 3/90, 11/98     Coronary artery disease     CABG x4 2011: LIMA to her LAD, saphenous vein graft to her ramus, saphenous vein graft to her OM, saphenous vein graft to her PDA.     Costochondritis      Depression      DIABETES MELLITUS TYPE II 10/07     DJD      DVT of Leg, postop 11/09    6 months of warfarin     Gastritis 10/89     GERD      HIATAL HERNIA      HYPERLIPIDEMIA      HYPOTHYROIDISM (aka HASHIMOTO)      Impaired fasting glucose      L Ankle Fracture 10/09     Obesity, unspecified      Osteoporosis, unspecified      PERIPHERAL NEUROPATHY      Polymyalgia rheumatica (H)      Post Herpetic Neuralgia 4/03    R lumbar distribution     Restless leg syndrome      Small vessel cerebrovascular changes 9/99     Stricture and stenosis of esophagus 10/89    Dilated     Syncope     " 1/06, 8/08, 2/10     VITAMIN D DEFICIENCY 12/07    per Dr. Petty       Past Surgical History:   Procedure Laterality Date     BYPASS GRAFT ARTERY CORONARY  11/2/2011    CABG x 4  Dr. PINEDA     CV ANGIOGRAM CORONARY GRAFT N/A 1/27/2020    Procedure: Angiogram Coronary Graft;  Surgeon: Jenny Luther MD;  Location:  HEART CARDIAC CATH LAB     CV AORTOGRAM ABDOMINAL N/A 1/27/2020    Procedure: Aortogram Abdominal;  Surgeon: Jenny Luther MD;  Location:  HEART CARDIAC CATH LAB     CV AORTOGRAM THORACIC N/A 1/27/2020    Procedure: Aortogram Thoracic;  Surgeon: Jenny Luther MD;  Location:  HEART CARDIAC CATH LAB     CV LEFT HEART CATH N/A 1/27/2020    Procedure: Right and Left heart catheterization for TAVR workup;  Surgeon: Jenny Luther MD;  Location:  HEART CARDIAC CATH LAB     CV TRANSCATHETER AORTIC VALVE REPLACEMENT N/A 5/5/2020    Procedure: Transcatheter Aortic Valve Replacement;  Surgeon: Jenny Luther MD;  Location:  HEART CARDIAC CATH LAB     EMBOLECTOMY LOWER EXTREMITY  11/9/2011    EMBOLECTOMY LOWER EXTREMITY; left leg femoral embolectomy.; Surgeon:JOLEEN BOONE; Location: OR     HYSTERECTOMY, CERVIX STATUS UNKNOWN  1978    partial hysterectomy     SURGICAL HISTORY OF -   10/09    L ankle fracture repair    Dr. Jose Roberto Trevino     [facility] NONSPECIFIC PROCEDURE  age 14    appendectomy     [facility] NONSPECIFIC PROCEDURE  as a child    T&A       Family History   Problem Relation Age of Onset     Alzheimer Disease Sister      Heart Disease Mother      Heart Disease Father      Heart Disease Son      Heart Disease Brother        Social History     Tobacco Use     Smoking status: Never Smoker     Smokeless tobacco: Never Used   Substance Use Topics     Alcohol use: No      SH:   2010.   Lives alone, house in [place].  One story with the laundry in the basement.     She has In home lab care for INR draws.       Review Of Systems  Skin: had a  "MOHS procedure on her nose 10/20  Eyes: impaired vision  Ears/Nose/Throat: hearing loss  Respiratory: No shortness of breath, dyspnea on exertion, cough, or hemoptysis  Cardiovascular: irregular heart beat, aortic stenosis  Gastrointestinal: reflux and Walters's esophagus   Genitourinary: negative  Musculoskeletal: uses a 4WW at home.  Now has FWW with a red tag  Left shoulder fracture 7/2020  Neurologic: vertigo, dizziness  Numbness of all 5 fingers left hand  Left hand numbness  Psychiatric: negative  Hematologic/Lymphatic/Immunologic: anemia  Endocrine: diabetes      GENERAL APPEARANCE: alert and no distress  BP (!) 156/77   Pulse 93   Temp 97.2  F (36.2  C)   Resp 18   Ht 1.651 m (5' 5\")   Wt 70.3 kg (155 lb)   SpO2 97%   BMI 25.79 kg/m     HEENT: normocephalic, bruising both orbits and large abrasion on left forehead  NECK: no adenopathy, no asymmetry, masses, or scars and thyroid normal to palpation  RESP: decreased BS left base, otherwise clear, no wheeze  CV: regular rate and rhythm, prominent S1, soft S2  ABDOMEN:  soft, nontender, no HSM or masses and bowel sounds normal  MS: extremities normal- no ext edema, FROM in all extremities.  SKIN: no suspicious lesions or rashes  NEURO: Normal strength and tone, sensory exam grossly normal, and speech normal  PSYCH: affect okay  LYMPHATICS: No cervical,  or supraclavicular nodes     Last Comprehensive Metabolic Panel:  Sodium   Date Value Ref Range Status   05/10/2021 139 133 - 144 mmol/L Final     Potassium   Date Value Ref Range Status   05/10/2021 4.2 3.4 - 5.3 mmol/L Final     Chloride   Date Value Ref Range Status   05/10/2021 107 94 - 109 mmol/L Final     Carbon Dioxide   Date Value Ref Range Status   05/10/2021 25 20 - 32 mmol/L Final     Anion Gap   Date Value Ref Range Status   05/10/2021 7 3 - 14 mmol/L Final     Glucose   Date Value Ref Range Status   05/10/2021 122 (H) 70 - 99 mg/dL Final     Urea Nitrogen   Date Value Ref Range Status "   05/10/2021 17 7 - 30 mg/dL Final     Creatinine   Date Value Ref Range Status   05/10/2021 0.87 0.52 - 1.04 mg/dL Final     GFR Estimate   Date Value Ref Range Status   05/10/2021 58 (L) >60 mL/min/[1.73_m2] Final     Comment:     Non  GFR Calc  Starting 12/18/2018, serum creatinine based estimated GFR (eGFR) will be   calculated using the Chronic Kidney Disease Epidemiology Collaboration   (CKD-EPI) equation.       Calcium   Date Value Ref Range Status   05/10/2021 8.6 8.5 - 10.1 mg/dL Final     Lab Results   Component Value Date    AST 22 05/09/2021      ALBUMIN 3.5 05/09/2021     Lab Results   Component Value Date    PROTTOTAL 7.3 05/09/2021      Lab Results   Component Value Date    ALKPHOS 112 05/09/2021     Lab Results   Component Value Date    WBC 11.0 05/10/2021      HGB 9.8 05/10/2021      MCV 70 05/10/2021       05/10/2021     ECHO 5/9/2021   Interpretation Summary  1. The left ventricle is normal in size. There is normal left ventricular wall  thickness. Hyperdynamic left ventricular function. The visual ejection  fraction is estimated at 65-70%. Grade I or early diastolic dysfunction. No regional wall motion abnormalities noted.  2. There is a catheter/pacemaker lead seen in the right ventricle. The right ventricle is not well visualized.  3. There is a bioprosthetic aortic valve. (20mm Khan Nica 3 implanted on  05/05/2020). The mean transaortic gradient is 16 mm Hg, with a peak gradient  of 34 mm Hg (DVI = 0.37). These values are mildly elevated for this type of prosthesis. There is trace to mild paravalvular regurgitation.  4. Trace to mild mitral and tricuspid regurgitation.  5. No pericardial effusion.  6. In comparison to the previous report dated 04/26/2021, the transaortic gradients are slightly higher on today's study.   ______________________________________________________________________________      IMP/PLAN:    (S12.501D) Closed nondisplaced fracture of sixth  cervical vertebra with routine healing, unspecified fracture morphology, subsequent encounter  (primary encounter diagnosis)  (W19.XXXD) Fall, subsequent encounter  Plan: Aspen collar, follow up with NS as scheduled   Pain management with scheduled acetaminophen, prn oxycodone  Continue calcium and vit D replacement  PHYSICAL THERAPY / OCCUPATIONAL THERAPY for balance, gait, strengthening.   Discharge goal is return home alone with services.       (I48.20) Chronic atrial fibrillation (H)  Comment: not on rate-slowing medications     Pulse Readings from Last 4 Encounters:   05/14/21 93   05/11/21 108   05/11/21 79   04/29/21 72      Plan: warfarin per INR for stroke prophylaxis       (I10) Essential hypertension  (N18.31) Stage 3a chronic kidney disease  Comment:   BP Readings from Last 3 Encounters:   05/14/21 (!) 156/77   05/11/21 (!) 165/79   05/11/21 (!) 143/62      Plan: losartan 50 mg/day    (E11.9) Type 2 diabetes mellitus without complication, without long-term current use of insulin (H)  Comment:   Lab Results   Component Value Date    A1C 6.8 02/24/2021      Plan: metformin 500 mg/day, follow BGM   HS gabapentin for neuropathic symptoms which she reports today.       (D64.9) Chronic anemia  Comment: microcytic, on anticoagulation  Plan: monitor hgb, further workup as outpatient  Would discontinue aspirin if hgb falls further    (K21.00) Gastroesophageal reflux disease with esophagitis without hemorrhage  Comment: h/o Walters's esophagus  Plan: omeprazole 20 mg/day    (R42) Vertigo  Comment: dizziness /falls  Plan: had an appointment with PHYSICAL THERAPY prior to admission; follow up after TCU discharge.  Meclizine prn    (I35.0) Severe AS -- S/P TAVR on 5/5/20  (I25.119) Coronary artery disease involving native coronary artery of native heart with angina pectoris (H)  Comment: furosemide discontinued during hospitalization   Plan: remains on daily ASA in addition to warfarin.  May need to hold this given  microcytic anemia  Continue atorvastatin 80 mg/day     María Elena Suggs MD

## 2021-05-19 NOTE — PROGRESS NOTES
[facility] Medical Record Number:  [id number]  Place of Service where encounter took place: [facility] ([facility]) [[id number]]    HPI:    Madie Silva is a 89 year old  (7/21/1929), who is being seen today for an episodic care visit at the above location. Today's concern is INR/Coumadin management for A. Fib    ROS/Subjective:  Bleeding Signs/Symptoms:  None  Thromboembolic Signs/Symptoms:  None  Medication Changes:  No  Dietary Changes:  Yes: at TCU  Activity Changes: Yes: at TCU  Bacterial/Viral Infection:  No  Missed Coumadin Doses:  None  On ASA: Yes - 81 mg po q day  Other Concerns:  No    OBJECTIVE:  BP (!) 153/61   Pulse 91   Temp 98.5  F (36.9  C)   Resp 18   Wt 70.3 kg (155 lb)   SpO2 99%   BMI 25.79 kg/m    INR Today:  2.0  Current Dose:  Coumadin 4 mg x 2 days and 3 mg x 5 days last week    ASSESSMENT:  1. Chronic atrial fibrillation (H)    2. Encounter for therapeutic drug monitoring    3. Long term (current) use of anticoagulants      Therapeutic INR for goal of 2-3    PLAN/ORDERS:   New Dose: Coumadin 4 mg M and F and 3 mg other days    Next INR: 5/25/21    Electronically signed by:  HAROLDO Acuña CNP

## 2021-05-20 NOTE — PROGRESS NOTES
[facility] [place] GERIATRIC SERVICES DISCHARGE SUMMARY  PATIENT'S NAME: Madie Silva  YOB: 1929  MEDICAL RECORD NUMBER:  [id number]  Place of Service where encounter took place:  [facility] ([facility]) [[id number]]    PRIMARY CARE PROVIDER AND CLINIC RESPONSIBLE AFTER TRANSFER:   Bee Orr MD, [address] / [facility] [address]    [facility] Provider     Transferring providers: HAROLDO Acuña CNP, Dr. Kartik MD  Recent Hospitalization/ED:  [facility] stay 5/9/21 to 5/11/21.  Date of SNF Admission: 5/11/21  Date of SNF (anticipated) Discharge: May 24, 2021  Discharged to: previous independent home  Cognitive Scores: SLUMS: 25/30  Physical Function: Ambulating 200 ft with 2ww  DME: No new DME needed    CODE STATUS/ADVANCE DIRECTIVES DISCUSSION:  Prior Full Code  ALLERGIES: Fosamax [alendronic acid], Amoxicillin, Bisphosphonates, Boniva [ibandronate sodium], Lisinopril, Niacin, and Simvastatin    NURSING FACILITY COURSE   Medication Changes/Rationale:     None     Summary of nursing facility stay:   89 year old female with hx of CAD s/p CABG (2011), severe aortic stenosis s/p TAVR (5/2020), chronic a-fib on warfarin, symptomatic bradycardia s/p PPM, DM2, vertigo hospitalized for C6 fracture following a fall due to dizziness. Lasix held due to ?orthostasis, on meclizine PRN. C-collar in place, f/u with Dr Aiken 4-6 weeks and continue pain meds. CAD, HTN on ASA, losartan, statin. DM2 managed with metformin. Hypothyroidism on levothyroxine. GERD controlled with PPI. Anemia with stable Hgb 9 range. Chronic pain on PTA Neurontin. To TCU for therapies.     Today reports doing well. No chest pain, dyspnea, bowel or bladder issues. BP range 131-154/60-76 and HR 80-90s, sats 98% on room air.   BG range 118-158 daily.     Closed nondisplaced fracture of sixth cervical vertebra with routine healing, unspecified fracture morphology, subsequent encounter  (primary encounter  diagnosis)  Fall, subsequent encounter  Acute onset. Continue Aspen collar, follow up with NS as scheduled. Continue scheduled acetaminophen, prn oxycodone for pain and ca/vit d. Ready to discharge home with home care as noted below.     Chronic atrial fibrillation (H)  Rate controlled. Coumadin and INR orders as noted below - next check 5/25.     Essential hypertension  Stage 3a chronic kidney disease  Chronic, stable with PTA losartan 50 mg/day. Avoid nephrotoxins. Monitor BMP per PCP routine.   Lab Results   Component Value Date    CR 0.93 05/18/2021    CR 0.87 05/10/2021       Type 2 diabetes mellitus without complication, without long-term current use of insulin (H)  Well managed with PTA metformin 500 mg/day, follow BGM. Continue HS gabapentin for neuropathic symptoms    Chronic anemia  Chronic, microcytic, on anticoagulation. Further workup as outpatient - consider discontinue ASA.   Lab Results   Component Value Date    HGB 8.8 05/18/2021    HGB 9.8 05/10/2021     Gastroesophageal reflux disease with esophagitis without hemorrhage  Chronic, continue PTA omeprazole 20 mg/day    Vertigo  Ongoing on and off, continue PRN meclizine    Severe AS -- S/P TAVR on 5/5/20  Coronary artery disease involving native coronary artery of native heart with angina pectoris (H)  Continues daily ASA in addition to warfarin.  May need to hold this given microcytic anemia. Continue statin. Recommend HGB recheck at PCP follow up.     Discharge Medications:  Current Outpatient Medications   Medication Sig Dispense Refill     Acetaminophen (TYLENOL ARTHRITIS PAIN PO) Take 650 mg by mouth At Bedtime       acetaminophen (TYLENOL) 500 MG tablet Take 2 tablets (1,000 mg) by mouth 3 times daily       aspirin EC 81 MG EC tablet Take 1 tablet by mouth daily.       atorvastatin (LIPITOR) 80 MG tablet Take 1 tablet (80 mg) by mouth daily 90 tablet 3     calcium 600 MG tablet Take 1 tablet by mouth 2 times daily.       Cholecalciferol  (VITAMIN D-400 PO) Take 1 tablet by mouth daily        clindamycin (CLEOCIN) 300 MG capsule 600 mg one hr prior to any dental appointment (Patient not taking: Reported on 5/12/2021) 2 capsule 3     gabapentin (NEURONTIN) 100 MG capsule Take 2 capsules (200 mg) by mouth At Bedtime 180 capsule 1     JANTOVEN ANTICOAGULANT 3 MG tablet Take 3 mg by mouth daily        levothyroxine (SYNTHROID/LEVOTHROID) 50 MCG tablet TAKE ONE TABLET BY MOUTH ONCE DAILY 90 tablet 3     Lidocaine (LIDOCARE) 4 % Patch Place 1 patch onto the skin daily as needed Apply to left wrist topically at bedtime for Pain and remove per schedule. To prevent lidocaine toxicity, patient should be patch free for 12 hrs daily.       losartan (COZAAR) 50 MG tablet Take 1 tablet (50 mg) by mouth every evening 90 tablet 3     meclizine (ANTIVERT) 12.5 MG tablet Take 1 tablet (12.5 mg) by mouth 3 times daily as needed for dizziness       metFORMIN (GLUCOPHAGE) 500 MG tablet TAKE ONE TABLET BY MOUTH DAILY WITH BREAKFAST 90 tablet 3     Multiple Vitamins-Minerals (PRESERVISION AREDS 2 PO) Take 1 tablet by mouth 2 times daily        nitroGLYCERIN (NITROSTAT) 0.4 MG SL tablet Place 0.4 mg under the tongue every 5 minutes as needed. Up to 3 doses per episode        nystatin (MYCOSTATIN) 486068 UNIT/GM external powder Apply to breast folds topically two times a day as needed for redness       omeprazole (PRILOSEC) 20 MG DR capsule Take 1 capsule (20 mg) by mouth daily 90 capsule 3     oxyCODONE (ROXICODONE) 5 MG tablet Take 0.5 tablets (2.5 mg) by mouth every 6 hours as needed for severe pain 5 tablet 0     polyethylene glycol (MIRALAX) powder Take 17 g (1 capful) by mouth daily 1 Bottle 3       Controlled medications:   Oxycodone 5 mg tabs #10 tabs escribed     Past Medical History:   Past Medical History:   Diagnosis Date     Walters's esophagus 7/09     Basal cell carcinoma      Bradycardia     post-op CABG 2011 - s/p pacemaker implanted 2011     CHRONIC VERTIGO  9/99     Colonic Adenoma 3/90, 11/98     Coronary artery disease     CABG x4 2011: LIMA to her LAD, saphenous vein graft to her ramus, saphenous vein graft to her OM, saphenous vein graft to her PDA.     Costochondritis      Depression      DIABETES MELLITUS TYPE II 10/07     DJD      DVT of Leg, postop 11/09    6 months of warfarin     Gastritis 10/89     GERD      HIATAL HERNIA      HYPERLIPIDEMIA      HYPOTHYROIDISM (aka HASHIMOTO)      Impaired fasting glucose      L Ankle Fracture 10/09     Obesity, unspecified      Osteoporosis, unspecified      PERIPHERAL NEUROPATHY      Polymyalgia rheumatica (H)      Post Herpetic Neuralgia 4/03    R lumbar distribution     Restless leg syndrome      Small vessel cerebrovascular changes 9/99     Stricture and stenosis of esophagus 10/89    Dilated     Syncope     1/06, 8/08, 2/10     VITAMIN D DEFICIENCY 12/07    per Dr. Petty     Physical Exam:   Vitals: BP (!) 153/61   Pulse 91   Temp 98.5  F (36.9  C)   Resp 18   Wt 70.8 kg (156 lb)   SpO2 99%   BMI 25.96 kg/m    BMI= Body mass index is 25.96 kg/m .  GENERAL APPEARANCE:  Alert, in no distress, pleasant, cooperative, oriented x 4  EYES:  EOM, lids, pupils and irises normal, sclera clear and conjunctiva normal, no discharge or mattering on lids or lashes noted  ENT:  Mouth normal, moist mucous membranes, nose normal without drainage or crusting, external ears without lesions, hearing acuity impaired  NECK: c-collar in place  RESP:  respiratory effort normal, no chest wall tenderness, no respiratory distress, Lung sounds clear, patient is on room air  CV:  Auscultation of heart done, rate and rhythm controlled and regular, no murmur, no rub or gallop. Edema none bilateral lower extremities.   ABDOMEN:  normal bowel sounds, soft, nontender, no palpable masses.  M/S:   Gait and station not assessed, no tenderness or swelling of the joints; able to move all extremities, digits normal  SKIN:  Inspection and palpation of  skin and subcutaneous tissue: bilateral bruises around eyes, abrasion left forehead   NEURO: cranial nerves 2-12 grossly intact, no facial asymmetry, no speech deficits and able to follow directions, moves all extremities symmetrically  PSYCH:  insight and judgement and memory appear intact, affect and mood normal     SNF labs:   Most Recent 3 CBC's:  Recent Labs   Lab Test 05/18/21  0705 05/10/21  0647 05/09/21  0218   WBC 6.3 11.0 8.7   HGB 8.8* 9.8* 9.6*   MCV 72* 70* 71*    247 278     Most Recent 3 BMP's:  Recent Labs   Lab Test 05/18/21  0705 05/10/21  0647 05/09/21  0218    139 137   POTASSIUM 4.0 4.2 3.7   CHLORIDE 107 107 105   CO2 26 25 27   BUN 27 17 24   CR 0.93 0.87 0.99   ANIONGAP 5 7 5   SHAWNEE 8.9 8.6 8.7   * 122* 146*     Most Recent TSH and T4:  Recent Labs   Lab Test 02/24/21  0943   TSH 1.07     Most Recent Hemoglobin A1c:  Recent Labs   Lab Test 02/24/21  0943   A1C 6.8*       DISCHARGE PLAN:    Follow up labs: INR due 5/25 to be drawn by home care with results to PCP. Recommend Hgb recheck at PCP follow up 1-2 weeks and consider stopping ASA.     Medical Follow Up:      Follow up with primary care provider in 1-2 weeks  Follow up with specialist Neurosurgery 4-6 weeks with repeat cervical x ray     [facility] scheduled appointments:  Next 5 appointments (look out 90 days)    Jun 07, 2021 10:00 AM  Return Visit with David Verduzco PA-C  [facility] [place] ([facility] ) [address] [place] [id number] [phone]           Discharge Services: Home Care:  Occupational Therapy, Physical Therapy, Registered Nurse, Home Health Aide and From:  [facility]    Discharge Instructions Verbalized to Patient at Discharge:     Wear neck collar at all times    Limit lifting to 10 lbs    No driving while on narcotics    Check BG twice daily    TOTAL DISCHARGE TIME:   Greater than 30  minutes  Electronically signed by:  HAROLDO Acuña CNP     Home care Face to Face documentation done in EPIC attached to Home care orders for [facility].

## 2021-05-25 NOTE — TELEPHONE ENCOUNTER
Home care just received the referral today. INR was ordered for 5/25, however they will not be able to see the patient until 5/26. Patient was discharged on 4 mg Mon,Fri; 3 mg all other days of the week.    Writer spoke with Madie. She will take 3 mg tonight. Patient is confused on her Warfarin/Jantovan. Patient states she has green and tan tablets. There is no documentation of green (2.5 mg) tablets. Advised patient to have the home care RN review the tablets.    Erica Danielle, RN, BSN, PHN  [facility]   [phone]

## 2021-05-26 NOTE — TELEPHONE ENCOUNTER
Call from FREIDA Turner at [facility]  Requesting Verbal approval for homcare orders: nursing, Pt, OT home health aide.   Writer gave verbal apporval. Company will fax over orders to [facility] office.    Kathrin Arce RN  [facility]

## 2021-05-26 NOTE — PROGRESS NOTES
ANTICOAGULATION MANAGEMENT     Patient Name:  Madie Silva  Date:  2021    ASSESSMENT /SUBJECTIVE:    Today's INR result of 2.2 is therapeutic. Goal INR of 2.0-3.0      Warfarin dose taken: Warfarin taken as instructed    Diet: No new diet changes affecting INR    Medication changes/ interactions: No new medications/supplements affecting INR    Previous INR: Therapeutic     S/S of bleeding or thromboembolism: No    New injury or illness: No    Upcoming surgery, procedure or cardioversion: No    Additional findings: Verified with home care nurse that patient has the 4 mg tablets and 3 mg tablets on hand in the home.      PLAN:    Telephone call with home care nurse Johnny regarding INR result and instructed:     Warfarin Dosing Instructions: Continue your current warfarin dose 4.5 mg on mon/thur and 3 mg all other days    Instructed patient to follow up no later than: 1 week  Orders given to  Homecare nurse/facility to recheck    Education provided: None required      Johnny verbalizes understanding and agrees to warfarin dosing plan.    Instructed to call the Anticoagulation Clinic for any changes, questions or concerns. (#[phone])        Julianne Camilo RN      OBJECTIVE:  Recent labs: (last 7 days)     21   INR 2.2*         No question data found.  Anticoagulation Summary  As of 2021    INR goal:  2.0-3.0   TTR:  57.9 % (11.6 mo)   INR used for dosin.2 (2021)   Warfarin maintenance plan:  4.5 mg (3 mg x 1.5) every Mon, Thu; 3 mg (3 mg x 1) all other days   Full warfarin instructions:  4.5 mg every Mon, Thu; 3 mg all other days   Weekly warfarin total:  24 mg   Plan last modified:  Phillip Siddiqui RN (2021)   Next INR check:     Priority:  High   Target end date:  Indefinite    Indications    Long-term (current) use of anticoagulants [Z79.01] (Resolved) [Z79.01]  Phlebitis and thrombophlebitis of deep veins of lower extremities (H) [I80.209]  Acute myocardial infarction  initial  episode of care (H) (Resolved) [I21.9]  Atrial Fibrillation  paroxysmal -- on Warfarin (H) [I48.0]             Anticoagulation Episode Summary     INR check location:      Preferred lab:      Send INR reminders to:  ADRIEN DE LOS SANTOS    Comments:  In Home Labs      Anticoagulation Care Providers     Provider Role Specialty Phone number    De Obregon MD Referring Internal Medicine [phone]

## 2021-06-02 NOTE — TELEPHONE ENCOUNTER
Debbie MENDOZA [facility]; Calling to confirm Xray on 6-7-21.     No further action needed.     Jessica Chakraborty RN

## 2021-06-02 NOTE — PROGRESS NOTES
VM left at 12:42-    Debbie, nurse with [facility] [phone], calling to report INR of 3.3 from today.   Taking 4.5 mg Mon, Thurs and 3 mg ROW.     Thanks!  Kim Dunn, RN

## 2021-06-02 NOTE — PROGRESS NOTES
ANTICOAGULATION MANAGEMENT     Patient Name:  Madie Silva  Date:  6/2/2021    ASSESSMENT /SUBJECTIVE:    Today's INR result of 3.3 is supratherapeutic. Goal INR of 2.0-3.0      Warfarin dose taken: Warfarin taken as instructed    Diet: Decreased greens/vitamin K in diet; ongoing change    Medication changes/ interactions: No new medications/supplements affecting INR    Previous INR: Therapeutic     S/S of bleeding or thromboembolism: No    New injury or illness: No    Upcoming surgery, procedure or cardioversion: No    Additional findings: None. No changes except that patient has been back from TCU for 10 days. Pt states she doesn't have any greens at home. Likely was getting more Vit K in food at [facility].      PLAN:    Warfarin Dosing Instructions: Change your warfarin dose to 4.5mg every Mon; 3mg all other days  . (6 % change)    Instructed patient to follow up no later than: 1 week  Orders given to  Homecare nurse/facility to recheck    Education provided: Target INR goal and significance of current INR result    Telephone call with home care nurse Debbie whom verbalizes understanding and agrees to plan    Instructed to call the Anticoagulation Clinic for any changes, questions or concerns. (#[phone])        Phillip Siddiqui RN      OBJECTIVE:  Recent labs: (last 7 days)     06/02/21   INR 3.3*         No question data found.  Anticoagulation Summary  As of 6/2/2021    INR goal:  2.0-3.0   TTR:  57.8 % (11.7 mo)   INR used for dosing:  3.3 (6/2/2021)   Warfarin maintenance plan:  4.5 mg (3 mg x 1.5) every Mon; 3 mg (3 mg x 1) all other days   Full warfarin instructions:  4.5 mg every Mon; 3 mg all other days   Weekly warfarin total:  22.5 mg   Plan last modified:  Phillip Siddiqui, RN (6/2/2021)   Next INR check:  6/9/2021   Priority:  Maintenance   Target end date:  Indefinite    Indications    Long-term (current) use of anticoagulants [Z79.01] (Resolved) [Z79.01]  Phlebitis and thrombophlebitis of deep veins of  lower extremities (H) [I80.209]  Acute myocardial infarction  initial episode of care (H) (Resolved) [I21.9]  Atrial Fibrillation  paroxysmal -- on Warfarin (H) [I48.0]             Anticoagulation Episode Summary     INR check location:      Preferred lab:      Send INR reminders to:  ADRIEN DE LOS SANTOS    Comments:  In Home Labs      Anticoagulation Care Providers     Provider Role Specialty Phone number    De Obregon MD Referring Internal Medicine [phone]

## 2021-06-07 NOTE — LETTER
6/7/2021         RE: Madie Silva  [address]  [facility] [address]        Dear Colleague,    Thank you for referring your patient, Madie Silva, to the [facility] [place]. Please see a copy of my visit note below.    Neurosurgery follow-up    Ms. Silva is a 89-year-old female who fell 6 weeks ago and suffered a right C6 possible articular pillar fracture.  Is been in a cervical collar since that time.  This time she denies any neck pain.  She denies any radicular symptoms denies any numbness or tingling in her upper extremities as well as denies any loss of coordination in her feet feet.  She has been wearing her cervical collar as directed and has not had any falls.     Exam    B/P: 138/70, T: Data Unavailable, P: 90, R: Data Unavailable     Alert and oriented no acute distress  Cervical spine nontender to palpation  Cervical range of motion nonpainful.  Bilateral upper and lower extremities appropriate strength  Adonay sign negative    Imaging    Degenerative changes noted. Vertebral body heights are  maintained. Posterior alignment is normal. There is a subtle lucency  seen on the AP view along the superior lateral aspect of the right C5  articular pillar which could be related to a incomplete osteophyte or  prior chip fracture. No other abnormalities noted. The C6 right-sided  articular fracture not visualized in this study.        Assessment    Right C6 articular pillar fracture      Plan     Imaging discussed with Dr. Aiken.  We would recommend that the patient continue her cervical collar for another 6 weeks and return to clinic with a repeat CT scan of her cervical spine at that time.      Total time of 30 minutes spent with the patient today in counseling and coordination of care.        Again, thank you for allowing me to participate in the care of your patient.        Sincerely,        David Verduzco PA-C

## 2021-06-07 NOTE — PROGRESS NOTES
Neurosurgery follow-up    Ms. Silva is a 89-year-old female who fell 6 weeks ago and suffered a right C6 possible articular pillar fracture.  Is been in a cervical collar since that time.  This time she denies any neck pain.  She denies any radicular symptoms denies any numbness or tingling in her upper extremities as well as denies any loss of coordination in her feet feet.  She has been wearing her cervical collar as directed and has not had any falls.     Exam    B/P: 138/70, T: Data Unavailable, P: 90, R: Data Unavailable     Alert and oriented no acute distress  Cervical spine nontender to palpation  Cervical range of motion nonpainful.  Bilateral upper and lower extremities appropriate strength  Adonay sign negative    Imaging    Degenerative changes noted. Vertebral body heights are  maintained. Posterior alignment is normal. There is a subtle lucency  seen on the AP view along the superior lateral aspect of the right C5  articular pillar which could be related to a incomplete osteophyte or  prior chip fracture. No other abnormalities noted. The C6 right-sided  articular fracture not visualized in this study.        Assessment    Right C6 articular pillar fracture      Plan     Imaging discussed with Dr. Aiken.  We would recommend that the patient continue her cervical collar for another 6 weeks and return to clinic with a repeat CT scan of her cervical spine at that time.      Total time of 30 minutes spent with the patient today in counseling and coordination of care.

## 2021-06-08 NOTE — LETTER
Dear Madie,    Your provider would like you to continue wearing your brace for another 6 weeks. You will need to have another CT scan of your cervical spine and return to clinic for an office visit after this is completed.     You can call to schedule your imaging at [phone] and call the neurosurgery clinic at [phone] to schedule your office visit. You can coordinate and have these done on the same day, just make sure you have the CT done before arriving at your follow up appointment.       Thank you,    [facility]  [address] [place] [address]  [id number]  Tel. [phone]  Fax [phone]

## 2021-06-08 NOTE — TELEPHONE ENCOUNTER
Called pt and let her know that her provider David Verduzco PA-C would like her to continue wearing her cervical collar for another 6 weeks and return to clinic with a repeat CT scan of her cervical spine at that time.     Pt wrote down all of this information with phone number to the neurosurgery clinic and imaging scheduling number. Pt states that she is confused with this information and her son usually helps her with these kinds of things so writer composed a letter with all of this information and mailed it to the pt for her and her son to review as a reference.

## 2021-06-08 NOTE — TELEPHONE ENCOUNTER
[facility] -[phone], was out to see pt today for evaluation.   Requesting PT orders: once a week for 5 weeks.     Verbal OK given, per standing orders.  Form will be faxed to PCP to review, sign, and complete.

## 2021-06-09 NOTE — PROGRESS NOTES
ANTICOAGULATION MANAGEMENT     Patient Name:  Madie Silva  Date:  6/9/2021    ASSESSMENT /SUBJECTIVE:    Today's INR result of 3.0 is therapeutic. Goal INR of 2.0-3.0      Warfarin dose taken: Warfarin taken as instructed    Diet: No new diet changes affecting INR    Medication changes/ interactions: No new medications/supplements affecting INR    Previous INR: Supratherapeutic     S/S of bleeding or thromboembolism: No    New injury or illness: No    Upcoming surgery, procedure or cardioversion: No    Additional findings: None      PLAN:    Warfarin Dosing Instructions: Continue your current warfarin dose 4.5mg every Mon; 3mg all other days    Instructed patient to follow up no later than: 1 week  Orders given to  Homecare nurse/facility to recheck    Education provided: Target INR goal and significance of current INR result    Detailed voice message left for home care nurse Debbie with dosing instructions and follow up date.     Instructed to call the Anticoagulation Clinic for any changes, questions or concerns. (#[phone])        Phillip Siddiqui RN      OBJECTIVE:  Recent labs: (last 7 days)     06/09/21   INR 3.0*         No question data found.  Anticoagulation Summary  As of 6/9/2021    INR goal:  2.0-3.0   TTR:  55.6 % (11.7 mo)   INR used for dosing:  3.0 (6/9/2021)   Warfarin maintenance plan:  4.5 mg (3 mg x 1.5) every Mon; 3 mg (3 mg x 1) all other days   Full warfarin instructions:  4.5 mg every Mon; 3 mg all other days   Weekly warfarin total:  22.5 mg   No change documented:  Phillip Siddiqui RN   Plan last modified:  Phillip Siddiqui RN (6/2/2021)   Next INR check:  6/16/2021   Priority:  Maintenance   Target end date:  Indefinite    Indications    Long-term (current) use of anticoagulants [Z79.01] (Resolved) [Z79.01]  Phlebitis and thrombophlebitis of deep veins of lower extremities (H) [I80.209]  Acute myocardial infarction  initial episode of care (H) (Resolved) [I21.9]  Atrial  Fibrillation  paroxysmal -- on Warfarin (H) [I48.0]             Anticoagulation Episode Summary     INR check location:      Preferred lab:      Send INR reminders to:  ADRIEN DE LOS SANTOS    Comments:  In Home Labs      Anticoagulation Care Providers     Provider Role Specialty Phone number    De Obregon MD Referring Internal Medicine [phone]

## 2021-06-09 NOTE — PROGRESS NOTES
VM left on [facility] - home care reporting INR of 3.0 today - please call with dosing.     Nalini Syed, RN, BSN, PHN  Anticoagulation Nurse  [phone]

## 2021-06-12 NOTE — TELEPHONE ENCOUNTER
Please help assist patient getting a virtual visit with cardiology, Dr. Stephen.   Patient is to be evaluated for Watchman procedure, as she is clearly high risk on warfarin for bleeding, with multiple falls, etc.   She is now willing to proceed.      Would like to get her evaluated ASAP.   I have also messaged Dr. Stephen.    Thanks

## 2021-06-15 PROBLEM — M54.6 ACUTE RIGHT-SIDED THORACIC BACK PAIN: Status: ACTIVE | Noted: 2021-01-01

## 2021-06-15 NOTE — PROGRESS NOTES
ANTICOAGULATION  MANAGEMENT: Discharge Review    Madie Silva chart reviewed for anticoagulation continuity of care    Hospital Admission on 06/14-06/15 for acute hypoxic respiratory failure.    Discharge disposition: Home    Results:    Recent labs: (last 7 days)     06/09/21 06/15/21  0037   INR 3.0* 2.48*     Anticoagulation inpatient management:     warfarin held 06/15    Anticoagulation discharge instructions:     Warfarin dosing: home regimen continued   Bridging: No   INR goal change: No      Medication changes affecting anticoagulation: No    Additional factors affecting anticoagulation: No    Plan     Spoke with Madie and instructed her to take 4.5mg tonight. Home care RN is scheduled to check INR tomorrow.    Phillip Siddiqui RN

## 2021-06-15 NOTE — ED NOTES
Pt walked well with a walker and staff following. When returned to pts room o2 level dropped down to 85%. NC was applied with o2 on 4

## 2021-06-15 NOTE — PROGRESS NOTES
Clinic Care Coordination Contact  Ambulatory Care Coordination to Inpatient Care Management   Hand-In Communication    Date:  Jackie 15, 2021  Name: Madie Silva is enrolled in Ambulatory Care Coordination program and I am the Lead Care Coordinator.  CC Contact Information: Epic InFatTailsket + phone  Payor Source: Payor: [facility] / Plan: [employer] MEDICARE NON [employer] / Product Type: HMO /   Current services in place:     Please see the CC Snaphot and Care Management Flowsheets for specific details of this Madie Silva care plan.   Additional details/specific concerns r/t this admission:    Home Safety (lives alone, high fall risk), Discharge Disposition (recommend additional in-home assistance or more supportive living arrangement) and Readmission Risk 83%    I will follow this admission in Epic. Please feel free to contact me with questions or for further collaboration in discharge planning.

## 2021-06-15 NOTE — PROGRESS NOTES
A&O x4. VSS on room air. C/o neck, back, and shoulder pain, PRN tylenol able to control pain. Denies CP/SOB. Up with standby assist w/ walker and gait belt. Purewick in place. Plan for pt to discharge home with lincoln Boyd later today. Oliver updated on plan and agrees.

## 2021-06-15 NOTE — CONSULTS
Care Management Initial Consult    General Information  Assessment completed with: Patient,    Type of CM/SW Visit: Offer D/C Planning    Primary Care Provider verified and updated as needed: Yes   Readmission within the last 30 days: no previous admission in last 30 days      Reason for Consult: discharge planning  Advance Care Planning: Advance Care Planning Reviewed: present on chart          Communication Assessment  Patient's communication style: spoken language (English or Bilingual)    Hearing Difficulty or Deaf: no   Wear Glasses or Blind: no    Cognitive  Cognitive/Neuro/Behavioral: WDL                      Living Environment:   People in home: alone     Current living Arrangements: house      Able to return to prior arrangements: yes       Family/Social Support:  Care provided by: self  Provides care for: no one  Marital Status:   Children          Description of Support System:           Current Resources:   Patient receiving home care services: Yes  Skilled Home Care Services: Skilled Nursing, Home Health Aid, Physicial Therapy, Occupational Therapy  Community Resources: Home Care  Equipment currently used at home: walker, rolling  Supplies currently used at home: NA     Employment/Financial:  Employment Status: retired        Financial Concerns: No concerns identified           Lifestyle & Psychosocial Needs:        Socioeconomic History     Marital status:      Spouse name: Not on file     Number of children: Not on file     Years of education: Not on file     Highest education level: Not on file     Tobacco Use     Smoking status: Never Smoker     Smokeless tobacco: Never Used   Substance and Sexual Activity     Alcohol use: No     Drug use: No     Sexual activity: Never       Functional Status:  Prior to admission patient needed assistance: needing help with errands, laundry is in her lower level.  Her son is able to help her   Will need to wear cervical collar another five weeks            Mental Health Status:  Mental Health Status: No Current Concerns       Chemical Dependency Status:  Chemical Dependency Status: No Current Concerns             Values/Beliefs:  Spiritual, Cultural Beliefs, Mandaen Practices, Values that affect care:   No              Additional Information:  Pt was admitted Observation status.  Currently denies pain.  She recently changed her PCP to Dr Orr.  Her former PCP, Dr Obregon has retired.  He called her in the past week concerning being assessed for a Watchman due to her history of falls and needing to be on anticoagulants.  Pt's Cardiologist is Dr Gilliland.  Pt will follow up with Cardiology once she is out of the cervical collar.  Pt prefers to schedule her follow up with Dr Orr.  Will send e-mail to [facility] to resume services.    Carlita Romero, RN   Inpatient Care Management  [phone]

## 2021-06-15 NOTE — ED PROVIDER NOTES
History   Chief Complaint:  Flank Pain     HPI   Madie Silva is a 89 year old female with history of CAD, CKD, kidney stone, and atrial fibrillation who presents with left flank pain. The patient reports that she started having left flank pain around noon. She reports having pain like this in the past. She reports that she thinks that this is nerve damage from shingles that she has had before. She notes that the last time she had pain like this was last year. She denies urinary symptoms. She reports that she took gabapentin today and 1/4 of an oxycodone. She reports that she recently broke her neck on mothers day and broke her shoulder prior to that in July 2020.     Review of Systems   Genitourinary: Positive for flank pain. Negative for difficulty urinating, dysuria, frequency, hematuria and urgency.   All other systems reviewed and are negative.    Allergies:  Fosamax   Amoxicillin  Bisphosphonates  Boniva   Lisinopril  Niacin  Simvastatin    Medications:  Tylenol   Aspirin 81 mg   Lipitor   Clindamycin   Lasix   Gabapentin  Norco  Levothyroxine   Losartan   Metformin   Nitroglycerin   Omeprazole   Remeron  Miralax   Glucophage  Toprol-XL  Jantoven anticoagulant    Oxycodone     Past Medical History:    Walters's esophagus   Basal cell carcinoma   Chronic vertigo   Bradycardia   CAD   Colonic Adenoma  Costochondritis   Depression  Diabetes mellitus, type II   DJD   DVT   GERD   Hiatal hernia  Hyperlipidemia  Hypothyroidism   Osteoporosis   Polymyalgia rheumatica  Post-herpetic neuralgia  Restless leg syndrome   Small vessel cerebrovascular changes   Stricture and stenosis of esophagus    Vitamin D deficiency  Stroke    Aortic valve stenosis  Atrial fibrillation   Kidney stone   Polymyalgia rheumatica   CKD  PPD  Obesity     Past Surgical History:    CABG   CV angiogram coronary graft   CV aortogram abdominal   CV aortogram thoracic   CV left heart cath   CV transcatheter aortic valve replacement   Embolectomy  "lower extremity left   Hysterectomy   Left ankle fracture repair  Appendectomy  Tonsillectomy and adenoidectomy      Family History:    Heart disease     Social History:  The patient presents with a family member. The patient's primary care is Dr. Orr.     Physical Exam     Patient Vitals for the past 24 hrs:   BP Temp Temp src Pulse Resp SpO2 Height Weight   06/14/21 2330 (!) 164/62 -- -- 75 -- 95 % -- --   06/14/21 2300 (!) 153/57 -- -- 73 -- (!) 87 % -- --   06/14/21 2230 -- -- -- 81 -- (!) 84 % -- --   06/14/21 2204 (!) 165/66 -- -- 65 -- 91 % -- --   06/14/21 2044 (!) 178/69 96.9  F (36.1  C) Temporal 82 22 100 % 1.651 m (5' 5\") 71.2 kg (157 lb)       Physical Exam  General: No distress.   Head:  Scalp is atraumatic.  Eyes:  The pupils are equal, round, and reactive to light. Normal conjunctiva.   ENT:                                      Ears:  The external ears are normal.   Nose:  The external nose is normal.  Throat:  The oropharynx is normal. Mucus membranes are moist.                 Neck:  C-collar in place from previous accident.  CV:  Normal rate. No murmur. 2+ radial pulses  Resp:  Breath sounds are clear bilaterally. Non-labored, no retractions or accessory muscle use.  GI:  Abdomen is soft, no distension, no tenderness.   MS:  Normal range of motion. No acute deformities. No midline cervical, thoracic, lumbar tenderness. No reproducible back tenderness.  No rash.  Skin:  Warm and dry. No rash.   Neuro:  Alert. Strength and sensation grossly intact.   Psych : Awake. Alert.  Appropriate interactions.       Emergency Department Course   Imaging:  Chest XR,  PA & LAT   Final Result   IMPRESSION: Cardiomegaly. Pulmonary vascularity normal. Slight interstitial prominence both lungs may represent mild edema. No airspace infiltrates or pleural effusions. Median sternotomy wires. Pacemaker lead tips right atrium and right ventricle.    Esophageal hiatal hernia.      CT Abdomen Pelvis w/o Contrast   Final " Result   IMPRESSION:    1.  Numerous calcifications both renal khris most of which are vascular. Probable tiny nonobstructing stones right kidney. No hydronephrosis.   2.  Large esophageal hiatal hernia.   3.  Small right pleural effusion.   4.  Small splenic and pancreatic cysts not well seen due to the lack of IV contrast.   5.  Extensive atherosclerotic disease.   6.  Bilateral pelvic sidewall adenopathy has increased.            Laboratory:   CBC: WBC 7.6, HGB 8.3 (L),    BMP: Glucose 143 (H), GFR: 45 (L), o/w WNL (Creatinine: 1.07 (H))  INR: Pending    UA: Protein Albumin: 30 (A), Leukocyte Esterase: Larger (A), WBC: 6 (H), WBC clumps: Present, RBC 3 (H), Squamous Epithelial: 6 (H), Mucous: Present, o/w Negative  Urine Culture Aerobic Bacterial: Pending    Asymptomatic COVID-19 PCR: Pending     Emergency Department Course:    Reviewed:  I reviewed nursing notes, vitals, past medical history and care everywhere    Assessments:  2107 I obtained history and examined the patient as noted above.   2337 I rechecked the patient and explained findings.     Consults:   1215 I spoke with Dr. Montes of the hospitalist services regarding admission.     Interventions:  2159 morphine 2 mg, IV  2159 Zofran 4 mg, IV     Disposition:  The patient was admitted to the hospital under the care of Dr. Dr. Montes.     Impression & Plan   Medical Decision Making:  Madie Silva is a 89 year old female presents emergency department with focal right thoracic back pain.  Atraumatic.  Patient reports similar pain in the past and at that time there was no clear diagnosis.  Differential diagnosis includes kidney stone, pyelonephritis, musculoskeletal, shingles, pulmonary embolism, among others.  CT without evidence of ureteral stone.  There is no hydronephrosis.  Large esophageal hiatal hernia noted.  Urine without clear sign of infection, urine culture pending.  Unclear source of pain at this time, though suspect  musculoskeletal.  Patient is anticoagulated and INR 2.48.  Low suspicion for pulmonary embolism as cause of pain.      Patient took 1/4 tablet of Norco prior to arrival and just prior to CT, I was told by nursing that she was having increasing pain.  2 mg morphine given prior to CT.  Nurse noted patient's O2 sats in the mid 80s and 3 L O2 by nasal cannula given.  On my recheck, nasal cannula removed and O2 sats dropped to 87%.  I suspect hypoxia from narcotic medications,.  Plan for admission to observation for further monitoring.  If hypoxia persists, further work-up may be indicated.  Patient agrees with this plan and all questions and concerns addressed.  Chest x-ray reveals slight interstitial prominences both lung may represent mild edema.  Otherwise no evidence of pneumonia, pneumothorax, or other acute findings.    Covid-19  Madie Silva was evaluated during a global COVID-19 pandemic, which necessitated consideration that the patient might be at risk for infection with the SARS-CoV-2 virus that causes COVID-19.   Applicable protocols for evaluation were followed during the patient's care.   COVID-19 was considered as part of the patient's evaluation. The plan for testing is:  a test was obtained during this visit.    Diagnosis:    ICD-10-CM    1. Acute right-sided thoracic back pain  M54.6        Scribe Disclosure:  I, Jane Rosas, am serving as a scribe at 9:05 PM on 6/14/2021 to document services personally performed by Nataliia Murphy PA-C based on my observations and the provider's statements to me.        Nataliia Murphy PA-C  06/15/21 0218

## 2021-06-15 NOTE — PLAN OF CARE
RECEIVING UNIT ED HANDOFF REVIEW    ED Nurse Handoff Report was reviewed by: Sharla Zhao RN on Jackie 15, 2021 at 1:12 AM

## 2021-06-15 NOTE — PLAN OF CARE
Discharge    Patient discharged to home via car with family around 1650   Care plan note: A & O x 4, calm and cooperative. VSS on RA except BP elevated/denies cardiac symptoms at this time, BP appears to be at baseline for this admission. Denies pain. IV removed. Discharge paperwork explained to patient and questions answered.     Listed belongings gathered and returned to patient. Yes  Belongings returned to patient from security/pharmacy Yes  Care Plan and Patient education resolved: Yes  Prescriptions if needed, hard copies sent with patient  Yes  Home and hospital acquired medications returned to patient: Yes  Medication Bin checked and emptied on discharge Yes  Follow up appointment made for patient: Yes    Skye Smith RN on 6/15/2021 at 4:31 PM

## 2021-06-15 NOTE — TELEPHONE ENCOUNTER
Forwarded this message to Dr. Constantine Stephen-Cardiac Electrophysiologist-PH: :[phone]. Asking to forward to his scheduling team. Vianca Cosby, CMA

## 2021-06-15 NOTE — PLAN OF CARE
[facility]      OUTPATIENT PHYSICAL THERAPY EVALUATION  PLAN OF TREATMENT FOR OUTPATIENT REHABILITATION  (COMPLETE FOR INITIAL CLAIMS ONLY)  Patient's Last Name, First Name, M.I.  YOB: 1929  Madie Silva                        Provider's Name  [facility] Medical Record No.  [id number]                               Onset Date:  06/14/21   Start of Care Date:  06/14/21      Type:     _X_PT   ___OT   ___SLP Medical Diagnosis:  Abdominal pain                         PT Diagnosis:  impaired mobility    Visits from SOC:  1   _________________________________________________________________________________  Plan of Treatment/Functional Goals    Planned Interventions: balance training, bed mobility training, gait training, home exercise program, stair training, strengthening, transfer training     Goals: See Physical Therapy Goals on Care Plan in [employer] electronic health record.    Therapy Frequency: Daily  Predicted Duration of Therapy Intervention: 4 days   _________________________________________________________________________________    I CERTIFY THE NEED FOR THESE SERVICES FURNISHED UNDER        THIS PLAN OF TREATMENT AND WHILE UNDER MY CARE     (Physician co-signature of this document indicates review and certification of the therapy plan).              Certification date from: 06/15/21, Certification date to: 06/19/21    Referring Physician: Greg Montes MD            Initial Assessment        See Physical Therapy evaluation dated 06/14/21 in Epic electronic health record.

## 2021-06-15 NOTE — ED NOTES
"[facility]  ED Nurse Handoff Report    ED Chief complaint: Flank Pain      ED Diagnosis:   Final diagnoses:   None       Code Status: Full Code    Allergies:   Allergies   Allergen Reactions     Fosamax [Alendronic Acid] GI Disturbance     Severe gastrointestinal reaction     Amoxicillin Hives     Bisphosphonates Other (See Comments)     Swallowing disorder     Boniva [Ibandronate Sodium] Nausea and Vomiting, Diarrhea and Swelling     Arm swelling with IV only     Lisinopril Cough     Niacin Rash     Simvastatin Muscle Pain (Myalgia)       Patient Story:   Pt presented to ED through triage from home c/o Intermittent R sided abdominal pain that was radiating to the right flank area with intermittent nausea. Pt states that she attempted to take a \"quarter\" of her oxycodone pill but there wasn't much of a relief.    Focused Assessment:    Intermittent R flank pain  Nausea with pain  <90 % on RA while sleeping. Improved after 3L NC.  Unsteady on feet with ambulation (walks with a walker at home)  Has a C-collar    Treatments and/or interventions provided:   2 MG Morphine  4 MG Zofran    Patient's response to treatments and/or interventions:    Partial pain relief  Improved O2 Sat's     To be done/followed up on inpatient unit:    Continue with plan of care    Does this patient have any cognitive concerns?: Denies    Activity level - Baseline/Home:  Walker  Activity Level - Current:   Stand with Assist and Walker    Patient's Preferred language: English   Needed?: No    Isolation: None  Infection: Not Applicable  Patient tested for COVID 19 prior to admission: YES  Bariatric?: No    Vital Signs:   Vitals:    06/14/21 2204 06/14/21 2230 06/14/21 2300 06/14/21 2330   BP: (!) 165/66  (!) 153/57 (!) 164/62   Pulse: 65 81 73 75   Resp:       Temp:       TempSrc:       SpO2: 91% (!) 84% (!) 87% 95%   Weight:       Height:           Cardiac Rhythm:     Was the PSS-3 completed:   Yes  What " interventions are required if any?               Family Comments: Grandson at bedside  OBS brochure/video discussed/provided to patient/family: Yes              Name of person given brochure if not patient: n/a              Relationship to patient: n/a    For the majority of the shift this patient's behavior was Green.   Behavioral interventions performed were none.    ED NURSE PHONE NUMBER: *[phone]

## 2021-06-15 NOTE — PLAN OF CARE
Summary:  R flank pain  DATE & TIME: 6/15/21 [id number]   Cognitive Concerns/ Orientation : A&O x4   BEHAVIOR & AGGRESSION TOOL COLOR: Green  CIWA SCORE: NA   ABNL VS/O2: VSS on 2 L oxygen via nasal cannula  MOBILITY: Ax1 + gb/walker; ambulates with walker at baseline  PAIN MANAGMENT: PRN Tylenol given x1 for headache  DIET: Regular  BOWEL/BLADDER: Incontinent of bladder at times, pure wick in place.  No BM this shift  ABNL LAB/BG: Cr 1.07, ; Hgb 8.3  DRAIN/DEVICES: PIV saline locked, pure wick  TELEMETRY RHYTHM: NA  SKIN: Intact, pale  TESTS/PROCEDURES: PT consult  D/C DAY/GOALS/PLACE: Discharge pending   OTHER IMPORTANT INFO: Denies oxygen, c-collar on due to fall 7 weeks ago, Wean oxygen

## 2021-06-15 NOTE — PHARMACY-ANTICOAGULATION SERVICE
Clinical Pharmacy - Warfarin Dosing Consult     Pharmacy has been consulted to manage this patient s warfarin therapy.  Indication: Atrial Fibrillation  Therapy Goal: INR 2-3  Warfarin Prior to Admission: Yes  Warfarin PTA Regimen: 4.5 mg Mondays, 3 mg AOD    INR   Date Value Ref Range Status   06/15/2021 2.48 (H) 0.86 - 1.14 Final   06/09/2021 3.0 (A) 0.90 - 1.10 Final       Recommend warfarin 3 mg today.  Pharmacy will monitor Madie Moralesman daily and order warfarin doses to achieve specified goal.      Please contact pharmacy as soon as possible if the warfarin needs to be held for a procedure or if the warfarin goals change.

## 2021-06-15 NOTE — PROGRESS NOTES
[facility]  Patient is currently open to home care services with [facility]. The patient is currently receiving RN, PT, OT, and HHA services.  Patient's  and home health team have been notified that patient is under OBSERVATION STATUS. [facility] Liaison will continue to follow patient during stay. If patient is admitted to inpatient status please provide orders to resume home care at time of discharge if appropriate.

## 2021-06-15 NOTE — PHARMACY-ADMISSION MEDICATION HISTORY
Pharmacy Medication History  Admission medication history interview status for the 6/14/2021  admission is complete. See EPIC admission navigator for prior to admission medications     Location of Interview: Phone  Medication history sources: Spoke w/ patient who knew her medications.  Reviewed medication list that [facility] has as well.      Significant changes made to the medication list:  - Updated Jantoven dosing.  - Removed Furosemide, Meclizine, Metoprolol, and Nystatin powder as patient reports she is no longer taking these medications.  - Added Fiber capsules.      In the past week, patient estimated taking medication this percent of the time: greater than 90%    Additional medication history information:   Current Jantoven regimen:   4.5 mg on Mondays; 3 mg all other days.    Medication reconciliation completed by provider prior to medication history? Yes    Time spent in this activity: 30 minutes    Prior to Admission medications    Medication Sig Last Dose Taking? Auth Provider   acetaminophen (ACETAMINOPHEN 8 HOUR) 650 MG CR tablet Take 650 mg by mouth At Bedtime 6/13/2021 at hs Yes Unknown, Entered By History   aspirin EC 81 MG EC tablet Take 1 tablet by mouth daily. 6/14/2021 at am Yes Hunter Mantilla MD   atorvastatin (LIPITOR) 80 MG tablet Take 1 tablet (80 mg) by mouth daily 6/13/2021 at pm Yes De Obregon MD   calcium 600 MG tablet Take 1 tablet by mouth 2 times daily. 6/14/2021 at am Yes De Obregon MD   clindamycin (CLEOCIN) 300 MG capsule 600 mg one hr prior to any dental appointment  at prn Yes Lori Kraft APRN CNP   gabapentin (NEURONTIN) 100 MG capsule Take 2 capsules (200 mg) by mouth At Bedtime 6/13/2021 at hs Yes De Obregon MD   JANTOVEN ANTICOAGULANT 3 MG tablet 4.5 mg PO Monday  3 mg PO all other days 6/13/2021 at pm Yes Unknown, Entered By History   levothyroxine (SYNTHROID/LEVOTHROID) 50 MCG tablet TAKE ONE TABLET BY MOUTH ONCE DAILY  6/14/2021 at am Yes De Obregon MD   losartan (COZAAR) 50 MG tablet Take 1 tablet (50 mg) by mouth every evening 6/13/2021 at pm Yes Lori Kraft APRN CNP   metFORMIN (GLUCOPHAGE) 500 MG tablet TAKE ONE TABLET BY MOUTH DAILY WITH BREAKFAST 6/14/2021 at am Yes Ni Kraus APRN CNP   Multiple Vitamins-Minerals (PRESERVISION AREDS 2 PO) Take 1 tablet by mouth 2 times daily  6/14/2021 at am Yes Reported, Patient   nitroGLYCERIN (NITROSTAT) 0.4 MG SL tablet Place 0.4 mg under the tongue every 5 minutes as needed. Up to 3 doses per episode   at prn Yes Unknown, Entered By History   omeprazole (PRILOSEC) 20 MG DR capsule Take 1 capsule (20 mg) by mouth daily 6/14/2021 at am Yes Bee Orr MD   oxyCODONE (ROXICODONE) 5 MG tablet Take 0.5 tablets (2.5 mg) by mouth every 6 hours as needed for severe pain  at prn Yes Bee Orr MD   polyethylene glycol (MIRALAX) powder Take 17 g (1 capful) by mouth daily 6/14/2021 at am Yes Anna Burger PA-C   psyllium (METAMUCIL/KONSYL) capsule Take 2 capsules by mouth daily 6/14/2021 at am Yes Unknown, Entered By History   Vitamin D, Cholecalciferol, 25 MCG (1000 UT) TABS Take 25 mcg by mouth daily 6/14/2021 at am Yes Unknown, Entered By History       The information provided in this note is only as accurate as the sources available at the time of update(s)     Aaliyah Sullivan, PharmD, BCPS

## 2021-06-15 NOTE — PROGRESS NOTES
06/15/21 1330   Quick Adds   Type of Visit Initial PT Evaluation   Living Environment   People in home alone   Current Living Arrangements house   Home Accessibility stairs to enter home   Number of Stairs, Main Entrance 2   Stair Railings, Main Entrance railings safe and in good condition   Transportation Anticipated family or friend will provide   Living Environment Comments Lives alone in home was getting home cares and home PT/OT, family did help as needed    Self-Care   Usual Activity Tolerance moderate   Current Activity Tolerance fair   Regular Exercise Other (see comments)  (was getting home OT and PT )   Equipment Currently Used at Home walker, rolling   Activity/Exercise/Self-Care Comment Has FWW, 4WW, cane at home    Disability/Function   Fall history within last six months yes   Number of times patient has fallen within last six months 3   General Information   Onset of Illness/Injury or Date of Surgery 06/14/21   Referring Physician Greg Montes MD   Patient/Family Therapy Goals Statement (PT) return home    Pertinent History of Current Problem (include personal factors and/or comorbidities that impact the POC) Pt presented to ED through triage from home c/o Intermittent R sided abdominal pain that was radiating to the right flank area with intermittent nausea, hx of previous vertigo  anf fall with C6 fx with cervical collar (will continue to need to use for 5 more weeks per pt report)   Existing Precautions/Restrictions spinal  (previous C6 fx )   Weight-Bearing Status - LLE full weight-bearing   Weight-Bearing Status - RLE full weight-bearing   Cognition   Orientation Status (Cognition) oriented x 3   Cognitive Status Comments WFL   Bed Mobility   Comment (Bed Mobility) Mod I bed mob supine <> sit via log roll    Transfers   Transfer Safety Comments SBA with FWW sit <> stand    Gait/Stairs (Locomotion)   [place] Level (Gait) supervision   Assistive Device (Gait) walker,  front-wheeled   Distance in Feet (Required for LE Total Joints) 150' x 2    Pattern (Gait) step-through   Negotiation (Stairs) stairs independence;handrail location;number of steps   [place] Level (Stairs) supervision   Handrail Location (Stairs) left side (ascending)   Number of Steps (Stairs) 2   Comment (Gait/Stairs) SBA on stairs    Clinical Impression   Criteria for Skilled Therapeutic Intervention yes, treatment indicated   PT Diagnosis (PT) impaired mobility    Influenced by the following impairments decreased strength, impaired balance, spinal precautions with previous C6 fx and collar use at all times    Functional limitations due to impairments decreased activity tolerance, fall risk    Clinical Presentation Stable/Uncomplicated   Clinical Presentation Rationale clinical judgement    Clinical Decision Making (Complexity) low complexity   Therapy Frequency (PT) Daily   Predicted Duration of Therapy Intervention (days/wks) 4 days    Planned Therapy Interventions (PT) balance training;bed mobility training;gait training;home exercise program;stair training;strengthening;transfer training   Anticipated Equipment Needs at Discharge (PT) walker, rolling   Risk & Benefits of therapy have been explained evaluation/treatment results reviewed;care plan/treatment goals reviewed;risks/benefits reviewed;current/potential barriers reviewed;participants voiced agreement with care plan;participants included;patient   Therapy Certification   Start of care date 06/14/21   Certification date from 06/15/21   Certification date to 06/19/21   Medical Diagnosis Abdominal pain    Total Evaluation Time   Total Evaluation Time (Minutes) 15

## 2021-06-15 NOTE — H&P
[facility]    History and Physical  Hospitalist       Date of Admission:  6/14/2021  Date of Service (when I saw the patient): 06/15/21    Assessment & Plan   Madie Silva is a 89 year old female who presents with flank pain    Acute hypoxic respiratory failure  Initial O2 sats in %. With drop in O2 to 84% after low dose of morphine given for CT. O2 sats improved with O2. CXR with normal pulmonary vascularity, sl interstitial prominence bilateral lungs may represent mild edema. Exam with clear lungs, no crackles and good air movement  - wean O2 as able  - PT in the am    Flank pain  Has had flank pain in the past. States on day of presentation she developed L flank pain around noon. Thinks it is nerve damage from shingles she has had before. Took gabapentin today and 1/4 tab oxycodone. CT in ED without nephrolithiasis. Pt suspects it is nerve pain in area of her shingles  - monitor    C6 fracture   Fall due to vertigo 5/2021  With fall ~7 weeks ago. Following with neurosurgery.   - continue c-collar for 5 weeks with neurosurgery follow up    Chronic afib  SSS s/p ppm   Severe Aortic stenosis s/p TAVR  [warfarin]  TTE 5/9 with EF 65-70%, bioprosthetic valve good.   - pharmacy consult for warfarin       CAD, native heart, native vessels s/p CABG 2011  Essential hypertension  [reportedly aspirin 81 mg daily, losartan 50 mg daily, metoprolol 12.5 mg daily, atorvastatin 80 mg daily]  - continue meds except hold atorvastatin in obs    DM2 without complications  A1c 6.8. PTA on metformin 500 mg daily.  - hold metformin inpatient, resume at discharge    Walters's esophagus  Hiatal hernia  GERD  Chronic and stable on omeprazole 20 mg daily     Chronic anemia  Hgb stable from previous. Monitor for now     Chronic pain syndrome  [reportedly gabapentin 200 mg at HS, oxycodone 2.5 mg q6 hours prn pain]  - hold for now    COVID-19 negative on admission    DVT Prophylaxis: Warfarin  Code Status:  Full Code    Disposition: Expected discharge in 1 days     Greg Montes MD  [phone] (P)  Text Page     Primary Care Physician   Dr Bee Orr    Chief Complaint   Flank pain    History is obtained from the patient and medical records    History of Present Illness   Madie Silva is a 89 year old female who presents with flank pain.  She reports she has had pain on and off for quite a few years in the right flank area.  This pain flared on the day of presentation prompting her presentation in the emergency department.  She received morphine 2 mg in the emergency department which helped her pain.  However, immediately after receiving this she developed hypoxia.  On arrival in the emergency department she was at 100% but it dipped down to the mid 80%'s range.  She denies any shortness of breath.  Denies any new cough.  Denies chest pain.  She reports that her breathing has not been fine for a number of years but is not changed.  Further review of systems is negative.    Past Medical History    I have reviewed this patient's medical history and updated it with pertinent information if needed.   Past Medical History:   Diagnosis Date     Walters's esophagus 7/09     Basal cell carcinoma      Bradycardia     post-op CABG 2011 - s/p pacemaker implanted 2011     CHRONIC VERTIGO 9/99     Colonic Adenoma 3/90, 11/98     Coronary artery disease     CABG x4 2011: LIMA to her LAD, saphenous vein graft to her ramus, saphenous vein graft to her OM, saphenous vein graft to her PDA.     Costochondritis      Depression      DIABETES MELLITUS TYPE II 10/07     DJD      DVT of Leg, postop 11/09    6 months of warfarin     Gastritis 10/89     GERD      HIATAL HERNIA      HYPERLIPIDEMIA      HYPOTHYROIDISM (aka HASHIMOTO)      Impaired fasting glucose      L Ankle Fracture 10/09     Obesity, unspecified      Osteoporosis, unspecified      PERIPHERAL NEUROPATHY      Polymyalgia rheumatica (H)      Post Herpetic Neuralgia  4/03    R lumbar distribution     Restless leg syndrome      Small vessel cerebrovascular changes 9/99     Stricture and stenosis of esophagus 10/89    Dilated     Syncope     1/06, 8/08, 2/10     VITAMIN D DEFICIENCY 12/07    per Dr. Petty       Past Surgical History   I have reviewed this patient's surgical history and updated it with pertinent information if needed.  Past Surgical History:   Procedure Laterality Date     BYPASS GRAFT ARTERY CORONARY  11/2/2011    CABG x 4  Dr. PINEDA     CV ANGIOGRAM CORONARY GRAFT N/A 1/27/2020    Procedure: Angiogram Coronary Graft;  Surgeon: Jenny Luther MD;  Location: [facility] CARDIAC CATH LAB     CV AORTOGRAM ABDOMINAL N/A 1/27/2020    Procedure: Aortogram Abdominal;  Surgeon: Jenny Luther MD;  Location:  HEART CARDIAC CATH LAB     CV AORTOGRAM THORACIC N/A 1/27/2020    Procedure: Aortogram Thoracic;  Surgeon: Jenny Luther MD;  Location:  HEART CARDIAC CATH LAB     CV LEFT HEART CATH N/A 1/27/2020    Procedure: Right and Left heart catheterization for TAVR workup;  Surgeon: Jenny Luther MD;  Location:  HEART CARDIAC CATH LAB     CV TRANSCATHETER AORTIC VALVE REPLACEMENT N/A 5/5/2020    Procedure: Transcatheter Aortic Valve Replacement;  Surgeon: Jenny Luther MD;  Location:  HEART CARDIAC CATH LAB     EMBOLECTOMY LOWER EXTREMITY  11/9/2011    EMBOLECTOMY LOWER EXTREMITY; left leg femoral embolectomy.; Surgeon:JOLEEN BOONE; Location: OR     HYSTERECTOMY, CERVIX STATUS UNKNOWN  1978    partial hysterectomy     SURGICAL HISTORY OF -   10/09    L ankle fracture repair    Dr. Jose Roberto Trevino     [facility] NONSPECIFIC PROCEDURE  age 14    appendectomy     [facility] NONSPECIFIC PROCEDURE  as a child    T&A       Prior to Admission Medications   Prior to Admission Medications   Prescriptions Last Dose Informant Patient Reported? Taking?   Acetaminophen (TYLENOL ARTHRITIS PAIN PO)  Self Yes No   Sig: Take 650 mg by mouth  At Bedtime   Cholecalciferol (VITAMIN D-400 PO)  Self Yes No   Sig: Take 1 tablet by mouth daily    JANTOVEN ANTICOAGULANT 3 MG tablet   Yes No   Sig: Take by mouth daily Coumadin 4 mg  and  and 3 mg other days. Check INR on 21   Multiple Vitamins-Minerals (PRESERVISION AREDS 2 PO)  Self Yes No   Sig: Take 1 tablet by mouth 2 times daily    acetaminophen (TYLENOL) 500 MG tablet   No No   Sig: Take 2 tablets (1,000 mg) by mouth 3 times daily   aspirin EC 81 MG EC tablet  Self No No   Sig: Take 1 tablet by mouth daily.   atorvastatin (LIPITOR) 80 MG tablet  Self No No   Sig: Take 1 tablet (80 mg) by mouth daily   calcium 600 MG tablet  Self Yes No   Sig: Take 1 tablet by mouth 2 times daily.   clindamycin (CLEOCIN) 300 MG capsule  Self No No   Si mg one hr prior to any dental appointment   furosemide (LASIX) 20 MG tablet   Yes Yes   Sig: Take 20 mg by mouth daily as needed   gabapentin (NEURONTIN) 100 MG capsule  Self No No   Sig: Take 2 capsules (200 mg) by mouth At Bedtime   levothyroxine (SYNTHROID/LEVOTHROID) 50 MCG tablet  Self No No   Sig: TAKE ONE TABLET BY MOUTH ONCE DAILY   losartan (COZAAR) 50 MG tablet  Self Yes No   Sig: Take 1 tablet (50 mg) by mouth every evening   meclizine (ANTIVERT) 12.5 MG tablet   No No   Sig: Take 1 tablet (12.5 mg) by mouth 3 times daily as needed for dizziness   metFORMIN (GLUCOPHAGE) 500 MG tablet   No No   Sig: TAKE ONE TABLET BY MOUTH DAILY WITH BREAKFAST   metoprolol succinate ER (TOPROL-XL) 25 MG 24 hr tablet   Yes Yes   Sig: Take 12.5 mg by mouth daily   nitroGLYCERIN (NITROSTAT) 0.4 MG SL tablet  Self Yes No   Sig: Place 0.4 mg under the tongue every 5 minutes as needed. Up to 3 doses per episode    nystatin (MYCOSTATIN) 890258 UNIT/GM external powder  Self Yes No   Sig: Apply to breast folds topically two times a day as needed for redness   omeprazole (PRILOSEC) 20 MG DR capsule  Self No No   Sig: Take 1 capsule (20 mg) by mouth daily   oxyCODONE (ROXICODONE)  5 MG tablet   No No   Sig: Take 0.5 tablets (2.5 mg) by mouth every 6 hours as needed for severe pain   polyethylene glycol (MIRALAX) powder  Self No No   Sig: Take 17 g (1 capful) by mouth daily      Facility-Administered Medications: None     Allergies   Allergies   Allergen Reactions     Fosamax [Alendronic Acid] GI Disturbance     Severe gastrointestinal reaction     Amoxicillin Hives     Bisphosphonates Other (See Comments)     Swallowing disorder     Boniva [Ibandronate Sodium] Nausea and Vomiting, Diarrhea and Swelling     Arm swelling with IV only     Lisinopril Cough     Niacin Rash     Simvastatin Muscle Pain (Myalgia)       Social History   I have reviewed this patient's social history and updated it with pertinent information if needed. Madie Silva  reports that she has never smoked. She has never used smokeless tobacco. She reports that she does not drink alcohol or use drugs.    Family History   I have reviewed this patient's family history and updated it with pertinent information if needed.   Family History   Problem Relation Age of Onset     Alzheimer Disease Sister      Heart Disease Mother      Heart Disease Father      Heart Disease Son      Heart Disease Brother        Review of Systems   The 10 point Review of Systems is negative other than noted in the HPI or here.     Physical Exam   Temp: 96.9  F (36.1  C) Temp src: Temporal BP: (!) 164/62 Pulse: 75   Resp: 22 SpO2: 95 % O2 Device: Nasal cannula Oxygen Delivery: 3 LPM  Vital Signs with Ranges  157 lbs 0 oz    Constitutional: alert, oriented and in no acute distress  Eyes: EOMI, PERRL  HEENT: OP clear  Respiratory: CTA B without w/c  Cardiovascular: RRR no murmur. no edema.  GI: soft, nontender, nondistended, BS present  Lymph/Hematologic: no cervical LAD  Genitourinary: deferred  Skin: no rashes or lesions grossly  Musculoskeletal: no deformities or arthritis. C-collar in place  Neurologic: CN II-XII, QUINONES  Psychiatric: mood and affect  wnl    Data   Data reviewed today:  I personally reviewed the chest x-ray image(s) showing possible mild interstitial edema and the abdominal CT image(s) showing no acute process.  Recent Labs   Lab 06/14/21  2140 06/09/21   WBC 7.6  --    HGB 8.3*  --    MCV 70*  --      --    INR  --  3.0*     --    POTASSIUM 4.3  --    CHLORIDE 108  --    CO2 23  --    BUN 22  --    CR 1.07*  --    ANIONGAP 6  --    SHAWNEE 8.6  --    *  --        Recent Results (from the past 24 hour(s))   Cardiac Device Check - Remote   Result Value    Date Time Interrogation Session [id number]    Implantable Pulse Generator  Medtronic    Implantable Pulse Generator Model SEDR01 Sensia    Implantable Pulse Generator Serial Number [id number]    Type Interrogation Session Remote     Clinic Name [facility]    Implantable Pulse Generator Type Pacemaker    Implantable Pulse Generator Implant Date 20111108    Implantable Lead  Medtronic    Implantable Lead Model 4076 CapsureFix Novus    Implantable Lead Serial Number [id number]    Implantable Lead Implant Date 20111108    Implantable Lead Polarity Type Bipolar Lead    Implantable Lead Location Right Ventricle    Implantable Lead  Medtronic    Implantable Lead Model 4076 CapsureFix Novus    Implantable Lead Serial Number [id number]    Implantable Lead Implant Date 20111108    Implantable Lead Polarity Type Bipolar Lead    Implantable Lead Location Right Atrium    Robert Setting Mode (NBG Code) DDDR    Robert Setting Lower Rate Limit 70    Robert Setting Maximum Tracking Rate 130    Robert Setting Maximum Sensor Rate 130    Robert Setting SHARDA Delay Low 120    Robert Setting PAV Delay Low 150    Robert Setting AT Mode Switch Rate 175    Robert Setting AT Mode Switch Mode DDIR    Lead Channel Setting Sensing Polarity Bipolar    Lead Channel Setting Sensing Sensitivity 0.25    Lead Channel Setting Sensing Polarity Bipolar    Lead Channel Setting Sensing  Sensitivity 4.00    Lead Channel Setting Pacing Polarity Bipolar    Lead Channel Setting Pacing Pulse Width 0.40    Lead Channel Setting Pacing Amplitude 1.500    Lead Channel Setting Pacing Capture Mode Adaptive    Lead Channel Setting Pacing Polarity Bipolar    Lead Channel Setting Pacing Pulse Width 0.40    Lead Channel Setting Pacing Amplitude 2.000    Lead Channel Setting Pacing Capture Mode Adaptive    Zone Setting Type Category VF    Zone Setting Detection Interval 333.33    Zone Setting Type Category ATRIAL_FIBRILLATION    Zone Setting Detection Interval 342.86    Lead Channel Impedance Value 443    Lead Channel Pacing Threshold Amplitude 0.375    Lead Channel Pacing Threshold Pulse Width 0.40    Lead Channel Impedance Value 446    Lead Channel Pacing Threshold Amplitude 0.500    Lead Channel Pacing Threshold Pulse Width 0.40    Battery Date Time of Measurements 20210614083053    Battery Status OK    Battery Remaining Longevity 30    Battery Voltage 2.75    Battery Impedance 2,198    Robert Statistic Date Time Start 20201111155552    Robert Statistic Date Time End 20210614083053    Robert Statistic AP  Percent 6    Robert Statistic AS  Percent 1    Robert Statistic AP VS Percent 80    Robert Statistic AS VS Percent 13    Atrial Tachy Statistic Date Time Start 20201111155552    Atrial Tachy Statistic Date Time End 20210614083053    Atrial Tachy Statistic AT/AF Nikolai Percent 0    Atrial Tachy Statistic Number Of Mode Switches 61    Episode Statistic Recent Count 5    Episode Statistic Type Category AT/AF    Episode Statistic Recent Count 4    Episode Statistic Type Category VF    Episode Statistic Recent Date Time Start 20201111155552    Episode Statistic Recent Date Time End 20210614083053    Episode Statistic Recent Date Time Start 20201111155552    Episode Statistic Recent Date Time End [id number]    Narrative    [employer] Sensia (D) Remote PPM Device Check  AP: 86%  : 7%  Mode: DDDR 70/130  Presenting  Rhythm: AS/VS, rates 80-145bpm  Heart Rate: Adequate rates per histogram  Sensing: stable  Pacing Threshold: stable  Impedance: stable  Battery Status: 11-49 months (30 months)   Atrial Arrhythmia: 51 mode switch episodes comprising <1% of the time. 4 EGMs show As=Vs for SVT, longest lasting 4 minutes 14 seconds, ventricular rates 150-160bpm. Taking Warfarin.  Ventricular Arrhythmia: 4 ventricular high rates. 3 EGMs show As=Vs for SVT lasting 8-18 beats, rates in the 180s.    Care Plan: F/u PPM [facility] q 3 months. Gave patient results over the phone. LEONARD Escoto    I have reviewed and interpreted the device interrogation, settings, programming and nurse's summary. The device is functioning within normal device parameters. I agree with the current findings, assessment and plan.   CT Abdomen Pelvis w/o Contrast    Narrative    EXAM: CT ABDOMEN PELVIS W/O CONTRAST  LOCATION: [facility]  DATE/TIME: 6/14/2021 10:34 PM    INDICATION: Right flank pain.  COMPARISON: CT abdomen and pelvis 05/20/2014  TECHNIQUE: CT scan of the abdomen and pelvis was performed without IV contrast. Multiplanar reformats were obtained. Dose reduction techniques were used.  CONTRAST: None.    FINDINGS:   LOWER CHEST: Very small right pleural effusion. Minimal atelectasis both lower lung fields. Large esophageal hiatal hernia. Previous median sternotomy and CABG. Dense calcification of the mitral valve. Pacemaker lead tips right atrium and right   ventricle.    HEPATOBILIARY: Normal.    PANCREAS: Small cyst within the body of the pancreas better appreciated on previous enhanced exam.    SPLEEN: Small splenic cyst better appreciated on previous enhanced exam.    ADRENAL GLANDS: Normal.    KIDNEYS/BLADDER: Numerous calcifications within the right and left renal khris most of which appear to be vascular. Possible 2 tiny nonobstructing stones upper pole right kidney the largest measuring 3 mm. No bladder stones.    BOWEL: No evidence  for bowel obstruction.    LYMPH NODES: Bilateral pelvic sidewall adenopathy has increased in size with the largest lymph node left pelvic sidewall on image 174 series 3 measuring 3.1 x 1.8 cm, previously measuring 2.1 x 0.9 cm    VASCULATURE: Advanced atherosclerotic disease abdominal aorta and iliac arteries. Plaque within the celiac trunk, superior mesenteric artery and inferior mesenteric artery.    PELVIC ORGANS: Hysterectomy.    MUSCULOSKELETAL: Degenerative disc disease lumbar spine.      Impression    IMPRESSION:   1.  Numerous calcifications both renal khris most of which are vascular. Probable tiny nonobstructing stones right kidney. No hydronephrosis.  2.  Large esophageal hiatal hernia.  3.  Small right pleural effusion.  4.  Small splenic and pancreatic cysts not well seen due to the lack of IV contrast.  5.  Extensive atherosclerotic disease.  6.  Bilateral pelvic sidewall adenopathy has increased.     Chest XR,  PA & LAT    Narrative    EXAM: XR CHEST 2 VW  LOCATION: [facility]  DATE/TIME: 6/15/2021 12:36 AM    INDICATION: Hypoxia  COMPARISON: 07/29/2020      Impression    IMPRESSION: Cardiomegaly. Pulmonary vascularity normal. Slight interstitial prominence both lungs may represent mild edema. No airspace infiltrates or pleural effusions. Median sternotomy wires. Pacemaker lead tips right atrium and right ventricle.   Esophageal hiatal hernia.

## 2021-06-15 NOTE — ED NOTES
Pt placed on 3L O2 as O2 sat down to 87% after morphine. Pt repositioned in bed and warm blanket applied. Pt reports pain has improved after morphine and feels relaxed.

## 2021-06-15 NOTE — PLAN OF CARE
Summary:  R flank pain  DATE & TIME: 6/15/21 [id number]   Cognitive Concerns/ Orientation : A&O x4   BEHAVIOR & AGGRESSION TOOL COLOR: Green  CIWA SCORE: NA   ABNL VS/O2: VSS on 2 L oxygen via nasal cannula  MOBILITY: Ax1 + gb/walker; ambulates with walker at baseline  PAIN MANAGMENT: PRN Tylenol given x1 for headache  DIET: Regular  BOWEL/BLADDER: Incontinent of bladder at times, pure wick in place.  No BM this shift  ABNL LAB/BG: Cr 1.07, ; Hgb 8.3  DRAIN/DEVICES: PIV saline locked, pure wick  TELEMETRY RHYTHM: NA  SKIN: Intact, pale  TESTS/PROCEDURES: PT consult  D/C DAY/GOALS/PLACE: Discharge pending   OTHER IMPORTANT INFO: Denies nausea, c-collar on due to C6 fx from fall 7 weeks ago, Wean oxygen    Observation goals PRIOR TO DISCHARGE    Comments: -diagnostic tests and consults completed and resulted- not met  -vital signs normal or at patient baseline- not met  -dyspnea improved and O2 sats greater than 88% on room air or prior home oxygen levels- partially met   -returns to baseline functional status- not met

## 2021-06-15 NOTE — DISCHARGE SUMMARY
[facility]  Hospitalist Discharge Summary      Date of Admission:  6/14/2021  Date of Discharge:  6/15/2021  Discharging Provider: Thalia Adams MD      Discharge Diagnoses   Acute hypoxic respiratory failure secondary to medication; resolved  Flank pain-improved    C6 fracture  Fall due to vertigo 5/2021  Chronic afib  SSS s/p PPM  Severe aortic stenosis s/p TAVR  CAD s/p CABG 2011  Essential HTN  Type II DM  Walters's esophagus  Hiatal hernia  GERD  Chronic anemia  Chronic pain syndrome    Follow-ups Needed After Discharge   Follow-up Appointments     Follow-up and recommended labs and tests       Follow up with primary care provider, Bee Orr, within 7 days   for hospital follow- up.  No follow up labs or test are needed.             Unresulted Labs Ordered in the Past 30 Days of this Admission     Date and Time Order Name Status Description    6/14/2021 2103 Urine Culture Aerobic Bacterial Preliminary           Discharge Disposition   Discharged to home  Condition at discharge: Stable    Hospital Course   Madie Silva is a 89 year old female who presents with flank pain     Acute hypoxic respiratory failure secondary to morphine: resolved.   Initial O2 sats in %. With drop in O2 to 84% after low dose of morphine given for CT. O2 sats improved with O2. CXR with normal pulmonary vascularity, sl interstitial prominence bilateral lungs may represent mild edema. Exam with clear lungs, no crackles and good air movement. Denies shortness of breath. She was able to wean off oxygen and sats remain high 90s onr RA at time of discharge.       Flank pain-improved  Has had flank pain in the past. States on day of presentation she developed L flank pain around noon. Thinks it is nerve damage from shingles she has had before. Took gabapentin today and 1/4 tab oxycodone. CT in ED without nephrolithiasis. Pt suspects it is nerve pain in area of her shingles and she does get similar  pain on and off.  She is currently improved and mostly used tylenol.  She has oxycodone at home that she could use.   PT evaluated and ok for her to discharge home.     C6 fracture   Fall due to vertigo 5/2021  With fall ~7 weeks ago. Following with neurosurgery.   - continue c-collar for 5 weeks with neurosurgery follow up     Chronic afib  SSS s/p ppm   Severe Aortic stenosis s/p TAVR  [warfarin]  TTE 5/9 with EF 65-70%, bioprosthetic valve good. INR 2.48. will resume warfarin per PTA regimen.      CAD, native heart, native vessels s/p CABG 2011  Essential hypertension  [reportedly aspirin 81 mg daily, losartan 50 mg daily, metoprolol 12.5 mg daily, atorvastatin 80 mg daily]--resume all.     DM2 without complications  A1c 6.8. PTA on metformin 500 mg daily.  - resume metformin     Walters's esophagus  Hiatal hernia  GERD  Chronic and stable on omeprazole 20 mg daily     Chronic anemia  Hgb stable from previous. Monitor for now     Chronic pain syndrome  [reportedly gabapentin 200 mg at HS, oxycodone 2.5 mg q6 hours prn pain]--will resume at discharge      Consultations This Hospital Stay   PHYSICAL THERAPY ADULT IP CONSULT  PHARMACY TO DOSE WARFARIN    Code Status   Full Code    Time Spent on this Encounter   I, Thalia Adams MD, personally saw the patient today and spent 30 minutes discharging this patient.       Thalia Adams MD  [place] [address] [facility]  [address]  [place] [address]  Phone: [phone]  ______________________________________________________________________    Physical Exam   Vital Signs: Temp: 98  F (36.7  C) Temp src: Oral BP: (!) 148/52 Pulse: 79   Resp: 18 SpO2: 97 % O2 Device: None (Room air) Oxygen Delivery: 2 LPM  Weight: 156 lbs 11.95 oz  General Appearance: Alert, awake and no apparent distress  Respiratory: clear to auscultation bilaterally, no wheezing  Cardiovascular: regular rate and rhythm  GI: soft and non-tender  Skin: warm  and dry  Other: C-collar in place       Primary Care Physician   Bee Orr    Discharge Orders      Reason for your hospital stay    You were admitted for flank pain that has improved.     Follow-up and recommended labs and tests     Follow up with primary care provider, Bee Orr, within 7 days for hospital follow- up.  No follow up labs or test are needed.     Activity    Your activity upon discharge: activity as tolerated     Diet    Follow this diet upon discharge: Orders Placed This Encounter      Room Service      Regular Diet Adult       Significant Results and Procedures   Most Recent 3 CBC's:  Recent Labs   Lab Test 06/14/21  2140 05/18/21  0705 05/10/21  0647   WBC 7.6 6.3 11.0   HGB 8.3* 8.8* 9.8*   MCV 70* 72* 70*    303 247     Most Recent 3 BMP's:  Recent Labs   Lab Test 06/14/21 2140 05/18/21 0705 05/10/21  0647    138 139   POTASSIUM 4.3 4.0 4.2   CHLORIDE 108 107 107   CO2 23 26 25   BUN 22 27 17   CR 1.07* 0.93 0.87   ANIONGAP 6 5 7   SHAWNEE 8.6 8.9 8.6   * 103* 122*   ,   Results for orders placed or performed during the hospital encounter of 06/14/21   CT Abdomen Pelvis w/o Contrast    Narrative    EXAM: CT ABDOMEN PELVIS W/O CONTRAST  LOCATION: [facility]  DATE/TIME: 6/14/2021 10:34 PM    INDICATION: Right flank pain.  COMPARISON: CT abdomen and pelvis 05/20/2014  TECHNIQUE: CT scan of the abdomen and pelvis was performed without IV contrast. Multiplanar reformats were obtained. Dose reduction techniques were used.  CONTRAST: None.    FINDINGS:   LOWER CHEST: Very small right pleural effusion. Minimal atelectasis both lower lung fields. Large esophageal hiatal hernia. Previous median sternotomy and CABG. Dense calcification of the mitral valve. Pacemaker lead tips right atrium and right   ventricle.    HEPATOBILIARY: Normal.    PANCREAS: Small cyst within the body of the pancreas better appreciated on previous enhanced exam.    SPLEEN: Small  splenic cyst better appreciated on previous enhanced exam.    ADRENAL GLANDS: Normal.    KIDNEYS/BLADDER: Numerous calcifications within the right and left renal khris most of which appear to be vascular. Possible 2 tiny nonobstructing stones upper pole right kidney the largest measuring 3 mm. No bladder stones.    BOWEL: No evidence for bowel obstruction.    LYMPH NODES: Bilateral pelvic sidewall adenopathy has increased in size with the largest lymph node left pelvic sidewall on image 174 series 3 measuring 3.1 x 1.8 cm, previously measuring 2.1 x 0.9 cm    VASCULATURE: Advanced atherosclerotic disease abdominal aorta and iliac arteries. Plaque within the celiac trunk, superior mesenteric artery and inferior mesenteric artery.    PELVIC ORGANS: Hysterectomy.    MUSCULOSKELETAL: Degenerative disc disease lumbar spine.      Impression    IMPRESSION:   1.  Numerous calcifications both renal khris most of which are vascular. Probable tiny nonobstructing stones right kidney. No hydronephrosis.  2.  Large esophageal hiatal hernia.  3.  Small right pleural effusion.  4.  Small splenic and pancreatic cysts not well seen due to the lack of IV contrast.  5.  Extensive atherosclerotic disease.  6.  Bilateral pelvic sidewall adenopathy has increased.     Chest XR,  PA & LAT    Narrative    EXAM: XR CHEST 2 VW  LOCATION: [facility]  DATE/TIME: 6/15/2021 12:36 AM    INDICATION: Hypoxia  COMPARISON: 07/29/2020      Impression    IMPRESSION: Cardiomegaly. Pulmonary vascularity normal. Slight interstitial prominence both lungs may represent mild edema. No airspace infiltrates or pleural effusions. Median sternotomy wires. Pacemaker lead tips right atrium and right ventricle.   Esophageal hiatal hernia.     *Note: Due to a large number of results and/or encounters for the requested time period, some results have not been displayed. A complete set of results can be found in Results Review.       Discharge Medications    Current Discharge Medication List      CONTINUE these medications which have NOT CHANGED    Details   acetaminophen (ACETAMINOPHEN 8 HOUR) 650 MG CR tablet Take 650 mg by mouth At Bedtime      aspirin EC 81 MG EC tablet Take 1 tablet by mouth daily.    Associated Diagnoses: S/P coronary artery bypass graft x 3      atorvastatin (LIPITOR) 80 MG tablet Take 1 tablet (80 mg) by mouth daily  Qty: 90 tablet, Refills: 3    Comments: Profile Rx: patient will call when needed  Associated Diagnoses: Hyperlipidemia LDL goal <100      calcium 600 MG tablet Take 1 tablet by mouth 2 times daily.      clindamycin (CLEOCIN) 300 MG capsule 600 mg one hr prior to any dental appointment  Qty: 2 capsule, Refills: 3    Associated Diagnoses: Aortic stenosis, severe      gabapentin (NEURONTIN) 100 MG capsule Take 2 capsules (200 mg) by mouth At Bedtime  Qty: 180 capsule, Refills: 1    Associated Diagnoses: Post herpetic neuralgia; Restless leg syndrome      JANTOVEN ANTICOAGULANT 3 MG tablet 4.5 mg PO Monday  3 mg PO all other days      levothyroxine (SYNTHROID/LEVOTHROID) 50 MCG tablet TAKE ONE TABLET BY MOUTH ONCE DAILY  Qty: 90 tablet, Refills: 3    Associated Diagnoses: Acquired hypothyroidism      losartan (COZAAR) 50 MG tablet Take 1 tablet (50 mg) by mouth every evening  Qty: 90 tablet, Refills: 3    Associated Diagnoses: Benign essential hypertension; Essential hypertension      metFORMIN (GLUCOPHAGE) 500 MG tablet TAKE ONE TABLET BY MOUTH DAILY WITH BREAKFAST  Qty: 30 tablet, Refills: 0    Associated Diagnoses: Type 2 diabetes mellitus with diabetic nephropathy, without long-term current use of insulin (H)      Multiple Vitamins-Minerals (PRESERVISION AREDS 2 PO) Take 1 tablet by mouth 2 times daily       nitroGLYCERIN (NITROSTAT) 0.4 MG SL tablet Place 0.4 mg under the tongue every 5 minutes as needed. Up to 3 doses per episode       omeprazole (PRILOSEC) 20 MG DR capsule Take 1 capsule (20 mg) by mouth daily  Qty: 90  capsule, Refills: 3    Associated Diagnoses: Hiatal hernia; Walters's esophagus with dysplasia      oxyCODONE (ROXICODONE) 5 MG tablet Take 0.5 tablets (2.5 mg) by mouth every 6 hours as needed for severe pain  Qty: 10 tablet, Refills: 0    Associated Diagnoses: Fall, initial encounter      polyethylene glycol (MIRALAX) powder Take 17 g (1 capful) by mouth daily  Qty: 1 Bottle, Refills: 3    Associated Diagnoses: Constipation, unspecified constipation type      psyllium (METAMUCIL/KONSYL) capsule Take 2 capsules by mouth daily    Comments: Pharmacy to dispense capsule size that is available.      Vitamin D, Cholecalciferol, 25 MCG (1000 UT) TABS Take 25 mcg by mouth daily           Allergies   Allergies   Allergen Reactions     Fosamax [Alendronic Acid] GI Disturbance     Severe gastrointestinal reaction     Amoxicillin Hives     Bisphosphonates Other (See Comments)     Swallowing disorder     Boniva [Ibandronate Sodium] Nausea and Vomiting, Diarrhea and Swelling     Arm swelling with IV only     Lisinopril Cough     Niacin Rash     Simvastatin Muscle Pain (Myalgia)

## 2021-06-15 NOTE — PLAN OF CARE
Physical Therapy Discharge Summary    Reason for therapy discharge:    Discharged to home with home therapy.    Progress towards therapy goal(s). See goals on Care Plan in [facility] electronic health record for goal details.  Goals partially met.  Barriers to achieving goals:   discharge from facility.    Therapy recommendation(s):    Continued therapy is recommended.  Rationale/Recommendations:  recommend continued home PT to assist with safety and functional mobility improvement.

## 2021-06-16 NOTE — PROGRESS NOTES
ANTICOAGULATION MANAGEMENT     Patient Name:  Madie Silva  Date:  2021    ASSESSMENT /SUBJECTIVE:    Today's INR result of 2.2 is therapeutic. Goal INR of 2.0-3.0      Warfarin dose taken: Warfarin taken as instructed    Diet: No new diet changes affecting INR    Medication changes/ interactions: No new medications/supplements affecting INR    Previous INR: Therapeutic     S/S of bleeding or thromboembolism: No    New injury or illness: No    Upcoming surgery, procedure or cardioversion: No    Additional findings: Tylenol PRN for right hip pain - fracture of c5 & c6 - was in ED for hip pain - morphine and tylenol given - no changes to meds or ACC      PLAN:    Warfarin Dosing Instructions: Continue your current warfarin dose 4.5mg Monday & 3mg AOD    Instructed patient to follow up no later than: 1 week  Orders given to  Homecare nurse/facility to recheck    Education provided: Monitoring for bleeding signs and symptoms, Monitoring for clotting signs and symptoms, When to seek medical attention/emergency care and Contact [facility] Anticoagulation: [phone]  with any changes, questions or concerns.     Telephone call with home care nurse Debbie  whom verbalizes understanding and agrees to plan    Instructed to call the Anticoagulation Clinic for any changes, questions or concerns. (#[phone])        Nalini Hunter RN      OBJECTIVE:  Recent labs: (last 7 days)     06/15/21  0037 21   INR 2.48* 2.2*         No question data found.  Anticoagulation Summary  As of 2021    INR goal:  2.0-3.0   TTR:  55.6 % (11.7 mo)   INR used for dosin.2 (2021)   Warfarin maintenance plan:  4.5 mg (3 mg x 1.5) every Mon; 3 mg (3 mg x 1) all other days   Full warfarin instructions:  4.5 mg every Mon; 3 mg all other days   Weekly warfarin total:  22.5 mg   No change documented:  Nalini Hunter RN   Plan last modified:  Phillip Siddiqui RN (2021)   Next INR check:  2021   Priority:   Maintenance   Target end date:  Indefinite    Indications    Long-term (current) use of anticoagulants [Z79.01] (Resolved) [Z79.01]  Phlebitis and thrombophlebitis of deep veins of lower extremities (H) [I80.209]  Acute myocardial infarction  initial episode of care (H) (Resolved) [I21.9]  Atrial Fibrillation  paroxysmal -- on Warfarin (H) [I48.0]             Anticoagulation Episode Summary     INR check location:      Preferred lab:      Send INR reminders to:  ADRIEN DE LOS SANTOS    Comments:  In Home Labs      Anticoagulation Care Providers     Provider Role Specialty Phone number    De Obregon MD Referring Internal Medicine [phone]         Nalini Syed RN, BSN, PHN  Anticoagulation Nurse  [phone]

## 2021-06-17 NOTE — PROGRESS NOTES
Clinic Care Coordination Contact  [facility]    Referral Source: IP Report  Clinical Data: Care Coordinator Outreach  [facility] 6/14/21 - 6/15/21: acute right-sided thoracic back pain    Outreach attempted x 1.  Patient answered phone but indicated she was not available to talk now as she was expecting another phone call.  Patient agreeable to phone visit with CC RN tomorrow 6/18/21 morning.    Plan: CC RN will call back to patient tomorrow 6/17/21 at 9:00 am for hospital discharge follow-up per discussion with patient.    Jeet Parish, RN  Clinic Care Coordinator  [facility]  [facility]  Ph: [phone]

## 2021-06-18 NOTE — LETTER
[facility] CARE COORDINATION  [facility]  [address] , [place] [address]    June 18, 2021        Madie Amezquita  [address] [place] [address]          Dear Dayan Ramachandran is an updated Complex Care Plan for your continued enrollment in Care Coordination. Please let us know if you have additional questions, concerns, or goals that we can assist with.    Sincerely,    Jeet Parish RN  Clinic Care Coordinator  [facility]  [facility]  Ph: [phone]            [facility]  Complex Care Plan  About Me:    Patient Name:  Madie Amezquita    YOB: 1929  Age:         89 year old   [facility] MRN:    [id number] Telephone Information:  Home Phone [phone]   Mobile [phone]       Address:  [address] [facility] [address] Email address:  No e-mail address on record      Emergency Contact(s)    Name Relationship Lgl Grd Work Phone Home Phone Mobile Phone   1. SUSAN AMEZQUITA Son No [phone]   2. ALIRIONATAN CASTRO Grandchild No  [phone]   3. ALIRIOJOSE LUIS CASTRO Grandchild No  [phone]   4. ALIRIOZAIN CASTRO Daughter-in-Law    [phone]           Primary language:  English     needed? No   [facility]:  [phone] op. 1  Other communication barriers: None  Preferred Method of Communication:  Mail  Current living arrangement: I live in a private home  Mobility Status/ Medical Equipment: Independent    Health Maintenance  Health Maintenance Reviewed: Due/Overdue   Health Maintenance Due   Topic Date Due     URINE DRUG SCREEN  Never done     DEPRESSION ACTION PLAN  Never done     MEDICARE ANNUAL WELLNESS VISIT  Never done     EYE EXAM  01/01/2019     DIABETIC FOOT EXAM  12/13/2019     My Access Plan  Medical Emergency 911   Primary Clinic Line [facility] - [phone]   24 Hour  Appointment Line [phone] or  [phone]) (toll-free)   24 Hour Nurse Line [phone] (toll-free)   Preferred Urgent Care [facility], [phone]   Preferred Hospital [facility]  [phone]   Preferred Pharmacy [facility] [place], [place] - [address]     Behavioral Health Crisis Line The National Suicide Prevention Lifeline at 1-322.277.8652 or 462     My Care Team Members  Patient Care Team       Relationship Specialty Notifications Start End    Bee Orr MD PCP - General Internal Medicine  4/26/21     Phone: [phone] Fax: [phone] [address] [place] [address]    Jeet Parish RN Lead Care Coordinator Primary Care - CC Admissions 8/4/20     Phone: [phone]         Rohini Herrmann MA Community Health Worker Primary Care - CC  10/5/20     Phone: [phone]         Charles Cowan MD Assigned Surgical Provider   11/15/20     Phone: [phone] Pager: [phone] Fax: [phone] [address] [facility] [address]    Bridger Costello MD Assigned Heart and Vascular Provider   4/18/21     Phone: [phone] Fax: [phone]         [address] [facility] [address] [facility] [address]    Bee Orr MD Assigned PCP   4/25/21     Phone: [phone] Fax: [phone]         [address] [place] [address]            My Care Plans  Self Management and Treatment Plan  Goals and (Comments)  Goals        Patient Stated      1. Psychosocial (pt-stated)      Goal Statement: I want to remain as independent as I safely can.  Date Goal set: 10/5/20 // revised on 11/16/20  Barriers: lives alone in private home with stairs, chronic vertigo, recent fall  Strengths: motivated to remain independent, supportive son  Date to Achieve By: 4/30/21 // extended to 8/31/21  Patient expressed understanding of goal: Yes    Action steps to achieve this goal:  1. I will continue working with  [facility] as indicated.  2. I will do home exercises as advised by home care.  3. I will take precautions to avoid falls such as wearing appropriate footwear, using my walker, and removing any obstacles from my walking path.  4. I will explore options for a Medical Alert System.  5. I will explore additional options for in-home assistance.  6. I will consider higher levels of care if needed.  7. I will continue working with Care Coordination to identify and address barriers to achieving my goal.        Action Plans on File: none  Advance Care Plans/Directives Type:   Type Advanced Care Plans/Directives: Advanced Directive - On File    My Medical and Care Information  Problem List   Patient Active Problem List   Diagnosis     GERD     Colonic Adenoma     Obesity     Osteoarthritis involving multiple joints     Osteoporosis     Small vessel cerebrovascular changes     Essential hypertension     Post herpetic neuralgia     CHRONIC VERTIGO     Diaphragmatic hernia     Polymyalgia rheumatica (H)     Hypothyroidism     Type 2 diabetes mellitus with diabetic nephropathy (H)     Vitamin D deficiency     Hereditary and idiopathic peripheral neuropathy     Walters's esophagus     Restless leg syndrome     Vasovagal syncope     CKD (chronic kidney disease) stage 3, GFR 30-59 ml/min (H)     Health Care Home     PPD positive     Phlebitis and thrombophlebitis of deep veins of lower extremities (H)     Kidney stone     Atrial Fibrillation, paroxysmal -- on Warfarin (H)     Aortic valve stenosis, severe     Stricture and stenosis of esophagus     CAD, S/P CABG x 4 in 2011     Symptomatic bradycardia -- S/P PPM 2011     Status post coronary angiogram     Severe AS -- S/P TAVR on 5/5/20     Left Prox Humerus Fract     History of stroke     Dizziness     Near syncope     Closed head injury, initial encounter     Fall, initial encounter     Abrasion of forehead, initial encounter     Contusion of forehead, initial encounter      Closed nondisplaced fracture of sixth cervical vertebra, unspecified fracture morphology, initial encounter (H)     Acute right-sided thoracic back pain      Current Medications:  Current Outpatient Medications   Medication Instructions     acetaminophen (ACETAMINOPHEN 8 HOUR) 650 mg, Oral, AT BEDTIME     aspirin (ASA) 81 mg, Oral, DAILY     atorvastatin (LIPITOR) 80 mg, Oral, DAILY     calcium 600 MG tablet 1 tablet, Oral, 2 TIMES DAILY     clindamycin (CLEOCIN) 300 MG capsule 600 mg one hr prior to any dental appointment     gabapentin (NEURONTIN) 200 mg, Oral, AT BEDTIME     JANTOVEN ANTICOAGULANT 3 MG tablet 4.5 mg PO Monday<BR>3 mg PO all other days     levothyroxine (SYNTHROID/LEVOTHROID) 50 MCG tablet TAKE ONE TABLET BY MOUTH ONCE DAILY     losartan (COZAAR) 50 mg, Oral, EVERY EVENING     metFORMIN (GLUCOPHAGE) 500 MG tablet TAKE ONE TABLET BY MOUTH DAILY WITH BREAKFAST     Multiple Vitamins-Minerals (PRESERVISION AREDS 2 PO) 1 tablet, Oral, 2 TIMES DAILY     nitroGLYcerin (NITROSTAT) 0.4 mg, Sublingual, EVERY 5 MIN PRN, Up to 3 doses per episode     omeprazole (PRILOSEC) 20 mg, Oral, DAILY     oxyCODONE (ROXICODONE) 2.5 mg, Oral, EVERY 6 HOURS PRN     polyethylene glycol (MIRALAX) powder 1 capful, Oral, DAILY     psyllium (METAMUCIL/KONSYL) capsule 2 capsules, Oral, DAILY     Vitamin D (Cholecalciferol) 25 mcg, Oral, DAILY     Care Coordination Start Date: 10/5/2020   Frequency of Care Coordination: monthly   Form Last Updated: 06/18/2021

## 2021-06-18 NOTE — PROGRESS NOTES
"Clinic Care Coordination Contact    OUTREACH    Referral Information:  Referral Source: IP Report  Primary Diagnosis: Other (acute right-sided thoracic back pain)  Chief Complaint   Patient presents with     Clinic Care Coordination - Post Hospital     acute right-sided thoracic back pain     Universal Utilization: reviewed on this date  Difficulty keeping appointments: No  Compliance Concerns: No  No-Show Concerns: No  No PCP office visit in Past Year: No  Utilization    Last refreshed: 6/18/2021  9:58 AM: Hospital Admissions 3           Last refreshed: 6/18/2021  9:58 AM: ED Visits 4           Last refreshed: 6/18/2021  9:58 AM: No Show Count (past year) 2              Current as of: 6/18/2021  9:58 AM          Patient enrolled with Care Coordination; noted to have recent hospitalization at [facility] from 6/14/21 to 6/15/21 for acute right-sided thoracic back pain.  CC RN outreach to patient for hospital discharge follow-up.    Patient reported to be doing fairly well since hospital discharge, she stated \"I'm still getting around\".    Patient continues working with [facility] for RN, PT, OT, and HHA services.  Patient's son is also available intermittently to come to her home to assist her as needed.  Patient also noted that her [employer] community continues to offer her assistance in whatever capacity she needs so she will consider accepting their offer.    Patient noted her pain has been manageable.  She thinks her most recent back pain was potentially due to post-herpetic neuralgia from past shingles; she indicated the pain is to \"the small of her back\", but also stated \"over the kidneys\".  Her CT scan in hospital did show some \"tiny nonobstructing stones upper pole right kidney\" which is generally where patient experiences the pain but she isn't sure it's the stones causing the pain.  She noted she's had chronic back pain for a long time and that it becomes less " manageable when she gets back spasms which is what happened to prompt her to go to hospital.  She will continue closely monitoring her pain and will notify clinic if worsening.    Patient has been mostly using Tylenol for pain relief.  She occasionally takes oxycodone; she did take a quarter of a hydrocodone-acetaminophen tablet this past week which helped some.  CC RN provided patient with education regarding monitoring daily acetaminophen intake; encouraged her to keep track in a notebook which she agreed to do and will try to keep max Tylenol intake to around 3000 mg/day.  Patient also plans to get some more lidocaine patches to apply to her back.  She has used some moist heat, as well, which she reported helps some.    CC RN reviewed previous conversation with patient regarding [employer] or other personal emergency system.  Patient updated that she did look into this but it was too expensive so she plans to forego this for now.  Patient has been focusing on safety at home by always using her walker for ambulation and ensuring no objects in her walking path.    CC RN reviewed with patient her hospital discharge AVS and recommendation for PCP visit.  Patient wasn't agreeable to in-office visit at this time but did agree to scheduling hospital follow-up visit with PCP via telephone.  She also noted her appointments next month for CT scan and Neurosurgery follow-up.    Patient denied outstanding questions or concerns at this time.  She appreciates ongoing CC support and agreed to contact CC team if any questions or needs arise prior to next outreach.    Clinical Concerns:  Current Medical Concerns: back pain  Current Behavioral Concerns: none noted at this time  Education Provided to patient: reviewed hospital discharge AVS, educated on Tylenol use and discussed alternative pain management interventions    Pain  Pain (GOAL): Yes  Type: Acute on Chronic  Location of chronic pain: back  Progression: Waxing and  waning  Description of pain: Aching, Burning  Alleviating Factors: Pain Medication    Health Maintenance Reviewed: Due/Overdue - reviewed with patient, she will consider scheduling Medicare Annual Wellness Visit with PCP, declined scheduling today    Medication Management:  Post-discharge medication reconciliation status: Discharge medications reviewed and reconciled.  Changed medications per note/orders (see AVS).    Functional Status:  Dependent ADLs: Independent  Dependent IADLs: Independent  Bed or wheelchair confined: No  Mobility Status: Independent  Any fall with injury in the past year?: Yes    Living Situation:  Current living arrangement: I live in a private home  [facility]    Lifestyle & Psychosocial Needs:  Diet: Regular  Inadequate nutrition (GOAL): No  Tube Feeding: No  Inadequate activity/exercise (GOAL): Yes  Significant changes in sleep pattern (GOAL): No  Transportation means: Family     Hindu or spiritual beliefs that impact treatment: No  Mental health DX: No  Mental health management concern (GOAL): No  Chemical Dependency Status: No Current Concerns  Informal Support system: Family   Socioeconomic History     Marital status:      Spouse name: Not on file     Number of children: Not on file     Years of education: Not on file     Highest education level: Not on file     Tobacco Use     Smoking status: Never Smoker     Smokeless tobacco: Never Used   Substance and Sexual Activity     Alcohol use: No     Drug use: No     Sexual activity: Never     Care Coordinator has reviewed patient's Social Determinants of Health (SDoH) on this date. Upon review, changes were not  made.      Resources and Interventions:  Skilled Home Care Services: Skilled Nursing, Physicial Therapy, Occupational Therapy, Home Health Aid  Community Resources: Home Care  Supplies used at home: None  Equipment Currently Used at Home: cane, quad, walker, standard  Employment Status:  retired    Advance Care Plan/Directive  Advanced Care Plans/Directives on file: Yes  Type Advanced Care Plans/Directives: Advanced Directive - On File  Advanced Care Plan/Directive Status: Not Applicable    Referrals Placed: None     Goals: reviewed and updated following transition of care and discussion with patient  Goals        General    1. Psychosocial (pt-stated)     Notes - Note edited  6/18/2021  9:15 AM by Jeet Parish RN    Goal Statement: I want to remain as independent as I safely can.  Date Goal set: 10/5/20 // revised on 11/16/20  Barriers: lives alone in private home with stairs, chronic vertigo, recent fall  Strengths: motivated to remain independent, supportive son  Date to Achieve By: 4/30/21 // extended to 8/31/21  Patient expressed understanding of goal: Yes    Action steps to achieve this goal:  1. I will continue working with [facility] as indicated.  2. I will do home exercises as advised by home care.  3. I will take precautions to avoid falls such as wearing appropriate footwear, using my walker, and removing any obstacles from my walking path.  4. I will explore options for a Medical Alert System.  5. I will explore additional options for in-home assistance.  6. I will consider higher levels of care if needed.  7. I will continue working with Care Coordination to identify and address barriers to achieving my goal.        Patient/Caregiver understanding: Yes    Outreach Frequency: monthly  Future Appointments              In 5 days Bee Orr MD [facility]    In 1 month SHCT1 [facility] JOSHUA    In 1 month David Verduzco PA-C [facility]    In 3 months CONKLIN TECH1 [facility], [facility] PSA CLIN        Plan:    Patient will continue plan of care as above.    Patient will attend upcoming visit with PCP for hospital discharge  follow-up.    Patient agreed to contact CC RN with additional questions, concerns, or needs.    Delegation to CHW: No    CC CHW will continue with outreaches at this time with next outreach in approximately 1 month. CC CHW will inform CC RN/SW of any concerns or changes regarding patient as needed.  CC RN/SW will review patient's chart in approximately 6 weeks and outreach to patient as indicated.    Jeet Parish, RN  Clinic Care Coordinator  [facility], [facility]  Ph: [phone]

## 2021-06-23 NOTE — PROGRESS NOTES
TELEPHONE VISIT                                                      ASSESSMENT/PLAN                                                      (M54.6) Acute left-sided thoracic back pain  (primary encounter diagnosis)  Plan: short oral steroid taper and Flexeril as needed prescribed; patient may use warm compresses as well; if symptoms worsen, change, or do not improve, patient to contact MD.      Patient encouraged to schedule an establish care/AWV visit when able.    Total time of call between patient and provider was 6 minutes. Provider location: office. Patient location: home.    Bee Orr MD   [address] [address]  T: [phone], F: [phone]    SUBJECTIVE                                                      Madie Silav is a very pleasant 89 year old female who requested a telephone visit to discuss back pain:    Patient reports a flareup of her left-sided, mid back pain. Patient has had this pain multiple times in the past - usually improves with several days worth of narcotics. She has been using oxycodone as needed (which was prescribed for her C6 fracture) to no avail.    ---    (Note was completed, in part, with [employer] voice-recognition software. Documentation reviewed, but some grammatical, spelling, and word errors may remain.)

## 2021-06-23 NOTE — PROGRESS NOTES
ANTICOAGULATION MANAGEMENT     Madie Silva 89 year old female is on warfarin with therapeutic INR result. (Goal INR 2.0-3.0)    Recent labs: (last 7 days)     06/23/21   INR 2.6*       ASSESSMENT     Source(s): Home Care/Facility Nurse       Warfarin doses taken: Warfarin taken as instructed    Diet: No new diet changes identified    New illness, injury, or hospitalization: Yes: back pain increased    Medication/supplement changes: Started on flexeril as needed, prednisone 40 mg for 3 days then 20mg 3 days then stop 6/23-6/29, increased tylenol due to back pain    Signs or symptoms of bleeding or clotting: No    Previous INR: Therapeutic last 2(+) visits    Additional findings: None     PLAN     Recommended plan for temporary change(s) affecting INR     Dosing Instructions: Continue your current warfarin dose with next INR in 5 days       Summary  As of 6/23/2021    Full warfarin instructions:  4.5 mg every Mon; 3 mg all other days   Next INR check:  6/28/2021             Telephone call with home care nurse Debbie whom verbalizes understanding and agrees to plan    Orders given to  Homecare nurse/facility to recheck    Education provided: Potential interaction between warfarin and prednisone    Plan made per [facility] anticoagulation protocol    Julianne Camilo RN  Anticoagulation Clinic  6/23/2021    _______________________________________________________________________     Anticoagulation Episode Summary     Current INR goal:  2.0-3.0   TTR:  55.6 % (11.7 mo)   Target end date:  Indefinite   Send INR reminders to:  ADRIEN DE LOS SANTOS    Indications    Long-term (current) use of anticoagulants [Z79.01] (Resolved) [Z79.01]  Phlebitis and thrombophlebitis of deep veins of lower extremities (H) [I80.209]  Acute myocardial infarction  initial episode of care (H) (Resolved) [I21.9]  Atrial Fibrillation  paroxysmal -- on Warfarin (H) [I48.0]           Comments:  In Home Labs         Anticoagulation Care Providers     Provider  Role Specialty Phone number    De Obregon MD Referring Internal Medicine [phone]

## 2021-06-28 NOTE — PROGRESS NOTES
ANTICOAGULATION MANAGEMENT     Madie Silva 89 year old female is on warfarin with supratherapeutic INR result. (Goal INR 2.0-3.0)    Recent labs: (last 7 days)     06/28/21   INR 3.8*       ASSESSMENT     Source(s): Home Care/Facility Nurse       Warfarin doses taken: Warfarin taken as instructed    Diet: No new diet changes identified    New illness, injury, or hospitalization: No    Medication/supplement changes: prednisone 5 day course (dates: 6/24-6/28, last dose tonight) which has potential for interaction; increasing INR    Signs or symptoms of bleeding or clotting: No    Previous INR: Therapeutic last 2(+) visits    Additional findings: None     PLAN     Recommended plan for temporary change(s) affecting INR     Dosing Instructions: Hold dose then continue your current warfarin dose with next INR in 1 week       Summary  As of 6/28/2021    Full warfarin instructions:  6/28: Hold; Otherwise 4.5 mg every Mon; 3 mg all other days   Next INR check:  7/5/2021             Telephone call with home care nurse Debbie whom agrees to plan and repeated back plan correctly    Orders given to  Homecare nurse/facility to recheck    Education provided: Target INR goal and significance of current INR result, Potential interaction between warfarin and prednisone and Monitoring for bleeding signs and symptoms    Plan made per [facility] anticoagulation protocol    Mary Renae RN  Anticoagulation Clinic  6/28/2021    _______________________________________________________________________     Anticoagulation Episode Summary     Current INR goal:  2.0-3.0   TTR:  54.7 % (11.7 mo)   Target end date:  Indefinite   Send INR reminders to:  ADRIEN DE LOS SANTOS    Indications    Long-term (current) use of anticoagulants [Z79.01] (Resolved) [Z79.01]  Phlebitis and thrombophlebitis of deep veins of lower extremities (H) [I80.209]  Acute myocardial infarction  initial episode of care (H) (Resolved) [I21.9]  Atrial Fibrillation  paroxysmal -- on  Warfarin (H) [I48.0]           Comments:           Anticoagulation Care Providers     Provider Role Specialty Phone number    De Obregon MD Referring Internal Medicine [phone]

## 2021-06-29 NOTE — TELEPHONE ENCOUNTER
Has HX of Osteoporosis.    Not RN protocol.    Please refill as appropriate.    Katiana Dillard RN  [facility]

## 2021-07-07 NOTE — PROGRESS NOTES
ANTICOAGULATION MANAGEMENT     Madie Silva 89 year old female is on warfarin with supratherapeutic INR result. (Goal INR 2.0-3.0)    Recent labs: (last 7 days)     07/07/21   INR 3.3*       ASSESSMENT     Source(s): Home Care/Facility Nurse       Warfarin doses taken: Warfarin taken as instructed    Diet: No new diet changes identified    New illness, injury, or hospitalization: No    Medication/supplement changes: Prednisone completed on 6/28    Signs or symptoms of bleeding or clotting: No    Previous INR: Supratherapeutic    Additional findings: None     PLAN     Recommended plan for temporary change(s) affecting INR     Dosing Instructions: Partial hold then continue your current warfarin dose with next INR in 1 week       Summary  As of 7/7/2021    Full warfarin instructions:  3 mg every day   Next INR check:  7/14/2021             Telephone call with home care nurse filomena who verbalizes understanding and agrees to plan    Orders given to  Homecare nurse/facility to recheck    Education provided: None required    Plan made per [facility] anticoagulation protocol    Julianne Camilo RN  Anticoagulation Clinic  7/7/2021    _______________________________________________________________________     Anticoagulation Episode Summary     Current INR goal:  2.0-3.0   TTR:  53.5 % (11.7 mo)   Target end date:  Indefinite   Send INR reminders to:  ADRIEN DE LOS SANTOS    Indications    Long-term (current) use of anticoagulants [Z79.01] (Resolved) [Z79.01]  Phlebitis and thrombophlebitis of deep veins of lower extremities (H) [I80.209]  Acute myocardial infarction  initial episode of care (H) (Resolved) [I21.9]  Atrial Fibrillation  paroxysmal -- on Warfarin (H) [I48.0]           Comments:           Anticoagulation Care Providers     Provider Role Specialty Phone number    De Obregon MD Referring Internal Medicine [phone]        Anticoagulation Management    Unable to reach Filomena today.    Today's INR result of 3.3 is  supratherapeutic (goal INR of 2.0-3.0).  Result received from: Home Care    Follow up required to confirm warfarin dose taken and assess for changes    LMTCB     Tentative plan: reduce dosing to 3 mg per day.      Anticoagulation clinic to follow up    Julianne Camilo RN

## 2021-07-08 NOTE — PROGRESS NOTES
ANTICOAGULATION FOLLOW-UP CLINIC VISIT    Patient Name:  Madie Silva  Date:  3/28/2018  Contact Type:  Face to Face    SUBJECTIVE:     Patient Findings     Positives Activity level change    Comments Pt gets quite short of breath from hiatal hernia , also has some aorta narrowing           OBJECTIVE    INR Protime   Date Value Ref Range Status   03/28/2018 2.4 (A) 0.86 - 1.14 Final       ASSESSMENT / PLAN  INR assessment THER    Recheck INR In: 6 WEEKS    INR Location Clinic      Anticoagulation Summary as of 3/28/2018     INR goal 2.0-3.0   Today's INR 2.4   Maintenance plan 4.5 mg (3 mg x 1.5) on Tue, Thu, Sat; 3 mg (3 mg x 1) all other days   Full instructions 4.5 mg on Tue, Thu, Sat; 3 mg all other days   Weekly total 25.5 mg   No change documented Stella Cha, RN   Plan last modified Connie Hickman RN (7/10/2017)   Next INR check 5/10/2018   Target end date     Indications   Long-term (current) use of anticoagulants [Z79.01] [Z79.01]  Phlebitis and thrombophlebitis of deep veins of lower extremities (H) [I80.209]  Acute myocardial infarction  initial episode of care (Resolved) [I21.9]         Anticoagulation Episode Summary     INR check location     Preferred lab     Send INR reminders to [facility]    Comments       Anticoagulation Care Providers     Provider Role Specialty Phone number    De Obregon MD [facility] Internal Medicine [phone]            See the Encounter Report to view Anticoagulation Flowsheet and Dosing Calendar (Go to Encounters tab in chart review, and find the Anticoagulation Therapy Visit)        Stella Cha RN                Procedure scheduled as Urgent.

## 2021-07-13 NOTE — TELEPHONE ENCOUNTER
Left VM message on confidential VM to Claudia with [facility] giving verbal order for one visit for reassessment by PT.    Marybeth Thomas, MSN, RN

## 2021-07-13 NOTE — TELEPHONE ENCOUNTER
Reason for Call:  Home Health Care    Claudia with [facility] called regarding (reason for call):     Orders are needed for this patient.     PT: 1x1 reassessment    Phone Number Homecare Nurse can be reached at: [phone]    Can we leave a detailed message on this number? YES    Phone number patient can be reached at: [phone]    Best Time: any    Call taken on 7/13/2021 at 8:52 AM by Ni Mcmillan

## 2021-07-14 NOTE — PROGRESS NOTES
ANTICOAGULATION MANAGEMENT     Madie Silva 89 year old female is on warfarin with therapeutic INR result. (Goal INR 2.0-3.0)    Recent labs: (last 7 days)     07/14/21  0000   INR 2.6       ASSESSMENT     Source(s): Home Care/Facility Nurse       Warfarin doses taken: Warfarin taken differently, but did not change total weekly dose    Diet: No new diet changes identified    New illness, injury, or hospitalization: No    Medication/supplement changes: None noted    Signs or symptoms of bleeding or clotting: No    Previous INR: Supratherapeutic    Additional findings: None     PLAN     Recommended plan for no diet, medication or health factor changes affecting INR     Dosing Instructions: Continue your current warfarin dose with next INR in 2 weeks       Summary  As of 7/14/2021    Full warfarin instructions:  4.5 mg every Mon; 3 mg all other days   Next INR check:               Telephone call with home care nurse Debbie who verbalizes understanding and agrees to plan    Orders given to  Homecare nurse/facility to recheck    Education provided: None required    Plan made per [facility] anticoagulation protocol    Julianne Camilo RN  Anticoagulation Clinic  7/14/2021    _______________________________________________________________________     Anticoagulation Episode Summary     Current INR goal:  2.0-3.0   TTR:  54.4 % (11.7 mo)   Target end date:  Indefinite   Send INR reminders to:  ADRIEN DE LOS SANTOS    Indications    Long-term (current) use of anticoagulants [Z79.01] (Resolved) [Z79.01]  Phlebitis and thrombophlebitis of deep veins of lower extremities (H) [I80.209]  Acute myocardial infarction  initial episode of care (H) (Resolved) [I21.9]  Atrial Fibrillation  paroxysmal -- on Warfarin (H) [I48.0]           Comments:           Anticoagulation Care Providers     Provider Role Specialty Phone number    De Obregon MD Referring Internal Medicine [phone]

## 2021-07-19 NOTE — LETTER
7/19/2021         RE: Madie Silva  [address]  [facility] [place] [address]        Dear Colleague,    Thank you for referring your patient, Madie Silva, to the [facility] [place]. Please see a copy of my visit note below.    Neurosurgery follow-up    Ms. Silva is a 89-year-old female who fell 12 weeks ago and suffered a right C6 possible articular pillar fracture.  She has been in a cervical collar since that time.  At this time she denies any neck pain.  She denies any radicular symptoms denies any numbness or tingling in her upper extremities as well as denies any loss of coordination in her feet feet.  She has been wearing her cervical collar as directed and has not had any falls.     She has had some issues with flank pain and was recently admitted for that.  She returns to clinic today with a repeat cervical CT scan.    Exam    B/P: 138/70, T: Data Unavailable, P: 90, R: Data Unavailable     Alert and oriented no acute distress  Cervical spine nontender to palpation  Cervical range of motion nonpainful.  Bilateral upper and lower extremities appropriate strength      Imaging    Cervical CT scan shows healing of the right C6 articular pillar fracture.    Assessment    Right C6 articular pillar fracture      Plan     Patient may wean her cervical collar, begin physical therapy, may follow-up with us as needed.      Total time of 30 minutes spent with the patient today in counseling and coordination of care.        Again, thank you for allowing me to participate in the care of your patient.        Sincerely,        David Verduzco PA-C

## 2021-07-21 NOTE — TELEPHONE ENCOUNTER
Raya PT [facility]     Verbal Orders given for:   Continued PT  1x week for 3 weeks  Gait, exercise and mobility.     Jessica Chakraborty RN

## 2021-07-21 NOTE — PROGRESS NOTES
[facility] now requests orders and shares plan of care/discharge summaries for some patients through [employer].  Please REPLY TO THIS MESSAGE OR ROUTE BACK TO THE AUTHOR in order to give authorization for orders when needed.  This is considered a verbal order, you will still receive a faxed copy of orders for signature.  Thank you for your assistance in improving collaboration for our patients.    ORDER  SN 1w1, 3m1 3prn for  INR monitoring, educated/assess post C6 fx pain, falls prevention  HHA 1w1 for bathing and adl assistance

## 2021-07-22 NOTE — PROGRESS NOTES
Clinic Care Coordination Contact  Community Health Worker Follow Up    Goals:   Goals Addressed as of 7/22/2021 at 10:07 AM                    Today    5/13/21       Patient Stated       1. Psychosocial (pt-stated)   30%  On Hold    Added 10/5/20 by Jeet Parish RN      Goal Statement: I want to remain as independent as I safely can.  Date Goal set: 10/5/20 // revised on 11/16/20  Barriers: lives alone in private home with stairs, chronic vertigo, recent fall  Strengths: motivated to remain independent, supportive son  Date to Achieve By: 4/30/21 // extended to 8/31/21  Patient expressed understanding of goal: Yes    Action steps to achieve this goal:  1. I will continue working with [facility] as indicated.  2. I will do home exercises as advised by home care.  3. I will take precautions to avoid falls such as wearing appropriate footwear, using my walker, and removing any obstacles from my walking path.  4. I will explore options for a Medical Alert System.  5. I will explore additional options for in-home assistance.  6. I will consider higher levels of care if needed.  7. I will continue working with Care Coordination to identify and address barriers to achieving my goal.        Discussed:    Patient stated that she has a full team from home care that visits her.  Her doctor extended the order.    Patient stated that she will have in home lab visits for her blood work after home care has been completed.    Patient stated that it was her birthday yesterday, she had a lot of phone calls and visitors.  Patient stated that she is resting today.    Patient stated that she uses a walker and often is barefoot.    Patient stated that when she goes outside, she will bring her phone.  Patient is looking at options for medical alert systems.    Intervention and Education during outreach: CHW encouraged patient to contact CC if there are any other changes or resources needed.  Patient acknowledged understanding.      Plan: Patient will use walker to assist with walking.    CHW will follow up in one month.    Next Month: 8/20/21    IZAIAH Gloria  Clinic Care Coordination  [facility]: [facility] [place], and [facility]  Phone: [phone]

## 2021-07-29 NOTE — PROGRESS NOTES
ANTICOAGULATION MANAGEMENT     Madie Silva 89 year old female is on warfarin with therapeutic INR result. (Goal INR 2.0-3.0)    Recent labs: (last 7 days)     07/29/21  1020   INR 3.0*       ASSESSMENT     Source(s): Home Care/Facility Nurse       Warfarin doses taken: Warfarin taken as instructed    Diet: No new diet changes identified    New illness, injury, or hospitalization: Having some SOB with exertion since Saturday. She did go to the ED yesterday but there was a 4 hour wait so she left. Home care has a call in to PCP to see if they can get patient an appointment. She is not in respiratory distress right now per home care, no chest pain or fluid retention. Has a bit of a dry cough.     Medication/supplement changes: None noted    Signs or symptoms of bleeding or clotting: No    Previous INR: Therapeutic last visit; previously outside of goal range    Additional findings: None     PLAN     Recommended plan for no diet, medication or health factor changes affecting INR     Dosing Instructions: Continue your current warfarin dose with next INR in 1 week       Summary  As of 7/29/2021    Full warfarin instructions:  4.5 mg every Mon; 3 mg all other days   Next INR check:               Telephone call with home care nurse Debbie who verbalizes understanding and agrees to plan    Orders given to  Homecare nurse/facility to recheck    Education provided: Target INR goal and significance of current INR result    Plan made per [facility] anticoagulation protocol    Mary Renae RN  Anticoagulation Clinic  7/29/2021    _______________________________________________________________________     Anticoagulation Episode Summary     Current INR goal:  2.0-3.0   TTR:  54.5 % (11.7 mo)   Target end date:  Indefinite   Send INR reminders to:  ADRIEN DE LOS SANTOS    Indications    Long-term (current) use of anticoagulants [Z79.01] (Resolved) [Z79.01]  Phlebitis and thrombophlebitis of deep veins of lower extremities (H)  [I80.209]  Acute myocardial infarction  initial episode of care (H) (Resolved) [I21.9]  Atrial Fibrillation  paroxysmal -- on Warfarin (H) [I48.0]           Comments:           Anticoagulation Care Providers     Provider Role Specialty Phone number    De Obregon MD Referring Internal Medicine [phone]

## 2021-07-29 NOTE — PROGRESS NOTES
Clinic Care Coordination Contact    OSCAR VOGEL Follow Up Progress Note: patient went to ED yesterday with her son, but the wait to be seen was 2-4 hours. They left without patient being seen.     OSCAR VOGEL called patient this morning to check in, she went to the ED due to a dry cough and SOB. The nurse was there visiting (Debbie) and took her vitals.      Assessment: Patient lungs were clear as per Debbie, no temp, dry cough non productive cough was heard with SOB. Patient's O2 Sats 97% pulse 78. Patient taking her meds as prescribed.     Goals addressed this encounter:   Goals Addressed                    This Visit's Progress       Patient Stated       1. Psychosocial (pt-stated)   40%      Goal Statement: I want to remain as independent as I safely can.  Date Goal set: 10/5/20 // revised on 11/16/20  Barriers: lives alone in private home with stairs, chronic vertigo, recent fall  Strengths: motivated to remain independent, supportive son  Date to Achieve By: 4/30/21 // extended to 8/31/21  Patient expressed understanding of goal: Yes    Action steps to achieve this goal:  1. I will continue working with [facility] as indicated.  2. I will do home exercises as advised by home care.  3. I will take precautions to avoid falls such as wearing appropriate footwear, using my walker, and removing any obstacles from my walking path.  4. I will explore options for a Medical Alert System.  5. I will explore additional options for in-home assistance.  6. I will consider higher levels of care if needed.  7. I will continue working with Care Coordination to identify and address barriers to achieving my goal.             Intervention/Education provided during outreach: Patient's son would like to know if patient needs to come in to be seen.      Outreach Frequency: monthly    Plan:   -OSCAR VOGEL will route note to PCP to see if patient needs to come in.     - CHW to call patient for check in as planned.       Flower Hargrove,  ZACKERY  Clinic Care Coordinator  [facility] [place] and [facility] [place]  [phone]  [email or web address]  Care Coordinator will follow up in 6 weeks.

## 2021-07-29 NOTE — ED TRIAGE NOTES
Pt has been experiencing increased shortness of breath for about a week. Home health care nurse recommended coming in. Triage SPO2 95%

## 2021-07-30 NOTE — TELEPHONE ENCOUNTER
"[facility] and Home nurse calling for pt, see note below from care coordination. Pt wants to be seen for appt for increased SOB, and a cough.   O2 sats are 93% per home care nurse.    Note per INR nurse from yesterday 7/29:  \"Having some SOB with exertion since Saturday. She did go to the ED yesterday but there was a 4 hour wait so she left. Home care has a call in to PCP to see if they can get patient an appointment. She is not in respiratory distress right now per home care, no chest pain or fluid retention. Has a bit of a dry cough.\"     Appt scheduled this morning at 10am with Dr. Orr.    Pt's son will be bringing her in.  "

## 2021-07-30 NOTE — PROGRESS NOTES
"  ASSESSMENT/PLAN                                                      (R06.02) Shortness of breath  (primary encounter diagnosis)  Comment: etiology unclear; differential includes anemia, metabolic derangement, pulmonary pathology, and acute diastolic or systolic dysfunction.  Comment: my prelim read of CXR is fluid overload (bilateral pleural effusions); formal read pending; CBC without leukocytosis or left shift - pneumonia less likely.  Plan: START Lasix 20 mg once daily (holding off on potassium supplement in light of CKD stage III); follow-up with Anna next week - [facility] at that time; if breathing worsens in the interim, patient should proceed to the ER for further evaluation.    (I10) Benign essential hypertension  Comment: poorly-controlled on current regimen but may be due to to fluid overload and increased work of breathing.  Plan: starting Lasix as above; blood pressure follow-up with Anna next week.    Bee Orr MD   [place] [address] [place] [id number]  T: [phone], F: [phone]    SUBJECTIVE                                                      Madie Silva is a very pleasant 89 year old female who presents with shortness of breath:    Accompanied by son.    Patient reports shortness of breath, wheezing, dry cough, and dyspnea with minimal exertion for the last week. No chest pain or palpitations. No fevers or chills. Mild lower extremity \"water retention,\" but no orthopnea or PND.    PMH significant for recent TAVR and proximal atrial fibrillation on chronic AC. Last echo performed 5/9/2021 demonstrated hyperdynamic systolic function and mild diastolic dysfunction.    Patient is a never smoker and has no history of asthma.    Patient was vaccinated for COVID-19 earlier this year with no known recent COVID-19 exposures.    Patient's blood pressure is noted to be significantly elevated today, even on repeat. She is currently on losartan 50 mg " daily.    OBJECTIVE                                                      BP (!) 150/70 (BP Location: Left arm, Patient Position: Chair, Cuff Size: Adult Regular)   Pulse 86   Temp 97  F (36.1  C) (Temporal)   Resp 22   Wt 72.6 kg (160 lb)   SpO2 97%   BMI 31.25 kg/m    Constitutional: well-appearing  Respiratory: increased respiratory effort, conversational dyspnea (2-3 words), but clear to auscultation bilaterally with normal air movement   Cardiovascular: irregularly irregular; mild, bilateral, symmetric, pitting edema, from above ankles to mid tibias    CXR (my prelim read): demonstrates fluid overload/bilateral pleural effusions     CBC (reviewed): no leukocytosis or left shift suggestive of infection/pneumonia    CMP (reviewed): unremarkable    ---    (Note documentation was completed, in part, with [employer] voice-recognition software. Documentation was reviewed, but some grammatical, spelling, and word errors may remain.)

## 2021-08-02 PROBLEM — I48.91 ATRIAL FIBRILLATION WITH RVR (H): Status: ACTIVE | Noted: 2021-01-01

## 2021-08-02 PROBLEM — R79.89 ELEVATED TROPONIN: Status: ACTIVE | Noted: 2021-01-01

## 2021-08-02 PROBLEM — D64.9 CHRONIC ANEMIA: Status: ACTIVE | Noted: 2021-01-01

## 2021-08-02 PROBLEM — I50.9 ACUTE CONGESTIVE HEART FAILURE, UNSPECIFIED HEART FAILURE TYPE (H): Status: ACTIVE | Noted: 2021-01-01

## 2021-08-02 PROBLEM — Z79.01 ANTICOAGULATED: Status: ACTIVE | Noted: 2021-01-01

## 2021-08-02 NOTE — ED PROVIDER NOTES
History   Chief Complaint:  Shortness of Breath       HPI   Madie Silva is a 89 year old female on warfarin with history of atrial fibrillation, CAD, CKD, stroke, diabetes, DVT, aortic valve stenosis, with pacemaker in place who presents with shortness of breath. The patient reports that she has been experiencing shortness of breath for over one week, which worsened today. She also states that she had a dry cough and has now developed a productive cough. She states that she had some chest pain last night and that she has noticed swelling in her feet. The patient saw her primary care provider 3 days ago, and was put on Lasix due to fluid in her lungs. She was on metoprolol but is no longer taking it, as she states her doctor decided to discontinue her prescription. She is not on home oxygen and does not smoke. The patient broke her neck on 5/9/21 collar was just removed last week. She is vaccinated against COVID-19.      Review of Systems   Respiratory: Positive for cough and shortness of breath.    Cardiovascular: Positive for leg swelling.   All other systems reviewed and are negative.      Allergies:  Fosamax  Amoxicillin  Bisphosphonates  Boniva  Lisinopril  Niacin  Simvastatin    Medications:  Aspirin  Lipitor  Cleocin  Flexeril  Lasix  Neurontin  Synthroid  Cozaar  Nitrostat  Prilosec  Oxycodone  Miralax  Psyllium  Warfarin       Past Medical History:    Walters's esophagus  Basal cell carcinoma  Bradycardia  Chronic vertigo  Colonic adenoma  CAD  Costochondritis  Depression  Diabetes  DJD  DVT  Gastritis  GERD  Hernia  Hyperlipidemia  Hypothyroidism  Obesity  Osteoporosis  Peripheral neuropathy  Polymyalgia rheumatica  Post herpetic neuralgia  Restless leg syndrome  Small vessel cerebrovascular changes  Stricture and stenosis of esophagus  Vitamin D deficiency  Stroke  Aortic valve stenosis  Atrial fibrillation  Kidney stone  Phlebitis and thrombophlebitis of deep veins of lower extremities  PPD positive    CKD      Past Surgical History:    Bypass graft artery coronary  CV abdomen angiogram  CV angiogram coronary graft  CV aortogram  CV left heart catheter  CV transcatheter aortic valve replacement  Embolectomy   Hysterectomy  Left ankle fracture repair  Appendectomy    Family History:    Mother: heart disease  Father: heart disease  Brother: heart disease  Sister: Alzheimer disease    Social History:  Patient presents with her       Physical Exam     Patient Vitals for the past 24 hrs:   BP Temp Temp src Pulse Resp SpO2 Weight   08/02/21 1500 129/56 -- -- 77 -- 99 % --   08/02/21 1440 114/63 -- -- 88 (!) 35 100 % 75.2 kg (165 lb 12.6 oz)   08/02/21 1435 -- -- -- 86 21 93 % --   08/02/21 1430 125/57 -- -- 93 -- -- --   08/02/21 1425 110/58 -- -- 95 -- 96 % --   08/02/21 1420 -- -- -- 81 25 91 % --   08/02/21 1415 100/62 -- -- 84 -- 94 % --   08/02/21 1410 115/61 -- -- 90 -- 94 % --   08/02/21 1405 116/55 -- -- (!) 130 -- 97 % --   08/02/21 1400 -- -- -- (!) 137 -- -- --   08/02/21 1355 126/74 -- -- 56 -- -- --   08/02/21 1344 132/70 97.4  F (36.3  C) Temporal (!) 156 26 98 % --       Physical Exam    Physical Exam   Constitutional:  Patient is oriented to person, place, and time. They appear well-developed and well-nourished. Mild distress secondary to shortness of breath  HENT:   Mouth/Throat:   Oropharynx is clear and moist.   Eyes:    Conjunctivae normal and EOM are normal. Pupils are equal, round, and reactive to light.   Neck:    Normal range of motion. Chronic pain left over from her cervical fracture.  Cardiovascular: Tachycardic rate, irregular rhythm and normal heart sounds.  Exam reveals no gallop and no friction rub.  No murmur heard.  Pulmonary/Chest:  Tachypnic. Speaks in short sentences. Respiratory distress. Diminished breath sounds bilaterally. Patient has no wheezes. Patient has no rales.   Abdominal:   Soft. Bowel sounds are normal. Patient exhibits no mass. There is no tenderness. There is  no rebound and no guarding.   Musculoskeletal:  Normal range of motion. Patient exhibits no edema.   Neurological:   Patient is alert and oriented to person, place, and time. Patient is generally weak. No cranial nerve deficit or sensory deficit. GCS 15  Skin:   Skin is warm and dry. No rash noted. No erythema.   Psychiatric:   Patient has a normal mood      Emergency Department Course   ECG  ECG taken at 1353, ECG read at 1354  Atrial fibrillation with rapid ventricular response  Left axis deviation  Low voltage QRS  Nonspecific ST and T wave abnormality  Abnormal ECG  Rate 133 bpm. AK interval * ms. QRS duration 80 ms. QT/QTc 288/428 ms. P-R-T axes * -55 142.     ECG  ECG taken at 1400, ECG read at 1402  Atrial fibrillation with rapid ventricular response  Left axis deviation  Pulmonary disease pattern   Marked ST abnormality, possible septal subendocardial injury  Abnormal ECG .  Rate 140 bpm. AK interval * ms. QRS duration 80 ms. QT/QTc 318/485 ms. P-R-T axes * -41 129.     Imaging:  XR Chest Port 1 View   Pacer pads over the left chest. Low lung volumes. Slight  increase in small bilateral pleural effusions and bibasilar  atelectasis/consolidation. No pneumothorax. Linear opacities in the  left lung apex have increased, likely bronchiectasis and/or edema.  Pacemaker. Cardiomegaly. Median sternotomy and mediastinal surgical  Clips.  As per radiology      Laboratory:    CBC: WBC 8.8, HGB 8.1 (L),   BMP: Creatinine 1.13 (H), Glucose 223 (H), GFR 42 (L) o/w WNL     Lactic acid (result time 1429) 3.3 (H)  Troponin (Collected 1416): 0.151 (H)     Blood Gas venous: pH 7.39, PCO2 36 (L), PO2 19 (L), Bicarbonate 22    BNP 11,568  INR 2.59 (H)    Asymptomatic COVID19 Virus PCR by nasopharyngeal swab pending      Emergency Department Course:    Reviewed:  I reviewed nursing notes, vitals and past medical history    Assessments:  1351 I obtained history and examined the patient as noted above.     Consults:   1440 I  spoke with Dr. Smith, hospitalist, regarding the patient    Interventions:  1407 Cardizem 15 mg IV  1419 Cardizem 5 mg/hr IV  1429 Tylenol 975 mg Oral  1500     Lasix 40 mg IV    Disposition:  The patient was admitted to the hospital under the care of Dr. Smith.       Impression & Plan     CMS Diagnoses: The Lactic acid level is elevated due to hypoxia, at this time there is no sign of severe sepsis or septic shock. and None     Medical Decision Making:  Yadira Silva is a 89-year-old female presenting to the emergency department shortness of breath and atrial fibrillation with RVR.  It sounds like she has been having a problem with shortness of breath for the last week with the mild cough.  As noted above she has been off her metoprolol for a couple of months unclear why but her primary care took her off of it and nobody resumed anything for rate control.  She denies any fevers and she is Covid vaccinated.  On arrival here the patient was tachypneic short of breath and found to be in A. fib with RVR with a heart rate in the 150s.  She was brought directly to stable room 3 and I was asked to assess her emergently.      Peripheral IV was established on her and we were able to get blood work.  We then gave her a dose of Cardizem IV.  This immediately brought her heart rate down into the 80s and she felt much better.  She was satting normal but still felt short of breath so was put her on 2 L of nasal cannula.  EKG confirms the A. fib.  Blood work also shows a significantly elevated BNP, chest x-ray is consistent with heart failure and pleural effusions.  Her troponin is elevated and this is most likely demand ischemia due to the heart rate and the strain from CHF.  She is anticoagulated and is therapeutic on Coumadin.    Her white count is normal, she is chronically anemic.  I did give her a IV dose of Lasix and placed her on a Cardizem drip.  She does complain of some chronic neck pain from her previous fracture and  so she was given Tylenol for this.  Her lactic acid is a little bit elevated but in the setting of no fever and normal white count I favor this to be more due to hypoxia and not infection.  We will admit her to [facility].    Critical care time 45 minutes.      Covid-19  Madie Silva was evaluated during a global COVID-19 pandemic, which necessitated consideration that the patient might be at risk for infection with the SARS-CoV-2 virus that causes COVID-19.   Applicable protocols for evaluation were followed during the patient's care.   COVID-19 was considered as part of the patient's evaluation. The plan for testing is:  a test was obtained during this visit.    Diagnosis:    ICD-10-CM    1. Atrial fibrillation with RVR (H)  I48.91    2. Elevated troponin  R77.8     demand ischemia   3. Anticoagulated  Z79.01    4. Chronic anemia  D64.9    5. Acute congestive heart failure, unspecified heart failure type (H)  I50.9        Discharge Medications:  New Prescriptions    No medications on file       Scribe Disclosure:  I, FRANKO RAMIREZ, am serving as a scribe at 1:52 PM on 8/2/2021 to document services personally performed by Anna Garcia MD based on my observations and the provider's statements to me.            Anna Garcia MD  08/02/21 [id number]

## 2021-08-02 NOTE — ED NOTES
[facility]  ED Nurse Handoff Report    ED Chief complaint: Shortness of Breath      ED Diagnosis:   Final diagnoses:   None       Code Status: Full Code, Admitting MD to confirm.      Allergies:   Allergies   Allergen Reactions     Fosamax [Alendronic Acid] GI Disturbance     Severe gastrointestinal reaction     Amoxicillin Hives     Bisphosphonates Other (See Comments)     Swallowing disorder     Boniva [Ibandronate Sodium] Nausea and Vomiting, Diarrhea and Swelling     Arm swelling with IV only     Lisinopril Cough     Niacin Rash     Simvastatin Muscle Pain (Myalgia)       Patient Story: Presented through triage with SOB and A-fib with RVR  Focused Assessment:  Alert and oriented x4. In 2L of O2 for comfort, patient not hypoxic in room air. 18G in L AC. Diltiazem for rate controlled. Patient is anticoagulated with Warfarin. Son at bedside. Chronic back pain, PCP is trying to limit narcotic use.    Labs Ordered and Resulted from Time of ED Arrival Up to the Time of Departure from the ED   INR - Abnormal; Notable for the following components:       Result Value    INR 2.59 (*)     All other components within normal limits   BASIC METABOLIC PANEL - Abnormal; Notable for the following components:    Creatinine 1.13 (*)     Glucose 223 (*)     GFR Estimate 42 (*)     All other components within normal limits   TROPONIN I - Abnormal; Notable for the following components:    Troponin I 0.151 (*)     All other components within normal limits   LACTIC ACID WHOLE BLOOD - Abnormal; Notable for the following components:    Lactic Acid 3.3 (*)     All other components within normal limits   NT PROBNP INPATIENT - Abnormal; Notable for the following components:    N terminal Pro BNP Inpatient 11,568 (*)     All other components within normal limits   BLOOD GAS VENOUS - Abnormal; Notable for the following components:    pCO2 Venous 36 (*)     pO2 Venous 19 (*)     All other components within normal limits   CBC  WITH PLATELETS AND DIFFERENTIAL - Abnormal; Notable for the following components:    Hemoglobin 8.1 (*)     Hematocrit 27.8 (*)     MCV 66 (*)     MCH 19.2 (*)     MCHC 29.1 (*)     RDW 16.9 (*)     All other components within normal limits   SARS-COV2 (COVID-19) VIRUS RT-PCR   PERIPHERAL IV CATHETER   CARDIAC CONTINUOUS MONITORING   COVID-19 VIRUS (CORONAVIRUS) BY PCR    Narrative:     The following orders were created for panel order Asymptomatic COVID-19 Virus (Coronavirus) by PCR Nasopharyngeal.  Procedure                               Abnormality         Status                     ---------                               -----------         ------                     SARS-COV2 (COVID-19) Vir...[[id number]]                      In process                   Please view results for these tests on the individual orders.   CBC WITH PLATELETS & DIFFERENTIAL    Narrative:     The following orders were created for panel order CBC with platelets differential.  Procedure                               Abnormality         Status                     ---------                               -----------         ------                     CBC with platelets and d...[[id number]]  Abnormal            Final result                 Please view results for these tests on the individual orders.     XR Chest Port 1 View   Final Result   IMPRESSION: Pacer pads over the left chest. Low lung volumes. Slight   increase in small bilateral pleural effusions and bibasilar   atelectasis/consolidation. No pneumothorax. Linear opacities in the   left lung apex have increased, likely bronchiectasis and/or edema.   Pacemaker. Cardiomegaly. Median sternotomy and mediastinal surgical   clips.      SHERIE BELTRAN MD            SYSTEM ID:  [id number]            Treatments and/or interventions provided: Diltiazem, lasix, NS at 125ml  Patient's response to treatments and/or interventions: Tolerated well    To be done/followed up on inpatient unit:  Continue  with plan of care per admitting MD.    Does this patient have any cognitive concerns?: Forgetful    Activity level - Baseline/Home:  Independent  Activity Level - Current:   Stand with Assist    Patient's Preferred language: English   Needed?: No    Isolation: None  Infection: Not Applicable  Patient tested for COVID 19 prior to admission: YES  Bariatric?: No    Vital Signs:   Vitals:    08/02/21 1430 08/02/21 1435 08/02/21 1440 08/02/21 1500   BP: 125/57  114/63 129/56   Pulse: 93 86 88 77   Resp:  21 (!) 35    Temp:       TempSrc:       SpO2:  93% 100% 99%   Weight:   75.2 kg (165 lb 12.6 oz)        Cardiac Rhythm:     Was the PSS-3 completed:   Yes  What interventions are required if any?               Family Comments: Son at bedside  OBS brochure/video discussed/provided to patient/family: N/A             For the majority of the shift this patient's behavior was Green.     ED NURSE PHONE NUMBER: [phone]

## 2021-08-02 NOTE — PHARMACY-ADMISSION MEDICATION HISTORY
Pharmacy Medication History  Admission medication history interview status for the 8/2/2021  admission is complete. See EPIC admission navigator for prior to admission medications     Location of Interview: Patient room  Medication history sources: Patient    Significant changes made to the medication list:  N/A    In the past week, patient estimated taking medication this percent of the time: greater than 90%    Additional medication history information:   N/A    Medication reconciliation completed by provider prior to medication history? Yes    Time spent in this activity: 20 minutes     Prior to Admission medications    Medication Sig Last Dose Taking? Auth Provider   acetaminophen (ACETAMINOPHEN 8 HOUR) 650 MG CR tablet Take 650 mg by mouth At Bedtime 8/1/2021 at PM Yes Unknown, Entered By History   acetaminophen (TYLENOL) 500 MG tablet Take 500 mg by mouth every 6 hours as needed for mild pain Past Week at Unknown time Yes Unknown, Entered By History   aspirin EC 81 MG EC tablet Take 1 tablet by mouth daily. 8/2/2021 at AM Yes Hunter Mantilla MD   atorvastatin (LIPITOR) 80 MG tablet Take 1 tablet (80 mg) by mouth daily 8/1/2021 at Unknown time Yes De Obregon MD   calcium 600 MG tablet Take 2 tablets by mouth daily  8/1/2021 at am Yes De Obregon MD   furosemide (LASIX) 20 MG tablet Take 1 tablet (20 mg) by mouth daily 8/2/2021 at noon Yes Bee Orr MD   gabapentin (NEURONTIN) 100 MG capsule Take 2 capsules (200 mg) by mouth At Bedtime 8/1/2021 at Unknown time Yes De Obregon MD   levothyroxine (SYNTHROID/LEVOTHROID) 50 MCG tablet TAKE ONE TABLET BY MOUTH ONCE DAILY 8/2/2021 at Unknown time Yes De Obregon MD   losartan (COZAAR) 50 MG tablet Take 1 tablet (50 mg) by mouth every evening 8/1/2021 at Unknown time Yes Lori Kraft APRN CNP   metFORMIN (GLUCOPHAGE) 500 MG tablet TAKE ONE TABLET BY MOUTH DAILY WITH BREAKFAST 8/2/2021 at Unknown time Yes Dominga  Bee EAGLE MD   omeprazole (PRILOSEC) 20 MG DR capsule TAKE 1 CAPSULE BY MOUTH EVERY DAY 8/2/2021 at Unknown time Yes Bee Orr MD   polyethylene glycol (MIRALAX) powder Take 17 g (1 capful) by mouth daily 8/1/2021 at PM Yes Anna Burger PA-C   psyllium (METAMUCIL/KONSYL) capsule Take 2 capsules by mouth daily 8/1/2021 at PM Yes Unknown, Entered By History   Vitamin D, Cholecalciferol, 25 MCG (1000 UT) TABS Take 25 mcg by mouth daily 8/1/2021 at Unknown time Yes Unknown, Entered By History   warfarin ANTICOAGULANT (JANTOVEN ANTICOAGULANT) 3 MG tablet Take 1 1/2 tablets on Mon and 1 tablet all  Other days as directed by the INR clinic 8/1/2021 at PM Yes Bee Orr MD   clindamycin (CLEOCIN) 300 MG capsule 600 mg one hr prior to any dental appointment Unknown at Unknown time  Lori Kraft APRN CNP   cyclobenzaprine (FLEXERIL) 5 MG tablet Take 1 tablet (5 mg) by mouth 3 times daily as needed for muscle spasms More than a month at Unknown time  Bee Orr MD   nitroGLYCERIN (NITROSTAT) 0.4 MG SL tablet Place 0.4 mg under the tongue every 5 minutes as needed. Up to 3 doses per episode  Unknown at Unknown time  Unknown, Entered By History   oxyCODONE (ROXICODONE) 5 MG tablet Take 0.5 tablets (2.5 mg) by mouth every 6 hours as needed for severe pain Unknown at Unknown time  Bee Orr MD       The information provided in this note is only as accurate as the sources available at the time of update(s)

## 2021-08-02 NOTE — H&P
[facility]    History and Physical  Hospitalist       Date of Admission:  8/2/2021    Assessment & Plan   Madie Silva is a 89 year old female who presents with shortness of breath, found to have acute on chronic CHF, tachycardia induced with atrial fib in RVR.    Acute on chronic CHF  Acute hypoxic respiratory failure  Bilateral pleural effusions  Chronic Atrial fib, RVR improved  NSTEMI, type 2 secondary to demand  SSS s/p pacemaker  Severe aortic stenosis s/p TAVR  PTA warfarin. TTE 5/9 with EF 65-70%, biprosthetic valve intact. She was recently taken off metoprolol for unknown reasons. Trialed on 20mg daily furosemide over the weekend by PCP. Received 40mg IV lasix in ED and  Given bolus of cardizem followed by gtt with improvement of HR in 80s. BNP 51823. Trop 0.151, likely secondary to demand. On 2LPM but mostly for comfort while in ED, progressive hypoxia as evening progressed. EKG atrial fib with rate in 140s. Lower suspicion for PE given therapeutic INR on admit, d-dimer not collected on admission.  *CXR increase in size of bilateral pleural effusions (from 7/30 CXR), bibasilar consolidation/atelectasis, linear opacities, could be edema. Cardiomegaly and pacemaker noted.  - admit inpatient  - telemetry  - echocardiogram  - cardiology consult  - diltiazem gtt for rate control, wean  - wean o2 as able to maintain sats >92%  - trend trop x2 more  - add back metoprolol, start at 25mg BID  - continue warfarin for AC  - lasix 40mg IV q8h x4 doses (first dose received in ED)  - She has plans to follow up with Dr Skaggs for Watchman      Lactic acidosis  Secondary to hypoxia as above, as hypoxia improves, lactic acid should follow. No worry for sepsis.  - lactic acid check in AM    CKD Stage 3  Baseline creatinine 0.9-1.1 range. Cr on admit is 1.13.  - Monitor with diuresis  - BMP in AM    Recent C6 fracture   Fall due to vertigo 5/2021  Saw NS in follow-up 7/19. Cervical CT showed healing  of right C6 articular pillar fracture at that time. Was recently just taken off oxycodone and c-collar and told to begin physical therapy.  - son will bring c collar in to help  - trying to wean from oxycodone, PRN 2.5mg available  - tylenol RN  - prn flexeril  - PRN IV morphine for increased work of breathing/breakthrough pain     CAD, native heart, native vessels s/p CABG 2011  Essential hypertension  PTA aspirin 81 mg daily, losartan 50 mg daily, atorvastatin 80 mg daily  - continue ASA and statin  - hold losartan while titrating rate control meds as above     DM2 without complications  A1c 6.8. PTA on metformin 500 mg daily.  - hold off on metformin  - medium resistance sliding scale insulin  - ACHS BG checks     Walters's esophagus  Hiatal hernia  GERD  Chronic and stable on omeprazole 20 mg daily     Chronic anemia  Hgb baseline 8-9. 8.1 on admit, likely some dilution     Chronic pain syndrome  Tylenol as needed and 650 at bedtime, gabapentin 200mg at bedtime, flexeril 5mg TID PRN  - all continued    Anxiety  Son reports strong anxiety associated with her shortness of breath and vice versa  - PRN ativan available  - PRN atarax also available if itching/anxiety    COVID-19 screening, negative  She is fully vaccinated    DVT Prophylaxis: Warfarin  Code Status: DNR, pre-arrest intubation okay  Expected discharge: 2-3 days, pending rate control, improved SOB    Ivette Smith, DO    Primary Care Physician   Bee Orr    Chief Complaint   Shortness of breath    History is obtained from the patient, son, prior record review    History of Present Illness   Madie Silva is a 89 year old female who presents with shortness of breath. She has been short of breath for 9 days, steadily worsening. Came on Wednesday 7/28 to ED, but wait was long so she went home. Saw PCP on 7/30, had CXR which showed pleural effusions, and was recommended to start lasix 20mg daily. She continued to do poorly. She has a cough and felt  more dyspneic with activity. She is anxious. She also reports some intermittent chest pain overnight when her heart rate got really fast. She also had some swelling in her feet. Neither her nor her son know why her metoprolol was stopped. She also reports neck pain, she is more comfortable with rolled towel behind neck, but this is a constant course of anxiety with her. She denies nausea, vomiting, diarrhea, fevers, chills, constipation. She does report frequent BMs, 10 per day--but reports they are all formed.    Past Medical History    I have reviewed this patient's medical history and updated it with pertinent information if needed.   Past Medical History:   Diagnosis Date     Walters's esophagus 7/09     Basal cell carcinoma      Bradycardia     post-op CABG 2011 - s/p pacemaker implanted 2011     CHRONIC VERTIGO 9/99     Colonic Adenoma 3/90, 11/98     Coronary artery disease     CABG x4 2011: LIMA to her LAD, saphenous vein graft to her ramus, saphenous vein graft to her OM, saphenous vein graft to her PDA.     Costochondritis      Depression      DIABETES MELLITUS TYPE II 10/07     DJD      DVT of Leg, postop 11/09    6 months of warfarin     Gastritis 10/89     GERD      HIATAL HERNIA      HYPERLIPIDEMIA      HYPOTHYROIDISM (aka HASHIMOTO)      Impaired fasting glucose      L Ankle Fracture 10/09     Obesity, unspecified      Osteoporosis, unspecified      PERIPHERAL NEUROPATHY      Polymyalgia rheumatica (H)      Post Herpetic Neuralgia 4/03    R lumbar distribution     Restless leg syndrome      Small vessel cerebrovascular changes 9/99     Stricture and stenosis of esophagus 10/89    Dilated     Syncope     1/06, 8/08, 2/10     VITAMIN D DEFICIENCY 12/07    per Dr. Petty       Past Surgical History   I have reviewed this patient's surgical history and updated it with pertinent information if needed.  Past Surgical History:   Procedure Laterality Date     BYPASS GRAFT ARTERY CORONARY  11/2/2011    CABG x  4  Dr. PINEDA     CV ABDOMINAL ANGIOGRAM N/A 1/27/2020    Procedure: Aortogram Abdominal;  Surgeon: Jenny Luther MD;  Location:  HEART CARDIAC CATH LAB     CV ANGIOGRAM CORONARY GRAFT N/A 1/27/2020    Procedure: Angiogram Coronary Graft;  Surgeon: Jenny Luther MD;  Location:  HEART CARDIAC CATH LAB     CV AORTOGRAM N/A 1/27/2020    Procedure: Aortogram Thoracic;  Surgeon: Jenny Luther MD;  Location:  HEART CARDIAC CATH LAB     CV LEFT HEART CATH N/A 1/27/2020    Procedure: Right and Left heart catheterization for TAVR workup;  Surgeon: Jenny Luther MD;  Location:  HEART CARDIAC CATH LAB     CV TRANSCATHETER AORTIC VALVE REPLACEMENT N/A 5/5/2020    Procedure: Transcatheter Aortic Valve Replacement;  Surgeon: Jenny Luther MD;  Location:  HEART CARDIAC CATH LAB     EMBOLECTOMY LOWER EXTREMITY  11/9/2011    EMBOLECTOMY LOWER EXTREMITY; left leg femoral embolectomy.; Surgeon:JOLEEN BOONE; Location: OR     HYSTERECTOMY, CERVIX STATUS UNKNOWN  1978    partial hysterectomy     SURGICAL HISTORY OF -   10/09    L ankle fracture repair    Dr. Jose Roberto Trevino     [facility] NONSPECIFIC PROCEDURE  age 14    appendectomy     [facility] NONSPECIFIC PROCEDURE  as a child    T&A       Prior to Admission Medications   Prior to Admission Medications   Prescriptions Last Dose Informant Patient Reported? Taking?   Vitamin D, Cholecalciferol, 25 MCG (1000 UT) TABS 8/1/2021 at Unknown time Self Yes Yes   Sig: Take 25 mcg by mouth daily   acetaminophen (ACETAMINOPHEN 8 HOUR) 650 MG CR tablet 8/1/2021 at PM Self Yes Yes   Sig: Take 650 mg by mouth At Bedtime   acetaminophen (TYLENOL) 500 MG tablet Past Week at Unknown time  Yes Yes   Sig: Take 500 mg by mouth every 6 hours as needed for mild pain   aspirin EC 81 MG EC tablet 8/2/2021 at AM Self No Yes   Sig: Take 1 tablet by mouth daily.   atorvastatin (LIPITOR) 80 MG tablet 8/1/2021 at Unknown time Self No Yes   Sig: Take 1  tablet (80 mg) by mouth daily   calcium 600 MG tablet 2021 at am Self Yes Yes   Sig: Take 2 tablets by mouth daily    clindamycin (CLEOCIN) 300 MG capsule Unknown at Unknown time Self No No   Si mg one hr prior to any dental appointment   cyclobenzaprine (FLEXERIL) 5 MG tablet More than a month at Unknown time  No No   Sig: Take 1 tablet (5 mg) by mouth 3 times daily as needed for muscle spasms   furosemide (LASIX) 20 MG tablet 2021 at noon  No Yes   Sig: Take 1 tablet (20 mg) by mouth daily   gabapentin (NEURONTIN) 100 MG capsule 2021 at Unknown time Self No Yes   Sig: Take 2 capsules (200 mg) by mouth At Bedtime   levothyroxine (SYNTHROID/LEVOTHROID) 50 MCG tablet 2021 at Unknown time Self No Yes   Sig: TAKE ONE TABLET BY MOUTH ONCE DAILY   losartan (COZAAR) 50 MG tablet 2021 at Unknown time Self Yes Yes   Sig: Take 1 tablet (50 mg) by mouth every evening   metFORMIN (GLUCOPHAGE) 500 MG tablet 2021 at Unknown time  No Yes   Sig: TAKE ONE TABLET BY MOUTH DAILY WITH BREAKFAST   nitroGLYCERIN (NITROSTAT) 0.4 MG SL tablet Unknown at Unknown time Self Yes No   Sig: Place 0.4 mg under the tongue every 5 minutes as needed. Up to 3 doses per episode    omeprazole (PRILOSEC) 20 MG DR capsule 2021 at Unknown time  No Yes   Sig: TAKE 1 CAPSULE BY MOUTH EVERY DAY   polyethylene glycol (MIRALAX) powder 2021 at PM Self No Yes   Sig: Take 17 g (1 capful) by mouth daily   psyllium (METAMUCIL/KONSYL) capsule 2021 at PM Self Yes Yes   Sig: Take 2 capsules by mouth daily   warfarin ANTICOAGULANT (JANTOVEN ANTICOAGULANT) 3 MG tablet 2021 at PM  No Yes   Sig: Take 1 1/2 tablets on Mon and 1 tablet all  Other days as directed by the INR clinic      Facility-Administered Medications: None     Allergies   Allergies   Allergen Reactions     Fosamax [Alendronic Acid] GI Disturbance     Severe gastrointestinal reaction     Amoxicillin Hives     Bisphosphonates Other (See Comments)     Swallowing  disorder     Boniva [Ibandronate Sodium] Nausea and Vomiting, Diarrhea and Swelling     Arm swelling with IV only     Lisinopril Cough     Niacin Rash     Simvastatin Muscle Pain (Myalgia)       Social History   I have reviewed this patient's social history and updated it with pertinent information if needed. Madie Silva  reports that she has never smoked. She has never used smokeless tobacco. She reports that she does not drink alcohol and does not use drugs.    Family History   I have reviewed this patient's family history and updated it with pertinent information if needed.   Family History   Problem Relation Age of Onset     Alzheimer Disease Sister      Heart Disease Mother      Heart Disease Father      Heart Disease Son      Heart Disease Brother        Review of Systems   The 10 point Review of Systems is negative other than noted in the HPI    Physical Exam   Temp: 97.4  F (36.3  C) Temp src: Temporal BP: 130/51 Pulse: 80   Resp: 18 SpO2: 100 % O2 Device: Nasal cannula Oxygen Delivery: 2 LPM  Vital Signs with Ranges  Temp:  [97.4  F (36.3  C)] 97.4  F (36.3  C)  Pulse:  [] 80  Resp:  [14-35] 18  BP: (100-134)/(51-82) 130/51  SpO2:  [91 %-100 %] 100 %  165 lbs 12.57 oz    Constitutional: Awake, alert, cooperative, mild distress with SOB and anxiety  Eyes: Conjunctiva and pupils examined and normal.  HEENT: Moist mucous membranes, normal dentition.  Respiratory: decreased breath sounds overall, no wheeze, does have few fine crackles at bases. RR upper 20s  Cardiovascular: irregularly irregular, rate in 80s, normal S1 and S2, and no murmur noted.  GI: Soft, non-distended, non-tender, normal bowel sounds.  Lymph/Hematologic: No anterior cervical or supraclavicular adenopathy.  Skin: No rashes, no cyanosis, no edema.  Musculoskeletal: No joint swelling, erythema or tenderness.  Neurologic: Cranial nerves 2-12 intact, normal strength and sensation.  Psychiatric: Alert, oriented to person, place and time, +  anxiety    Data   Data reviewed today:  I personally reviewed CXR increase in size of bilateral pleural effusions (from 7/30 CXR), bibasilar consolidation/atelectasis, linear opacities, could be edema. Cardiomegaly and pacemaker noted. EKG: atrial fib in 140s  Recent Labs   Lab 08/02/21  1724 08/02/21  1705 08/02/21  1416 08/02/21  1415 07/30/21  1035 07/30/21  1034 07/29/21  1020   WBC  --   --   --  8.8 7.6  --   --    HGB  --   --   --  8.1* 8.7*  --   --    MCV  --   --   --  66* 66*  --   --    PLT  --   --   --  324 345  --   --    INR  --   --  2.59*  --   --   --  3.0*   NA  --   --  135  --   --  136  --    POTASSIUM  --   --  5.2  --   --  4.9  --    CHLORIDE  --   --  106  --   --  107  --    CO2  --   --  21  --   --  23  --    BUN  --   --  21  --   --  21  --    CR  --   --  1.13*  --   --  1.03  --    ANIONGAP  --   --  8  --   --  6  --    SHAWNEE  --   --  8.8  --   --  9.1  --    GLC  --  144* 223*  --   --  169*  --    ALBUMIN  --   --   --   --   --  3.4  --    PROTTOTAL  --   --   --   --   --  7.3  --    BILITOTAL  --   --   --   --   --  0.4  --    ALKPHOS  --   --   --   --   --  112  --    ALT  --   --   --   --   --  23  --    AST  --   --   --   --   --  22  --    TROPONIN 0.173*  --  0.151*  --   --   --   --        Recent Results (from the past 24 hour(s))   XR Chest Port 1 View    Narrative    XR CHEST PORT 1 VIEW 8/2/2021 2:25 PM    HISTORY: sob    COMPARISON: 7/30/2021      Impression    IMPRESSION: Pacer pads over the left chest. Low lung volumes. Slight  increase in small bilateral pleural effusions and bibasilar  atelectasis/consolidation. No pneumothorax. Linear opacities in the  left lung apex have increased, likely bronchiectasis and/or edema.  Pacemaker. Cardiomegaly. Median sternotomy and mediastinal surgical  clips.    SHERIE BELTRAN MD         SYSTEM ID:  [id number]

## 2021-08-02 NOTE — PROGRESS NOTES
RECEIVING UNIT ED HANDOFF REVIEW    ED Nurse Handoff Report was reviewed by: Priscila Bishop RN on August 2, 2021 at 3:35 PM

## 2021-08-02 NOTE — PHARMACY-ANTICOAGULATION SERVICE
Clinical Pharmacy - Warfarin Dosing Consult     Pharmacy has been consulted to manage this patient s warfarin therapy.  Indication: Atrial Fibrillation  Therapy Goal: INR 2-3  Warfarin Prior to Admission: Yes  Warfarin PTA Regimen: 4.5 mg on Monday, 3 mg on all other days    INR   Date Value Ref Range Status   08/02/2021 2.59 (H) 0.85 - 1.15 Final     Comment:     Effective 7/11/2021, the reference range for this assay has changed.     INR (External)   Date Value Ref Range Status   07/29/2021 3.0 (A) 0.9 - 1.1 Final       Recommend warfarin 3 mg today.  Pharmacy will monitor Madie Silva daily and order warfarin doses to achieve specified goal.      Please contact pharmacy as soon as possible if the warfarin needs to be held for a procedure or if the warfarin goals change.        Jean-Paul GonzalezD

## 2021-08-02 NOTE — PROGRESS NOTES
[facility]  Patient is currently open to home care services with [facility]. The patient is currently receiving RN, OT, PT, HHA services.  and home health team have been notified of patient admission. [facility] liaison will continue to follow patient during stay. If appropriate provide orders to resume home care at time of discharge.    Daniela Grubbs RN   [facility] Home Care Liaison   [phone]

## 2021-08-02 NOTE — ED TRIAGE NOTES
"SOB for past two weeks. Primary MD did xrray showing \"fluid on lungs\" and started on lasix. Has been on for 4 days but SOB worsening.   "

## 2021-08-02 NOTE — PROGRESS NOTES
Clinic Care Coordination Contact  Ambulatory Care Coordination to Inpatient Care Management   Hand-In Communication    Date:  August 2, 2021  Name: Madie Silva is enrolled in Ambulatory Care Coordination program and I am the Lead Care Coordinator.  CC Contact Information: Epic InPerzosket + phone  Payor Source: Payor: [facility] / Plan: [employer] MEDICARE NON [employer] / Product Type: HMO /   Current services in place: Home Care   Please see the CC Snaphot and Care Management Flowsheets for specific  details of this Madie Silva care plan.   Additional details/specific concerns r/t this admission:    Psychosocial  want to remain as independent as I safely can.    I will follow this admission in Epic. Please feel free to contact me with questions or for further collaboration in discharge planning.      ZACKERY García  Clinic Care Coordinator  [facility] and [facility]  [phone]  [email or web address]

## 2021-08-02 NOTE — PLAN OF CARE
VSS. C/o generalized back/neck pain- flexeril and tylenol given. She is anxious d/t being short of breath and it worsens her breathing- ativan given. Breathing got better. Dilt gtt at 5mg/hr. Will continue to monitor.

## 2021-08-02 NOTE — TELEPHONE ENCOUNTER
"Pt son [facility] on file.  Pt was seen last Friday with Dr. Orr. Was fund to have  fluid in lungs on xray. Started on Lasix daily. Has been taking as prescribed.     Pt is reporting Increased weakness and continuing SOB.   Pt has home health; was seen today, they recommended she go to ER and patient refused.     Pt is able to speak in 3-4 words phrases while breathing heavily. Respirations sound rapid. Pt states that she can \"barely take 10 steps without having to sit down\".     Advised pt be seen in ER now. Pt son states that he will take her to [facility] ER now.   Jessica Chakraborty RN      Reason for Disposition    MODERATE difficulty breathing (e.g., speaks in phrases, SOB even at rest, pulse 100-120) of new onset or worse than normal    Additional Information    Negative: Chest pain    Negative: Wheezing (high pitched whistling sound) and previous asthma attacks or use of asthma medicines    Negative: Difficulty breathing and only present when coughing    Negative: Difficulty breathing and only from stuffy or runny nose    Negative: Difficulty breathing and within 14 days of COVID-19 Exposure    Negative: Breathing stopped and hasn't returned    Negative: Choking on something    Negative: SEVERE difficulty breathing (e.g., struggling for each breath, speaks in single words, pulse > 120)    Negative: Bluish (or gray) lips or face    Negative: Difficult to awaken or acting confused (e.g., disoriented, slurred speech)    Negative: Passed out (i.e., fainted, collapsed and was not responding)    Negative: Wheezing started suddenly after medicine, an allergic food, or bee sting    Negative: Stridor    Negative: Slow, shallow and weak breathing    Negative: Sounds like a life-threatening emergency to the triager    Answer Assessment - Initial Assessment Questions  1. RESPIRATORY STATUS: \"Describe your breathing?\" (e.g., wheezing, shortness of breath, unable to speak, severe coughing)       Extremely SOB, unable to " "speak in more than 2 words at a time.   2. ONSET: \"When did this breathing problem begin?\"       Since last Friday. Pt was seen and started on Lasix 20mg daily.   3. PATTERN \"Does the difficult breathing come and go, or has it been constant since it started?\"       Constant  4. SEVERITY: \"How bad is your breathing?\" (e.g., mild, moderate, severe)     - MILD: No SOB at rest, mild SOB with walking, speaks normally in sentences, can lay down, no retractions, pulse < 100.     - MODERATE: SOB at rest, SOB with minimal exertion and prefers to sit, cannot lie down flat, speaks in phrases, mild retractions, audible wheezing, pulse 100-120.     - SEVERE: Very SOB at rest, speaks in single words, struggling to breathe, sitting hunched forward, retractions, pulse > 120       Sever; pt can only speak 2 words at a time; respirations sound very fast.   5. RECURRENT SYMPTOM: \"Have you had difficulty breathing before?\" If so, ask: \"When was the last time?\" and \"What happened that time?\"       Yes, has been SOB since last Friday.   6. CARDIAC HISTORY: \"Do you have any history of heart disease?\" (e.g., heart attack, angina, bypass surgery, angioplasty)       Yes.   7. LUNG HISTORY: \"Do you have any history of lung disease?\"  (e.g., pulmonary embolus, asthma, emphysema)      no  8. CAUSE: \"What do you think is causing the breathing problem?\"       Fluid in lungs  9. OTHER SYMPTOMS: \"Do you have any other symptoms? (e.g., dizziness, runny nose, cough, chest pain, fever)      No  10. PREGNANCY: \"Is there any chance you are pregnant?\" \"When was your last menstrual period?\"        NA  11. TRAVEL: \"Have you traveled out of the country in the last month?\" (e.g., travel history, exposures)        NO    Protocols used: BREATHING DIFFICULTY-A-OH      "

## 2021-08-02 NOTE — ED NOTES
Bed: ST03  Expected date:   Expected time:   Means of arrival:   Comments:  Triage, tachycardia/SOB

## 2021-08-03 PROBLEM — D50.9 ANEMIA, IRON DEFICIENCY: Status: ACTIVE | Noted: 2021-01-01

## 2021-08-03 PROBLEM — Z98.890 STATUS POST CORONARY ANGIOGRAM: Status: RESOLVED | Noted: 2020-01-27 | Resolved: 2021-01-01

## 2021-08-03 PROBLEM — S09.90XA CLOSED HEAD INJURY, INITIAL ENCOUNTER: Status: RESOLVED | Noted: 2021-01-01 | Resolved: 2021-01-01

## 2021-08-03 PROBLEM — R42 DIZZINESS: Status: RESOLVED | Noted: 2021-01-01 | Resolved: 2021-01-01

## 2021-08-03 PROBLEM — S00.81XA ABRASION OF FOREHEAD, INITIAL ENCOUNTER: Status: RESOLVED | Noted: 2021-01-01 | Resolved: 2021-01-01

## 2021-08-03 PROBLEM — J96.01 ACUTE RESPIRATORY FAILURE WITH HYPOXIA (H): Status: ACTIVE | Noted: 2021-01-01

## 2021-08-03 PROBLEM — S00.83XA CONTUSION OF FOREHEAD, INITIAL ENCOUNTER: Status: RESOLVED | Noted: 2021-01-01 | Resolved: 2021-01-01

## 2021-08-03 PROBLEM — I50.9 ACUTE CONGESTIVE HEART FAILURE, UNSPECIFIED HEART FAILURE TYPE (H): Status: RESOLVED | Noted: 2021-01-01 | Resolved: 2021-01-01

## 2021-08-03 PROBLEM — I35.0 NONRHEUMATIC AORTIC VALVE STENOSIS: Status: RESOLVED | Noted: 2018-03-15 | Resolved: 2021-01-01
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
## 2021-08-03 PROBLEM — R55 NEAR SYNCOPE: Status: RESOLVED | Noted: 2021-01-01 | Resolved: 2021-01-01

## 2021-08-03 PROBLEM — I50.33 DIASTOLIC DYSFUNCTION WITH ACUTE ON CHRONIC HEART FAILURE (H): Status: ACTIVE | Noted: 2021-01-01

## 2021-08-03 PROBLEM — Z79.01 ANTICOAGULATED: Status: RESOLVED | Noted: 2021-01-01 | Resolved: 2021-01-01

## 2021-08-03 NOTE — PROGRESS NOTES
08/03/21 1525   Quick Adds   Type of Visit Initial Occupational Therapy Evaluation   Living Environment   People in home alone   Current Living Arrangements house   Home Accessibility stairs to enter home;stairs within home   Number of Stairs, Main Entrance 1   Number of Stairs, Within Home, Primary greater than 10 stairs  (flight to basement)   Stair Railings, Within Home, Primary railings on both sides of stairs   Transportation Anticipated car, drives self   Living Environment Comments Pt was having HHOT/PT/RN/PCA   Self-Care   Usual Activity Tolerance moderate   Current Activity Tolerance fair   Equipment Currently Used at Home shower chair;cane, quad;walker, rolling   Activity/Exercise/Self-Care Comment Pt reports indep with ADLs/IADLs at baseline, had PCA for shower weekly, was going outside to water her flowers a few weeks ago   Instrumental Activities of Daily Living (IADL)   Previous Responsibilities meal prep;housekeeping;laundry;shopping;yardwork;medication management;finances;driving   IADL Comments PCA was assisting with shower; pt completes laundry in the basement indep   Disability/Function   Walking or Climbing Stairs Difficulty yes   Walking or Climbing Stairs ambulation difficulty, requires equipment;stair climbing difficulty, requires equipment   Mobility Management walker/cane    Dressing/Bathing Difficulty yes   Dressing/Bathing bathing difficulty, assistance 1 person;bathing difficulty, requires equipment   Dressing/Bathing Management PCA and shower seat   Toileting issues no   Doing Errands Independently Difficulty (such as shopping) no   Fall history within last six months no   Change in Functional Status Since Onset of Current Illness/Injury yes   General Information   Onset of Illness/Injury or Date of Surgery 08/02/21   Referring Physician Ivette Smith   Patient/Family Therapy Goal Statement (OT) home   Additional Occupational Profile Info/Pertinent History of Current Problem 89 year  old female who presents with shortness of breath, found to have acute on chronic CHF, tachycardia induced with atrial fib in RVR.   Existing Precautions/Restrictions fall;oxygen therapy device and L/min  (4L NC)   Left Upper Extremity (Weight-bearing Status) full weight-bearing (FWB)  (all)   General Observations and Info Activity order: up with assist prn   Cognitive Status Examination   Orientation Status orientation to person, place and time   Affect/Mental Status (Cognitive) WNL   Follows Commands follows two-step commands;over 90% accuracy   Pain Assessment   Patient Currently in Pain No   Integumentary/Edema   Integumentary/Edema Comments very mild edema BLE   Posture   Posture forward head position;protracted shoulders   Range of Motion Comprehensive   Comment, General Range of Motion BUE WFL   Strength Comprehensive (MMT)   Comment, General Manual Muscle Testing (MMT) Assessment BUE at least 3+/5 based on observation of dressing task/transfers   Bed Mobility   Bed Mobility supine-sit;sit-supine   Supine-Sit [place] (Bed Mobility) minimum assist (75% patient effort)   Sit-Supine [place] (Bed Mobility) minimum assist (75% patient effort)   Transfers   Transfers sit-stand transfer;toilet transfer   Sit-Stand Transfer   Sit-Stand [place] (Transfers) contact guard   Assistive Device (Sit-Stand Transfers) walker, standard   Toilet Transfer   Type (Toilet Transfer) sit-stand;stand-sit   [place] Level (Toilet Transfer) contact guard   Assistive Device (Toilet Transfer) commode chair   Balance   Balance Comments good seated; unsteady ambulating in room with 2WW, no LOB   Activities of Daily Living   BADL Assessment lower body dressing;toileting   Lower Body Dressing Assessment   [place] Level (Lower Body Dressing) moderate assist (50% patient effort)   Comment (Lower Body Dressing) limited by SOB   Toileting   [place] Level (Toileting) minimum assist (75% patient effort)   Comment  (Toileting) limited by SOB   Clinical Impression   Criteria for Skilled Therapeutic Interventions Met (OT) yes;meets criteria;skilled treatment is necessary   OT Diagnosis impaired ADLs   OT Problem List-Impairments impacting ADL problems related to;activity tolerance impaired;balance;mobility;strength  (SOB)   Assessment of Occupational Performance 3-5 Performance Deficits   Identified Performance Deficits dressing, bathing, toileting, home chores   Planned Therapy Interventions (OT) ADL retraining;strengthening;transfer training;risk factor education;IADL retraining   Clinical Decision Making Complexity (OT) moderate complexity   Therapy Frequency (OT) Daily   Predicted Duration of Therapy 5 days   Risk & Benefits of therapy have been explained evaluation/treatment results reviewed;patient   OT Discharge Planning    OT Discharge Recommendation (DC Rec) Transitional Care Facility   OT Rationale for DC Rec Pt below baseline and lives alone, she would benefit from continued therapy to increase safety and independence with I/ADLs.  Pending LOS, pt may progress to home with HHOT/PT.   OT Brief overview of current status  Doris transfer to toilet, ModA LE dressing, Doris 12' with 2WW, activity limited by significant SOB (SpO2 >94%)   Total Evaluation Time (Minutes)   Total Evaluation Time (Minutes) 5

## 2021-08-03 NOTE — PLAN OF CARE
Neuro- Alert and Oriented  Most Recent Vitals- Temp: 97.6  F (36.4  C) Temp src: Oral BP: 131/77 Pulse: 74   Resp: (!) 32 SpO2: 97 % O2 Device: Nasal cannula Oxygen Delivery: 4 LPM  Tele - 100% V Paced  Cardiac- No chest pain reported   Resp- Shortness of breath reported Fine crackles in bases  O2- NC 4 LPM  Activity- A1 with walker and gait belt   Pain-  Chronic back pain-PRN oxycodone given with relief  Drips- SL-previously on dilt drip  Sites- None  Drains/Tubes- None  Plan- Continue current plan of care  Misc- Purewick in place with good output    Anastacia Avery, RN

## 2021-08-03 NOTE — PROGRESS NOTES
[facility]    Hospitalist Progress Note    Assessment & Plan   89 year old female who presents with shortness of breath, found to have acute on chronic CHF, tachycardia induced with atrial fib in RVR.     Acute on chronic diastolic CHF  Acute hypoxic respiratory failure with hypoxia  Bilateral pleural effusions 2nd to CHF  Chronic Atrial fib, RVR -- rate already improved  NSTEMI, type 2 secondary to demand  SSS s/p pacemaker  Severe aortic stenosis s/p TAVR  -- was recently taken off metoprolol for unknown reasons. Trialed on 20mg daily furosemide over the weekend by PCP  -- EKG atrial fib with rate in 140s.   - diltiazem gtt stopped, rate controlled with Metoprolol  - currently on 4 LPM O2 (none at home)  - continue Lasix 40 mg bid IV today  - She has plans to follow up with Dr Skaggs for Watchman       Lactic acidosis -- Secondary to hypoxia   - lactic acid 3.3, now 1.7     CKD Stage 3  Baseline creatinine 0.9-1.1 range baseline  - BMP in AM     Recent C6 fracture   Fall due to vertigo 5/2021  Saw NS in follow-up 7/19. Cervical CT showed healing of right C6 articular pillar fracture at that time. Was recently just taken off oxycodone and c-collar and told to begin physical therapy.  - son will bring c collar in to help  - trying to wean from oxycodone, PRN 2.5mg available  - tylenol RN  - prn flexeril     CAD, native heart, native vessels s/p CABG 2011  Essential hypertension  PTA aspirin 81 mg daily, losartan 50 mg daily, atorvastatin 80 mg daily  - continue ASA and statin  - hold losartan while titrating rate control meds as above     DM2 without complications  A1c 6.8. PTA on metformin 500 mg daily.  - hold off on metformin     Walters's esophagus  Hiatal hernia  GERD  Chronic and stable on omeprazole 20 mg daily     Chronic anemia  Hgb baseline 8-9. 8.1 on admit, likely some dilution, will check B12 and iron studies     Chronic pain syndrome  Tylenol as needed and 650 at bedtime,  gabapentin 200mg at bedtime, flexeril 5mg TID prn      Anxiety  Son reports strong anxiety associated with her shortness of breath and vice versa  - PRN ativan available  - PRN atarax also available if itching/anxiety    Plan:  Discussed with patient and son, anticipate discharge in 2 days, PT ordered, may need TCU on discharge.     DVT Prophylaxis: Warfarin  Code Status: No CPR- Pre-arrest intubation OK    Disposition: Expected discharge Thurs to home or TCU (previously at [facility])    Kel Márquez  Pager [phone]  Cell Phone [phone]  Text Page (7am to 6pm)    Interval History   Feels much better today, was able to sleep and lie down, currently on 4 LPM.     Physical Exam   Temp: 97.6  F (36.4  C) Temp src: Oral BP: 131/77 Pulse: 74   Resp: (!) 32 SpO2: 97 % O2 Device: Nasal cannula Oxygen Delivery: 4 LPM  Vitals:    08/02/21 1440 08/03/21 0635   Weight: 75.2 kg (165 lb 12.6 oz) 77.9 kg (171 lb 12.8 oz)     Vital Signs with Ranges  Temp:  [97.4  F (36.3  C)-97.6  F (36.4  C)] 97.6  F (36.4  C)  Pulse:  [] 74  Resp:  [14-35] 32  BP: ()/() 131/77  SpO2:  [91 %-100 %] 97 %  I/O last 3 completed shifts:  In: -   Out: 400 [Urine:400]    # Pain Assessment:  Current Pain Score 8/2/2021   Patient currently in pain? yes   Pain score (0-10) -   Pain location -   Pain descriptors -   Madie s pain level was assessed and she currently denies pain.        Constitutional: Awake, alert, cooperative, no apparent distress  Respiratory: Clear to auscultation bilaterally, no crackles or wheezing  Cardiovascular: Regular rate and rhythm, normal S1 and S2, and no murmur noted  GI: Normal bowel sounds, soft, non-distended, non-tender  Extrem: No calf tenderness, trace bilateral ankle edema  Neuro: Ox3, no focal motor or sensory deficits    Medications     dilTIAZem HCl-Sodium Chloride Stopped (08/03/21 0108)     - MEDICATION INSTRUCTIONS -       Warfarin Therapy Reminder         acetaminophen  650 mg  Oral At Bedtime     aspirin  81 mg Oral Daily     atorvastatin  80 mg Oral QPM     furosemide  40 mg Intravenous Q8H     gabapentin  200 mg Oral At Bedtime     insulin aspart  1-7 Units Subcutaneous TID AC     insulin aspart  1-5 Units Subcutaneous At Bedtime     levothyroxine  50 mcg Oral Daily     [Held by provider] losartan  50 mg Oral QPM     metoprolol tartrate  25 mg Oral BID     pantoprazole  40 mg Oral QAM AC     polyethylene glycol  17 g Oral Daily     psyllium  2 capsule Oral Daily     sodium chloride (PF)  3 mL Intracatheter Q8H       Data   Recent Labs   Lab 08/03/21  0529 08/03/21  0220 08/02/21  2143 08/02/21  2117 08/02/21  1724 08/02/21  1416 08/02/21  1415 07/30/21  1035 07/30/21  1034 07/30/21  1034 07/29/21  1020   WBC 10.3  --   --   --   --   --  8.8 7.6  --   --   --    HGB 8.5*  --   --   --   --   --  8.1* 8.7*  --   --   --    MCV 65*  --   --   --   --   --  66* 66*  --   --   --      --   --   --   --   --  324 345  --   --   --    INR 2.59*  --   --   --   --  2.59*  --   --   --   --  3.0*     --   --   --   --  135  --   --   --  136  --    POTASSIUM 4.2  --   --   --   --  5.2  --   --   --  4.9  --    CHLORIDE 105  --   --   --   --  106  --   --   --  107  --    CO2 26  --   --   --   --  21  --   --   --  23  --    BUN 26  --   --   --   --  21  --   --   --  21  --    CR 1.32*  --   --   --   --  1.13*  --   --   --  1.03  --    ANIONGAP 5  --   --   --   --  8  --   --   --  6  --    SHAWNEE 8.8  --   --   --   --  8.8  --   --   --  9.1  --    * 152*  --  179*  --  223*  --   --    < > 169*  --    ALBUMIN  --   --   --   --   --   --   --   --   --  3.4  --    PROTTOTAL  --   --   --   --   --   --   --   --   --  7.3  --    BILITOTAL  --   --   --   --   --   --   --   --   --  0.4  --    ALKPHOS  --   --   --   --   --   --   --   --   --  112  --    ALT  --   --   --   --   --   --   --   --   --  23  --    AST  --   --   --   --   --   --   --   --   --  22   --    TROPONIN  --   --  0.180*  --  0.173* 0.151*  --   --   --   --   --     < > = values in this interval not displayed.       Imaging:   Recent Results (from the past 24 hour(s))   XR Chest Port 1 View    Narrative    XR CHEST PORT 1 VIEW 8/2/2021 2:25 PM    HISTORY: sob    COMPARISON: 7/30/2021      Impression    IMPRESSION: Pacer pads over the left chest. Low lung volumes. Slight  increase in small bilateral pleural effusions and bibasilar  atelectasis/consolidation. No pneumothorax. Linear opacities in the  left lung apex have increased, likely bronchiectasis and/or edema.  Pacemaker. Cardiomegaly. Median sternotomy and mediastinal surgical  clips.    SHERIE BELTRAN MD         SYSTEM ID:  [id number]

## 2021-08-03 NOTE — PROVIDER NOTIFICATION
MD Notification        Notified Person Name:  Dr. Smith  Notification Date/Time: 08/02/21 8:16 PM  Notification Interaction: AmCom     Purpose of Notification: MD Notification Pt in severe generalized pain-unrelieved thus far by PRNs. Pt requesting morphine or oxycodone.  Pt complains of itching-can we try atarax?    Orders placed: PRN atarax, morphine (break through pain and work of breathing),  And oxycodone      MD Notification        Notified Person Name:  Dr. Smith   Notification Date/Time: 08/02/21 9:01 PM  Notification Interaction: AmCom     Purpose of Notification: MD Notification Can we consider rechecking VBG? Pt remains tachyonic and currently requiring 4L O2 to maintain sats above 92%.  Call if order not appropriate.      MD Notification        Notified Person Name:  Dr. Smith   Notification Date/Time: 08/02/21 10:29 PM  Notification Interaction: AmCom     Purpose of Notification: MD Notification I think pt needs bipap. continues to have increased work of breathing and tachypenic. Having respiratory assess now.    Per Dr. Smtih, [place] NP will come to floor to assess patient.

## 2021-08-03 NOTE — PROGRESS NOTES
2222: Patient having increased work of breathing. Work of breathing at beginning of shift, discussed with patient thought possibly it was related to pain. Pain now under control and patient is now lethargic and unable to keep eyes open. Increased work of breathing noted while sleeping.

## 2021-08-04 NOTE — PROGRESS NOTES
08/04/21 1500   Quick Adds   Type of Visit Initial PT Evaluation   Living Environment   People in home alone   Current Living Arrangements house   Home Accessibility stairs to enter home;stairs within home   Number of Stairs, Main Entrance 1   Stair Railings, Main Entrance none   Number of Stairs, Within Home, Primary greater than 10 stairs   Stair Railings, Within Home, Primary railing on right side (ascending)   Self-Care   Usual Activity Tolerance moderate   Current Activity Tolerance fair   Equipment Currently Used at Home walker, rolling   Disability/Function   Fall history within last six months yes   Number of times patient has fallen within last six months 1   General Information   Onset of Illness/Injury or Date of Surgery 08/02/21   Referring Physician Ivette Smith,    Patient/Family Therapy Goals Statement (PT) Not stated   Pertinent History of Current Problem (include personal factors and/or comorbidities that impact the POC) Patient admitted on 8/2/21 for progressing SOB and found to have found to have acute on chronic CHF, tachycardia induced with atrial fib in RVR. Patient with PMH of atrial fibrillation, CAD, CKD, stroke, diabetes, DVT, aortic valve stenosis, with pacemaker.    Existing Precautions/Restrictions fall   Weight-Bearing Status - LLE full weight-bearing   Weight-Bearing Status - RLE full weight-bearing   Pain Assessment   Patient Currently in Pain No   Posture    Posture Forward head position;Protracted shoulders;Kyphosis   Range of Motion (ROM)   ROM Comment B LE ROM WNL   Strength   Strength Comments Not formally assessed but at least 3+/5 with functional transfers and gait   Bed Mobility   Comment (Bed Mobility) Supine>sit with Min A    Transfers   Transfer Safety Comments Sit>stand from EOB with FWW and CGA   Gait/Stairs (Locomotion)   Comment (Gait/Stairs) Patient able to take few steps away from EOB with FWW and CGA, some unsteadiness noted   Balance   Balance  Comments Some unsteadiness in standing, requiring A to support, likely due to fatigue   Sensory Examination   Sensory Perception Comments Notes baseline R hand numbness   Clinical Impression   Criteria for Skilled Therapeutic Intervention yes, treatment indicated   PT Diagnosis (PT) Impaired mobility and gait   Influenced by the following impairments pain, weakness, decreased tolerance to activity   Functional limitations due to impairments acute on chronic CHF, tachycardia induced with atrial fib in RVR   Clinical Presentation Evolving/Changing   Clinical Presentation Rationale Based on PMH, current presentation, and social support    Clinical Decision Making (Complexity) low complexity   Therapy Frequency (PT) 5x/week   Predicted Duration of Therapy Intervention (days/wks) 3 days   Planned Therapy Interventions (PT) bed mobility training;balance training;gait training;strengthening;stretching;transfer training;stair training   Risk & Benefits of therapy have been explained evaluation/treatment results reviewed;risks/benefits reviewed;care plan/treatment goals reviewed;current/potential barriers reviewed;participants voiced agreement with care plan;participants included;patient   PT Discharge Planning    PT Discharge Recommendation (DC Rec) Transitional Care Facility   PT Rationale for DC Rec Patient below baseline for mobility/ambulation and requiring A x 1 for all mobility/ambulation, most limited by decreased tolerance to activity and weakness. Patient would benefit from ongoing skilled therapy intervention at TCU to further address functional limitations and improve overall mobility/ambulation.    PT Brief overview of current status  Bed Min A, sit<>stand CGA, gait CGA with FWW   Total Evaluation Time   Total Evaluation Time (Minutes) 7

## 2021-08-04 NOTE — PROGRESS NOTES
A&O x4. VSS on 4L NS. Tele A fib CVR vs v paced. C/o 8/10 back pain, PRN oxycodone and T pump utilized. Denies SOB. Up w/ A1 w/ walker. Purewick in place. Stool sample collected, occult stool negative. Will continue to monitor.

## 2021-08-04 NOTE — PROGRESS NOTES
[facility]    Hospitalist Progress Note    Assessment & Plan   89 year old female who was admitted on 8/2/2021 with increased SOB:    Impression:   Principal Problem:    Acute on Chronic Diastolic CHF   -- on Lasix 40 mg IV bid, switch to PO bid   -- O2 at 4 LPM, wean as able      Walters's esophagus w Esoph Stricture   -- substernal pain after breakfast this AM, suspect food caught, hopefully it can work it way thru in next hour, otherwise will involve GI      Iron Deficiency Anemia, severe -- Iron 10, TIBC 439, 2%sat, last colonoscopy on 5/14/15 and benign polyp seen then and 5 yr follow recommended then, last EGD in 2009 with large HH.    -- Venofer 300 mg IV   -- will check stool for blood, follow hgb       Walters's esophagus w Esoph Stricture      Elevated troponin, Demand Ischemia      Paroxysmal Atrial fib w RVR -- on Warfarin   -- HR controlled now back on Metoprolol 25 mg bid, hold warfarin today       Acute respiratory failure with hypoxia    -- still on 4 LPM, wean O2 as able    Active Problems:    DM 2 with CRF-3, Hgb A1C 6.8 on 2/24/21      Essential hypertension      CAD, S/P CABG x 4 in 2011      Symptomatic bradycardia -- S/P PPM 2011      Severe AS -- S/P TAVR on 5/5/20        Plan:  As above, left message for son. Discussed with nurse.  Will contact GI if needed (last saw Dr. Brownlee for colonoscopy in 2015 -- and he is on today for [facility] if needed).     DVT Prophylaxis: Warfarin  Code Status: No CPR- Pre-arrest intubation OK    Disposition: Expected discharge to TCU ?Friday (previously at [facility] and liked it)    Kel Márquez  Pager [phone]  Cell Phone [phone]  Text Page (7am to 6pm)  (35 min total)    Interval History   Was feeling OK until breakfast, but now with sternal pain and feels like food caught, and has hx of esophageal stricture.  She is trying to spit or regurgitate to feel better.     Physical Exam   Temp: 98  F (36.7  C) Temp src: Oral BP:  127/56 Pulse: 87   Resp: 16 SpO2: 98 % O2 Device: Nasal cannula Oxygen Delivery: 4 LPM  Vitals:    08/02/21 1440 08/03/21 0635   Weight: 75.2 kg (165 lb 12.6 oz) 77.9 kg (171 lb 12.8 oz)     Vital Signs with Ranges  Temp:  [97.6  F (36.4  C)-98.5  F (36.9  C)] 98  F (36.7  C)  Pulse:  [83-90] 87  Resp:  [16] 16  BP: (103-127)/(49-72) 127/56  SpO2:  [93 %-100 %] 98 %  I/O last 3 completed shifts:  In: 840 [P.O.:840]  Out: 1150 [Urine:1150]    # Pain Assessment:  Current Pain Score 8/4/2021   Patient currently in pain? denies   Pain score (0-10) -   Pain location -   Pain descriptors -   Madie orona pain level was assessed and she currently denies pain.        Constitutional: Awake, alert, cooperative, currently uncomfortable (?feels like food caught in esophagus)  Respiratory: Clear to auscultation bilaterally, no crackles or wheezing  Cardiovascular: irregular irreg, S1 and S2, and no murmur noted  GI: Normal bowel sounds, soft, non-distended, non-tender  Extrem: No calf tenderness, no ankle edema  Neuro: Ox3, no focal motor or sensory deficits    Medications     - MEDICATION INSTRUCTIONS -       Warfarin Therapy Reminder         acetaminophen  650 mg Oral At Bedtime     aspirin  81 mg Oral Daily     atorvastatin  80 mg Oral QPM     furosemide  40 mg Oral BID     gabapentin  200 mg Oral At Bedtime     insulin aspart  1-7 Units Subcutaneous TID AC     insulin aspart  1-5 Units Subcutaneous At Bedtime     levothyroxine  50 mcg Oral Daily     [Held by provider] losartan  50 mg Oral QPM     metoprolol tartrate  25 mg Oral BID     pantoprazole  40 mg Oral QAM AC     polyethylene glycol  17 g Oral Daily     psyllium  2 capsule Oral Daily     sodium chloride (PF)  3 mL Intracatheter Q8H       Data   Recent Labs   Lab 08/04/21  0843 08/04/21  0538 08/04/21  0202 08/03/21  2132 08/03/21  0529 08/02/21  2143 08/02/21  1724 08/02/21  1416 08/02/21  1415 07/30/21  1035 07/30/21  1034 07/30/21  1034   WBC  --   --   --   --  10.3  --    --   --  8.8 7.6  --   --    HGB  --   --   --   --  8.5*  --   --   --  8.1* 8.7*  --   --    MCV  --   --   --   --  65*  --   --   --  66* 66*  --   --    PLT  --   --   --   --  306  --   --   --  324 345  --   --    INR  --  3.43*  --   --  2.59*  --   --  2.59*  --   --   --   --    NA  --   --   --   --  136  --   --  135  --   --   --  136   POTASSIUM  --   --   --   --  4.2  --   --  5.2  --   --   --  4.9   CHLORIDE  --   --   --   --  105  --   --  106  --   --   --  107   CO2  --   --   --   --  26  --   --  21  --   --   --  23   BUN  --   --   --   --  26  --   --  21  --   --   --  21   CR  --   --   --   --  1.32*  --   --  1.13*  --   --   --  1.03   ANIONGAP  --   --   --   --  5  --   --  8  --   --   --  6   SHAWNEE  --   --   --   --  8.8  --   --  8.8  --   --   --  9.1   *  --  135* 199* 161*  --   --  223*  --   --    < > 169*   ALBUMIN  --   --   --   --   --   --   --   --   --   --   --  3.4   PROTTOTAL  --   --   --   --   --   --   --   --   --   --   --  7.3   BILITOTAL  --   --   --   --   --   --   --   --   --   --   --  0.4   ALKPHOS  --   --   --   --   --   --   --   --   --   --   --  112   ALT  --   --   --   --   --   --   --   --   --   --   --  23   AST  --   --   --   --   --   --   --   --   --   --   --  22   TROPONIN  --   --   --   --   --  0.180* 0.173* 0.151*  --   --   --   --     < > = values in this interval not displayed.       Imaging:   No results found for this or any previous visit (from the past 24 hour(s)).

## 2021-08-04 NOTE — CONSULTS
Care Management Initial Consult    General Information  Assessment completed with: Patient,  (Patient)  Type of CM/SW Visit: Initial Assessment    Primary Care Provider verified and updated as needed: Yes   Readmission within the last 30 days:        Reason for Consult: discharge planning  Advance Care Planning: Advance Care Planning Reviewed: present on chart          Communication Assessment  Patient's communication style: spoken language (English or Bilingual)    Hearing Difficulty or Deaf: no   Wear Glasses or Blind: no    Cognitive  Cognitive/Neuro/Behavioral: WDL                      Living Environment:   People in home: alone     Current living Arrangements: house      Able to return to prior arrangements: no       Family/Social Support:  Care provided by: self  Provides care for: no one  Marital Status:   Children          Description of Support System: Supportive, Involved    Support Assessment: Adequate family and caregiver support, Adequate social supports    Current Resources:   Patient receiving home care services: No     Community Resources: Housekeeping/Chore Agency, PCA  Equipment currently used at home: cane, quad, shower chair, walker, rolling  Supplies currently used at home: None    Employment/Financial:  Employment Status: retired        Financial Concerns: No concerns identified           Lifestyle & Psychosocial Needs:  Social Determinants of Health     Tobacco Use: Low Risk      Smoking Tobacco Use: Never Smoker     Smokeless Tobacco Use: Never Used   Alcohol Use:      Frequency of Alcohol Consumption:      Average Number of Drinks:      Frequency of Binge Drinking:    Financial Resource Strain:      Difficulty of Paying Living Expenses:    Food Insecurity:      Worried About Running Out of Food in the Last Year:      Ran Out of Food in the Last Year:    Transportation Needs:      Lack of Transportation (Medical):      Lack of Transportation (Non-Medical):    Physical Activity:       Days of Exercise per Week:      Minutes of Exercise per Session:    Stress:      Feeling of Stress :    Social Connections:      Frequency of Communication with Friends and Family:      Frequency of Social Gatherings with Friends and Family:      Attends Scientologist Services:      Active Member of Clubs or Organizations:      Attends Club or Organization Meetings:      Marital Status:    Intimate Partner Violence:      Fear of Current or Ex-Partner:      Emotionally Abused:      Physically Abused:      Sexually Abused:    Depression: Not at risk     PHQ-2 Score: 0   Housing Stability:      Unable to Pay for Housing in the Last Year:      Number of Places Lived in the Last Year:      Unstable Housing in the Last Year:        Functional Status:  Prior to admission patient needed assistance:              Mental Health Status:          Chemical Dependency Status:                Values/Beliefs:  Spiritual, Cultural Beliefs, Scientologist Practices, Values that affect care: no               Additional Information:  Per care management/social work consult for discharge planning/TCU placement on discharge.  Patient was admitted o 8-2-21 with CHF exacerbation.  The tentative date of discharge is 8-5-21 or 8-6-21.  Reviewed chart.  Patient lives alone in a house with 1 step to enter and a flight of steps to the basement.  Patient uses a rolling walker or a quad cane at baseline, depending on the distance.  Patient has a shower chair in the shower.  Patient is independent with ADL's and IADL;'s at baseline.  Patient receives assistance with bathing and showers.  Patient is open to [facility] for RN/PT/OT/HHA.  Reviewed the therapy discharge recommendations of TCU placement on discharge and patient is in agreement.  Patient states she would like to go to [facility] on discharge.  Referral sent, via discharge on the double, to check bed availability.  Received a call back from [facility].  They have a bed available for  tomorrow.  They would like to know if patient wants a double room or a private room.  Will follow up with patient.    Will continue to follow.      NAPOLEON Dyer, [facility]    [phone]  [facility]

## 2021-08-04 NOTE — PROGRESS NOTES
"SPIRITUAL HEALTH SERVICES Progress Note  [facility] [place]    Visited pt due to \"yes\" in pt chart. I introduced myself and  services and pt invited me to stay for visit.    Madie shared her health and life review stories with me, including her abraham in God and the support her abraham gives her, her love of hymns, and the support of her family. She shared that she \"has had a long, good life\" and \"is at peace, whatever happens\". Madie talked about the visits she has with her  and the questions she has for him, such as \"who created the angels?\" and \"will we know one another in heaven?\" Madie welcomed prayer and I offered prayer and words of our care and support for her.    I offered reflective listening and affirmation of Madie' feelings, meaning, and experience.    [facility] remains available.      Muriel Roberts  Chaplain Resident    "

## 2021-08-04 NOTE — PLAN OF CARE
Neuro- A/Ox4  Tele/Cardiac- A fib cvr  Resp- dyspnea on exertion. 3-4 LPM O2 NC  Activity- A1 gb / wlker  Pain- moderate pain in back, prn oxycodone given x1  Drips- none  Drains/Tubes- PIV, purewick in place  Skin- WDL  GI/-   Aggression Color- green  COVID status- negative  Plan- follow with PT, possible discharge to TCU

## 2021-08-05 NOTE — PLAN OF CARE
A&O x 4. VSS. On 4L O2 NC. Tele: Afib CVR. Ativan x 1 for anxiety. Denies pain. Assist x 1-2. Incontinent x 1. Purewick in place. /145. Will continue w/plan of care.

## 2021-08-05 NOTE — CONSULTS
Cardiology Progress Note          Assessment and Plan:   Acute Systolic heart failure    DM 2 with CRF-3, Hgb A1C 6.8 on 2/24/21    Essential hypertension    Walters's esophagus w Esoph Stricture    CAD, S/P CABG x 4 in 2011    Symptomatic bradycardia -- S/P PPM 2011    Severe AS -- S/P TAVR on 5/5/20    Elevated troponin and new WMA c/w subacute MI    Chronic anemia    Paroxysmal Atrial fib w RVR -- on Warfarin    Acute respiratory failure with hypoxia (H)    Anemia, iron deficiency    Anticoagulated    Acute congestive heart failure, unspecified heart failure type (H)  Cardiomyopathy- ischemia  H/o CABG  Anterior ST segement depression  Angiogram 1/20 by Dr. Luther  S/P PPM  S/P TAVR  Mild RV dysfunction  Moderate Tricuspid insufficiency    Complex patient with highly complex decision making  90 min spent at bedside, with patient, and calling son and cath lab  Critically ill patient with poor prognosis  Wants to proceed with cardiac catheterization and temporary reversal of DNR/DNI status  Also discussed with son who agrees with code status reversal  Vit K and FFP  IV NTG for ongoing ischemia                 Interval History:     Madie Silva is a pleasant 89-year-old admitted 8/2/2021 with congestive heart failure.  She has multiple medical issues including recent fall in May of this year coronary disease previous history coronary bypass surgery 2011, history of transcatheter aortic valve replacement in May, diabetes, permanent pacemaker, chronic kidney insufficiency, also presented with lactic acidosis.  They have been treating her in the hospital with diuretics unresponsive.  Troponins have been mildly elevated at 0.192 thought secondary to demand ischemia.  EKG shows atrial fibrillation with rapid ventricular response and anterior ST segment depression.  INR is 3.43 due to paroxysmal underlying atrial fibrillation.    When speaking to Madie she said she had severe chest discomfort on Saturday prior to admission  she was going to call 911 but did not.  Since then she has had intermittent episodes of recurrent left upper chest discomfort and pressure.  Upon my seeing her in the room she was having some ongoing chest discomfort.  She is DNR/DNI but is willing to reverse her CODE STATUS before any invasive cardiac procedures.  Her GFR is 41.  She is also has anemia with a hemoglobin of 7.8 without obvious bleeding.    Patient is critically ill.  She is in marked respiratory distress with evidence of a new infarction her with her ejection fraction going from 65% to 35%.  She has ischemic type symptoms and ST segment depression.  Most likely she had infarction on Saturday prior to admission is having postinfarction ischemia therefore unresponsive to diuretic therapy.  Patient has high risk for death.  Is difficult given her multiple comorbidities and her elderly age.  I talked with the son and her about this.  They have agreed to a left and right heart catheterization coronary angiogram possible core intervention and do understand that they will reverse her DNR DNI CODE STATUS during the duration of the procedure minimally.  Obviously her coagulopathy needs to be corrected as quickly as possible to allow safest procedural risks that we can afford.  Probably radial is preferred option however with ultrasound depending on the INR could also be could femoral could be considered.  She had an angiogram done by Dr. Behring in January of this year showing patent left internal mammary to the LAD, patent vein graft to OM and PDA.  She has an occluded vein graft to the ramus branch.                  Review of Systems:   As per subjective, otherwise 5 systems reviewed and negative.           Physical Exam:   Blood pressure 138/79, pulse 88, temperature 97.6  F (36.4  C), temperature source Oral, resp. rate 16, weight 80.8 kg (178 lb 1.6 oz), SpO2 96 %, not currently breastfeeding.      Vital Sign Ranges  Temperature Temp  Av.9  F (36.6   C)  Min: 97.6  F (36.4  C)  Max: 98.5  F (36.9  C)   Blood pressure Systolic (24hrs), Av , Min:124 , Max:148        Diastolic (24hrs), Av, Min:54, Max:92      Pulse Pulse  Av.6  Min: 86  Max: 89   Respirations Resp  Av  Min: 16  Max: 16   Pulse oximetry SpO2  Av.1 %  Min: 92 %  Max: 98 %         Intake/Output Summary (Last 24 hours) at 2021 1133  Last data filed at 2021 1027  Gross per 24 hour   Intake 850 ml   Output 925 ml   Net -75 ml       Constitutional:   NAD   Skin:   Warm and dry   Head:   Nontraumatic   Neck:   elevated JVP, 12   Lungs:   bilateral rales extensive   Cardiovascular:   irregularly irregular rhythm    Abdomen:   Benign   Extremities and Back:   Symmetric, no curvature, spinous processes are non-tender on palpation, paraspinous muscles are non-tender on palpation, no costal vertebral tenderness   Neurological:   Grossly nonfocal            Medications:     No current outpatient medications on file.                Data:     Results for orders placed or performed during the hospital encounter of 21 (from the past 24 hour(s))   Glucose by meter   Result Value Ref Range    GLUCOSE BY METER POCT 226 (H) 70 - 99 mg/dL   Occult blood stool   Result Value Ref Range    Occult Blood Negative Negative   Glucose by meter   Result Value Ref Range    GLUCOSE BY METER POCT 201 (H) 70 - 99 mg/dL   Glucose by meter   Result Value Ref Range    GLUCOSE BY METER POCT 183 (H) 70 - 99 mg/dL   Glucose by meter   Result Value Ref Range    GLUCOSE BY METER POCT 145 (H) 70 - 99 mg/dL   INR   Result Value Ref Range    INR 3.43 (H) 0.85 - 1.15   Basic metabolic panel   Result Value Ref Range    Sodium 136 133 - 144 mmol/L    Potassium 3.7 3.4 - 5.3 mmol/L    Chloride 105 94 - 109 mmol/L    Carbon Dioxide (CO2) 27 20 - 32 mmol/L    Anion Gap 4 3 - 14 mmol/L    Urea Nitrogen 31 (H) 7 - 30 mg/dL    Creatinine 1.15 (H) 0.52 - 1.04 mg/dL    Calcium 8.4 (L) 8.5 - 10.1 mg/dL    Glucose 125  (H) 70 - 99 mg/dL    GFR Estimate 41 (L) >60 mL/min/1.73m2   Troponin I   Result Value Ref Range    Troponin I 0.192 (HH) 0.000 - 0.045 ug/L   Hemoglobin   Result Value Ref Range    Hemoglobin 7.8 (L) 11.7 - 15.7 g/dL   Glucose by meter   Result Value Ref Range    GLUCOSE BY METER POCT 112 (H) 70 - 99 mg/dL     *Note: Due to a large number of results and/or encounters for the requested time period, some results have not been displayed. A complete set of results can be found in Results Review.

## 2021-08-05 NOTE — PROGRESS NOTES
Critical Care:    Called to bedside as son now here.  After further discussions with patient and daughter they wish not to proceed with cardiac catheterization and move towards pallative care.  Pallative care consult ordered and hospitalists to be notified.

## 2021-08-05 NOTE — PROGRESS NOTES
Care Management Follow Up    Length of Stay (days): 3    Expected Discharge Date: 08/06/2021     Concerns to be Addressed: discharge planning     Patient plan of care discussed at interdisciplinary rounds: Yes    Anticipated Discharge Disposition: Transitional Care     Anticipated Discharge Services: Other (see comment) (therapy)  Anticipated Discharge DME:  N/A    Patient/family educated on Medicare website which has current facility and service quality ratings: no  Education Provided on the Discharge Plan:  N/A  Patient/Family in Agreement with the Plan: yes    Referrals Placed by CM/SW: Post Acute Facilities  Private pay costs discussed: private room/amenity fees    Additional Information:  Spoke with patient regarding discharge plans.  Patient states that she would like a double room at [facility] as she does not want  To pay the private room amenity fee.  Call placed to update Saida at [facility].    Will continue to follow.      NAPOLEON Dyer, [facility]    [phone]  [facility]

## 2021-08-05 NOTE — PROGRESS NOTES
[facility]    Hospitalist Progress Note    Assessment & Plan   89 year old female who was admitted on 8/2/2021 with increased SOB:    Impression:   Principal Problem:    Acute on Chronic Diastolic CHF -- after increased diuretic x 3 days weight up 13 lbs (?correct), and still SOB on 4 LPM   -- increase Lasix to 60 mg IV bid   -- O2 at 4 LPM, wean as able   -- will also try Duoneb PRN (has intermittent wheezing, but no hx of COPD and no hx of smoking)   -- Will consult cardiology for any suggestions       Walters's esophagus w Esoph Stricture   -- substernal pain after breakfast yesterday, resolved after 20 min, none today      Iron Deficiency Anemia, severe -- Iron 10, TIBC 439, 2%sat, last colonoscopy on 5/14/15 and benign polyp seen then and 5 yr follow recommended then, last EGD in 2009 with large HH.  Suspect Hgb 7.8 contributing to SOB   -- Venofer 300 mg IV x 2 doses   -- Stool for blood negative now      Elevated troponin, Demand Ischemia -- suspect demand ischemia related to afib with RVR     Lab Results   Component Value Date    TROPI <0.015 07/30/2020    TROPI <0.015 07/29/2020    TROPI <0.015 10/09/2019    TROPI <0.015 10/09/2019    TROPI <0.012 05/18/2012    TROPONIN 0.192 (HH) 08/05/2021    TROPONIN 0.180 (HH) 08/02/2021    TROPONIN 0.173 (HH) 08/02/2021    TROPONIN 0.151 (HH) 08/02/2021         Paroxysmal Atrial fib w RVR -- on Warfarin   -- HR controlled now back on Metoprolol 25 mg bid, hold warfarin today       Acute respiratory failure with hypoxia    -- still on 4 LPM, wean O2 as able    Active Problems:    DM 2 with CRF-3, Hgb A1C 6.8 on 2/24/21      Essential hypertension      CAD, S/P CABG x 4 in 2011      Symptomatic bradycardia -- S/P PPM 2011      Severe AS -- S/P TAVR on 5/5/20        Plan:  Discussed with nurse, son updated.     DVT Prophylaxis: Warfarin  Code Status: No CPR- Pre-arrest intubation OK    Disposition: Expected discharge to TCU ?Friday or later  (previously at [facility] and liked it)    Kel Lopez [facility]  Pager [phone]  Cell Phone [phone]  Text Page (7am to 6pm)  (35 min total)    Interval History   Still SOB, at times has wheezing.  Has slight cough with infrequent yellow sputum by her report.      Physical Exam   Temp: 97.6  F (36.4  C) Temp src: Oral BP: (!) 145/66 Pulse: 88   Resp: 16 SpO2: 98 % O2 Device: Oxymask Oxygen Delivery: 4 LPM  Vitals:    08/02/21 1440 08/03/21 0635 08/05/21 0507   Weight: 75.2 kg (165 lb 12.6 oz) 77.9 kg (171 lb 12.8 oz) 80.8 kg (178 lb 1.6 oz)     Vital Signs with Ranges  Temp:  [97.6  F (36.4  C)-98.5  F (36.9  C)] 97.6  F (36.4  C)  Pulse:  [87-89] 88  Resp:  [16] 16  BP: (124-145)/(54-92) 145/66  SpO2:  [92 %-98 %] 98 %  I/O last 3 completed shifts:  In: 560 [P.O.:560]  Out: 400 [Urine:400]    # Pain Assessment:  Current Pain Score 8/5/2021   Patient currently in pain? yes   Pain score (0-10) -   Pain location -   Pain descriptors -   Madie s pain level was assessed and she currently denies pain.        Constitutional: Awake, alert, cooperative, currently uncomfortable (?feels like food caught in esophagus)  Respiratory: No rales, no rhonchi, slight end expiratory wheeze at times  Cardiovascular: irregular irreg, S1 and S2, and no murmur noted  GI: Normal bowel sounds, soft, non-distended, non-tender  Extrem: No calf tenderness, no ankle edema  Neuro: Ox3, no focal motor or sensory deficits but appears fatigued     Medications     - MEDICATION INSTRUCTIONS -       Warfarin Therapy Reminder         acetaminophen  650 mg Oral At Bedtime     aspirin  81 mg Oral Daily     atorvastatin  80 mg Oral QPM     furosemide  60 mg Intravenous BID     gabapentin  200 mg Oral At Bedtime     insulin aspart  1-7 Units Subcutaneous TID AC     insulin aspart  1-5 Units Subcutaneous At Bedtime     levothyroxine  50 mcg Oral Daily     [Held by provider] losartan  50 mg Oral QPM     metoprolol tartrate  25 mg Oral BID      pantoprazole  40 mg Oral QAM AC     polyethylene glycol  17 g Oral Daily     psyllium  2 capsule Oral Daily     sodium chloride (PF)  3 mL Intracatheter Q8H     traZODone  50 mg Oral At Bedtime     warfarin-No DOSE today  1 each Does not apply no dose today (warfarin)       Data   Recent Labs   Lab 08/05/21  0747 08/05/21  0608 08/05/21  0555 08/05/21  0140 08/04/21  0538 08/03/21  0529 08/02/21  2143 08/02/21  1724 08/02/21  1416 08/02/21  1415 07/30/21  1035 07/30/21  1034 07/30/21  1034   WBC  --   --   --   --   --  10.3  --   --   --  8.8 7.6  --   --    HGB  --  7.8*  --   --   --  8.5*  --   --   --  8.1* 8.7*  --   --    MCV  --   --   --   --   --  65*  --   --   --  66* 66*  --   --    PLT  --   --   --   --   --  306  --   --   --  324 345  --   --    INR  --   --  3.43*  --  3.43* 2.59*  --   --  2.59*  --   --    < >  --    NA  --   --  136  --   --  136  --   --  135  --   --   --  136   POTASSIUM  --   --  3.7  --   --  4.2  --   --  5.2  --   --   --  4.9   CHLORIDE  --   --  105  --   --  105  --   --  106  --   --   --  107   CO2  --   --  27  --   --  26  --   --  21  --   --   --  23   BUN  --   --  31*  --   --  26  --   --  21  --   --   --  21   CR  --   --  1.15*  --   --  1.32*  --   --  1.13*  --   --   --  1.03   ANIONGAP  --   --  4  --   --  5  --   --  8  --   --   --  6   SHAWNEE  --   --  8.4*  --   --  8.8  --   --  8.8  --   --   --  9.1   *  --  125* 145*  --  161*  --   --  223*  --   --    < > 169*   ALBUMIN  --   --   --   --   --   --   --   --   --   --   --   --  3.4   PROTTOTAL  --   --   --   --   --   --   --   --   --   --   --   --  7.3   BILITOTAL  --   --   --   --   --   --   --   --   --   --   --   --  0.4   ALKPHOS  --   --   --   --   --   --   --   --   --   --   --   --  112   ALT  --   --   --   --   --   --   --   --   --   --   --   --  23   AST  --   --   --   --   --   --   --   --   --   --   --   --  22   TROPONIN  --   --  0.192*  --   --   --  0.180*  0.173* 0.151*  --   --    < >  --     < > = values in this interval not displayed.       Imaging:   No results found for this or any previous visit (from the past 24 hour(s)).

## 2021-08-05 NOTE — PROGRESS NOTES
A&O x4. VSS on 4-6L oximask (UNABLE TO WEAN). Tele A fib CVR vs V paced. Breathing intermittently labored, PRN nebs ordered. Pt very ZHAO. Purewick in place. x1 c/o abdominal pain, but shortly resolved, pt felt this was r/t gas. X2 BM's today. Up A1 w/ walker and gait belt. Pt very anxious. IV iron given. Started on imdur. Pt and family really interested in exploring pallaitive care options, consult ordered. Will continue to monitor.

## 2021-08-05 NOTE — PROVIDER NOTIFICATION
MD Notification    Notified Person: MD    Notified Person Name: Donna    Notification Date/Time: 08/05/21 5:13 PM    Notification Interaction: Text message    Purpose of Notification: Pt still restless after atarax at 1500. Refuses morphine secondary to a previus unpleasant experience. States ativan helped last night, would you consider reordering?    Orders Received: see new order for ativan    Comments:

## 2021-08-05 NOTE — PROVIDER NOTIFICATION
MD Notification    Notified Person: MD    Notified Person Name: Dr. Thompson    Notification Date/Time: 8/5 1220    Notification Interaction: text page     Purpose of Notification: Pt son bedside. They do not want to proceed with angiogram. They want to explore palliative options. Pt continues to appear very uncomfortable/ labored breathing    Orders Received: MD will be by to see pt soon     Comments:

## 2021-08-06 NOTE — PLAN OF CARE
Occupational Therapy Discharge Summary    Reason for therapy discharge:    Discharged to transitional care facility.    Progress towards therapy goal(s). See goals on Care Plan in [facility] electronic health record for goal details.  Goals not met.  Barriers to achieving goals:   discharge from facility.    Therapy recommendation(s):    Continued therapy is recommended.  Rationale/Recommendations:  Skilled OT in TCU setting to increase safety and independence with I/ADLs.  .

## 2021-08-06 NOTE — DISCHARGE SUMMARY
[facility]    Discharge Summary  Hospitalist    Date of Admission:  8/2/2021  Date of Discharge:  8/6/2021  Discharging Provider: Kel Márquez MD    Discharge Diagnoses   Principal Problem:    Acute Systolic heart Failure      Chronic Diastolic CHF      Moderate Pulmonary Hypertension with Moderate Tricuspid Regur      Chronic Nasal Congestion       Paroxysmal Atrial fib w RVR -- on Warfarin      Acute respiratory failure with hypoxia      Anemia, iron deficiency      NSTEMI, Demand Ischemia      Generalized anxiety disoder    Active Problems:    DM 2 with CRF-3, Hgb A1C 6.8 on 2/24/21    Essential hypertension    Walters's esophagus w Esoph Stricture    CAD, S/P CABG x 4 in 2011    Symptomatic bradycardia -- S/P PPM 2011    Severe AS -- S/P TAVR on 5/5/20    C6 Fx  2nd to fall 5/9/21        History of Present Illness   89 year old female who presents with shortness of breath. She has been short of breath for 2 weeks or more, saw PCP on 7/30, had CXR which showed pleural effusions, and was recommended to start lasix 20mg daily. She continued to do poorly. She has a cough and felt more dyspneic with activity. She is anxious. She also reports some intermittent chest pain overnight when her heart rate got really fast. She also had some swelling in her feet.  She was on Metoprolol for afib, but it was stopped for some reason.  She also reports neck pain and has C6 fracture from 3 months ago which she intermittently uses a hard cervical collar.     In ER,  in afib, RR 35, O2 sat 93% on 4 LPM and is on room air at home, but can not breath through her nose.   CXR with small bilateral effusions, but increased from 3 days earlier. Hgb was 8.1 with MCV of 66, previously 8.3 on 6/14/21.  Creat was 1.13, Pro-BNP 11,568, and trop mildly elevated at 0.180.     Hospital Course   Admitted to cardiac telemetry bed.  Was started on Metoprolol 25 mg bid and heart rate 70 to 90 in afib after  that.  Treated with Lasix 40 mg IV BID with no improvement after 2 days, so increased to 60 mg IV bid with still minimal improvement.  Serial trops showed:  Lab Results   Component Value Date                                  TROPONIN 0.192 () 08/05/2021    TROPONIN 0.180 () 08/02/2021    TROPONIN 0.173 () 08/02/2021    TROPONIN 0.151 () 08/02/2021     Cardiac echo showed:  1. The left ventricle is normal in size. The visual ejection fraction is  estimated at 35%. Lateral hypokinesis.  2. The right ventricle is mildly dilated. Mildly decreased right ventricular  systolic function  3. There is moderate (2+) tricuspid regurgitation.  4. Moderate (46-55mmHg) pulmonary hypertension is present. The right  ventricular systolic pressure is approximated at 50mmHg plus the right atrial  pressure.  5. s/p TAVR with 20mm Khan Nica 3, implanted 5/2020. Mean 11mmHg, Vmax  2.2m/s, mild PLV.     Echo from 5/2021 showed EF 65%, 1+ TR, TAVR with mean 19mmHg, Vmax 3.0m/s,  mild PVL.    Weight was up to 178 lbs, and was down to 164 by discharge.  She was given nasal steroid spray which did allow her to breath through her nose and I think improved the O2 she was actually receiving.      Because of minimal improvement after 3 days, Cardiology was consulted and offered a right and heart left cath to find out exactly what was wrong, but family and patient elected to not do it, at which point the patient mentioned if she didn't improve significantly she would like to die in her own home, and she and son were open to the idea of hospice -- if needed.     Her anemia appeared to be primarily iron deficiency -- with iron 10, TIBC 439, and 2% saturation, and last colonoscopy in 2015, and she did not want another GI work up, but stool negative for blood at this time.  Vit B12 583.  She was given IV Fenofer 300 mg IV x 2.      Her breathing may improve with time as we treat her Iron deficiency anemia, and help with her nasal  congestion, and she will continue lasix 40 mg PO bid, and will discharge on O2 at 4 LPM and wean as able, and will discharge to [facility] with therapy, but no no significant improvement in 1-2 weeks she and son will consider hospice with her discharging home, and son was agreeable to caring for her (currently she was living in an independent apartment).  She is DNR/DNI a tthis point.        She was mildly anxious, trazodone 50 mg at bedtime helped with sleep, and she was given Ativan 0.5 mg tid prn which helped her breathing as well.     Kel Márquez MD  Pager: [phone]  Cell Phone:  [phone]       Significant Results and Procedures   As above    Pending Results   These results will be followed up by Dr. Márquez  Unresulted Labs Ordered in the Past 30 Days of this Admission     Date and Time Order Name Status Description    8/5/2021 11:15 AM Prepare plasma (unit) Preliminary     8/5/2021 11:15 AM Prepare plasma (unit) Preliminary           Code Status   DNR / DNI       Primary Care Physician   Bee Orr    Physical Exam   Temp: 97.6  F (36.4  C) Temp src: Oral BP: 106/64 Pulse: 97   Resp: 16 SpO2: 92 % O2 Device: Nasal cannula Oxygen Delivery: 4 LPM  Vitals:    08/05/21 0507 08/05/21 1309 08/06/21 0523   Weight: 80.8 kg (178 lb 1.6 oz) 73.8 kg (162 lb 12.8 oz) 74.7 kg (164 lb 9.6 oz)     Vital Signs with Ranges  Temp:  [97.6  F (36.4  C)-98.6  F (37  C)] 97.6  F (36.4  C)  Pulse:  [81-99] 97  Resp:  [16] 16  BP: (106-109)/(48-64) 106/64  SpO2:  [88 %-95 %] 92 %  I/O last 3 completed shifts:  In: 300 [P.O.:300]  Out: 1450 [Urine:1450]    Exam on discharge:   Lungs with slight expiratory wheeze intermittently  CV with irreg irreg S1S2  Ankles with no edema    Discharge Disposition   Discharged to short-term care facility  Condition at discharge: Fair    Consultations This Hospital Stay   CARE MANAGEMENT / SOCIAL WORK IP CONSULT  PHYSICAL THERAPY ADULT IP CONSULT  OCCUPATIONAL THERAPY ADULT  IP CONSULT  PHARMACY TO DOSE WARFARIN  PHARMACY TO DOSE WARFARIN  CARDIOLOGY IP CONSULT  PHARMACY TO DOSE PHYTONADIONE  PHARMACY TO DOSE WARFARIN  PALLIATIVE CARE ADULT IP CONSULT  PHYSICAL THERAPY ADULT IP CONSULT  OCCUPATIONAL THERAPY ADULT IP CONSULT    Time Spent on this Encounter   I spent a total of 35 minutes discharging this patient.     Discharge Orders      Medication Therapy Management Referral      Care Coordination Referral      General info for SNF    Length of Stay Estimate: Short Term Care: Estimated # of Days <30  Condition at Discharge: Stable  Level of care:skilled   Rehabilitation Potential: Fair  Admission H&P remains valid and up-to-date: Yes  Recent Chemotherapy: N/A  Use Nursing Home Standing Orders: Yes     Mantoux instructions    Give two-step Mantoux (PPD) Per Facility Policy Yes     Follow Up and recommended labs and tests    Follow up with Nursing home provider in 5 days, check CBC, BMP and INR.  Adjust warfarin for desired INR of 2.0 to 2.5 for atrial fib.  If not improving patient may decide to go home with son on Hospice.     Activity - Up with nursing assistance     Additional Discharge Instructions    Call Dr. Thompson if any medical questions at Cell Phone [phone].     Reason for your hospital stay    Shortness of breath related to heart failure, chronic nasal congestion, and iron deficiency anemia.     Daily weights    Call Provider for weight gain of more than 2 pounds per day or 5 pounds per week.     Glucose monitor nursing POCT    Before meals 3 times a day     No CPR- Do NOT Intubate    She wants to try to get better, but if no improvement in 1-2 weeks she may elect to go home with son on hospice.     Physical Therapy Adult Consult    Evaluate and treat as clinically indicated.    Reason:  weakness     Occupational Therapy Adult Consult    Evaluate and treat as clinically indicated.    Reason:  Assist with ADL's     Oxygen Adult/Peds    Oxygen Documentation:   I certify  that this patient, Madie Silva has been under my care (or a nurse practitioner or physican's assistant working with me). This is the face-to-face encounter for oxygen medical necessity.      Madie Silva is now in a chronic stable state and continues to require supplemental oxygen. Patient has continued oxygen desaturation due to Chronic Heart Failure I50.    Alternative treatment(s) tried or considered and deemed clinically infective for treatment of Chronic Heart Failure I50 include nebulizers.  If portability is ordered, is the patient mobile within the home? yes    **Patients who qualify for home O2 coverage under the CMS guidelines require ABG tests or O2 sat readings obtained closest to, but no earlier than 2 days prior to the discharge, as evidence of the need for home oxygen therapy. Testing must be performed while patient is in the chronic stable state. See notes for O2 sats.**     Advance Diet as Tolerated    Follow this diet upon discharge: Orders Placed This Encounter      Combination Diet Consistent Carb 60 Grams CHO per Meal Diet; 3 gm sodium     Discharge Medications   Discharge Medication List as of 8/6/2021 11:54 AM      START taking these medications    Details   ferrous sulfate (FEROSUL) 325 (65 Fe) MG tablet Take 1 tablet (325 mg) by mouth 2 times daily, R-0, No Print Out      fluticasone (FLONASE) 50 MCG/ACT nasal spray Spray 2 sprays into both nostrils daily, No Print Out      hydrOXYzine (ATARAX) 25 MG tablet Take 1 tablet (25 mg) by mouth every 4 hours as needed for itching or anxiety, R-0, No Print Out      insulin aspart (NOVOLOG PEN) 100 UNIT/ML pen 3 times a day with meals, for glucometer 150-200 give 2 units SQ, 201-250 give 4 units, >250 give 6 units, R-0, No Print Out      ipratropium - albuterol 0.5 mg/2.5 mg/3 mL (DUONEB) 0.5-2.5 (3) MG/3ML neb solution Take 1 vial (3 mLs) by nebulization every 4 hours as needed for wheezing, R-0, No Print Out      isosorbide mononitrate (IMDUR) 30  MG 24 hr tablet Take 1 tablet (30 mg) by mouth daily, R-0, No Print Out      LORazepam (ATIVAN) 0.5 MG tablet Take 1 tablet (0.5 mg) by mouth 3 times daily as needed for anxiety, Disp-15 tablet, R-0, Local Print      metoprolol tartrate (LOPRESSOR) 25 MG tablet Take 1 tablet (25 mg) by mouth 2 times daily, No Print Out      miconazole (MICATIN) 2 % external powder Apply topically 2 times daily as needed for other (tinea corpus)R-0No Print Out      oxyCODONE (ROXICODONE) 5 MG tablet Take 0.5 tablets (2.5 mg) by mouth every 4 hours as needed for moderate to severe pain, Disp-10 tablet, R-0, Local Print      traZODone (DESYREL) 50 MG tablet Take 1 tablet (50 mg) by mouth At Bedtime, No Print Out         CONTINUE these medications which have CHANGED    Details   acetaminophen (TYLENOL) 325 MG tablet Take 2 tablets (650 mg) by mouth every 6 hours as needed for mild pain or other (and adjunct with moderate or severe pain or per patient request), R-0, No Print Out      cyclobenzaprine (FLEXERIL) 5 MG tablet Take 1 tablet (5 mg) by mouth 3 times daily as needed for muscle spasms, Disp-30 tablet, R-1, Local Print      furosemide (LASIX) 40 MG tablet Take 1 tablet (40 mg) by mouth 2 times daily, R-0, No Print Out      warfarin ANTICOAGULANT (JANTOVEN ANTICOAGULANT) 3 MG tablet Take 1 pill (3 mg) daily, but none on Mon, Wed, Friday on Mon.  Adjust dose for desired INR 2.0 to 2.5 for Atrial Fib., Disp-96 tablet, R-1, No Print Out         CONTINUE these medications which have NOT CHANGED    Details   aspirin EC 81 MG EC tablet Take 1 tablet by mouth daily., Transitional      atorvastatin (LIPITOR) 80 MG tablet Take 1 tablet (80 mg) by mouth daily, Disp-90 tablet, R-3, E-PrescribeProfile Rx: patient will call when needed      calcium 600 MG tablet Take 2 tablets by mouth daily , Historical      gabapentin (NEURONTIN) 100 MG capsule Take 2 capsules (200 mg) by mouth At Bedtime, Disp-180 capsule, R-1, E-Prescribe      levothyroxine  (SYNTHROID/LEVOTHROID) 50 MCG tablet TAKE ONE TABLET BY MOUTH ONCE DAILY, Disp-90 tablet, R-3, E-Prescribe      losartan (COZAAR) 50 MG tablet Take 1 tablet (50 mg) by mouth every evening, Disp-90 tablet, R-3, Historical      metFORMIN (GLUCOPHAGE) 500 MG tablet TAKE ONE TABLET BY MOUTH DAILY WITH BREAKFAST, Disp-90 tablet, R-0, E-Prescribe      omeprazole (PRILOSEC) 20 MG DR capsule TAKE 1 CAPSULE BY MOUTH EVERY DAY, Disp-90 capsule, R-3, E-Prescribe      polyethylene glycol (MIRALAX) powder Take 17 g (1 capful) by mouth daily, Disp-1 Bottle,R-3, E-Prescribe      Vitamin D, Cholecalciferol, 25 MCG (1000 UT) TABS Take 25 mcg by mouth daily, Historical      clindamycin (CLEOCIN) 300 MG capsule 600 mg one hr prior to any dental appointment, Disp-2 capsule, R-3, E-Prescribe      nitroGLYCERIN (NITROSTAT) 0.4 MG SL tablet Place 0.4 mg under the tongue every 5 minutes as needed. Up to 3 doses per episode , Historical         STOP taking these medications       acetaminophen (ACETAMINOPHEN 8 HOUR) 650 MG CR tablet Comments:   Reason for Stopping:         psyllium (METAMUCIL/KONSYL) capsule Comments:   Reason for Stopping:             Allergies   Allergies   Allergen Reactions     Fosamax [Alendronic Acid] GI Disturbance     Severe gastrointestinal reaction     Amoxicillin Hives     Bisphosphonates Other (See Comments)     Swallowing disorder     Boniva [Ibandronate Sodium] Nausea and Vomiting, Diarrhea and Swelling     Arm swelling with IV only     Lisinopril Cough     Niacin Rash     Simvastatin Muscle Pain (Myalgia)     Data   Most Recent 3 CBC's:  Recent Labs   Lab Test 08/05/21  0608 08/03/21  0529 08/02/21  1415 07/30/21  1035   WBC  --  10.3 8.8 7.6   HGB 7.8* 8.5* 8.1* 8.7*   MCV  --  65* 66* 66*   PLT  --  306 324 345      Most Recent 3 BMP's:  Recent Labs   Lab Test 08/06/21  1019 08/06/21  0207 08/05/21  2109 08/05/21  0555 08/03/21  0529 08/02/21  1416   NA  --   --   --  136 136 135   POTASSIUM  --   --   --   3.7 4.2 5.2   CHLORIDE  --   --   --  105 105 106   CO2  --   --   --  27 26 21   BUN  --   --   --  31* 26 21   CR  --   --   --  1.15* 1.32* 1.13*   ANIONGAP  --   --   --  4 5 8   SHAWNEE  --   --   --  8.4* 8.8 8.8   * 160* 160* 125* 161* 223*     Most Recent 2 LFT's:  Recent Labs   Lab Test 07/30/21  1034 05/09/21  0218   AST 22 22   ALT 23 27   ALKPHOS 112 112   BILITOTAL 0.4 0.4     Most Recent INR's and Anticoagulation Dosing History:  Anticoagulation Dose History     Recent Dosing and Labs Latest Ref Rng & Units 6/28/2021 7/7/2021 7/14/2021 8/2/2021 8/3/2021 8/4/2021 8/5/2021    Warfarin 3 mg - - - - 3 mg 3 mg - -    INR 0.85 - 1.15 - - - 2.59(H) 2.59(H) 3.43(H) 3.43(H)    INR - 3.8(A) 3.3(A) 2.6 - - - -    INR 0.8 - 1.1 - - - - - - -        Most Recent 3 Troponin's:  Recent Labs   Lab Test 08/05/21  0555 08/02/21  2143 08/02/21  1724 07/30/20  2251 07/29/20  1628 10/09/19  1424   TROPI  --   --   --  <0.015 <0.015 <0.015   TROPONIN 0.192* 0.180* 0.173*  --   --   --      Most Recent Cholesterol Panel:  Recent Labs   Lab Test 02/24/21  0943   CHOL 206*   LDL 90   HDL 59   TRIG 286*     Most Recent 6 Bacteria Isolates From Any Culture (See EPIC Reports for Culture Details):  Recent Labs   Lab Test 06/14/21  2103 09/13/18  1000 08/02/18  1259   CULT <10,000 colonies/mL  urogenital rafi  Susceptibility testing not routinely done   10,000 to 50,000 colonies/mL  mixed urogenital rafi   <10,000 colonies/mL  mixed urogenital rafi       Most Recent TSH, T4 and A1c Labs:  Recent Labs   Lab Test 02/24/21  0943   TSH 1.07   A1C 6.8*

## 2021-08-06 NOTE — PLAN OF CARE
A&Ox4, anxious and restless at times. Denies pain. ZHAO and occasionally at rest. O2 4-6L NC. IV Lasix x1 with good results. Wheezing improved with nebs, lungs with fine crackles at bases. BM x3. Bedrest this shift. Plan to wean oxygen and mobilize as tolerated.

## 2021-08-06 NOTE — PROGRESS NOTES
Patient discharging to TCU per escort wheelchair.  VSS, becomes dyspneic with sats 88% with activity.  Provided paperwork to WC escort and AVS to family.  No complaints. Ready for discharge.

## 2021-08-06 NOTE — PROGRESS NOTES
Reviewed with patient and family their wishes to proceed with palliative care. Request no further cardiac workup or procedures. Awaiting transfer to TCU at noon today.

## 2021-08-06 NOTE — PROGRESS NOTES
Care Management Discharge Note    Discharge Date: 08/06/2021       Discharge Disposition: Transitional Care    Discharge Services: Other (see comment) (therapy)    Discharge DME:  Oxygen    Discharge Transportation: agency, via the skyway at 12:00    Private pay costs discussed: private room/amenity fees    PAS Confirmation Code:  [id number]  Patient/family educated on Medicare website which has current facility and service quality ratings: no    Education Provided on the Discharge Plan:  yes  Persons Notified of Discharge Plans: patient, son, and daughter-in-law  Patient/Family in Agreement with the Plan: yes    Handoff Referral Completed: Yes    Additional Information:  Received discharge orders for patient.  Bed available at [facility] for today.  Patient will transport, via the skyway, around 12:00 today.  Patient, son, and daughter-in-law informed of the plan and in agreement to the plan.  Call placed to update [facility] and faxed the orders and the PAS.      PAS-RR    D: Per DHS regulation, JASPREET completed and submitted PAS-RR to [place] [employer] Direct Connect via the Senior LinkAge Line.  PAS-RR confirmation # is : [id number].    I: JASPREET spoke with patient and family and they are aware a PAS-RR has been submitted.  JASPREET reviewed with patient and family that they may be contacted for a follow up appointment within 10 days of hospital discharge if their SNF stay is < 30 days.  Contact information for [facility] LinkAge Line was also provided.    A: Patient and family  verbalized understanding.    P: Further questions may be directed to [facility] LinkAge Line at #[phone], option #4 for PAS-RR staff.        NAPOLEON Dyer, [facility]    [phone]  [facility]

## 2021-08-06 NOTE — PLAN OF CARE
VSS. Sats mid 90's on 6L NC. Tele afib CVR. A&O x4. Lungs with fine crackles in bases. Dyspnea with exertion. C/o back pain, oxycodone and heat applied with relief. Repositioned independently in bed. Incontinent of urine. New purewick placed at 0545. Plan for family care conference/palliative consult to determine POC.

## 2021-08-06 NOTE — PROGRESS NOTES
ANTICOAGULATION  MANAGEMENT: Discharge Review    Madie Silva chart reviewed for anticoagulation continuity of care    Hospital Admission on 8/2 for acute on chronic diastolic CHF.    Discharge disposition: [facility] skilled nursing [phone]    Results:    Recent labs: (last 7 days)     08/02/21  1416 08/03/21  0529 08/04/21  0538 08/05/21  0555   INR 2.59* 2.59* 3.43* 3.43*     Anticoagulation inpatient management:     anticoag tracking updated     Anticoagulation discharge instructions:     Warfarin dosing: warfarin ANTICOAGULANT 3 MG tablet Also known as: JANTOVEN ANTICOAGULANT  Take as directed. If you are unsure how to take  this medication, talk to your nurse or doctor.  Original instructions: Take 1 pill (3 mg) daily, but  none on Mon, Wed, Friday on Mon. Adjust dose  for desired INR 2.0 to 2.5 for Atrial Fib.  LAST TAKEN: 3 mg on August 3, 2021 5:41 PM  What changed: additional instructions  Next Dose Due: 8/6 bedtime     Bridging: No   INR goal change: Yes: 2.0-2.5      Medication changes affecting anticoagulation: Yes: ferrous sulfate, increase in furosemide dose,     Additional factors affecting anticoagulation: Yes: in TCU - is to follow up with nursing provider for labs and dosing of warfarin    Plan     Sent anticoagulation referral order to referring provider for updated orders with new INR goal    ACC will resume monitoring upon discharge from TCU    Anticoagulation Calendar updated    Nalini Hunter RN

## 2021-08-09 NOTE — PROGRESS NOTES
[facility] GERIATRIC SERVICES  PRIMARY CARE PROVIDER AND CLINIC:  Bee Orr MD, [address] [place] [address] / [facility] [id number]  Chief Complaint   Patient presents with     Hospital F/U      [facility] Medical Record Number:  [id number]  Place of Service where encounter took place:  [facility] [[id number]]    Madie Silva  is a 89 year old  (7/21/1929), admitted to the above facility from  [facility]. Hospital stay 8/2/21 through 8/6/21..   HPI:    This is a 89 yo female who who presents with shortness of breath. She has been short of breath for 2 weeks or more, saw PCP on 7/30, had CXR which showed pleural effusions, and was recommended to start lasix 20mg daily. Continued to decline, developed a cough and felt more dyspneic with activity. She also reported some intermittent chest pain overnight with tachycardia and BLE edema. She was on Metoprolol for afib, but it was stopped for some reason.  She also reports neck pain and has C6 fracture from 3 months ago which she intermittently uses a hard cervical collar. She required 4L NC and was discharged on such. CXR showed bilateral pleural effusions, she was diuresed, restarted on metoprolol 25mg BID. Because of minimal improvement after 3 days, Cardiology was consulted and offered a right and heart left cath to find out exactly what was wrong, but family and patient elected to not do it, at which point the patient mentioned if she didn't improve significantly she would like to die in her own home, and she and son were open to the idea of hospice if needed. She was also found to be anemic, received 2 doses of venofer. She was then discharged to the [facility] for rehab. If no improvement may pursue hospice.     CODE STATUS/ADVANCE DIRECTIVES DISCUSSION:  No CPR- Do NOT Intubate  DNR / DNI  ALLERGIES:   Allergies   Allergen Reactions     Fosamax [Alendronic Acid] GI Disturbance     Severe gastrointestinal reaction     Amoxicillin Hives      Bisphosphonates Other (See Comments)     Swallowing disorder     Boniva [Ibandronate Sodium] Nausea and Vomiting, Diarrhea and Swelling     Arm swelling with IV only     Lisinopril Cough     Niacin Rash     Simvastatin Muscle Pain (Myalgia)      PAST MEDICAL HISTORY:   Past Medical History:   Diagnosis Date     Walters's esophagus 7/09     Basal cell carcinoma      Bradycardia     post-op CABG 2011 - s/p pacemaker implanted 2011     CHRONIC VERTIGO 9/99     Colonic Adenoma 3/90, 11/98     Coronary artery disease     CABG x4 2011: LIMA to her LAD, saphenous vein graft to her ramus, saphenous vein graft to her OM, saphenous vein graft to her PDA.     Costochondritis      Depression      DIABETES MELLITUS TYPE II 10/07     DJD      DVT of Leg, postop 11/09    6 months of warfarin     Gastritis 10/89     GERD      HIATAL HERNIA      HYPERLIPIDEMIA      HYPOTHYROIDISM (aka HASHIMOTO)      Impaired fasting glucose      L Ankle Fracture 10/09     Obesity, unspecified      Osteoporosis, unspecified      PERIPHERAL NEUROPATHY      Polymyalgia rheumatica (H)      Post Herpetic Neuralgia 4/03    R lumbar distribution     Restless leg syndrome      Small vessel cerebrovascular changes 9/99     Stricture and stenosis of esophagus 10/89    Dilated     Syncope     1/06, 8/08, 2/10     VITAMIN D DEFICIENCY 12/07    per Dr. Petty      PAST SURGICAL HISTORY:   has a past surgical history that includes NONSPECIFIC PROCEDURE (age 14); NONSPECIFIC PROCEDURE (as a child); hysterectomy, cervix status unknown (1978); surgical history of -  (10/09); Bypass graft artery coronary (11/2/2011); Embolectomy lower extremity (11/9/2011); Left Heart Cath (N/A, 1/27/2020); Aortogram Abdominal (N/A, 1/27/2020); Aortogram Thoracic (N/A, 1/27/2020); Angiogram Coronary Graft (N/A, 1/27/2020); and Transcatheter Aortic Valve Replacement (N/A, 5/5/2020).  FAMILY HISTORY: family history includes Alzheimer Disease in her sister; Heart Disease in her  brother, father, mother, and son.  SOCIAL HISTORY:   reports that she has never smoked. She has never used smokeless tobacco. She reports that she does not drink alcohol and does not use drugs.  Patient's living condition: lives alone    Post Discharge Medication Reconciliation Status: discharge medications reconciled and changed, per note/orders  Current Outpatient Medications   Medication Sig     acetaminophen (TYLENOL) 500 MG tablet Take 1,000 mg by mouth 3 times daily     Lidocaine (LIDOCARE) 4 % Patch Place 1 patch onto the skin every 24 hours To prevent lidocaine toxicity, patient should be patch free for 12 hrs daily. Apply to back     aspirin EC 81 MG EC tablet Take 1 tablet by mouth daily.     atorvastatin (LIPITOR) 80 MG tablet Take 1 tablet (80 mg) by mouth daily     calcium 600 MG tablet Take 2 tablets by mouth daily      clindamycin (CLEOCIN) 300 MG capsule 600 mg one hr prior to any dental appointment     ferrous sulfate (FEROSUL) 325 (65 Fe) MG tablet Take 1 tablet (325 mg) by mouth 2 times daily     fluticasone (FLONASE) 50 MCG/ACT nasal spray Spray 2 sprays into both nostrils daily     furosemide (LASIX) 40 MG tablet Take 1 tablet (40 mg) by mouth 2 times daily     gabapentin (NEURONTIN) 100 MG capsule Take 2 capsules (200 mg) by mouth At Bedtime (Patient taking differently: Take 200 mg by mouth 3 times daily )     hydrOXYzine (ATARAX) 25 MG tablet Take 1 tablet (25 mg) by mouth every 4 hours as needed for itching or anxiety     insulin aspart (NOVOLOG PEN) 100 UNIT/ML pen 3 times a day with meals, for glucometer 150-200 give 2 units SQ, 201-250 give 4 units, >250 give 6 units     ipratropium - albuterol 0.5 mg/2.5 mg/3 mL (DUONEB) 0.5-2.5 (3) MG/3ML neb solution Take 1 vial (3 mLs) by nebulization every 4 hours as needed for wheezing (Patient taking differently: Take 3 mLs by nebulization 4 times daily And q4h PRN)     isosorbide mononitrate (IMDUR) 30 MG 24 hr tablet Take 1 tablet (30 mg) by mouth  daily     levothyroxine (SYNTHROID/LEVOTHROID) 50 MCG tablet TAKE ONE TABLET BY MOUTH ONCE DAILY     LORazepam (ATIVAN) 0.5 MG tablet Take 1 tablet (0.5 mg) by mouth 3 times daily as needed for anxiety     losartan (COZAAR) 50 MG tablet Take 1 tablet (50 mg) by mouth every evening     metFORMIN (GLUCOPHAGE) 500 MG tablet TAKE ONE TABLET BY MOUTH DAILY WITH BREAKFAST     metoprolol tartrate (LOPRESSOR) 25 MG tablet Take 1 tablet (25 mg) by mouth 2 times daily     miconazole (MICATIN) 2 % external powder Apply topically 2 times daily as needed for other (tinea corpus)     nitroGLYCERIN (NITROSTAT) 0.4 MG SL tablet Place 0.4 mg under the tongue every 5 minutes as needed. Up to 3 doses per episode      omeprazole (PRILOSEC) 20 MG DR capsule TAKE 1 CAPSULE BY MOUTH EVERY DAY     polyethylene glycol (MIRALAX) powder Take 17 g (1 capful) by mouth daily     traZODone (DESYREL) 50 MG tablet Take 1 tablet (50 mg) by mouth At Bedtime     Vitamin D, Cholecalciferol, 25 MCG (1000 UT) TABS Take 25 mcg by mouth daily     warfarin ANTICOAGULANT (JANTOVEN ANTICOAGULANT) 3 MG tablet Take 1 pill (3 mg) daily, but none on Mon, Wed, Friday on Mon.  Adjust dose for desired INR 2.0 to 2.5 for Atrial Fib. (Patient taking differently: Tu/Th/Sat/Sun)     No current facility-administered medications for this visit.       ROS:  10 point ROS of systems including Constitutional, Eyes, Respiratory, Cardiovascular, Gastroenterology, Genitourinary, Integumentary, Musculoskeletal, Psychiatric were all negative except for pertinent positives noted in my HPI.    Vitals:  /64   Pulse 89   Temp 98.1  F (36.7  C)   Resp 28   Wt 72 kg (158 lb 12.8 oz)   SpO2 97%   BMI 31.01 kg/m    Exam:  GENERAL APPEARANCE:  Alert, oriented, anxious, chronic back pain  ENT:  Mouth and posterior oropharynx normal, moist mucous membranes, normal hearing acuity  NECK:  No adenopathy,masses or thyromegaly  RESP:  respiratory effort and palpation of chest normal,  crackles bilaterally, 4L NC, ZHAO  CV:  Palpation and auscultation of heart done , regular rate and rhythm, no murmur, rub, or gallop, no edema  ABDOMEN:  normal bowel sounds, soft, nontender, no hepatosplenomegaly or other masses  M/S:   Able to move all extremities, weak  SKIN:  Inspection of skin and subcutaneous tissue baseline  NEURO:   No focal deficits  PSYCH:  oriented to 2/3    Lab/Diagnostic data:    Most Recent 3 CBC's:Recent Labs   Lab Test 08/05/21  0608 08/03/21  0529 08/02/21  1415 07/30/21  1035   WBC  --  10.3 8.8 7.6   HGB 7.8* 8.5* 8.1* 8.7*   MCV  --  65* 66* 66*   PLT  --  306 324 345     Most Recent 3 BMP's:Recent Labs   Lab Test 08/06/21  1019 08/06/21  0207 08/05/21  2109 08/05/21  0555 08/03/21  0529 08/02/21  1416   NA  --   --   --  136 136 135   POTASSIUM  --   --   --  3.7 4.2 5.2   CHLORIDE  --   --   --  105 105 106   CO2  --   --   --  27 26 21   BUN  --   --   --  31* 26 21   CR  --   --   --  1.15* 1.32* 1.13*   ANIONGAP  --   --   --  4 5 8   SHAWNEE  --   --   --  8.4* 8.8 8.8   * 160* 160* 125* 161* 223*     Most Recent 3 BNP's:Recent Labs   Lab Test 08/02/21  1416 04/29/21  1418 10/09/19  1215 02/12/18  1612   NTBNPI 11,568*  --  552  --    NTBNP  --  523*  --  230       ASSESSMENT/PLAN:    Severe AS -- S/P TAVR on 5/5/20  Acute on Chronic Diastolic CHF  Continue diuresis with lasix 40mg BID, daily wts. Last 161lbs. Currently 4L NC, wean as able. Start duonebs QID and encourage IS    History of stroke  Continue statin and ASA    Paroxysmal Atrial fib w RVR -- on Warfarin  Restarted on metoprolol 25mg BID. Continue coumadin 3mg on Tu/Th/Sat/Sun, last INR 1.6, recheck 8/11    Type 2 diabetes mellitus with diabetic nephropathy, without long-term current use of insulin (H)  Last A1c 6.8, continue TID sliding scale with metformin 500mg daily    Post herpetic neuralgia  Start lidocaine patch, increase gabapentin to 200mg TID    HTN  Continue imdur 30mg daily, cozaar 50mg daily with  metoprolol BID. Monitor BP BID    Anxiety  Monitor use.  lorazepam 0.5mg TID PRN    Chronic back pain  Increase tylenol to 1000mg TID    Hypothyroidism  Continue synthroid 50mcg daily, last TSH 1.07    Stage 3a chronic kidney disease  Last Cr 1.15 with GFR 41 on 8/5, recheck BMP on 8/10    Iron deficiency anemia secondary to inadequate dietary iron intake  Received 2 doses in hospital. Occult negative. Continue ferrous sulfate 325mg BID, last Hgb 7.8, recheck CBC on 8/10    GERD  Continue omeprazole 20mg daily    Constipation  Continue miralax daily    Insomnia  Continue trazodone 50mg daily    Orders:  Increase gabapentin, tylenol, lidocaine patch, wean O2, daily wts    Total time spent with patient visit at the skilled nursing facility was 36 including patient visit and review of past records. Greater than 50% of total time spent with counseling and coordinating care due to increase tylenol, start lidocaine patch and increase gabapentin per pain control, wean O2 as diuresed, BP monitoring, labs and therapy, which lasted 20 minutes.    Electronically signed by:  Charles Dawson NP

## 2021-08-09 NOTE — PROGRESS NOTES
Clinic Care Coordination Contact  Care Coordination Transition Communication    Referral Source: IP Handoff    Clinical Data:  [facility]     Discharge Summary  Hospitalist     Date of Admission:  8/2/2021  Date of Discharge:  8/6/2021  Discharging Provider: Kel Márquez MD     Discharge Diagnoses   Principal Problem:    Acute Systolic heart Failure       Chronic Diastolic CHF       Moderate Pulmonary Hypertension with Moderate Tricuspid Regur       Chronic Nasal Congestion        Paroxysmal Atrial fib w RVR -- on Warfarin       Acute respiratory failure with hypoxia       Anemia, iron deficiency       NSTEMI, Demand Ischemia       Generalized anxiety disoder     Active Problems:    DM 2 with CRF-3, Hgb A1C 6.8 on 2/24/21    Essential hypertension    Walters's esophagus w Esoph Stricture    CAD, S/P CABG x 4 in 2011    Symptomatic bradycardia -- S/P PPM 2011    Severe AS -- S/P TAVR on 5/5/20    C6 Fx  2nd to fall 5/9/21    Transition to Facility:              [facility]              Contact name and phone number/fax: [phone]/ [phone]    Plan: RN/SW Care Coordinator will await notification from facility staff informing RN/SW Care Coordinator of patient's discharge plans/needs. RN/SW Care Coordinator will review chart and outreach to facility staff every 4 weeks and as needed. Fax sent to TCU with my contact information requesting notification upon discharge.      Suzan Wallace [facility]  Clinic Care Coordinator  [facility] [place]  [facility]  [phone]  [email or web address]

## 2021-08-09 NOTE — PLAN OF CARE
Physical Therapy Discharge Summary    Reason for therapy discharge:    Discharged to transitional care facility.    Progress towards therapy goal(s). See goals on Care Plan in [facility] electronic health record for goal details.  Goals partially met.  Barriers to achieving goals:   discharge from facility.    Therapy recommendation(s):    Continued therapy is recommended.  Rationale/Recommendations:  Patient below baseline for mobility/ambulation and requiring A x 1 for all mobility/ambulation, most limited by decreased tolerance to activity and weakness. Patient would benefit from ongoing skilled therapy intervention at TCU to further address functional limitations and improve overall mobility/ambulation. .

## 2021-08-10 NOTE — PROGRESS NOTES
[facility]  INITIAL VISIT NOTE  August 11, 2021    PRIMARY CARE PROVIDER AND CLINIC:  Bee Orr [address] [place] [address] / [facility] [id number]    CHIEF COMPLAINT:  Hospital follow-up/Initial visit    HPI:    Madie Silva is a 89 year old  (7/21/1929) female who was seen at [place] [facility] on August 11, 2021 for an initial visit.     Medical history is notable for CAD, pulmonary hypertension, PAF, sick sinus syndrome, s/p permanent pacemaker, hypertension, dyslipidemia, aortic stenosis, s/p TAVR, DM type II, CKD stage III, chronic anemia, PMR, hypothyroidism, GERD, and anxiety.    Summary of hospital course:  Patient was hospitalized at [facility] from August 2 through August 6, 2021 for acute CHF.  Patient presented with shortness of breath and hypoxia.  Work-up revealed BNP of 11,568, troponin I levels of 0.151 and 0.180, hemoglobin of 8.1, and creatinine of 1.13.  INR was therapeutic at 2.59.  Chest x-ray was significant for slight increase in the small bilateral pleural effusions and bibasilar atelectasis/consolidation, cardiomegaly, and linear opacities in the left lung apex, increased compared to prior exam, likely bronchiectasis and/or edema.  EKG showed atrial fibrillation with RVR.  Limited echocardiogram demonstrated LV ejection fraction of 35%, lateral hypokinesis, mildly decreased RV systolic function, 2+ tricuspid regurgitation, moderate pulmonary hypertension, and prior TAVR.  LV systolic function had decreased markedly from previous echo in May 2021 which had shown EF of 65%. She was treated with IV furosemide. Cardiology was consulted and recommended right and left heart catheterization but patient and family declined further cardiac work-up to move toward palliative care.  She was started on metoprolol.  She was treated with IV Venofer x2 in the hospital for iron deficiency.  TCU was recommended per therapies.    Patient is admitted to this facility for medical  management, nursing care, and rehab.     Of note, history was obtained from patient, facility RN, and extensive review of the chart necessitated by complex hospitalization.    Today's visit:  Patient was seen in her room, while lying in bed.  She appears extremely frail.  She is overall not doing well.  She reports dyspnea at rest.  She is currently on 4 L of oxygen.  She denies fever, chills, chest pain, palpitation, nausea, vomiting, abdominal pain, or urinary symptoms.  She reports that she had a bowel movement 2-3 days ago.  She wears her cervical collar intermittently.      CODE STATUS:   DNR / DNI    PAST MEDICAL HISTORY:   CAD, s/p CABG x4 in November 2011  HFrEF; LVEF 35% on August 3, 2021  Moderate pulmonary hypertension  PAD, s/p left common femoral, profunda femoris, superficial femoral, popliteal, and peroneal artery thromboembolectomy in November 2011  PAF  Sick sinus syndrome, s/p permanent pacemaker in November 2011  Hypertension  Dyslipidemia  Severe aortic stenosis, s/p TAVR in May 2020  DM type II  CKD stage IIIb, baseline creatinine 0.9-1.2  Chronic anemia, baseline hemoglobin recently 8-10  Iron deficiency  Hypothyroidism  PMR  GERD  Hiatal hernia  Walters's esophagus   Traumatic C6 fracture in June 2021  Chronic pain syndrome  Postherpetic neuralgia  Anxiety  Chronic vertigo  Osteoporosis    Past Medical History:   Diagnosis Date     Walters's esophagus 7/09     Basal cell carcinoma      Bradycardia     post-op CABG 2011 - s/p pacemaker implanted 2011     CHRONIC VERTIGO 9/99     Colonic Adenoma 3/90, 11/98     Coronary artery disease     CABG x4 2011: LIMA to her LAD, saphenous vein graft to her ramus, saphenous vein graft to her OM, saphenous vein graft to her PDA.     Costochondritis      Depression      DIABETES MELLITUS TYPE II 10/07     DJD      DVT of Leg, postop 11/09    6 months of warfarin     Gastritis 10/89     GERD      HIATAL HERNIA      HYPERLIPIDEMIA      HYPOTHYROIDISM (aka  HASHIMOTO)      Impaired fasting glucose      L Ankle Fracture 10/09     Obesity, unspecified      Osteoporosis, unspecified      PERIPHERAL NEUROPATHY      Polymyalgia rheumatica (H)      Post Herpetic Neuralgia 4/03    R lumbar distribution     Restless leg syndrome      Small vessel cerebrovascular changes 9/99     Stricture and stenosis of esophagus 10/89    Dilated     Syncope     1/06, 8/08, 2/10     VITAMIN D DEFICIENCY 12/07    per Dr. Petty       PAST SURGICAL HISTORY:   Past Surgical History:   Procedure Laterality Date     BYPASS GRAFT ARTERY CORONARY  11/2/2011    CABG x 4  Dr. PINEDA     CV ABDOMINAL ANGIOGRAM N/A 1/27/2020    Procedure: Aortogram Abdominal;  Surgeon: Jenny Luther MD;  Location:  HEART CARDIAC CATH LAB     CV ANGIOGRAM CORONARY GRAFT N/A 1/27/2020    Procedure: Angiogram Coronary Graft;  Surgeon: Jenny Luther MD;  Location:  HEART CARDIAC CATH LAB     CV AORTOGRAM N/A 1/27/2020    Procedure: Aortogram Thoracic;  Surgeon: Jenny Luther MD;  Location:  HEART CARDIAC CATH LAB     CV LEFT HEART CATH N/A 1/27/2020    Procedure: Right and Left heart catheterization for TAVR workup;  Surgeon: Jenny Luther MD;  Location:  HEART CARDIAC CATH LAB     CV TRANSCATHETER AORTIC VALVE REPLACEMENT N/A 5/5/2020    Procedure: Transcatheter Aortic Valve Replacement;  Surgeon: Jenny Luther MD;  Location:  HEART CARDIAC CATH LAB     EMBOLECTOMY LOWER EXTREMITY  11/9/2011    EMBOLECTOMY LOWER EXTREMITY; left leg femoral embolectomy.; Surgeon:JOLEEN BOONE; Location: OR     HYSTERECTOMY, CERVIX STATUS UNKNOWN  1978    partial hysterectomy     SURGICAL HISTORY OF -   10/09    L ankle fracture repair    Dr. Jose Roberto Trevino     [facility] NONSPECIFIC PROCEDURE  age 14    appendectomy     [facility] NONSPECIFIC PROCEDURE  as a child    T&A       FAMILY HISTORY:   Family History   Problem Relation Age of Onset     Alzheimer Disease Sister      Heart  Disease Mother      Heart Disease Father      Heart Disease Son      Heart Disease Brother        SOCIAL HISTORY:  Patient is  and lives in a house.  She ambulates with the assistance of a walker.    Social History     Tobacco Use     Smoking status: Never Smoker     Smokeless tobacco: Never Used   Substance Use Topics     Alcohol use: No       MEDICATIONS:  Current Outpatient Medications   Medication Sig Dispense Refill     acetaminophen (TYLENOL) 500 MG tablet Take 1,000 mg by mouth 3 times daily       aspirin EC 81 MG EC tablet Take 1 tablet by mouth daily.       atorvastatin (LIPITOR) 80 MG tablet Take 1 tablet (80 mg) by mouth daily 90 tablet 3     calcium 600 MG tablet Take 2 tablets by mouth daily        clindamycin (CLEOCIN) 300 MG capsule 600 mg one hr prior to any dental appointment 2 capsule 3     ferrous sulfate (FEROSUL) 325 (65 Fe) MG tablet Take 1 tablet (325 mg) by mouth 2 times daily  0     fluticasone (FLONASE) 50 MCG/ACT nasal spray Spray 2 sprays into both nostrils daily       furosemide (LASIX) 40 MG tablet Take 1 tablet (40 mg) by mouth 2 times daily  0     gabapentin (NEURONTIN) 100 MG capsule Take 2 capsules (200 mg) by mouth At Bedtime (Patient taking differently: Take 200 mg by mouth 3 times daily ) 180 capsule 1     hydrOXYzine (ATARAX) 25 MG tablet Take 1 tablet (25 mg) by mouth every 4 hours as needed for itching or anxiety  0     insulin aspart (NOVOLOG PEN) 100 UNIT/ML pen 3 times a day with meals, for glucometer 150-200 give 2 units SQ, 201-250 give 4 units, >250 give 6 units  0     ipratropium - albuterol 0.5 mg/2.5 mg/3 mL (DUONEB) 0.5-2.5 (3) MG/3ML neb solution Take 1 vial (3 mLs) by nebulization every 4 hours as needed for wheezing (Patient taking differently: Take 3 mLs by nebulization 4 times daily And q4h PRN)  0     isosorbide mononitrate (IMDUR) 30 MG 24 hr tablet Take 1 tablet (30 mg) by mouth daily  0     levothyroxine (SYNTHROID/LEVOTHROID) 50 MCG tablet TAKE ONE  TABLET BY MOUTH ONCE DAILY 90 tablet 3     Lidocaine (LIDOCARE) 4 % Patch Place 1 patch onto the skin every 24 hours To prevent lidocaine toxicity, patient should be patch free for 12 hrs daily. Apply to back       LORazepam (ATIVAN) 0.5 MG tablet Take 1 tablet (0.5 mg) by mouth 3 times daily as needed for anxiety 15 tablet 0     losartan (COZAAR) 50 MG tablet Take 1 tablet (50 mg) by mouth every evening 90 tablet 3     metFORMIN (GLUCOPHAGE) 500 MG tablet TAKE ONE TABLET BY MOUTH DAILY WITH BREAKFAST 90 tablet 0     metoprolol tartrate (LOPRESSOR) 25 MG tablet Take 1 tablet (25 mg) by mouth 2 times daily       miconazole (MICATIN) 2 % external powder Apply topically 2 times daily as needed for other (tinea corpus)  0     nitroGLYCERIN (NITROSTAT) 0.4 MG SL tablet Place 0.4 mg under the tongue every 5 minutes as needed. Up to 3 doses per episode        omeprazole (PRILOSEC) 20 MG DR capsule TAKE 1 CAPSULE BY MOUTH EVERY DAY 90 capsule 3     polyethylene glycol (MIRALAX) powder Take 17 g (1 capful) by mouth daily 1 Bottle 3     traZODone (DESYREL) 50 MG tablet Take 1 tablet (50 mg) by mouth At Bedtime       Vitamin D, Cholecalciferol, 25 MCG (1000 UT) TABS Take 25 mcg by mouth daily       warfarin ANTICOAGULANT (JANTOVEN ANTICOAGULANT) 3 MG tablet Take 1 pill (3 mg) daily, but none on Mon, Wed, Friday on Mon.  Adjust dose for desired INR 2.0 to 2.5 for Atrial Fib. (Patient taking differently: Tu/Th/Sat/Sun) 96 tablet 1       ALLERGIES:  Allergies   Allergen Reactions     Fosamax [Alendronic Acid] GI Disturbance     Severe gastrointestinal reaction     Amoxicillin Hives     Bisphosphonates Other (See Comments)     Swallowing disorder     Boniva [Ibandronate Sodium] Nausea and Vomiting, Diarrhea and Swelling     Arm swelling with IV only     Lisinopril Cough     Niacin Rash     Simvastatin Muscle Pain (Myalgia)       ROS:  10 point ROS were negative other than the symptoms noted above in the HPI.    PHYSICAL  "EXAM:  Vital signs were reviewed in the chart.  Vital Signs: /65   Pulse 89   Temp 97.5  F (36.4  C)   Resp 26   Ht 1.651 m (5' 5\")   Wt 72 kg (158 lb 12.8 oz)   SpO2 94%   BMI 26.43 kg/m    General: Patient is extremely frail and weak and has dyspnea at rest.  HEENT: Normocephalic; oropharynx clear; conjunctivitis pallor  Cardiovascular: Normal S1, S2, RRR  Respiratory: Lungs clear to auscultation bilaterally on anterior chest examination   GI: Abdomen soft, non-tender, non-distended, +BS  Extremities: No significant LE edema  Neuro: CX II-XII grossly intact; ROM in all four extremities grossly intact  Psych: Alert and oriented x3; normal affect  Skin: No acute rash    LABORATORY/IMAGING DATA:    Most Recent 3 CBC's:Recent Labs   Lab Test 08/05/21  0608 08/03/21  0529 08/02/21  1415 07/30/21  1035   WBC  --  10.3 8.8 7.6   HGB 7.8* 8.5* 8.1* 8.7*   MCV  --  65* 66* 66*   PLT  --  306 324 345     Most Recent 3 BMP's:Recent Labs   Lab Test 08/06/21  1019 08/06/21  0207 08/05/21  2109 08/05/21  0555 08/03/21  0529 08/02/21  1416   NA  --   --   --  136 136 135   POTASSIUM  --   --   --  3.7 4.2 5.2   CHLORIDE  --   --   --  105 105 106   CO2  --   --   --  27 26 21   BUN  --   --   --  31* 26 21   CR  --   --   --  1.15* 1.32* 1.13*   ANIONGAP  --   --   --  4 5 8   SHAWNEE  --   --   --  8.4* 8.8 8.8   * 160* 160* 125* 161* 223*     Most Recent 2 LFT's:Recent Labs   Lab Test 07/30/21  1034 05/09/21  0218   AST 22 22   ALT 23 27   ALKPHOS 112 112   BILITOTAL 0.4 0.4     Most Recent 3 Troponin's:Recent Labs   Lab Test 08/05/21  0555 08/02/21  2143 08/02/21  1724 07/30/20  2251 07/29/20  1628 10/09/19  1424   TROPI  --   --   --  <0.015 <0.015 <0.015   TROPONIN 0.192* 0.180* 0.173*  --   --   --      Most Recent 3 BNP's:Recent Labs   Lab Test 08/02/21  1416 04/29/21  1418 10/09/19  1215 02/12/18  1612   NTBNPI 11,568*  --  552  --    NTBNP  --  523*  --  230     Most Recent Cholesterol Panel:Recent Labs "   Lab Test 02/24/21  0943   CHOL 206*   LDL 90   HDL 59   TRIG 286*     Most Recent TSH and T4:Recent Labs   Lab Test 02/24/21  0943   TSH 1.07     Most Recent Hemoglobin A1c:Recent Labs   Lab Test 02/24/21  0943   A1C 6.8*     Most Recent Anemia Panel:Recent Labs   Lab Test 08/05/21  0608 08/03/21  0529   WBC  --  10.3   HGB 7.8* 8.5*   HCT  --  28.8*   MCV  --  65*   PLT  --  306   IRON  --  10*   IRONSAT  --  2*   FEB  --  439*   B12  --  583         ASSESSMENT/PLAN:  Acute HFrEF; LVEF 35%,  Moderate pulmonary hypertension,  Acute hypoxic respiratory failure.  No previous history of CHF.  Patient was treated with IV furosemide in the hospital.  Patient and family declined further cardiac work-up to move toward palliative care if patient's condition does not improve with conservative management.  She is overall extremely weak and frail appearing and has poor prognosis.  She is currently on oxygen at 4 L/min.  Enrollment with hospice is appropriate.   is appropriate.  Plan:  Continue supplemental oxygen; titrate to keep O2 saturation above 90%  Continue Imdur 30 mg p.o. daily, metoprolol 25 mg p.o. twice daily, losartan 50 mg p.o. daily, and furosemide 40 mg p.o. twice daily  Continue DuoNeb as needed  Monitor weight and volume status  Consider hospice enrollment    CAD, s/p CABG x4 in November 2011,  NSTEMI (type I versus type II),  Severe aortic stenosis, s/p TAVR in May 2020,  Essential hypertension,  Dyslipidemia..  Patient and family declined further cardiac work-up to move toward palliative care if patient's condition does not improve with conservative management.  Plan:  Continue aspirin 81 mg p.o. daily, atorvastatin 80 mg p.o. daily, Imdur 30 mg p.o. daily, metoprolol 25 mg p.o. twice daily, and losartan 50 mg p.o. daily  As needed sublingual nitroglycerin  Monitor blood pressure and cardiac status    PAF with RVR,  Sick sinus syndrome, s/p permanent pacemaker in November 2011.  Heart rate is now  controlled.  She is currently on warfarin 3 mg p.o. daily.  Plan:  Continue metoprolol 25 mg p.o. twice daily  Continue chronic anticoagulation therapy with warfarin (target INR 2-3)  Monitor heart rate  Monitor INR    DM type II.  Last hemoglobin A1c was 6.8% in February 2021.  Plan:  Continue diabetic diet  Continue Metformin 500 mg p.o. daily  Continue sliding scale NovoLog  Continue to monitor blood glucose    CKD stage IIIb.  Baseline creatinine 0.9-1.2.  Last creatinine was 1.36 on August 10.  Plan:  Avoid NSAIDs and nephrotoxins  Closely monitor renal function    Chronic anemia,  Iron deficiency.  Baseline hemoglobin recently 8-10.  Last hemoglobin was 7.9 on August 10.  Patient received IV Venofer x2 in the hospital for iron deficiency (serum iron of 10, TIBC of 439, and iron saturation of 2% on August 3).  Hemoccult was negative on August 4, 2021.  Patient declined work-up in the hospital for iron deficiency.  Plan:  Continue ferrous sulfate 325 mg p.o. twice daily  Monitor hemoglobin periodically    Hypothyroidism.  Last TSH was 1.07 in July 2021.  Plan:  Continue PTA levothyroxine 50 mcg p.o. daily    GERD,  Hiatal hernia.  Plan:  Continue PTA omeprazole 20 mg p.o. daily    History of traumatic C6 fracture in June 2021.  Plan:  Encourage wearing cervical collar  Pain management with acetaminophen    Chronic pain syndrome,  Postherpetic neuralgia.  Plan:  Continue gabapentin 200 mg p.o. 3 times daily and lidocaine patch    Anxiety.  Plan:  Continue trazodone 50 mg p.o. nightly and as needed lorazepam and hydroxyzine    Physical deconditioning.  Plan:  Continue PT/OT evaluation and therapy          Orders written by provider at facility:  [facility] on Friday, August 13        Recommendation by provider at facility:  Patient is appropriate for hospice enrollment          Total unit/floor time of 60 minutes consisted of the following: examination of patient, reviewing the record including pertinent labs and  imaging. More than 50% of this time was spent in coordination of care with the patient, nursing staff, and other healthcare providers. This time was spent on discussing the care plan including the indication for hospice, need for blood work, and safe discharge plan.        Electronically signed by:  Parmjit Verdugo MD

## 2021-08-12 PROBLEM — Z51.5 HOSPICE CARE PATIENT: Status: ACTIVE | Noted: 2021-01-01

## 2021-08-12 NOTE — TELEPHONE ENCOUNTER
Prior Authorization Retail Medication Request    Medication/Dose: Lorazepam 0.5 mg tab  ICD code (if different than what is on RX):    Previously Tried and Failed:    Rationale:      Insurance Name:  [employer]  Insurance ID:  [id number]      Pharmacy Information (if different than what is on RX)  Name:    Phone:

## 2021-08-12 NOTE — PROGRESS NOTES
[facility]    Chief Complaint   Patient presents with     RECHECK     HPI:  Madie Silva is a 89 year old  (7/21/1929), who is being seen today for an episodic care visit at: [facility] [[id number]].     This is a 89 yo female who who presents with shortness of breath. She has been short of breath for 2 weeks or more, saw PCP on 7/30, had CXR which showed pleural effusions, and was recommended to start lasix 20mg daily. Continued to decline, developed a cough and felt more dyspneic with activity. She also reported some intermittent chest pain overnight with tachycardia and BLE edema. She was on Metoprolol for afib, but it was stopped for some reason.  She also reports neck pain and has C6 fracture from 3 months ago which she intermittently uses a hard cervical collar. She required 4L NC and was discharged on such. CXR showed bilateral pleural effusions, she was diuresed, restarted on metoprolol 25mg BID. Because of minimal improvement after 3 days, Cardiology was consulted and offered a right and heart left cath to find out exactly what was wrong, but family and patient elected to not do it, at which point the patient mentioned if she didn't improve significantly she would like to die in her own home, and she and son were open to the idea of hospice if needed. She was also found to be anemic, received 2 doses of venofer. She was then discharged to the [facility] for rehab. If no improvement may pursue hospice.     Today's concern is:   hospice    Allergies, and PMH/PSH reviewed in [facility] today.  REVIEW OF SYSTEMS:  4 point ROS including Respiratory, CV, GI and , other than that noted in the HPI,  is negative    Objective:   /73   Pulse 87   Temp 97.8  F (36.6  C)   Resp 28   Wt 71.8 kg (158 lb 3.2 oz)   SpO2 91%   BMI 26.33 kg/m    GENERAL APPEARANCE:  Alert, oriented, anxious, chronic back pain  RESP:  respiratory effort and palpation of chest normal, crackles  bilaterally, 4L NC, ZHAO  CV:  Palpation and auscultation of heart done , regular rate and rhythm, no murmur, rub, or gallop, no edema  PSYCH:  oriented to 2/3    Patient is on hospice/palliative care and labs are not recommended    Assessment/Plan:    Severe AS -- S/P TAVR on 5/5/20  Acute on Chronic Diastolic CHF  Discontinue lasix, support with 4L NC, use morphine sulfate per dyspnea     History of stroke  discontinue ASA and statin     Paroxysmal Atrial fib w RVR -- on Warfarin  discontinue coumadin     Type 2 diabetes mellitus with diabetic nephropathy, without long-term current use of insulin (H)  Last A1c 6.8, discontinue DM tx including oral and insulin. discontinue BS monitoring     Post herpetic neuralgia  Start lidocaine patch, continue gabapentin 200mg TID     HTN  discontinue all BP medications, no longer monitoring BPs     Anxiety  Today per hospice, will change to ativan 0.5mg TID and 0.5mg q4h PRN     Chronic back pain  Continue tylenol 1000mg TID, has morphine sulfate 2mg q2h PRN     Hypothyroidism  discontinue synthroid     Stage 3a chronic kidney disease  Last Cr 1.15 with GFR 41 on 8/5, no longer monitoring     Iron deficiency anemia secondary to inadequate dietary iron intake  Received 2 doses in hospital. Occult negative. discontinue iron     GERD  discontinue omeprazole     Constipation  Continue miralax daily     Insomnia  Continue trazodone 50mg daily    Orders:  Start hospice, discontinue several oral medications, schedule ativan    Electronically signed by: Charles Dawson NP

## 2021-08-12 NOTE — TELEPHONE ENCOUNTER
No pa needed- pa for this medication has already been approved through [employer]. The pharmacy received a paid claim.

## 2021-08-20 ENCOUNTER — TELEPHONE (OUTPATIENT)
Dept: INTERNAL MEDICINE | Facility: CLINIC | Age: 86
End: 2021-08-20

## 2021-08-20 DIAGNOSIS — I80.209 PHLEBITIS AND THROMBOPHLEBITIS OF DEEP VEINS OF LOWER EXTREMITIES (H): Primary | ICD-10-CM

## 2021-08-20 NOTE — TELEPHONE ENCOUNTER
ANTICOAGULATION     Madie Silva is overdue for INR check.     Spoke with [facility]. They state they do not have a record of this patient and could not give recent dosing.     Per Geriatrics notes it looks like warfarin may have been discontinued while at [facility].    Called Son Oliver and requested he call back to establish INR plan going forward. Need to clarify if pt is still on warfarin.    Phillip Siddiqui RN

## 2021-08-25 NOTE — TELEPHONE ENCOUNTER
Per chart review, warfarin was discontinued on  by Charles Dawson. Upon chart review today, it appears pt has .     ANTICOAGULATION  MANAGEMENT    Madie Silva is being discharged from the [facility] (ACC).    Reason for discharge:     Anticoagulation episode resolved, ACC referral closed and INR Standing order discontinued      Phillip Siddiqui RN

## 2021-08-27 ENCOUNTER — PATIENT OUTREACH (OUTPATIENT)
Dept: CARE COORDINATION | Facility: CLINIC | Age: 86
End: 2021-08-27

## 2021-08-27 NOTE — PROGRESS NOTES
Clinic Care Coordination Contact    Situation: Patient chart reviewed by care coordinator.    Background: Pt discharged from care coordination.     Assessment: Reason for discharge:     Plan/Recommendations: No further outreaches will be made at this time.        ZACKERY García  Clinic Care Coordinator  [facility] and [facility]  [phone]  [email or web address]

## 2021-11-03 NOTE — TELEPHONE ENCOUNTER
"Requested Prescriptions   Pending Prescriptions Disp Refills     JANTOVEN 3 MG tablet [Pharmacy Med Name: JANTOVEN 3MG TABS] 100 tablet 3     Sig: TAKE ONE TABLET BY MOUTH ONCE DAILY AND TAKE AN ADDITIONAL HALF TABLET TUESDAY, THURSDAY, AND SATURDAY       Vitamin K Antagonists Failed - 4/9/2019  9:57 AM        Failed - INR is within goal in the past 6 weeks     Confirm INR is within goal in the past 6 weeks.     Recent Labs   Lab Test 03/28/19   INR 2.5*                       Passed - Recent (12 mo) or future (30 days) visit within the authorizing provider's specialty     Patient had office visit in the last 12 months or has a visit in the next 30 days with authorizing provider or within the authorizing provider's specialty.  See \"Patient Info\" tab in inbasket, or \"Choose Columns\" in Meds & Orders section of the refill encounter.              Passed - Medication is active on med list        Passed - Patient is 18 years of age or older        Passed - Patient is not pregnant        Passed - No positive pregnancy on file in past 12 months          " no

## 2023-08-16 NOTE — ED PROVIDER NOTES
History   Chief Complaint  Bleeding from right ear    The history is provided by the patient.      Madie Silva is a 89 year old female with s/p coronary artery bypass on warfarin 7-8 years ago, s/p TAVR (tissue valve), DVT, CAD, hyperlipidemia, vertigo, and type 2 diabetes who presents for evaluation of intermittent bleeding out of her right ear that began this morning. It is not bleeding currently. There is no ear pain. She denies any trauma to the area or any headaches. She states her hearing is poor at baseline and is unsure if her hearing is diminished in that ear. She called a NP hotline who recommended she be evaluated.     Allergies  Amoxicillin  Bisphosphonates  Boniva [Ibandronate Sodium]  Fosamax [Alendronic Acid]  Lisinopril  Niacin  Simvastatin    Medications  Warfarin  Aspirin 81 mg  Atorvastatin  Gabapentin  Levothyroxine  Losartan  Metformin  Metoprolol succinate ER  Omeprazole     Past Medical History  Walters's esophagus  Bradycardia  Chronic vertigo  Colonic adenoma  CAD  Costochondritis  Depression  Type 2 diabetes  DJD  DVT  Hiatal hernia  Hyperlipidemia  Hypothyroidism  GERD  Obesity  Osteoporosis  Peripheral neuropathy  Polymyalgia rheumatica  RLS  Small vessel cerebrovascular changes  Stricture and stenosis of esophagus  Vitamin D deficiency     Past Surgical History  Bypass graft artery coronary  Appendectomy  Tonsillectomy and adenoidectomy  Angiogram coronary graft  Aortogram abdominal  Aortogram thoracic  Left heart cath  Transcatheter aortic valve replacement  Embolectomy lower extremity  Partial hysterectomy  Left ankle fracture repair     Family History  Sister - Alzheimer's disease  Mother - heart disease  Father - heart disease  Son - heart disease  Brother - heart disease    Social History  The patient was unaccompanied to the ED.  Smoking Status: never  Smokeless Tobacco: never  Alcohol Use: no  Drug Use: no  Marital Status:     Review of Systems   HENT: Negative for ear  Quality 130: Documentation Of Current Medications In The Medical Record: Current Medications Documented Additional Notes: Pt declined influenza vaccine at this time pain.         Bleeding from right ear   Neurological: Negative for headaches.   All other systems reviewed and are negative.    Physical Exam     Patient Vitals for the past 24 hrs:   BP Temp Temp src Pulse Resp SpO2   07/12/20 2041 -- -- -- -- -- 99 %   07/12/20 2040 (!) 188/69 -- -- 74 -- 98 %   07/12/20 2023 (!) 172/67 -- -- 74 -- --   07/12/20 1904 (!) 190/73 97.5  F (36.4  C) Temporal 92 20 99 %     Physical Exam  Physical Exam   Constitutional: Pt appears well-developed and well-nourished.  Head: Atraumatic. Head moves freely with normal range of motion. No battles signs. No Racoons eyes.   ENT: Oropharynx is clear and moist. Nose with no deformity. Skin tag at the nose with small scab. No hemotympanum. Blood sititing in the right ear canal, cleared with a Q-tip and an abrasion at the ear canal is seen. TM intact and normal in appearance.   Eyes: Conjunctivae pink. EOMs intact. Pupils are equal, round, and reactive to light.  Neck: Normal range of motion.   Cardiovascular: Regular rate and rhythm. Normal heart sounds. No concerning  murmur heard. Intact distal pulses: radial pulses 2+ on the right, 2+ on the left.   Pulmonary/Chest: No respiratory distress.  Breath sounds normal. No decreased breath sounds. No wheezes. No rhonchi. No rales.   Abdominal: Soft. Non-tender. No rebound, no guarding.   Musculoskeletal: No edema. No tenderness. Distal capillary refill and sensation intact.  Neurological: Oriented to person, place, and time. No focal deficits.   Skin: Skin is warm.       Emergency Department Course     Laboratory:  Laboratory findings were communicated with the patient who voiced understanding of the findings.    CBC: HGB 11.5 (L) o/w WNL (WBC 6.8, )  INR: 3.11 (H)    Procedures    Silver Nitrate Ear Canal Cautery     PROCEDURE NOTE: The location of the patient's bleeding in the right ear canal was identified and cauterized with silver nitrate.  The patient tolerated the procedure well and  Quality 226: Preventive Care And Screening: Tobacco Use: Screening And Cessation Intervention: Patient screened for tobacco use, is a smoker AND did not receive Cessation Counseling during measurement period or in the six months prior to the measurement period there were no complications.  He was observed in the emergency department following the procedure and had no recurrence of his bleeding.     Emergency Department Course:  Past medical records, nursing notes, and vitals reviewed.    1934 I physically examined the patient as documented above.    IV was inserted and blood was drawn for laboratory testing, results above.    2028 I cauterized the patient's right ear canal as documented above.     2022 I rechecked the patient and discussed the findings of their workup thus far.     Findings and plan explained to the Patient. Patient discharged home with instructions regarding supportive care, medications, and reasons to return. The importance of close follow-up was reviewed.     I personally answered all related questions prior to discharge.     Impression & Plan     Medical Decision Making:  Madie Silva is a 89 year old female on Coumadin with hx of DVT who awoke bleeding from her right ear. No pain, oo new hearing loss. No recent fall, head injury or trauma. She notes just a slow trickle at the ear. Upon exam there is an abrasion at the floor of the ear canal on the right, silver nitrate to cauterize this was effective. INR is mildly supratherapeutic at 3.11 and we discussed holding today and tomorrows dose and recheck on Tuesday. Given no known trauma to the ear canal, I did advise she follow up with ENT in case there is a small growth that is bleeding as its difficult to fully assess. Patient is amenable to plan.       Diagnosis:    ICD-10-CM    1. Supratherapeutic INR  R79.1    2. Ear canal abrasion  S00.419A      Disposition:  Discharged to home.    Scribe Disclosure:  I, Destiny Beck, am serving as a scribe at 8:22 PM on 7/12/2020 to document services personally performed by Kim Yanez CNP based on my observations and the provider's statements to me.      Kim Yanez APRN CNP  07/12/20 4318     Quality 110: Preventive Care And Screening: Influenza Immunization: Influenza Immunization not Administered for Documented Reasons. Detail Level: Detailed

## 2024-06-03 NOTE — PROGRESS NOTES
Neurosurgery follow-up    Ms. Silva is a 89-year-old female who fell 12 weeks ago and suffered a right C6 possible articular pillar fracture.  She has been in a cervical collar since that time.  At this time she denies any neck pain.  She denies any radicular symptoms denies any numbness or tingling in her upper extremities as well as denies any loss of coordination in her feet feet.  She has been wearing her cervical collar as directed and has not had any falls.     She has had some issues with flank pain and was recently admitted for that.  She returns to clinic today with a repeat cervical CT scan.    Exam    B/P: 138/70, T: Data Unavailable, P: 90, R: Data Unavailable     Alert and oriented no acute distress  Cervical spine nontender to palpation  Cervical range of motion nonpainful.  Bilateral upper and lower extremities appropriate strength      Imaging    Cervical CT scan shows healing of the right C6 articular pillar fracture.    Assessment    Right C6 articular pillar fracture      Plan     Patient may wean her cervical collar, begin physical therapy, may follow-up with us as needed.      Total time of 30 minutes spent with the patient today in counseling and coordination of care.     The patient is a 48y Female complaining of foot pain/injury.

## 2024-10-18 NOTE — TELEPHONE ENCOUNTER
I am glad it worked out for her.  Continue the gabapentin at 2-5 tablets per day.  SHe may not need to take the 5 tablets per day that she was taking, 2-3 per day may suffice.     cerclage

## 2025-04-21 NOTE — TELEPHONE ENCOUNTER
Patient has appointment scheduled. No further action needed.   Quality 226: Preventive Care And Screening: Tobacco Use: Screening And Cessation Intervention: Patient screened for tobacco use and is an ex/non-smoker Detail Level: Detailed Quality 47: Advance Care Plan: Advance Care Planning discussed and documented; advance care plan or surrogate decision maker documented in the medical record.

## 2025-05-11 NOTE — CODE/RAPID RESPONSE
[facility]    House [facility] RRT Note  7/30/2020   Time Called: 2232    RRT called for: Chest pain    Assessment & Plan     Left upper reproducible chest pain suspect 2/2 recent fracture of L humeral head and surgical neck.  Alternatively considered: PE, fat embolism, PTX, GERD, ACS  - Upon arrival, pt lying in bed, awake, alert, in no apparent distress, conversing and joking with staff.  Per nursing, pt has not received any dilaudid throughout the day for shoulder pain, only oxycodone and acetaminophen.  Pt reports baseline SOB, unchanged.  Pt reports no nausea, diaphoresis.  Pt unable to describe nature of pain; however, reproducible with palpation.  Pt notes waxing/waning L upper chest pain.  VS noting HR 70s, SBP 150s-160s, RR 10s-20s, O2 sats > 92% on RA.      INTERVENTIONS:  - Stat EKG  - Stat trop  - Noted pt received warfarin this evening and ASA this morning, therapeutic INR noted  - Will resume telemetry at this time  - PRN dilaudid IV now   - Pt requesting muscle relaxant, will order flexeril at this time    At the end of the RRT pt remains hemodynamically stable, resting.    Discussed with and defer further cares to nursing and hospitalist    Interval History     Madie Silva is a 89 year old female who was admitted on 7/29/2020 for shoulder pain.    Medical history significant for: Severe aortic stenosis s/p TAVR, CAD s/p 4V CABG, HTN, HLP, symptomatic bradycardia s/p PPM, PAF on chronic anticoagulation    Code Status: Full Code    Allergies   Allergies   Allergen Reactions     Amoxicillin      hives     Bisphosphonates      Swallowing disorder     Boniva [Ibandronate Sodium] Nausea and Vomiting     Diarrhea, N/V with oral.  IV caused arm to swell      Fosamax [Alendronic Acid]      Severe gastrointestinal reaction     Lisinopril      cough     Niacin      rash     Simvastatin      myalgias       Physical Exam   Vital Signs with Ranges:  Temp:  [97.2  F (36.2  C)-98.3  F (36.8  C)] 98.1  F  (36.7  C)  Pulse:  [69-75] 75  Resp:  [16-18] 16  BP: (121-160)/(49-84) 142/51  SpO2:  [91 %-95 %] 92 %  I/O last 3 completed shifts:  In: 100 [P.O.:100]  Out: 650 [Urine:650]    Constitutional: Pt lying in bed, awake, alert, in no apparent distress, appears younger than stated age  Pulmonary: In no apparent respiratory distress, shallow inspirations, clear lung sounds throughout, no obvious crackles or wheezes noted  Cardiovascular: Regular rate and rhythm, normal S1S2, no murmur, rub or gallop noted, noted PPM L upper chest  GI: Soft, nondistended, nontender to palpation, no guarding or rebound tenderness noted  Skin/Integumen: Warm, dry  Neuro: A/Ox4, clear speech  Psych:  Mildly anxious  Extremities: No peripheral edema, LUE in sling    Data     EKG:  Interpreted by HAROLDO James CNP  Time reviewed: 2255  Symptoms at time of EKG: L upper chest pain   Rhythm: normal sinus   Rate: Normal  Axis: Normal  Ectopy: none  Conduction: normal  ST Segments/ T Waves: No ST-T wave changes and No acute ischemic changes  Q Waves: III  Comparison to prior: Unchanged from 7/29/20    Clinical Impression: no acute changes      Troponin:    Recent Labs   Lab Test 07/30/20  2251   TROPI <0.015       Time Spent on this Encounter   I spent 15 minutes on the unit/floor managing the care of Madie Silva. Over 50% of my time was spent counseling the patient and/or coordinating care regarding services listed in this note.      HAROLDO James CNP  House [facility]   complains of pain/discomfort

## (undated) DEVICE — CATH ANGIO SLCT 6FR DIA 100CML

## (undated) DEVICE — SHEATH INTRODUCER SET 20-26MM HEART VALV

## (undated) DEVICE — GUIDEWIRE VASC 300CM .014IN CHC PT I H7491215501J2

## (undated) DEVICE — INFLATION DEVICE ATRION QL2530

## (undated) DEVICE — 12FT REMINGTON PACING CABLE CONNECTOR WITH PACE-LOC SAFETY DEVICE, DISPOSABLE

## (undated) DEVICE — TOTE ANGIO CORP PC15AT SAN32CC83O

## (undated) DEVICE — DEVICE FEMOSTOP COMPRESSION 11165

## (undated) DEVICE — Device

## (undated) DEVICE — INTRO TERUMO 6FRX25CM W/MARKER RSB603

## (undated) DEVICE — DEFIB PRO-PADZ LVP LQD GEL ADULT 8900-2105-01

## (undated) DEVICE — CATH ANGIO JUDKINS JL4 6FRX100CM INFINITI 534620T

## (undated) DEVICE — KIT HAND CONTROL ANGIOTOUCH ACIST 65CM AT-P65

## (undated) DEVICE — INTRO TERUMO 7FRX25CM W/MARKER RSB703

## (undated) DEVICE — CATH ANGIO INFINITI IM 4FRX100CM 538460

## (undated) DEVICE — CATH ANGIO INFINITI PIGTAIL 145 6 SH 6FRX110CM  534-652S

## (undated) DEVICE — INTRO GLIDESHEATH SLENDER 6FR 10X45CM 60-1060

## (undated) DEVICE — INTRODUCER CATH VASC 5FRX10CM  MPIS-501-NT-U-SST

## (undated) DEVICE — WIRE GUIDE LUNDERQUIST 0.035"X260CM DBL CVD

## (undated) DEVICE — CLOSURE ANGIOSEAL 6FR 610130

## (undated) DEVICE — RAD CLOSURE ANGIOSEAL 8FR  610131

## (undated) DEVICE — CATH ANGIO SUPERTORQUE AL1 6FRX100CM 532-645

## (undated) DEVICE — INTRO SHEATH 7FRX10CM PINNACLE RSS702

## (undated) DEVICE — WIRE GUIDE 0.035"X150CM EMERALD STR 502542

## (undated) DEVICE — WIRE GUIDE 0.035"X260CM SAFE-T-J EXCHANGE G00517

## (undated) DEVICE — MANIFOLD KIT ANGIO AUTOMATED 014613

## (undated) DEVICE — CATH DIAG 4FR ANG PIG 538453S

## (undated) DEVICE — SYR ANGIOGRAPHY MULTIUSE KIT ACIST 014612

## (undated) DEVICE — GUIDEWIRE VERSACORE 0.035"X300CM  J-TIP 1012068-07

## (undated) DEVICE — CATH ANGIO INFINITI 3DRC 6FRX100CM 534676T

## (undated) DEVICE — GUIDEWIRE VASC SAFARI2 0.035X275CM H74939406XS1

## (undated) DEVICE — CLOSURE DEVICE 6FR VASC PROGLIDE MEDICATED SUTURE 12673-03

## (undated) DEVICE — NDL PERC ENTRY THINWALL 18GA 7.0" G00166

## (undated) DEVICE — CATH EP PACEL 5FRX110CM 1MM RIGHT HEART CURVE 401763

## (undated) DEVICE — INTRO SHEATH 6FRX10CM PINNACLE RSS602

## (undated) RX ORDER — REGADENOSON 0.08 MG/ML
INJECTION, SOLUTION INTRAVENOUS
Status: DISPENSED
Start: 2018-03-22

## (undated) RX ORDER — HEPARIN SODIUM 1000 [USP'U]/ML
INJECTION, SOLUTION INTRAVENOUS; SUBCUTANEOUS
Status: DISPENSED
Start: 2020-05-05

## (undated) RX ORDER — CLINDAMYCIN PHOSPHATE 900 MG/50ML
INJECTION, SOLUTION INTRAVENOUS
Status: DISPENSED
Start: 2020-05-05

## (undated) RX ORDER — FENTANYL CITRATE 50 UG/ML
INJECTION, SOLUTION INTRAMUSCULAR; INTRAVENOUS
Status: DISPENSED
Start: 2020-05-05

## (undated) RX ORDER — HEPARIN SODIUM 200 [USP'U]/100ML
INJECTION, SOLUTION INTRAVENOUS
Status: DISPENSED
Start: 2020-05-05

## (undated) RX ORDER — LIDOCAINE HYDROCHLORIDE 10 MG/ML
INJECTION, SOLUTION EPIDURAL; INFILTRATION; INTRACAUDAL; PERINEURAL
Status: DISPENSED
Start: 2020-05-05

## (undated) RX ORDER — FENTANYL CITRATE 50 UG/ML
INJECTION, SOLUTION INTRAMUSCULAR; INTRAVENOUS
Status: DISPENSED
Start: 2020-01-27

## (undated) RX ORDER — HEPARIN SODIUM 1000 [USP'U]/ML
INJECTION, SOLUTION INTRAVENOUS; SUBCUTANEOUS
Status: DISPENSED
Start: 2020-01-27

## (undated) RX ORDER — LIDOCAINE HYDROCHLORIDE 10 MG/ML
INJECTION, SOLUTION EPIDURAL; INFILTRATION; INTRACAUDAL; PERINEURAL
Status: DISPENSED
Start: 2020-01-27

## (undated) RX ORDER — ACETAMINOPHEN 325 MG/1
TABLET ORAL
Status: DISPENSED
Start: 2020-01-27